# Patient Record
Sex: FEMALE | Race: WHITE | NOT HISPANIC OR LATINO
[De-identification: names, ages, dates, MRNs, and addresses within clinical notes are randomized per-mention and may not be internally consistent; named-entity substitution may affect disease eponyms.]

---

## 2017-06-28 ENCOUNTER — APPOINTMENT (OUTPATIENT)
Dept: ORTHOPEDIC SURGERY | Facility: CLINIC | Age: 82
End: 2017-06-28

## 2017-06-28 VITALS
HEART RATE: 65 BPM | SYSTOLIC BLOOD PRESSURE: 157 MMHG | BODY MASS INDEX: 29.12 KG/M2 | DIASTOLIC BLOOD PRESSURE: 72 MMHG | WEIGHT: 135 LBS | TEMPERATURE: 97.9 F | HEIGHT: 57 IN

## 2017-06-28 DIAGNOSIS — M25.572 PAIN IN LEFT ANKLE AND JOINTS OF LEFT FOOT: ICD-10-CM

## 2017-06-28 DIAGNOSIS — Z86.79 PERSONAL HISTORY OF OTHER DISEASES OF THE CIRCULATORY SYSTEM: ICD-10-CM

## 2017-06-28 DIAGNOSIS — Z86.39 PERSONAL HISTORY OF OTHER ENDOCRINE, NUTRITIONAL AND METABOLIC DISEASE: ICD-10-CM

## 2017-06-29 ENCOUNTER — INPATIENT (INPATIENT)
Facility: HOSPITAL | Age: 82
LOS: 2 days | Discharge: HOME | DRG: 194 | End: 2017-07-02
Attending: HOSPITALIST | Admitting: HOSPITALIST
Payer: MEDICARE

## 2017-06-29 VITALS
WEIGHT: 134.92 LBS | RESPIRATION RATE: 24 BRPM | SYSTOLIC BLOOD PRESSURE: 135 MMHG | HEIGHT: 60 IN | OXYGEN SATURATION: 96 % | TEMPERATURE: 99 F | HEART RATE: 90 BPM | DIASTOLIC BLOOD PRESSURE: 71 MMHG

## 2017-06-29 LAB
ALBUMIN SERPL ELPH-MCNC: 4.1 G/DL — SIGNIFICANT CHANGE UP (ref 3.3–5.2)
ALP SERPL-CCNC: 55 U/L — SIGNIFICANT CHANGE UP (ref 40–120)
ALT FLD-CCNC: 17 U/L — SIGNIFICANT CHANGE UP
ANION GAP SERPL CALC-SCNC: 12 MMOL/L — SIGNIFICANT CHANGE UP (ref 5–17)
APTT BLD: 31.1 SEC — SIGNIFICANT CHANGE UP (ref 27.5–37.4)
AST SERPL-CCNC: 25 U/L — SIGNIFICANT CHANGE UP
BASOPHILS # BLD AUTO: 0 K/UL — SIGNIFICANT CHANGE UP (ref 0–0.2)
BASOPHILS NFR BLD AUTO: 0.2 % — SIGNIFICANT CHANGE UP (ref 0–2)
BILIRUB SERPL-MCNC: 0.8 MG/DL — SIGNIFICANT CHANGE UP (ref 0.4–2)
BUN SERPL-MCNC: 22 MG/DL — HIGH (ref 8–20)
CALCIUM SERPL-MCNC: 9.6 MG/DL — SIGNIFICANT CHANGE UP (ref 8.6–10.2)
CHLORIDE SERPL-SCNC: 102 MMOL/L — SIGNIFICANT CHANGE UP (ref 98–107)
CO2 SERPL-SCNC: 30 MMOL/L — HIGH (ref 22–29)
CREAT SERPL-MCNC: 0.86 MG/DL — SIGNIFICANT CHANGE UP (ref 0.5–1.3)
D DIMER BLD IA.RAPID-MCNC: 553 NG/ML DDU — HIGH
EOSINOPHIL # BLD AUTO: 0.1 K/UL — SIGNIFICANT CHANGE UP (ref 0–0.5)
EOSINOPHIL NFR BLD AUTO: 0.3 % — SIGNIFICANT CHANGE UP (ref 0–6)
GLUCOSE SERPL-MCNC: 92 MG/DL — SIGNIFICANT CHANGE UP (ref 70–115)
HCT VFR BLD CALC: 36.1 % — LOW (ref 37–47)
HGB BLD-MCNC: 11.9 G/DL — LOW (ref 12–16)
INR BLD: 1.02 RATIO — SIGNIFICANT CHANGE UP (ref 0.88–1.16)
LACTATE BLDV-MCNC: 1.4 MMOL/L — SIGNIFICANT CHANGE UP (ref 0.5–2)
LYMPHOCYTES # BLD AUTO: 2.1 K/UL — SIGNIFICANT CHANGE UP (ref 1–4.8)
LYMPHOCYTES # BLD AUTO: 8.7 % — LOW (ref 20–55)
MCHC RBC-ENTMCNC: 28.7 PG — SIGNIFICANT CHANGE UP (ref 27–31)
MCHC RBC-ENTMCNC: 33 G/DL — SIGNIFICANT CHANGE UP (ref 32–36)
MCV RBC AUTO: 87.2 FL — SIGNIFICANT CHANGE UP (ref 81–99)
MONOCYTES # BLD AUTO: 2.6 K/UL — HIGH (ref 0–0.8)
MONOCYTES NFR BLD AUTO: 11.1 % — HIGH (ref 3–10)
NEUTROPHILS # BLD AUTO: 18.8 K/UL — HIGH (ref 1.8–8)
NEUTROPHILS NFR BLD AUTO: 79.4 % — HIGH (ref 37–73)
NT-PROBNP SERPL-SCNC: 846 PG/ML — HIGH (ref 0–300)
PLAT MORPH BLD: NORMAL — SIGNIFICANT CHANGE UP
PLATELET # BLD AUTO: 321 K/UL — SIGNIFICANT CHANGE UP (ref 150–400)
POTASSIUM SERPL-MCNC: 5 MMOL/L — SIGNIFICANT CHANGE UP (ref 3.5–5.3)
POTASSIUM SERPL-SCNC: 5 MMOL/L — SIGNIFICANT CHANGE UP (ref 3.5–5.3)
PROT SERPL-MCNC: 6.9 G/DL — SIGNIFICANT CHANGE UP (ref 6.6–8.7)
PROTHROM AB SERPL-ACNC: 11.2 SEC — SIGNIFICANT CHANGE UP (ref 9.8–12.7)
RBC # BLD: 4.14 M/UL — LOW (ref 4.4–5.2)
RBC # FLD: 15.2 % — SIGNIFICANT CHANGE UP (ref 11–15.6)
RBC BLD AUTO: NORMAL — SIGNIFICANT CHANGE UP
SODIUM SERPL-SCNC: 144 MMOL/L — SIGNIFICANT CHANGE UP (ref 135–145)
TROPONIN T SERPL-MCNC: 0.02 NG/ML — SIGNIFICANT CHANGE UP (ref 0–0.06)
WBC # BLD: 23.7 K/UL — HIGH (ref 4.8–10.8)
WBC # FLD AUTO: 23.7 K/UL — HIGH (ref 4.8–10.8)

## 2017-06-29 PROCEDURE — 93010 ELECTROCARDIOGRAM REPORT: CPT

## 2017-06-29 PROCEDURE — 99285 EMERGENCY DEPT VISIT HI MDM: CPT

## 2017-06-29 PROCEDURE — 93970 EXTREMITY STUDY: CPT | Mod: 26

## 2017-06-29 PROCEDURE — 71020: CPT | Mod: 26

## 2017-06-29 PROCEDURE — 99223 1ST HOSP IP/OBS HIGH 75: CPT

## 2017-06-29 PROCEDURE — 71275 CT ANGIOGRAPHY CHEST: CPT | Mod: 26

## 2017-06-29 RX ORDER — SODIUM CHLORIDE 9 MG/ML
3 INJECTION INTRAMUSCULAR; INTRAVENOUS; SUBCUTANEOUS ONCE
Qty: 0 | Refills: 0 | Status: COMPLETED | OUTPATIENT
Start: 2017-06-29 | End: 2017-06-29

## 2017-06-29 RX ADMIN — TRAMADOL HYDROCHLORIDE 50 MILLIGRAM(S): 50 TABLET ORAL at 23:23

## 2017-06-29 RX ADMIN — TRAMADOL HYDROCHLORIDE 50 MILLIGRAM(S): 50 TABLET ORAL at 22:41

## 2017-06-29 RX ADMIN — SODIUM CHLORIDE 3 MILLILITER(S): 9 INJECTION INTRAMUSCULAR; INTRAVENOUS; SUBCUTANEOUS at 19:36

## 2017-06-29 NOTE — ED PROVIDER NOTE - OBJECTIVE STATEMENT
84 y/o F presents for SOB has been having chronic issue with mucus production and felt that the sob was getting worse so came in for that reason denies fevers no chest or abd pain no prior hx of dvt or pe has been having some chronic pain in the left ankle and undergoing w/u with pcp dr gusman

## 2017-06-29 NOTE — H&P ADULT - HISTORY OF PRESENT ILLNESS
84 y/o female came to the ER because of worsening of SOB and wheezing. patient added that she has productive cough and SOB for about 1 month. low grade fever today.

## 2017-06-29 NOTE — ED ADULT TRIAGE NOTE - CHIEF COMPLAINT QUOTE
pt BIBA from home with SOB and dizziness all day. pt AOX3, on 3L of 02 on arrival, no hx of COPD or 02 use.

## 2017-06-29 NOTE — ED PROVIDER NOTE - PROGRESS NOTE DETAILS
Labs as noted.  No old labs for comparison.  Will obtain CT chest to R/O PE.  Case d/w hospitalist/Dr. Sheikh for PMD/Dr. Bell and will admit for further w/u and evaluation

## 2017-06-29 NOTE — ED PROVIDER NOTE - MEDICAL DECISION MAKING DETAILS
labs/ekg/doppler/dimer re-eval symptoms of chronic cough with some sob non smoker no hx of hypercoag or clot no prolonged periods of immobility

## 2017-06-29 NOTE — ED PROVIDER NOTE - NS ED MD DISPO DIVISION
Per provider's request, SW met with patient to discuss completing an Advance Directive.  Patient reported that he remembers completing on in the past.  SW looked in EPIC and found that patient does have an Advance Directive on file.  SW went through the completed POA and patient indicated that it is still current and that no changes need to be made.   Export

## 2017-06-30 DIAGNOSIS — Z98.1 ARTHRODESIS STATUS: Chronic | ICD-10-CM

## 2017-06-30 DIAGNOSIS — I10 ESSENTIAL (PRIMARY) HYPERTENSION: ICD-10-CM

## 2017-06-30 DIAGNOSIS — M54.5 LOW BACK PAIN: ICD-10-CM

## 2017-06-30 DIAGNOSIS — J18.1 LOBAR PNEUMONIA, UNSPECIFIED ORGANISM: ICD-10-CM

## 2017-06-30 DIAGNOSIS — R06.02 SHORTNESS OF BREATH: ICD-10-CM

## 2017-06-30 LAB
ANION GAP SERPL CALC-SCNC: 10 MMOL/L — SIGNIFICANT CHANGE UP (ref 5–17)
APPEARANCE UR: CLEAR — SIGNIFICANT CHANGE UP
BACTERIA # UR AUTO: ABNORMAL
BILIRUB UR-MCNC: NEGATIVE — SIGNIFICANT CHANGE UP
BUN SERPL-MCNC: 17 MG/DL — SIGNIFICANT CHANGE UP (ref 8–20)
CALCIUM SERPL-MCNC: 9.1 MG/DL — SIGNIFICANT CHANGE UP (ref 8.6–10.2)
CHLORIDE SERPL-SCNC: 104 MMOL/L — SIGNIFICANT CHANGE UP (ref 98–107)
CO2 SERPL-SCNC: 28 MMOL/L — SIGNIFICANT CHANGE UP (ref 22–29)
COLOR SPEC: YELLOW — SIGNIFICANT CHANGE UP
COMMENT - URINE: SIGNIFICANT CHANGE UP
CREAT SERPL-MCNC: 0.73 MG/DL — SIGNIFICANT CHANGE UP (ref 0.5–1.3)
DIFF PNL FLD: NEGATIVE — SIGNIFICANT CHANGE UP
EPI CELLS # UR: SIGNIFICANT CHANGE UP
GLUCOSE SERPL-MCNC: 154 MG/DL — HIGH (ref 70–115)
GLUCOSE UR QL: NEGATIVE MG/DL — SIGNIFICANT CHANGE UP
HCT VFR BLD CALC: 32.8 % — LOW (ref 37–47)
HGB BLD-MCNC: 10.8 G/DL — LOW (ref 12–16)
KETONES UR-MCNC: NEGATIVE — SIGNIFICANT CHANGE UP
LEUKOCYTE ESTERASE UR-ACNC: ABNORMAL
MAGNESIUM SERPL-MCNC: 2.2 MG/DL — SIGNIFICANT CHANGE UP (ref 1.6–2.6)
MCHC RBC-ENTMCNC: 28.6 PG — SIGNIFICANT CHANGE UP (ref 27–31)
MCHC RBC-ENTMCNC: 32.9 G/DL — SIGNIFICANT CHANGE UP (ref 32–36)
MCV RBC AUTO: 87 FL — SIGNIFICANT CHANGE UP (ref 81–99)
NITRITE UR-MCNC: NEGATIVE — SIGNIFICANT CHANGE UP
PH UR: 5 — SIGNIFICANT CHANGE UP (ref 5–8)
PLATELET # BLD AUTO: 302 K/UL — SIGNIFICANT CHANGE UP (ref 150–400)
POTASSIUM SERPL-MCNC: 4.2 MMOL/L — SIGNIFICANT CHANGE UP (ref 3.5–5.3)
POTASSIUM SERPL-SCNC: 4.2 MMOL/L — SIGNIFICANT CHANGE UP (ref 3.5–5.3)
PROCALCITONIN SERPL-MCNC: 0.7 NG/ML — HIGH (ref 0–0.04)
PROT UR-MCNC: NEGATIVE MG/DL — SIGNIFICANT CHANGE UP
RBC # BLD: 3.77 M/UL — LOW (ref 4.4–5.2)
RBC # FLD: 15.4 % — SIGNIFICANT CHANGE UP (ref 11–15.6)
RBC CASTS # UR COMP ASSIST: SIGNIFICANT CHANGE UP /HPF (ref 0–4)
SODIUM SERPL-SCNC: 142 MMOL/L — SIGNIFICANT CHANGE UP (ref 135–145)
SP GR SPEC: 1.01 — SIGNIFICANT CHANGE UP (ref 1.01–1.02)
UROBILINOGEN FLD QL: NEGATIVE MG/DL — SIGNIFICANT CHANGE UP
WBC # BLD: 16.1 K/UL — HIGH (ref 4.8–10.8)
WBC # FLD AUTO: 16.1 K/UL — HIGH (ref 4.8–10.8)
WBC UR QL: ABNORMAL

## 2017-06-30 PROCEDURE — 99233 SBSQ HOSP IP/OBS HIGH 50: CPT

## 2017-06-30 PROCEDURE — 93306 TTE W/DOPPLER COMPLETE: CPT | Mod: 26

## 2017-06-30 RX ORDER — DILTIAZEM HCL 120 MG
240 CAPSULE, EXT RELEASE 24 HR ORAL DAILY
Qty: 0 | Refills: 0 | Status: DISCONTINUED | OUTPATIENT
Start: 2017-06-30 | End: 2017-07-02

## 2017-06-30 RX ORDER — CEFTRIAXONE 500 MG/1
1 INJECTION, POWDER, FOR SOLUTION INTRAMUSCULAR; INTRAVENOUS EVERY 24 HOURS
Qty: 0 | Refills: 0 | Status: DISCONTINUED | OUTPATIENT
Start: 2017-07-01 | End: 2017-07-02

## 2017-06-30 RX ORDER — AZITHROMYCIN 500 MG/1
500 TABLET, FILM COATED ORAL EVERY 24 HOURS
Qty: 0 | Refills: 0 | Status: DISCONTINUED | OUTPATIENT
Start: 2017-07-01 | End: 2017-07-02

## 2017-06-30 RX ORDER — DICLOFENAC SODIUM 75 MG/1
0 TABLET, DELAYED RELEASE ORAL
Qty: 0 | Refills: 0 | COMMUNITY

## 2017-06-30 RX ORDER — TRAMADOL HYDROCHLORIDE 50 MG/1
50 TABLET ORAL ONCE
Qty: 0 | Refills: 0 | Status: DISCONTINUED | OUTPATIENT
Start: 2017-06-30 | End: 2017-06-29

## 2017-06-30 RX ORDER — ENOXAPARIN SODIUM 100 MG/ML
40 INJECTION SUBCUTANEOUS EVERY 24 HOURS
Qty: 0 | Refills: 0 | Status: DISCONTINUED | OUTPATIENT
Start: 2017-06-30 | End: 2017-07-02

## 2017-06-30 RX ORDER — TRAMADOL HYDROCHLORIDE 50 MG/1
25 TABLET ORAL
Qty: 0 | Refills: 0 | Status: DISCONTINUED | OUTPATIENT
Start: 2017-06-30 | End: 2017-06-30

## 2017-06-30 RX ORDER — ATORVASTATIN CALCIUM 80 MG/1
40 TABLET, FILM COATED ORAL AT BEDTIME
Qty: 0 | Refills: 0 | Status: DISCONTINUED | OUTPATIENT
Start: 2017-06-30 | End: 2017-07-02

## 2017-06-30 RX ORDER — PANTOPRAZOLE SODIUM 20 MG/1
40 TABLET, DELAYED RELEASE ORAL
Qty: 0 | Refills: 0 | Status: DISCONTINUED | OUTPATIENT
Start: 2017-06-30 | End: 2017-07-02

## 2017-06-30 RX ORDER — AZITHROMYCIN 500 MG/1
TABLET, FILM COATED ORAL
Qty: 0 | Refills: 0 | Status: DISCONTINUED | OUTPATIENT
Start: 2017-06-30 | End: 2017-07-02

## 2017-06-30 RX ORDER — AZITHROMYCIN 500 MG/1
500 TABLET, FILM COATED ORAL ONCE
Qty: 0 | Refills: 0 | Status: COMPLETED | OUTPATIENT
Start: 2017-06-30 | End: 2017-06-30

## 2017-06-30 RX ORDER — TRAMADOL HYDROCHLORIDE 50 MG/1
100 TABLET ORAL THREE TIMES A DAY
Qty: 0 | Refills: 0 | Status: DISCONTINUED | OUTPATIENT
Start: 2017-06-30 | End: 2017-07-02

## 2017-06-30 RX ORDER — ONDANSETRON 8 MG/1
4 TABLET, FILM COATED ORAL EVERY 6 HOURS
Qty: 0 | Refills: 0 | Status: DISCONTINUED | OUTPATIENT
Start: 2017-06-30 | End: 2017-07-02

## 2017-06-30 RX ORDER — CEFTRIAXONE 500 MG/1
1 INJECTION, POWDER, FOR SOLUTION INTRAMUSCULAR; INTRAVENOUS ONCE
Qty: 0 | Refills: 0 | Status: COMPLETED | OUTPATIENT
Start: 2017-06-30 | End: 2017-06-30

## 2017-06-30 RX ORDER — SENNA PLUS 8.6 MG/1
2 TABLET ORAL AT BEDTIME
Qty: 0 | Refills: 0 | Status: DISCONTINUED | OUTPATIENT
Start: 2017-06-30 | End: 2017-07-02

## 2017-06-30 RX ORDER — CEFTRIAXONE 500 MG/1
INJECTION, POWDER, FOR SOLUTION INTRAMUSCULAR; INTRAVENOUS
Qty: 0 | Refills: 0 | Status: DISCONTINUED | OUTPATIENT
Start: 2017-06-30 | End: 2017-07-02

## 2017-06-30 RX ORDER — GABAPENTIN 400 MG/1
300 CAPSULE ORAL
Qty: 0 | Refills: 0 | Status: DISCONTINUED | OUTPATIENT
Start: 2017-06-30 | End: 2017-06-30

## 2017-06-30 RX ORDER — ACETAMINOPHEN 500 MG
650 TABLET ORAL EVERY 6 HOURS
Qty: 0 | Refills: 0 | Status: DISCONTINUED | OUTPATIENT
Start: 2017-06-30 | End: 2017-07-02

## 2017-06-30 RX ORDER — VALSARTAN 80 MG/1
160 TABLET ORAL DAILY
Qty: 0 | Refills: 0 | Status: DISCONTINUED | OUTPATIENT
Start: 2017-06-30 | End: 2017-07-02

## 2017-06-30 RX ADMIN — Medication 240 MILLIGRAM(S): at 06:37

## 2017-06-30 RX ADMIN — VALSARTAN 160 MILLIGRAM(S): 80 TABLET ORAL at 06:37

## 2017-06-30 RX ADMIN — ATORVASTATIN CALCIUM 40 MILLIGRAM(S): 80 TABLET, FILM COATED ORAL at 21:30

## 2017-06-30 RX ADMIN — TRAMADOL HYDROCHLORIDE 100 MILLIGRAM(S): 50 TABLET ORAL at 21:30

## 2017-06-30 RX ADMIN — CEFTRIAXONE 100 GRAM(S): 500 INJECTION, POWDER, FOR SOLUTION INTRAMUSCULAR; INTRAVENOUS at 02:06

## 2017-06-30 RX ADMIN — TRAMADOL HYDROCHLORIDE 100 MILLIGRAM(S): 50 TABLET ORAL at 16:30

## 2017-06-30 RX ADMIN — TRAMADOL HYDROCHLORIDE 25 MILLIGRAM(S): 50 TABLET ORAL at 06:37

## 2017-06-30 RX ADMIN — TRAMADOL HYDROCHLORIDE 50 MILLIGRAM(S): 50 TABLET ORAL at 04:42

## 2017-06-30 RX ADMIN — AZITHROMYCIN 255 MILLIGRAM(S): 500 TABLET, FILM COATED ORAL at 03:50

## 2017-06-30 RX ADMIN — GABAPENTIN 300 MILLIGRAM(S): 400 CAPSULE ORAL at 06:39

## 2017-06-30 RX ADMIN — TRAMADOL HYDROCHLORIDE 100 MILLIGRAM(S): 50 TABLET ORAL at 22:30

## 2017-06-30 RX ADMIN — PANTOPRAZOLE SODIUM 40 MILLIGRAM(S): 20 TABLET, DELAYED RELEASE ORAL at 06:37

## 2017-06-30 RX ADMIN — TRAMADOL HYDROCHLORIDE 100 MILLIGRAM(S): 50 TABLET ORAL at 15:33

## 2017-06-30 RX ADMIN — TRAMADOL HYDROCHLORIDE 25 MILLIGRAM(S): 50 TABLET ORAL at 07:37

## 2017-06-30 NOTE — SWALLOW BEDSIDE ASSESSMENT ADULT - SLP PERTINENT HISTORY OF CURRENT PROBLEM
Pt reports food sticking in lower esophagus" when she gets stressed. Pt reports she was scheduled to have an endoscopy PTA

## 2017-06-30 NOTE — PHYSICAL THERAPY INITIAL EVALUATION ADULT - ADDITIONAL COMMENTS
Pt lives in a private home with 3 steps to enter (+) HR. Pt uses her rollator all the time. She also still drives.

## 2017-06-30 NOTE — PATIENT PROFILE ADULT. - ABILITY TO HEAR (WITH HEARING AID OR HEARING APPLIANCE IF NORMALLY USED):
Mildly to Moderately Impaired: difficulty hearing in some environments or speaker may need to increase volume or speak distinctly/2 hearing aids

## 2017-06-30 NOTE — PROGRESS NOTE ADULT - ASSESSMENT
1) CAP --> follow up procalcitonin  --> c.w iv abx  --> follow up cultures  --> likely transtion to oral abx tomorrow  --> speech consult appreciated    2) htn --> home meds    3) hld --< statin     4) dvt prop --> lovenox sub q    5) chronic pain --. discontinued gabapentin as per family request  --> follow up with private pain management    dispo --> follow up tte

## 2017-06-30 NOTE — PROGRESS NOTE ADULT - SUBJECTIVE AND OBJECTIVE BOX
MAMTA TALAVERA    [id number]    85y      Female    INTERVAL HPI/OVERNIGHT EVENTS:    patient being seen for cap, chronic pain and med management. patient seen at bedside with daughter in law and states feeling better.     last 24 hours patient is afebrile.       REVIEW OF SYSTEMS:    CONSTITUTIONAL: No fever, weight loss, or fatigue  RESPIRATORY: wheezing   CARDIOVASCULAR: No chest pain, palpitations  GASTROINTESTINAL: No abdominal or epigastric pain. No nausea, vomiting  NEUROLOGICAL: No headaches, memory loss, loss of strength.  MISCELLANEOUS:      Vital Signs Last 24 Hrs  T(C): 36.4 (2017 08:06), Max: 37.4 (2017 00:36)  T(F): 97.6 (2017 08:06), Max: 99.4 (2017 00:36)  HR: 65 (2017 08:06) (65 - 90)  BP: 142/68 (2017 08:06) (135/71 - 160/80)  BP(mean): --  RR: 18 (2017 08:06) (18 - 24)  SpO2: 100% (2017 08:06) (96% - 100%)    PHYSICAL EXAM:    GENERAL: NAD, pleasant   HEENT: PERRL, +EOMI  NECK: soft, Supple, No JVD,   CHEST/LUNG: min exp wheezing,   HEART: S1S2+, Regular rate and rhythm; No murmurs  ABDOMEN: Soft, Nontender,   EXTREMITIES:  2+ Peripheral Pulses, No edema  SKIN: No rashes or lesions  NEURO: AAOX3, no focal deficits  PSYCH: normal mood      LABS:                        10.8   16.1  )-----------( 302      ( 2017 11:47 )             32.8     06    142  |  104  |  17.0  ----------------------------<  154<H>  4.2   |  28.0  |  0.73    Ca    9.1      2017 11:47  Mg     2.2         TPro  6.9  /  Alb  4.1  /  TBili  0.8  /  DBili  x   /  AST  25  /  ALT  17  /  AlkPhos  55      PT/INR - ( 2017 19:22 )   PT: 11.2 sec;   INR: 1.02 ratio         PTT - ( 2017 19:22 )  PTT:31.1 sec  Urinalysis Basic - ( 2017 11:09 )    Color: Yellow / Appearance: Clear / S.010 / pH: x  Gluc: x / Ketone: Negative  / Bili: Negative / Urobili: Negative mg/dL   Blood: x / Protein: Negative mg/dL / Nitrite: Negative   Leuk Esterase: Trace / RBC: 0-2 /HPF / WBC 6-10   Sq Epi: x / Non Sq Epi: x / Bacteria: x          MEDICATIONS  (STANDING):  enoxaparin Injectable 40 milliGRAM(s) SubCutaneous every 24 hours  valsartan 160 milliGRAM(s) Oral daily  diltiazem    milliGRAM(s) Oral daily  atorvastatin 40 milliGRAM(s) Oral at bedtime  pantoprazole    Tablet 40 milliGRAM(s) Oral before breakfast  cefTRIAXone   IVPB   IV Intermittent   azithromycin  IVPB   IV Intermittent     MEDICATIONS  (PRN):  acetaminophen   Tablet 650 milliGRAM(s) Oral every 6 hours PRN For Temp greater than 38 C (100.4 F)  ondansetron Injectable 4 milliGRAM(s) IV Push every 6 hours PRN Nausea and/or Vomiting  senna 2 Tablet(s) Oral at bedtime PRN Constipation  traMADol 100 milliGRAM(s) Oral three times a day PRN Moderate Pain (4 - 6)      RADIOLOGY & ADDITIONAL TESTS:    tte done

## 2017-06-30 NOTE — PATIENT PROFILE ADULT. - VISION (WITH CORRECTIVE LENSES IF THE PATIENT USUALLY WEARS THEM):
1 pair/Partially impaired: cannot see medication labels or newsprint, but can see obstacles in path, and the surrounding layout; can count fingers at arm's length

## 2017-07-01 LAB
ANION GAP SERPL CALC-SCNC: 7 MMOL/L — SIGNIFICANT CHANGE UP (ref 5–17)
BUN SERPL-MCNC: 19 MG/DL — SIGNIFICANT CHANGE UP (ref 8–20)
CALCIUM SERPL-MCNC: 9.3 MG/DL — SIGNIFICANT CHANGE UP (ref 8.6–10.2)
CHLORIDE SERPL-SCNC: 102 MMOL/L — SIGNIFICANT CHANGE UP (ref 98–107)
CO2 SERPL-SCNC: 32 MMOL/L — HIGH (ref 22–29)
CREAT SERPL-MCNC: 0.75 MG/DL — SIGNIFICANT CHANGE UP (ref 0.5–1.3)
CULTURE RESULTS: NO GROWTH — SIGNIFICANT CHANGE UP
GLUCOSE SERPL-MCNC: 148 MG/DL — HIGH (ref 70–115)
HCT VFR BLD CALC: 33 % — LOW (ref 37–47)
HGB BLD-MCNC: 10.6 G/DL — LOW (ref 12–16)
MAGNESIUM SERPL-MCNC: 2 MG/DL — SIGNIFICANT CHANGE UP (ref 1.6–2.6)
MCHC RBC-ENTMCNC: 28.1 PG — SIGNIFICANT CHANGE UP (ref 27–31)
MCHC RBC-ENTMCNC: 32.1 G/DL — SIGNIFICANT CHANGE UP (ref 32–36)
MCV RBC AUTO: 87.5 FL — SIGNIFICANT CHANGE UP (ref 81–99)
PLATELET # BLD AUTO: 300 K/UL — SIGNIFICANT CHANGE UP (ref 150–400)
POTASSIUM SERPL-MCNC: 4.2 MMOL/L — SIGNIFICANT CHANGE UP (ref 3.5–5.3)
POTASSIUM SERPL-SCNC: 4.2 MMOL/L — SIGNIFICANT CHANGE UP (ref 3.5–5.3)
RBC # BLD: 3.77 M/UL — LOW (ref 4.4–5.2)
RBC # FLD: 15.2 % — SIGNIFICANT CHANGE UP (ref 11–15.6)
SODIUM SERPL-SCNC: 141 MMOL/L — SIGNIFICANT CHANGE UP (ref 135–145)
SPECIMEN SOURCE: SIGNIFICANT CHANGE UP
WBC # BLD: 10.8 K/UL — SIGNIFICANT CHANGE UP (ref 4.8–10.8)
WBC # FLD AUTO: 10.8 K/UL — SIGNIFICANT CHANGE UP (ref 4.8–10.8)

## 2017-07-01 PROCEDURE — 99233 SBSQ HOSP IP/OBS HIGH 50: CPT

## 2017-07-01 RX ADMIN — TRAMADOL HYDROCHLORIDE 100 MILLIGRAM(S): 50 TABLET ORAL at 23:00

## 2017-07-01 RX ADMIN — PANTOPRAZOLE SODIUM 40 MILLIGRAM(S): 20 TABLET, DELAYED RELEASE ORAL at 05:21

## 2017-07-01 RX ADMIN — CEFTRIAXONE 100 GRAM(S): 500 INJECTION, POWDER, FOR SOLUTION INTRAMUSCULAR; INTRAVENOUS at 04:07

## 2017-07-01 RX ADMIN — TRAMADOL HYDROCHLORIDE 100 MILLIGRAM(S): 50 TABLET ORAL at 18:02

## 2017-07-01 RX ADMIN — ATORVASTATIN CALCIUM 40 MILLIGRAM(S): 80 TABLET, FILM COATED ORAL at 23:02

## 2017-07-01 RX ADMIN — TRAMADOL HYDROCHLORIDE 100 MILLIGRAM(S): 50 TABLET ORAL at 08:52

## 2017-07-01 RX ADMIN — TRAMADOL HYDROCHLORIDE 100 MILLIGRAM(S): 50 TABLET ORAL at 23:01

## 2017-07-01 RX ADMIN — TRAMADOL HYDROCHLORIDE 100 MILLIGRAM(S): 50 TABLET ORAL at 09:52

## 2017-07-01 RX ADMIN — AZITHROMYCIN 255 MILLIGRAM(S): 500 TABLET, FILM COATED ORAL at 05:20

## 2017-07-01 RX ADMIN — VALSARTAN 160 MILLIGRAM(S): 80 TABLET ORAL at 05:21

## 2017-07-01 RX ADMIN — TRAMADOL HYDROCHLORIDE 100 MILLIGRAM(S): 50 TABLET ORAL at 17:02

## 2017-07-01 RX ADMIN — Medication 240 MILLIGRAM(S): at 05:21

## 2017-07-01 NOTE — PROGRESS NOTE ADULT - ASSESSMENT
1) CAP --> procalcitonin elevated  --> c.w iv abx  --> follow up cultures  --> transition to oral tomorrow     2) htn --> home meds    3) hld --< statin     4) chronic diastolic HF --> on arb  --> follow up with outpatient cardio    5) chronic pain --. discontinued gabapentin as per family request  --> follow up with private pain management    dispo --> dc tomorrow

## 2017-07-01 NOTE — PROGRESS NOTE ADULT - SUBJECTIVE AND OBJECTIVE BOX
MAMTA TALAVERA    [id number]    85y      Female    INTERVAL HPI/OVERNIGHT EVENTS:    patient being seen for cap, chronic pain and med management. patient seen at bedside and states feeling well.       REVIEW OF SYSTEMS:    CONSTITUTIONAL: No fever, weight loss, or fatigue  RESPIRATORY: No cough, wheezing, hemoptysis; No shortness of breath  CARDIOVASCULAR: No chest pain, palpitations  GASTROINTESTINAL: No abdominal or epigastric pain. No nausea, vomiting  NEUROLOGICAL: No headaches, memory loss, loss of strength.  MISCELLANEOUS:      Vital Signs Last 24 Hrs  T(C): 36.7 (2017 07:37), Max: 36.7 (2017 07:37)  T(F): 98 (2017 07:37), Max: 98 (2017 07:37)  HR: 61 (2017 07:37) (61 - 73)  BP: 143/72 (2017 07:37) (138/72 - 166/76)  BP(mean): --  RR: 18 (2017 07:37) (18 - 20)  SpO2: 98% (2017 07:37) (92% - 98%)    PHYSICAL EXAM:    GENERAL: NAD, pleasant   HEENT: PERRL, +EOMI  NECK: soft, Supple, No JVD,   CHEST/LUNG: min exp wheezing,   HEART: S1S2+, Regular rate and rhythm; No murmurs  ABDOMEN: Soft, Nontender,   EXTREMITIES:  2+ Peripheral Pulses, No edema  SKIN: No rashes or lesions  NEURO: AAOX3, no focal deficits  PSYCH: normal mood      LABS:                        10.6   10.8  )-----------( 300      ( 2017 06:35 )             33.0     07-    141  |  102  |  19.0  ----------------------------<  148<H>  4.2   |  32.0<H>  |  0.75    Ca    9.3      2017 06:35  Mg     2.0     07-    TPro  6.9  /  Alb  4.1  /  TBili  0.8  /  DBili  x   /  AST  25  /  ALT  17  /  AlkPhos  55      PT/INR - ( 2017 19:22 )   PT: 11.2 sec;   INR: 1.02 ratio         PTT - ( 2017 19:22 )  PTT:31.1 sec  Urinalysis Basic - ( 2017 11:09 )    Color: Yellow / Appearance: Clear / S.010 / pH: x  Gluc: x / Ketone: Negative  / Bili: Negative / Urobili: Negative mg/dL   Blood: x / Protein: Negative mg/dL / Nitrite: Negative   Leuk Esterase: Trace / RBC: 0-2 /HPF / WBC 6-10   Sq Epi: x / Non Sq Epi: x / Bacteria: x        MEDICATIONS  (STANDING):  enoxaparin Injectable 40 milliGRAM(s) SubCutaneous every 24 hours  valsartan 160 milliGRAM(s) Oral daily  diltiazem    milliGRAM(s) Oral daily  atorvastatin 40 milliGRAM(s) Oral at bedtime  pantoprazole    Tablet 40 milliGRAM(s) Oral before breakfast  cefTRIAXone   IVPB   IV Intermittent   azithromycin  IVPB   IV Intermittent   cefTRIAXone   IVPB 1 Gram(s) IV Intermittent every 24 hours  azithromycin  IVPB 500 milliGRAM(s) IV Intermittent every 24 hours    MEDICATIONS  (PRN):  acetaminophen   Tablet 650 milliGRAM(s) Oral every 6 hours PRN For Temp greater than 38 C (100.4 F)  ondansetron Injectable 4 milliGRAM(s) IV Push every 6 hours PRN Nausea and/or Vomiting  senna 2 Tablet(s) Oral at bedtime PRN Constipation  traMADol 100 milliGRAM(s) Oral three times a day PRN Moderate Pain (4 - 6)      RADIOLOGY & ADDITIONAL TESTS:    tte:     Summary:   1. Technically good study.   2. Hyperdynamic global left ventricular systolic function.   3. Left ventricular ejection fraction, by visual estimation, is >75%.   4. LV mid cavitary obliteration is seen.   5. Elevated left atrial and left ventricular end-diastolic pressures.   6. Moderately increased LV wall thickness.   7. Spectral Doppler shows pseudonormal pattern of left ventricular   myocardial filling (Grade II diastolic dysfunction).  8. moderate AS

## 2017-07-02 VITALS
OXYGEN SATURATION: 90 % | HEART RATE: 62 BPM | SYSTOLIC BLOOD PRESSURE: 139 MMHG | DIASTOLIC BLOOD PRESSURE: 67 MMHG | RESPIRATION RATE: 18 BRPM | TEMPERATURE: 98 F

## 2017-07-02 LAB
ANION GAP SERPL CALC-SCNC: 13 MMOL/L — SIGNIFICANT CHANGE UP (ref 5–17)
BUN SERPL-MCNC: 19 MG/DL — SIGNIFICANT CHANGE UP (ref 8–20)
CALCIUM SERPL-MCNC: 9.6 MG/DL — SIGNIFICANT CHANGE UP (ref 8.6–10.2)
CHLORIDE SERPL-SCNC: 102 MMOL/L — SIGNIFICANT CHANGE UP (ref 98–107)
CO2 SERPL-SCNC: 28 MMOL/L — SIGNIFICANT CHANGE UP (ref 22–29)
CREAT SERPL-MCNC: 0.73 MG/DL — SIGNIFICANT CHANGE UP (ref 0.5–1.3)
GLUCOSE SERPL-MCNC: 85 MG/DL — SIGNIFICANT CHANGE UP (ref 70–115)
HCT VFR BLD CALC: 34.1 % — LOW (ref 37–47)
HGB BLD-MCNC: 11 G/DL — LOW (ref 12–16)
MAGNESIUM SERPL-MCNC: 1.8 MG/DL — SIGNIFICANT CHANGE UP (ref 1.6–2.6)
MCHC RBC-ENTMCNC: 28.4 PG — SIGNIFICANT CHANGE UP (ref 27–31)
MCHC RBC-ENTMCNC: 32.3 G/DL — SIGNIFICANT CHANGE UP (ref 32–36)
MCV RBC AUTO: 88.1 FL — SIGNIFICANT CHANGE UP (ref 81–99)
PLATELET # BLD AUTO: 321 K/UL — SIGNIFICANT CHANGE UP (ref 150–400)
POTASSIUM SERPL-MCNC: 4.2 MMOL/L — SIGNIFICANT CHANGE UP (ref 3.5–5.3)
POTASSIUM SERPL-SCNC: 4.2 MMOL/L — SIGNIFICANT CHANGE UP (ref 3.5–5.3)
RBC # BLD: 3.87 M/UL — LOW (ref 4.4–5.2)
RBC # FLD: 14.8 % — SIGNIFICANT CHANGE UP (ref 11–15.6)
SODIUM SERPL-SCNC: 143 MMOL/L — SIGNIFICANT CHANGE UP (ref 135–145)
WBC # BLD: 7.7 K/UL — SIGNIFICANT CHANGE UP (ref 4.8–10.8)
WBC # FLD AUTO: 7.7 K/UL — SIGNIFICANT CHANGE UP (ref 4.8–10.8)

## 2017-07-02 PROCEDURE — 99239 HOSP IP/OBS DSCHRG MGMT >30: CPT

## 2017-07-02 RX ORDER — TRAMADOL HYDROCHLORIDE 50 MG/1
0 TABLET ORAL
Qty: 0 | Refills: 0 | COMMUNITY

## 2017-07-02 RX ORDER — IBUPROFEN 200 MG
1 TABLET ORAL
Qty: 0 | Refills: 0 | COMMUNITY

## 2017-07-02 RX ORDER — TRAMADOL HYDROCHLORIDE 50 MG/1
2 TABLET ORAL
Qty: 0 | Refills: 0 | COMMUNITY
Start: 2017-07-02

## 2017-07-02 RX ORDER — GABAPENTIN 400 MG/1
1 CAPSULE ORAL
Qty: 0 | Refills: 0 | COMMUNITY

## 2017-07-02 RX ORDER — TRAMADOL HYDROCHLORIDE 50 MG/1
1 TABLET ORAL
Qty: 0 | Refills: 0 | COMMUNITY
Start: 2017-07-02

## 2017-07-02 RX ADMIN — CEFTRIAXONE 100 GRAM(S): 500 INJECTION, POWDER, FOR SOLUTION INTRAMUSCULAR; INTRAVENOUS at 02:06

## 2017-07-02 RX ADMIN — PANTOPRAZOLE SODIUM 40 MILLIGRAM(S): 20 TABLET, DELAYED RELEASE ORAL at 06:03

## 2017-07-02 RX ADMIN — VALSARTAN 160 MILLIGRAM(S): 80 TABLET ORAL at 05:57

## 2017-07-02 RX ADMIN — Medication 240 MILLIGRAM(S): at 05:57

## 2017-07-02 RX ADMIN — AZITHROMYCIN 255 MILLIGRAM(S): 500 TABLET, FILM COATED ORAL at 00:19

## 2017-07-02 RX ADMIN — TRAMADOL HYDROCHLORIDE 100 MILLIGRAM(S): 50 TABLET ORAL at 06:02

## 2017-07-02 NOTE — DISCHARGE NOTE ADULT - CARE PROVIDER_API CALL
Alcon Bell), Internal Medicine  [address]  [place] [address]  Phone: [phone]  Fax: [phone]

## 2017-07-02 NOTE — DISCHARGE NOTE ADULT - MEDICATION SUMMARY - MEDICATIONS TO STOP TAKING
I will STOP taking the medications listed below when I get home from the hospital:    gabapentin 300 mg oral capsule  -- 1 cap(s) by mouth 3 times a day    Motrin 600 mg oral tablet  -- 1 tab(s) by mouth every 6 hours    traMADol 50 mg oral tablet  --  by mouth

## 2017-07-02 NOTE — DISCHARGE NOTE ADULT - MEDICATION SUMMARY - MEDICATIONS TO TAKE
I will START or STAY ON the medications listed below when I get home from the hospital:    traMADol 50 mg oral tablet  -- 2 tab(s) by mouth 3 times a day, As needed, Moderate Pain (4 - 6)  -- Indication: For Pain    valsartan 160 mg oral tablet  -- 1 tab(s) by mouth once a day  -- Indication: For htn    dilTIAZem 240 mg/24 hours oral capsule, extended release  -- 1 cap(s) by mouth once a day  -- Indication: For htn    atorvastatin 20 mg oral tablet  -- 1 tab(s) by mouth once a day  -- Indication: For hyperlipidemia    Protonix 40 mg oral delayed release tablet  -- 1 tab(s) by mouth once a day  -- Indication: For gerd    Levaquin 750 mg oral tablet  -- 1 tab(s) by mouth every 24 hours  -- Avoid prolonged or excessive exposure to direct and/or artificial sunlight while taking this medication.  Do not take dairy products, antacids, or iron preparations within one hour of this medication.  Finish all this medication unless otherwise directed by prescriber.  May cause drowsiness or dizziness.  Medication should be taken with plenty of water.    -- Indication: For Pneumonia of right upper lobe due to infectious organism

## 2017-07-02 NOTE — DISCHARGE NOTE ADULT - HOSPITAL COURSE
84 y/o female came to the ER because of worsening of SOB and wheezing. patient added that she has productive cough and SOB for about 1 month. low grade fever today. patient started on iv abx and had a ct angio chest:    IMPRESSION:    Negative for pulmonary emboli.    Right upper lobe pneumonia.      le duplex neg for dvt    patient improved on iv abx and transitoned to oral abx and is now stable for dc.     time spent on dc 32 minutes

## 2017-07-02 NOTE — DISCHARGE NOTE ADULT - PLAN OF CARE
finished iv abx and transition to oral abx follow up with pcp home meds dc gabapentin follow up with pain management secondary to cap

## 2017-07-02 NOTE — DISCHARGE NOTE ADULT - SECONDARY DIAGNOSIS.
Essential hypertension Chronic low back pain, unspecified back pain laterality, with sciatica presence unspecified Leukocytosis

## 2017-07-02 NOTE — DISCHARGE NOTE ADULT - CARE PLAN
Principal Discharge DX:	Pneumonia of right upper lobe due to infectious organism  Goal:	finished iv abx and transition to oral abx  Instructions for follow-up, activity and diet:	follow up with pcp  Secondary Diagnosis:	Essential hypertension  Goal:	home meds  Secondary Diagnosis:	Chronic low back pain, unspecified back pain laterality, with sciatica presence unspecified  Goal:	dc gabapentin  Instructions for follow-up, activity and diet:	follow up with pain management  Secondary Diagnosis:	Leukocytosis  Goal:	secondary to cap

## 2017-07-05 LAB
CULTURE RESULTS: SIGNIFICANT CHANGE UP
CULTURE RESULTS: SIGNIFICANT CHANGE UP
M TB TUBERC IFN-G BLD QL: 0 IU/ML — SIGNIFICANT CHANGE UP
M TB TUBERC IFN-G BLD QL: 0.02 IU/ML — SIGNIFICANT CHANGE UP
M TB TUBERC IFN-G BLD QL: NEGATIVE — SIGNIFICANT CHANGE UP
MITOGEN IGNF BCKGRD COR BLD-ACNC: 0.96 IU/ML — SIGNIFICANT CHANGE UP
SPECIMEN SOURCE: SIGNIFICANT CHANGE UP
SPECIMEN SOURCE: SIGNIFICANT CHANGE UP

## 2017-10-11 PROCEDURE — 81001 URINALYSIS AUTO W/SCOPE: CPT

## 2017-10-11 PROCEDURE — 86480 TB TEST CELL IMMUN MEASURE: CPT

## 2017-10-11 PROCEDURE — 97163 PT EVAL HIGH COMPLEX 45 MIN: CPT

## 2017-10-11 PROCEDURE — 93005 ELECTROCARDIOGRAM TRACING: CPT

## 2017-10-11 PROCEDURE — 92610 EVALUATE SWALLOWING FUNCTION: CPT

## 2017-10-11 PROCEDURE — 84145 PROCALCITONIN (PCT): CPT

## 2017-10-11 PROCEDURE — 71046 X-RAY EXAM CHEST 2 VIEWS: CPT

## 2017-10-11 PROCEDURE — 99285 EMERGENCY DEPT VISIT HI MDM: CPT | Mod: 25

## 2017-10-11 PROCEDURE — 71275 CT ANGIOGRAPHY CHEST: CPT

## 2017-10-11 PROCEDURE — 85027 COMPLETE CBC AUTOMATED: CPT

## 2017-10-11 PROCEDURE — 93970 EXTREMITY STUDY: CPT

## 2017-10-11 PROCEDURE — 85730 THROMBOPLASTIN TIME PARTIAL: CPT

## 2017-10-11 PROCEDURE — 83735 ASSAY OF MAGNESIUM: CPT

## 2017-10-11 PROCEDURE — 93306 TTE W/DOPPLER COMPLETE: CPT

## 2017-10-11 PROCEDURE — 80053 COMPREHEN METABOLIC PANEL: CPT

## 2017-10-11 PROCEDURE — 85610 PROTHROMBIN TIME: CPT

## 2017-10-11 PROCEDURE — 83605 ASSAY OF LACTIC ACID: CPT

## 2017-10-11 PROCEDURE — 84484 ASSAY OF TROPONIN QUANT: CPT

## 2017-10-11 PROCEDURE — 87040 BLOOD CULTURE FOR BACTERIA: CPT

## 2017-10-11 PROCEDURE — 85379 FIBRIN DEGRADATION QUANT: CPT

## 2017-10-11 PROCEDURE — 87086 URINE CULTURE/COLONY COUNT: CPT

## 2017-10-11 PROCEDURE — 80048 BASIC METABOLIC PNL TOTAL CA: CPT

## 2017-10-11 PROCEDURE — 36415 COLL VENOUS BLD VENIPUNCTURE: CPT

## 2017-10-11 PROCEDURE — 83880 ASSAY OF NATRIURETIC PEPTIDE: CPT

## 2017-10-30 ENCOUNTER — INPATIENT (INPATIENT)
Facility: HOSPITAL | Age: 82
LOS: 2 days | Discharge: NURSING, REHAB OR LONG-TERM CARE | DRG: 300 | End: 2017-11-02
Attending: HOSPITALIST | Admitting: HOSPITALIST
Payer: MEDICARE

## 2017-10-30 VITALS
HEART RATE: 90 BPM | TEMPERATURE: 98 F | OXYGEN SATURATION: 96 % | SYSTOLIC BLOOD PRESSURE: 144 MMHG | HEIGHT: 59 IN | RESPIRATION RATE: 20 BRPM | DIASTOLIC BLOOD PRESSURE: 72 MMHG | WEIGHT: 130.07 LBS

## 2017-10-30 DIAGNOSIS — Z98.1 ARTHRODESIS STATUS: Chronic | ICD-10-CM

## 2017-10-30 DIAGNOSIS — I82.403 ACUTE EMBOLISM AND THROMBOSIS OF UNSPECIFIED DEEP VEINS OF LOWER EXTREMITY, BILATERAL: ICD-10-CM

## 2017-10-30 LAB
ALBUMIN SERPL ELPH-MCNC: 4.1 G/DL — SIGNIFICANT CHANGE UP (ref 3.3–5.2)
ALP SERPL-CCNC: 130 U/L — HIGH (ref 40–120)
ALT FLD-CCNC: 25 U/L — SIGNIFICANT CHANGE UP
ANION GAP SERPL CALC-SCNC: 16 MMOL/L — SIGNIFICANT CHANGE UP (ref 5–17)
APPEARANCE UR: CLEAR — SIGNIFICANT CHANGE UP
APTT BLD: 26.3 SEC — LOW (ref 27.5–37.4)
AST SERPL-CCNC: 27 U/L — SIGNIFICANT CHANGE UP
BACTERIA # UR AUTO: ABNORMAL
BASOPHILS # BLD AUTO: 0 K/UL — SIGNIFICANT CHANGE UP (ref 0–0.2)
BASOPHILS NFR BLD AUTO: 0.2 % — SIGNIFICANT CHANGE UP (ref 0–2)
BILIRUB SERPL-MCNC: 0.5 MG/DL — SIGNIFICANT CHANGE UP (ref 0.4–2)
BILIRUB UR-MCNC: NEGATIVE — SIGNIFICANT CHANGE UP
BUN SERPL-MCNC: 28 MG/DL — HIGH (ref 8–20)
CALCIUM SERPL-MCNC: 9.8 MG/DL — SIGNIFICANT CHANGE UP (ref 8.6–10.2)
CHLORIDE SERPL-SCNC: 98 MMOL/L — SIGNIFICANT CHANGE UP (ref 98–107)
CO2 SERPL-SCNC: 26 MMOL/L — SIGNIFICANT CHANGE UP (ref 22–29)
COLOR SPEC: YELLOW — SIGNIFICANT CHANGE UP
CREAT SERPL-MCNC: 0.95 MG/DL — SIGNIFICANT CHANGE UP (ref 0.5–1.3)
DIFF PNL FLD: NEGATIVE — SIGNIFICANT CHANGE UP
EOSINOPHIL # BLD AUTO: 0.1 K/UL — SIGNIFICANT CHANGE UP (ref 0–0.5)
EOSINOPHIL NFR BLD AUTO: 0.8 % — SIGNIFICANT CHANGE UP (ref 0–6)
EPI CELLS # UR: SIGNIFICANT CHANGE UP
GLUCOSE SERPL-MCNC: 86 MG/DL — SIGNIFICANT CHANGE UP (ref 70–115)
GLUCOSE UR QL: NEGATIVE MG/DL — SIGNIFICANT CHANGE UP
HCT VFR BLD CALC: 40.5 % — SIGNIFICANT CHANGE UP (ref 37–47)
HGB BLD-MCNC: 13.7 G/DL — SIGNIFICANT CHANGE UP (ref 12–16)
INR BLD: 0.96 RATIO — SIGNIFICANT CHANGE UP (ref 0.88–1.16)
KETONES UR-MCNC: NEGATIVE — SIGNIFICANT CHANGE UP
LEUKOCYTE ESTERASE UR-ACNC: ABNORMAL
LYMPHOCYTES # BLD AUTO: 1.4 K/UL — SIGNIFICANT CHANGE UP (ref 1–4.8)
LYMPHOCYTES # BLD AUTO: 16 % — LOW (ref 20–55)
MCHC RBC-ENTMCNC: 29.5 PG — SIGNIFICANT CHANGE UP (ref 27–31)
MCHC RBC-ENTMCNC: 33.8 G/DL — SIGNIFICANT CHANGE UP (ref 32–36)
MCV RBC AUTO: 87.3 FL — SIGNIFICANT CHANGE UP (ref 81–99)
MONOCYTES # BLD AUTO: 1.1 K/UL — HIGH (ref 0–0.8)
MONOCYTES NFR BLD AUTO: 12.9 % — HIGH (ref 3–10)
NEUTROPHILS # BLD AUTO: 6 K/UL — SIGNIFICANT CHANGE UP (ref 1.8–8)
NEUTROPHILS NFR BLD AUTO: 69.5 % — SIGNIFICANT CHANGE UP (ref 37–73)
NITRITE UR-MCNC: NEGATIVE — SIGNIFICANT CHANGE UP
PH UR: 6 — SIGNIFICANT CHANGE UP (ref 5–8)
PLATELET # BLD AUTO: 334 K/UL — SIGNIFICANT CHANGE UP (ref 150–400)
POTASSIUM SERPL-MCNC: 4.6 MMOL/L — SIGNIFICANT CHANGE UP (ref 3.5–5.3)
POTASSIUM SERPL-SCNC: 4.6 MMOL/L — SIGNIFICANT CHANGE UP (ref 3.5–5.3)
PROT SERPL-MCNC: 7.2 G/DL — SIGNIFICANT CHANGE UP (ref 6.6–8.7)
PROT UR-MCNC: NEGATIVE MG/DL — SIGNIFICANT CHANGE UP
PROTHROM AB SERPL-ACNC: 10.6 SEC — SIGNIFICANT CHANGE UP (ref 9.8–12.7)
RBC # BLD: 4.64 M/UL — SIGNIFICANT CHANGE UP (ref 4.4–5.2)
RBC # FLD: 16.3 % — HIGH (ref 11–15.6)
RBC CASTS # UR COMP ASSIST: SIGNIFICANT CHANGE UP /HPF (ref 0–4)
SODIUM SERPL-SCNC: 140 MMOL/L — SIGNIFICANT CHANGE UP (ref 135–145)
SP GR SPEC: 1.01 — SIGNIFICANT CHANGE UP (ref 1.01–1.02)
TSH SERPL-MCNC: 2.99 UIU/ML — SIGNIFICANT CHANGE UP (ref 0.27–4.2)
UROBILINOGEN FLD QL: NEGATIVE MG/DL — SIGNIFICANT CHANGE UP
WBC # BLD: 8.6 K/UL — SIGNIFICANT CHANGE UP (ref 4.8–10.8)
WBC # FLD AUTO: 8.6 K/UL — SIGNIFICANT CHANGE UP (ref 4.8–10.8)
WBC UR QL: SIGNIFICANT CHANGE UP

## 2017-10-30 PROCEDURE — 93925 LOWER EXTREMITY STUDY: CPT | Mod: 26

## 2017-10-30 PROCEDURE — 99223 1ST HOSP IP/OBS HIGH 75: CPT

## 2017-10-30 PROCEDURE — 99285 EMERGENCY DEPT VISIT HI MDM: CPT

## 2017-10-30 PROCEDURE — 72158 MRI LUMBAR SPINE W/O & W/DYE: CPT | Mod: 26

## 2017-10-30 PROCEDURE — 93970 EXTREMITY STUDY: CPT | Mod: 26

## 2017-10-30 RX ORDER — ENOXAPARIN SODIUM 100 MG/ML
60 INJECTION SUBCUTANEOUS ONCE
Qty: 0 | Refills: 0 | Status: DISCONTINUED | OUTPATIENT
Start: 2017-10-30 | End: 2017-10-31

## 2017-10-30 RX ORDER — TRAMADOL HYDROCHLORIDE 50 MG/1
50 TABLET ORAL EVERY 8 HOURS
Qty: 0 | Refills: 0 | Status: DISCONTINUED | OUTPATIENT
Start: 2017-10-30 | End: 2017-10-30

## 2017-10-30 RX ORDER — IBUPROFEN 200 MG
400 TABLET ORAL EVERY 6 HOURS
Qty: 0 | Refills: 0 | Status: DISCONTINUED | OUTPATIENT
Start: 2017-10-30 | End: 2017-10-31

## 2017-10-30 RX ORDER — OXYCODONE AND ACETAMINOPHEN 5; 325 MG/1; MG/1
2 TABLET ORAL EVERY 6 HOURS
Qty: 0 | Refills: 0 | Status: DISCONTINUED | OUTPATIENT
Start: 2017-10-30 | End: 2017-10-30

## 2017-10-30 RX ADMIN — HYDROMORPHONE HYDROCHLORIDE 1 MILLIGRAM(S): 2 INJECTION INTRAMUSCULAR; INTRAVENOUS; SUBCUTANEOUS at 22:33

## 2017-10-30 RX ADMIN — Medication 400 MILLIGRAM(S): at 22:23

## 2017-10-30 RX ADMIN — Medication 400 MILLIGRAM(S): at 23:00

## 2017-10-30 RX ADMIN — TRAMADOL HYDROCHLORIDE 50 MILLIGRAM(S): 50 TABLET ORAL at 23:00

## 2017-10-30 RX ADMIN — TRAMADOL HYDROCHLORIDE 50 MILLIGRAM(S): 50 TABLET ORAL at 22:23

## 2017-10-30 NOTE — ED ADULT NURSE NOTE - OBJECTIVE STATEMENT
85 year old a&ox3 female comes to ED c/o of swelling to the lower extremities and rt hip pain. as per pt. and family she got an epidural on the 23rd and from there started having worsening LE pain and weakness. as per family it is very difficult for her to ambulate. +2 pitting edema noted to the rt le and +3 to lt le. +pulses.  pt. has a rt 3rd missing toe from when she was little. breathing even and unlabored. pt. in no apparent distress at this renato.

## 2017-10-30 NOTE — ED PROVIDER NOTE - OBJECTIVE STATEMENT
86 yo female hx of long standing chronic low back pain s/p lumbar fusion/ laminectomy, and years of pain management and epidural injections; lives alone; presents with 1 week of gradually worsening low back pain radiating into left groin region and radiating down left leg to tib/fib region; pain is burning, throbbing, severe; and worse with standing /weight bear, improved with laying down; patient has reached the point where now she cannot walk due to pain, was unable to get self to bathroom yesterday so presented to ED; of note, patient had epidural steroid injection 1 week ago, and reports her symptoms have gradually worsened since then.

## 2017-10-30 NOTE — ED PROVIDER NOTE - MEDICAL DECISION MAKING DETAILS
back pain with difficulty ambulating ; us positive for dvt; start anticoagulation; admit for anticoagulation and pt

## 2017-10-30 NOTE — H&P ADULT - HISTORY OF PRESENT ILLNESS
Pt is a 84 yo female with a pmh/o HTN, HLD, b/l acquired deafness, and chronic back pain s/p surgical intervention x 2 who is currently being followed by pain management who recently received a lumbar facet block last week who presents with intractable low back pain and left calf pain. Pt denies any recent trauma, new activity, and cannot recall anything that may have caused her acute pain. Pt states she has had this pain since the 23rd and presented to the ED as she can no longer care for self at home due to pain. Pt lives at home by herself.  Denies cp, sob, recent travel, h/o dvt/PE, gu/gi sx.

## 2017-10-30 NOTE — H&P ADULT - ATTENDING COMMENTS
advanced care planning discussed with patient for 30 min. plan of care including pain control and ortho consult discussed. code status discussed pt is FULL CODE. Pt states understanding of plan and has no further questions at this time.

## 2017-10-30 NOTE — ED PROVIDER NOTE - CARE PLAN
Principal Discharge DX:	Acute deep vein thrombosis (DVT) of both lower extremities, unspecified vein  Secondary Diagnosis:	Chronic right-sided low back pain without sciatica

## 2017-10-30 NOTE — H&P ADULT - PROBLEM SELECTOR PLAN 1
pain control  PT consult  ortho consult-d/w PA, to see, no intervention recommended at this time, will f/u with official consult note  pain management consult as pt being followed by team as outpt  sw consult-pt will likely need placement as lives alone pt reports previous sacral fracture in her 20's and does not recall any recent trauma  pain control  PT consult  ortho consult-d/w PA, to see, no intervention recommended at this time, will f/u with official consult note  pain management consult as pt being followed by team as outpt  sw consult-pt will likely need placement as lives alone  vit d levels  MRI reviewed  labs reviewed

## 2017-10-30 NOTE — ED PROVIDER NOTE - NEUROLOGICAL, MLM
Alert and oriented, normal CN's, normal speech, normal strength/sensation, ROM b/l upper ext; b/l low ext 4/5 strength with decreased DTRs; +normal sensation

## 2017-10-30 NOTE — ED ADULT TRIAGE NOTE - CHIEF COMPLAINT QUOTE
arrives with lt ankle swelling and lt groin pain, no injury, had epidural in the back last week. no chest pain, no dyspnea.

## 2017-10-30 NOTE — H&P ADULT - ASSESSMENT
Pt is a 86 yo female with a pmh/o HTN, HLD, b/l acquired deafness, and chronic back pain s/p surgical intervention x 2 who is admitted for acute/subacute sacral fracture and acute DVT.

## 2017-10-31 ENCOUNTER — TRANSCRIPTION ENCOUNTER (OUTPATIENT)
Age: 82
End: 2017-10-31

## 2017-10-31 DIAGNOSIS — S32.10XA UNSPECIFIED FRACTURE OF SACRUM, INITIAL ENCOUNTER FOR CLOSED FRACTURE: ICD-10-CM

## 2017-10-31 DIAGNOSIS — M84.48XA PATHOLOGICAL FRACTURE, OTHER SITE, INITIAL ENCOUNTER FOR FRACTURE: ICD-10-CM

## 2017-10-31 DIAGNOSIS — E78.5 HYPERLIPIDEMIA, UNSPECIFIED: ICD-10-CM

## 2017-10-31 DIAGNOSIS — M54.5 LOW BACK PAIN: ICD-10-CM

## 2017-10-31 DIAGNOSIS — S32.9XXA FRACTURE OF UNSPECIFIED PARTS OF LUMBOSACRAL SPINE AND PELVIS, INITIAL ENCOUNTER FOR CLOSED FRACTURE: ICD-10-CM

## 2017-10-31 DIAGNOSIS — I82.403 ACUTE EMBOLISM AND THROMBOSIS OF UNSPECIFIED DEEP VEINS OF LOWER EXTREMITY, BILATERAL: ICD-10-CM

## 2017-10-31 DIAGNOSIS — Z29.9 ENCOUNTER FOR PROPHYLACTIC MEASURES, UNSPECIFIED: ICD-10-CM

## 2017-10-31 DIAGNOSIS — S32.020A WEDGE COMPRESSION FRACTURE OF SECOND LUMBAR VERTEBRA, INITIAL ENCOUNTER FOR CLOSED FRACTURE: ICD-10-CM

## 2017-10-31 DIAGNOSIS — I10 ESSENTIAL (PRIMARY) HYPERTENSION: ICD-10-CM

## 2017-10-31 DIAGNOSIS — G89.4 CHRONIC PAIN SYNDROME: ICD-10-CM

## 2017-10-31 DIAGNOSIS — N17.9 ACUTE KIDNEY FAILURE, UNSPECIFIED: ICD-10-CM

## 2017-10-31 LAB
ALBUMIN SERPL ELPH-MCNC: 3.8 G/DL — SIGNIFICANT CHANGE UP (ref 3.3–5.2)
ALP SERPL-CCNC: 122 U/L — HIGH (ref 40–120)
ALT FLD-CCNC: 22 U/L — SIGNIFICANT CHANGE UP
ANION GAP SERPL CALC-SCNC: 9 MMOL/L — SIGNIFICANT CHANGE UP (ref 5–17)
APPEARANCE UR: CLEAR — SIGNIFICANT CHANGE UP
APTT BLD: 33 SEC — SIGNIFICANT CHANGE UP (ref 27.5–37.4)
AST SERPL-CCNC: 22 U/L — SIGNIFICANT CHANGE UP
BASOPHILS # BLD AUTO: 0 K/UL — SIGNIFICANT CHANGE UP (ref 0–0.2)
BASOPHILS NFR BLD AUTO: 0.3 % — SIGNIFICANT CHANGE UP (ref 0–2)
BILIRUB SERPL-MCNC: 0.6 MG/DL — SIGNIFICANT CHANGE UP (ref 0.4–2)
BILIRUB UR-MCNC: NEGATIVE — SIGNIFICANT CHANGE UP
BUN SERPL-MCNC: 24 MG/DL — HIGH (ref 8–20)
CALCIUM SERPL-MCNC: 9.3 MG/DL — SIGNIFICANT CHANGE UP (ref 8.6–10.2)
CHLORIDE SERPL-SCNC: 102 MMOL/L — SIGNIFICANT CHANGE UP (ref 98–107)
CO2 SERPL-SCNC: 31 MMOL/L — HIGH (ref 22–29)
COLOR SPEC: YELLOW — SIGNIFICANT CHANGE UP
CREAT SERPL-MCNC: 0.88 MG/DL — SIGNIFICANT CHANGE UP (ref 0.5–1.3)
CULTURE RESULTS: NO GROWTH — SIGNIFICANT CHANGE UP
DIFF PNL FLD: NEGATIVE — SIGNIFICANT CHANGE UP
EOSINOPHIL # BLD AUTO: 0.1 K/UL — SIGNIFICANT CHANGE UP (ref 0–0.5)
EOSINOPHIL NFR BLD AUTO: 1.3 % — SIGNIFICANT CHANGE UP (ref 0–6)
GLUCOSE SERPL-MCNC: 90 MG/DL — SIGNIFICANT CHANGE UP (ref 70–115)
GLUCOSE UR QL: NEGATIVE MG/DL — SIGNIFICANT CHANGE UP
HCT VFR BLD CALC: 38.6 % — SIGNIFICANT CHANGE UP (ref 37–47)
HGB BLD-MCNC: 12.8 G/DL — SIGNIFICANT CHANGE UP (ref 12–16)
INR BLD: 1.01 RATIO — SIGNIFICANT CHANGE UP (ref 0.88–1.16)
KETONES UR-MCNC: NEGATIVE — SIGNIFICANT CHANGE UP
LEUKOCYTE ESTERASE UR-ACNC: NEGATIVE — SIGNIFICANT CHANGE UP
LYMPHOCYTES # BLD AUTO: 1.7 K/UL — SIGNIFICANT CHANGE UP (ref 1–4.8)
LYMPHOCYTES # BLD AUTO: 24 % — SIGNIFICANT CHANGE UP (ref 20–55)
MCHC RBC-ENTMCNC: 29 PG — SIGNIFICANT CHANGE UP (ref 27–31)
MCHC RBC-ENTMCNC: 33.2 G/DL — SIGNIFICANT CHANGE UP (ref 32–36)
MCV RBC AUTO: 87.3 FL — SIGNIFICANT CHANGE UP (ref 81–99)
MONOCYTES # BLD AUTO: 0.9 K/UL — HIGH (ref 0–0.8)
MONOCYTES NFR BLD AUTO: 13.2 % — HIGH (ref 3–10)
NEUTROPHILS # BLD AUTO: 4.2 K/UL — SIGNIFICANT CHANGE UP (ref 1.8–8)
NEUTROPHILS NFR BLD AUTO: 60.8 % — SIGNIFICANT CHANGE UP (ref 37–73)
NITRITE UR-MCNC: NEGATIVE — SIGNIFICANT CHANGE UP
PH UR: 8 — SIGNIFICANT CHANGE UP (ref 5–8)
PLATELET # BLD AUTO: 312 K/UL — SIGNIFICANT CHANGE UP (ref 150–400)
POTASSIUM SERPL-MCNC: 4.3 MMOL/L — SIGNIFICANT CHANGE UP (ref 3.5–5.3)
POTASSIUM SERPL-SCNC: 4.3 MMOL/L — SIGNIFICANT CHANGE UP (ref 3.5–5.3)
PROT SERPL-MCNC: 6.6 G/DL — SIGNIFICANT CHANGE UP (ref 6.6–8.7)
PROT UR-MCNC: NEGATIVE MG/DL — SIGNIFICANT CHANGE UP
PROTHROM AB SERPL-ACNC: 11.1 SEC — SIGNIFICANT CHANGE UP (ref 9.8–12.7)
RBC # BLD: 4.42 M/UL — SIGNIFICANT CHANGE UP (ref 4.4–5.2)
RBC # FLD: 16.3 % — HIGH (ref 11–15.6)
SODIUM SERPL-SCNC: 142 MMOL/L — SIGNIFICANT CHANGE UP (ref 135–145)
SP GR SPEC: 1.01 — SIGNIFICANT CHANGE UP (ref 1.01–1.02)
SPECIMEN SOURCE: SIGNIFICANT CHANGE UP
UROBILINOGEN FLD QL: NEGATIVE MG/DL — SIGNIFICANT CHANGE UP
WBC # BLD: 6.9 K/UL — SIGNIFICANT CHANGE UP (ref 4.8–10.8)
WBC # FLD AUTO: 6.9 K/UL — SIGNIFICANT CHANGE UP (ref 4.8–10.8)

## 2017-10-31 PROCEDURE — 22310 CLOSED TX VERT FX W/O MANJ: CPT

## 2017-10-31 PROCEDURE — 72170 X-RAY EXAM OF PELVIS: CPT | Mod: 26

## 2017-10-31 PROCEDURE — 99233 SBSQ HOSP IP/OBS HIGH 50: CPT

## 2017-10-31 PROCEDURE — 99223 1ST HOSP IP/OBS HIGH 75: CPT | Mod: 57

## 2017-10-31 PROCEDURE — 72192 CT PELVIS W/O DYE: CPT | Mod: 26

## 2017-10-31 PROCEDURE — 27197 CLSD TX PELVIC RING FX: CPT

## 2017-10-31 PROCEDURE — 72100 X-RAY EXAM L-S SPINE 2/3 VWS: CPT | Mod: 26

## 2017-10-31 PROCEDURE — 76377 3D RENDER W/INTRP POSTPROCES: CPT | Mod: 26

## 2017-10-31 PROCEDURE — 72220 X-RAY EXAM SACRUM TAILBONE: CPT | Mod: 26

## 2017-10-31 RX ORDER — VALSARTAN 80 MG/1
160 TABLET ORAL DAILY
Qty: 0 | Refills: 0 | Status: DISCONTINUED | OUTPATIENT
Start: 2017-10-31 | End: 2017-11-02

## 2017-10-31 RX ORDER — ENOXAPARIN SODIUM 100 MG/ML
60 INJECTION SUBCUTANEOUS
Qty: 0 | Refills: 0 | Status: DISCONTINUED | OUTPATIENT
Start: 2017-10-31 | End: 2017-11-02

## 2017-10-31 RX ORDER — OXYCODONE AND ACETAMINOPHEN 5; 325 MG/1; MG/1
2 TABLET ORAL EVERY 4 HOURS
Qty: 0 | Refills: 0 | Status: DISCONTINUED | OUTPATIENT
Start: 2017-10-31 | End: 2017-10-31

## 2017-10-31 RX ORDER — LIDOCAINE 4 G/100G
1 CREAM TOPICAL DAILY
Qty: 0 | Refills: 0 | Status: DISCONTINUED | OUTPATIENT
Start: 2017-10-31 | End: 2017-11-02

## 2017-10-31 RX ORDER — ENOXAPARIN SODIUM 100 MG/ML
60 INJECTION SUBCUTANEOUS
Qty: 0 | Refills: 0 | Status: DISCONTINUED | OUTPATIENT
Start: 2017-10-30 | End: 2017-10-31

## 2017-10-31 RX ORDER — DOCUSATE SODIUM 100 MG
100 CAPSULE ORAL
Qty: 0 | Refills: 0 | Status: DISCONTINUED | OUTPATIENT
Start: 2017-10-31 | End: 2017-11-02

## 2017-10-31 RX ORDER — PANTOPRAZOLE SODIUM 20 MG/1
40 TABLET, DELAYED RELEASE ORAL
Qty: 0 | Refills: 0 | Status: DISCONTINUED | OUTPATIENT
Start: 2017-10-31 | End: 2017-11-02

## 2017-10-31 RX ORDER — DILTIAZEM HCL 120 MG
240 CAPSULE, EXT RELEASE 24 HR ORAL DAILY
Qty: 0 | Refills: 0 | Status: DISCONTINUED | OUTPATIENT
Start: 2017-10-31 | End: 2017-11-02

## 2017-10-31 RX ORDER — ATORVASTATIN CALCIUM 80 MG/1
20 TABLET, FILM COATED ORAL AT BEDTIME
Qty: 0 | Refills: 0 | Status: DISCONTINUED | OUTPATIENT
Start: 2017-10-31 | End: 2017-11-02

## 2017-10-31 RX ORDER — KETOROLAC TROMETHAMINE 30 MG/ML
15 SYRINGE (ML) INJECTION ONCE
Qty: 0 | Refills: 0 | Status: DISCONTINUED | OUTPATIENT
Start: 2017-10-31 | End: 2017-10-31

## 2017-10-31 RX ORDER — HYDROMORPHONE HYDROCHLORIDE 2 MG/ML
0.5 INJECTION INTRAMUSCULAR; INTRAVENOUS; SUBCUTANEOUS EVERY 4 HOURS
Qty: 0 | Refills: 0 | Status: DISCONTINUED | OUTPATIENT
Start: 2017-10-31 | End: 2017-11-01

## 2017-10-31 RX ORDER — POLYETHYLENE GLYCOL 3350 17 G/17G
17 POWDER, FOR SOLUTION ORAL DAILY
Qty: 0 | Refills: 0 | Status: DISCONTINUED | OUTPATIENT
Start: 2017-10-31 | End: 2017-11-02

## 2017-10-31 RX ORDER — IBUPROFEN 200 MG
400 TABLET ORAL EVERY 6 HOURS
Qty: 0 | Refills: 0 | Status: DISCONTINUED | OUTPATIENT
Start: 2017-10-31 | End: 2017-11-02

## 2017-10-31 RX ORDER — IBUPROFEN 200 MG
600 TABLET ORAL EVERY 6 HOURS
Qty: 0 | Refills: 0 | Status: DISCONTINUED | OUTPATIENT
Start: 2017-10-31 | End: 2017-10-31

## 2017-10-31 RX ORDER — OXYCODONE AND ACETAMINOPHEN 5; 325 MG/1; MG/1
2 TABLET ORAL EVERY 6 HOURS
Qty: 0 | Refills: 0 | Status: DISCONTINUED | OUTPATIENT
Start: 2017-10-31 | End: 2017-11-01

## 2017-10-31 RX ORDER — CYCLOBENZAPRINE HYDROCHLORIDE 10 MG/1
5 TABLET, FILM COATED ORAL THREE TIMES A DAY
Qty: 0 | Refills: 0 | Status: DISCONTINUED | OUTPATIENT
Start: 2017-10-31 | End: 2017-11-02

## 2017-10-31 RX ORDER — SENNA PLUS 8.6 MG/1
2 TABLET ORAL AT BEDTIME
Qty: 0 | Refills: 0 | Status: DISCONTINUED | OUTPATIENT
Start: 2017-10-31 | End: 2017-11-02

## 2017-10-31 RX ORDER — GABAPENTIN 400 MG/1
100 CAPSULE ORAL THREE TIMES A DAY
Qty: 0 | Refills: 0 | Status: DISCONTINUED | OUTPATIENT
Start: 2017-10-31 | End: 2017-11-02

## 2017-10-31 RX ORDER — HYDROMORPHONE HYDROCHLORIDE 2 MG/ML
1 INJECTION INTRAMUSCULAR; INTRAVENOUS; SUBCUTANEOUS EVERY 6 HOURS
Qty: 0 | Refills: 0 | Status: DISCONTINUED | OUTPATIENT
Start: 2017-10-31 | End: 2017-11-01

## 2017-10-31 RX ADMIN — HYDROMORPHONE HYDROCHLORIDE 1 MILLIGRAM(S): 2 INJECTION INTRAMUSCULAR; INTRAVENOUS; SUBCUTANEOUS at 11:23

## 2017-10-31 RX ADMIN — OXYCODONE AND ACETAMINOPHEN 2 TABLET(S): 5; 325 TABLET ORAL at 20:14

## 2017-10-31 RX ADMIN — ATORVASTATIN CALCIUM 20 MILLIGRAM(S): 80 TABLET, FILM COATED ORAL at 22:17

## 2017-10-31 RX ADMIN — LIDOCAINE 1 PATCH: 4 CREAM TOPICAL at 02:05

## 2017-10-31 RX ADMIN — PANTOPRAZOLE SODIUM 40 MILLIGRAM(S): 20 TABLET, DELAYED RELEASE ORAL at 09:27

## 2017-10-31 RX ADMIN — CYCLOBENZAPRINE HYDROCHLORIDE 5 MILLIGRAM(S): 10 TABLET, FILM COATED ORAL at 02:08

## 2017-10-31 RX ADMIN — Medication 100 MILLIGRAM(S): at 18:39

## 2017-10-31 RX ADMIN — HYDROMORPHONE HYDROCHLORIDE 1 MILLIGRAM(S): 2 INJECTION INTRAMUSCULAR; INTRAVENOUS; SUBCUTANEOUS at 11:38

## 2017-10-31 RX ADMIN — GABAPENTIN 100 MILLIGRAM(S): 400 CAPSULE ORAL at 22:18

## 2017-10-31 RX ADMIN — LIDOCAINE 1 PATCH: 4 CREAM TOPICAL at 11:38

## 2017-10-31 RX ADMIN — OXYCODONE AND ACETAMINOPHEN 2 TABLET(S): 5; 325 TABLET ORAL at 18:43

## 2017-10-31 RX ADMIN — ENOXAPARIN SODIUM 60 MILLIGRAM(S): 100 INJECTION SUBCUTANEOUS at 00:19

## 2017-10-31 RX ADMIN — HYDROMORPHONE HYDROCHLORIDE 1 MILLIGRAM(S): 2 INJECTION INTRAMUSCULAR; INTRAVENOUS; SUBCUTANEOUS at 22:18

## 2017-10-31 RX ADMIN — VALSARTAN 160 MILLIGRAM(S): 80 TABLET ORAL at 05:28

## 2017-10-31 RX ADMIN — Medication 240 MILLIGRAM(S): at 05:28

## 2017-10-31 RX ADMIN — ENOXAPARIN SODIUM 60 MILLIGRAM(S): 100 INJECTION SUBCUTANEOUS at 18:39

## 2017-10-31 RX ADMIN — LIDOCAINE 1 PATCH: 4 CREAM TOPICAL at 11:23

## 2017-10-31 NOTE — DISCHARGE NOTE ADULT - MEDICATION SUMMARY - MEDICATIONS TO TAKE
I will START or STAY ON the medications listed below when I get home from the hospital:    ibuprofen 600 mg oral tablet  -- 1 tab(s) by mouth every 6 hours  -- Indication: For dvt pain    oxyCODONE 10 mg oral tablet, extended release  -- 1 tab(s) by mouth every 12 hours MDD:2  -- Indication: For Pain    traMADol 50 mg oral tablet  -- 1 tab(s) by mouth every 6 hours, As Needed - 6)  -- Indication: For Pain    valsartan 160 mg oral tablet  -- 1 tab(s) by mouth once a day  -- Indication: For Htn    dilTIAZem 240 mg/24 hours oral capsule, extended release  -- 1 cap(s) by mouth once a day  -- Indication: For Htn    apixaban 5 mg oral tablet  -- 1 tab(s) by mouth 2 times a day MDD:2  tabs  -- Check with your doctor before becoming pregnant.  It is very important that you take or use this exactly as directed.  Do not skip doses or discontinue unless directed by your doctor.  Obtain medical advice before taking any non-prescription drugs as some may affect the action of this medication.    -- Indication: For dvt    gabapentin 100 mg oral capsule  -- 1 cap(s) by mouth 3 times a day  -- Indication: For Pain    atorvastatin 20 mg oral tablet  -- 1 tab(s) by mouth once a day  -- Indication: For Hyperlipidemia, unspecified hyperlipidemia type    lidocaine 5% topical film  -- Apply on skin to affected area once a day   -- Indication: For Pain    docusate sodium 100 mg oral capsule  -- 1 cap(s) by mouth 2 times a day  -- Indication: For Constipation    polyethylene glycol 3350 oral powder for reconstitution  -- 17 gram(s) by mouth once a day, As needed, Constipation  -- Indication: For Constipation    senna oral tablet  -- 2 tab(s) by mouth once a day (at bedtime)  -- Indication: For Constipation    cyclobenzaprine 5 mg oral tablet  -- 1 tab(s) by mouth 3 times a day, As needed, Muscle Spasm  -- Indication: For Constipation    Protonix 40 mg oral delayed release tablet  -- 1 tab(s) by mouth once a day  -- Indication: For gi prophylaxis

## 2017-10-31 NOTE — DISCHARGE NOTE ADULT - MEDICATION SUMMARY - MEDICATIONS TO STOP TAKING
I will STOP taking the medications listed below when I get home from the hospital:    Percocet 5/325 oral tablet    Dyrenium  -- 37.5 mg

## 2017-10-31 NOTE — DISCHARGE NOTE ADULT - CARE PLAN
Principal Discharge DX:	Chronic low back pain, unspecified back pain laterality, with sciatica presence unspecified  Goal:	Improved function and pain control  Instructions for follow-up, activity and diet:	office follow-up in 1-2 weeks  Pain control as needed  weight bearing and activity as tolerated Principal Discharge DX:	Closed nondisplaced fracture of pelvis, unspecified part of pelvis, initial encounter  Goal:	Improved function and pain control  Instructions for follow-up, activity and diet:	office follow-up in 1-2 weeks  Pain control as needed  RIGHT LE- weight bearing as tolerated  LEFT LE - TTWB Principal Discharge DX:	Closed nondisplaced fracture of pelvis, unspecified part of pelvis, initial encounter  Goal:	Improved function and pain control  Instructions for follow-up, activity and diet:	office follow-up in 1-2 weeks  Pain control as needed  RIGHT LE- weight bearing as tolerated  LEFT LE - TTWB  Secondary Diagnosis:	Acute deep vein thrombosis (DVT) of both lower extremities, unspecified vein

## 2017-10-31 NOTE — CONSULT NOTE ADULT - SUBJECTIVE AND OBJECTIVE BOX
Chief Complaint: chronic pain, low back, buttock pain       HPI:  MAMTA TALAVERA is a 85y Female that was seen and examined at bedside in the ED. Patient was noted to be asleep upon initial encounter. She describes 7/10 low back pain that is aching in nature. There is no radiation of pain but she admits to associated numbness in the lower extremities. She denies any bowel or bladder incontinence. She admits to being under the care of Pain Management physician, Dr. Otoole, and was maintained on Tramadol until recently being switch to Percocet for chronic low back pain. She states she recently had a procedure done last week, which only provided relief for a few days. She offers no further complaints.       REVIEW OF SYSTEMS: X denotes positive finding, otherwise all additional items were reviewed and negative  GEN: [] fever, [] chills, [] change in appetite, [] weight loss, [] fatigue, [] sedation  ENT: [] change in vision, [] dry mouth, [] ringing in ears, [] sore throat  RESP: [] shortness of breath, [] Obstructive sleep apnea  CV: [] chest pain, [] palpitations   GI: [] nausea, [] vomiting, [] flatus, [] recent BM, [] constipation, [] GERD  : [] bladder dysfunction, [] dysuria  MSK: [] weakness, [] joint pain, [] myalgias  Neuro: [x] pain, [x] numbness, [] tingling, [] tremor, [] seizures  Skin: [] rash, [] pruritis  Psych: [] anxiety, [] depression  Endo: [] polydipsia  Heme: [] coagulopathic disorder      PAST MEDICAL & SURGICAL HISTORY:  Hyperlipidemia, unspecified hyperlipidemia type  Chronic back pain  Essential hypertension  S/P spinal fusion: L spines      SOCIAL HISTORY:  Denies Smoking, Alcohol, or Drug Use  FAMILY HISTORY:  No pertinent family history in first degree relatives      Allergies  No Known Allergies      PAIN MEDICATIONS:  MEDICATIONS  (STANDING):  atorvastatin 20 milliGRAM(s) Oral at bedtime  diltiazem    milliGRAM(s) Oral daily  docusate sodium 100 milliGRAM(s) Oral two times a day  enoxaparin Injectable 60 milliGRAM(s) SubCutaneous two times a day  gabapentin 100 milliGRAM(s) Oral three times a day  lidocaine   Patch 1 Patch Transdermal daily  pantoprazole    Tablet 40 milliGRAM(s) Oral before breakfast  senna 2 Tablet(s) Oral at bedtime  valsartan 160 milliGRAM(s) Oral daily    MEDICATIONS  (PRN):  cyclobenzaprine 5 milliGRAM(s) Oral three times a day PRN Muscle Spasm  HYDROmorphone  Injectable 0.5 milliGRAM(s) IV Push every 4 hours PRN Breakthrough pain  HYDROmorphone  Injectable 1 milliGRAM(s) IV Push every 6 hours PRN Severe Pain (7 - 10)  ibuprofen  Tablet 400 milliGRAM(s) Oral every 6 hours PRN mild pain (1-3)  oxyCODONE    5 mG/acetaminophen 325 mG 2 Tablet(s) Oral every 6 hours PRN Moderate Pain (4 - 6)  polyethylene glycol 3350 17 Gram(s) Oral daily PRN Constipation      Vital Signs Last 24 Hrs  T(C): 36.7 (31 Oct 2017 13:01), Max: 36.8 (30 Oct 2017 16:05)  T(F): 98 (31 Oct 2017 13:01), Max: 98.3 (30 Oct 2017 16:05)  HR: 86 (31 Oct 2017 13:01) (78 - 90)  BP: 122/68 (31 Oct 2017 13:01) (122/68 - 157/79)  BP(mean): --  RR: 24 (31 Oct 2017 13:01) (20 - 24)  SpO2: 97% (31 Oct 2017 13:01) (94% - 97%)             PHYSICAL EXAM: X denotes positive finding   GENERAL: [x] cooperative, [] uncooperative, [x] NAD, [] in distress, [x] speech clear and coherent  HEENT: [x] NCAT, [x] EOMI, [] pupils non pinpoint, [] no external deformities, [] NGT   NECK: [x] normal overall appearance, [] no gross masses  RESPIRATORY: [x] unlabored respirations, [x] on room air, [] on supplemental O2, [] labored respirations  CV: [] regular rate, [] regular rhythm, [x] no LE edema, [] pitting LE edema  Abdomen: [x] soft, [] non-tender, [] tender to palpation, [] pos bowel sounds  : [] [place], [x] deferred  Skin: [x] normal texture, [] rash, [] lesion(s), [] drain(s)   MSK: [x] limited AROM, [] extremities antigravity x4, [] normal gait  Neuro: [] SILT, [x] sensation reduced to light touch b/l LE, [] abnormal DTRs  Psych: [x] oriented to person, place, time, [] displays insight, [] limited/impaired insight, [] poor coping skills       LABS:                        12.8   6.9   )-----------( 312      ( 31 Oct 2017 07:51 )             38.6     10-31    142  |  102  |  24.0<H>  ----------------------------<  90  4.3   |  31.0<H>  |  0.88    Ca    9.3      31 Oct 2017 07:51    TPro  6.6  /  Alb  3.8  /  TBili  0.6  /  DBili  x   /  AST  22  /  ALT  22  /  AlkPhos  122<H>  10-31    PT/INR - ( 31 Oct 2017 07:51 )   PT: 11.1 sec;   INR: 1.01 ratio         PTT - ( 31 Oct 2017 07:51 )  PTT:33.0 sec  Urinalysis Basic - ( 31 Oct 2017 10:01 )    Color: Yellow / Appearance: Clear / S.010 / pH: x  Gluc: x / Ketone: Negative  / Bili: Negative / Urobili: Negative mg/dL   Blood: x / Protein: Negative mg/dL / Nitrite: Negative   Leuk Esterase: Negative / RBC: x / WBC x   Sq Epi: x / Non Sq Epi: x / Bacteria: x      Drug Screen:      Radiology:      :  This report was requested by: Gayatri Gramajo | Reference #: [id number]     Rx Written	Rx Dispensed	Drug	Quantity	Days Supply	Prescriber Name			  10/26/2017	10/26/2017	oxycodone-acetaminophen 5-325 mg tab 	21	7	Rach Cobos NP			  2017	oxycodone-acetaminophen 5-325 mg tab 	21	7	Rach Cobos NP			  2017	tramadol hcl 50 mg tablet 	240	40	Rach Cobos NP			  2017	tramadol hcl 50 mg tablet 	240	40	Rach Cobos NP			  2017	tramadol hcl 50 mg tablet 	240	40	PapitoRach oscar NP			  2017	tramadol hcl 50 mg tablet 	240	40	OlaRach oscar NP			  2017	tramadol hcl 50 mg tablet 	240	40	OlaRach oscar NP			  03/15/2017	2017	tramadol hcl 50 mg tablet 	240	40	Rach Cobos NP			  2016	tramadol hcl 50 mg tablet 	240	30	PapitoRach oscar NP			  2016	tramadol hcl 50 mg tablet 	240	30	PapitoRach oscar NP			  2016	tramadol hcl 50 mg tablet 	60	8	Rahc Cobos NP			  * - Drugs marked with an asterisk are compound drugs. If the compound drug is made up of more than one controlled substance, then each controlled substance will be a separate row in the table.  Prescriptions Dispensed in [place]  There are no results for the search terms that you entered.   Prescriptions Dispensed in [place]  There are no results for the search terms that you entered.   Prescriptions Dispensed in [place]  There are no results for the search terms that you entered.   Prescriptions Dispensed in [place]  There are no results for the search terms that you entered.   Prescriptions Dispensed in [place]  There are no results for the search terms that you entered.   Prescriptions Dispensed in [place]  There are no results for the search terms that you entered.   Prescriptions Dispensed in [facility]  There are no results for the search terms that you entered.   Prescriptions Dispensed in [place]  There are no results for the search terms that you entered.   Prescriptions Dispensed in [place]  There are no results for the search terms that you entered.   Prescriptions Dispensed in [place]  There are no results for the search terms that you entered.   Prescriptions Dispensed in [place]  There are no results for the search terms that you entered.   Prescriptions Dispensed in [place]  There are no results for the search terms that you entered.   Prescriptions Dispensed in [place]  There are no results for the search terms that you entered.   Prescriptions Dispensed in [place]  There are no results for the search terms that you entered.   Prescriptions Dispensed in [place]  There are no results for the search terms that you entered.   Prescriptions Dispensed in [place]  There are no results for the search terms that you entered.   Prescriptions Dispensed in [place]  There are no results for the search terms that you entered.   Prescriptions Dispensed in [place]  There are no results for the search terms that you entered.   Prescriptions Dispensed in [place]  There are no results for the search terms that you entered.   Prescriptions Dispensed in [place]  There are no results for the search terms that you entered.   Prescriptions Dispensed in [place]  There are no results for the search terms that you entered.   * - Drugs marked with an asterisk are compound drugs. If the compound drug is made up of more than one controlled substance, then each controlled substance will be a separate row in the table.

## 2017-10-31 NOTE — CONSULT NOTE ADULT - SUBJECTIVE AND OBJECTIVE BOX
Pt Name: MAMTA TALAVERA    MRN: [id number]      Patient is a 85y Female presenting to the emergency department with a chief complaint of left lower back and left leg pain   Patient is a 85y old  Female who presents with a chief complaint of leg and back pain (30 Oct 2017 23:25).  Patient states received Epidural last Monday for back pain and following day developed left leg pain.  Patient with hx of L4/5 fusion in 2005. Patient states since pain to leg has been increasing and became difficult to bear weight causing her to come to ER.  Patient had MRI of back shows chronic L2 Compression fx and subacute vs acute sacral fx.  patient denies any recent falls or trauma.  patient states currently has left sided back pain with left leg pain.  Patient  had dopplers due to swelling in left leg found positive bilateral DVT.  Patient states has mild pain to left back currently.   .    HEALTH ISSUES - PROBLEM Dx:  Hyperlipidemia, unspecified hyperlipidemia type: Hyperlipidemia, unspecified hyperlipidemia type  Essential hypertension: Essential hypertension  SORAIDA (acute kidney injury): SORAIDA (acute kidney injury)  Acute deep vein thrombosis (DVT) of both lower extremities, unspecified vein: Acute deep vein thrombosis (DVT) of both lower extremities, unspecified vein  Closed fracture of sacrum, initial encounter: Closed fracture of sacrum, initial encounter      .      REVIEW OF SYSTEMS      General: no fevers or chills	    Skin/Breast:  	  Ophthalmologic:  	  ENMT:	    Respiratory and Thorax: no dyspnea   	  Cardiovascular:	no chest pain     Gastrointestinal:	no abdominal pain     Genitourinary:	    Musculoskeletal:	 left lower back pain/left leg pain and swelling     Neurological:	    Psychiatric:	    Hematology/Lymphatics:	    Endocrine:	    Allergic/Immunologic:	    ROS is otherwise negative.    PAST MEDICAL & SURGICAL HISTORY:  PAST MEDICAL & SURGICAL HISTORY:  Hyperlipidemia, unspecified hyperlipidemia type  Chronic back pain  Essential hypertension  S/P spinal fusion: L spines      Allergies: No Known Allergies      Medications: atorvastatin 20 milliGRAM(s) Oral at bedtime  cyclobenzaprine 5 milliGRAM(s) Oral three times a day PRN  diltiazem    milliGRAM(s) Oral daily  enoxaparin Injectable 60 milliGRAM(s) SubCutaneous two times a day  ibuprofen  Tablet 600 milliGRAM(s) Oral every 6 hours PRN  ketorolac   Injectable 15 milliGRAM(s) IV Push once PRN  lidocaine   Patch 1 Patch Transdermal daily  oxyCODONE    5 mG/acetaminophen 325 mG 2 Tablet(s) Oral every 4 hours PRN  pantoprazole    Tablet 40 milliGRAM(s) Oral before breakfast  valsartan 160 milliGRAM(s) Oral daily      FAMILY HISTORY:  No pertinent family history in first degree relatives  : non-contributory    Social History: denies drug/alcohol or tobacco use, lives alone at home    Ambulation: Walking independently                          13.7   8.6   )-----------( 334      ( 30 Oct 2017 18:55 )             40.5     10-30    140  |  98  |  28.0<H>  ----------------------------<  86  4.6   |  26.0  |  0.95    Ca    9.8      30 Oct 2017 18:55    TPro  7.2  /  Alb  4.1  /  TBili  0.5  /  DBili  x   /  AST  27  /  ALT  25  /  AlkPhos  130<H>  10-30      PHYSICAL EXAM:    Vital Signs Last 24 Hrs  T(C): 36.4 (31 Oct 2017 04:01), Max: 36.8 (30 Oct 2017 16:05)  T(F): 97.5 (31 Oct 2017 04:01), Max: 98.3 (30 Oct 2017 16:05)  HR: 78 (31 Oct 2017 04:01) (78 - 90)  BP: 157/79 (31 Oct 2017 04:01) (125/72 - 157/79)  BP(mean): --  RR: 20 (31 Oct 2017 04:01) (20 - 20)  SpO2: 96% (31 Oct 2017 04:01) (94% - 96%)  Daily Height in cm: 149.86 (30 Oct 2017 16:05)    Daily     Appearance: Alert, responsive, in no acute distress.    Neck FROM, no tenderness    Resp No distress    Skin: no rash on visible skin. Skin is clean, dry and intact. No bleeding. No abrasions. No ulcerations.    Vascular: 2+ distal pulses. Cap refill < 2 sec. No signs of venous insuffiency or stasis. No extremity ulcerations. No cyanosis.    Musculoskeletal:      Back positive mild paraspinal lumbar tenderness, no bony tenderness, ROM intact       Left Upper Extremity: Atraumatic with normal alignment NROM. No crepitus. No bony tenderness.        Right Upper Extremity: Atraumatic with normal alignment NROM. No crepitus. No bony tenderness.        Left Lower Extremity: Positive swelling to lower leg, positive tenderness to calf and lateral leg, pulse intact, Dorsi/planar flexion intact, EHL/FHL intact,  Strength 4/5 for lifting leg off bed       Right Lower Extremity: mild swelling, pulse intact, Dorsi/plantar flexion intact, EHL/FHL, strength 5/5, strength 5/5    Neurological: Sensation is grossly intact to light touch. No focal deficits or weaknesses found.    Pathologic reflexes: - Lu,  -  Clonus            Reflexes:  Patella, Achilles intact                Imaging Studies: < from: MRI Lumbar Spine w/wo Cont (10.30.17 @ 20:45) >    ******PRELIMINARY REPORT******    ******PRELIMINARY REPORT******           EXAM:  LUMBAR SPINE(MRI)W WO CON                          PROCEDURE DATE:  10/30/2017        ******PRELIMINARY REPORT******    ******PRELIMINARY REPORT******          INTERPRETATION:  [facility] RADI[employer]IST PRELIMINARY REPORT    EXAM:    MR Lumbar Spine Without and With Intravenous Contrast    CLINICAL HISTORY:    85 years old, female; Signs and symptoms; Weakness    TECHNIQUE:    Magnetic resonance images of the lumbar spine without and with   intravenous   contrast in multiple planes.    CONTRAST:    5 mL of gadavist administered intravenously.    COMPARISON:    No relevant prior studies available.    FINDINGS:    Vertebrae:  Abnormal T2 signal seen in the sacrum bilaterally extending   to   the sacroiliac joints.  Surgical changes from posterior fusion of L4 and   L5   level.  Mild old compression fracture of L2 level.  Severe levoscoliosis   in   lumbar spine centered at L2 level.  No evidence of acute fracture in   lumbar   spine.      Marrow:  Moderate to severe loss of vertebral body height at L4 and L5   level.   No edema seen within the bone marrow to suggest acute fracture.  Loss of   vertebral body height appears old.    Spinal cord:  Unremarkable.  Normal signal.  No abnormal enhancement.    Soft tissues:  Unremarkable.     DISCS/SPINAL CANAL/NEURAL FORAMINA:    Mild to moderate degenerative disc disease and facet arthropathy   throughout   lumbar spine. No disc protrusion or extrusion. Areas of mild degenerative   canal   stenosis seen. No areas of moderate or severe stenosis.    IMPRESSION:         Abnormal T2 signal seen in the sacrum bilaterally extending to the   sacroiliac   joints.  Findings are consistent with acute versus subacute sacral   fractures.    Severe levoscoliosis with degenerative and surgical changes throughout   lumbar   spine.          ******PRELIMINARY REPORT******    ******PRELIMINARY REPORT******              VANE FULLER M.D.;[facility] RADIOLOGIST                    < end of copied text >      A/P:  Pt is a  85y Female with Patient is a 85y old  Female who presents with a chief complaint of leg and back pain (30 Oct 2017 23:25)   found to have DVT bilateral lower extremities/chronic back pain       PLAN:  * Pain control  * Treatment plan to be finalized after review of imaging studies by Dr. Abarca Pt Name: MAMTA TALAVERA    MRN: [id number]      Patient is a 85y Female presenting to the emergency department with a chief complaint of left lower back and left leg pain   Patient is a 85y old  Female who presents with a chief complaint of leg and back pain (30 Oct 2017 23:25).  Patient states received Epidural last Monday for back pain and following day developed left leg pain.  Patient with hx of L4/5 fusion in 2005. Patient states since pain to leg has been increasing and became difficult to bear weight causing her to come to ER.  Patient had MRI of back shows chronic L2 Compression fx and subacute vs acute sacral fx.  patient denies any recent falls or trauma.  patient states currently has left sided back pain with left leg pain.  Patient  had dopplers due to swelling in left leg found positive bilateral DVT.  Patient states has mild pain to left back currently.   .    HEALTH ISSUES - PROBLEM Dx:  Hyperlipidemia, unspecified hyperlipidemia type: Hyperlipidemia, unspecified hyperlipidemia type  Essential hypertension: Essential hypertension  SORAIDA (acute kidney injury): SORAIDA (acute kidney injury)  Acute deep vein thrombosis (DVT) of both lower extremities, unspecified vein: Acute deep vein thrombosis (DVT) of both lower extremities, unspecified vein  Closed fracture of sacrum, initial encounter: Closed fracture of sacrum, initial encounter      .      REVIEW OF SYSTEMS      General: no fevers or chills	    Skin/Breast:  	  Ophthalmologic:  	  ENMT:	    Respiratory and Thorax: no dyspnea   	  Cardiovascular:	no chest pain     Gastrointestinal:	no abdominal pain     Genitourinary:	    Musculoskeletal:	 left lower back pain/left leg pain and swelling     Neurological:	    Psychiatric:	    Hematology/Lymphatics:	    Endocrine:	    Allergic/Immunologic:	    ROS is otherwise negative.    PAST MEDICAL & SURGICAL HISTORY:  PAST MEDICAL & SURGICAL HISTORY:  Hyperlipidemia, unspecified hyperlipidemia type  Chronic back pain  Essential hypertension  S/P spinal fusion: L spines      Allergies: No Known Allergies      Medications: atorvastatin 20 milliGRAM(s) Oral at bedtime  cyclobenzaprine 5 milliGRAM(s) Oral three times a day PRN  diltiazem    milliGRAM(s) Oral daily  enoxaparin Injectable 60 milliGRAM(s) SubCutaneous two times a day  ibuprofen  Tablet 600 milliGRAM(s) Oral every 6 hours PRN  ketorolac   Injectable 15 milliGRAM(s) IV Push once PRN  lidocaine   Patch 1 Patch Transdermal daily  oxyCODONE    5 mG/acetaminophen 325 mG 2 Tablet(s) Oral every 4 hours PRN  pantoprazole    Tablet 40 milliGRAM(s) Oral before breakfast  valsartan 160 milliGRAM(s) Oral daily      FAMILY HISTORY:  No pertinent family history in first degree relatives  : non-contributory    Social History: denies drug/alcohol or tobacco use, lives alone at home    Ambulation: Walking independently                          13.7   8.6   )-----------( 334      ( 30 Oct 2017 18:55 )             40.5     10-30    140  |  98  |  28.0<H>  ----------------------------<  86  4.6   |  26.0  |  0.95    Ca    9.8      30 Oct 2017 18:55    TPro  7.2  /  Alb  4.1  /  TBili  0.5  /  DBili  x   /  AST  27  /  ALT  25  /  AlkPhos  130<H>  10-30      PHYSICAL EXAM:    Vital Signs Last 24 Hrs  T(C): 36.4 (31 Oct 2017 04:01), Max: 36.8 (30 Oct 2017 16:05)  T(F): 97.5 (31 Oct 2017 04:01), Max: 98.3 (30 Oct 2017 16:05)  HR: 78 (31 Oct 2017 04:01) (78 - 90)  BP: 157/79 (31 Oct 2017 04:01) (125/72 - 157/79)  BP(mean): --  RR: 20 (31 Oct 2017 04:01) (20 - 20)  SpO2: 96% (31 Oct 2017 04:01) (94% - 96%)  Daily Height in cm: 149.86 (30 Oct 2017 16:05)    Daily     Appearance: Alert, responsive, in no acute distress.    Neck FROM, no tenderness    Resp No distress    Skin: no rash on visible skin. Skin is clean, dry and intact. No bleeding. No abrasions. No ulcerations.    Vascular: 2+ distal pulses. Cap refill < 2 sec. No signs of venous insuffiency or stasis. No extremity ulcerations. No cyanosis.    Musculoskeletal:      Back positive mild paraspinal lumbar tenderness, mild bony tenderness to lumbar region, no tenderness to sacrum, lidoderm patch in place, no open wounds or sores, ROM intact       Left Upper Extremity: Atraumatic with normal alignment NROM. No crepitus. No bony tenderness.        Right Upper Extremity: Atraumatic with normal alignment NROM. No crepitus. No bony tenderness.        Left Lower Extremity: Positive swelling to lower leg, positive tenderness to calf and lateral leg, pulse intact, Dorsi/planar flexion intact, EHL/FHL intact,  Strength 4/5 for lifting leg off bed       Right Lower Extremity: mild swelling, pulse intact, Dorsi/plantar flexion intact, EHL/FHL, strength 5/5, strength 5/5    Neurological: Sensation is grossly intact to light touch. No focal deficits or weaknesses found.    Pathologic reflexes: - Lu,  -  Clonus            Reflexes:  Patella, Achilles intact                Imaging Studies: < from: MRI Lumbar Spine w/wo Cont (10.30.17 @ 20:45) >    ******PRELIMINARY REPORT******    ******PRELIMINARY REPORT******           EXAM:  LUMBAR SPINE(MRI)W WO CON                          PROCEDURE DATE:  10/30/2017        ******PRELIMINARY REPORT******    ******PRELIMINARY REPORT******          INTERPRETATION:  [facility] RADI[employer]IST PRELIMINARY REPORT    EXAM:    MR Lumbar Spine Without and With Intravenous Contrast    CLINICAL HISTORY:    85 years old, female; Signs and symptoms; Weakness    TECHNIQUE:    Magnetic resonance images of the lumbar spine without and with   intravenous   contrast in multiple planes.    CONTRAST:    5 mL of gadavist administered intravenously.    COMPARISON:    No relevant prior studies available.    FINDINGS:    Vertebrae:  Abnormal T2 signal seen in the sacrum bilaterally extending   to   the sacroiliac joints.  Surgical changes from posterior fusion of L4 and   L5   level.  Mild old compression fracture of L2 level.  Severe levoscoliosis   in   lumbar spine centered at L2 level.  No evidence of acute fracture in   lumbar   spine.      Marrow:  Moderate to severe loss of vertebral body height at L4 and L5   level.   No edema seen within the bone marrow to suggest acute fracture.  Loss of   vertebral body height appears old.    Spinal cord:  Unremarkable.  Normal signal.  No abnormal enhancement.    Soft tissues:  Unremarkable.     DISCS/SPINAL CANAL/NEURAL FORAMINA:    Mild to moderate degenerative disc disease and facet arthropathy   throughout   lumbar spine. No disc protrusion or extrusion. Areas of mild degenerative   canal   stenosis seen. No areas of moderate or severe stenosis.    IMPRESSION:         Abnormal T2 signal seen in the sacrum bilaterally extending to the   sacroiliac   joints.  Findings are consistent with acute versus subacute sacral   fractures.    Severe levoscoliosis with degenerative and surgical changes throughout   lumbar   spine.          ******PRELIMINARY REPORT******    ******PRELIMINARY REPORT******              VANE FULLER M.D.;[facility] RADIOLOGIST                    < end of copied text >      A/P:  Pt is a  85y Female with Patient is a 85y old  Female who presents with a chief complaint of leg and back pain (30 Oct 2017 23:25)   found to have DVT bilateral lower extremities/chronic back pain       PLAN:  * Pain control  * Treatment plan to be finalized after review of imaging studies by Dr. Abarca Pt Name: MAMTA TALAVERA    MRN: [id number]      Patient is a 85y Female presenting to the emergency department with a chief complaint of left lower back and left leg pain   Patient is a 85y old  Female who presents with a chief complaint of leg and back pain (30 Oct 2017 23:25).  Patient states received Epidural last Monday for back pain and following day developed left leg pain.  Patient with hx of L4/5 fusion in 2005. Patient states since pain to leg has been increasing and became difficult to bear weight causing her to come to ER.  Patient had MRI of back shows chronic L2 Compression fx and subacute vs acute sacral fx.  patient denies any recent falls or trauma.  patient states currently has left sided back pain with left leg pain.  Patient  had dopplers due to swelling in left leg found positive bilateral DVT.  Patient states has mild pain to left back currently.   .    HEALTH ISSUES - PROBLEM Dx:  Hyperlipidemia, unspecified hyperlipidemia type: Hyperlipidemia, unspecified hyperlipidemia type  Essential hypertension: Essential hypertension  SORAIDA (acute kidney injury): SORAIDA (acute kidney injury)  Acute deep vein thrombosis (DVT) of both lower extremities, unspecified vein: Acute deep vein thrombosis (DVT) of both lower extremities, unspecified vein  Closed fracture of sacrum, initial encounter: Closed fracture of sacrum, initial encounter      .      REVIEW OF SYSTEMS      General: no fevers or chills	    Skin/Breast:  	  Ophthalmologic:  	  ENMT:	    Respiratory and Thorax: no dyspnea   	  Cardiovascular:	no chest pain     Gastrointestinal:	no abdominal pain     Genitourinary:	    Musculoskeletal:	 left lower back pain/left leg pain and swelling     Neurological:	    Psychiatric:	    Hematology/Lymphatics:	    Endocrine:	    Allergic/Immunologic:	    ROS is otherwise negative.    PAST MEDICAL & SURGICAL HISTORY:  PAST MEDICAL & SURGICAL HISTORY:  Hyperlipidemia, unspecified hyperlipidemia type  Chronic back pain  Essential hypertension  S/P spinal fusion: L spines      Allergies: No Known Allergies      Medications: atorvastatin 20 milliGRAM(s) Oral at bedtime  cyclobenzaprine 5 milliGRAM(s) Oral three times a day PRN  diltiazem    milliGRAM(s) Oral daily  enoxaparin Injectable 60 milliGRAM(s) SubCutaneous two times a day  ibuprofen  Tablet 600 milliGRAM(s) Oral every 6 hours PRN  ketorolac   Injectable 15 milliGRAM(s) IV Push once PRN  lidocaine   Patch 1 Patch Transdermal daily  oxyCODONE    5 mG/acetaminophen 325 mG 2 Tablet(s) Oral every 4 hours PRN  pantoprazole    Tablet 40 milliGRAM(s) Oral before breakfast  valsartan 160 milliGRAM(s) Oral daily      FAMILY HISTORY:  No pertinent family history in first degree relatives  : non-contributory    Social History: denies drug/alcohol or tobacco use, lives alone at home    Ambulation: Walking independently                          13.7   8.6   )-----------( 334      ( 30 Oct 2017 18:55 )             40.5     10-30    140  |  98  |  28.0<H>  ----------------------------<  86  4.6   |  26.0  |  0.95    Ca    9.8      30 Oct 2017 18:55    TPro  7.2  /  Alb  4.1  /  TBili  0.5  /  DBili  x   /  AST  27  /  ALT  25  /  AlkPhos  130<H>  10-30      PHYSICAL EXAM:    Vital Signs Last 24 Hrs  T(C): 36.4 (31 Oct 2017 04:01), Max: 36.8 (30 Oct 2017 16:05)  T(F): 97.5 (31 Oct 2017 04:01), Max: 98.3 (30 Oct 2017 16:05)  HR: 78 (31 Oct 2017 04:01) (78 - 90)  BP: 157/79 (31 Oct 2017 04:01) (125/72 - 157/79)  BP(mean): --  RR: 20 (31 Oct 2017 04:01) (20 - 20)  SpO2: 96% (31 Oct 2017 04:01) (94% - 96%)  Daily Height in cm: 149.86 (30 Oct 2017 16:05)    Daily     Appearance: Alert, responsive, in no acute distress.    Neck FROM, no tenderness    Resp No distress    Skin: no rash on visible skin. Skin is clean, dry and intact. No bleeding. No abrasions. No ulcerations.    Vascular: 2+ distal pulses. Cap refill < 2 sec. No signs of venous insuffiency or stasis. No extremity ulcerations. No cyanosis.    Musculoskeletal:      Back positive mild paraspinal lumbar tenderness, mild bony tenderness to lumbar region, no tenderness to sacrum, lidoderm patch in place, no open wounds or sores, ROM intact       Left Upper Extremity: Atraumatic with normal alignment NROM. No crepitus. No bony tenderness.        Right Upper Extremity: Atraumatic with normal alignment NROM. No crepitus. No bony tenderness.        Left Lower Extremity: Positive swelling to lower leg, positive tenderness to calf and lateral leg, pulse intact, Dorsi/planar flexion intact, EHL/FHL intact,  Strength 4/5 for lifting leg off bed       Right Lower Extremity: mild swelling, pulse intact, Dorsi/plantar flexion intact, EHL/FHL, strength 5/5, strength 5/5    Neurological: Sensation is grossly intact to light touch. No focal deficits or weaknesses found.    Pathologic reflexes: - Lu,  -  Clonus            Reflexes:  Patella, Achilles intact                Imaging Studies: < from: MRI Lumbar Spine w/wo Cont (10.30.17 @ 20:45) >    ******PRELIMINARY REPORT******    ******PRELIMINARY REPORT******           EXAM:  LUMBAR SPINE(MRI)W WO CON                          PROCEDURE DATE:  10/30/2017        ******PRELIMINARY REPORT******    ******PRELIMINARY REPORT******          INTERPRETATION:  [facility] RADIOLOGIST PRELIMINARY REPORT    EXAM:    MR Lumbar Spine Without and With Intravenous Contrast    CLINICAL HISTORY:    85 years old, female; Signs and symptoms; Weakness    TECHNIQUE:    Magnetic resonance images of the lumbar spine without and with   intravenous   contrast in multiple planes.    CONTRAST:    5 mL of gadavist administered intravenously.    COMPARISON:    No relevant prior studies available.    FINDINGS:    Vertebrae:  Abnormal T2 signal seen in the sacrum bilaterally extending   to   the sacroiliac joints.  Surgical changes from posterior fusion of L4 and   L5   level.  Mild old compression fracture of L2 level.  Severe levoscoliosis   in   lumbar spine centered at L2 level.  No evidence of acute fracture in   lumbar   spine.      Marrow:  Moderate to severe loss of vertebral body height at L4 and L5   level.   No edema seen within the bone marrow to suggest acute fracture.  Loss of   vertebral body height appears old.    Spinal cord:  Unremarkable.  Normal signal.  No abnormal enhancement.    Soft tissues:  Unremarkable.     DISCS/SPINAL CANAL/NEURAL FORAMINA:    Mild to moderate degenerative disc disease and facet arthropathy   throughout   lumbar spine. No disc protrusion or extrusion. Areas of mild degenerative   canal   stenosis seen. No areas of moderate or severe stenosis.    IMPRESSION:         Abnormal T2 signal seen in the sacrum bilaterally extending to the   sacroiliac   joints.  Findings are consistent with acute versus subacute sacral   fractures.    Severe levoscoliosis with degenerative and surgical changes throughout   lumbar   spine.          ******PRELIMINARY REPORT******    ******PRELIMINARY REPORT******              VANE FULLER M.D.;[employer]IOLOGIST                    < end of copied text >      A/P:  Pt is a  85y Female with Patient is a 85y old  Female who presents with a chief complaint of leg and back pain (30 Oct 2017 23:25)   found to have DVT bilateral lower extremities/chronic back pain       PLAN:  * Pain control  * I reviewed MRI images of the lumbar spine as well as pelvic x-rays that show a rami fracture as well as the corresponding MRI evidence of lumbar fractures I believe patient can be treated with right lower chin be weightbearing as tolerated left lower extremity toe touch weightbearing secondary to these sacral fractures as well as rami fractures PT OT with the above-mentioned weightbearing restrictions patient be followed up her hospital stay DVT prophylaxis secondary to her past medical history and recent Doppler findings patient can safely follow from a spinal perspective as an outpatient out also like to recommend a CAT scan of the pelvis to further delineate some anatomic detail these rami fractures

## 2017-10-31 NOTE — ED ADULT NURSE REASSESSMENT NOTE - NS ED NURSE REASSESS COMMENT FT1
pt. a&ox3. vss. as documented. pt. eating dinner. pt. in no apparent distress at this time. awaiting testing. will continue to monitor.
Pt is resting in bed comfortably at this time, no apparent distress noted at this time. Pt denies any complaints at this time. Plan of care explained, will continue to monitor.
pt care assumed at 1930. pt is not in room at this time. will reevaluate when pt returns to the Emergency Department.
pt returned from MRI and doppler at this time. no apparent distress noted at this time. pt denies any complaints at this time. plan of care explained, will continue to monitor.

## 2017-10-31 NOTE — DISCHARGE NOTE ADULT - CARE PROVIDER_API CALL
Ronald Abarca (DO), Orthopaedic Surgery  [address] [place] [address]  Phone: [phone]  Fax: [phone]

## 2017-10-31 NOTE — DISCHARGE NOTE ADULT - PATIENT PORTAL LINK FT
“You can access the [facility] Patient Portal, offered by [facility], by registering with the following website: [email or web address]”

## 2017-10-31 NOTE — PROGRESS NOTE ADULT - SUBJECTIVE AND OBJECTIVE BOX
MAMTA TALAVERA Patient is a 85y old  Female who presents with a chief complaint of leg and back pain (31 Oct 2017 08:46)     HPI:  Pt is a 84 yo female with a pmh/o HTN, HLD, b/l acquired deafness, and chronic back pain s/p surgical intervention x 2 who is currently being followed by pain management who recently received a lumbar facet block last week who presents with intractable low back pain and left calf pain. Pt denies any recent trauma, new activity, and cannot recall anything that may have caused her acute pain. Pt states she has had this pain since the  and presented to the ED as she can no longer care for self at home due to pain. Pt lives at home by herself.  Denies cp, sob, recent travel, h/o dvt/PE, gu/gi sx. (30 Oct 2017 23:25)      HPI: 10/31:    Patient was seen and examined at the bedside, she appear severely uncomfortable almost crying.     Height (cm): 149.86 (10-30 @ 16:05)  Weight (kg): 59 (10-30 @ 16:05)  BMI (kg/m2): 26.3 (10-30 @ 16:05)  BSA (m2): 1.54 (10-30 @ 16:05)I&O's Summary    Allergies    No Known Allergies    Intolerances      HEALTH ISSUES - PROBLEM Dx:  Closed compression fracture of second lumbar vertebra, initial encounter: Closed compression fracture of second lumbar vertebra, initial encounter  Chronic pain syndrome: Chronic pain syndrome  Sacral insufficiency fracture, initial encounter: Sacral insufficiency fracture, initial encounter  Chronic low back pain, unspecified back pain laterality, with sciatica presence unspecified: Chronic low back pain, unspecified back pain laterality, with sciatica presence unspecified  Hyperlipidemia, unspecified hyperlipidemia type: Hyperlipidemia, unspecified hyperlipidemia type  Essential hypertension: Essential hypertension  SORAIDA (acute kidney injury): SORAIDA (acute kidney injury)  Acute deep vein thrombosis (DVT) of both lower extremities, unspecified vein: Acute deep vein thrombosis (DVT) of both lower extremities, unspecified vein  Closed fracture of sacrum, initial encounter: Closed fracture of sacrum, initial encounter        PAST MEDICAL & SURGICAL HISTORY:  Hyperlipidemia, unspecified hyperlipidemia type  Chronic back pain  Essential hypertension  S/P spinal fusion: L spines          Vital Signs Last 24 Hrs  T(C): 36.7 (31 Oct 2017 13:01), Max: 36.8 (30 Oct 2017 16:05)  T(F): 98 (31 Oct 2017 13:01), Max: 98.3 (30 Oct 2017 16:05)  HR: 86 (31 Oct 2017 13:) (78 - 90)  BP: 122/68 (31 Oct 2017 13:01) (122/68 - 157/79)  BP(mean): --  RR: 24 (31 Oct 2017 13:) (20 - 24)  SpO2: 97% (31 Oct 2017 13:) (94% - 97%)T(C): 36.7 (10-31-17 @ 13:01), Max: 36.8 (10-30-17 @ 16:05)  HR: 86 (10-31-17 @ 13:01) (78 - 90)  BP: 122/68 (10-31-17 @ 13:01) (122/68 - 157/79)  RR: 24 (10-31-17 @ 13:01) (20 - 24)  SpO2: 97% (10-31-17 @ 13:01) (94% - 97%)  Wt(kg): --    PHYSICAL EXAM:    GENERAL: NAD, well-groomed, well-developed  HEAD:  Atraumatic, Normocephalic  EYES: EOMI, PERRLA, conjunctiva and sclera clear  ENMT:  Moist mucous membranes,  No lesions  NECK: Supple, No JVD, Normal thyroid  NERVOUS SYSTEM:  Alert & Oriented X3,  Moves upper and lower extremities; DTRs 2+ intact and symmetric  CHEST/LUNG: Clear to auscultation bilaterally; No rales, rhonchi, wheezing,   HEART: Regular rate and rhythm; No murmurs,   ABDOMEN: Soft, Nontender, Nondistended; Bowel sounds present  EXTREMITIES:  Peripheral Pulses, No  cyanosis, or edema  SKIN: No rashes or lesions    atorvastatin 20 milliGRAM(s) Oral at bedtime  cyclobenzaprine 5 milliGRAM(s) Oral three times a day PRN  diltiazem    milliGRAM(s) Oral daily  docusate sodium 100 milliGRAM(s) Oral two times a day  enoxaparin Injectable 60 milliGRAM(s) SubCutaneous two times a day  gabapentin 100 milliGRAM(s) Oral three times a day  HYDROmorphone  Injectable 0.5 milliGRAM(s) IV Push every 4 hours PRN  HYDROmorphone  Injectable 1 milliGRAM(s) IV Push every 6 hours PRN  ibuprofen  Tablet 400 milliGRAM(s) Oral every 6 hours PRN  lidocaine   Patch 1 Patch Transdermal daily  oxyCODONE    5 mG/acetaminophen 325 mG 2 Tablet(s) Oral every 6 hours PRN  pantoprazole    Tablet 40 milliGRAM(s) Oral before breakfast  polyethylene glycol 3350 17 Gram(s) Oral daily PRN  senna 2 Tablet(s) Oral at bedtime  valsartan 160 milliGRAM(s) Oral daily      LABS:                          12.8   6.9   )-----------( 312      ( 31 Oct 2017 07:51 )             38.6     10-31    142  |  102  |  24.0<H>  ----------------------------<  90  4.3   |  31.0<H>  |  0.88    Ca    9.3      31 Oct 2017 07:51    TPro  6.6  /  Alb  3.8  /  TBili  0.6  /  DBili  x   /  AST  22  /  ALT  22  /  AlkPhos  122<H>  10-31    LIVER FUNCTIONS - ( 31 Oct 2017 07:51 )  Alb: 3.8 g/dL / Pro: 6.6 g/dL / ALK PHOS: 122 U/L / ALT: 22 U/L / AST: 22 U/L / GGT: x           PT/INR - ( 31 Oct 2017 07:51 )   PT: 11.1 sec;   INR: 1.01 ratio         PTT - ( 31 Oct 2017 07:51 )  PTT:33.0 sec      Urinalysis Basic - ( 31 Oct 2017 10:01 )    Color: Yellow / Appearance: Clear / S.010 / pH: x  Gluc: x / Ketone: Negative  / Bili: Negative / Urobili: Negative mg/dL   Blood: x / Protein: Negative mg/dL / Nitrite: Negative   Leuk Esterase: Negative / RBC: x / WBC x   Sq Epi: x / Non Sq Epi: x / Bacteria: x      CAPILLARY BLOOD GLUCOSE          RADIOLOGY & ADDITIONAL TESTS:      Consultant notes reviewed    Case discussed with consultant/provider/ family /patient MAMTA TALAVERA Patient is a 85y old  Female who presents with a chief complaint of leg and back pain (31 Oct 2017 08:46)     HPI:  Pt is a 84 yo female with a pmh/o HTN, HLD, b/l acquired deafness, and chronic back pain s/p surgical intervention x 2 who is currently being followed by pain management who recently received a lumbar facet block last week who presents with intractable low back pain and left calf pain. Pt denies any recent trauma, new activity, and cannot recall anything that may have caused her acute pain. Pt states she has had this pain since the  and presented to the ED as she can no longer care for self at home due to pain. Pt lives at home by herself.  Denies cp, sob, recent travel, h/o dvt/PE, gu/gi sx. (30 Oct 2017 23:25)      HPI: 10/31:    Patient was seen and examined at the bedside, she appear severely uncomfortable almost crying. States she has back pain, groin pain, and a burning sensation from her low back to her legs. (7/10) Groin and back pain started prior to admission. She is being admitted for management of a sacral fracture also found to have bilateral LE DVTs. She denies CP, SOB, nausea, vomiting, abdominal pain, diarrhea, constipation, numbness in her extremities.     Later RN Bj mentioned he noticed an abnormality on her AP view of her lumbar spine x-ray. There seemed to be a fracture of her pelvis. I went to radiology and spoke to the radiologist. He confirmed that there was a fracture on the pelvis and he wrote that there was an acetabulum fracture. I called orthopedics back as they were originally consulted for the sacral fracture. Their interpretation of the fracture was most likely a pubic rami fracture and they ordered a dedicated pelvic fracture to make sure of this. The results are pending.       Height (cm): 149.86 (10-30 @ 16:05)  Weight (kg): 59 (10-30 @ 16:05)  BMI (kg/m2): 26.3 (10-30 @ 16:05)  BSA (m2): 1.54 (10-30 @ 16:05)I&O's Summary    Allergies    No Known Allergies    HEALTH ISSUES - PROBLEM Dx:  Closed compression fracture of second lumbar vertebra, initial encounter: Closed compression fracture of second lumbar vertebra, initial encounter  Chronic pain syndrome: Chronic pain syndrome  Sacral insufficiency fracture, initial encounter: Sacral insufficiency fracture, initial encounter  Chronic low back pain, unspecified back pain laterality, with sciatica presence unspecified: Chronic low back pain, unspecified back pain laterality, with sciatica presence unspecified  Hyperlipidemia, unspecified hyperlipidemia type: Hyperlipidemia, unspecified hyperlipidemia type  Essential hypertension: Essential hypertension  SORAIDA (acute kidney injury): SORAIDA (acute kidney injury)  Acute deep vein thrombosis (DVT) of both lower extremities, unspecified vein: Acute deep vein thrombosis (DVT) of both lower extremities, unspecified vein  Closed fracture of sacrum, initial encounter: Closed fracture of sacrum, initial encounter        PAST MEDICAL & SURGICAL HISTORY:  Hyperlipidemia, unspecified hyperlipidemia type  Chronic back pain  Essential hypertension  S/P spinal fusion: L spines      Vital Signs Last 24 Hrs  T(C): 36.7 (31 Oct 2017 13:01), Max: 36.8 (30 Oct 2017 16:05)  T(F): 98 (31 Oct 2017 13:01), Max: 98.3 (30 Oct 2017 16:05)  HR: 86 (31 Oct 2017 13:01) (78 - 90)  BP: 122/68 (31 Oct 2017 13:01) (122/68 - 157/79)  RR: 24 (31 Oct 2017 13:01) (20 - 24)  SpO2: 97% (31 Oct 2017 13:01) (94% - 97%)    PHYSICAL EXAM:    GENERAL: In severe distress, in pain and discomfort  HEAD:  Atraumatic, Normocephalic  EYES: EOMI, PERRLA, conjunctiva and sclera clear  ENMT:  dry  mucous membranes,  No lesions  NERVOUS SYSTEM:  Alert & Oriented X3,  Moves upper and lower extremities  MUSCULOSKELETAL: no pain to palpation of the hip bilaterally, FROM of left and right hip, no pain to passive movement of bilateral hips  CHEST/LUNG: Clear to auscultation bilaterally; No rales, rhonchi, wheezing,   HEART: Regular rate and rhythm; No murmurs,   ABDOMEN: Soft, Nontender, Nondistended; Bowel sounds present  EXTREMITIES:  Peripheral Pulses, No  cyanosis, or edema  SKIN: No rashes or lesions    atorvastatin 20 milliGRAM(s) Oral at bedtime  cyclobenzaprine 5 milliGRAM(s) Oral three times a day PRN  diltiazem    milliGRAM(s) Oral daily  docusate sodium 100 milliGRAM(s) Oral two times a day  enoxaparin Injectable 60 milliGRAM(s) SubCutaneous two times a day  gabapentin 100 milliGRAM(s) Oral three times a day  HYDROmorphone  Injectable 0.5 milliGRAM(s) IV Push every 4 hours PRN  HYDROmorphone  Injectable 1 milliGRAM(s) IV Push every 6 hours PRN  ibuprofen  Tablet 400 milliGRAM(s) Oral every 6 hours PRN  lidocaine   Patch 1 Patch Transdermal daily  oxyCODONE    5 mG/acetaminophen 325 mG 2 Tablet(s) Oral every 6 hours PRN  pantoprazole    Tablet 40 milliGRAM(s) Oral before breakfast  polyethylene glycol 3350 17 Gram(s) Oral daily PRN  senna 2 Tablet(s) Oral at bedtime  valsartan 160 milliGRAM(s) Oral daily      LABS:                          12.8   6.9   )-----------( 312      ( 31 Oct 2017 07:51 )             38.6     10    142  |  102  |  24.0<H>  ----------------------------<  90  4.3   |  31.0<H>  |  0.88    Ca    9.3      31 Oct 2017 07:51    TPro  6.6  /  Alb  3.8  /  TBili  0.6  /  DBili  x   /  AST  22  /  ALT  22  /  AlkPhos  122<H>  10    LIVER FUNCTIONS - ( 31 Oct 2017 07:51 )  Alb: 3.8 g/dL / Pro: 6.6 g/dL / ALK PHOS: 122 U/L / ALT: 22 U/L / AST: 22 U/L / GGT: x           PT/INR - ( 31 Oct 2017 07:51 )   PT: 11.1 sec;   INR: 1.01 ratio         PTT - ( 31 Oct 2017 07:51 )  PTT:33.0 sec      Urinalysis Basic - ( 31 Oct 2017 10:01 )    Color: Yellow / Appearance: Clear / S.010 / pH: x  Gluc: x / Ketone: Negative  / Bili: Negative / Urobili: Negative mg/dL   Blood: x / Protein: Negative mg/dL / Nitrite: Negative   Leuk Esterase: Negative / RBC: x / WBC x   Sq Epi: x / Non Sq Epi: x / Bacteria: x    RADIOLOGY & ADDITIONAL TESTS:  EXAM:  COCCYX SPINE                          PROCEDURE DATE:  10/31/2017          INTERPRETATION:  Radiographs of the sacrum and coccyx        CLINICAL INFORMATION:  Injury with  Pain.    TECHNIQUE:  Frontal and lateral views of the sacrum and coccyx were   obtained.         FINDINGS:  No prior examinations are available for review.    The sacrum appears intact.  Sacral neural foramina appear symmetric. The   sacroiliac joints are intact.  The coccyx appears unremarkable.         Status post aspinal fusion fixation of the L5 S1 vertebra.  IMPRESSION:   No acute radiographic osseous pathology..          If pain persist despite conservative therapy and soft tissue internal   derangement or occult fracture is clinically suspected follow-up MRI   recommended.    EXAM:  LUMBAR SPINE                          *** ADDENDUM 10/31/2017  ***    Correction:    There is a fracture of the left acetabulum only seen on AP lumbar spine   radiograph. Also note there is a typical indentation of the anterior   S1-S2 sacral segments which may represent a fracture. Please refer to MRI   scan dated 10/30/2017. Critical value  discussed with patient assistant   ADILIA, on 10/31/2017 at 2:11 PM with read back.  Hospital policies for critical values including read back   policy were followed.  The verbal communication of the critical value   supplements this written report.       *** END OF ADDENDUM 10/31/2017  ***      PROCEDURE DATE:  10/31/2017          INTERPRETATION:  Radiographs of the lumbar spine        CLINICAL INFORMATION:  Injury with  Pain.    TECHNIQUE:  Frontal, lateral  and lateral coned-down view of the   lumbosacral junction were obtained.    FINDINGS:  No previous examinations are available for review.    There is diffuse severe osteopenia of theceliac scoliotic lumbar spine   convex to the left with the spinal fusion fixation of the L5-S1 vertebra.   The sacroiliac joints appear intact. There is degenerative spondylosis   with hypertrophic spur formation on the cavity of curve. Two-level   degenerative disc space narrowing noted.  Calcified plaques of the bowel aorta noted without gross evidence of   aneurysm.   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    If pain persist despite conservative therapy and occult fracture or disc   herniation causing central canal or foraminal nerve root compression   clinically suspected further assessment with CT or MRI recommended.           Consultant notes reviewed    Case discussed with consultant/provider/ family /patient

## 2017-10-31 NOTE — CONSULT NOTE ADULT - ASSESSMENT
MAMTA TALAVERA is a 85y Female who presents with pain in the back, buttocks which is nociceptive in nature due to chronic low back pain, sacral insufficiency fracture.  Pain is inadequately controlled with current regimen.     Co-morbid Conditions:  Acute embolism and thrombosis of deep vein of both lower extremities  No h/o HF  No pertinent family history in first degree relatives  MEWS Score  Hyperlipidemia, unspecified hyperlipidemia type  Chronic back pain  Essential hypertension  Chronic low back pain, unspecified back pain laterality, with sciatica presence unspecified  Acute deep vein thrombosis (DVT) of both lower extremities, unspecified vein  Chronic low back pain, unspecified back pain laterality, with sciatica presence unspecified  Hyperlipidemia, unspecified hyperlipidemia type  Essential hypertension  SORAIDA (acute kidney injury)  Acute deep vein thrombosis (DVT) of both lower extremities, unspecified vein  Closed fracture of sacrum, initial encounter  S/P spinal fusion  No significant past surgical history  LEG /BACK/GROIN PAIN  LEG PAIN  90+  Chronic right-sided low back pain without sciatica

## 2017-10-31 NOTE — DISCHARGE NOTE ADULT - HOSPITAL COURSE
is an 84 yo female with a pmh/o HTN, HLD, b/l acquired deafness, and chronic back pain s/p surgical intervention x 2 who is currently being followed by pain management who recently received a lumbar facet block last week who presents with intractable low back pain and left calf pain. She was found to have a sacral fracture and left sided superior and inferior pubic rami fracture, and she received pain control and PT in the hospital. Our orthopedics team did see her and they recommende pain control and PT. She's being discharged to [facility] on apixaban and she is doing very well. She is still a little dehydrated and we recommended that she drink more water. She was on a diuretic at home (she has no hx of chf) and we recommend that she stop all diuretics.    She's pleased with her care here at [facility] and she can be discharged home.

## 2017-10-31 NOTE — DISCHARGE NOTE ADULT - PRINCIPAL DIAGNOSIS
Chronic low back pain, unspecified back pain laterality, with sciatica presence unspecified Closed nondisplaced fracture of pelvis, unspecified part of pelvis, initial encounter

## 2017-10-31 NOTE — DISCHARGE NOTE ADULT - PLAN OF CARE
Improved function and pain control office follow-up in 1-2 weeks  Pain control as needed  weight bearing and activity as tolerated office follow-up in 1-2 weeks  Pain control as needed  RIGHT LE- weight bearing as tolerated  LEFT LE - TTWB

## 2017-11-01 DIAGNOSIS — S32.509A UNSPECIFIED FRACTURE OF UNSPECIFIED PUBIS, INITIAL ENCOUNTER FOR CLOSED FRACTURE: ICD-10-CM

## 2017-11-01 DIAGNOSIS — S32.9XXA FRACTURE OF UNSPECIFIED PARTS OF LUMBOSACRAL SPINE AND PELVIS, INITIAL ENCOUNTER FOR CLOSED FRACTURE: ICD-10-CM

## 2017-11-01 LAB
24R-OH-CALCIDIOL SERPL-MCNC: 39.5 NG/ML — SIGNIFICANT CHANGE UP (ref 30–80)
ALBUMIN SERPL ELPH-MCNC: 3.7 G/DL — SIGNIFICANT CHANGE UP (ref 3.3–5.2)
ALP SERPL-CCNC: 114 U/L — SIGNIFICANT CHANGE UP (ref 40–120)
ALT FLD-CCNC: 22 U/L — SIGNIFICANT CHANGE UP
ANION GAP SERPL CALC-SCNC: 11 MMOL/L — SIGNIFICANT CHANGE UP (ref 5–17)
AST SERPL-CCNC: 20 U/L — SIGNIFICANT CHANGE UP
BILIRUB SERPL-MCNC: 0.5 MG/DL — SIGNIFICANT CHANGE UP (ref 0.4–2)
BUN SERPL-MCNC: 22 MG/DL — HIGH (ref 8–20)
CALCIUM SERPL-MCNC: 9.4 MG/DL — SIGNIFICANT CHANGE UP (ref 8.6–10.2)
CHLORIDE SERPL-SCNC: 102 MMOL/L — SIGNIFICANT CHANGE UP (ref 98–107)
CO2 SERPL-SCNC: 28 MMOL/L — SIGNIFICANT CHANGE UP (ref 22–29)
CREAT SERPL-MCNC: 0.91 MG/DL — SIGNIFICANT CHANGE UP (ref 0.5–1.3)
GLUCOSE SERPL-MCNC: 137 MG/DL — HIGH (ref 70–115)
HCT VFR BLD CALC: 42.1 % — SIGNIFICANT CHANGE UP (ref 37–47)
HGB BLD-MCNC: 14 G/DL — SIGNIFICANT CHANGE UP (ref 12–16)
MAGNESIUM SERPL-MCNC: 2.1 MG/DL — SIGNIFICANT CHANGE UP (ref 1.6–2.6)
MCHC RBC-ENTMCNC: 29.2 PG — SIGNIFICANT CHANGE UP (ref 27–31)
MCHC RBC-ENTMCNC: 33.3 G/DL — SIGNIFICANT CHANGE UP (ref 32–36)
MCV RBC AUTO: 87.9 FL — SIGNIFICANT CHANGE UP (ref 81–99)
PHOSPHATE SERPL-MCNC: 2.5 MG/DL — SIGNIFICANT CHANGE UP (ref 2.4–4.7)
PLATELET # BLD AUTO: 360 K/UL — SIGNIFICANT CHANGE UP (ref 150–400)
POTASSIUM SERPL-MCNC: 4 MMOL/L — SIGNIFICANT CHANGE UP (ref 3.5–5.3)
POTASSIUM SERPL-SCNC: 4 MMOL/L — SIGNIFICANT CHANGE UP (ref 3.5–5.3)
PROT SERPL-MCNC: 6.6 G/DL — SIGNIFICANT CHANGE UP (ref 6.6–8.7)
RBC # BLD: 4.79 M/UL — SIGNIFICANT CHANGE UP (ref 4.4–5.2)
RBC # FLD: 16.5 % — HIGH (ref 11–15.6)
SODIUM SERPL-SCNC: 141 MMOL/L — SIGNIFICANT CHANGE UP (ref 135–145)
WBC # BLD: 9.9 K/UL — SIGNIFICANT CHANGE UP (ref 4.8–10.8)
WBC # FLD AUTO: 9.9 K/UL — SIGNIFICANT CHANGE UP (ref 4.8–10.8)

## 2017-11-01 PROCEDURE — 99233 SBSQ HOSP IP/OBS HIGH 50: CPT

## 2017-11-01 RX ORDER — OXYCODONE HYDROCHLORIDE 5 MG/1
10 TABLET ORAL EVERY 12 HOURS
Qty: 0 | Refills: 0 | Status: DISCONTINUED | OUTPATIENT
Start: 2017-11-01 | End: 2017-11-02

## 2017-11-01 RX ORDER — GABAPENTIN 400 MG/1
1 CAPSULE ORAL
Qty: 0 | Refills: 0 | COMMUNITY
Start: 2017-11-01

## 2017-11-01 RX ORDER — OXYCODONE HYDROCHLORIDE 5 MG/1
1 TABLET ORAL
Qty: 60 | Refills: 0 | OUTPATIENT
Start: 2017-11-01 | End: 2017-12-01

## 2017-11-01 RX ORDER — WARFARIN SODIUM 2.5 MG/1
10 TABLET ORAL ONCE
Qty: 0 | Refills: 0 | Status: COMPLETED | OUTPATIENT
Start: 2017-11-01 | End: 2017-11-01

## 2017-11-01 RX ORDER — TRAMADOL HYDROCHLORIDE 50 MG/1
50 TABLET ORAL EVERY 6 HOURS
Qty: 0 | Refills: 0 | Status: DISCONTINUED | OUTPATIENT
Start: 2017-11-01 | End: 2017-11-02

## 2017-11-01 RX ADMIN — GABAPENTIN 100 MILLIGRAM(S): 400 CAPSULE ORAL at 06:33

## 2017-11-01 RX ADMIN — ATORVASTATIN CALCIUM 20 MILLIGRAM(S): 80 TABLET, FILM COATED ORAL at 21:44

## 2017-11-01 RX ADMIN — GABAPENTIN 100 MILLIGRAM(S): 400 CAPSULE ORAL at 21:44

## 2017-11-01 RX ADMIN — ENOXAPARIN SODIUM 60 MILLIGRAM(S): 100 INJECTION SUBCUTANEOUS at 06:32

## 2017-11-01 RX ADMIN — LIDOCAINE 1 PATCH: 4 CREAM TOPICAL at 01:58

## 2017-11-01 RX ADMIN — PANTOPRAZOLE SODIUM 40 MILLIGRAM(S): 20 TABLET, DELAYED RELEASE ORAL at 06:33

## 2017-11-01 RX ADMIN — Medication 100 MILLIGRAM(S): at 06:32

## 2017-11-01 RX ADMIN — SENNA PLUS 2 TABLET(S): 8.6 TABLET ORAL at 21:45

## 2017-11-01 RX ADMIN — OXYCODONE AND ACETAMINOPHEN 2 TABLET(S): 5; 325 TABLET ORAL at 10:02

## 2017-11-01 RX ADMIN — TRAMADOL HYDROCHLORIDE 50 MILLIGRAM(S): 50 TABLET ORAL at 14:51

## 2017-11-01 RX ADMIN — Medication 240 MILLIGRAM(S): at 06:33

## 2017-11-01 RX ADMIN — ENOXAPARIN SODIUM 60 MILLIGRAM(S): 100 INJECTION SUBCUTANEOUS at 18:07

## 2017-11-01 RX ADMIN — OXYCODONE HYDROCHLORIDE 10 MILLIGRAM(S): 5 TABLET ORAL at 19:00

## 2017-11-01 RX ADMIN — LIDOCAINE 1 PATCH: 4 CREAM TOPICAL at 12:10

## 2017-11-01 RX ADMIN — OXYCODONE HYDROCHLORIDE 10 MILLIGRAM(S): 5 TABLET ORAL at 18:05

## 2017-11-01 RX ADMIN — OXYCODONE AND ACETAMINOPHEN 2 TABLET(S): 5; 325 TABLET ORAL at 11:00

## 2017-11-01 RX ADMIN — GABAPENTIN 100 MILLIGRAM(S): 400 CAPSULE ORAL at 13:36

## 2017-11-01 RX ADMIN — Medication 100 MILLIGRAM(S): at 18:07

## 2017-11-01 RX ADMIN — WARFARIN SODIUM 10 MILLIGRAM(S): 2.5 TABLET ORAL at 21:44

## 2017-11-01 RX ADMIN — TRAMADOL HYDROCHLORIDE 50 MILLIGRAM(S): 50 TABLET ORAL at 15:50

## 2017-11-01 RX ADMIN — VALSARTAN 160 MILLIGRAM(S): 80 TABLET ORAL at 06:33

## 2017-11-01 NOTE — CONSULT NOTE ADULT - SUBJECTIVE AND OBJECTIVE BOX
HPI:  Ms. Garnica is a 85 year old female with a PMHx of HTN, HLD, b/L acquired deafness, and chronic back pain s/p spinal surgical intervention x 2, who is followed by pain management as an outpatient and recently received a lumbar facet block the week prior to admission. She presented to [facility] on 10/30 with intractable low back pain and left calf pain. She denies any recent trauma, new activity, and cannot recall anything that may have caused her acute pain. The pain initially started Oct. 23rd and she decided to present to the ED after she was no longer able to care for herself at home due to pain.     Lumbar MRI showed acute/subacute bilateral sacral insufficiency fractures, severe levoscoliosis. Lumbar spine x-ray showed left acetabulum fracture. Pelvis x-ray was positive for acute left superior margin of the superior pubic ramus fracture and possible left inferior pubic ramus fracture. CT scan was then done to further evaluate and showed acute minimally displaced left-sided superior and inferior pubic rami fractures, H-shaped sacral insufficiency fractures. Hospital course also notable for bilateral lower extremities venous dopplers were positive for deep venous thrombosis within the right soleal and gastrocnemius veins and within the left gastrocnemius veins.    Evaluated by orthopedics and managed non-operatively, to follow up as an outpatient. Pain management consulted and medications adjusted.    Today,    REVIEW OF SYSTEMS  Constitutional - No fever, No fatigue  HEENT - No visual disturbances, No difficulty hearing,  No neck pain  Respiratory - No cough, No wheezing, No shortness of breath  Cardiovascular - No chest pain, No palpitations  Gastrointestinal - No abdominal pain, No nausea, No vomiting, No diarrhea, No constipation  Genitourinary - No dysuria, No frequency, No hematuria, No incontinence  Neurological - No headaches, No loss of strength, No numbness  Skin - No itching, No rashes  Endocrine - No temperature intolerance  Musculoskeletal - No joint pain, No joint swelling, No muscle pain  Psychiatric - No depression, No anxiety  All other review of systems negative    PAST MEDICAL & SURGICAL HISTORY  B/L acquired deafness  Hyperlipidemia  Chronic back pain  Hypertension  S/P spinal fusion    SOCIAL HISTORY  Single  Homemaker  Smoking - Denied  EtOH - Denied   Drugs - Denied    FUNCTIONAL HISTORY  _______ hand dominant  Lives alone in a multi-level home with 4 JONAH + HR and ___ STI + HR  Independent in ambulation, ADL's, transfers prior to hospitalization    CURRENT FUNCTIONAL STATUS  Bed mobility - Min A  Transfers - Mod A using RW  Gait - 3 steps using RW requiring Mod A    FAMILY HISTORY   Reviewed and non-contributory    ALLERGIES  No Known Allergies    VITALS  T(C): 36.8 (11-01-17 @ 08:11)  T(F): 98.2 (11-01-17 @ 08:11), Max: 98.4 (10-31-17 @ 23:50)  HR: 56 (11-01-17 @ 08:11) (56 - 88)  BP: 128/79 (11-01-17 @ 08:11) (111/68 - 152/80)  RR:  (17 - 18)  SpO2:  (95% - 99%)  Wt(kg): --    PHYSICAL EXAM      RECENT LABS/IMAGING             14.0   9.9   )-----------( 360      ( 01 Nov 2017 09:11 )             42.1     141  |  102  |  22.0<H>  ----------------------------<  137<H>  4.0   |  28.0  |  0.91    Ca    9.4      01 Nov 2017 09:11  Phos  2.5     11-01  Mg     2.1     11-01    TPro  6.6  /  Alb  3.7  /  TBili  0.5  /  DBili  x   /  AST  20  /  ALT  22  /  AlkPhos  114  11-01    PT/INR - ( 31 Oct 2017 07:51 )   PT: 11.1 sec;   INR: 1.01 ratio    PTT - ( 31 Oct 2017 07:51 )  PTT:33.0 sec    U/A (10/31) - Negative nitrite and LE    MEDICATIONS   MEDICATIONS  (STANDING):  atorvastatin 20 milliGRAM(s) Oral at bedtime  diltiazem    milliGRAM(s) Oral daily  docusate sodium 100 milliGRAM(s) Oral two times a day  enoxaparin Injectable 60 milliGRAM(s) SubCutaneous two times a day  gabapentin 100 milliGRAM(s) Oral three times a day  lidocaine   Patch 1 Patch Transdermal daily  oxyCODONE  ER Tablet 10 milliGRAM(s) Oral every 12 hours  pantoprazole    Tablet 40 milliGRAM(s) Oral before breakfast  senna 2 Tablet(s) Oral at bedtime  valsartan 160 milliGRAM(s) Oral daily  warfarin 10 milliGRAM(s) Oral once    MEDICATIONS  (PRN):  cyclobenzaprine 5 milliGRAM(s) Oral three times a day PRN Muscle Spasm  ibuprofen  Tablet 400 milliGRAM(s) Oral every 6 hours PRN mild pain (1-3)  polyethylene glycol 3350 17 Gram(s) Oral daily PRN Constipation  traMADol 50 milliGRAM(s) Oral every 6 hours PRN Moderate Pain (4 - 6)    ASSESSMENT/PLAN  86 y/o female with intractable pain and was found to have acute minimally displaced left-sided superior and inferior pubic rami fractures, and H-shaped sacral insufficiency fractures. She is now with functional, gait, ADL impairments.    Disposition -  PT - ROM, Bed mobility, Transfers, Ambulation with assistive device  OT - ADLs, ROM  Precautions - Falls, Cardiac  Pain control - Tramadol PRN, Ibuprofen PRN, Cyclobenzaprine PRN, Oxycontin, Gabapentin 100mg TID, Lidoderm  DVT Prophylaxis - Lovenox bridge to Coumadin  Weight bearing status - TTWB LLE, WBAT RLE  Skin - Turn Q2hrs  Diet - Regular/thins HPI:  Ms. Garnica is a 85 year old female with a PMHx of HTN, HLD, b/L acquired deafness, and chronic back pain s/p spinal surgical intervention x 3, who is followed by pain management as an outpatient and recently received a lumbar facet block the week prior to admission. She reports that she has been dealing with low back pain radiating to the left > right lower extremities for years now requiring the use of a RW. She presented to [facility] on 10/30 as her low back pain worsened after the injection. She also complained of left calf pain. She denies any recent trauma, new activity, and cannot recall anything that may have caused her acute pain. The pain initially started Oct. 23rd and she decided to present to the ED after she was no longer able to care for herself at home due to pain.     Lumbar MRI showed acute/subacute bilateral sacral insufficiency fractures, severe levoscoliosis. Lumbar spine x-ray showed left acetabulum fracture. Pelvis x-ray was positive for acute left superior margin of the superior pubic ramus fracture and possible left inferior pubic ramus fracture. CT scan was then done to further evaluate and showed acute minimally displaced left-sided superior and inferior pubic rami fractures, H-shaped sacral insufficiency fractures. Hospital course also notable for bilateral lower extremities venous dopplers were positive for deep venous thrombosis within the right soleal and gastrocnemius veins and within the left gastrocnemius veins.    Evaluated by orthopedics and managed non-operatively, to follow up as an outpatient. Pain management consulted and medications adjusted.    Today, she reports that they adjusted her pain medications and the pain is very well controlled at this time. Reports that she was told she had osteoporosis by her primary care physician previously and was on Fosamax but stopped it as it upset her stomach. She worked with therapy today and reports that it was slightly more painful sitting but tolerable. Denies bowel or bladder incontinence.    REVIEW OF SYSTEMS  Constitutional - No fever, No fatigue  HEENT - No visual disturbances, No difficulty hearing,  No neck pain  Respiratory - No cough, No wheezing, No shortness of breath  Cardiovascular - No chest pain, No palpitations  Gastrointestinal - No abdominal pain, No nausea, No vomiting, No diarrhea, No constipation  Genitourinary - No dysuria, No frequency, No hematuria, No incontinence  Neurological - No headaches, +loss of strength, +numbness  Skin - No itching, No rashes  Endocrine - No temperature intolerance  Musculoskeletal - +back pain, No joint swelling, No muscle pain  Psychiatric - No depression, No anxiety  All other review of systems negative    PAST MEDICAL & SURGICAL HISTORY  Osteoporosis  B/L acquired deafness  Hyperlipidemia  Chronic back pain s/p 3 spinal surgeries  Hypertension    SOCIAL HISTORY  Single  Homemaker  Smoking - Denied  EtOH - Denied   Drugs - Denied    FUNCTIONAL HISTORY  Right hand dominant  Lives alone in a ranch style home with 3 JONAH + HR and no stairs inside to negotiate  Utilized RW for assistance with ambulation prior to hospitalization for years, independent in ADL's. Previously driving.    CURRENT FUNCTIONAL STATUS  Bed mobility - Min A  Transfers - Mod A using RW  Gait - 3 steps using RW requiring Mod A    FAMILY HISTORY   Reviewed and non-contributory    ALLERGIES  No Known Allergies    VITALS  T(C): 36.8 (11-01-17 @ 08:11)  T(F): 98.2 (11-01-17 @ 08:11), Max: 98.4 (10-31-17 @ 23:50)  HR: 56 (11-01-17 @ 08:11) (56 - 88)  BP: 128/79 (11-01-17 @ 08:11) (111/68 - 152/80)  RR:  (17 - 18)  SpO2:  (95% - 99%)  Wt(kg): --    PHYSICAL EXAM  Constitutional - NAD, Comfortable  HEENT - NCAT, EOMI  Neck - Supple  Chest - CTA bilaterally  Cardiovascular - RRR, S1S2  Abdomen - BS+, Soft, NTND  Extremities - Trace left lower extremity edema, Mild left calf tenderness  Neurologic Exam -                    Cognitive - Awake, Alert, Oriented to self, place, date, year, situation     Cranial Nerves - Hard of hearing     Motor -                     LEFT    UE - ShAB 5/5, EF 5/5, EE 5/5, WE 5/5,  5/5                    RIGHT UE - ShAB 5/5, EF 5/5, EE 5/5, WE 5/5,  5/5                    LEFT    LE - HF 2/5 (limited by pain), KE 4/5 (limited by pain), DF 5/5, PF 5/5                    RIGHT LE - HF 4/5, KE 5/5, DF 5/5, PF 5/5        Sensory - Impaired to light touch in bilateral lower extremities in no dermatomal pattern     Proprioception - Intact bilateral big toes     Reflexes - DTR diminished in bilateral patella and kristy's and symmetrical  Psychiatric - Affect WNL    RECENT LABS/IMAGING             14.0   9.9   )-----------( 360      ( 01 Nov 2017 09:11 )             42.1     141  |  102  |  22.0<H>  ----------------------------<  137<H>  4.0   |  28.0  |  0.91    Ca    9.4      01 Nov 2017 09:11  Phos  2.5     11-01  Mg     2.1     11-01    TPro  6.6  /  Alb  3.7  /  TBili  0.5  /  DBili  x   /  AST  20  /  ALT  22  /  AlkPhos  114  11-01    PT/INR - ( 31 Oct 2017 07:51 )   PT: 11.1 sec;   INR: 1.01 ratio    PTT - ( 31 Oct 2017 07:51 )  PTT:33.0 sec    U/A (10/31) - Negative nitrite and LE    MEDICATIONS   MEDICATIONS  (STANDING):  atorvastatin 20 milliGRAM(s) Oral at bedtime  diltiazem    milliGRAM(s) Oral daily  docusate sodium 100 milliGRAM(s) Oral two times a day  enoxaparin Injectable 60 milliGRAM(s) SubCutaneous two times a day  gabapentin 100 milliGRAM(s) Oral three times a day  lidocaine   Patch 1 Patch Transdermal daily  oxyCODONE  ER Tablet 10 milliGRAM(s) Oral every 12 hours  pantoprazole    Tablet 40 milliGRAM(s) Oral before breakfast  senna 2 Tablet(s) Oral at bedtime  valsartan 160 milliGRAM(s) Oral daily  warfarin 10 milliGRAM(s) Oral once    MEDICATIONS  (PRN):  cyclobenzaprine 5 milliGRAM(s) Oral three times a day PRN Muscle Spasm  ibuprofen  Tablet 400 milliGRAM(s) Oral every 6 hours PRN mild pain (1-3)  polyethylene glycol 3350 17 Gram(s) Oral daily PRN Constipation  traMADol 50 milliGRAM(s) Oral every 6 hours PRN Moderate Pain (4 - 6)    ASSESSMENT/PLAN  86 y/o female with intractable pain and was found to have acute minimally displaced left-sided superior and inferior pubic rami fractures, and H-shaped sacral insufficiency fractures. She is now with functional, gait, ADL impairments.    Recommend starting patient on medication for bone mineral growth and osteoporosis for prevention of further fractures.  Disposition - Continue bedside therapy, patient's pain is better controlled at this time. Given the patient's current limitations in function and that she lives along she would likely benefit from a slower pace of therapy at a subacute rehabilitation to improve functional, gait, ADL impairments. Will discuss case with attending for final rehabilitation recommendations.  PT - ROM, Bed mobility, Transfers, Ambulation with assistive device  OT - ADLs, ROM  Precautions - Falls, Cardiac  Pain control - Tramadol PRN, Ibuprofen PRN, Cyclobenzaprine PRN, Oxycontin, Gabapentin 100mg TID, Lidoderm  DVT Prophylaxis - Lovenox bridge to Coumadin  Weight bearing status - TTWB LLE, WBAT RLE  Skin - Turn Q2hrs  Diet - Regular/thins HPI:  Ms. Garnica is a 85 year old female with a PMHx of HTN, HLD, b/L acquired deafness, and chronic back pain s/p spinal surgical intervention x 3, who is followed by pain management as an outpatient and recently received a lumbar facet block the week prior to admission. She reports that she has been dealing with low back pain radiating to the left > right lower extremities for years now requiring the use of a RW. She presented to [facility] on 10/30 as her low back pain worsened after the injection. She also complained of left calf pain. She denies any recent trauma, new activity, and cannot recall anything that may have caused her acute pain. The pain initially started Oct. 23rd and she decided to present to the ED after she was no longer able to care for herself at home due to pain.     Lumbar MRI showed acute/subacute bilateral sacral insufficiency fractures, severe levoscoliosis. Lumbar spine x-ray showed left acetabulum fracture. Pelvis x-ray was positive for acute left superior margin of the superior pubic ramus fracture and possible left inferior pubic ramus fracture. CT scan was then done to further evaluate and showed acute minimally displaced left-sided superior and inferior pubic rami fractures, H-shaped sacral insufficiency fractures. Hospital course also notable for bilateral lower extremities venous dopplers were positive for deep venous thrombosis within the right soleal and gastrocnemius veins and within the left gastrocnemius veins.    Evaluated by orthopedics and managed non-operatively, to follow up as an outpatient. Pain management consulted and medications adjusted.    Today, she reports that they adjusted her pain medications and the pain is very well controlled at this time. Reports that she was told she had osteoporosis by her primary care physician previously and was on Fosamax but stopped it as it upset her stomach. She worked with therapy today and reports that it was slightly more painful sitting but tolerable. Denies bowel or bladder incontinence.    REVIEW OF SYSTEMS  Constitutional - No fever,   HEENT - + difficulty hearing,  No neck pain  Respiratory - No cough,  Cardiovascular - No chest pain,  Gastrointestinal - No abdominal pain,   Genitourinary - No dysuria,  Neurological : +loss of strength, +numbness  Skin - No itching, No rashes  Musculoskeletal - +back pain,   Psychiatric - No depression, No anxiety  All other review of systems negative    PAST MEDICAL & SURGICAL HISTORY  Osteoporosis  B/L acquired deafness  Hyperlipidemia  Chronic back pain s/p 3 spinal surgeries  Hypertension    SOCIAL HISTORY  Single  Homemaker  Smoking - Denied  EtOH - Denied   Drugs - Denied    FUNCTIONAL HISTORY  Right hand dominant  Lives alone in a ranch style home with 3 JONAH + HR and no stairs inside to negotiate  Utilized RW for assistance with ambulation prior to hospitalization for years, independent in ADL's. Previously driving.    CURRENT FUNCTIONAL STATUS  Bed mobility - Min A  Transfers - Mod A using RW  Gait - 3 steps using RW requiring Mod A    FAMILY HISTORY   Reviewed and non-contributory    ALLERGIES  No Known Allergies    VITALS  T(C): 36.8 (11-01-17 @ 08:11)  T(F): 98.2 (11-01-17 @ 08:11), Max: 98.4 (10-31-17 @ 23:50)  HR: 56 (11-01-17 @ 08:11) (56 - 88)  BP: 128/79 (11-01-17 @ 08:11) (111/68 - 152/80)  RR:  (17 - 18)  SpO2:  (95% - 99%)  Wt(kg): --    PHYSICAL EXAM  Constitutional - NAD, Comfortable  HEENT - NCAT, EOMI  Neck - Supple  Chest - CTA bilaterally  Cardiovascular - RRR, S1S2  Abdomen - BS+, Soft, NTND  Extremities - Trace left lower extremity edema, Mild left calf tenderness  Neurologic Exam -                    Cognitive - Awake, Alert, Oriented to self, place, date, year, situation     Cranial Nerves - Hard of hearing     Motor -                     LEFT    UE - ShAB 5/5, EF 5/5, EE 5/5, WE 5/5,  5/5                    RIGHT UE - ShAB 5/5, EF 5/5, EE 5/5, WE 5/5,  5/5                    LEFT    LE - HF 2/5 (limited by pain), KE 4/5 (limited by pain), DF 5/5, PF 5/5                    RIGHT LE - HF 4/5, KE 5/5, DF 5/5, PF 5/5        Sensory - Impaired to light touch in bilateral lower extremities in no dermatomal pattern     Proprioception - Intact bilateral big toes     Reflexes - DTR diminished in bilateral patella and kristy's and symmetrical  Psychiatric - Affect WNL    RECENT LABS/IMAGING             14.0   9.9   )-----------( 360      ( 01 Nov 2017 09:11 )             42.1     141  |  102  |  22.0<H>  ----------------------------<  137<H>  4.0   |  28.0  |  0.91    Ca    9.4      01 Nov 2017 09:11  Phos  2.5     11-01  Mg     2.1     11-01    TPro  6.6  /  Alb  3.7  /  TBili  0.5  /  DBili  x   /  AST  20  /  ALT  22  /  AlkPhos  114  11-01    PT/INR - ( 31 Oct 2017 07:51 )   PT: 11.1 sec;   INR: 1.01 ratio    PTT - ( 31 Oct 2017 07:51 )  PTT:33.0 sec    U/A (10/31) - Negative nitrite and LE    MEDICATIONS   MEDICATIONS  (STANDING):  atorvastatin 20 milliGRAM(s) Oral at bedtime  diltiazem    milliGRAM(s) Oral daily  docusate sodium 100 milliGRAM(s) Oral two times a day  enoxaparin Injectable 60 milliGRAM(s) SubCutaneous two times a day  gabapentin 100 milliGRAM(s) Oral three times a day  lidocaine   Patch 1 Patch Transdermal daily  oxyCODONE  ER Tablet 10 milliGRAM(s) Oral every 12 hours  pantoprazole    Tablet 40 milliGRAM(s) Oral before breakfast  senna 2 Tablet(s) Oral at bedtime  valsartan 160 milliGRAM(s) Oral daily  warfarin 10 milliGRAM(s) Oral once    MEDICATIONS  (PRN):  cyclobenzaprine 5 milliGRAM(s) Oral three times a day PRN Muscle Spasm  ibuprofen  Tablet 400 milliGRAM(s) Oral every 6 hours PRN mild pain (1-3)  polyethylene glycol 3350 17 Gram(s) Oral daily PRN Constipation  traMADol 50 milliGRAM(s) Oral every 6 hours PRN Moderate Pain (4 - 6)    ASSESSMENT/PLAN  84 y/o female with intractable pain and was found to have acute minimally displaced left-sided superior and inferior pubic rami fractures, and H-shaped sacral insufficiency fractures. She is now with functional, gait, ADL impairments.    Recommend starting patient on medication for bone mineral growth and osteoporosis for prevention of further fractures.  Disposition - Continue bedside therapy, patient's pain is better controlled at this time. Given the patient's current limitations in function and that she lives along she would likely benefit from a subacute rehabilitation to improve functional, gait, ADL impairments. Will discuss case with attending for final rehabilitation recommendations.  PT - ROM, Bed mobility, Transfers, Ambulation with assistive device  OT - ADLs, ROM  Precautions - Falls, Cardiac  Pain control - Tramadol PRN, Ibuprofen PRN, Cyclobenzaprine PRN, Oxycontin, Gabapentin 100mg TID, Lidoderm  DVT Prophylaxis - Lovenox bridge to Coumadin  Weight bearing status - TTWB LLE, WBAT RLE  Skin - Turn Q2hrs  Diet - Regular/thins

## 2017-11-01 NOTE — PHYSICAL THERAPY INITIAL EVALUATION ADULT - CRITERIA FOR SKILLED THERAPEUTIC INTERVENTIONS
functional limitations in following categories/rehab potential/therapy frequency/impairments found/risk reduction/prevention/anticipated discharge recommendation

## 2017-11-01 NOTE — PHYSICAL THERAPY INITIAL EVALUATION ADULT - PERTINENT HX OF CURRENT PROBLEM, REHAB EVAL
Pt presents with back pain x1 month that has increased, now with inability to ambulate. Reports no trauma or falls' x ray shows sacral fx, pubic rami fx

## 2017-11-01 NOTE — CONSULT NOTE ADULT - ATTENDING COMMENTS
85 year old female with h/o chronic LBP, multiple back surgeries, on pain management admitted with intractable pain. W/U revealed left superior and inferior pubic rami fracture and also with below knee DVT  On exam she does have limitation of left hip flexion.    Recommend course of subacute rehab to improve mobility/transfers/gait and ADLs.  Thank you
PLAN-  #Opioid:  - consider oxycodone 5/10mg PO q4h prn mod/sev pain  - consider Dilaudid 0.5mg IV q4h prn breakthrough pain   - Please monitor for oversedation and respiratory depression     #Adjuvant Analgesics:  - flexeril 5mg PO TID prn muscle spasms  - gabapentin 100mg PO TID, monitor CrCl   - Ibuprofen 400mg PO q6h, monitor renal fx   - Lidoderm 5% transdermal  - consider acetaminophen 975mg PO q8h, monitor LFTs     #Bowel Regimen:  - per primary team as clinically indicated     Risks, benefits, and alternatives to opioids discussed with patient, who verbalizes understanding. Patient counseled on treatment plan and pain expectations. All questions and concerns addressed at this time.    Please page if any concerns: [phone].

## 2017-11-01 NOTE — PROGRESS NOTE ADULT - SUBJECTIVE AND OBJECTIVE BOX
is an 84 yo female with a pmh/o HTN, HLD, b/l acquired deafness, and chronic back pain s/p surgical intervention x 2 who is currently being followed by pain management who recently received a lumbar facet block last week who presents with intractable low back pain and left calf pain. She was found to have a sacral fracture and pubic rami fracture, and she is currently benefitting from pain control. She can most likely be d/cd to [facility]., she worked well with PT today and was able to ambulate with a walker.    Summary:   REVIEW OF SYSTEMS    General:	"I'm comfortable."    Skin/Breast:  	  Ophthalmologic:  	  ENMT:	    Respiratory and Thorax:  	  Cardiovascular:	    Gastrointestinal:	    Genitourinary:	    Musculoskeletal:	    Neurological:	    Psychiatric:	    Hematology/Lymphatics:	    Endocrine:	    Allergic/Immunologic:	  Vital Signs Last 24 Hrs  T(C): 36.6 (01 Nov 2017 16:27), Max: 36.9 (31 Oct 2017 23:50)  T(F): 97.9 (01 Nov 2017 16:27), Max: 98.4 (31 Oct 2017 23:50)  HR: 89 (01 Nov 2017 16:27) (56 - 89)  BP: 119/74 (01 Nov 2017 16:27) (111/68 - 152/80)  BP(mean): --  RR: 18 (01 Nov 2017 16:27) (17 - 18)  SpO2: 97% (01 Nov 2017 16:27) (95% - 99%)  PHYSICAL EXAM:  GENERAL: In severe distress, in pain and discomfort  HEAD:  Atraumatic, Normocephalic  EYES: EOMI, PERRLA, conjunctiva and sclera clear  ENMT:  dry  mucous membranes,  No lesions  NERVOUS SYSTEM:  Alert & Oriented X3,  Moves upper and lower extremities  MUSCULOSKELETAL: no pain to palpation of the hip bilaterally, FROM of left and right hip, no pain to passive movement of bilateral hips  CHEST/LUNG: Clear to auscultation bilaterally; No rales, rhonchi, wheezing,   HEART: Regular rate and rhythm; No murmurs,   ABDOMEN: Soft, Nontender, Nondistended; Bowel sounds present  EXTREMITIES:  Peripheral Pulses, No  cyanosis, or edema  SKIN: No rashes or lesions                          14.0   9.9   )-----------( 360      ( 01 Nov 2017 09:11 )             42.1     11-01    141  |  102  |  22.0<H>  ----------------------------<  137<H>  4.0   |  28.0  |  0.91    Ca    9.4      01 Nov 2017 09:11  Phos  2.5     11-01  Mg     2.1     11-01    TPro  6.6  /  Alb  3.7  /  TBili  0.5  /  DBili  x   /  AST  20  /  ALT  22  /  AlkPhos  114  11-01    LIVER FUNCTIONS - ( 01 Nov 2017 09:11 )  Alb: 3.7 g/dL / Pro: 6.6 g/dL / ALK PHOS: 114 U/L / ALT: 22 U/L / AST: 20 U/L / GGT: x           PT/INR - ( 31 Oct 2017 07:51 )   PT: 11.1 sec;   INR: 1.01 ratio         PTT - ( 31 Oct 2017 07:51 )  PTT:33.0 sec    Radiology:     MEDICATIONS  (STANDING):  atorvastatin 20 milliGRAM(s) Oral at bedtime  diltiazem    milliGRAM(s) Oral daily  docusate sodium 100 milliGRAM(s) Oral two times a day  enoxaparin Injectable 60 milliGRAM(s) SubCutaneous two times a day  gabapentin 100 milliGRAM(s) Oral three times a day  lidocaine   Patch 1 Patch Transdermal daily  oxyCODONE  ER Tablet 10 milliGRAM(s) Oral every 12 hours  pantoprazole    Tablet 40 milliGRAM(s) Oral before breakfast  senna 2 Tablet(s) Oral at bedtime  valsartan 160 milliGRAM(s) Oral daily  warfarin 10 milliGRAM(s) Oral once    MEDICATIONS  (PRN):  cyclobenzaprine 5 milliGRAM(s) Oral three times a day PRN Muscle Spasm  ibuprofen  Tablet 400 milliGRAM(s) Oral every 6 hours PRN mild pain (1-3)  polyethylene glycol 3350 17 Gram(s) Oral daily PRN Constipation  traMADol 50 milliGRAM(s) Oral every 6 hours PRN Moderate Pain (4 - 6)

## 2017-11-01 NOTE — PHYSICAL THERAPY INITIAL EVALUATION ADULT - ADDITIONAL COMMENTS
Pt reports using RW x1 month prior to admission, however, pt reports it was becoming more difficult to ambulate. Pt unable to transfer into car, shop etc. secondary to back pain. +

## 2017-11-02 VITALS
RESPIRATION RATE: 16 BRPM | HEART RATE: 81 BPM | DIASTOLIC BLOOD PRESSURE: 81 MMHG | SYSTOLIC BLOOD PRESSURE: 139 MMHG | TEMPERATURE: 98 F | OXYGEN SATURATION: 97 %

## 2017-11-02 LAB
ANION GAP SERPL CALC-SCNC: 9 MMOL/L — SIGNIFICANT CHANGE UP (ref 5–17)
BUN SERPL-MCNC: 22 MG/DL — HIGH (ref 8–20)
CALCIUM SERPL-MCNC: 9.1 MG/DL — SIGNIFICANT CHANGE UP (ref 8.6–10.2)
CHLORIDE SERPL-SCNC: 106 MMOL/L — SIGNIFICANT CHANGE UP (ref 98–107)
CO2 SERPL-SCNC: 29 MMOL/L — SIGNIFICANT CHANGE UP (ref 22–29)
CREAT SERPL-MCNC: 0.79 MG/DL — SIGNIFICANT CHANGE UP (ref 0.5–1.3)
GLUCOSE SERPL-MCNC: 93 MG/DL — SIGNIFICANT CHANGE UP (ref 70–115)
HCT VFR BLD CALC: 40.4 % — SIGNIFICANT CHANGE UP (ref 37–47)
HGB BLD-MCNC: 13.1 G/DL — SIGNIFICANT CHANGE UP (ref 12–16)
INR BLD: 1.08 RATIO — SIGNIFICANT CHANGE UP (ref 0.88–1.16)
MAGNESIUM SERPL-MCNC: 2.1 MG/DL — SIGNIFICANT CHANGE UP (ref 1.6–2.6)
MCHC RBC-ENTMCNC: 28.6 PG — SIGNIFICANT CHANGE UP (ref 27–31)
MCHC RBC-ENTMCNC: 32.4 G/DL — SIGNIFICANT CHANGE UP (ref 32–36)
MCV RBC AUTO: 88.2 FL — SIGNIFICANT CHANGE UP (ref 81–99)
PHOSPHATE SERPL-MCNC: 2.8 MG/DL — SIGNIFICANT CHANGE UP (ref 2.4–4.7)
PLATELET # BLD AUTO: 363 K/UL — SIGNIFICANT CHANGE UP (ref 150–400)
POTASSIUM SERPL-MCNC: 4.1 MMOL/L — SIGNIFICANT CHANGE UP (ref 3.5–5.3)
POTASSIUM SERPL-SCNC: 4.1 MMOL/L — SIGNIFICANT CHANGE UP (ref 3.5–5.3)
PROTHROM AB SERPL-ACNC: 11.9 SEC — SIGNIFICANT CHANGE UP (ref 9.8–12.7)
RBC # BLD: 4.58 M/UL — SIGNIFICANT CHANGE UP (ref 4.4–5.2)
RBC # FLD: 16.8 % — HIGH (ref 11–15.6)
SODIUM SERPL-SCNC: 144 MMOL/L — SIGNIFICANT CHANGE UP (ref 135–145)
WBC # BLD: 8.5 K/UL — SIGNIFICANT CHANGE UP (ref 4.8–10.8)
WBC # FLD AUTO: 8.5 K/UL — SIGNIFICANT CHANGE UP (ref 4.8–10.8)

## 2017-11-02 PROCEDURE — 99222 1ST HOSP IP/OBS MODERATE 55: CPT | Mod: GC

## 2017-11-02 PROCEDURE — 99239 HOSP IP/OBS DSCHRG MGMT >30: CPT

## 2017-11-02 RX ORDER — DOCUSATE SODIUM 100 MG
1 CAPSULE ORAL
Qty: 0 | Refills: 0 | COMMUNITY
Start: 2017-11-02

## 2017-11-02 RX ORDER — APIXABAN 2.5 MG/1
10 TABLET, FILM COATED ORAL EVERY 12 HOURS
Qty: 0 | Refills: 0 | Status: DISCONTINUED | OUTPATIENT
Start: 2017-11-02 | End: 2017-11-02

## 2017-11-02 RX ORDER — SENNA PLUS 8.6 MG/1
2 TABLET ORAL
Qty: 0 | Refills: 0 | COMMUNITY
Start: 2017-11-02

## 2017-11-02 RX ORDER — APIXABAN 2.5 MG/1
2 TABLET, FILM COATED ORAL
Qty: 28 | Refills: 0 | OUTPATIENT
Start: 2017-11-02 | End: 2017-11-09

## 2017-11-02 RX ORDER — LIDOCAINE 4 G/100G
1 CREAM TOPICAL
Qty: 30 | Refills: 0 | OUTPATIENT
Start: 2017-11-02 | End: 2017-12-02

## 2017-11-02 RX ORDER — CYCLOBENZAPRINE HYDROCHLORIDE 10 MG/1
1 TABLET, FILM COATED ORAL
Qty: 90 | Refills: 0 | OUTPATIENT
Start: 2017-11-02 | End: 2017-12-02

## 2017-11-02 RX ORDER — GABAPENTIN 400 MG/1
1 CAPSULE ORAL
Qty: 90 | Refills: 0 | OUTPATIENT
Start: 2017-11-02 | End: 2017-12-02

## 2017-11-02 RX ORDER — POLYETHYLENE GLYCOL 3350 17 G/17G
17 POWDER, FOR SOLUTION ORAL
Qty: 0 | Refills: 0 | COMMUNITY
Start: 2017-11-02

## 2017-11-02 RX ORDER — TRIAMTERENE 100 MG/1
0 CAPSULE ORAL
Qty: 0 | Refills: 0 | COMMUNITY

## 2017-11-02 RX ADMIN — OXYCODONE HYDROCHLORIDE 10 MILLIGRAM(S): 5 TABLET ORAL at 05:19

## 2017-11-02 RX ADMIN — GABAPENTIN 100 MILLIGRAM(S): 400 CAPSULE ORAL at 13:36

## 2017-11-02 RX ADMIN — Medication 100 MILLIGRAM(S): at 05:18

## 2017-11-02 RX ADMIN — LIDOCAINE 1 PATCH: 4 CREAM TOPICAL at 13:36

## 2017-11-02 RX ADMIN — ENOXAPARIN SODIUM 60 MILLIGRAM(S): 100 INJECTION SUBCUTANEOUS at 05:19

## 2017-11-02 RX ADMIN — GABAPENTIN 100 MILLIGRAM(S): 400 CAPSULE ORAL at 05:19

## 2017-11-02 RX ADMIN — Medication 240 MILLIGRAM(S): at 05:19

## 2017-11-02 RX ADMIN — PANTOPRAZOLE SODIUM 40 MILLIGRAM(S): 20 TABLET, DELAYED RELEASE ORAL at 05:19

## 2017-11-02 RX ADMIN — VALSARTAN 160 MILLIGRAM(S): 80 TABLET ORAL at 05:18

## 2017-11-02 NOTE — PROGRESS NOTE ADULT - PROBLEM SELECTOR PLAN 2
-cont pain meds
-cont pain meds
-On AP view of Lumbar spine, there appears to be a pelvic fracture, discussed with radiology, they agree and actually read it as an acetabulum fracture. Ortho was called back, they believe it is a pubic rami fracture and ordered a dedicated pelvic x-ray   -Ortho states if it is a pelvic rami fracture management will not change  -F/u on x-ray results

## 2017-11-02 NOTE — PROGRESS NOTE ADULT - PROBLEM SELECTOR PLAN 3
-d/c to [facility]
-d/c to [facility] today
full dose lovenox with plan for bridge  -No history of valvular A-fib or valve replacements she could be a good candidate for DOACS possible eliquis  -Most likely will need at elast 3 months of anticoag

## 2017-11-02 NOTE — PROGRESS NOTE ADULT - ASSESSMENT
has:
84 yo female with a pmh/o HTN, HLD, b/l acquired deafness, and chronic back pain s/p surgical intervention x 2 who is currently being followed by pain management who recently received a lumbar facet block last week who presents with intractable low back pain and left calf pain.  In the ED she was found to have bilateral LE DVTs below the knee and a sacral fracture. She was started on pain medication and full dose lovenox. Orthopedics was consulted and recommended pain control and office follow up in 1-2 weeks. After i saw her today I adjusted her pain medication. Shortly after, LOLIS Lorenzo mentioned he noticed an abnormality on her AP view of her lumbar spine x-ray. There seemed to be a fracture of her pelvis. I went to radiology and spoke to the radiologist. He confirmed that there was a fracture on the pelvis and he wrote that there was an acetabulum fracture. I called orthopedics back as they were originally consulted for the sacral fracture. Their interpretation of the fracture was most likely a pubic rami fracture and they ordered a dedicated pelvic fracture to make sure of this. The results are pending.
 has:

## 2017-11-02 NOTE — PROGRESS NOTE ADULT - PROBLEM SELECTOR PLAN 1
-cont pain control, d/c to [facility]
pt reports previous sacral fracture in her 20's and does not recall any recent trauma, although she does work often in the garden, and when she sits down she usually hit the chair with some force  pain control, adjusted (Dilaudid severe pain, percocet moderate, Motrin for mild)  PT consult  Appreciate ortho consult --> pain control, office f/u in 1-2weeks with Dr. Abarca   Appreciate pain management consult   sw consult-pt will likely need placement as lives alone  Will check 25, hydroxy vitd in the AM  MRI reviewed  labs reviewed
-cont pain control, d/c to [facility]

## 2017-11-02 NOTE — PROGRESS NOTE ADULT - SUBJECTIVE AND OBJECTIVE BOX
is an 86 yo female with a pmh/o HTN, HLD, b/l acquired deafness, and chronic back pain s/p surgical intervention x 2 who is currently being followed by pain management who recently received a lumbar facet block last week who presents with intractable low back pain and left calf pain. She was found to have a sacral fracture and left sided superior and inferior pubic rami fracture, and she received pain control and PT in the hospital. Our orthopedics team did see her and they recommended pain control and PT. She's being discharged to [facility] on apixaban and she is doing very well. She is still a little dehydrated and we recommended that she drink more water. She was on a diuretic at home (she has no hx of chf) and we recommend that she stop all diuretics.    She's pleased with her care here at [facility] and she can be discharged home.      Summary:   REVIEW OF SYSTEMS    General:	"I'm comfortable."    Skin/Breast:  	  Ophthalmologic:  	  ENMT:	    Respiratory and Thorax:  	  Cardiovascular:	    Gastrointestinal:	    Genitourinary:	    Musculoskeletal:	    Neurological:	    Psychiatric:	    Hematology/Lymphatics:	    Endocrine:	    Allergic/Immunologic:	  Vital Signs Last 24 Hrs  112/64, HR=76  PHYSICAL EXAM:  GENERAL: In severe distress, in pain and discomfort  HEAD:  Atraumatic, Normocephalic  EYES: EOMI, PERRLA, conjunctiva and sclera clear  ENMT:  dry  mucous membranes,  No lesions  NERVOUS SYSTEM:  Alert & Oriented X3,  Moves upper and lower extremities  MUSCULOSKELETAL: no pain to palpation of the hip bilaterally, FROM of left and right hip, no pain to passive movement of bilateral hips  CHEST/LUNG: Clear to auscultation bilaterally; No rales, rhonchi, wheezing,   HEART: Regular rate and rhythm; No murmurs,   ABDOMEN: Soft, Nontender, Nondistended; Bowel sounds present  EXTREMITIES:  Peripheral Pulses, No  cyanosis, or edema  SKIN: No rashes or lesions                 14.0   9.9   )-----------( 360      ( 01 Nov 2017 09:11 )             42.1     11-01    141  |  102  |  22.0<H>  ----------------------------<  137<H>  4.0   |  28.0  |  0.91    Ca    9.4      01 Nov 2017 09:11  Phos  2.5     11-01  Mg     2.1     11-01    TPro  6.6  /  Alb  3.7  /  TBili  0.5  /  DBili  x   /  AST  20  /  ALT  22  /  AlkPhos  114  11-01    LIVER FUNCTIONS - ( 01 Nov 2017 09:11 )  Alb: 3.7 g/dL / Pro: 6.6 g/dL / ALK PHOS: 114 U/L / ALT: 22 U/L / AST: 20 U/L / GGT: x           PT/INR - ( 31 Oct 2017 07:51 )   PT: 11.1 sec;   INR: 1.01 ratio         PTT - ( 31 Oct 2017 07:51 )  PTT:33.0 sec    Radiology:     MEDICATIONS  (STANDING):  atorvastatin 20 milliGRAM(s) Oral at bedtime  diltiazem    milliGRAM(s) Oral daily  docusate sodium 100 milliGRAM(s) Oral two times a day  enoxaparin Injectable 60 milliGRAM(s) SubCutaneous two times a day  gabapentin 100 milliGRAM(s) Oral three times a day  lidocaine   Patch 1 Patch Transdermal daily  oxyCODONE  ER Tablet 10 milliGRAM(s) Oral every 12 hours  pantoprazole    Tablet 40 milliGRAM(s) Oral before breakfast  senna 2 Tablet(s) Oral at bedtime  valsartan 160 milliGRAM(s) Oral daily  warfarin 10 milliGRAM(s) Oral once    MEDICATIONS  (PRN):  cyclobenzaprine 5 milliGRAM(s) Oral three times a day PRN Muscle Spasm  ibuprofen  Tablet 400 milliGRAM(s) Oral every 6 hours PRN mild pain (1-3)  polyethylene glycol 3350 17 Gram(s) Oral daily PRN Constipation  traMADol 50 milliGRAM(s) Oral every 6 hours PRN Moderate Pain (4 - 6)

## 2017-11-02 NOTE — PROGRESS NOTE ADULT - PROBLEM SELECTOR PROBLEM 4
Acute deep vein thrombosis (DVT) of both lower extremities, unspecified vein
Acute deep vein thrombosis (DVT) of both lower extremities, unspecified vein
SORAIDA (acute kidney injury)

## 2017-11-02 NOTE — PROGRESS NOTE ADULT - PROBLEM SELECTOR PROBLEM 1
Closed nondisplaced fracture of pelvis, unspecified part of pelvis, initial encounter
Closed fracture of sacrum, initial encounter
Closed nondisplaced fracture of pelvis, unspecified part of pelvis, initial encounter

## 2017-11-03 PROBLEM — M54.9 DORSALGIA, UNSPECIFIED: Chronic | Status: ACTIVE | Noted: 2017-10-30

## 2017-11-08 ENCOUNTER — RX RENEWAL (OUTPATIENT)
Age: 82
End: 2017-11-08

## 2017-11-09 RX ORDER — APIXABAN 2.5 MG/1
1 TABLET, FILM COATED ORAL
Qty: 60 | Refills: 0 | OUTPATIENT
Start: 2017-11-09 | End: 2017-12-09

## 2017-11-14 ENCOUNTER — OTHER (OUTPATIENT)
Age: 82
End: 2017-11-14

## 2017-11-15 ENCOUNTER — APPOINTMENT (OUTPATIENT)
Dept: ORTHOPEDIC SURGERY | Facility: CLINIC | Age: 82
End: 2017-11-15
Payer: MEDICARE

## 2017-11-15 VITALS
HEART RATE: 71 BPM | DIASTOLIC BLOOD PRESSURE: 70 MMHG | HEIGHT: 57 IN | SYSTOLIC BLOOD PRESSURE: 123 MMHG | BODY MASS INDEX: 29.12 KG/M2 | WEIGHT: 135 LBS

## 2017-11-15 DIAGNOSIS — S32.810A MULTIPLE FRACTURES OF PELVIS WITH STABLE DISRUPTION OF PELVIC RING, INITIAL ENCOUNTER FOR CLOSED FRACTURE: ICD-10-CM

## 2017-11-15 PROCEDURE — 99024 POSTOP FOLLOW-UP VISIT: CPT

## 2017-12-19 PROCEDURE — 97530 THERAPEUTIC ACTIVITIES: CPT

## 2017-12-19 PROCEDURE — 93925 LOWER EXTREMITY STUDY: CPT

## 2017-12-19 PROCEDURE — 36415 COLL VENOUS BLD VENIPUNCTURE: CPT

## 2017-12-19 PROCEDURE — 81001 URINALYSIS AUTO W/SCOPE: CPT

## 2017-12-19 PROCEDURE — 84100 ASSAY OF PHOSPHORUS: CPT

## 2017-12-19 PROCEDURE — 72170 X-RAY EXAM OF PELVIS: CPT

## 2017-12-19 PROCEDURE — 93970 EXTREMITY STUDY: CPT

## 2017-12-19 PROCEDURE — 85610 PROTHROMBIN TIME: CPT

## 2017-12-19 PROCEDURE — 84443 ASSAY THYROID STIM HORMONE: CPT

## 2017-12-19 PROCEDURE — 99285 EMERGENCY DEPT VISIT HI MDM: CPT | Mod: 25

## 2017-12-19 PROCEDURE — 87086 URINE CULTURE/COLONY COUNT: CPT

## 2017-12-19 PROCEDURE — 85730 THROMBOPLASTIN TIME PARTIAL: CPT

## 2017-12-19 PROCEDURE — 80053 COMPREHEN METABOLIC PANEL: CPT

## 2017-12-19 PROCEDURE — 81003 URINALYSIS AUTO W/O SCOPE: CPT

## 2017-12-19 PROCEDURE — 97110 THERAPEUTIC EXERCISES: CPT

## 2017-12-19 PROCEDURE — 83735 ASSAY OF MAGNESIUM: CPT

## 2017-12-19 PROCEDURE — 82306 VITAMIN D 25 HYDROXY: CPT

## 2017-12-19 PROCEDURE — 72100 X-RAY EXAM L-S SPINE 2/3 VWS: CPT

## 2017-12-19 PROCEDURE — 72192 CT PELVIS W/O DYE: CPT

## 2017-12-19 PROCEDURE — 80048 BASIC METABOLIC PNL TOTAL CA: CPT

## 2017-12-19 PROCEDURE — 97163 PT EVAL HIGH COMPLEX 45 MIN: CPT

## 2017-12-19 PROCEDURE — 76377 3D RENDER W/INTRP POSTPROCES: CPT

## 2017-12-19 PROCEDURE — 85027 COMPLETE CBC AUTOMATED: CPT

## 2017-12-19 PROCEDURE — 72158 MRI LUMBAR SPINE W/O & W/DYE: CPT

## 2017-12-19 PROCEDURE — 72220 X-RAY EXAM SACRUM TAILBONE: CPT

## 2017-12-21 ENCOUNTER — APPOINTMENT (OUTPATIENT)
Dept: ORTHOPEDIC SURGERY | Facility: CLINIC | Age: 82
End: 2017-12-21

## 2017-12-22 ENCOUNTER — INPATIENT (INPATIENT)
Facility: HOSPITAL | Age: 82
LOS: 6 days | Discharge: HOME | DRG: 287 | End: 2017-12-29
Attending: HOSPITALIST | Admitting: HOSPITALIST
Payer: MEDICARE

## 2017-12-22 VITALS
TEMPERATURE: 99 F | HEART RATE: 78 BPM | RESPIRATION RATE: 16 BRPM | WEIGHT: 130.07 LBS | DIASTOLIC BLOOD PRESSURE: 106 MMHG | SYSTOLIC BLOOD PRESSURE: 161 MMHG | HEIGHT: 59 IN | OXYGEN SATURATION: 99 %

## 2017-12-22 DIAGNOSIS — Z98.1 ARTHRODESIS STATUS: Chronic | ICD-10-CM

## 2017-12-22 DIAGNOSIS — Z87.81 PERSONAL HISTORY OF (HEALED) TRAUMATIC FRACTURE: Chronic | ICD-10-CM

## 2017-12-22 LAB
ALBUMIN SERPL ELPH-MCNC: 4.4 G/DL — SIGNIFICANT CHANGE UP (ref 3.3–5.2)
ALP SERPL-CCNC: 70 U/L — SIGNIFICANT CHANGE UP (ref 40–120)
ALT FLD-CCNC: 14 U/L — SIGNIFICANT CHANGE UP
ANION GAP SERPL CALC-SCNC: 15 MMOL/L — SIGNIFICANT CHANGE UP (ref 5–17)
APTT BLD: 35.4 SEC — SIGNIFICANT CHANGE UP (ref 27.5–37.4)
AST SERPL-CCNC: 26 U/L — SIGNIFICANT CHANGE UP
BASOPHILS # BLD AUTO: 0 K/UL — SIGNIFICANT CHANGE UP (ref 0–0.2)
BASOPHILS NFR BLD AUTO: 0.4 % — SIGNIFICANT CHANGE UP (ref 0–2)
BILIRUB SERPL-MCNC: 0.3 MG/DL — LOW (ref 0.4–2)
BUN SERPL-MCNC: 14 MG/DL — SIGNIFICANT CHANGE UP (ref 8–20)
CALCIUM SERPL-MCNC: 9.8 MG/DL — SIGNIFICANT CHANGE UP (ref 8.6–10.2)
CHLORIDE SERPL-SCNC: 104 MMOL/L — SIGNIFICANT CHANGE UP (ref 98–107)
CO2 SERPL-SCNC: 26 MMOL/L — SIGNIFICANT CHANGE UP (ref 22–29)
CREAT SERPL-MCNC: 0.79 MG/DL — SIGNIFICANT CHANGE UP (ref 0.5–1.3)
EOSINOPHIL # BLD AUTO: 0.2 K/UL — SIGNIFICANT CHANGE UP (ref 0–0.5)
EOSINOPHIL NFR BLD AUTO: 2.9 % — SIGNIFICANT CHANGE UP (ref 0–6)
GLUCOSE SERPL-MCNC: 98 MG/DL — SIGNIFICANT CHANGE UP (ref 70–115)
HCT VFR BLD CALC: 36.6 % — LOW (ref 37–47)
HGB BLD-MCNC: 11.8 G/DL — LOW (ref 12–16)
INR BLD: 1.19 RATIO — HIGH (ref 0.88–1.16)
LIDOCAIN IGE QN: 37 U/L — SIGNIFICANT CHANGE UP (ref 22–51)
LYMPHOCYTES # BLD AUTO: 1.8 K/UL — SIGNIFICANT CHANGE UP (ref 1–4.8)
LYMPHOCYTES # BLD AUTO: 22.5 % — SIGNIFICANT CHANGE UP (ref 20–55)
MCHC RBC-ENTMCNC: 28.9 PG — SIGNIFICANT CHANGE UP (ref 27–31)
MCHC RBC-ENTMCNC: 32.2 G/DL — SIGNIFICANT CHANGE UP (ref 32–36)
MCV RBC AUTO: 89.5 FL — SIGNIFICANT CHANGE UP (ref 81–99)
MONOCYTES # BLD AUTO: 1 K/UL — HIGH (ref 0–0.8)
MONOCYTES NFR BLD AUTO: 12.8 % — HIGH (ref 3–10)
NEUTROPHILS # BLD AUTO: 5 K/UL — SIGNIFICANT CHANGE UP (ref 1.8–8)
NEUTROPHILS NFR BLD AUTO: 61.2 % — SIGNIFICANT CHANGE UP (ref 37–73)
NT-PROBNP SERPL-SCNC: 1188 PG/ML — HIGH (ref 0–300)
PLATELET # BLD AUTO: 310 K/UL — SIGNIFICANT CHANGE UP (ref 150–400)
POTASSIUM SERPL-MCNC: 4.5 MMOL/L — SIGNIFICANT CHANGE UP (ref 3.5–5.3)
POTASSIUM SERPL-SCNC: 4.5 MMOL/L — SIGNIFICANT CHANGE UP (ref 3.5–5.3)
PROT SERPL-MCNC: 7.2 G/DL — SIGNIFICANT CHANGE UP (ref 6.6–8.7)
PROTHROM AB SERPL-ACNC: 13.1 SEC — HIGH (ref 9.8–12.7)
RBC # BLD: 4.09 M/UL — LOW (ref 4.4–5.2)
RBC # FLD: 15.5 % — SIGNIFICANT CHANGE UP (ref 11–15.6)
SODIUM SERPL-SCNC: 145 MMOL/L — SIGNIFICANT CHANGE UP (ref 135–145)
TROPONIN T SERPL-MCNC: <0.01 NG/ML — SIGNIFICANT CHANGE UP (ref 0–0.06)
WBC # BLD: 8.1 K/UL — SIGNIFICANT CHANGE UP (ref 4.8–10.8)
WBC # FLD AUTO: 8.1 K/UL — SIGNIFICANT CHANGE UP (ref 4.8–10.8)

## 2017-12-22 PROCEDURE — 71020: CPT | Mod: 26

## 2017-12-22 PROCEDURE — 93010 ELECTROCARDIOGRAM REPORT: CPT

## 2017-12-22 PROCEDURE — 99285 EMERGENCY DEPT VISIT HI MDM: CPT

## 2017-12-22 RX ORDER — IPRATROPIUM/ALBUTEROL SULFATE 18-103MCG
3 AEROSOL WITH ADAPTER (GRAM) INHALATION ONCE
Qty: 0 | Refills: 0 | Status: COMPLETED | OUTPATIENT
Start: 2017-12-22 | End: 2017-12-22

## 2017-12-22 RX ORDER — PANTOPRAZOLE SODIUM 20 MG/1
40 TABLET, DELAYED RELEASE ORAL ONCE
Qty: 0 | Refills: 0 | Status: COMPLETED | OUTPATIENT
Start: 2017-12-22 | End: 2017-12-22

## 2017-12-22 RX ORDER — SODIUM CHLORIDE 9 MG/ML
3 INJECTION INTRAMUSCULAR; INTRAVENOUS; SUBCUTANEOUS ONCE
Qty: 0 | Refills: 0 | Status: COMPLETED | OUTPATIENT
Start: 2017-12-22 | End: 2017-12-22

## 2017-12-22 RX ORDER — METOCLOPRAMIDE HCL 10 MG
10 TABLET ORAL ONCE
Qty: 0 | Refills: 0 | Status: COMPLETED | OUTPATIENT
Start: 2017-12-22 | End: 2017-12-22

## 2017-12-22 RX ORDER — MORPHINE SULFATE 50 MG/1
2 CAPSULE, EXTENDED RELEASE ORAL ONCE
Qty: 0 | Refills: 0 | Status: DISCONTINUED | OUTPATIENT
Start: 2017-12-22 | End: 2017-12-22

## 2017-12-22 RX ADMIN — MORPHINE SULFATE 2 MILLIGRAM(S): 50 CAPSULE, EXTENDED RELEASE ORAL at 22:36

## 2017-12-22 RX ADMIN — PANTOPRAZOLE SODIUM 40 MILLIGRAM(S): 20 TABLET, DELAYED RELEASE ORAL at 22:36

## 2017-12-22 RX ADMIN — MORPHINE SULFATE 2 MILLIGRAM(S): 50 CAPSULE, EXTENDED RELEASE ORAL at 22:50

## 2017-12-22 RX ADMIN — SODIUM CHLORIDE 3 MILLILITER(S): 9 INJECTION INTRAMUSCULAR; INTRAVENOUS; SUBCUTANEOUS at 21:03

## 2017-12-22 RX ADMIN — Medication 104 MILLIGRAM(S): at 22:36

## 2017-12-22 RX ADMIN — Medication 3 MILLILITER(S): at 22:36

## 2017-12-22 NOTE — ED ADULT NURSE NOTE - PMH
Chronic back pain    Essential hypertension    Hyperlipidemia, unspecified hyperlipidemia type    PNA (pneumonia)  november 2017

## 2017-12-22 NOTE — ED PROVIDER NOTE - PHYSICAL EXAMINATION
Constitutional: Appears short of breath, in respiratory distress, talking in full sentences  Head: NC/AT, no swelling  Eyes: EOMI, no swelling  Mouth: mm moist  Neck: supple, trachea is midline  Chest: wheezing bilaterally (L>R) with mild retractions, in respiratory distress  Heart: S1 S2 distant  Abdomen: abd soft, non tender  Musc/Skel: extremities with no swelling, no deformity, no spine tenderness, distal pulses present, lower extremities with varicose veins  Neuro: A&Ox3, no focal deficits   Skin: dry, warm

## 2017-12-22 NOTE — ED ADULT NURSE NOTE - OBJECTIVE STATEMENT
pt care assumed at 2100, no apparent distress noted at this time. pt received Alert and Oriented to person, place, situation and time sitting in bed comfortably with son, and grand kids at bedside. Pt c/o cough and abnormal EKG. Pt states she felt like she was getting pna again when she went to urgent care. urgent care sent pt here due to "an abnormal EKG." pt was recently d/c from [facility] and was on abx for pna. HR is regular, lung sounds are diminished b/l, abd is soft and nontender with positive bowel sounds in all four quadrants, skin is warm, dry and appropriate for age and race. plan of care explained, will continue to monitor.

## 2017-12-22 NOTE — ED PROVIDER NOTE - MEDICAL DECISION MAKING DETAILS
84 y/o with recurrent PNA, wheezing, short of breath, plan to do RVP, sepsis protocol, CXR, and admit

## 2017-12-22 NOTE — ED PROVIDER NOTE - PROGRESS NOTE DETAILS
discussed results discussed CXR results, plan to do CTA abdomen/pelvis discussed CXR results, plan to do CTA abdomen/pelvis  patient is pre occupied with her health  most likely is anxious, plan to rule out acs

## 2017-12-22 NOTE — ED PROVIDER NOTE - NS ED ROS FT
no weight change, no fever or chills  no rash, no bruises  no visual changes no eye discharge  +cough +congestion  +SOB, +chest pain +wheezing  no orthopnea, no pnd  no abd pain, no vomiting/diarrhea, +nausea  no hematuria, no change in urinary habits  no joint pain, no deformity  no headache, no paresthesia

## 2017-12-22 NOTE — ED PROVIDER NOTE - OBJECTIVE STATEMENT
85 year old female with hx of HTN, A-fib, hysterectomy presents to the ED c/o shortness of breath, wheezing, chest pain that began today. Pt reports recurrent PNA episodes since August 2017. Pt states her first episode onset after discharge from [facility]; pt was undergoing rehab treatment s/p pelvis fracture from fall. Pt states she woke up normally this morning, presenting sx onset around 1600. She describes her chest pain as if she was "having a heart attack" and states she felt congested with phlegm. Pt states she has been spitting up increasing amounts of water as well. Denies fever, chills, dysuria, hematuria, constipation. Pt endorses associated nausea, no vomiting or diarrhea. Pt has consulted her PMD about her recurrent sx, referred to pulmonologist who she states did not examine her. Currently takes Gabapentin, Eliquis, Protonix, Tramadol. Taking pain medication for chronic L leg pain s/p procedure to extremity. Tramadol, Tylenol, Motrin taken PTA. Non-smoker, +passive smoke exposure. No further complaints at this time. 85 year old female with hx of HTN, A-fib, hysterectomy presents to the ED c/o shortness of breath, wheezing, chest pain that began today. Pt reports recurrent PNA episodes since August 2017. Pt states her first episode onset after discharge from [facility]; pt was undergoing rehab treatment s/p pelvis fracture from fall. Pt states she woke up normally this morning, presenting sx onset around 1600. She describes her chest pain as if she was "having a heart attack" and states she felt congested with phlegm. Pt states she has been spitting up increasing amounts of water as well. Denies fever, chills, dysuria, hematuria, constipation. Pt endorses associated nausea, no vomiting or diarrhea. Pt has consulted her PMD about her recurrent sx, referred to pulmonologist who she states did not examine her. Currently takes Gabapentin, Eliquis, Protonix, Tramadol. Taking pain medication for chronic L leg pain s/p procedure to extremity. Tramadol, Tylenol, Motrin taken PTA. Non-smoker, +passive smoke exposure. No further complaints at this time.  PMD is Dr. Jim [phone]

## 2017-12-23 ENCOUNTER — TRANSCRIPTION ENCOUNTER (OUTPATIENT)
Age: 82
End: 2017-12-23

## 2017-12-23 DIAGNOSIS — R91.1 SOLITARY PULMONARY NODULE: ICD-10-CM

## 2017-12-23 DIAGNOSIS — R07.89 OTHER CHEST PAIN: ICD-10-CM

## 2017-12-23 DIAGNOSIS — I82.433 ACUTE EMBOLISM AND THROMBOSIS OF POPLITEAL VEIN, BILATERAL: ICD-10-CM

## 2017-12-23 DIAGNOSIS — S22.41XA MULTIPLE FRACTURES OF RIBS, RIGHT SIDE, INITIAL ENCOUNTER FOR CLOSED FRACTURE: ICD-10-CM

## 2017-12-23 DIAGNOSIS — I50.33 ACUTE ON CHRONIC DIASTOLIC (CONGESTIVE) HEART FAILURE: ICD-10-CM

## 2017-12-23 DIAGNOSIS — I50.9 HEART FAILURE, UNSPECIFIED: ICD-10-CM

## 2017-12-23 DIAGNOSIS — I48.91 UNSPECIFIED ATRIAL FIBRILLATION: ICD-10-CM

## 2017-12-23 DIAGNOSIS — I24.9 ACUTE ISCHEMIC HEART DISEASE, UNSPECIFIED: ICD-10-CM

## 2017-12-23 DIAGNOSIS — E04.1 NONTOXIC SINGLE THYROID NODULE: ICD-10-CM

## 2017-12-23 DIAGNOSIS — I48.0 PAROXYSMAL ATRIAL FIBRILLATION: ICD-10-CM

## 2017-12-23 LAB
ALBUMIN SERPL ELPH-MCNC: 4.5 G/DL — SIGNIFICANT CHANGE UP (ref 3.3–5.2)
ALP SERPL-CCNC: 77 U/L — SIGNIFICANT CHANGE UP (ref 40–120)
ALT FLD-CCNC: 15 U/L — SIGNIFICANT CHANGE UP
ANION GAP SERPL CALC-SCNC: 18 MMOL/L — HIGH (ref 5–17)
APTT BLD: 36.8 SEC — SIGNIFICANT CHANGE UP (ref 27.5–37.4)
AST SERPL-CCNC: 25 U/L — SIGNIFICANT CHANGE UP
BASOPHILS # BLD AUTO: 0 K/UL — SIGNIFICANT CHANGE UP (ref 0–0.2)
BASOPHILS NFR BLD AUTO: 0.2 % — SIGNIFICANT CHANGE UP (ref 0–2)
BILIRUB SERPL-MCNC: 0.5 MG/DL — SIGNIFICANT CHANGE UP (ref 0.4–2)
BUN SERPL-MCNC: 14 MG/DL — SIGNIFICANT CHANGE UP (ref 8–20)
CALCIUM SERPL-MCNC: 10.1 MG/DL — SIGNIFICANT CHANGE UP (ref 8.6–10.2)
CHLORIDE SERPL-SCNC: 102 MMOL/L — SIGNIFICANT CHANGE UP (ref 98–107)
CHOLEST SERPL-MCNC: 203 MG/DL — HIGH (ref 110–199)
CO2 SERPL-SCNC: 24 MMOL/L — SIGNIFICANT CHANGE UP (ref 22–29)
CREAT SERPL-MCNC: 0.7 MG/DL — SIGNIFICANT CHANGE UP (ref 0.5–1.3)
EOSINOPHIL # BLD AUTO: 0 K/UL — SIGNIFICANT CHANGE UP (ref 0–0.5)
EOSINOPHIL NFR BLD AUTO: 0 % — SIGNIFICANT CHANGE UP (ref 0–6)
GLUCOSE SERPL-MCNC: 138 MG/DL — HIGH (ref 70–115)
HBA1C BLD-MCNC: 5.4 % — SIGNIFICANT CHANGE UP (ref 4–5.6)
HCT VFR BLD CALC: 37.7 % — SIGNIFICANT CHANGE UP (ref 37–47)
HDLC SERPL-MCNC: 117 MG/DL — SIGNIFICANT CHANGE UP
HGB BLD-MCNC: 12.3 G/DL — SIGNIFICANT CHANGE UP (ref 12–16)
INR BLD: 1.4 RATIO — HIGH (ref 0.88–1.16)
LIPID PNL WITH DIRECT LDL SERPL: 76 MG/DL — SIGNIFICANT CHANGE UP
LYMPHOCYTES # BLD AUTO: 0.6 K/UL — LOW (ref 1–4.8)
LYMPHOCYTES # BLD AUTO: 9.3 % — LOW (ref 20–55)
MCHC RBC-ENTMCNC: 28.9 PG — SIGNIFICANT CHANGE UP (ref 27–31)
MCHC RBC-ENTMCNC: 32.6 G/DL — SIGNIFICANT CHANGE UP (ref 32–36)
MCV RBC AUTO: 88.7 FL — SIGNIFICANT CHANGE UP (ref 81–99)
MONOCYTES # BLD AUTO: 0.1 K/UL — SIGNIFICANT CHANGE UP (ref 0–0.8)
MONOCYTES NFR BLD AUTO: 1.5 % — LOW (ref 3–10)
NEUTROPHILS # BLD AUTO: 5.8 K/UL — SIGNIFICANT CHANGE UP (ref 1.8–8)
NEUTROPHILS NFR BLD AUTO: 88.8 % — HIGH (ref 37–73)
PLATELET # BLD AUTO: 310 K/UL — SIGNIFICANT CHANGE UP (ref 150–400)
POTASSIUM SERPL-MCNC: 4.3 MMOL/L — SIGNIFICANT CHANGE UP (ref 3.5–5.3)
POTASSIUM SERPL-SCNC: 4.3 MMOL/L — SIGNIFICANT CHANGE UP (ref 3.5–5.3)
PROT SERPL-MCNC: 7.6 G/DL — SIGNIFICANT CHANGE UP (ref 6.6–8.7)
PROTHROM AB SERPL-ACNC: 15.5 SEC — HIGH (ref 9.8–12.7)
RAPID RVP RESULT: DETECTED
RBC # BLD: 4.25 M/UL — LOW (ref 4.4–5.2)
RBC # FLD: 15.5 % — SIGNIFICANT CHANGE UP (ref 11–15.6)
RV+EV RNA SPEC QL NAA+PROBE: DETECTED
SODIUM SERPL-SCNC: 144 MMOL/L — SIGNIFICANT CHANGE UP (ref 135–145)
TOTAL CHOLESTEROL/HDL RATIO MEASUREMENT: 2 RATIO — LOW (ref 3.3–7.1)
TRIGL SERPL-MCNC: 48 MG/DL — SIGNIFICANT CHANGE UP (ref 10–200)
TROPONIN T SERPL-MCNC: <0.01 NG/ML — SIGNIFICANT CHANGE UP (ref 0–0.06)
WBC # BLD: 6.6 K/UL — SIGNIFICANT CHANGE UP (ref 4.8–10.8)
WBC # FLD AUTO: 6.6 K/UL — SIGNIFICANT CHANGE UP (ref 4.8–10.8)

## 2017-12-23 PROCEDURE — 99223 1ST HOSP IP/OBS HIGH 75: CPT

## 2017-12-23 PROCEDURE — 74174 CTA ABD&PLVS W/CONTRAST: CPT | Mod: 26

## 2017-12-23 PROCEDURE — 71275 CT ANGIOGRAPHY CHEST: CPT | Mod: 26

## 2017-12-23 PROCEDURE — 12345: CPT | Mod: NC

## 2017-12-23 RX ORDER — TRAMADOL HYDROCHLORIDE 50 MG/1
50 TABLET ORAL EVERY 12 HOURS
Qty: 0 | Refills: 0 | Status: DISCONTINUED | OUTPATIENT
Start: 2017-12-23 | End: 2017-12-23

## 2017-12-23 RX ORDER — ATORVASTATIN CALCIUM 80 MG/1
20 TABLET, FILM COATED ORAL AT BEDTIME
Qty: 0 | Refills: 0 | Status: DISCONTINUED | OUTPATIENT
Start: 2017-12-23 | End: 2017-12-29

## 2017-12-23 RX ORDER — IPRATROPIUM/ALBUTEROL SULFATE 18-103MCG
3 AEROSOL WITH ADAPTER (GRAM) INHALATION ONCE
Qty: 0 | Refills: 0 | Status: COMPLETED | OUTPATIENT
Start: 2017-12-23 | End: 2017-12-23

## 2017-12-23 RX ORDER — OXYCODONE AND ACETAMINOPHEN 5; 325 MG/1; MG/1
1 TABLET ORAL EVERY 6 HOURS
Qty: 0 | Refills: 0 | Status: DISCONTINUED | OUTPATIENT
Start: 2017-12-23 | End: 2017-12-29

## 2017-12-23 RX ORDER — APIXABAN 2.5 MG/1
5 TABLET, FILM COATED ORAL EVERY 12 HOURS
Qty: 0 | Refills: 0 | Status: DISCONTINUED | OUTPATIENT
Start: 2017-12-23 | End: 2017-12-26

## 2017-12-23 RX ORDER — FUROSEMIDE 40 MG
40 TABLET ORAL
Qty: 0 | Refills: 0 | Status: DISCONTINUED | OUTPATIENT
Start: 2017-12-23 | End: 2017-12-24

## 2017-12-23 RX ORDER — POLYETHYLENE GLYCOL 3350 17 G/17G
17 POWDER, FOR SOLUTION ORAL DAILY
Qty: 0 | Refills: 0 | Status: DISCONTINUED | OUTPATIENT
Start: 2017-12-23 | End: 2017-12-29

## 2017-12-23 RX ORDER — GABAPENTIN 400 MG/1
100 CAPSULE ORAL THREE TIMES A DAY
Qty: 0 | Refills: 0 | Status: DISCONTINUED | OUTPATIENT
Start: 2017-12-23 | End: 2017-12-29

## 2017-12-23 RX ORDER — VALSARTAN 80 MG/1
160 TABLET ORAL DAILY
Qty: 0 | Refills: 0 | Status: DISCONTINUED | OUTPATIENT
Start: 2017-12-23 | End: 2017-12-28

## 2017-12-23 RX ORDER — DILTIAZEM HCL 120 MG
240 CAPSULE, EXT RELEASE 24 HR ORAL DAILY
Qty: 0 | Refills: 0 | Status: DISCONTINUED | OUTPATIENT
Start: 2017-12-23 | End: 2017-12-29

## 2017-12-23 RX ORDER — TRAMADOL HYDROCHLORIDE 50 MG/1
25 TABLET ORAL EVERY 6 HOURS
Qty: 0 | Refills: 0 | Status: DISCONTINUED | OUTPATIENT
Start: 2017-12-23 | End: 2017-12-28

## 2017-12-23 RX ORDER — SENNA PLUS 8.6 MG/1
2 TABLET ORAL AT BEDTIME
Qty: 0 | Refills: 0 | Status: DISCONTINUED | OUTPATIENT
Start: 2017-12-23 | End: 2017-12-29

## 2017-12-23 RX ORDER — DOCUSATE SODIUM 100 MG
100 CAPSULE ORAL
Qty: 0 | Refills: 0 | Status: DISCONTINUED | OUTPATIENT
Start: 2017-12-23 | End: 2017-12-29

## 2017-12-23 RX ORDER — PANTOPRAZOLE SODIUM 20 MG/1
40 TABLET, DELAYED RELEASE ORAL
Qty: 0 | Refills: 0 | Status: DISCONTINUED | OUTPATIENT
Start: 2017-12-23 | End: 2017-12-29

## 2017-12-23 RX ADMIN — TRAMADOL HYDROCHLORIDE 50 MILLIGRAM(S): 50 TABLET ORAL at 05:25

## 2017-12-23 RX ADMIN — OXYCODONE AND ACETAMINOPHEN 1 TABLET(S): 5; 325 TABLET ORAL at 13:04

## 2017-12-23 RX ADMIN — APIXABAN 5 MILLIGRAM(S): 2.5 TABLET, FILM COATED ORAL at 19:48

## 2017-12-23 RX ADMIN — Medication 100 MILLIGRAM(S): at 19:48

## 2017-12-23 RX ADMIN — Medication 100 MILLIGRAM(S): at 05:25

## 2017-12-23 RX ADMIN — OXYCODONE AND ACETAMINOPHEN 1 TABLET(S): 5; 325 TABLET ORAL at 17:44

## 2017-12-23 RX ADMIN — Medication 40 MILLIGRAM(S): at 19:49

## 2017-12-23 RX ADMIN — VALSARTAN 160 MILLIGRAM(S): 80 TABLET ORAL at 05:25

## 2017-12-23 RX ADMIN — PANTOPRAZOLE SODIUM 40 MILLIGRAM(S): 20 TABLET, DELAYED RELEASE ORAL at 09:58

## 2017-12-23 RX ADMIN — Medication 40 MILLIGRAM(S): at 10:02

## 2017-12-23 RX ADMIN — TRAMADOL HYDROCHLORIDE 50 MILLIGRAM(S): 50 TABLET ORAL at 05:40

## 2017-12-23 RX ADMIN — GABAPENTIN 100 MILLIGRAM(S): 400 CAPSULE ORAL at 05:25

## 2017-12-23 RX ADMIN — Medication 40 MILLIGRAM(S): at 03:48

## 2017-12-23 RX ADMIN — APIXABAN 5 MILLIGRAM(S): 2.5 TABLET, FILM COATED ORAL at 05:25

## 2017-12-23 RX ADMIN — OXYCODONE AND ACETAMINOPHEN 1 TABLET(S): 5; 325 TABLET ORAL at 23:00

## 2017-12-23 RX ADMIN — Medication 240 MILLIGRAM(S): at 05:25

## 2017-12-23 RX ADMIN — SENNA PLUS 2 TABLET(S): 8.6 TABLET ORAL at 22:04

## 2017-12-23 RX ADMIN — GABAPENTIN 100 MILLIGRAM(S): 400 CAPSULE ORAL at 22:04

## 2017-12-23 RX ADMIN — ATORVASTATIN CALCIUM 20 MILLIGRAM(S): 80 TABLET, FILM COATED ORAL at 22:04

## 2017-12-23 RX ADMIN — GABAPENTIN 100 MILLIGRAM(S): 400 CAPSULE ORAL at 13:08

## 2017-12-23 RX ADMIN — OXYCODONE AND ACETAMINOPHEN 1 TABLET(S): 5; 325 TABLET ORAL at 22:04

## 2017-12-23 NOTE — H&P ADULT - RS GEN PE MLT RESP DETAILS PC
diminished breath sounds, R/respirations labored/respirations non-labored/diminished breath sounds, L/rales/no chest wall tenderness/no intercostal retractions

## 2017-12-23 NOTE — PROGRESS NOTE ADULT - ASSESSMENT
86 yo female with a pmh/o HTN, HLD, thyroid and lung nodule and chronic back pain due to fractures, recently was discharged and sent to [facility] s/p pelvic fracture and b/l LE DVT presents with Shortness of breath, found to have Diastolic HF, URI and Pafib.

## 2017-12-23 NOTE — H&P ADULT - PROBLEM SELECTOR PROBLEM 4
Acute deep vein thrombosis (DVT) of popliteal vein of both lower extremities Closed fracture of multiple ribs of right side, initial encounter

## 2017-12-23 NOTE — H&P ADULT - PROBLEM SELECTOR PLAN 3
pain control with tramadol  pt following with pain management as outpt-will continue regimen  pt denies falling at home-family states likely occurred with patient throwing herself on specific hard rolling chair to sit habitually. as above  may be related to subacute right sided rib fracture

## 2017-12-23 NOTE — PROGRESS NOTE ADULT - SUBJECTIVE AND OBJECTIVE BOX
seen for diastolic hf, URI, chronic back pain    complaining of rib and back pain.  sob improved.    MEDICATIONS  (STANDING):  apixaban 5 milliGRAM(s) Oral every 12 hours  atorvastatin 20 milliGRAM(s) Oral at bedtime  diltiazem    milliGRAM(s) Oral daily  docusate sodium 100 milliGRAM(s) Oral two times a day  furosemide   Injectable 40 milliGRAM(s) IV Push two times a day  gabapentin 100 milliGRAM(s) Oral three times a day  pantoprazole    Tablet 40 milliGRAM(s) Oral before breakfast  senna 2 Tablet(s) Oral at bedtime  valsartan 160 milliGRAM(s) Oral daily    MEDICATIONS  (PRN):  oxyCODONE    5 mG/acetaminophen 325 mG 1 Tablet(s) Oral every 6 hours PRN Severe Pain (7 - 10)  polyethylene glycol 3350 17 Gram(s) Oral daily PRN Constipation  traMADol 25 milliGRAM(s) Oral every 6 hours PRN Moderate Pain (4 - 6)      Allergies    No Known Allergies        Vital Signs Last 24 Hrs  T(C): 36.6 (23 Dec 2017 11:24), Max: 37 (22 Dec 2017 19:51)  T(F): 97.9 (23 Dec 2017 11:24), Max: 98.6 (22 Dec 2017 19:51)  HR: 92 (23 Dec 2017 11:24) (72 - 92)  BP: 135/64 (23 Dec 2017 11:24) (125/69 - 161/106)  BP(mean): --  RR: 18 (23 Dec 2017 11:24) (16 - 18)  SpO2: 92% (23 Dec 2017 11:24) (92% - 99%)    PHYSICAL EXAM:    GENERAL: NAD  CHEST/LUNG: crackles left base  poor effort   HEART: Regular rate and rhythm; S1 S2  ABDOMEN: Soft, Nontender, Nondistended; Bowel sounds present  EXTREMITIES:  no edema.   NERVOUS SYSTEM:  Alert & Oriented X3,  nonfocal  PSYCH: normal mood, appropriate response.    LABS:                        12.3   6.6   )-----------( 310      ( 23 Dec 2017 09:16 )             37.7     12-23    144  |  102  |  14.0  ----------------------------<  138<H>  4.3   |  24.0  |  0.70    Ca    10.1      23 Dec 2017 09:16    TPro  7.6  /  Alb  4.5  /  TBili  0.5  /  DBili  x   /  AST  25  /  ALT  15  /  AlkPhos  77  12-23    PT/INR - ( 23 Dec 2017 09:16 )   PT: 15.5 sec;   INR: 1.40 ratio         PTT - ( 23 Dec 2017 09:16 )  PTT:36.8 sec      CAPILLARY BLOOD GLUCOSE            RADIOLOGY & ADDITIONAL TESTS:

## 2017-12-23 NOTE — H&P ADULT - PROBLEM SELECTOR PLAN 2
as above  may be related to subacute right sided rib fracture rate controlled, pt denies h/o afib, previous EKG first degree block in sinus rhythm  cont AC-on for b/l DVT lower extremities  EKG's from urgent care and ED reviewed  cont cardizem at home dose  f/u cardiology consult

## 2017-12-23 NOTE — H&P ADULT - ASSESSMENT
84 yo female with a pmh/o HTN, HLD, thyroid and lung nodule, b/l acquired deafness, and chronic back pain s/p surgical intervention x 2 who is currently being followed by pain management who recently was discharged and sent to [facility] s/p pelvic fracture and b/l LE DVT, who is admitted for acute on chronic CHF exacerbation unknown type and atypical chest pain concerning for ACS. 86 yo female with a pmh/o HTN, HLD, thyroid and lung nodule, b/l acquired deafness, and chronic back pain s/p surgical intervention x 2 who is currently being followed by pain management who recently was discharged and sent to [facility] s/p pelvic fracture and b/l LE DVT, who is admitted for acute on chronic CHF exacerbation unknown type, new onset atrial fibrillation, and atypical chest pain.

## 2017-12-23 NOTE — H&P ADULT - PROBLEM SELECTOR PLAN 1
admit to monitored bed + pulse ox  pt noted to be hypoxic on ambulation and at rest is 92% on RA  [facility] cardiology consulted for evaluation  lasix 40mg IV bid started  strict i/o  daily weights  DASH/TLC diet with fluid restriction  nutrition consult for dietary counseling  Social work consult for eliquis coverage outpt and also for evaluation of placement as pt lives alone, is fall risk, and states she is unable to care for self  cont statin  serial troponin  ASA daily  TTE  PE r/o via CTA chest-interlobular septal thickening and cardiomegaly  lipid panel  HbA1c  f/u RVP panel-additional viral component likely given sick contacts  Pt eval

## 2017-12-23 NOTE — H&P ADULT - PROBLEM SELECTOR PLAN 4
cont apixiban 5mg bid pain control with tramadol  pt following with pain management as outpt-will continue regimen  pt denies falling at home-family states likely occurred with patient throwing herself on specific hard rolling chair to sit habitually.

## 2017-12-23 NOTE — H&P ADULT - HISTORY OF PRESENT ILLNESS
Pt is a 86 yo female with a pmh/o HTN, HLD, pneumonia, thyroid and lung nodule, b/l acquired deafness, and chronic back pain s/p surgical intervention x 2 who is currently being followed by pain management who recently was discharged and sent to [facility] s/p pelvic fracture and b/l LE DVT who presents to ED with daughter and grand daughter complaining of sob when walking to bathroom or trying to ambulate, productive cough with yellow sputum, b/l leg swelling and leg pain, sob upon awakening and when lying flat associated with chest pain and weight gain >6lbs since discharge from [facility]. +sick contacts in family. Pt states she arrived home either TH or [facility] and since then has indulged in ham daily, chinese food, [employer] j[employer], and 'lots and lots' of water because she thought she was dehydrated. Pt denies h/o CHF however does state she was on lasix for years but stopped as her PMD retired. Pt reports having cardiac cath approx 4-5 yrs ago but does not know of results or who performed it. Pt states the cardiologist was from [facility].  Denies v/d, HA, back pain, gu sx, fever, chills.

## 2017-12-23 NOTE — CONSULT NOTE ADULT - SUBJECTIVE AND OBJECTIVE BOX
HPI:  Pt is a 84 yo female with a pmh/o HTN, HLD, hearing disorder, chronic back pain s/p surgical intervention x 2, recent pelvic fracture and bilateral DVT on Eliquis.   She was recently in rehab and discharged home. Over the last several days has had increased dyspnea on exertion, progressive yesterday when she developed more severe dyspnea. Also noted pain in left lower chest radiating around back, also somewhat worse with exertion. She has noted dietary indiscretions recently.   On arrival noted to be in atrial fibrillation with reasonably controlled ventricular rates (80s-110 bpm). She had previously been on lasix but stopped due to frequent urination. Has received diuresis and feeling better, but still SOB with exertion and sitting up in bed. CT noted some vascular congestion- no reported PE.   Of note, last TTEs have revealed preserved LV function with moderate AS (though was severe by DAVID, not be gradient). Stress in 7/2015 with no ischemia.        PAST MEDICAL & SURGICAL HISTORY:  Closed fracture of multiple ribs of right side, initial encounter  Pelvic fracture  Acute deep vein thrombosis (DVT) of popliteal vein of both lower extremities  Thyroid nodule  Lung nodule  PNA (pneumonia): november 2017  Hyperlipidemia, unspecified hyperlipidemia type  Chronic back pain  Essential hypertension  History of pelvic fracture  S/P spinal fusion: L spines      REVIEW OF SYSTEMS  +SOB, chest  pain, Leg pain, back pain. denies fever, palpitation, syncope. No SOB at rest. otherwise negative other than HPI      MEDICATIONS  (STANDING):  apixaban 5 milliGRAM(s) Oral every 12 hours  atorvastatin 20 milliGRAM(s) Oral at bedtime  diltiazem    milliGRAM(s) Oral daily  docusate sodium 100 milliGRAM(s) Oral two times a day  furosemide   Injectable 40 milliGRAM(s) IV Push two times a day  gabapentin 100 milliGRAM(s) Oral three times a day  pantoprazole    Tablet 40 milliGRAM(s) Oral before breakfast  senna 2 Tablet(s) Oral at bedtime  valsartan 160 milliGRAM(s) Oral daily    MEDICATIONS  (PRN):  polyethylene glycol 3350 17 Gram(s) Oral daily PRN Constipation  traMADol 50 milliGRAM(s) Oral every 12 hours PRN Moderate Pain (4 - 6)      Allergies    No Known Allergies    Intolerances    SOCIAL HISTORY: , lives alone. denies tob, etoh, drugs	    FAMILY HISTORY:  No pertinent family history in first degree relatives      Vital Signs Last 24 Hrs  T(C): 36.7 (23 Dec 2017 03:29), Max: 37 (22 Dec 2017 19:51)  T(F): 98.1 (23 Dec 2017 03:29), Max: 98.6 (22 Dec 2017 19:51)  HR: 92 (22 Dec 2017 21:58) (78 - 92)  BP: 125/75 (23 Dec 2017 03:29) (125/75 - 161/106)  BP(mean): --  RR: 18 (23 Dec 2017 03:29) (16 - 18)  SpO2: 92% (23 Dec 2017 03:29) (92% - 99%)    PHYSICAL EXAM:  NAD, sitting up in bed  NC/AT  no JVD  rales mostly in left lung, decreased BS on right. good air movement  irregularly irregular, 2/6 LUCIO, S2 not audible.   mild reproducible CP on palpitation of right chest  s/nt/nd  trace ankle edema bilaterally  AAOx3, nonfocal  hearing impaired  no apparent rash      LABS:                        11.8   8.1   )-----------( 310      ( 22 Dec 2017 21:01 )             36.6   12-22    145  |  104  |  14.0  ----------------------------<  98  4.5   |  26.0  |  0.79    Ca    9.8      22 Dec 2017 21:01    TPro  7.2  /  Alb  4.4  /  TBili  0.3<L>  /  DBili  x   /  AST  26  /  ALT  14  /  AlkPhos  70  12-22  LIVER FUNCTIONS - ( 22 Dec 2017 21:01 )  Alb: 4.4 g/dL / Pro: 7.2 g/dL / ALK PHOS: 70 U/L / ALT: 14 U/L / AST: 26 U/L / GGT: x           PT/INR - ( 22 Dec 2017 21:01 )   PT: 13.1 sec;   INR: 1.19 ratio         PTT - ( 22 Dec 2017 21:01 )  PTT:35.4 secCARDIAC MARKERS ( 22 Dec 2017 21:01 )  x     / <0.01 ng/mL / x     / x     / x          BNP 1188    +RPV    ECG: atrial fibrillation 93 bpm, LAFB, poor Rw progression, no acute ST changes.     RADIOLOGY & ADDITIONAL STUDIES:  < from: TTE Echo Complete w/Doppler (06.30.17 @ 11:46) >   1. Technically good study.   2. Hyperdynamic global left ventricular systolic function.   3. Left ventricular ejection fraction, by visual estimation, is >75%.   4. LV mid cavitary obliteration is seen.   5. Elevated left atrial and left ventricular end-diastolic pressures.   6. Moderately increased LV wall thickness.   7. Spectral Doppler shows pseudonormal pattern of left ventricular   myocardial filling (Grade II diastolic dysfunction).   8. Severely enlarged left atrium.   9.Thickening of the anterior and posterior mitral valve leaflets.  10. Trace mitral valve regurgitation.  11. Peak transaortic gradient equals 56.2 mmHg, mean transaortic gradient   equals 32.0 mmHg, the calculated aortic valve area equals 0.88 cm² by the   continuity equation consistent with severe aortic stenosis. By gradients,   moderate aortic stenosis is present. DI of 0.34 also speaks against   severe aortic valve stenosis. Based on the above findings, moerate aortic   valve stenosis is present.  12. There is moderate aortic root calcification.  13. There is no evidence of pericardial effusion.      < end of copied text >  < from: Nuclear Stress Test-Pharmacologic (07.29.15 @ 08:02) >  IMPRESSIONS:Normal Study  Inability to exercise due to unsteady gait.  No Symptom. No diagnostic changes.  The left ventricle was normal LV size.  No ischemia/infarction.  Gated wall motion analysis shows normal wall motion with  LVEF of 65%.    < end of copied text >  < from: CT Angio Chest w/ IV Cont (12.23.17 @ 00:53) >  No evidence of dissection    Subtle new interlobular septal thickening may be related to vascular   congestion    1 cm left upper lobe pulmonary nodule is stable since June 2017.   Continued follow-up recommended for 2 year stability per Fleischner   criteria below    Mild hyperdense thickening of the right lateral abdominal wall   musculature. This may represent an intramuscular hematoma versus   thickening due to positioning. Correlate with physical exam.    Subacute right lower posterolateral rib fractures are new since June 2017.    Pelvic fractures again noted asseen on prior exam of October 2017.      < end of copied text >

## 2017-12-23 NOTE — H&P ADULT - PMH
Acute deep vein thrombosis (DVT) of popliteal vein of both lower extremities    Chronic back pain    Closed fracture of multiple ribs of right side, initial encounter    Essential hypertension    Hyperlipidemia, unspecified hyperlipidemia type    Lung nodule    Pelvic fracture    PNA (pneumonia)  november 2017  Thyroid nodule

## 2017-12-23 NOTE — CONSULT NOTE ADULT - ASSESSMENT
84 yo female with history of aortic stenosis with preserved LV function, HTN, HLD, hearing disorder, recent pelvic fracture and bilateral DVT on Eliquis, now with SOB in setting of respiratory viral illness (RPV +) and evidence of CHF with exertional dyspnea, elevated BNP and pulmonary congestion. Also atrial fibrillation with is sufficiently rate controlled, likely not new given severe LA enlargement.  Suspect CHF related to combination of AS, AF, and dietary indiscretions.     1. acute CHF exacerbation in setting of chronic diastolic CHF (preserved LV function)  -agree with lasix IV for now. monitor I/Os, daily weights  -rate control of atrial fibrillation. Can continue Diltiazem  -TTE. Will need to reassess severity of AS. Suspect will need evaluation for TAVR. May need cath in this setting as well.   -telemetry     2. Chest pain- atypical. Suspect not coronary due to normal stress in last few years, atypical nature, recent rib fractures, and nml troponin/absence of ischemic changes on ECG.   -serial troponins and ECG  -possible further ischemic evaluation pending workup for AS/TAVR as above    3. Atrial fibrillation  -continue rate control with Dilt for now. Goal rates <110s bpm.   -CHADSVASC=5. continue anticoagulation. Eliquis ok for now (treating AF and recent DVT).   -may need to transition to lovenox pending AS workup    4. Respiratory infection, viral  -supportive care, respiratory isolation, treatment per medical team  -no infiltrate on CT.     Doubt PE, but will need to ensure this was ruled out on recent CT chest

## 2017-12-24 DIAGNOSIS — E78.5 HYPERLIPIDEMIA, UNSPECIFIED: ICD-10-CM

## 2017-12-24 DIAGNOSIS — I35.0 NONRHEUMATIC AORTIC (VALVE) STENOSIS: ICD-10-CM

## 2017-12-24 DIAGNOSIS — J21.0 ACUTE BRONCHIOLITIS DUE TO RESPIRATORY SYNCYTIAL VIRUS: ICD-10-CM

## 2017-12-24 LAB
ANION GAP SERPL CALC-SCNC: 14 MMOL/L — SIGNIFICANT CHANGE UP (ref 5–17)
BUN SERPL-MCNC: 21 MG/DL — HIGH (ref 8–20)
CALCIUM SERPL-MCNC: 10.4 MG/DL — HIGH (ref 8.6–10.2)
CHLORIDE SERPL-SCNC: 102 MMOL/L — SIGNIFICANT CHANGE UP (ref 98–107)
CO2 SERPL-SCNC: 32 MMOL/L — HIGH (ref 22–29)
CREAT SERPL-MCNC: 0.93 MG/DL — SIGNIFICANT CHANGE UP (ref 0.5–1.3)
GLUCOSE SERPL-MCNC: 101 MG/DL — SIGNIFICANT CHANGE UP (ref 70–115)
HCT VFR BLD CALC: 38.5 % — SIGNIFICANT CHANGE UP (ref 37–47)
HGB BLD-MCNC: 12.8 G/DL — SIGNIFICANT CHANGE UP (ref 12–16)
MAGNESIUM SERPL-MCNC: 2 MG/DL — SIGNIFICANT CHANGE UP (ref 1.6–2.6)
MCHC RBC-ENTMCNC: 29.4 PG — SIGNIFICANT CHANGE UP (ref 27–31)
MCHC RBC-ENTMCNC: 33.2 G/DL — SIGNIFICANT CHANGE UP (ref 32–36)
MCV RBC AUTO: 88.3 FL — SIGNIFICANT CHANGE UP (ref 81–99)
PLATELET # BLD AUTO: 332 K/UL — SIGNIFICANT CHANGE UP (ref 150–400)
POTASSIUM SERPL-MCNC: 4 MMOL/L — SIGNIFICANT CHANGE UP (ref 3.5–5.3)
POTASSIUM SERPL-SCNC: 4 MMOL/L — SIGNIFICANT CHANGE UP (ref 3.5–5.3)
RBC # BLD: 4.36 M/UL — LOW (ref 4.4–5.2)
RBC # FLD: 15.5 % — SIGNIFICANT CHANGE UP (ref 11–15.6)
SODIUM SERPL-SCNC: 148 MMOL/L — HIGH (ref 135–145)
WBC # BLD: 9.1 K/UL — SIGNIFICANT CHANGE UP (ref 4.8–10.8)
WBC # FLD AUTO: 9.1 K/UL — SIGNIFICANT CHANGE UP (ref 4.8–10.8)

## 2017-12-24 PROCEDURE — 99233 SBSQ HOSP IP/OBS HIGH 50: CPT

## 2017-12-24 PROCEDURE — 93880 EXTRACRANIAL BILAT STUDY: CPT | Mod: 26

## 2017-12-24 PROCEDURE — 93306 TTE W/DOPPLER COMPLETE: CPT | Mod: 26

## 2017-12-24 RX ORDER — INFLUENZA VIRUS VACCINE 15; 15; 15; 15 UG/.5ML; UG/.5ML; UG/.5ML; UG/.5ML
0.5 SUSPENSION INTRAMUSCULAR ONCE
Qty: 0 | Refills: 0 | Status: COMPLETED | OUTPATIENT
Start: 2017-12-24 | End: 2017-12-24

## 2017-12-24 RX ORDER — FUROSEMIDE 40 MG
40 TABLET ORAL DAILY
Qty: 0 | Refills: 0 | Status: DISCONTINUED | OUTPATIENT
Start: 2017-12-24 | End: 2017-12-24

## 2017-12-24 RX ADMIN — Medication 40 MILLIGRAM(S): at 06:28

## 2017-12-24 RX ADMIN — GABAPENTIN 100 MILLIGRAM(S): 400 CAPSULE ORAL at 16:56

## 2017-12-24 RX ADMIN — TRAMADOL HYDROCHLORIDE 25 MILLIGRAM(S): 50 TABLET ORAL at 09:46

## 2017-12-24 RX ADMIN — PANTOPRAZOLE SODIUM 40 MILLIGRAM(S): 20 TABLET, DELAYED RELEASE ORAL at 11:46

## 2017-12-24 RX ADMIN — VALSARTAN 160 MILLIGRAM(S): 80 TABLET ORAL at 06:28

## 2017-12-24 RX ADMIN — APIXABAN 5 MILLIGRAM(S): 2.5 TABLET, FILM COATED ORAL at 17:01

## 2017-12-24 RX ADMIN — TRAMADOL HYDROCHLORIDE 25 MILLIGRAM(S): 50 TABLET ORAL at 09:52

## 2017-12-24 RX ADMIN — TRAMADOL HYDROCHLORIDE 25 MILLIGRAM(S): 50 TABLET ORAL at 16:57

## 2017-12-24 RX ADMIN — Medication 240 MILLIGRAM(S): at 06:28

## 2017-12-24 RX ADMIN — Medication 100 MILLIGRAM(S): at 17:01

## 2017-12-24 RX ADMIN — Medication 100 MILLIGRAM(S): at 06:28

## 2017-12-24 RX ADMIN — GABAPENTIN 100 MILLIGRAM(S): 400 CAPSULE ORAL at 21:23

## 2017-12-24 RX ADMIN — GABAPENTIN 100 MILLIGRAM(S): 400 CAPSULE ORAL at 06:28

## 2017-12-24 RX ADMIN — SENNA PLUS 2 TABLET(S): 8.6 TABLET ORAL at 21:23

## 2017-12-24 RX ADMIN — APIXABAN 5 MILLIGRAM(S): 2.5 TABLET, FILM COATED ORAL at 06:28

## 2017-12-24 RX ADMIN — ATORVASTATIN CALCIUM 20 MILLIGRAM(S): 80 TABLET, FILM COATED ORAL at 21:23

## 2017-12-24 NOTE — PROGRESS NOTE ADULT - ASSESSMENT
86 yo female with history of aortic stenosis with preserved LV function, HTN, HLD, hearing disorder, recent pelvic fracture and bilateral DVT on Eliquis, now with SOB in setting of respiratory viral illness (RPV +) and evidence of CHF with exertional dyspnea, elevated BNP and pulmonary congestion. Also atrial fibrillation with is sufficiently rate controlled, likely not new given severe LA enlargement.  Suspect CHF related to combination of respiratory illness, AS, AF, and dietary indiscretions.     1. acute CHF exacerbation in setting of chronic diastolic CHF (preserved LV function) and valvular heart disease.   -volume status improving. Will hold lasix for now given increase BUN/Cr.  -monitor I/Os, daily weights  -rate control of atrial fibrillation. Can continue Diltiazem  -awaiting official TTE read. Suspect severe AS- if so will need evaluation for TAVR. May need cath in this setting as well.   -telemetry     2. Chest pain- atypical. Suspect not coronary due to normal stress in last few years, atypical nature, recent rib fractures, and nml troponin/absence of ischemic changes on ECG.   -possible further ischemic evaluation pending workup for AS/TAVR as above    3. Atrial fibrillation  -continue rate control with Dilt for now. Goal rates <110s bpm.   -CHADSVASC=5. continue anticoagulation. Eliquis ok for now (treating AF and recent DVT).   -may need to hold anticoagulation pending AS workup    4. Respiratory infection, viral  -supportive care, respiratory isolation, treatment per medical team  -no infiltrate on CT.

## 2017-12-24 NOTE — PROGRESS NOTE ADULT - SUBJECTIVE AND OBJECTIVE BOX
seen for diastolic heart failure, URI    + cough.  CURRY  ROS otherwise.    MEDICATIONS  (STANDING):  apixaban 5 milliGRAM(s) Oral every 12 hours  atorvastatin 20 milliGRAM(s) Oral at bedtime  diltiazem    milliGRAM(s) Oral daily  docusate sodium 100 milliGRAM(s) Oral two times a day  gabapentin 100 milliGRAM(s) Oral three times a day  pantoprazole    Tablet 40 milliGRAM(s) Oral before breakfast  senna 2 Tablet(s) Oral at bedtime  valsartan 160 milliGRAM(s) Oral daily    MEDICATIONS  (PRN):  guaiFENesin    Syrup 200 milliGRAM(s) Oral every 6 hours PRN Cough  oxyCODONE    5 mG/acetaminophen 325 mG 1 Tablet(s) Oral every 6 hours PRN Severe Pain (7 - 10)  polyethylene glycol 3350 17 Gram(s) Oral daily PRN Constipation  traMADol 25 milliGRAM(s) Oral every 6 hours PRN Moderate Pain (4 - 6)      Allergies    No Known Allergies    Vital Signs Last 24 Hrs  T(C): 36.5 (24 Dec 2017 06:25), Max: 36.7 (24 Dec 2017 02:48)  T(F): 97.7 (24 Dec 2017 06:25), Max: 98.1 (24 Dec 2017 02:48)  HR: 83 (24 Dec 2017 06:25) (83 - 97)  BP: 149/79 (24 Dec 2017 06:25) (136/68 - 149/79)  BP(mean): --  RR: 18 (24 Dec 2017 06:25) (18 - 18)  SpO2: 100% (24 Dec 2017 06:25) (95% - 100%)    PHYSICAL EXAM:    GENERAL: NAD  CHEST/LUNG: Ctab  HEART: Regular rate and rhythm; S1 S2; LUCIO RUSB  ABDOMEN: Soft, Nontender, Nondistended; Bowel sounds present  EXTREMITIES:  no edema.  NERVOUS SYSTEM:  Alert & Oriented X3, nonfocal  PSYCH: normal mood, appropriate response.    LABS:                        12.8   9.1   )-----------( 332      ( 24 Dec 2017 08:43 )             38.5     12-24    148<H>  |  102  |  21.0<H>  ----------------------------<  101  4.0   |  32.0<H>  |  0.93    Ca    10.4<H>      24 Dec 2017 08:43  Mg     2.0     12-24    TPro  7.6  /  Alb  4.5  /  TBili  0.5  /  DBili  x   /  AST  25  /  ALT  15  /  AlkPhos  77  12-23    PT/INR - ( 23 Dec 2017 09:16 )   PT: 15.5 sec;   INR: 1.40 ratio         PTT - ( 23 Dec 2017 09:16 )  PTT:36.8 sec      CAPILLARY BLOOD GLUCOSE            RADIOLOGY & ADDITIONAL TESTS:

## 2017-12-24 NOTE — PROGRESS NOTE ADULT - SUBJECTIVE AND OBJECTIVE BOX
Consult requesting by: Alonzo   cardiologist: Richard       HPI:  Pt is a 84 yo female with a pmh/o HTN, HLD, pneumonia, thyroid and lung nodule, b/l acquired deafness, and chronic back pain s/p surgical intervention x 2 who is currently being followed by pain management who recently was discharged and sent to [facility] s/p pelvic fracture and b/l LE DVT who presents to ED with daughter and grand daughter complaining of sob when walking to bathroom or trying to ambulate, productive cough with yellow sputum, b/l leg swelling and leg pain, sob upon awakening and when lying flat associated with chest pain and weight gain >6lbs since discharge from [facility]. +sick contacts in family. Pt states she arrived home either [facility] or [facility] and since then has indulged in ham daily, chinese food, burRecoVend jae, and 'lots and lots' of water because she thought she was dehydrated. Pt denies h/o CHF however does state she was on lasix for years but stopped as her PMD retired. Pt reports having cardiac cath approx 4-5 yrs ago but does not know of results or who performed it. Pt states the cardiologist was from [facility].  Denies v/d, HA, back pain, gu sx, fever, chills. (23 Dec 2017 03:47)    Currently consulted for severe aortic stenosis; patient currently denies chest pain, palpitations, N/V, fevers, chills, headache, syncope, black out.  she admits leg edema, weight gain, shortness of breath, dyspnea on exertion.        PAST MEDICAL & SURGICAL HISTORY:  Closed fracture of multiple ribs of right side, initial encounter  Pelvic fracture  Acute deep vein thrombosis (DVT) of popliteal vein of both lower extremities  Thyroid nodule  Lung nodule  PNA (pneumonia): november 2017  Hyperlipidemia, unspecified hyperlipidemia type  Chronic back pain  Essential hypertension  History of pelvic fracture  S/P spinal fusion: L spines      MEDICATIONS  (STANDING):  apixaban 5 milliGRAM(s) Oral every 12 hours  atorvastatin 20 milliGRAM(s) Oral at bedtime  diltiazem    milliGRAM(s) Oral daily  docusate sodium 100 milliGRAM(s) Oral two times a day  gabapentin 100 milliGRAM(s) Oral three times a day  pantoprazole    Tablet 40 milliGRAM(s) Oral before breakfast  senna 2 Tablet(s) Oral at bedtime  valsartan 160 milliGRAM(s) Oral daily    MEDICATIONS  (PRN):  guaiFENesin    Syrup 200 milliGRAM(s) Oral every 6 hours PRN Cough  oxyCODONE    5 mG/acetaminophen 325 mG 1 Tablet(s) Oral every 6 hours PRN Severe Pain (7 - 10)  polyethylene glycol 3350 17 Gram(s) Oral daily PRN Constipation  traMADol 25 milliGRAM(s) Oral every 6 hours PRN Moderate Pain (4 - 6)    Antiplatelet therapy:                           Last dose/amt:    Allergies  No Known Allergies  Intolerances    SOCIAL HISTORY:  Smoker: denies  ETOH use: denies  Occupation: retired   Live with: self; currently living in two family home with son   Assisted device use: walker/ cane at home   Has stairs x 3 at home     FAMILY HISTORY:  No pertinent family history in first degree relatives      PHYSICAL EXAM  Vital Signs Last 24 Hrs  T(C): 36.6 (24 Dec 2017 17:34), Max: 36.7 (24 Dec 2017 02:48)  T(F): 97.8 (24 Dec 2017 17:34), Max: 98.1 (24 Dec 2017 02:48)  HR: 70 (24 Dec 2017 17:34) (70 - 97)  BP: 138/74 (24 Dec 2017 17:34) (136/68 - 149/79)  BP(mean): --  RR: 18 (24 Dec 2017 17:34) (18 - 18)  SpO2: 97% (24 Dec 2017 17:34) (95% - 100%)                                                            LABS:                        12.8   9.1   )-----------( 332      ( 24 Dec 2017 08:43 )             38.5     12-24    148<H>  |  102  |  21.0<H>  ----------------------------<  101  4.0   |  32.0<H>  |  0.93    Ca    10.4<H>      24 Dec 2017 08:43  Mg     2.0     12-24    TPro  7.6  /  Alb  4.5  /  TBili  0.5  /  DBili  x   /  AST  25  /  ALT  15  /  AlkPhos  77  12-23    PT/INR - ( 23 Dec 2017 09:16 )   PT: 15.5 sec;   INR: 1.40 ratio         PTT - ( 23 Dec 2017 09:16 )  PTT:36.8 sec    CARDIAC MARKERS ( 23 Dec 2017 09:16 )  x     / <0.01 ng/mL / x     / x     / x      CARDIAC MARKERS ( 22 Dec 2017 21:01 )  x     / <0.01 ng/mL / x     / x     / x            Serum Pro-Brain Natriuretic Peptide: 1188 pg/mL (12-22 @ 21:01)        Cardiac Cath: N/A    TTE / BIRGIT:  < from: TTE Echo Complete w/Doppler (12.24.17 @ 14:07) >     PHYSICIAN INTERPRETATION:  Aortic Valve: Moderate to severe aortic stenosis is present. Peak   transaortic gradient equals 65.3 mmHg, mean transaortic gradient equals   33.8 mmHg, the calculated aortic valve area equals 0.92 cm² by the   continuity equation consistent with severe aortic stenosis. The peak   aortic velocity was obtained from the apical view.        Summary:   1. Technically excellent study.   2. Moderate to severe aortic valve stenosis.   3. Re-evaluation of doppler study confirms at least moderate to severe   aortic stenosis.   4. Peak transaortic gradient equals 65.3 mmHg, mean transaortic gradient   equals 33.8 mmHg, the calculated aortic valve area equals 0.92 cm² by the   continuity equation consistent with severe aortic stenosis.      < end of copied text >

## 2017-12-24 NOTE — PROGRESS NOTE ADULT - SUBJECTIVE AND OBJECTIVE BOX
no significant change in symptoms. No SOB at rest. With productive cough, dark phlegm.   Acknowledges progressive exertional dyspnea and LE edema   Remains in AF, with controlled ventricular rates    MEDICATIONS  (STANDING):  apixaban 5 milliGRAM(s) Oral every 12 hours  atorvastatin 20 milliGRAM(s) Oral at bedtime  diltiazem    milliGRAM(s) Oral daily  docusate sodium 100 milliGRAM(s) Oral two times a day  furosemide    Tablet 40 milliGRAM(s) Oral daily  gabapentin 100 milliGRAM(s) Oral three times a day  pantoprazole    Tablet 40 milliGRAM(s) Oral before breakfast  senna 2 Tablet(s) Oral at bedtime  valsartan 160 milliGRAM(s) Oral daily    MEDICATIONS  (PRN):  guaiFENesin    Syrup 200 milliGRAM(s) Oral every 6 hours PRN Cough  oxyCODONE    5 mG/acetaminophen 325 mG 1 Tablet(s) Oral every 6 hours PRN Severe Pain (7 - 10)  polyethylene glycol 3350 17 Gram(s) Oral daily PRN Constipation  traMADol 25 milliGRAM(s) Oral every 6 hours PRN Moderate Pain (4 - 6)      Allergies    No Known Allergies    Intolerances      PAST MEDICAL & SURGICAL HISTORY:  Closed fracture of multiple ribs of right side, initial encounter  Pelvic fracture  Acute deep vein thrombosis (DVT) of popliteal vein of both lower extremities  Thyroid nodule  Lung nodule  PNA (pneumonia): november 2017  Hyperlipidemia, unspecified hyperlipidemia type  Chronic back pain  Essential hypertension  History of pelvic fracture  S/P spinal fusion: L spines      Vital Signs Last 24 Hrs  T(C): 36.5 (24 Dec 2017 06:25), Max: 36.7 (24 Dec 2017 02:48)  T(F): 97.7 (24 Dec 2017 06:25), Max: 98.1 (24 Dec 2017 02:48)  HR: 83 (24 Dec 2017 06:25) (83 - 97)  BP: 149/79 (24 Dec 2017 06:25) (136/68 - 149/79)  BP(mean): --  RR: 18 (24 Dec 2017 06:25) (18 - 18)  SpO2: 100% (24 Dec 2017 06:25) (95% - 100%)    Physical Exam:  NAD, sitting up in bed  NC/AT  no JVD  decreased BS in lower lungs good air movement  irregularly irregular, 2/6 LUCIO, S2 not audible.   mild reproducible CP on palpitation of left and right lower chest wall  s/nt/nd  trace-1+ ankle edema bilaterally  AAOx3, nonfocal  hearing impaired  no apparent rash      LABS:                        12.8   9.1   )-----------( 332      ( 24 Dec 2017 08:43 )             38.5     12-24    148<H>  |  102  |  21.0<H>  ----------------------------<  101  4.0   |  32.0<H>  |  0.93    Ca    10.4<H>      24 Dec 2017 08:43  Mg     2.0     12-24    TPro  7.6  /  Alb  4.5  /  TBili  0.5  /  DBili  x   /  AST  25  /  ALT  15  /  AlkPhos  77  12-23    PT/INR - ( 23 Dec 2017 09:16 )   PT: 15.5 sec;   INR: 1.40 ratio         PTT - ( 23 Dec 2017 09:16 )  PTT:36.8 sec      RADIOLOGY & ADDITIONAL TESTS:

## 2017-12-24 NOTE — ED ADULT NURSE REASSESSMENT NOTE - NS ED NURSE REASSESS COMMENT FT1
Patient A&Ox4, complaining of pain to back. Stated is chronic in nature. Respirations even & unlabored, denies any numbness or tingling. O2 in place, negative obvious s/s distress. Denies any chest pain, shortness of breath, nausea or dizziness. Cardiac monitor in place, atrial flutter. IV site C/D/I, patent, negative s/s phlebitis or infiltration. All AM medications administered as ordered, well tolerated. Will monitor.
Patient A&Ox4, denies any pain or discomfort. Denies any chest pain, shortness of breath, nausea or dizziness. Respirations even & unlabored, denies any numbness or tingling. O2 in place. Atrial flutter remains on cardiac monitor. All PM medications administered as ordered, as well as pain medication as ordered, well tolerated. Will continue to monitor.
Report received from off going RN, charting as noted. Patient A&Ox4, denies any pain or discomfort. Respirations even & unlabored, denies any numbness or tingling. Cardiac monitor in place, atrial flutter. Respirations even & unlabored, denies any numbness or tingling. O2 in place, well tolerated. Negative obvious distress. IV site C/D/I, patent, negative s/s phlebitis or infiltration. Commode at bedside. Fluid restriction maintained. Plan of care discussed, all questions answered. Will continue to monitor.
Pt is resting in bed comfortably at this time, no apparent distress noted at this time. Pt denies any complaints at this time. pt remains Normal Sinus Rhythm on the cardiac monitor. Plan of care explained, will continue to monitor.
Received notice from ED monitor tech that patient was in atrial flutter. Primary ED RN off unit at ultrasound with another admitted patient at this time. Another RN called to check on patient. Other RN stated patient in negative obvious distress,  in atrial flutter. Dr. Sheikh made aware.

## 2017-12-24 NOTE — PROGRESS NOTE ADULT - ASSESSMENT
84 yo female with history of aortic stenosis with preserved LV function, HTN, HLD, hearing disorder, recent pelvic fracture and bilateral DVT on Eliquis, now with SOB in setting of respiratory viral illness (RPV +) and evidence of CHF with exertional dyspnea, elevated BNP and pulmonary congestion complicated by atrial fibrillation currently rate cntroled     1. acute CHF exacerbation in setting of chronic diastolic CHF (preserved LV function)  -agree with lasix IV for now. monitor I/Os, daily weights  -rate control of atrial fibrillation. Can continue Diltiazem  -TTE. Will need to reassess severity of AS. Suspect will need evaluation for TAVR. May need cath in this setting as well.   -telemetry     2. Chest pain- atypical. Suspect not coronary due to normal stress in last few years, atypical nature, recent rib fractures, and nml troponin/absence of ischemic changes on ECG.   -serial troponins and ECG  -possible further ischemic evaluation pending workup for AS/TAVR as above    3. Atrial fibrillation  -continue rate control with Dilt for now. Goal rates <110s bpm.   -CHADSVASC=5. continue anticoagulation. Eliquis ok for now (treating AF and recent DVT).   -may need to transition to lovenox pending AS workup    4. Respiratory infection, viral  -supportive care, respiratory isolation, treatment per medical team  -no infiltrate on CT.

## 2017-12-24 NOTE — PROGRESS NOTE ADULT - ASSESSMENT
84 yo female with a pmh/o HTN, HLD, thyroid and lung nodule and chronic back pain due to fractures, recently was discharged and sent to [facility] s/p pelvic fracture and b/l LE DVT presents with Shortness of breath, found to have Diastolic HF, URI and Pafib.  Questionable severe AS with repeat echo pending per cardiology request.

## 2017-12-25 DIAGNOSIS — I35.0 NONRHEUMATIC AORTIC (VALVE) STENOSIS: ICD-10-CM

## 2017-12-25 LAB
ANION GAP SERPL CALC-SCNC: 17 MMOL/L — SIGNIFICANT CHANGE UP (ref 5–17)
BUN SERPL-MCNC: 23 MG/DL — HIGH (ref 8–20)
CALCIUM SERPL-MCNC: 9.6 MG/DL — SIGNIFICANT CHANGE UP (ref 8.6–10.2)
CHLORIDE SERPL-SCNC: 100 MMOL/L — SIGNIFICANT CHANGE UP (ref 98–107)
CO2 SERPL-SCNC: 29 MMOL/L — SIGNIFICANT CHANGE UP (ref 22–29)
CREAT SERPL-MCNC: 0.83 MG/DL — SIGNIFICANT CHANGE UP (ref 0.5–1.3)
GLUCOSE SERPL-MCNC: 97 MG/DL — SIGNIFICANT CHANGE UP (ref 70–115)
HCT VFR BLD CALC: 37.6 % — SIGNIFICANT CHANGE UP (ref 37–47)
HGB BLD-MCNC: 12.4 G/DL — SIGNIFICANT CHANGE UP (ref 12–16)
MCHC RBC-ENTMCNC: 28.8 PG — SIGNIFICANT CHANGE UP (ref 27–31)
MCHC RBC-ENTMCNC: 33 G/DL — SIGNIFICANT CHANGE UP (ref 32–36)
MCV RBC AUTO: 87.2 FL — SIGNIFICANT CHANGE UP (ref 81–99)
PLATELET # BLD AUTO: 328 K/UL — SIGNIFICANT CHANGE UP (ref 150–400)
POTASSIUM SERPL-MCNC: 3.6 MMOL/L — SIGNIFICANT CHANGE UP (ref 3.5–5.3)
POTASSIUM SERPL-SCNC: 3.6 MMOL/L — SIGNIFICANT CHANGE UP (ref 3.5–5.3)
RBC # BLD: 4.31 M/UL — LOW (ref 4.4–5.2)
RBC # FLD: 15.3 % — SIGNIFICANT CHANGE UP (ref 11–15.6)
SODIUM SERPL-SCNC: 146 MMOL/L — HIGH (ref 135–145)
WBC # BLD: 8.7 K/UL — SIGNIFICANT CHANGE UP (ref 4.8–10.8)
WBC # FLD AUTO: 8.7 K/UL — SIGNIFICANT CHANGE UP (ref 4.8–10.8)

## 2017-12-25 PROCEDURE — 99233 SBSQ HOSP IP/OBS HIGH 50: CPT

## 2017-12-25 RX ORDER — POTASSIUM CHLORIDE 20 MEQ
40 PACKET (EA) ORAL ONCE
Qty: 0 | Refills: 0 | Status: COMPLETED | OUTPATIENT
Start: 2017-12-25 | End: 2017-12-25

## 2017-12-25 RX ADMIN — OXYCODONE AND ACETAMINOPHEN 1 TABLET(S): 5; 325 TABLET ORAL at 13:15

## 2017-12-25 RX ADMIN — Medication 100 MILLIGRAM(S): at 17:45

## 2017-12-25 RX ADMIN — Medication 100 MILLIGRAM(S): at 05:09

## 2017-12-25 RX ADMIN — TRAMADOL HYDROCHLORIDE 25 MILLIGRAM(S): 50 TABLET ORAL at 06:03

## 2017-12-25 RX ADMIN — SENNA PLUS 2 TABLET(S): 8.6 TABLET ORAL at 21:16

## 2017-12-25 RX ADMIN — APIXABAN 5 MILLIGRAM(S): 2.5 TABLET, FILM COATED ORAL at 05:08

## 2017-12-25 RX ADMIN — APIXABAN 5 MILLIGRAM(S): 2.5 TABLET, FILM COATED ORAL at 17:45

## 2017-12-25 RX ADMIN — TRAMADOL HYDROCHLORIDE 25 MILLIGRAM(S): 50 TABLET ORAL at 01:30

## 2017-12-25 RX ADMIN — GABAPENTIN 100 MILLIGRAM(S): 400 CAPSULE ORAL at 13:52

## 2017-12-25 RX ADMIN — Medication 240 MILLIGRAM(S): at 05:09

## 2017-12-25 RX ADMIN — GABAPENTIN 100 MILLIGRAM(S): 400 CAPSULE ORAL at 21:16

## 2017-12-25 RX ADMIN — PANTOPRAZOLE SODIUM 40 MILLIGRAM(S): 20 TABLET, DELAYED RELEASE ORAL at 05:09

## 2017-12-25 RX ADMIN — ATORVASTATIN CALCIUM 20 MILLIGRAM(S): 80 TABLET, FILM COATED ORAL at 21:16

## 2017-12-25 RX ADMIN — OXYCODONE AND ACETAMINOPHEN 1 TABLET(S): 5; 325 TABLET ORAL at 12:57

## 2017-12-25 RX ADMIN — GABAPENTIN 100 MILLIGRAM(S): 400 CAPSULE ORAL at 05:09

## 2017-12-25 RX ADMIN — VALSARTAN 160 MILLIGRAM(S): 80 TABLET ORAL at 05:09

## 2017-12-25 RX ADMIN — Medication 40 MILLIEQUIVALENT(S): at 12:57

## 2017-12-25 NOTE — PROGRESS NOTE ADULT - SUBJECTIVE AND OBJECTIVE BOX
MAMTA TALAVERA    [id number]    85y      Female    Patient is a 85y old  Female who presents with a chief complaint of shortness of breath, leg swelling (23 Dec 2017 03:47)      INTERVAL HPI/OVERNIGHT EVENTS:    Patient has no chest pain or SOB, denies fever, chills, chest pain, nausea, vomiting, dizziness.     REVIEW OF SYSTEMS:    CONSTITUTIONAL: No fever, no fatigue  RESPIRATORY: No cough, No shortness of breath  CARDIOVASCULAR: No chest pain, palpitations  GASTROINTESTINAL: No abdominal, No nausea, vomiting  NEUROLOGICAL: No headaches,  loss of strength.  MISCELLANEOUS: No joint swelling or pain       Vital Signs Last 24 Hrs  T(C): 36.8 (25 Dec 2017 07:52), Max: 36.9 (24 Dec 2017 23:42)  T(F): 98.2 (25 Dec 2017 07:52), Max: 98.4 (24 Dec 2017 23:42)  HR: 83 (25 Dec 2017 07:52) (70 - 91)  BP: 120/77 (25 Dec 2017 07:52) (120/77 - 142/80)  RR: 18 (25 Dec 2017 07:52) (18 - 18)  SpO2: 92% (25 Dec 2017 07:52) (92% - 97%)    PHYSICAL EXAM:    GENERAL: Elderly female looking comfortable  HEENT: PERRL, +EOMI  NECK: soft, Supple, No JVD,   CHEST/LUNG: Decrease air entry bilaterally; No wheezing  HEART: S1S2+, Regular rate and rhythm; No murmurs  ABDOMEN: Soft, Nontender, Nondistended; Bowel sounds present  EXTREMITIES:  2+ Peripheral Pulses, No edema  SKIN: No rashes or lesions  NEURO: AAOX3, no focal deficits, no motor r sensory loss  PSYCH: normal mood      LABS:                        12.4   8.7   )-----------( 328      ( 25 Dec 2017 06:07 )             37.6     12-25    146<H>  |  100  |  23.0<H>  ----------------------------<  97  3.6   |  29.0  |  0.83    Ca    9.6      25 Dec 2017 06:07  Mg     2.0     12-24              I&O's Summary    24 Dec 2017 07:01  -  25 Dec 2017 07:00  --------------------------------------------------------  IN: 0 mL / OUT: 1350 mL / NET: -1350 mL        MEDICATIONS  (STANDING):  apixaban 5 milliGRAM(s) Oral every 12 hours  atorvastatin 20 milliGRAM(s) Oral at bedtime  diltiazem    milliGRAM(s) Oral daily  docusate sodium 100 milliGRAM(s) Oral two times a day  gabapentin 100 milliGRAM(s) Oral three times a day  influenza   Vaccine 0.5 milliLiter(s) IntraMuscular once  pantoprazole    Tablet 40 milliGRAM(s) Oral before breakfast  potassium chloride    Tablet ER 40 milliEquivalent(s) Oral once  senna 2 Tablet(s) Oral at bedtime  valsartan 160 milliGRAM(s) Oral daily    MEDICATIONS  (PRN):  guaiFENesin    Syrup 200 milliGRAM(s) Oral every 6 hours PRN Cough  oxyCODONE    5 mG/acetaminophen 325 mG 1 Tablet(s) Oral every 6 hours PRN Severe Pain (7 - 10)  polyethylene glycol 3350 17 Gram(s) Oral daily PRN Constipation  traMADol 25 milliGRAM(s) Oral every 6 hours PRN Moderate Pain (4 - 6)

## 2017-12-25 NOTE — PROGRESS NOTE ADULT - SUBJECTIVE AND OBJECTIVE BOX
No new symptoms today. Denies SOB at rest, but still SOB with ambulation or with prolonged conversations.     MEDICATIONS  (STANDING):  apixaban 5 milliGRAM(s) Oral every 12 hours  atorvastatin 20 milliGRAM(s) Oral at bedtime  diltiazem    milliGRAM(s) Oral daily  docusate sodium 100 milliGRAM(s) Oral two times a day  gabapentin 100 milliGRAM(s) Oral three times a day  influenza   Vaccine 0.5 milliLiter(s) IntraMuscular once  pantoprazole    Tablet 40 milliGRAM(s) Oral before breakfast  senna 2 Tablet(s) Oral at bedtime  valsartan 160 milliGRAM(s) Oral daily    MEDICATIONS  (PRN):  guaiFENesin    Syrup 200 milliGRAM(s) Oral every 6 hours PRN Cough  oxyCODONE    5 mG/acetaminophen 325 mG 1 Tablet(s) Oral every 6 hours PRN Severe Pain (7 - 10)  polyethylene glycol 3350 17 Gram(s) Oral daily PRN Constipation  traMADol 25 milliGRAM(s) Oral every 6 hours PRN Moderate Pain (4 - 6)      Allergies    No Known Allergies    Intolerances      PAST MEDICAL & SURGICAL HISTORY:  Closed fracture of multiple ribs of right side, initial encounter  Pelvic fracture  Acute deep vein thrombosis (DVT) of popliteal vein of both lower extremities  Thyroid nodule  Lung nodule  PNA (pneumonia): november 2017  Hyperlipidemia, unspecified hyperlipidemia type  Chronic back pain  Essential hypertension  History of pelvic fracture  S/P spinal fusion: L spines      Vital Signs Last 24 Hrs  T(C): 37 (25 Dec 2017 15:25), Max: 37 (25 Dec 2017 15:25)  T(F): 98.6 (25 Dec 2017 15:25), Max: 98.6 (25 Dec 2017 15:25)  HR: 85 (25 Dec 2017 15:25) (70 - 91)  BP: 109/67 (25 Dec 2017 15:25) (109/67 - 142/80)  BP(mean): --  RR: 18 (25 Dec 2017 15:25) (18 - 18)  SpO2: 93% (25 Dec 2017 15:25) (92% - 97%)    Physical Exam:  NAD, sitting up in bed  NC/AT  no JVD  decreased BS in lower lungs good air movement  irregularly irregular, 2/6 LUCIO, S2 not audible.   mild reproducible CP on palpitation of left and right lower chest wall  s/nt/nd  no LE at this time.  AAOx3, nonfocal  hearing impaired  no apparent rash    LABS:                        12.4   8.7   )-----------( 328      ( 25 Dec 2017 06:07 )             37.6     12-25    146<H>  |  100  |  23.0<H>  ----------------------------<  97  3.6   |  29.0  |  0.83    Ca    9.6      25 Dec 2017 06:07  Mg     2.0     12-24      RADIOLOGY & ADDITIONAL TESTS:  < from: TTE Echo Complete w/Doppler (12.24.17 @ 14:07) >   1. Technically excellent study.   2. Moderate to severe aortic valve stenosis.   3. Re-evaluation of doppler study confirms at least moderate to severe   aortic stenosis.   4. Peak transaortic gradient equals 65.3 mmHg, mean transaortic gradient   equals 33.8 mmHg, the calculated aortic valve area equals 0.92 cm² by the   continuity equation consistent with severe aortic stenosis.    < end of copied text >

## 2017-12-25 NOTE — DIETITIAN INITIAL EVALUATION ADULT. - PROBLEM SELECTOR PLAN 4
pain control with tramadol  pt following with pain management as outpt-will continue regimen  pt denies falling at home-family states likely occurred with patient throwing herself on specific hard rolling chair to sit habitually.

## 2017-12-25 NOTE — DIETITIAN INITIAL EVALUATION ADULT. - PROBLEM SELECTOR PLAN 2
rate controlled, pt denies h/o afib, previous EKG first degree block in sinus rhythm  cont AC-on for b/l DVT lower extremities  EKG's from urgent care and ED reviewed  cont cardizem at home dose  f/u cardiology consult

## 2017-12-25 NOTE — PROGRESS NOTE ADULT - ASSESSMENT
86 yo female with a pmh/o HTN, HLD, thyroid and lung nodule and chronic back pain due to fractures, recently was discharged and sent to [facility] s/p pelvic fracture and b/l LE DVT presents with Shortness of breath, found to have Diastolic HF, URI and Pafib.  Questionable severe AS with repeat echo pending per cardiology request.

## 2017-12-25 NOTE — DIETITIAN INITIAL EVALUATION ADULT. - OTHER INFO
Pt admitted for CHF. Pt tolerating DASH diet + 1000ml FR. Diet discussed in length with Pt- good comprehension. Motivated to adhere to diet recommendations.

## 2017-12-25 NOTE — DIETITIAN INITIAL EVALUATION ADULT. - PROBLEM SELECTOR PLAN 1
admit to monitored bed + pulse ox  pt noted to be hypoxic on ambulation and at rest is 92% on RA  [facility] cardiology consulted for evaluation  lasix 40mg IV bid started  strict i/o  daily weights  DASH/TLC diet with fluid restriction  nutrition consult for dietary counseling  Social work consult for eliquis coverage outpt and also for evaluation of placement as pt lives alone, is fall risk, and states she is unable to care for self  cont statin  serial troponin  ASA daily  TTE  PE r/o via CTA chest-interlobular septal thickening and cardiomegaly  lipid panel  HbA1c  f/u RVP panel-additional viral component likely given sick contacts  Pt eval

## 2017-12-25 NOTE — PROGRESS NOTE ADULT - ASSESSMENT
86 yo female with history of aortic stenosis with preserved LV function, HTN, HLD, hearing disorder, recent pelvic fracture and bilateral DVT on Eliquis, now with SOB in setting of respiratory viral illness (RPV +) and evidence of CHF.. Also atrial fibrillation with is sufficiently rate controlled, likely chronic given severe LA enlargement.  Now euvolemic. Moderate to severe AS, likely responsible for recurrent CHF exacerbation and chronic CHF symptoms.     -Appears euvolemic. Hold lasix for now. restart as needed.   -continue AF rate control.   -CHADSVASC=5 and recent DVT. continue Eliquis. Hold as needed pending surgical evaluation.   -appreciate CTS evaluation for consideration of TAVR. Workup started.   -possible further ischemic evaluation pending workup for AS/TAVR as above  -Dr Rubio to evaluate tomorrow if available

## 2017-12-26 LAB
ANION GAP SERPL CALC-SCNC: 12 MMOL/L — SIGNIFICANT CHANGE UP (ref 5–17)
BUN SERPL-MCNC: 20 MG/DL — SIGNIFICANT CHANGE UP (ref 8–20)
CALCIUM SERPL-MCNC: 9.6 MG/DL — SIGNIFICANT CHANGE UP (ref 8.6–10.2)
CHLORIDE SERPL-SCNC: 105 MMOL/L — SIGNIFICANT CHANGE UP (ref 98–107)
CO2 SERPL-SCNC: 27 MMOL/L — SIGNIFICANT CHANGE UP (ref 22–29)
CREAT SERPL-MCNC: 0.71 MG/DL — SIGNIFICANT CHANGE UP (ref 0.5–1.3)
GLUCOSE SERPL-MCNC: 93 MG/DL — SIGNIFICANT CHANGE UP (ref 70–115)
HCT VFR BLD CALC: 37.7 % — SIGNIFICANT CHANGE UP (ref 37–47)
HGB BLD-MCNC: 12.2 G/DL — SIGNIFICANT CHANGE UP (ref 12–16)
INR BLD: 1.19 RATIO — HIGH (ref 0.88–1.16)
MCHC RBC-ENTMCNC: 28.6 PG — SIGNIFICANT CHANGE UP (ref 27–31)
MCHC RBC-ENTMCNC: 32.4 G/DL — SIGNIFICANT CHANGE UP (ref 32–36)
MCV RBC AUTO: 88.3 FL — SIGNIFICANT CHANGE UP (ref 81–99)
PLATELET # BLD AUTO: 329 K/UL — SIGNIFICANT CHANGE UP (ref 150–400)
POTASSIUM SERPL-MCNC: 4.1 MMOL/L — SIGNIFICANT CHANGE UP (ref 3.5–5.3)
POTASSIUM SERPL-SCNC: 4.1 MMOL/L — SIGNIFICANT CHANGE UP (ref 3.5–5.3)
PROTHROM AB SERPL-ACNC: 13.1 SEC — HIGH (ref 9.8–12.7)
RBC # BLD: 4.27 M/UL — LOW (ref 4.4–5.2)
RBC # FLD: 15.2 % — SIGNIFICANT CHANGE UP (ref 11–15.6)
SODIUM SERPL-SCNC: 144 MMOL/L — SIGNIFICANT CHANGE UP (ref 135–145)
WBC # BLD: 7.7 K/UL — SIGNIFICANT CHANGE UP (ref 4.8–10.8)
WBC # FLD AUTO: 7.7 K/UL — SIGNIFICANT CHANGE UP (ref 4.8–10.8)

## 2017-12-26 PROCEDURE — 99232 SBSQ HOSP IP/OBS MODERATE 35: CPT

## 2017-12-26 PROCEDURE — 99231 SBSQ HOSP IP/OBS SF/LOW 25: CPT

## 2017-12-26 PROCEDURE — 74174 CTA ABD&PLVS W/CONTRAST: CPT | Mod: 26

## 2017-12-26 PROCEDURE — 75574 CT ANGIO HRT W/3D IMAGE: CPT | Mod: 26

## 2017-12-26 RX ADMIN — TRAMADOL HYDROCHLORIDE 25 MILLIGRAM(S): 50 TABLET ORAL at 21:10

## 2017-12-26 RX ADMIN — VALSARTAN 160 MILLIGRAM(S): 80 TABLET ORAL at 05:36

## 2017-12-26 RX ADMIN — Medication 100 MILLIGRAM(S): at 17:20

## 2017-12-26 RX ADMIN — OXYCODONE AND ACETAMINOPHEN 1 TABLET(S): 5; 325 TABLET ORAL at 14:56

## 2017-12-26 RX ADMIN — TRAMADOL HYDROCHLORIDE 25 MILLIGRAM(S): 50 TABLET ORAL at 21:09

## 2017-12-26 RX ADMIN — OXYCODONE AND ACETAMINOPHEN 1 TABLET(S): 5; 325 TABLET ORAL at 05:36

## 2017-12-26 RX ADMIN — SENNA PLUS 2 TABLET(S): 8.6 TABLET ORAL at 21:05

## 2017-12-26 RX ADMIN — Medication 240 MILLIGRAM(S): at 05:36

## 2017-12-26 RX ADMIN — ATORVASTATIN CALCIUM 20 MILLIGRAM(S): 80 TABLET, FILM COATED ORAL at 21:05

## 2017-12-26 RX ADMIN — Medication 200 MILLIGRAM(S): at 21:10

## 2017-12-26 RX ADMIN — GABAPENTIN 100 MILLIGRAM(S): 400 CAPSULE ORAL at 05:35

## 2017-12-26 RX ADMIN — GABAPENTIN 100 MILLIGRAM(S): 400 CAPSULE ORAL at 14:56

## 2017-12-26 RX ADMIN — PANTOPRAZOLE SODIUM 40 MILLIGRAM(S): 20 TABLET, DELAYED RELEASE ORAL at 05:36

## 2017-12-26 RX ADMIN — GABAPENTIN 100 MILLIGRAM(S): 400 CAPSULE ORAL at 21:05

## 2017-12-26 RX ADMIN — Medication 100 MILLIGRAM(S): at 05:36

## 2017-12-26 RX ADMIN — APIXABAN 5 MILLIGRAM(S): 2.5 TABLET, FILM COATED ORAL at 05:35

## 2017-12-26 NOTE — PROGRESS NOTE ADULT - SUBJECTIVE AND OBJECTIVE BOX
MAMTA TALAVERA    [id number]    85y      Female    Patient is a 85y old  Female who presents with a chief complaint of shortness of breath, leg swelling (23 Dec 2017 03:47)      INTERVAL HPI/OVERNIGHT EVENTS:    Patient has no chest pain or SOB, denies fever, chills, chest pain, nausea, vomiting, dizziness, participating well with physical therapy, as per patient she get SOB while exertion.      REVIEW OF SYSTEMS:    CONSTITUTIONAL: No fever, no fatigue  RESPIRATORY: No cough, has exertional shortness of breath  CARDIOVASCULAR: No chest pain, palpitations  GASTROINTESTINAL: No abdominal, No nausea, vomiting  NEUROLOGICAL: No headaches,  loss of strength.  MISCELLANEOUS: No joint swelling or pain        Vital Signs Last 24 Hrs  T(C): 36.5 (26 Dec 2017 07:37), Max: 37 (25 Dec 2017 15:25)  T(F): 97.7 (26 Dec 2017 07:37), Max: 98.6 (25 Dec 2017 15:25)  HR: 72 (26 Dec 2017 07:37) (72 - 86)  BP: 132/79 (26 Dec 2017 07:37) (109/67 - 140/88)  RR: 18 (26 Dec 2017 07:37) (18 - 18)  SpO2: 98% (26 Dec 2017 07:37) (93% - 98%)    PHYSICAL EXAM:    GENERAL: Elderly female looking comfortable  HEENT: PERRL, +EOMI  NECK: soft, Supple, No JVD,   CHEST/LUNG: Decrease air entry bilaterally; No wheezing  HEART: S1S2+, Regular rate and rhythm; systolic murmurs loud at aortic area.   ABDOMEN: Soft, Nontender, Nondistended; Bowel sounds present  EXTREMITIES:  1+ Peripheral Pulses, No edema  SKIN: No rashes or lesions  NEURO: AAOX3, no focal deficits, no motor r sensory loss  PSYCH: normal mood    LABS:                        12.2   7.7   )-----------( 329      ( 26 Dec 2017 06:01 )             37.7     12-26    144  |  105  |  20.0  ----------------------------<  93  4.1   |  27.0  |  0.71    Ca    9.6      26 Dec 2017 06:01      PT/INR - ( 26 Dec 2017 06:01 )   PT: 13.1 sec;   INR: 1.19 ratio                 I&O's Summary    25 Dec 2017 07:01  -  26 Dec 2017 07:00  --------------------------------------------------------  IN: 480 mL / OUT: 1 mL / NET: 479 mL        MEDICATIONS  (STANDING):  apixaban 5 milliGRAM(s) Oral every 12 hours  atorvastatin 20 milliGRAM(s) Oral at bedtime  diltiazem    milliGRAM(s) Oral daily  docusate sodium 100 milliGRAM(s) Oral two times a day  gabapentin 100 milliGRAM(s) Oral three times a day  influenza   Vaccine 0.5 milliLiter(s) IntraMuscular once  pantoprazole    Tablet 40 milliGRAM(s) Oral before breakfast  senna 2 Tablet(s) Oral at bedtime  valsartan 160 milliGRAM(s) Oral daily    MEDICATIONS  (PRN):  guaiFENesin    Syrup 200 milliGRAM(s) Oral every 6 hours PRN Cough  oxyCODONE    5 mG/acetaminophen 325 mG 1 Tablet(s) Oral every 6 hours PRN Severe Pain (7 - 10)  polyethylene glycol 3350 17 Gram(s) Oral daily PRN Constipation  traMADol 25 milliGRAM(s) Oral every 6 hours PRN Moderate Pain (4 - 6)

## 2017-12-26 NOTE — CHART NOTE - NSCHARTNOTEFT_GEN_A_CORE
86 yo female with a pmh/o HTN, HLD, pneumonia, thyroid and lung nodule, b/l acquired deafness, and chronic back pain s/p surgical intervention x 2 who is currently being followed by pain management who recently   was discharged and sent to [facility] s/p pelvic fracture and b/l LE DVT who presents to ED  complaining of sob worsening leg edema> dietary non compliant ^^ fast food intake  Consulted for traetment AS> TAVR eval for Dr Aragon  Discussed w/ Dr Soto this am will have cath today, plan for CTA 12/27 and subsequent discharge [facility] with O/P TAVR in near future > will discuss timing Dr Aragon

## 2017-12-26 NOTE — PHYSICAL THERAPY INITIAL EVALUATION ADULT - ADDITIONAL COMMENTS
Pt. states she lives alone in a single story home.  Recent discharge from [facility] and is modified independent with rolling walker and has walk in shower with shower chair.  Son lives next door to assist as needed.

## 2017-12-26 NOTE — PROGRESS NOTE ADULT - ASSESSMENT
84 yo female with a pmh/o HTN, HLD, thyroid and lung nodule and chronic back pain due to fractures, recently was discharged and sent to [facility] s/p pelvic fracture and b/l LE DVT presents with Shortness of breath, found to have Diastolic HF, URI and Pafib.  Questionable severe AS with repeat echo pending per cardiology request.

## 2017-12-26 NOTE — PROGRESS NOTE ADULT - SUBJECTIVE AND OBJECTIVE BOX
Patient with severe aortic stenosis presenting with symptoms of shortness of breath.   Will plan for BIRGIT/Cath tomorrow.   CTA needed for TAVR workup.   Will follow.

## 2017-12-26 NOTE — PROGRESS NOTE ADULT - SUBJECTIVE AND OBJECTIVE BOX
Patient seen today in bed. Complaint of productive cough in the AM. Otherwise no acute complaints.     TELE: Coarse atrial fibrillation with rates consistently below 100bpm.     MEDICATIONS  (STANDING):  atorvastatin 20 milliGRAM(s) Oral at bedtime  diltiazem    milliGRAM(s) Oral daily  docusate sodium 100 milliGRAM(s) Oral two times a day  gabapentin 100 milliGRAM(s) Oral three times a day  influenza   Vaccine 0.5 milliLiter(s) IntraMuscular once  pantoprazole    Tablet 40 milliGRAM(s) Oral before breakfast  senna 2 Tablet(s) Oral at bedtime  valsartan 160 milliGRAM(s) Oral daily    MEDICATIONS  (PRN):  guaiFENesin    Syrup 200 milliGRAM(s) Oral every 6 hours PRN Cough  oxyCODONE    5 mG/acetaminophen 325 mG 1 Tablet(s) Oral every 6 hours PRN Severe Pain (7 - 10)  polyethylene glycol 3350 17 Gram(s) Oral daily PRN Constipation  traMADol 25 milliGRAM(s) Oral every 6 hours PRN Moderate Pain (4 - 6)    Allergies    No Known Allergies    PAST MEDICAL & SURGICAL HISTORY:  Closed fracture of multiple ribs of right side, initial encounter  Pelvic fracture  Acute deep vein thrombosis (DVT) of popliteal vein of both lower extremities  Thyroid nodule  Lung nodule  PNA (pneumonia): november 2017  Hyperlipidemia, unspecified hyperlipidemia type  Chronic back pain  Essential hypertension  History of pelvic fracture  S/P spinal fusion: L spines    Vital Signs Last 24 Hrs  T(C): 36.5 (26 Dec 2017 07:37), Max: 37 (25 Dec 2017 15:25)  T(F): 97.7 (26 Dec 2017 07:37), Max: 98.6 (25 Dec 2017 15:25)  HR: 72 (26 Dec 2017 07:37) (72 - 86)  BP: 132/79 (26 Dec 2017 07:37) (109/67 - 140/88)  RR: 18 (26 Dec 2017 07:37) (18 - 18)  SpO2: 98% (26 Dec 2017 07:37) (93% - 98%)    Physical Exam:  Constitutional: NAD, AAOx3  Cardiovascular: +S1S2 IRIR. 3/6 holosystolic LUCIO at RUSB  Pulmonary: Coarse breath sounds b/l, unlabored  GI: soft NTND +BS  Neuro: non focal, MANDEL x4    LABS:                        12.2   7.7   )-----------( 329      ( 26 Dec 2017 06:01 )             37.7     12-26    144  |  105  |  20.0  ----------------------------<  93  4.1   |  27.0  |  0.71    Ca    9.6      26 Dec 2017 06:01  PT/INR - ( 26 Dec 2017 06:01 )   PT: 13.1 sec;   INR: 1.19 ratio      A/P  84 yo female with history of aortic stenosis with preserved LV function, HTN, HLD, hearing disorder, recent pelvic fracture and bilateral DVT on Eliquis, now with SOB in setting of respiratory viral illness (RPV +) and evidence of CHF. Also atrial fibrillation with is sufficiently rate controlled, likely chronic given severe LA enlargement. CHADSVASC=5. Moderate to severe AS, likely responsible for recurrent CHF exacerbation and chronic CHF symptoms.     - Patient very well rate controlled at this point. Would continue Cardizem for now.   - No atrial flutter on monitor. Coarse AFib likely masquerading/misinterpreted as atrial flutter.   - No acute EP intervention at this time.  - Aortic Stenosis: Continue workup for TAVR in near future.

## 2017-12-27 ENCOUNTER — TRANSCRIPTION ENCOUNTER (OUTPATIENT)
Age: 82
End: 2017-12-27

## 2017-12-27 PROCEDURE — 93320 DOPPLER ECHO COMPLETE: CPT | Mod: 26

## 2017-12-27 PROCEDURE — 99152 MOD SED SAME PHYS/QHP 5/>YRS: CPT

## 2017-12-27 PROCEDURE — 76376 3D RENDER W/INTRP POSTPROCES: CPT | Mod: 26

## 2017-12-27 PROCEDURE — 93460 R&L HRT ART/VENTRICLE ANGIO: CPT | Mod: 26

## 2017-12-27 PROCEDURE — 99233 SBSQ HOSP IP/OBS HIGH 50: CPT

## 2017-12-27 PROCEDURE — 93312 ECHO TRANSESOPHAGEAL: CPT | Mod: 26

## 2017-12-27 PROCEDURE — 93567 NJX CAR CTH SPRVLV AORTGRPHY: CPT

## 2017-12-27 PROCEDURE — 93325 DOPPLER ECHO COLOR FLOW MAPG: CPT | Mod: 26

## 2017-12-27 PROCEDURE — 99232 SBSQ HOSP IP/OBS MODERATE 35: CPT

## 2017-12-27 RX ORDER — SODIUM CHLORIDE 9 MG/ML
1000 INJECTION INTRAMUSCULAR; INTRAVENOUS; SUBCUTANEOUS
Qty: 0 | Refills: 0 | Status: DISCONTINUED | OUTPATIENT
Start: 2017-12-27 | End: 2017-12-27

## 2017-12-27 RX ORDER — SODIUM CHLORIDE 9 MG/ML
300 INJECTION INTRAMUSCULAR; INTRAVENOUS; SUBCUTANEOUS
Qty: 0 | Refills: 0 | Status: DISCONTINUED | OUTPATIENT
Start: 2017-12-27 | End: 2017-12-28

## 2017-12-27 RX ORDER — FENTANYL CITRATE 50 UG/ML
25 INJECTION INTRAVENOUS ONCE
Qty: 0 | Refills: 0 | Status: DISCONTINUED | OUTPATIENT
Start: 2017-12-27 | End: 2017-12-27

## 2017-12-27 RX ORDER — ASPIRIN/CALCIUM CARB/MAGNESIUM 324 MG
81 TABLET ORAL ONCE
Qty: 0 | Refills: 0 | Status: COMPLETED | OUTPATIENT
Start: 2017-12-27 | End: 2017-12-27

## 2017-12-27 RX ORDER — APIXABAN 2.5 MG/1
5 TABLET, FILM COATED ORAL EVERY 12 HOURS
Qty: 0 | Refills: 0 | Status: DISCONTINUED | OUTPATIENT
Start: 2017-12-27 | End: 2017-12-28

## 2017-12-27 RX ADMIN — ATORVASTATIN CALCIUM 20 MILLIGRAM(S): 80 TABLET, FILM COATED ORAL at 21:34

## 2017-12-27 RX ADMIN — Medication 240 MILLIGRAM(S): at 06:11

## 2017-12-27 RX ADMIN — GABAPENTIN 100 MILLIGRAM(S): 400 CAPSULE ORAL at 21:34

## 2017-12-27 RX ADMIN — SENNA PLUS 2 TABLET(S): 8.6 TABLET ORAL at 21:34

## 2017-12-27 RX ADMIN — OXYCODONE AND ACETAMINOPHEN 1 TABLET(S): 5; 325 TABLET ORAL at 09:43

## 2017-12-27 RX ADMIN — PANTOPRAZOLE SODIUM 40 MILLIGRAM(S): 20 TABLET, DELAYED RELEASE ORAL at 06:12

## 2017-12-27 RX ADMIN — GABAPENTIN 100 MILLIGRAM(S): 400 CAPSULE ORAL at 06:11

## 2017-12-27 RX ADMIN — FENTANYL CITRATE 25 MICROGRAM(S): 50 INJECTION INTRAVENOUS at 17:35

## 2017-12-27 RX ADMIN — VALSARTAN 160 MILLIGRAM(S): 80 TABLET ORAL at 06:12

## 2017-12-27 RX ADMIN — APIXABAN 5 MILLIGRAM(S): 2.5 TABLET, FILM COATED ORAL at 21:35

## 2017-12-27 RX ADMIN — Medication 100 MILLIGRAM(S): at 18:29

## 2017-12-27 RX ADMIN — Medication 81 MILLIGRAM(S): at 16:46

## 2017-12-27 RX ADMIN — OXYCODONE AND ACETAMINOPHEN 1 TABLET(S): 5; 325 TABLET ORAL at 17:30

## 2017-12-27 RX ADMIN — OXYCODONE AND ACETAMINOPHEN 1 TABLET(S): 5; 325 TABLET ORAL at 18:15

## 2017-12-27 RX ADMIN — GABAPENTIN 100 MILLIGRAM(S): 400 CAPSULE ORAL at 18:30

## 2017-12-27 RX ADMIN — FENTANYL CITRATE 25 MICROGRAM(S): 50 INJECTION INTRAVENOUS at 17:20

## 2017-12-27 RX ADMIN — OXYCODONE AND ACETAMINOPHEN 1 TABLET(S): 5; 325 TABLET ORAL at 08:24

## 2017-12-27 RX ADMIN — Medication 100 MILLIGRAM(S): at 06:12

## 2017-12-27 NOTE — PROGRESS NOTE ADULT - ASSESSMENT
86 yo female with history of aortic stenosis with preserved LV function, HTN, HLD, hearing disorder, recent pelvic fracture and bilateral DVT on Eliquis, now with SOB in setting of respiratory viral illness (RPV +) and evidence of CHF with exertional dyspnea, elevated BNP and pulmonary congestion complicated by atrial fibrillation currently rate cntroled     1. acute CHF exacerbation in setting of chronic diastolic CHF (preserved LV function)  -agree with lasix IV for now. monitor I/Os, daily weights  -rate control of atrial fibrillation. Can continue Diltiazem  -TTE. Will need to reassess severity of AS. Suspect will need evaluation for TAVR. May need cath in this setting as well.   -telemetry     2. Chest pain- atypical. Suspect not coronary due to normal stress in last few years, atypical nature, recent rib fractures, and nml troponin/absence of ischemic changes on ECG.   -serial troponins and ECG  -possible further ischemic evaluation pending workup for AS/TAVR as above    3. Atrial fibrillation  -continue rate control with Dilt for now. Goal rates <110s bpm.   -CHADSVASC=5. continue anticoagulation. Eliquis ok for now (treating AF and recent DVT).   -may need to transition to lovenox pending AS workup    4. Respiratory infection, viral  -supportive care, respiratory isolation, treatment per medical team  -no infiltrate on CT.

## 2017-12-27 NOTE — PROGRESS NOTE ADULT - SUBJECTIVE AND OBJECTIVE BOX
s/p right and left heart catheterization via #[id number]/#[id number] after failed right brachial attempt  Tolerated procedure well w/o complications  Seen post procedure by Dr. Marie w/son present  Await centricity report          REVIEW OF SYSTEMS:  Denies SOB, CP, NV, HA, dizziness, palpitations, site pain    PHYSICAL EXAM: A&Ox3 NAD Skin warm and dry  NEURO: Speech intact +gag +swallow Tongue midline MANDEL  NECK: No JVD, trachea midline. Eupneic  HEART: gr3/6sm Tele AF VR95 bpm rare PVC  PULMONARY:  CTA david  ABDOMEN: Soft nontender X4 +BS Vdg  EXTREMITIES:  RFV/RFA site: No bleed, hematoma, pain, ecchymosis or swelling Rt DP/PT+  Rt brachial site soft, flat and ecchymotic. DSD applied  RT radial/brachial pulse +

## 2017-12-27 NOTE — PROGRESS NOTE ADULT - SUBJECTIVE AND OBJECTIVE BOX
Subjective: PT NAD s/p CATH today     VITAL SIGNS  T(C): 36.5 (17 @ 11:00), Max: 36.9 (17 @ 06:10)  HR: 92 (17 @ 17:15) (78 - 92)  BP: 129/61 (17 @ 17:15) (1/- - 149/72)  RR: 18 (17 @ 17:15) (17 - 18)  SpO2: 100% (17 @ 17:15) (96% - 100%) RA        Daily     Daily Weight in k.4 (27 Dec 2017 15:13)  Admit Wt: Drug Dosing Weight  Height (cm): 149.86 (22 Dec 2017 19:51)  Weight (kg): 59 (22 Dec 2017 19:51)  BMI (kg/m2): 26.3 (22 Dec 2017 19:51)  BSA (m2): 1.54 (22 Dec 2017 19:51)    LVEF:  60-65%    MEDICATIONS  apixaban 5 milliGRAM(s) Oral every 12 hours  atorvastatin 20 milliGRAM(s) Oral at bedtime  diltiazem    milliGRAM(s) Oral daily  docusate sodium 100 milliGRAM(s) Oral two times a day  gabapentin 100 milliGRAM(s) Oral three times a day  guaiFENesin    Syrup 200 milliGRAM(s) Oral every 6 hours PRN  influenza   Vaccine 0.5 milliLiter(s) IntraMuscular once  oxyCODONE    5 mG/acetaminophen 325 mG 1 Tablet(s) Oral every 6 hours PRN  pantoprazole    Tablet 40 milliGRAM(s) Oral before breakfast  polyethylene glycol 3350 17 Gram(s) Oral daily PRN  senna 2 Tablet(s) Oral at bedtime  sodium chloride 0.9%. 300 milliLiter(s) IV Continuous <Continuous>  traMADol 25 milliGRAM(s) Oral every 6 hours PRN  valsartan 160 milliGRAM(s) Oral daily    MEDICATIONS  (PRN):  guaiFENesin    Syrup 200 milliGRAM(s) Oral every 6 hours PRN Cough  oxyCODONE    5 mG/acetaminophen 325 mG 1 Tablet(s) Oral every 6 hours PRN Severe Pain (7 - 10)  polyethylene glycol 3350 17 Gram(s) Oral daily PRN Constipation  traMADol 25 milliGRAM(s) Oral every 6 hours PRN Moderate Pain (4 - 6)        PHYSICAL EXAM  General: A&O  resp:  Decreased at bases  cardiac: S1 S2  + LUCIO   Abd:  soft NT ND + BS        I&O's Detail      LABS      144  |  105  |  20.0  ----------------------------<  93  4.1   |  27.0  |  0.71    Ca    9.6      26 Dec 2017 06:01                                   12.2   7.7   )-----------( 329      ( 26 Dec 2017 06:01 )             37.7          PT/INR - ( 26 Dec 2017 06:01 )   PT: 13.1 sec;   INR: 1.19 ratio                    CAPILLARY BLOOD GLUCOSE               Today's CXR:  NA    PAST MEDICAL & SURGICAL HISTORY:  Closed fracture of multiple ribs of right side, initial encounter  Pelvic fracture  Acute deep vein thrombosis (DVT) of popliteal vein of both lower extremities  Thyroid nodule  Lung nodule  PNA (pneumonia): 2017  Hyperlipidemia, unspecified hyperlipidemia type  Chronic back pain  Essential hypertension  History of pelvic fracture  S/P spinal fusion: L spines

## 2017-12-27 NOTE — DISCHARGE NOTE ADULT - MEDICATION SUMMARY - MEDICATIONS TO STOP TAKING
I will STOP taking the medications listed below when I get home from the hospital:  None I will STOP taking the medications listed below when I get home from the hospital:    ibuprofen 600 mg oral tablet  -- 1 tab(s) by mouth every 6 hours

## 2017-12-27 NOTE — DISCHARGE NOTE ADULT - MEDICATION SUMMARY - MEDICATIONS TO CHANGE
I will SWITCH the dose or number of times a day I take the medications listed below when I get home from the hospital:    apixaban 5 mg oral tablet  -- 1 tab(s) by mouth 2 times a day MDD:2  tabs  -- Check with your doctor before becoming pregnant.  It is very important that you take or use this exactly as directed.  Do not skip doses or discontinue unless directed by your doctor.  Obtain medical advice before taking any non-prescription drugs as some may affect the action of this medication. I will SWITCH the dose or number of times a day I take the medications listed below when I get home from the hospital:    valsartan 160 mg oral tablet  -- 1 tab(s) by mouth once a day    apixaban 5 mg oral tablet  -- 1 tab(s) by mouth 2 times a day MDD:2  tabs  -- Check with your doctor before becoming pregnant.  It is very important that you take or use this exactly as directed.  Do not skip doses or discontinue unless directed by your doctor.  Obtain medical advice before taking any non-prescription drugs as some may affect the action of this medication.

## 2017-12-27 NOTE — PROGRESS NOTE ADULT - SUBJECTIVE AND OBJECTIVE BOX
MAMTA TALAVERA    [id number]    85y      Female    Patient is a 85y old  Female who presents with a chief complaint of shortness of breath, leg swelling (23 Dec 2017 03:47)      INTERVAL HPI/OVERNIGHT EVENTS:    Patient is feeling better, denies fever, chills, chest pain, nausea, vomiting, dizziness, she is going to have cardiac cath.     REVIEW OF SYSTEMS:    CONSTITUTIONAL: No fever, no fatigue  RESPIRATORY: No cough, has exertional shortness of breath  CARDIOVASCULAR: No chest pain, palpitations  GASTROINTESTINAL: No abdominal, No nausea, vomiting  NEUROLOGICAL: No headaches,  loss of strength.  MISCELLANEOUS: No joint swelling or pain        Vital Signs Last 24 Hrs  T(C): 36.5 (27 Dec 2017 08:18), Max: 36.9 (27 Dec 2017 06:10)  T(F): 97.7 (27 Dec 2017 08:18), Max: 98.5 (27 Dec 2017 06:10)  HR: 79 (27 Dec 2017 08:18) (79 - 89)  BP: 142/82 (27 Dec 2017 08:18) (1/- - 153/89)  RR: 18 (27 Dec 2017 08:18) (18 - 18)  SpO2: 99% (27 Dec 2017 08:18) (94% - 99%)    PHYSICAL EXAM:    GENERAL: Elderly female looking comfortable  HEENT: PERRL, +EOMI  NECK: soft, Supple, No JVD,   CHEST/LUNG: Decrease air entry bilaterally; No wheezing  HEART: S1S2+, Regular rate and rhythm; systolic murmurs loud at aortic area.   ABDOMEN: Soft, Nontender, Nondistended; Bowel sounds present  EXTREMITIES:  1+ Peripheral Pulses, No edema  SKIN: No rashes or lesions  NEURO: AAOX3, no focal deficits, no motor r sensory loss  PSYCH: normal mood      LABS:                        12.2   7.7   )-----------( 329      ( 26 Dec 2017 06:01 )             37.7     12-26    144  |  105  |  20.0  ----------------------------<  93  4.1   |  27.0  |  0.71    Ca    9.6      26 Dec 2017 06:01      PT/INR - ( 26 Dec 2017 06:01 )   PT: 13.1 sec;   INR: 1.19 ratio                 I&O's Summary      MEDICATIONS  (STANDING):  atorvastatin 20 milliGRAM(s) Oral at bedtime  diltiazem    milliGRAM(s) Oral daily  docusate sodium 100 milliGRAM(s) Oral two times a day  gabapentin 100 milliGRAM(s) Oral three times a day  influenza   Vaccine 0.5 milliLiter(s) IntraMuscular once  pantoprazole    Tablet 40 milliGRAM(s) Oral before breakfast  senna 2 Tablet(s) Oral at bedtime  valsartan 160 milliGRAM(s) Oral daily    MEDICATIONS  (PRN):  guaiFENesin    Syrup 200 milliGRAM(s) Oral every 6 hours PRN Cough  oxyCODONE    5 mG/acetaminophen 325 mG 1 Tablet(s) Oral every 6 hours PRN Severe Pain (7 - 10)  polyethylene glycol 3350 17 Gram(s) Oral daily PRN Constipation  traMADol 25 milliGRAM(s) Oral every 6 hours PRN Moderate Pain (4 - 6)

## 2017-12-27 NOTE — DISCHARGE NOTE ADULT - PATIENT PORTAL LINK FT
“You can access the [facility] Patient Portal, offered by [facility], by registering with the following website: [email or web address]”

## 2017-12-27 NOTE — PROGRESS NOTE ADULT - SUBJECTIVE AND OBJECTIVE BOX
NPO>8 hours  ASA 2 Mallampati 2  [place] has hearing aids and communicates effectively  ASA 81/IVF ordered for [facility]  BIRGIT prior to [facility]  TAVR evaluation w/u in progress  Kendal Carrillo and Brittany present for consents NPO>8 hours  ASA 2 Mallampati 2  [place] has hearing aids and communicates effectively  ASA 81/IVF ordered for [facility]  BIRGIT prior to [facility]  TAVR evaluation w/u in progress  Kendal Carrillo and Brittany present for consents    1300 s/p BIRGIT  Tolerated procedure well w/o complications  Seen post procedure by Dr. Rubio  +gag +swallow Patent airway  NPO for [facility]

## 2017-12-27 NOTE — DISCHARGE NOTE ADULT - ADDITIONAL INSTRUCTIONS
Follow up with your cardiologist in 2 week, PMD in 1 week and CT surgery team will contact you for further appointment.

## 2017-12-27 NOTE — DISCHARGE NOTE ADULT - SECONDARY DIAGNOSIS.
Essential hypertension Acute deep vein thrombosis (DVT) of popliteal vein of both lower extremities CAD (coronary artery disease) Severe aortic stenosis

## 2017-12-27 NOTE — DISCHARGE NOTE ADULT - CARE PLAN
Principal Discharge DX:	Acute on chronic diastolic heart failure due to valvular disease  Goal:	Continue with current meds, take low salt diet.  Instructions for follow-up, activity and diet:	Follow up with cardiologist  Secondary Diagnosis:	Essential hypertension  Goal:	HTN  Secondary Diagnosis:	Acute deep vein thrombosis (DVT) of popliteal vein of both lower extremities  Goal:	your dose of Eliquis has been changed to 2.5mg BID  Secondary Diagnosis:	CAD (coronary artery disease)  Goal:	Continue with current meds  Instructions for follow-up, activity and diet:	Follow up with cardiology  Secondary Diagnosis:	Severe aortic stenosis  Goal:	Follow up with your CT surgeon

## 2017-12-27 NOTE — PROGRESS NOTE ADULT - ASSESSMENT
A- s/p right and left heart catheterization via #6RFv/#5RFA after failed right brachial attempt  Await centricty  Possible CTS for AVR/TAVR A- s/p right and left heart catheterization via #6RFv/#5RFA after failed right brachial attempt  Await centricty-Severe AS, RCA  and disease OM1, mild pulmonary HTN, calcified aortic root  Possible CTS for AVR/TAVR

## 2017-12-27 NOTE — DISCHARGE NOTE ADULT - PLAN OF CARE
Continue with current meds, take low salt diet. Follow up with cardiologist HTN your dose of Eliquis has been changed to 2.5mg BID Continue with current meds Follow up with cardiology Follow up with your CT surgeon

## 2017-12-27 NOTE — DISCHARGE NOTE ADULT - HOSPITAL COURSE
S/P BIRGIT  S/P R&L cardiac catheterization via #5 RFA and #6 RFV after failed right brachial approach 84 yo female with a pmh/o HTN, HLD, thyroid and lung nodule and chronic back pain due to fractures, recently was discharged and sent to [facility] s/p pelvic fracture and b/l LE DVT presents with Shortness of breath, found to have Diastolic HF, URI and Pafib.  Questionable severe AS with repeat echo pending per cardiology request.  Patient was admitted under medicine, for her acute on chronic diastolic heart failure due to valvular disease, was started on IV lasix with close monitoring of I/Os, she was given supplemental oxygen as well, cardiology was consulted, she became euvolemic, Lasix was held, she is being give 20mg of lasix daily, echo repeat done showed Moderate to severe aortic valve stenosis, Re-evaluation of doppler study confirms at least moderate to severe aortic stenosis, Peak transaortic gradient equals 65.3 mmHg, mean transaortic gradient equals 33.8 mmHg, the calculated aortic valve area equals 0.92 cm² by the continuity equation consistent with severe aortic stenosis, patient has been continued with Diovan.   For her Aortic valve stenosis she is being seen by CT surgery team for TAVR, TTE repeated showed Moderate to severe aortic valve stenosis, Re-evaluation of doppler study confirms at least moderate to severe aortic stenosis, Peak transaortic gradient equals 65.3 mmHg, mean transaortic gradient equals 33.8 mmHg, the calculated aortic valve area equals 0.92 cm² by the continuity equation consistent with severe aortic stenosis, they did workup for the TAVR, her coppler of carotid done showed Right carotid system:  No hemodynamically significant stenosis in the common carotid or internal carotid artery.   Moderate stenosis in the external carotid artery,  Left carotid system:  No hemodynamically significant stenosis is found, patient had cardiac cath showed sever aortic stenosis, at mid circumflex there was a discrete 60 % stenosis in the middle third of the vessel segment, just before OM1, RCA: The vessel was small size, Proximal RCA: There was a diffuse 100 % stenosis in the proximal third of the vessel segment, just before RV marginal 1, patient was continued with aspirin along with statins, cardiology recommanded continue with same medical management and follow up in the clinic, CT surgery recommended follow up as out patient for further evaluation of   TAVR,  She has hx of Paroxysmal atrial fibrillation, she was continued with eliquis and dose adjusted and diltiazem, rate has been controlled, her CHADS VASC is 5, she was diagnosed with acute deep vein thrombosis (DVT) of popliteal vein of both lower extremities, 2 months ago, she has been continued with Eliquis, has no more leg swelling, most likely it was provoked in setting of pelvic fracture, she need out patient workup for hypercoagulable state.    She was continued with pain mes for her recent closed fracture of multiple ribs of right side, her pain remained well controlled.   She was evaluated by PT and she did well, her symptoms has better, she is being discharged home in a stable condition.     Vital Signs Last 24 Hrs  T(C): 37.1 (29 Dec 2017 10:08), Max: 37.1 (29 Dec 2017 10:08)  T(F): 98.8 (29 Dec 2017 10:08), Max: 98.8 (29 Dec 2017 10:08)  HR: 87 (29 Dec 2017 10:08) (66 - 87)  BP: 101/51 (29 Dec 2017 10:08) (101/51 - 127/59)  RR: 18 (29 Dec 2017 10:08) (18 - 18)  SpO2: 95% (29 Dec 2017 05:26) (95% - 96%)     PHYSICAL EXAM:    GENERAL: Elderly female looking comfortable  HEENT: PERRL, +EOMI  NECK: soft, Supple, No JVD,   CHEST/LUNG: Decrease air entry bilaterally; No wheezing  HEART: S1S2+, Regular rate and rhythm; systolic murmurs loud at aortic area.   ABDOMEN: Soft, Nontender, Nondistended; Bowel sounds present  EXTREMITIES:  1+ Peripheral Pulses, No edema  SKIN: No rashes or lesions  NEURO: AAOX3, no focal deficits, no motor r sensory loss  PSYCH: normal mood  Right femoral area with some ecchymosis, no swelling or palpable mass.     Total time spent 50minutes.

## 2017-12-27 NOTE — DISCHARGE NOTE ADULT - MEDICATION SUMMARY - MEDICATIONS TO TAKE
I will START or STAY ON the medications listed below when I get home from the hospital:    oxyCODONE 10 mg oral tablet, extended release  -- 1 tab(s) by mouth every 12 hours MDD:2  -- Indication: For Pain     traMADol 50 mg oral tablet  -- 1 tab(s) by mouth every 6 hours, As needed, Moderate Pain (4 - 6)  -- Indication: For Pain     aspirin 81 mg oral delayed release tablet  -- 1 tab(s) by mouth once a day  -- Indication: For CAD (coronary artery disease)    valsartan 40 mg oral tablet  -- 1 tab(s) by mouth once a day  -- Indication: For HTN     dilTIAZem 240 mg/24 hours oral capsule, extended release  -- 1 cap(s) by mouth once a day  -- Indication: For Afib     apixaban 2.5 mg oral tablet  -- 1 tab(s) by mouth every 12 hours  -- Indication: For Afib     gabapentin 100 mg oral capsule  -- 1 cap(s) by mouth 3 times a day  -- Indication: For Pain    atorvastatin 20 mg oral tablet  -- 1 tab(s) by mouth once a day (at bedtime)  -- Indication: For HLD    lidocaine 5% topical film  -- Apply on skin to affected area once a day   -- Indication: For Pain     Lasix 20 mg oral tablet  -- 1 tab(s) by mouth once a day   -- Avoid prolonged or excessive exposure to direct and/or artificial sunlight while taking this medication.  It is very important that you take or use this exactly as directed.  Do not skip doses or discontinue unless directed by your doctor.  It may be advisable to drink a full glass orange juice or eat a banana daily while taking this medication.    -- Indication: For CHF    docusate sodium 100 mg oral capsule  -- 1 cap(s) by mouth 2 times a day  -- Indication: For Constipation     polyethylene glycol 3350 oral powder for reconstitution  -- 17 gram(s) by mouth once a day, As needed, Constipation  -- Indication: For Constipation     senna oral tablet  -- 2 tab(s) by mouth once a day (at bedtime)  -- Indication: For Constipation     cyclobenzaprine 5 mg oral tablet  -- 1 tab(s) by mouth 3 times a day, As needed, Muscle Spasm  -- Indication: For Pain     Protonix 40 mg oral delayed release tablet  -- 1 tab(s) by mouth once a day  -- Indication: For GERD I will START or STAY ON the medications listed below when I get home from the hospital:    oxyCODONE 10 mg oral tablet, extended release  -- 1 tab(s) by mouth every 12 hours MDD:2  -- Indication: For Pain     traMADol 50 mg oral tablet  -- 1 tab(s) by mouth every 6 hours, As needed, Moderate Pain (4 - 6)  -- Indication: For Pain     aspirin 81 mg oral delayed release tablet  -- 1 tab(s) by mouth once a day  -- Indication: For CAD (coronary artery disease)    valsartan 40 mg oral tablet  -- 1 tab(s) by mouth once a day  -- Indication: For HTN    dilTIAZem 240 mg/24 hours oral capsule, extended release  -- 1 cap(s) by mouth once a day  -- Indication: For AFIB    apixaban 2.5 mg oral tablet  -- 1 tab(s) by mouth every 12 hours  -- Indication: For Afib    gabapentin 100 mg oral capsule  -- 1 cap(s) by mouth 3 times a day  -- Indication: For Pain    atorvastatin 20 mg oral tablet  -- 1 tab(s) by mouth once a day (at bedtime)  -- Indication: For HLD    lidocaine 5% topical film  -- Apply on skin to affected area once a day   -- Indication: For Pain    docusate sodium 100 mg oral capsule  -- 1 cap(s) by mouth 2 times a day  -- Indication: For Constipation    polyethylene glycol 3350 oral powder for reconstitution  -- 17 gram(s) by mouth once a day, As needed, Constipation  -- Indication: For Constipation    senna oral tablet  -- 2 tab(s) by mouth once a day (at bedtime)  -- Indication: For Constipation    cyclobenzaprine 5 mg oral tablet  -- 1 tab(s) by mouth 3 times a day, As needed, Muscle Spasm  -- Indication: For Pain     Protonix 40 mg oral delayed release tablet  -- 1 tab(s) by mouth once a day  -- Indication: For GERD

## 2017-12-27 NOTE — DISCHARGE NOTE ADULT - CARE PROVIDER_API CALL
Jer Marie), Cardiovascular Disease; Internal Medicine; Interventional Cardiology  Phone: [phone]  Fax: [phone]    Yesika Rubio), Cardiology; Internal Medicine  [address] [place] [id number]  Phone: [phone]  Fax: [phone]

## 2017-12-27 NOTE — PROGRESS NOTE ADULT - ASSESSMENT
86 yo female with a pmh/o HTN, HLD, thyroid and lung nodule and chronic back pain due to fractures, recently was discharged and sent to [facility] s/p pelvic fracture and b/l LE DVT presents with Shortness of breath, found to have Diastolic HF, URI and Pafib.  Questionable severe AS with repeat echo pending per cardiology request. 84 yo female with a pmh/o HTN, HLD, thyroid and lung nodule and chronic back pain due to fractures, recently was discharged and sent to [facility] s/p pelvic fracture and b/l LE DVT presents with Shortness of breath, found to have Diastolic HF, URI and Pafib.  Questionable severe AS with repeat echo pending per cardiology request.  Patient was admitted under medicine, for her acute on chronic diastolic heart failure due to valvular disease, was started on IV lasix with close monitoring of I/Os, she was given supplemental oxygen as well, cardiology was consulted, she became euvolemic, Lasix was held, echo repeat done showed Moderate to severe aortic valve stenosis, Re-evaluation of doppler study confirms at least moderate to severe aortic stenosis, Peak transaortic gradient equals 65.3 mmHg, mean transaortic gradient equals 33.8 mmHg, the calculated aortic valve area equals 0.92 cm² by the continuity equation consistent with severe aortic stenosis, patient has been continued with Diovan.   For her Aortic valve stenosis she is being seen by CT surgery team for TAVR, TTE repeated showed Moderate to severe aortic valve stenosis, Re-evaluation of doppler study confirms at least moderate to severe aortic stenosis, Peak transaortic gradient equals 65.3 mmHg, mean transaortic gradient equals 33.8 mmHg, the calculated aortic valve area equals 0.92 cm² by the continuity equation consistent with severe aortic stenosis.   Doppler of carotid done showed Right carotid system:  No hemodynamically significant stenosis in the common carotid or internal carotid artery.   Moderate stenosis in the external carotid artery,  Left carotid system:  No hemodynamically significant stenosis is found, patient going to have cardiac cath, She has hx of Paroxysmal atrial fibrillation, continued on eliquis and dilt, rate has been controlled, her CHADS VASC is 5.   She also has acute deep vein thrombosis (DVT) of popliteal vein of both lower extremities, she has been continued with Eliquis, leg swelling is better, she need out patient workup for hypercoagulable state.

## 2017-12-28 DIAGNOSIS — I25.10 ATHEROSCLEROTIC HEART DISEASE OF NATIVE CORONARY ARTERY WITHOUT ANGINA PECTORIS: ICD-10-CM

## 2017-12-28 DIAGNOSIS — I10 ESSENTIAL (PRIMARY) HYPERTENSION: ICD-10-CM

## 2017-12-28 DIAGNOSIS — I25.118 ATHEROSCLEROTIC HEART DISEASE OF NATIVE CORONARY ARTERY WITH OTHER FORMS OF ANGINA PECTORIS: ICD-10-CM

## 2017-12-28 DIAGNOSIS — I35.0 NONRHEUMATIC AORTIC (VALVE) STENOSIS: ICD-10-CM

## 2017-12-28 DIAGNOSIS — Z29.9 ENCOUNTER FOR PROPHYLACTIC MEASURES, UNSPECIFIED: ICD-10-CM

## 2017-12-28 PROCEDURE — 99232 SBSQ HOSP IP/OBS MODERATE 35: CPT

## 2017-12-28 PROCEDURE — 99233 SBSQ HOSP IP/OBS HIGH 50: CPT

## 2017-12-28 RX ORDER — ASPIRIN/CALCIUM CARB/MAGNESIUM 324 MG
81 TABLET ORAL DAILY
Qty: 0 | Refills: 0 | Status: DISCONTINUED | OUTPATIENT
Start: 2017-12-28 | End: 2017-12-29

## 2017-12-28 RX ORDER — VALSARTAN 80 MG/1
40 TABLET ORAL DAILY
Qty: 0 | Refills: 0 | Status: DISCONTINUED | OUTPATIENT
Start: 2017-12-29 | End: 2017-12-29

## 2017-12-28 RX ORDER — SODIUM CHLORIDE 9 MG/ML
250 INJECTION INTRAMUSCULAR; INTRAVENOUS; SUBCUTANEOUS
Qty: 0 | Refills: 0 | Status: DISCONTINUED | OUTPATIENT
Start: 2017-12-28 | End: 2017-12-29

## 2017-12-28 RX ORDER — TRAMADOL HYDROCHLORIDE 50 MG/1
50 TABLET ORAL EVERY 6 HOURS
Qty: 0 | Refills: 0 | Status: DISCONTINUED | OUTPATIENT
Start: 2017-12-28 | End: 2017-12-29

## 2017-12-28 RX ORDER — APIXABAN 2.5 MG/1
2.5 TABLET, FILM COATED ORAL EVERY 12 HOURS
Qty: 0 | Refills: 0 | Status: DISCONTINUED | OUTPATIENT
Start: 2017-12-28 | End: 2017-12-29

## 2017-12-28 RX ADMIN — OXYCODONE AND ACETAMINOPHEN 1 TABLET(S): 5; 325 TABLET ORAL at 23:00

## 2017-12-28 RX ADMIN — APIXABAN 2.5 MILLIGRAM(S): 2.5 TABLET, FILM COATED ORAL at 18:27

## 2017-12-28 RX ADMIN — TRAMADOL HYDROCHLORIDE 50 MILLIGRAM(S): 50 TABLET ORAL at 18:30

## 2017-12-28 RX ADMIN — GABAPENTIN 100 MILLIGRAM(S): 400 CAPSULE ORAL at 22:29

## 2017-12-28 RX ADMIN — ATORVASTATIN CALCIUM 20 MILLIGRAM(S): 80 TABLET, FILM COATED ORAL at 22:29

## 2017-12-28 RX ADMIN — SENNA PLUS 2 TABLET(S): 8.6 TABLET ORAL at 22:29

## 2017-12-28 RX ADMIN — GABAPENTIN 100 MILLIGRAM(S): 400 CAPSULE ORAL at 18:27

## 2017-12-28 RX ADMIN — SODIUM CHLORIDE 80 MILLILITER(S): 9 INJECTION INTRAMUSCULAR; INTRAVENOUS; SUBCUTANEOUS at 13:09

## 2017-12-28 RX ADMIN — VALSARTAN 160 MILLIGRAM(S): 80 TABLET ORAL at 06:11

## 2017-12-28 RX ADMIN — OXYCODONE AND ACETAMINOPHEN 1 TABLET(S): 5; 325 TABLET ORAL at 22:31

## 2017-12-28 RX ADMIN — TRAMADOL HYDROCHLORIDE 50 MILLIGRAM(S): 50 TABLET ORAL at 19:24

## 2017-12-28 RX ADMIN — GABAPENTIN 100 MILLIGRAM(S): 400 CAPSULE ORAL at 06:11

## 2017-12-28 RX ADMIN — Medication 100 MILLIGRAM(S): at 18:28

## 2017-12-28 RX ADMIN — TRAMADOL HYDROCHLORIDE 50 MILLIGRAM(S): 50 TABLET ORAL at 13:10

## 2017-12-28 RX ADMIN — Medication 240 MILLIGRAM(S): at 06:11

## 2017-12-28 RX ADMIN — APIXABAN 5 MILLIGRAM(S): 2.5 TABLET, FILM COATED ORAL at 10:59

## 2017-12-28 RX ADMIN — Medication 100 MILLIGRAM(S): at 06:10

## 2017-12-28 RX ADMIN — TRAMADOL HYDROCHLORIDE 50 MILLIGRAM(S): 50 TABLET ORAL at 13:09

## 2017-12-28 RX ADMIN — PANTOPRAZOLE SODIUM 40 MILLIGRAM(S): 20 TABLET, DELAYED RELEASE ORAL at 06:11

## 2017-12-28 NOTE — PROGRESS NOTE ADULT - SUBJECTIVE AND OBJECTIVE BOX
SUBJECTIVE:  84 yo female with PMH of Hypertesnion, Hyperlipidemia presented to hospital with shortness of breath/chf.  Echo  Moderate to severe aortic valve stenosis, Re-evaluation of doppler study confirms at least moderate to severe aortic stenosis, Peak transaortic gradient equals 65.3 mmHg, mean transaortic gradient equals 33.8 mmHg, the calculated aortic valve area equals 0.92 cm² by the continuity equation consistent with severe aortic stenosis,   aC  Cath c/w double vessel dx and severe as  Currently being evaluated by CV surgery for AVR vs jorge    Denies any cp sob or palpitaions  Oob in room without any dizziness    On exam          with a pmh/o HTN, HLD, thyroid and lung nodule and chronic back pain due to fractures, recently was discharged and sent to [facility] s/p pelvic fracture and b/l LE DVT presents with Shortness of breath, found to have Diastolic HF, URI and Pafib.  Questionable severe AS with repeat echo pending per cardiology request.  Patient was admitted under medicine, for her acute on chronic diastolic heart failure due to valvular disease, was started on IV lasix with close monitoring of I/Os, she was given supplemental oxygen as well, cardiology was consulted, she became euvolemic, Lasix was held, echo repeat done showed Moderate to severe aortic valve stenosis, Re-evaluation of doppler study confirms at least moderate to severe aortic stenosis, Peak transaortic gradient equals 65.3 mmHg, mean transaortic gradient equals 33.8 mmHg, the calculated aortic valve area equals 0.92 cm² by the continuity equation consistent with severe aortic stenosis, patient has been continued with Diovan.   For her Aortic valve stenosis she is being seen by CT surgery team for TAVR, TTE repeated showed Moderate to severe aortic valve stenosis, Re-evaluation of doppler study confirms at least moderate to severe aortic stenosis, Peak transaortic gradient equals 65.3 mmHg, mean transaortic gradient equals 33.8 mmHg, the calculated aortic valve area equals 0.92 cm² by the continuity equation consistent with severe aortic stenosis.   Doppler of carotid done showed Right carotid system:  No hemodynamically significant stenosis in the common carotid or internal carotid artery.   Moderate stenosis in the external carotid artery,  Left carotid system:  No hemodynamically significant stenosis is found, patient going to have cardiac cath, She has hx of Paroxysmal atrial fibrillation, continued on eliquis and dilt, rate has been controlled, her CHADS VASC is 5.   She also has acute deep vein thrombosis (DVT) of popliteal vein of both lower extremities, she has been continued with Eliquis, leg swelling is better, she need out patient workup for hypercoagulable state.             OBJECTIVE:  	  MEDICATIONS:  diltiazem    milliGRAM(s) Oral daily  valsartan 160 milliGRAM(s) Oral daily      guaiFENesin    Syrup 200 milliGRAM(s) Oral every 6 hours PRN    gabapentin 100 milliGRAM(s) Oral three times a day  oxyCODONE    5 mG/acetaminophen 325 mG 1 Tablet(s) Oral every 6 hours PRN  traMADol 50 milliGRAM(s) Oral every 6 hours PRN    docusate sodium 100 milliGRAM(s) Oral two times a day  pantoprazole    Tablet 40 milliGRAM(s) Oral before breakfast  polyethylene glycol 3350 17 Gram(s) Oral daily PRN  senna 2 Tablet(s) Oral at bedtime    atorvastatin 20 milliGRAM(s) Oral at bedtime    apixaban 5 milliGRAM(s) Oral every 12 hours  influenza   Vaccine 0.5 milliLiter(s) IntraMuscular once  sodium chloride 0.9%. 250 milliLiter(s) IV Continuous <Continuous>        PHYSICAL EXAM:    T(C): 37 (17 @ 10:24), Max: 37.2 (17 @ 06:10)  HR: 85 (17 @ 10:24) (80 - 103)  BP: 100/56 (17 @ 10:24) (100/56 - 147/68)  RR: 18 (17 @ 10:24) (17 - 18)  SpO2: 99% (17 @ 06:10) (99% - 100%)  Wt(kg): --    I&O's Summary    28 Dec 2017 07:01  -  28 Dec 2017 11:24  --------------------------------------------------------  IN: 0 mL / OUT: 300 mL / NET: -300 mL        Daily     Daily Weight in k.8 (28 Dec 2017 06:46)    Appearance: Normal	  HEENT:   Normal oral mucosa, PERRL, EOMI	  Lymphatic: No lymphadenopathy  Cardiovascular: Normal S1 S2, No JVD, No murmurs, No edema  Respiratory: Lungs clear to auscultation	  Psychiatry: A & O x 3, Mood & affect appropriate  Gastrointestinal:  Soft, Non-tender, + BS	  Skin: No rashes, No ecchymoses, No cyanosis  Neurologic: Non-focal  Extremities: Normal range of motion, No clubbing, cyanosis or edema  Vascular: Peripheral pulses palpable 2+ bilaterally    TELEMETRY: 	    ECG:  	  RADIOLOGY:   DIAGNOSTIC TESTING:  [ ] Echocardiogram:  [ ]  Catheterization:  [ ] Stress Test:    OTHER: 	    LABS:	 	    CARDIAC MARKERS:                    proBNP:   Lipid Profile:   HgA1c:   TSH:     ASSESSMENT/PLAN: SUBJECTIVE:  84 yo female with PMH of Hypertesnion, Hyperlipidemia presented to hospital with shortness of breath/chf.  Echo  Moderate to severe aortic valve stenosis, Re-evaluation of doppler study confirms at least moderate to severe aortic stenosis, Peak transaortic gradient equals 65.3 mmHg, mean transaortic gradient equals 33.8 mmHg, the calculated aortic valve area equals 0.92 cm² by the continuity equation consistent with severe aortic stenosis,   aC  Cath c/w double vessel dx and severe as  Currently being evaluated by CV surgery for AVR vs jorge    Denies any cp sob or palpitaions  Oob in room without any dizziness      OBJECTIVE:  	  MEDICATIONS:  diltiazem    milliGRAM(s) Oral daily  valsartan 160 milliGRAM(s) Oral daily  apixaban 5 milliGRAM(s) Oral every 12 hours  atorvastatin 20 milliGRAM(s) Oral at bedtime  pantoprazole    Tablet 40 milliGRAM(s) Oral before breakfast        PHYSICAL EXAM:    T(C): 37 (17 @ 10:24), Max: 37.2 (17 @ 06:10)  HR: 85 (17 @ 10:24) (80 - 103)  BP: 100/56 (17 @ 10:24) (100/56 - 147/68)  RR: 18 (17 @ 10:24) (17 - 18)  SpO2: 99% (17 @ 06:10) (99% - 100%)  Wt(kg): --    I&O's Summary    28 Dec 2017 07:01  -  28 Dec 2017 11:24  --------------------------------------------------------  IN: 0 mL / OUT: 300 mL / NET: -300 mL        Daily     Daily Weight in k.8 (28 Dec 2017 06:46)      TELEMETRY: 	  Atrial fib with controlled ellen response  No ventricular ectopy  ECG:  	  RADIOLOGY:   DIAGNOSTIC TESTING:  [ ] Echocardiogram:  [ ]  Catheterization:  [ ] Stress Test:    OTHER: 	        ASSESSMENT/PLAN: SUBJECTIVE:  86 yo female with PMH of Hypertesnion, Hyperlipidemia presented to hospital with shortness of breath/chf.  Echo  Moderate to severe aortic valve stenosis, Re-evaluation of doppler study confirms at least moderate to severe aortic stenosis, Peak transaortic gradient equals 65.3 mmHg, mean transaortic gradient equals 33.8 mmHg, the calculated aortic valve area equals 0.92 cm² by the continuity equation consistent with severe aortic stenosis, s/p Cath with double vessel disease and severe as  aC  Cath c/w double vessel dx and severe as  Currently being evaluated by CV surgery for AVR vs jorge    Denies any cp sob or palpitaions  Oob in room without any dizziness      OBJECTIVE:  	  MEDICATIONS:  diltiazem    milliGRAM(s) Oral daily  valsartan 160 milliGRAM(s) Oral daily  apixaban 5 milliGRAM(s) Oral every 12 hours  atorvastatin 20 milliGRAM(s) Oral at bedtime  pantoprazole    Tablet 40 milliGRAM(s) Oral before breakfast        PHYSICAL EXAM:    T(C): 37 (17 @ 10:24), Max: 37.2 (17 @ 06:10)  HR: 85 (17 @ 10:24) (80 - 103)  BP: 100/56 (17 @ 10:24) (100/56 - 147/68)  RR: 18 (17 @ 10:24) (17 - 18)  SpO2: 99% (17 @ 06:10) (99% - 100%)  Wt(kg): --    I&O's Summary    28 Dec 2017 07:01  -  28 Dec 2017 11:24  --------------------------------------------------------  IN: 0 mL / OUT: 300 mL / NET: -300 mL        Daily     Daily Weight in k.8 (28 Dec 2017 06:46)    Objective  Sitting in chair nad  neck supple  Lungs clear  Heart s1&s2 irregular with 3/6 jane  Abd soft non tender  Right radial pulse 2 plus no ecchymosis  right groin ecchymosis area soft with 2+ fem and dp/pt pulses  Neuro alert and oriented      TELEMETRY: 	  Atrial fib with controlled ellen response  No ventricular ectopy  ECG:  	  RADIOLOGY:   DIAGNOSTIC TESTING:  [ ] Echocardiogram:  [ ]  Catheterization:  HEMODYNAMICS: Hemodynamic assessment demonstrates normal pulmonary  capillary wedge pressure. No shunting was identified. There is mild  pulmonary hypertension.  VALVES: AORTIC VALVE: There was severe aortic stenosis with a centrally  oriented stenotic jet.  CORONARY VESSELS: The coronary circulation is right dominant.  LM:   --  LM: Normal.  LAD:   --  LAD: Normal. The vessel was normal sized, moderately calcified,  and moderately tortuous. Angiography showed mild atherosclerosis with no  flow limiting lesions.  CX:   --  Circumflex: Normal. The vessel was normal sized, moderately  calcified, and moderately tortuous. Angiography showed moderate  atherosclerosis.  --  Mid circumflex: There was a discrete 60 % stenosis in the middle third  of the vessel segment, just before OM1.  RCA:   --  RCA: The vessel was small sized.  --  Proximal RCA: There was a diffuse 100 % stenosis in the proximal third  of the vessel segment, just before RV marginal 1.  AORTA: The root exhibited normal size and marked fibrocalcific change.  DIAGNOSTIC IMPRESSIONS: Two vessel CAD  RCA Prox 100% (Fills via collaterals from the LAD)  LCx Mid 60% stenosis  Severe Aortic Stenosis (AVG=25 mmHg; DAVID=0.92 cm2)  Normal Aortic Root  Severe Tortuosity of the abdominal and thoracic Aorta  Normal CO (4.6 L/min) and CI (2.99 L/min/m2)  Mild Pul HTN (37/18 -27 mmHg)  Normal RA pressure (4 mmHg)  Normal PCWP (14 mmHg)  DIAGNOSTIC RECOMMENDATIONS: Remove Rt Groin access sheaths  CT Surgery consult for AVR (Surgical vs. TAVR)  Continue all meds  INTERVENTIONAL IMPRESSIONS: Two vessel CAD  RCA Prox 100% (Fills via collaterals from the LAD)  LCx Mid 60% stenosis  Severe Aortic Stenosis (AVG=25 mmHg; DAVID=0.92 cm2)  Normal Aortic Root  Severe Tortuosity of the abdominal and thoracic Aorta  Normal CO (4.6 L/min) and CI (2.99 L/min/m2)  Mild Pul HTN (37/18 -27 mmHg)  Normal RA pressure (4 mmHg)  Normal PCWP (14 mmHg)  INTERVENTIONAL RECOMMENDATIONS: Remove Rt Groin access sheaths  CT Surgery consult for AVR (Surgical vs. TAVR)    A/P    86 yo female with PMH of Hypertesnion, Hyperlipidemia presented to hospital with shortness of breath/chf.  Echo  Moderate to severe aortic valve stenosis, Re-evaluation of doppler study confirms at least moderate to severe aortic stenosis, Peak transaortic gradient equals 65.3 mmHg, mean transaortic gradient equals 33.8 mmHg, the calculated aortic valve area equals 0.92 cm² by the continuity equation consistent with severe aortic stenosis, s/p Cath with double vessel disease and severe as  aC  Severe Aortic stenosis  being evaluated by CT surgery  Patient will likely go to rehab and get stronger then return for surgery    ASHD  Double vessel disease  continue eliquos, cardizem, lipitor  add beta blocker if b/p and hr allow  Prevent hypotensive episodes in lieu of severe as    Atrial fib  rate controlled with cardizem continue eliquos    Dvt continue eliquos

## 2017-12-28 NOTE — PROGRESS NOTE ADULT - ATTENDING COMMENTS
PT evaluation is being done
PT evaluation.
PT evaluation is being done
Ecchymosis of the right inguinal area post cath, no worsening, no palpable swelling, will monitor for now.
No further in-patient cardiac work-up/management is needed.  Follow-up in cardiology office in 2 weeks.  Thank you for allowing me to participate in care of your patient.   Please call as needed.
agree with above.  Will continue work up for a TAVR procedure.  PRB discussed with patient

## 2017-12-28 NOTE — PROGRESS NOTE ADULT - SUBJECTIVE AND OBJECTIVE BOX
[facility]                                                        Office: [address] [address]                                                       Telephone: [phone]. Fax:[phone]                                                                             PROGRESS NOTE   Reason for follow up: aortic stenosis and diastolic heart failure.                             Overnight: No new events.   Update: cath showed severe coronary artery disease but good collaterals. EF normal. DAVID by cath 0,.9 cmsq.   BIRGIT confirms low flow , low gradient severe aortic stenosis. Agatson score aortic valve: 1400.     Subjective: "i dont want to go home"   Complains of:  patient states that she wants to get the procedure done sooner then later.  She does not want to go home. She wants to go back to rehab.   Review of symptoms: Cardiac:  No chest pain. +  dyspnea. No palpitations.  Respiratory: no cough. No dyspnea  Gastrointestinal: No diarrhea. No abdominal pain. No bleeding.     Past medical history: No updates.   Chronic conditions:  Hypertension: controlled. CHF: Stable.   	  Vitals:  T(C): 37 (12-28-17 @ 10:24), Max: 37.2 (12-28-17 @ 06:10)  HR: 85 (12-28-17 @ 10:24) (80 - 103)  BP: 100/56 (12-28-17 @ 10:24) (100/56 - 147/68)  RR: 18 (12-28-17 @ 10:24) (17 - 18)  SpO2: 99% (12-28-17 @ 06:10) (99% - 100%)  Wt(kg): --  I&O's Summary    28 Dec 2017 07:01  -  28 Dec 2017 13:57  --------------------------------------------------------  IN: 0 mL / OUT: 300 mL / NET: -300 mL          PHYSICAL EXAM:  Appearance: Comfortable. No acute distress  HEENT:  Head and neck: Atraumatic. Normocephalic.  Normal oral mucosa, PERRL, Neck is supple. No JVD, No carotid bruit.   Neurologic: A & O x 3, no focal deficits. EOMI , Cranial nerves are intact. hard of hearing. Uses a hearing aid./   Lymphatic: No cervical lymphadenopathy  Cardiovascular: irregular S1 S2, 5/6 systolic ejection murmur. no rubs/gallops. No JVD, No edema  Respiratory: Lungs clear to auscultation  Gastrointestinal:  Soft, Non-tender, + BS  Lower Extremities: No edema  Psychiatry: Patient is calm. No agitation. Mood & affect appropriate  Skin: No rashes/ ecchymoses/cyanosis/ulcers visualized on the face, hands or feet.    CURRENT MEDICATIONS:  diltiazem    milliGRAM(s) Oral daily  valsartan 160 milliGRAM(s) Oral daily    gabapentin  docusate sodium  pantoprazole    Tablet  senna  atorvastatin  apixaban  influenza   Vaccine  sodium chloride 0.9%.      LABS:	 	                proBNP: 1190  Lipid Profile: Date: 12-23 @ 09:16  Total cholesterol 203; Direct LDL: 76; HDL: 117; Triglycerides:48    HgA1c: Hemoglobin A1C, Whole Blood: 5.4 %  TSH:     TELEMETRY: Reviewed : Atrial fibrillation. heart rate     ECG:  Reviewed by me. 	Atrial fibrillation:  LAFB heart rate 93     DIAGNOSTIC TESTING:  [ ] Echocardiogram: BIRGIT: Low-flow ,. low-gradient severe aortic stenosis. EF normal. Other valves are unremarkable . no effusion.   [ ]  Catheterization: < from: Cardiac Cath Lab - Adult (12.27.17 @ 14:21) >  CORONARY VESSELS: The coronary circulation is right dominant.  LM:   --  LM: Normal.  LAD:   --  LAD: Normal. The vessel was normal sized, moderately calcified,and moderately tortuous. Angiographyshowed mild atherosclerosis with no  flow limiting lesions.  CX:   --  Circumflex: Normal. The vessel was normal sized, moderatelycalcified, and moderately tortuous. Angiography showed moderate  atherosclerosis.  --  Mid circumflex: There was a discrete 60 % stenosis in the middle third of the vessel segment, just before OM1.  RCA:   --  RCA: The vessel was small sized.  --  Proximal RCA: There was a diffuse 100 % stenosis in the proximal third of the vessel segment, just before RV marginal 1.  AORTA: The root exhibited normal size and marked fibrocalcific change.  DIAGNOSTIC IMPRESSIONS: Two vessel CAD  RCA Prox 100% (Fills via collaterals from the LAD)  LCx Mid 60% stenosis  Severe Aortic Stenosis (AVG=25 mmHg; DAVID=0.92 cm2)  Normal Aortic Root  Severe Tortuosity of the abdominal and thoracic Aorta  Normal CO (4.6 L/min) and CI (2.99 L/min/m2)  Mild Pul HTN (37/18 -27 mmHg)  Normal RA pressure (4 mmHg)  Normal PCWP (14 mmHg)    DIAGNOSTIC RECOMMENDATIONS: Remove Rt Groin access sheaths  CT Surgery consult for AVR (Surgical vs. TAVR)  Continue all meds  INTERVENTIONAL IMPRESSIONS: Two vessel CAD  RCA Prox 100% (Fills via collaterals from the LAD)  LCx Mid 60% stenosis  Severe Aortic Stenosis (AVG=25 mmHg; DAVID=0.92 cm2)  Normal Aortic Root  Severe Tortuosity of the abdominal and thoracic Aorta  Normal CO (4.6 L/min) and CI (2.99 L/min/m2)  Mild Pul HTN (37/18 -27 mmHg)  Normal RA pressure (4 mmHg)  Normal PCWP (14 mmHg)    [ ] Stress Test:    OTHER:

## 2017-12-28 NOTE — PROGRESS NOTE ADULT - ASSESSMENT
This is a 85 year old woman with history of coronary artery disease , severe aortic stenosis with diastolic heart failure improved with diuresis.

## 2017-12-28 NOTE — PROGRESS NOTE ADULT - SUBJECTIVE AND OBJECTIVE BOX
MAMTA TALAVERA    [id number]    85y      Female    Patient is a 85y old  Female who presents with a chief complaint of shortness of breath, leg swelling (27 Dec 2017 15:13)      INTERVAL HPI/OVERNIGHT EVENTS:    Patient is feeling better, she could not sleep well last night, she is s/p cardiac cath, denies fever, chills, chest pain, nausea, vomiting, dizziness    REVIEW OF SYSTEMS:    CONSTITUTIONAL: No fever, no fatigue  RESPIRATORY: No cough, has exertional shortness of breath  CARDIOVASCULAR: No chest pain, palpitations  GASTROINTESTINAL: No abdominal, No nausea, vomiting  NEUROLOGICAL: No headaches,  loss of strength.  MISCELLANEOUS: No joint swelling or pain        Vital Signs Last 24 Hrs  T(C): 37.2 (28 Dec 2017 06:10), Max: 37.2 (28 Dec 2017 06:10)  T(F): 98.9 (28 Dec 2017 06:10), Max: 98.9 (28 Dec 2017 06:10)  HR: 88 (28 Dec 2017 06:10) (78 - 103)  BP: 116/64 (28 Dec 2017 06:10) (116/64 - 147/68)  RR: 18 (28 Dec 2017 06:10) (17 - 18)  SpO2: 99% (28 Dec 2017 06:10) (99% - 100%)    PHYSICAL EXAM:    GENERAL: Elderly female looking comfortable  HEENT: PERRL, +EOMI  NECK: soft, Supple, No JVD,   CHEST/LUNG: Decrease air entry bilaterally; No wheezing  HEART: S1S2+, Regular rate and rhythm; systolic murmurs loud at aortic area.   ABDOMEN: Soft, Nontender, Nondistended; Bowel sounds present  EXTREMITIES:  1+ Peripheral Pulses, No edema  SKIN: No rashes or lesions  NEURO: AAOX3, no focal deficits, no motor r sensory loss  PSYCH: normal mood  Right femoral area with some ecchymosis, no swelling or palpable mass.       MEDICATIONS  (STANDING):  apixaban 5 milliGRAM(s) Oral every 12 hours  atorvastatin 20 milliGRAM(s) Oral at bedtime  diltiazem    milliGRAM(s) Oral daily  docusate sodium 100 milliGRAM(s) Oral two times a day  gabapentin 100 milliGRAM(s) Oral three times a day  influenza   Vaccine 0.5 milliLiter(s) IntraMuscular once  pantoprazole    Tablet 40 milliGRAM(s) Oral before breakfast  senna 2 Tablet(s) Oral at bedtime  sodium chloride 0.9%. 300 milliLiter(s) (50 mL/Hr) IV Continuous <Continuous>  valsartan 160 milliGRAM(s) Oral daily    MEDICATIONS  (PRN):  guaiFENesin    Syrup 200 milliGRAM(s) Oral every 6 hours PRN Cough  oxyCODONE    5 mG/acetaminophen 325 mG 1 Tablet(s) Oral every 6 hours PRN Severe Pain (7 - 10)  polyethylene glycol 3350 17 Gram(s) Oral daily PRN Constipation  traMADol 25 milliGRAM(s) Oral every 6 hours PRN Moderate Pain (4 - 6)

## 2017-12-28 NOTE — PROGRESS NOTE ADULT - PROBLEM SELECTOR PLAN 6
low BP. patient received contrast. IV fluids for contrast.   decrease Bp meds. valsartan to 40 mg (from 160). ct cardizem for afib.   holding parameters. BP goal 130/85
Patient had cardiac cath done showed Mid circumflex: There was a discrete 60 % stenosis in the middle third of the vessel segment, just before OM1.  RCA:   --  RCA: The vessel was small sized, Proximal RCA: There was a diffuse 100 % stenosis in the proximal third of the vessel segment, just before RV marginal 1.  Will follow cardiology and CT surgery team, will continue with current meds

## 2017-12-28 NOTE — PROGRESS NOTE ADULT - ASSESSMENT
84 yo female with a pmh/o HTN, HLD, thyroid and lung nodule and chronic back pain due to fractures, recently was discharged and sent to [facility] s/p pelvic fracture and b/l LE DVT presents with Shortness of breath, found to have Diastolic HF, URI and Pafib.  Questionable severe AS with repeat echo pending per cardiology request.  Patient was admitted under medicine, for her acute on chronic diastolic heart failure due to valvular disease, was started on IV lasix with close monitoring of I/Os, she was given supplemental oxygen as well, cardiology was consulted, she became euvolemic, Lasix was held, echo repeat done showed Moderate to severe aortic valve stenosis, Re-evaluation of doppler study confirms at least moderate to severe aortic stenosis, Peak transaortic gradient equals 65.3 mmHg, mean transaortic gradient equals 33.8 mmHg, the calculated aortic valve area equals 0.92 cm² by the continuity equation consistent with severe aortic stenosis, patient has been continued with Diovan.   For her Aortic valve stenosis she is being seen by CT surgery team for TAVR, TTE repeated showed Moderate to severe aortic valve stenosis, Re-evaluation of doppler study confirms at least moderate to severe aortic stenosis, Peak transaortic gradient equals 65.3 mmHg, mean transaortic gradient equals 33.8 mmHg, the calculated aortic valve area equals 0.92 cm² by the continuity equation consistent with severe aortic stenosis.   Doppler of carotid done showed Right carotid system:  No hemodynamically significant stenosis in the common carotid or internal carotid artery.   Moderate stenosis in the external carotid artery,  Left carotid system:  No hemodynamically significant stenosis is found, patient going to have cardiac cath, She has hx of Paroxysmal atrial fibrillation, continued on eliquis and dilt, rate has been controlled, her CHADS VASC is 5.   She also has acute deep vein thrombosis (DVT) of popliteal vein of both lower extremities, she has been continued with Eliquis, leg swelling is better, she need out patient workup for hypercoagulable state.

## 2017-12-29 VITALS
RESPIRATION RATE: 18 BRPM | OXYGEN SATURATION: 98 % | HEART RATE: 82 BPM | DIASTOLIC BLOOD PRESSURE: 62 MMHG | SYSTOLIC BLOOD PRESSURE: 116 MMHG

## 2017-12-29 LAB
ANION GAP SERPL CALC-SCNC: 11 MMOL/L — SIGNIFICANT CHANGE UP (ref 5–17)
BUN SERPL-MCNC: 22 MG/DL — HIGH (ref 8–20)
CALCIUM SERPL-MCNC: 9.1 MG/DL — SIGNIFICANT CHANGE UP (ref 8.6–10.2)
CHLORIDE SERPL-SCNC: 100 MMOL/L — SIGNIFICANT CHANGE UP (ref 98–107)
CO2 SERPL-SCNC: 28 MMOL/L — SIGNIFICANT CHANGE UP (ref 22–29)
CREAT SERPL-MCNC: 0.7 MG/DL — SIGNIFICANT CHANGE UP (ref 0.5–1.3)
GLUCOSE SERPL-MCNC: 103 MG/DL — SIGNIFICANT CHANGE UP (ref 70–115)
HCT VFR BLD CALC: 32.6 % — LOW (ref 37–47)
HGB BLD-MCNC: 10.7 G/DL — LOW (ref 12–16)
MCHC RBC-ENTMCNC: 28.7 PG — SIGNIFICANT CHANGE UP (ref 27–31)
MCHC RBC-ENTMCNC: 32.8 G/DL — SIGNIFICANT CHANGE UP (ref 32–36)
MCV RBC AUTO: 87.4 FL — SIGNIFICANT CHANGE UP (ref 81–99)
PLATELET # BLD AUTO: 256 K/UL — SIGNIFICANT CHANGE UP (ref 150–400)
POTASSIUM SERPL-MCNC: 4.1 MMOL/L — SIGNIFICANT CHANGE UP (ref 3.5–5.3)
POTASSIUM SERPL-SCNC: 4.1 MMOL/L — SIGNIFICANT CHANGE UP (ref 3.5–5.3)
RBC # BLD: 3.73 M/UL — LOW (ref 4.4–5.2)
RBC # FLD: 14.6 % — SIGNIFICANT CHANGE UP (ref 11–15.6)
SODIUM SERPL-SCNC: 139 MMOL/L — SIGNIFICANT CHANGE UP (ref 135–145)
WBC # BLD: 8.4 K/UL — SIGNIFICANT CHANGE UP (ref 4.8–10.8)
WBC # FLD AUTO: 8.4 K/UL — SIGNIFICANT CHANGE UP (ref 4.8–10.8)

## 2017-12-29 PROCEDURE — 87633 RESP VIRUS 12-25 TARGETS: CPT

## 2017-12-29 PROCEDURE — 97163 PT EVAL HIGH COMPLEX 45 MIN: CPT

## 2017-12-29 PROCEDURE — 99285 EMERGENCY DEPT VISIT HI MDM: CPT | Mod: 25

## 2017-12-29 PROCEDURE — 96374 THER/PROPH/DIAG INJ IV PUSH: CPT | Mod: XU

## 2017-12-29 PROCEDURE — 83690 ASSAY OF LIPASE: CPT

## 2017-12-29 PROCEDURE — 75574 CT ANGIO HRT W/3D IMAGE: CPT

## 2017-12-29 PROCEDURE — 93460 R&L HRT ART/VENTRICLE ANGIO: CPT

## 2017-12-29 PROCEDURE — 94010 BREATHING CAPACITY TEST: CPT

## 2017-12-29 PROCEDURE — 83735 ASSAY OF MAGNESIUM: CPT

## 2017-12-29 PROCEDURE — C1889: CPT

## 2017-12-29 PROCEDURE — 93320 DOPPLER ECHO COMPLETE: CPT

## 2017-12-29 PROCEDURE — 87486 CHLMYD PNEUM DNA AMP PROBE: CPT

## 2017-12-29 PROCEDURE — 87798 DETECT AGENT NOS DNA AMP: CPT

## 2017-12-29 PROCEDURE — 87581 M.PNEUMON DNA AMP PROBE: CPT

## 2017-12-29 PROCEDURE — 80061 LIPID PANEL: CPT

## 2017-12-29 PROCEDURE — 93005 ELECTROCARDIOGRAM TRACING: CPT

## 2017-12-29 PROCEDURE — C1894: CPT

## 2017-12-29 PROCEDURE — 83880 ASSAY OF NATRIURETIC PEPTIDE: CPT

## 2017-12-29 PROCEDURE — 93325 DOPPLER ECHO COLOR FLOW MAPG: CPT

## 2017-12-29 PROCEDURE — 83036 HEMOGLOBIN GLYCOSYLATED A1C: CPT

## 2017-12-29 PROCEDURE — 85610 PROTHROMBIN TIME: CPT

## 2017-12-29 PROCEDURE — 80053 COMPREHEN METABOLIC PANEL: CPT

## 2017-12-29 PROCEDURE — 36415 COLL VENOUS BLD VENIPUNCTURE: CPT

## 2017-12-29 PROCEDURE — 99153 MOD SED SAME PHYS/QHP EA: CPT

## 2017-12-29 PROCEDURE — 74174 CTA ABD&PLVS W/CONTRAST: CPT

## 2017-12-29 PROCEDURE — 96375 TX/PRO/DX INJ NEW DRUG ADDON: CPT | Mod: XU

## 2017-12-29 PROCEDURE — 85730 THROMBOPLASTIN TIME PARTIAL: CPT

## 2017-12-29 PROCEDURE — 71046 X-RAY EXAM CHEST 2 VIEWS: CPT

## 2017-12-29 PROCEDURE — 84484 ASSAY OF TROPONIN QUANT: CPT

## 2017-12-29 PROCEDURE — 85027 COMPLETE CBC AUTOMATED: CPT

## 2017-12-29 PROCEDURE — 99152 MOD SED SAME PHYS/QHP 5/>YRS: CPT

## 2017-12-29 PROCEDURE — 93306 TTE W/DOPPLER COMPLETE: CPT

## 2017-12-29 PROCEDURE — 93567 NJX CAR CTH SPRVLV AORTGRPHY: CPT

## 2017-12-29 PROCEDURE — 80048 BASIC METABOLIC PNL TOTAL CA: CPT

## 2017-12-29 PROCEDURE — 94760 N-INVAS EAR/PLS OXIMETRY 1: CPT

## 2017-12-29 PROCEDURE — 93312 ECHO TRANSESOPHAGEAL: CPT

## 2017-12-29 PROCEDURE — C1887: CPT

## 2017-12-29 PROCEDURE — 71275 CT ANGIOGRAPHY CHEST: CPT

## 2017-12-29 PROCEDURE — 93880 EXTRACRANIAL BILAT STUDY: CPT

## 2017-12-29 PROCEDURE — 94640 AIRWAY INHALATION TREATMENT: CPT

## 2017-12-29 PROCEDURE — 99233 SBSQ HOSP IP/OBS HIGH 50: CPT

## 2017-12-29 PROCEDURE — C1769: CPT

## 2017-12-29 PROCEDURE — 99239 HOSP IP/OBS DSCHRG MGMT >30: CPT

## 2017-12-29 RX ORDER — VALSARTAN 80 MG/1
1 TABLET ORAL
Qty: 30 | Refills: 0 | OUTPATIENT
Start: 2017-12-29 | End: 2018-01-27

## 2017-12-29 RX ORDER — VALSARTAN 80 MG/1
1 TABLET ORAL
Qty: 0 | Refills: 0 | COMMUNITY
Start: 2017-12-29

## 2017-12-29 RX ORDER — ASPIRIN/CALCIUM CARB/MAGNESIUM 324 MG
1 TABLET ORAL
Qty: 0 | Refills: 0 | COMMUNITY
Start: 2017-12-29

## 2017-12-29 RX ORDER — FUROSEMIDE 40 MG
1 TABLET ORAL
Qty: 30 | Refills: 0 | OUTPATIENT
Start: 2017-12-29 | End: 2018-01-27

## 2017-12-29 RX ORDER — TRAMADOL HYDROCHLORIDE 50 MG/1
1 TABLET ORAL
Qty: 0 | Refills: 0 | DISCHARGE
Start: 2017-12-29

## 2017-12-29 RX ORDER — APIXABAN 2.5 MG/1
1 TABLET, FILM COATED ORAL
Qty: 60 | Refills: 0 | OUTPATIENT
Start: 2017-12-29 | End: 2018-01-27

## 2017-12-29 RX ORDER — ATORVASTATIN CALCIUM 80 MG/1
1 TABLET, FILM COATED ORAL
Qty: 0 | Refills: 0 | DISCHARGE
Start: 2017-12-29

## 2017-12-29 RX ORDER — VALSARTAN 80 MG/1
1 TABLET ORAL
Qty: 0 | Refills: 0 | COMMUNITY

## 2017-12-29 RX ORDER — ATORVASTATIN CALCIUM 80 MG/1
1 TABLET, FILM COATED ORAL
Qty: 0 | Refills: 0 | COMMUNITY

## 2017-12-29 RX ORDER — IBUPROFEN 200 MG
1 TABLET ORAL
Qty: 0 | Refills: 0 | COMMUNITY

## 2017-12-29 RX ADMIN — APIXABAN 2.5 MILLIGRAM(S): 2.5 TABLET, FILM COATED ORAL at 05:28

## 2017-12-29 RX ADMIN — GABAPENTIN 100 MILLIGRAM(S): 400 CAPSULE ORAL at 05:28

## 2017-12-29 RX ADMIN — GABAPENTIN 100 MILLIGRAM(S): 400 CAPSULE ORAL at 14:11

## 2017-12-29 RX ADMIN — PANTOPRAZOLE SODIUM 40 MILLIGRAM(S): 20 TABLET, DELAYED RELEASE ORAL at 05:28

## 2017-12-29 RX ADMIN — Medication 240 MILLIGRAM(S): at 05:28

## 2017-12-29 RX ADMIN — Medication 100 MILLIGRAM(S): at 05:28

## 2017-12-29 RX ADMIN — Medication 81 MILLIGRAM(S): at 12:25

## 2017-12-29 NOTE — PROGRESS NOTE ADULT - PROBLEM SELECTOR PROBLEM 3
Closed fracture of multiple ribs of right side, initial encounter
Closed fracture of multiple ribs of right side, initial encounter
Acute congestive heart failure, unspecified congestive heart failure type
Closed fracture of multiple ribs of right side, initial encounter
New onset atrial fibrillation
New onset atrial fibrillation
Closed fracture of multiple ribs of right side, initial encounter
New onset atrial fibrillation

## 2017-12-29 NOTE — PROGRESS NOTE ADULT - PROVIDER SPECIALTY LIST ADULT
CT Surgery
Cardiology
Electrophysiology
Hospitalist
Intervent Cardiology
Hospitalist

## 2017-12-29 NOTE — PROGRESS NOTE ADULT - PROBLEM SELECTOR PROBLEM 1
Acute on chronic diastolic heart failure due to valvular disease
Acute on chronic diastolic heart failure due to valvular disease
Acute deep vein thrombosis (DVT) of popliteal vein of both lower extremities
Acute on chronic diastolic heart failure due to valvular disease
Nonrheumatic aortic valve stenosis
Nonrheumatic aortic valve stenosis
Severe aortic stenosis
Acute on chronic diastolic heart failure due to valvular disease
Nonrheumatic aortic valve stenosis

## 2017-12-29 NOTE — PROGRESS NOTE ADULT - PROBLEM SELECTOR PLAN 2
continue eliquis and dilt, rate is controlled, her CHADS VASC is 5, cardiology is on board.
continue eliquis and dilt, rate is controlled, her CHADS VASC is 5, cardiology is on board.
60% LCX. and Total RCA occlusion.   Will manage medically. asa 81 mg . Statins 20 mg lipitor .    Diltiazem for Angina and BP and heart rate control.
Diuretics PRN
Diuretics PRN
continue eliquis and dilt
continue eliquis and dilt
continue eliquis and dilt, rate is controlled, her CHADS VASC is 5, cardiology is on board.
continue eliquis and dilt, rate is controlled, her CHADS VASC is 5, cardiology is on board.
AS PER PRIMARY TEAM CURRENTLY HOLDING DIURETICS  CONTINUE TO MONITOR FLUID STATUS

## 2017-12-29 NOTE — PROGRESS NOTE ADULT - PROBLEM SELECTOR PLAN 5
Patient is being seen by CT surgery team for TAVR, TTE repeated showed Moderate to severe aortic valve stenosis, Re-evaluation of doppler study confirms at least moderate to severe aortic stenosis, Peak transaortic gradient equals 65.3 mmHg, mean transaortic gradient equals 33.8 mmHg, the calculated aortic valve area equals 0.92 cm² by the continuity equation consistent with severe aortic stenosis.   Doppler of carotid done showed Right carotid system:  No hemodynamically significant stenosis in the common carotid or internal carotid artery.   Moderate stenosis in the external carotid artery,  Left carotid system:  No hemodynamically significant stenosis is found, probably going to have cardiac cath, will follow CT surgery as well as Cardiology team.
Patient is being seen by CT surgery team for TAVR,  Doppler of carotid done showed Right carotid system:  No hemodynamically significant stenosis in the common carotid or internal carotid artery.   Moderate stenosis in the external carotid artery,  Left carotid system:  No hemodynamically significant stenosis is found, probably going to have cardiac cath, will follow CT surgery as well as Cardiology team.
Patient is being seen by CT surgery team for TAVR, TTE repeated showed Moderate to severe aortic valve stenosis, Re-evaluation of doppler study confirms at least moderate to severe aortic stenosis, Peak transaortic gradient equals 65.3 mmHg, mean transaortic gradient equals 33.8 mmHg, the calculated aortic valve area equals 0.92 cm² by the continuity equation consistent with severe aortic stenosis.   Doppler of carotid done showed Right carotid system:  No hemodynamically significant stenosis in the common carotid or internal carotid artery.   Moderate stenosis in the external carotid artery,  Left carotid system:  No hemodynamically significant stenosis is found, patient had cardiac cath showed sever aortic stenosis, at mid circumflex there was a discrete 60 % stenosis in the middle third  of the vessel segment, just before OM1, RCA: The vessel was small size, Proximal RCA: There was a diffuse 100 % stenosis in the proximal third of the vessel segment, just before RV marginal 1. will follow CT surgery as well as Cardiology team.
Patient is being seen by CT surgery team for TAVR, TTE repeated showed Moderate to severe aortic valve stenosis, Re-evaluation of doppler study confirms at least moderate to severe aortic stenosis, Peak transaortic gradient equals 65.3 mmHg, mean transaortic gradient equals 33.8 mmHg, the calculated aortic valve area equals 0.92 cm² by the continuity equation consistent with severe aortic stenosis.   Doppler of carotid done showed Right carotid system:  No hemodynamically significant stenosis in the common carotid or internal carotid artery.   Moderate stenosis in the external carotid artery,  Left carotid system:  No hemodynamically significant stenosis is found, patient is going to have cardiac cath, will follow CT surgery as well as Cardiology team.
SUPPORTIVE THERAPY

## 2017-12-29 NOTE — PROGRESS NOTE ADULT - SUBJECTIVE AND OBJECTIVE BOX
Subjective: " They said I don't qualify for rehab"  Pt in bed NAD     VITAL SIGNS  T(C): 37.1 (17 @ 10:08), Max: 37.1 (17 @ 10:08)  HR: 87 (17 @ 10:08) (66 - 87)  BP: 101/51 (17 @ 10:08) (101/51 - 127/59)  RR: 18 (17 @ 10:08) (18 - 18)  SpO2: 95% (17 @ 05:26) (95% - 96%) RA        Daily     Daily Weight in k.9 (29 Dec 2017 06:06)  Admit Wt: Drug Dosing Weight  Height (cm): 149.86 (22 Dec 2017 19:51)  Weight (kg): 59 (22 Dec 2017 19:51)      MEDICATIONS  apixaban 2.5 milliGRAM(s) Oral every 12 hours  aspirin enteric coated 81 milliGRAM(s) Oral daily  atorvastatin 20 milliGRAM(s) Oral at bedtime  diltiazem    milliGRAM(s) Oral daily  docusate sodium 100 milliGRAM(s) Oral two times a day  gabapentin 100 milliGRAM(s) Oral three times a day  guaiFENesin    Syrup 200 milliGRAM(s) Oral every 6 hours PRN  oxyCODONE    5 mG/acetaminophen 325 mG 1 Tablet(s) Oral every 6 hours PRN  pantoprazole    Tablet 40 milliGRAM(s) Oral before breakfast  polyethylene glycol 3350 17 Gram(s) Oral daily PRN  senna 2 Tablet(s) Oral at bedtime  sodium chloride 0.9%. 250 milliLiter(s) IV Continuous <Continuous>  traMADol 50 milliGRAM(s) Oral every 6 hours PRN  valsartan 40 milliGRAM(s) Oral daily    MEDICATIONS  (PRN):  guaiFENesin    Syrup 200 milliGRAM(s) Oral every 6 hours PRN Cough  oxyCODONE    5 mG/acetaminophen 325 mG 1 Tablet(s) Oral every 6 hours PRN Severe Pain (7 - 10)  polyethylene glycol 3350 17 Gram(s) Oral daily PRN Constipation  traMADol 50 milliGRAM(s) Oral every 6 hours PRN Moderate Pain (4 - 6)        PHYSICAL EXAM  General: A&O  resp:  Decreased at bases  cardiac: S1 S2  + LUCIO   Abd:  soft NT ND + BS      I&O's Detail    28 Dec 2017 07:01  -  29 Dec 2017 07:00  --------------------------------------------------------  IN:    Oral Fluid: 720 mL    sodium chloride 0.9%.: 240 mL  Total IN: 960 mL    OUT:    Voided: 500 mL  Total OUT: 500 mL    Total NET: 460 mL          LABS      139  |  100  |  22.0<H>  ----------------------------<  103  4.1   |  28.0  |  0.70    Ca    9.1      29 Dec 2017 01:00                                   10.7   8.4   )-----------( 256      ( 29 Dec 2017 01:00 )             32.6                     CAPILLARY BLOOD GLUCOSE               Today's CXR:  NA    PAST MEDICAL & SURGICAL HISTORY:  Closed fracture of multiple ribs of right side, initial encounter  Pelvic fracture  Acute deep vein thrombosis (DVT) of popliteal vein of both lower extremities  Thyroid nodule  Lung nodule  PNA (pneumonia): 2017  Hyperlipidemia, unspecified hyperlipidemia type  Chronic back pain  Essential hypertension  History of pelvic fracture  S/P spinal fusion: L spines

## 2017-12-29 NOTE — PROGRESS NOTE ADULT - PROBLEM SELECTOR PLAN 1
Lasix on hold for now, as she looks euvolemic, echo repeat done showed Moderate to severe aortic valve stenosis, Re-evaluation of doppler study confirms at least moderate to severe aortic stenosis, Peak transaortic gradient equals 65.3 mmHg, mean transaortic gradient equals 33.8 mmHg, the calculated aortic valve area equals 0.92 cm² by the continuity equation consistent with severe aortic stenosis, will continue with current meds, will resume lasix as needed, if she develops edema or worsening SOB.
lasix on hold for now, as she looks euvolemic, (bun increasing and hypernatremia), echo repeat pending per cardiology request as current echo may be under estimating degree of stenosis
KINZA to be done as outpatient. KINZA work up completed.    patient returning to a rehab and will be scheduled for Kinza as outpatient.   ct low dose diuretics 20 mg daily.   decrease Bp meds. valsartan to 40 mg (from 160). ct cardizem for afib.   Patient got contrast during cath and during CTA. will give her some IV fluids. 250 ml Normal saline 80 cc /hr.
Lasix on hold for now, as she looks euvolemic, echo repeat done showed Moderate to severe aortic valve stenosis, Re-evaluation of doppler study confirms at least moderate to severe aortic stenosis, Peak transaortic gradient equals 65.3 mmHg, mean transaortic gradient equals 33.8 mmHg, the calculated aortic valve area equals 0.92 cm² by the continuity equation consistent with severe aortic stenosis, will continue with current meds, will resume lasix as needed, if she develops edema or worsening SOB.
Resume Eliquis for DVT/dose 5mg po q12  D/W Dr. Robledo  Right groin and right brachial site care  FU Dr. Marie
TAVR WORK UP IN PROGRESS   CTA complete  CATH complete   carotids negative   plan to d/c home and then come back as an OP   will follow
TAVR WORK UP IN PROGRESS   CTA complete  CATH complete   carotids negative   will follow
c/w IV lasix  cardiology following  echo pending  likely all due to AS given murmur
stop lasix for now as dry (bun increasing and hypernatremia)  echo repeat pending per cardiology request as current echo may be under estimating degree of stenosis
lasix on hold for now, as she looks euvolemic, echo repeat done showed Moderate to severe aortic valve stenosis, Re-evaluation of doppler study confirms at least moderate to severe aortic stenosis, Peak transaortic gradient equals 65.3 mmHg, mean transaortic gradient equals 33.8 mmHg, the calculated aortic valve area equals 0.92 cm² by the continuity equation consistent with severe aortic stenosis.  will continue with current meds, will resume lasix as needed, if she develops edema or worsening SOB.
TAVR WORK UP IN PROGRESS  WILL NEED CTA/ AND ANGIOGRAM   PENDING CAROTIDS AND TAMMI   WILL DISCUSS WITH ATTENDING

## 2017-12-29 NOTE — PROGRESS NOTE ADULT - PROBLEM SELECTOR PLAN 4
Eliquis, leg swelling is better, will continue with anticoagulation.
eliquis
Ct cardizem CD.  ct eliquis 2.5 mg Q12. (patient is fall risk and also low body weight and > 80years. )
DASH DIET   PATIENT EDUCATION   CONTINUE LIPITOR
DASH DIET   PATIENT EDUCATION   CONTINUE LIPITOR
Eliquis, leg swelling is better, will continue with anticoagulation.
eliquis
eliquis
eliquis, leg swelling is better, will continue with anticoagulation.
DASH DIET   PATIENT EDUCATION   CONTINUE LIPITOR

## 2017-12-29 NOTE — PROGRESS NOTE ADULT - PROBLEM SELECTOR PROBLEM 5
Aortic valve stenosis, etiology of cardiac valve disease unspecified
Hyperlipidemia, unspecified hyperlipidemia type
RSV (acute bronchiolitis due to respiratory syncytial virus)

## 2017-12-29 NOTE — PROGRESS NOTE ADULT - PROBLEM SELECTOR PROBLEM 2
Paroxysmal atrial fibrillation
Paroxysmal atrial fibrillation
Acute on chronic diastolic heart failure due to valvular disease
Acute on chronic diastolic heart failure due to valvular disease
Coronary artery disease of native artery of native heart with stable angina pectoris
Paroxysmal atrial fibrillation
Acute on chronic diastolic heart failure due to valvular disease

## 2017-12-29 NOTE — PROGRESS NOTE ADULT - PROBLEM SELECTOR PLAN 3
c/w pain medications
c/w pain medications
Diastolic due to aortic stenosis.   Lasix 20 mg daily on discharge. Rate control for afib.
ON ELIQUIS   RATE CONTROL
ON ELIQUIS   RATE CONTROL
c/w pain medications
ON ELIQUIS   RATE CONTROL

## 2017-12-29 NOTE — PROGRESS NOTE ADULT - PROBLEM SELECTOR PROBLEM 4
Acute deep vein thrombosis (DVT) of popliteal vein of both lower extremities
Hyperlipidemia, unspecified hyperlipidemia type
Hyperlipidemia, unspecified hyperlipidemia type
Paroxysmal atrial fibrillation
Acute deep vein thrombosis (DVT) of popliteal vein of both lower extremities
Hyperlipidemia, unspecified hyperlipidemia type

## 2018-01-03 ENCOUNTER — APPOINTMENT (OUTPATIENT)
Dept: CARDIOLOGY | Facility: CLINIC | Age: 83
End: 2018-01-03
Payer: MEDICARE

## 2018-01-03 ENCOUNTER — NON-APPOINTMENT (OUTPATIENT)
Age: 83
End: 2018-01-03

## 2018-01-03 VITALS
SYSTOLIC BLOOD PRESSURE: 148 MMHG | OXYGEN SATURATION: 96 % | HEART RATE: 88 BPM | DIASTOLIC BLOOD PRESSURE: 95 MMHG | HEIGHT: 57 IN

## 2018-01-03 PROBLEM — J18.9 PNEUMONIA, UNSPECIFIED ORGANISM: Chronic | Status: ACTIVE | Noted: 2017-12-22

## 2018-01-03 PROCEDURE — 99215 OFFICE O/P EST HI 40 MIN: CPT

## 2018-01-03 PROCEDURE — 93000 ELECTROCARDIOGRAM COMPLETE: CPT

## 2018-01-08 ENCOUNTER — OUTPATIENT (OUTPATIENT)
Dept: OUTPATIENT SERVICES | Facility: HOSPITAL | Age: 83
LOS: 1 days | End: 2018-01-08
Payer: MEDICARE

## 2018-01-08 ENCOUNTER — APPOINTMENT (OUTPATIENT)
Dept: CARDIOTHORACIC SURGERY | Facility: CLINIC | Age: 83
End: 2018-01-08

## 2018-01-08 VITALS
WEIGHT: 136.69 LBS | DIASTOLIC BLOOD PRESSURE: 60 MMHG | SYSTOLIC BLOOD PRESSURE: 118 MMHG | HEIGHT: 58 IN | TEMPERATURE: 98 F | HEART RATE: 84 BPM | RESPIRATION RATE: 16 BRPM

## 2018-01-08 DIAGNOSIS — I35.0 NONRHEUMATIC AORTIC (VALVE) STENOSIS: ICD-10-CM

## 2018-01-08 DIAGNOSIS — Z01.810 ENCOUNTER FOR PREPROCEDURAL CARDIOVASCULAR EXAMINATION: ICD-10-CM

## 2018-01-08 DIAGNOSIS — Z98.1 ARTHRODESIS STATUS: Chronic | ICD-10-CM

## 2018-01-08 DIAGNOSIS — Z87.81 PERSONAL HISTORY OF (HEALED) TRAUMATIC FRACTURE: Chronic | ICD-10-CM

## 2018-01-08 LAB
ALBUMIN SERPL ELPH-MCNC: 4 G/DL — SIGNIFICANT CHANGE UP (ref 3.3–5.2)
ALP SERPL-CCNC: 69 U/L — SIGNIFICANT CHANGE UP (ref 40–120)
ALT FLD-CCNC: 14 U/L — SIGNIFICANT CHANGE UP
ANION GAP SERPL CALC-SCNC: 12 MMOL/L — SIGNIFICANT CHANGE UP (ref 5–17)
APPEARANCE UR: CLEAR — SIGNIFICANT CHANGE UP
APTT BLD: 33.1 SEC — SIGNIFICANT CHANGE UP (ref 27.5–37.4)
AST SERPL-CCNC: 22 U/L — SIGNIFICANT CHANGE UP
BACTERIA # UR AUTO: ABNORMAL
BASOPHILS # BLD AUTO: 0 K/UL — SIGNIFICANT CHANGE UP (ref 0–0.2)
BASOPHILS NFR BLD AUTO: 0.6 % — SIGNIFICANT CHANGE UP (ref 0–2)
BILIRUB SERPL-MCNC: 0.3 MG/DL — LOW (ref 0.4–2)
BILIRUB UR-MCNC: NEGATIVE — SIGNIFICANT CHANGE UP
BLD GP AB SCN SERPL QL: SIGNIFICANT CHANGE UP
BUN SERPL-MCNC: 16 MG/DL — SIGNIFICANT CHANGE UP (ref 8–20)
CALCIUM SERPL-MCNC: 9.7 MG/DL — SIGNIFICANT CHANGE UP (ref 8.6–10.2)
CHLORIDE SERPL-SCNC: 99 MMOL/L — SIGNIFICANT CHANGE UP (ref 98–107)
CO2 SERPL-SCNC: 32 MMOL/L — HIGH (ref 22–29)
COLOR SPEC: YELLOW — SIGNIFICANT CHANGE UP
CREAT SERPL-MCNC: 0.72 MG/DL — SIGNIFICANT CHANGE UP (ref 0.5–1.3)
DIFF PNL FLD: NEGATIVE — SIGNIFICANT CHANGE UP
EOSINOPHIL # BLD AUTO: 0.2 K/UL — SIGNIFICANT CHANGE UP (ref 0–0.5)
EOSINOPHIL NFR BLD AUTO: 2.8 % — SIGNIFICANT CHANGE UP (ref 0–6)
EPI CELLS # UR: SIGNIFICANT CHANGE UP
GLUCOSE SERPL-MCNC: 89 MG/DL — SIGNIFICANT CHANGE UP (ref 70–115)
GLUCOSE UR QL: NEGATIVE MG/DL — SIGNIFICANT CHANGE UP
HCT VFR BLD CALC: 35.6 % — LOW (ref 37–47)
HGB BLD-MCNC: 11.3 G/DL — LOW (ref 12–16)
INR BLD: 1.11 RATIO — SIGNIFICANT CHANGE UP (ref 0.88–1.16)
KETONES UR-MCNC: NEGATIVE — SIGNIFICANT CHANGE UP
LEUKOCYTE ESTERASE UR-ACNC: ABNORMAL
LYMPHOCYTES # BLD AUTO: 2.2 K/UL — SIGNIFICANT CHANGE UP (ref 1–4.8)
LYMPHOCYTES # BLD AUTO: 33.7 % — SIGNIFICANT CHANGE UP (ref 20–55)
MCHC RBC-ENTMCNC: 28.6 PG — SIGNIFICANT CHANGE UP (ref 27–31)
MCHC RBC-ENTMCNC: 31.7 G/DL — LOW (ref 32–36)
MCV RBC AUTO: 90.1 FL — SIGNIFICANT CHANGE UP (ref 81–99)
MONOCYTES # BLD AUTO: 0.8 K/UL — SIGNIFICANT CHANGE UP (ref 0–0.8)
MONOCYTES NFR BLD AUTO: 11.8 % — HIGH (ref 3–10)
MRSA PCR RESULT.: SIGNIFICANT CHANGE UP
NEUTROPHILS # BLD AUTO: 3.3 K/UL — SIGNIFICANT CHANGE UP (ref 1.8–8)
NEUTROPHILS NFR BLD AUTO: 50.8 % — SIGNIFICANT CHANGE UP (ref 37–73)
NITRITE UR-MCNC: NEGATIVE — SIGNIFICANT CHANGE UP
NT-PROBNP SERPL-SCNC: 710 PG/ML — HIGH (ref 0–300)
PH UR: 5 — SIGNIFICANT CHANGE UP (ref 5–8)
PLATELET # BLD AUTO: 428 K/UL — HIGH (ref 150–400)
POTASSIUM SERPL-MCNC: 3.8 MMOL/L — SIGNIFICANT CHANGE UP (ref 3.5–5.3)
POTASSIUM SERPL-SCNC: 3.8 MMOL/L — SIGNIFICANT CHANGE UP (ref 3.5–5.3)
PREALB SERPL-MCNC: 17 MG/DL — LOW (ref 18–38)
PROT SERPL-MCNC: 7 G/DL — SIGNIFICANT CHANGE UP (ref 6.6–8.7)
PROT UR-MCNC: 30 MG/DL
PROTHROM AB SERPL-ACNC: 12.2 SEC — SIGNIFICANT CHANGE UP (ref 9.8–12.7)
RBC # BLD: 3.95 M/UL — LOW (ref 4.4–5.2)
RBC # FLD: 14.5 % — SIGNIFICANT CHANGE UP (ref 11–15.6)
RBC CASTS # UR COMP ASSIST: SIGNIFICANT CHANGE UP /HPF (ref 0–4)
S AUREUS DNA NOSE QL NAA+PROBE: SIGNIFICANT CHANGE UP
SODIUM SERPL-SCNC: 143 MMOL/L — SIGNIFICANT CHANGE UP (ref 135–145)
SP GR SPEC: 1.02 — SIGNIFICANT CHANGE UP (ref 1.01–1.02)
T3 SERPL-MCNC: 117 NG/DL — SIGNIFICANT CHANGE UP (ref 80–200)
T4 AB SER-ACNC: 6 UG/DL — SIGNIFICANT CHANGE UP (ref 4.5–12)
TSH SERPL-MCNC: 7.73 UIU/ML — HIGH (ref 0.27–4.2)
TYPE + AB SCN PNL BLD: SIGNIFICANT CHANGE UP
UROBILINOGEN FLD QL: NEGATIVE MG/DL — SIGNIFICANT CHANGE UP
WBC # BLD: 6.4 K/UL — SIGNIFICANT CHANGE UP (ref 4.8–10.8)
WBC # FLD AUTO: 6.4 K/UL — SIGNIFICANT CHANGE UP (ref 4.8–10.8)
WBC UR QL: SIGNIFICANT CHANGE UP

## 2018-01-08 PROCEDURE — 71046 X-RAY EXAM CHEST 2 VIEWS: CPT | Mod: 26

## 2018-01-08 PROCEDURE — 93010 ELECTROCARDIOGRAM REPORT: CPT

## 2018-01-08 RX ORDER — CEFUROXIME AXETIL 250 MG
1500 TABLET ORAL ONCE
Qty: 0 | Refills: 0 | Status: COMPLETED | OUTPATIENT
Start: 2018-01-12 | End: 2018-01-12

## 2018-01-08 NOTE — H&P PST ADULT - NSANTHOSAYNRD_GEN_A_CORE
No. MAGDALENA screening performed.  STOP BANG Legend: 0-2 = LOW Risk; 3-4 = INTERMEDIATE Risk; 5-8 = HIGH Risk

## 2018-01-08 NOTE — H&P PST ADULT - PMH
Acute deep vein thrombosis (DVT) of popliteal vein of both lower extremities    Aortic stenosis    Chronic back pain    Closed fracture of multiple ribs of right side, initial encounter    Essential hypertension    Hyperlipidemia, unspecified hyperlipidemia type    Lung nodule    Pelvic fracture    PNA (pneumonia)  november 2017  Thyroid nodule

## 2018-01-10 ENCOUNTER — APPOINTMENT (OUTPATIENT)
Dept: ORTHOPEDIC SURGERY | Facility: CLINIC | Age: 83
End: 2018-01-10
Payer: MEDICARE

## 2018-01-10 VITALS
HEART RATE: 88 BPM | WEIGHT: 135 LBS | HEIGHT: 57 IN | SYSTOLIC BLOOD PRESSURE: 153 MMHG | BODY MASS INDEX: 29.12 KG/M2 | DIASTOLIC BLOOD PRESSURE: 85 MMHG

## 2018-01-10 DIAGNOSIS — S32.810A MULTIPLE FRACTURES OF PELVIS WITH STABLE DISRUPTION OF PELVIC RING, INITIAL ENCOUNTER FOR CLOSED FRACTURE: ICD-10-CM

## 2018-01-10 LAB
-  AMIKACIN: SIGNIFICANT CHANGE UP
-  AMPICILLIN/SULBACTAM: SIGNIFICANT CHANGE UP
-  AMPICILLIN: SIGNIFICANT CHANGE UP
-  AZTREONAM: SIGNIFICANT CHANGE UP
-  CEFAZOLIN: SIGNIFICANT CHANGE UP
-  CEFEPIME: SIGNIFICANT CHANGE UP
-  CEFOXITIN: SIGNIFICANT CHANGE UP
-  CEFTAZIDIME: SIGNIFICANT CHANGE UP
-  CEFTRIAXONE: SIGNIFICANT CHANGE UP
-  CIPROFLOXACIN: SIGNIFICANT CHANGE UP
-  ERTAPENEM: SIGNIFICANT CHANGE UP
-  GENTAMICIN: SIGNIFICANT CHANGE UP
-  LEVOFLOXACIN: SIGNIFICANT CHANGE UP
-  MEROPENEM: SIGNIFICANT CHANGE UP
-  NITROFURANTOIN: SIGNIFICANT CHANGE UP
-  PIPERACILLIN/TAZOBACTAM: SIGNIFICANT CHANGE UP
-  TOBRAMYCIN: SIGNIFICANT CHANGE UP
-  TRIMETHOPRIM/SULFAMETHOXAZOLE: SIGNIFICANT CHANGE UP
CULTURE RESULTS: SIGNIFICANT CHANGE UP
METHOD TYPE: SIGNIFICANT CHANGE UP
ORGANISM # SPEC MICROSCOPIC CNT: SIGNIFICANT CHANGE UP
ORGANISM # SPEC MICROSCOPIC CNT: SIGNIFICANT CHANGE UP
SPECIMEN SOURCE: SIGNIFICANT CHANGE UP

## 2018-01-10 PROCEDURE — 99024 POSTOP FOLLOW-UP VISIT: CPT

## 2018-01-11 PROCEDURE — 84480 ASSAY TRIIODOTHYRONINE (T3): CPT

## 2018-01-11 PROCEDURE — 87640 STAPH A DNA AMP PROBE: CPT

## 2018-01-11 PROCEDURE — 81001 URINALYSIS AUTO W/SCOPE: CPT

## 2018-01-11 PROCEDURE — 71046 X-RAY EXAM CHEST 2 VIEWS: CPT

## 2018-01-11 PROCEDURE — 93005 ELECTROCARDIOGRAM TRACING: CPT

## 2018-01-11 PROCEDURE — 36415 COLL VENOUS BLD VENIPUNCTURE: CPT

## 2018-01-11 PROCEDURE — 86920 COMPATIBILITY TEST SPIN: CPT

## 2018-01-11 PROCEDURE — 87086 URINE CULTURE/COLONY COUNT: CPT

## 2018-01-11 PROCEDURE — 86901 BLOOD TYPING SEROLOGIC RH(D): CPT

## 2018-01-11 PROCEDURE — 84436 ASSAY OF TOTAL THYROXINE: CPT

## 2018-01-11 PROCEDURE — 87641 MR-STAPH DNA AMP PROBE: CPT

## 2018-01-11 PROCEDURE — 85027 COMPLETE CBC AUTOMATED: CPT

## 2018-01-11 PROCEDURE — 80053 COMPREHEN METABOLIC PANEL: CPT

## 2018-01-11 PROCEDURE — G0463: CPT

## 2018-01-11 PROCEDURE — 86850 RBC ANTIBODY SCREEN: CPT

## 2018-01-11 PROCEDURE — 85730 THROMBOPLASTIN TIME PARTIAL: CPT

## 2018-01-11 PROCEDURE — 86900 BLOOD TYPING SEROLOGIC ABO: CPT

## 2018-01-11 PROCEDURE — 83880 ASSAY OF NATRIURETIC PEPTIDE: CPT

## 2018-01-11 PROCEDURE — 87186 SC STD MICRODIL/AGAR DIL: CPT

## 2018-01-11 PROCEDURE — 84134 ASSAY OF PREALBUMIN: CPT

## 2018-01-11 PROCEDURE — 84443 ASSAY THYROID STIM HORMONE: CPT

## 2018-01-11 PROCEDURE — 85610 PROTHROMBIN TIME: CPT

## 2018-01-12 ENCOUNTER — INPATIENT (INPATIENT)
Facility: HOSPITAL | Age: 83
LOS: 1 days | Discharge: HOME | DRG: 267 | End: 2018-01-14
Attending: THORACIC SURGERY (CARDIOTHORACIC VASCULAR SURGERY) | Admitting: THORACIC SURGERY (CARDIOTHORACIC VASCULAR SURGERY)
Payer: MEDICARE

## 2018-01-12 ENCOUNTER — APPOINTMENT (OUTPATIENT)
Dept: CARDIOTHORACIC SURGERY | Facility: HOSPITAL | Age: 83
End: 2018-01-12

## 2018-01-12 ENCOUNTER — TRANSCRIPTION ENCOUNTER (OUTPATIENT)
Age: 83
End: 2018-01-12

## 2018-01-12 VITALS
HEART RATE: 96 BPM | TEMPERATURE: 98 F | OXYGEN SATURATION: 99 % | RESPIRATION RATE: 16 BRPM | DIASTOLIC BLOOD PRESSURE: 80 MMHG | SYSTOLIC BLOOD PRESSURE: 145 MMHG | HEIGHT: 58 IN | WEIGHT: 136.69 LBS

## 2018-01-12 DIAGNOSIS — I35.0 NONRHEUMATIC AORTIC (VALVE) STENOSIS: ICD-10-CM

## 2018-01-12 DIAGNOSIS — Z98.1 ARTHRODESIS STATUS: Chronic | ICD-10-CM

## 2018-01-12 LAB
ABO RH CONFIRMATION: SIGNIFICANT CHANGE UP
ALBUMIN SERPL ELPH-MCNC: 3.1 G/DL — LOW (ref 3.3–5.2)
ALP SERPL-CCNC: 50 U/L — SIGNIFICANT CHANGE UP (ref 40–120)
ALT FLD-CCNC: 10 U/L — SIGNIFICANT CHANGE UP
ANION GAP SERPL CALC-SCNC: 12 MMOL/L — SIGNIFICANT CHANGE UP (ref 5–17)
APTT BLD: 41.5 SEC — HIGH (ref 27.5–37.4)
AST SERPL-CCNC: 20 U/L — SIGNIFICANT CHANGE UP
BILIRUB SERPL-MCNC: 0.3 MG/DL — LOW (ref 0.4–2)
BUN SERPL-MCNC: 13 MG/DL — SIGNIFICANT CHANGE UP (ref 8–20)
CALCIUM SERPL-MCNC: 7.9 MG/DL — LOW (ref 8.6–10.2)
CHLORIDE SERPL-SCNC: 110 MMOL/L — HIGH (ref 98–107)
CK SERPL-CCNC: 78 U/L — SIGNIFICANT CHANGE UP (ref 25–170)
CO2 SERPL-SCNC: 24 MMOL/L — SIGNIFICANT CHANGE UP (ref 22–29)
CREAT SERPL-MCNC: 0.52 MG/DL — SIGNIFICANT CHANGE UP (ref 0.5–1.3)
GAS PNL BLDA: SIGNIFICANT CHANGE UP
GLUCOSE SERPL-MCNC: 94 MG/DL — SIGNIFICANT CHANGE UP (ref 70–115)
HCT VFR BLD CALC: 31 % — LOW (ref 37–47)
HGB BLD-MCNC: 10.2 G/DL — LOW (ref 12–16)
INR BLD: 1.16 RATIO — SIGNIFICANT CHANGE UP (ref 0.88–1.16)
MAGNESIUM SERPL-MCNC: 1.6 MG/DL — SIGNIFICANT CHANGE UP (ref 1.6–2.6)
MCHC RBC-ENTMCNC: 29 PG — SIGNIFICANT CHANGE UP (ref 27–31)
MCHC RBC-ENTMCNC: 32.9 G/DL — SIGNIFICANT CHANGE UP (ref 32–36)
MCV RBC AUTO: 88.1 FL — SIGNIFICANT CHANGE UP (ref 81–99)
PLATELET # BLD AUTO: 308 K/UL — SIGNIFICANT CHANGE UP (ref 150–400)
POTASSIUM SERPL-MCNC: 3.4 MMOL/L — LOW (ref 3.5–5.3)
POTASSIUM SERPL-SCNC: 3.4 MMOL/L — LOW (ref 3.5–5.3)
PROT SERPL-MCNC: 5.2 G/DL — LOW (ref 6.6–8.7)
PROTHROM AB SERPL-ACNC: 12.8 SEC — HIGH (ref 9.8–12.7)
RBC # BLD: 3.52 M/UL — LOW (ref 4.4–5.2)
RBC # FLD: 14.2 % — SIGNIFICANT CHANGE UP (ref 11–15.6)
SODIUM SERPL-SCNC: 146 MMOL/L — HIGH (ref 135–145)
TROPONIN T SERPL-MCNC: 0.06 NG/ML — SIGNIFICANT CHANGE UP (ref 0–0.06)
WBC # BLD: 4.9 K/UL — SIGNIFICANT CHANGE UP (ref 4.8–10.8)
WBC # FLD AUTO: 4.9 K/UL — SIGNIFICANT CHANGE UP (ref 4.8–10.8)

## 2018-01-12 PROCEDURE — 93010 ELECTROCARDIOGRAM REPORT: CPT

## 2018-01-12 PROCEDURE — 33361 REPLACE AORTIC VALVE PERQ: CPT | Mod: 62,Q0

## 2018-01-12 PROCEDURE — 71045 X-RAY EXAM CHEST 1 VIEW: CPT | Mod: 26

## 2018-01-12 PROCEDURE — 93355 ECHO TRANSESOPHAGEAL (TEE): CPT

## 2018-01-12 RX ORDER — SODIUM CHLORIDE 9 MG/ML
1000 INJECTION INTRAMUSCULAR; INTRAVENOUS; SUBCUTANEOUS
Qty: 0 | Refills: 0 | Status: DISCONTINUED | OUTPATIENT
Start: 2018-01-12 | End: 2018-01-13

## 2018-01-12 RX ORDER — APIXABAN 2.5 MG/1
2.5 TABLET, FILM COATED ORAL EVERY 12 HOURS
Qty: 0 | Refills: 0 | Status: DISCONTINUED | OUTPATIENT
Start: 2018-01-13 | End: 2018-01-14

## 2018-01-12 RX ORDER — PANTOPRAZOLE SODIUM 20 MG/1
40 TABLET, DELAYED RELEASE ORAL
Qty: 0 | Refills: 0 | Status: DISCONTINUED | OUTPATIENT
Start: 2018-01-12 | End: 2018-01-14

## 2018-01-12 RX ORDER — DOCUSATE SODIUM 100 MG
100 CAPSULE ORAL THREE TIMES A DAY
Qty: 0 | Refills: 0 | Status: DISCONTINUED | OUTPATIENT
Start: 2018-01-12 | End: 2018-01-14

## 2018-01-12 RX ORDER — GABAPENTIN 400 MG/1
300 CAPSULE ORAL THREE TIMES A DAY
Qty: 0 | Refills: 0 | Status: DISCONTINUED | OUTPATIENT
Start: 2018-01-12 | End: 2018-01-14

## 2018-01-12 RX ORDER — POTASSIUM CHLORIDE 20 MEQ
20 PACKET (EA) ORAL ONCE
Qty: 0 | Refills: 0 | Status: COMPLETED | OUTPATIENT
Start: 2018-01-12 | End: 2018-01-12

## 2018-01-12 RX ORDER — SODIUM CHLORIDE 9 MG/ML
3 INJECTION INTRAMUSCULAR; INTRAVENOUS; SUBCUTANEOUS EVERY 8 HOURS
Qty: 0 | Refills: 0 | Status: DISCONTINUED | OUTPATIENT
Start: 2018-01-12 | End: 2018-01-12

## 2018-01-12 RX ORDER — ASPIRIN/CALCIUM CARB/MAGNESIUM 324 MG
81 TABLET ORAL DAILY
Qty: 0 | Refills: 0 | Status: DISCONTINUED | OUTPATIENT
Start: 2018-01-12 | End: 2018-01-14

## 2018-01-12 RX ORDER — HYDROMORPHONE HYDROCHLORIDE 2 MG/ML
0.5 INJECTION INTRAMUSCULAR; INTRAVENOUS; SUBCUTANEOUS ONCE
Qty: 0 | Refills: 0 | Status: DISCONTINUED | OUTPATIENT
Start: 2018-01-12 | End: 2018-01-12

## 2018-01-12 RX ORDER — POTASSIUM CHLORIDE 20 MEQ
40 PACKET (EA) ORAL ONCE
Qty: 0 | Refills: 0 | Status: COMPLETED | OUTPATIENT
Start: 2018-01-12 | End: 2018-01-12

## 2018-01-12 RX ORDER — CEFUROXIME AXETIL 250 MG
1500 TABLET ORAL EVERY 8 HOURS
Qty: 0 | Refills: 0 | Status: DISCONTINUED | OUTPATIENT
Start: 2018-01-12 | End: 2018-01-13

## 2018-01-12 RX ORDER — ATORVASTATIN CALCIUM 80 MG/1
20 TABLET, FILM COATED ORAL AT BEDTIME
Qty: 0 | Refills: 0 | Status: DISCONTINUED | OUTPATIENT
Start: 2018-01-12 | End: 2018-01-14

## 2018-01-12 RX ORDER — ACETAMINOPHEN 500 MG
1000 TABLET ORAL ONCE
Qty: 0 | Refills: 0 | Status: DISCONTINUED | OUTPATIENT
Start: 2018-01-12 | End: 2018-01-12

## 2018-01-12 RX ORDER — MAGNESIUM SULFATE 500 MG/ML
2 VIAL (ML) INJECTION ONCE
Qty: 0 | Refills: 0 | Status: COMPLETED | OUTPATIENT
Start: 2018-01-12 | End: 2018-01-12

## 2018-01-12 RX ORDER — MEPERIDINE HYDROCHLORIDE 50 MG/ML
25 INJECTION INTRAMUSCULAR; INTRAVENOUS; SUBCUTANEOUS ONCE
Qty: 0 | Refills: 0 | Status: DISCONTINUED | OUTPATIENT
Start: 2018-01-12 | End: 2018-01-12

## 2018-01-12 RX ADMIN — Medication 40 MILLIEQUIVALENT(S): at 20:42

## 2018-01-12 RX ADMIN — Medication 20 MILLIEQUIVALENT(S): at 19:03

## 2018-01-12 RX ADMIN — HYDROMORPHONE HYDROCHLORIDE 0.5 MILLIGRAM(S): 2 INJECTION INTRAMUSCULAR; INTRAVENOUS; SUBCUTANEOUS at 23:31

## 2018-01-12 RX ADMIN — SODIUM CHLORIDE 3 MILLILITER(S): 9 INJECTION INTRAMUSCULAR; INTRAVENOUS; SUBCUTANEOUS at 17:24

## 2018-01-12 RX ADMIN — Medication 100 MILLIGRAM(S): at 22:30

## 2018-01-12 RX ADMIN — ATORVASTATIN CALCIUM 20 MILLIGRAM(S): 80 TABLET, FILM COATED ORAL at 21:11

## 2018-01-12 RX ADMIN — Medication 100 MILLIGRAM(S): at 21:11

## 2018-01-12 RX ADMIN — GABAPENTIN 300 MILLIGRAM(S): 400 CAPSULE ORAL at 21:11

## 2018-01-12 RX ADMIN — Medication 50 GRAM(S): at 19:02

## 2018-01-12 NOTE — BRIEF OPERATIVE NOTE - POST-OP DX
Chronic diastolic CHF (congestive heart failure), NYHA class 3  01/12/2018    Active  Pierre Moncada  Severe aortic stenosis  01/12/2018    Active  Pierre Moncada

## 2018-01-12 NOTE — BRIEF OPERATIVE NOTE - PROCEDURE
<<-----Click on this checkbox to enter Procedure TAVR, transfemoral approach  01/12/2018  Percutaneous Transfemoral TAVR via Right Common Femoral Artery (23mm Gila S3) (NCT# [id number]) (STS/ACC TVT Registry Patient ID#[id number])  Katy CARR

## 2018-01-12 NOTE — PROGRESS NOTE ADULT - SUBJECTIVE AND OBJECTIVE BOX
Commercial Percutaneous 23mm Isaac Gila S3 Transfemoral TAVR via Right Common Femoral Artery.  NCT# [id number], STS/ACC TVT Registry Patient ID# [id number].

## 2018-01-13 DIAGNOSIS — I35.0 NONRHEUMATIC AORTIC (VALVE) STENOSIS: ICD-10-CM

## 2018-01-13 DIAGNOSIS — I82.433 ACUTE EMBOLISM AND THROMBOSIS OF POPLITEAL VEIN, BILATERAL: ICD-10-CM

## 2018-01-13 DIAGNOSIS — I50.32 CHRONIC DIASTOLIC (CONGESTIVE) HEART FAILURE: ICD-10-CM

## 2018-01-13 DIAGNOSIS — I48.2 CHRONIC ATRIAL FIBRILLATION: ICD-10-CM

## 2018-01-13 DIAGNOSIS — I44.7 LEFT BUNDLE-BRANCH BLOCK, UNSPECIFIED: ICD-10-CM

## 2018-01-13 DIAGNOSIS — I10 ESSENTIAL (PRIMARY) HYPERTENSION: ICD-10-CM

## 2018-01-13 LAB
ALBUMIN SERPL ELPH-MCNC: 3.1 G/DL — LOW (ref 3.3–5.2)
ALP SERPL-CCNC: 56 U/L — SIGNIFICANT CHANGE UP (ref 40–120)
ALT FLD-CCNC: 10 U/L — SIGNIFICANT CHANGE UP
ANION GAP SERPL CALC-SCNC: 9 MMOL/L — SIGNIFICANT CHANGE UP (ref 5–17)
APTT BLD: 32.4 SEC — SIGNIFICANT CHANGE UP (ref 27.5–37.4)
AST SERPL-CCNC: 21 U/L — SIGNIFICANT CHANGE UP
BILIRUB SERPL-MCNC: 0.4 MG/DL — SIGNIFICANT CHANGE UP (ref 0.4–2)
BUN SERPL-MCNC: 13 MG/DL — SIGNIFICANT CHANGE UP (ref 8–20)
CALCIUM SERPL-MCNC: 8.9 MG/DL — SIGNIFICANT CHANGE UP (ref 8.6–10.2)
CHLORIDE SERPL-SCNC: 105 MMOL/L — SIGNIFICANT CHANGE UP (ref 98–107)
CK SERPL-CCNC: 62 U/L — SIGNIFICANT CHANGE UP (ref 25–170)
CO2 SERPL-SCNC: 28 MMOL/L — SIGNIFICANT CHANGE UP (ref 22–29)
CREAT SERPL-MCNC: 0.67 MG/DL — SIGNIFICANT CHANGE UP (ref 0.5–1.3)
GLUCOSE SERPL-MCNC: 92 MG/DL — SIGNIFICANT CHANGE UP (ref 70–115)
HCT VFR BLD CALC: 30.5 % — LOW (ref 37–47)
HGB BLD-MCNC: 9.8 G/DL — LOW (ref 12–16)
INR BLD: 1.08 RATIO — SIGNIFICANT CHANGE UP (ref 0.88–1.16)
MAGNESIUM SERPL-MCNC: 2.3 MG/DL — SIGNIFICANT CHANGE UP (ref 1.6–2.6)
MCHC RBC-ENTMCNC: 28.3 PG — SIGNIFICANT CHANGE UP (ref 27–31)
MCHC RBC-ENTMCNC: 32.1 G/DL — SIGNIFICANT CHANGE UP (ref 32–36)
MCV RBC AUTO: 88.2 FL — SIGNIFICANT CHANGE UP (ref 81–99)
PLATELET # BLD AUTO: 306 K/UL — SIGNIFICANT CHANGE UP (ref 150–400)
POTASSIUM SERPL-MCNC: 4.8 MMOL/L — SIGNIFICANT CHANGE UP (ref 3.5–5.3)
POTASSIUM SERPL-SCNC: 4.8 MMOL/L — SIGNIFICANT CHANGE UP (ref 3.5–5.3)
PROT SERPL-MCNC: 5.5 G/DL — LOW (ref 6.6–8.7)
PROTHROM AB SERPL-ACNC: 11.9 SEC — SIGNIFICANT CHANGE UP (ref 9.8–12.7)
RBC # BLD: 3.46 M/UL — LOW (ref 4.4–5.2)
RBC # FLD: 14.3 % — SIGNIFICANT CHANGE UP (ref 11–15.6)
SODIUM SERPL-SCNC: 142 MMOL/L — SIGNIFICANT CHANGE UP (ref 135–145)
TROPONIN T SERPL-MCNC: 0.08 NG/ML — HIGH (ref 0–0.06)
WBC # BLD: 8.4 K/UL — SIGNIFICANT CHANGE UP (ref 4.8–10.8)
WBC # FLD AUTO: 8.4 K/UL — SIGNIFICANT CHANGE UP (ref 4.8–10.8)

## 2018-01-13 PROCEDURE — 93010 ELECTROCARDIOGRAM REPORT: CPT

## 2018-01-13 PROCEDURE — 93306 TTE W/DOPPLER COMPLETE: CPT | Mod: 26

## 2018-01-13 PROCEDURE — 99231 SBSQ HOSP IP/OBS SF/LOW 25: CPT

## 2018-01-13 PROCEDURE — 71045 X-RAY EXAM CHEST 1 VIEW: CPT | Mod: 26

## 2018-01-13 RX ORDER — VALSARTAN 80 MG/1
40 TABLET ORAL DAILY
Qty: 0 | Refills: 0 | Status: DISCONTINUED | OUTPATIENT
Start: 2018-01-13 | End: 2018-01-14

## 2018-01-13 RX ORDER — SENNA PLUS 8.6 MG/1
2 TABLET ORAL AT BEDTIME
Qty: 0 | Refills: 0 | Status: DISCONTINUED | OUTPATIENT
Start: 2018-01-13 | End: 2018-01-14

## 2018-01-13 RX ADMIN — Medication 100 MILLIGRAM(S): at 13:04

## 2018-01-13 RX ADMIN — APIXABAN 2.5 MILLIGRAM(S): 2.5 TABLET, FILM COATED ORAL at 09:33

## 2018-01-13 RX ADMIN — Medication 100 MILLIGRAM(S): at 07:00

## 2018-01-13 RX ADMIN — PANTOPRAZOLE SODIUM 40 MILLIGRAM(S): 20 TABLET, DELAYED RELEASE ORAL at 09:34

## 2018-01-13 RX ADMIN — Medication 100 MILLIGRAM(S): at 21:10

## 2018-01-13 RX ADMIN — VALSARTAN 40 MILLIGRAM(S): 80 TABLET ORAL at 18:49

## 2018-01-13 RX ADMIN — ATORVASTATIN CALCIUM 20 MILLIGRAM(S): 80 TABLET, FILM COATED ORAL at 21:11

## 2018-01-13 RX ADMIN — Medication 100 MILLIGRAM(S): at 14:08

## 2018-01-13 RX ADMIN — GABAPENTIN 300 MILLIGRAM(S): 400 CAPSULE ORAL at 21:10

## 2018-01-13 RX ADMIN — Medication 81 MILLIGRAM(S): at 13:04

## 2018-01-13 RX ADMIN — Medication 100 MILLIGRAM(S): at 09:33

## 2018-01-13 RX ADMIN — GABAPENTIN 300 MILLIGRAM(S): 400 CAPSULE ORAL at 13:04

## 2018-01-13 RX ADMIN — GABAPENTIN 300 MILLIGRAM(S): 400 CAPSULE ORAL at 09:34

## 2018-01-13 RX ADMIN — APIXABAN 2.5 MILLIGRAM(S): 2.5 TABLET, FILM COATED ORAL at 17:38

## 2018-01-13 NOTE — PROGRESS NOTE ADULT - SUBJECTIVE AND OBJECTIVE BOX
Subjective:  85yFemale POD#1 Perc TF-KINZA via R groin (S3)  postop new LBBB which resolved    "cant wait to go home"  denies chest pain, sob, feve/chills    Past Medical History:  Nonrheumatic aortic valve stenosis  H/o or current diagnosis of HF- Contraindication to ACEI/ARBs  H/o or current diagnosis of HF- ACEI/ARB contraindication unknown  Family history of breast cancer (Mother, Sibling)  No pertinent family history in first degree relatives  Handoff  Aortic stenosis  Closed fracture of multiple ribs of right side, initial encounter  Pelvic fracture  Acute deep vein thrombosis (DVT) of popliteal vein of both lower extremities  Thyroid nodule  Lung nodule  PNA (pneumonia)  Hyperlipidemia, unspecified hyperlipidemia type  Chronic back pain  Essential hypertension  Severe aortic stenosis  Chronic diastolic CHF (congestive heart failure), NYHA class 3  Severe aortic stenosis  Chronic diastolic CHF (congestive heart failure), NYHA class 3  Aortic stenosis  TAVR, transfemoral approach  History of pelvic fracture  S/P spinal fusion  No significant past surgical history  NONRHEUMATIC AORTIC (VALVE) ST        apixaban 2.5 milliGRAM(s) Oral every 12 hours  aspirin enteric coated 81 milliGRAM(s) Oral daily  atorvastatin 20 milliGRAM(s) Oral at bedtime  cefuroxime  IVPB 1500 milliGRAM(s) IV Intermittent every 8 hours  cefuroxime  IVPB 1500 milliGRAM(s) IV Intermittent once  docusate sodium 100 milliGRAM(s) Oral three times a day  gabapentin 300 milliGRAM(s) Oral three times a day  pantoprazole    Tablet 40 milliGRAM(s) Oral before breakfast  sodium chloride 0.9%. 1000 milliLiter(s) IV Continuous <Continuous>  sodium chloride 0.9%. 1000 milliLiter(s) IV Continuous <Continuous>  MEDICATIONS  (PRN):    Height (cm): 147.32 (01-12 @ 09:17)  Weight (kg): 62 (01-12 @ 09:17)  BMI (kg/m2): 28.6 (01-12 @ 09:17)  BSA (m2): 1.55 (01-12 @ 09:17)  Daily Height in cm: 147.32 (12 Jan 2018 09:17)    Daily       ABG - ( 12 Jan 2018 17:04 )  pH: 7.35  /  pCO2: 51    /  pO2: 124   / HCO3: 26    / Base Excess: 2.0   /  SaO2: 99                                      10.2   4.9   )-----------( 308      ( 12 Jan 2018 17:17 )             31.0   01-12    146<H>  |  110<H>  |  13.0  ----------------------------<  94  3.4<L>   |  24.0  |  0.52    Ca    7.9<L>      12 Jan 2018 17:17  Mg     1.6     01-12    TPro  5.2<L>  /  Alb  3.1<L>  /  TBili  0.3<L>  /  DBili  x   /  AST  20  /  ALT  10  /  AlkPhos  50  01-12    CARDIAC MARKERS ( 12 Jan 2018 17:17 )  x     / 0.06 ng/mL / 78 U/L / x     / x        PT/INR - ( 12 Jan 2018 17:17 )   PT: 12.8 sec;   INR: 1.16 ratio         PTT - ( 12 Jan 2018 17:17 )  PTT:41.5 sec      Objective:  T(C): 36.8 (01-12-18 @ 20:00), Max: 36.8 (01-12-18 @ 20:00)  HR: 84 (01-13-18 @ 01:00) (71 - 96)  BP: 117/59 (01-13-18 @ 01:00) (101/50 - 148/91)  RR: 16 (01-13-18 @ 01:00) (14 - 28)  SpO2: 95% (01-13-18 @ 01:00) (80% - 100%)  Wt(kg): --CAPILLARY BLOOD GLUCOSE      I&O's Summary    12 Jan 2018 07:01  -  13 Jan 2018 02:05  --------------------------------------------------------  IN: 160 mL / OUT: 125 mL / NET: 35 mL        Physical Exam:  Neuro:a0x3  Pulm: cta b/l  CV: s1/s2 ireg  Abd: soft nt/nd  Ext: warm, well perfused no edema  Inc: esau c/d/i

## 2018-01-13 NOTE — PHYSICAL THERAPY INITIAL EVALUATION ADULT - ADDITIONAL COMMENTS
owns and uses a RW, 2 steps 1 rail to enter home, sister states will be living with her at D/C for a week or two

## 2018-01-13 NOTE — PHYSICAL THERAPY INITIAL EVALUATION ADULT - GENERAL OBSERVATIONS, REHAB EVAL
pt received sitting in chair at bedside, NAD, agreeable to PT, cardiac PA discussion: agreeable at ambulate pt

## 2018-01-13 NOTE — PHYSICAL THERAPY INITIAL EVALUATION ADULT - PERTINENT HX OF CURRENT PROBLEM, REHAB EVAL
pt presents to [facility] due to s/p 1/12 percutaneous transfemoral-TAVR via right groin, post op new LBBB

## 2018-01-13 NOTE — PHYSICAL THERAPY INITIAL EVALUATION ADULT - GAIT PATTERN USED, PT EVAL
decreased gait velocity and activity tolerance, supervision for safety due to fatigue, O2 sat 95% p ambulation

## 2018-01-13 NOTE — PHYSICAL THERAPY INITIAL EVALUATION ADULT - CRITERIA FOR SKILLED THERAPEUTIC INTERVENTIONS
functional limitations in following categories/therapy frequency/impairments found/anticipated discharge recommendation/predicted duration of therapy intervention/rehab potential

## 2018-01-14 VITALS — RESPIRATION RATE: 29 BRPM | HEART RATE: 98 BPM

## 2018-01-14 DIAGNOSIS — J96.01 ACUTE RESPIRATORY FAILURE WITH HYPOXIA: ICD-10-CM

## 2018-01-14 LAB
ANION GAP SERPL CALC-SCNC: 12 MMOL/L — SIGNIFICANT CHANGE UP (ref 5–17)
BUN SERPL-MCNC: 12 MG/DL — SIGNIFICANT CHANGE UP (ref 8–20)
CALCIUM SERPL-MCNC: 9.4 MG/DL — SIGNIFICANT CHANGE UP (ref 8.6–10.2)
CHLORIDE SERPL-SCNC: 106 MMOL/L — SIGNIFICANT CHANGE UP (ref 98–107)
CO2 SERPL-SCNC: 27 MMOL/L — SIGNIFICANT CHANGE UP (ref 22–29)
CREAT SERPL-MCNC: 0.65 MG/DL — SIGNIFICANT CHANGE UP (ref 0.5–1.3)
GLUCOSE SERPL-MCNC: 98 MG/DL — SIGNIFICANT CHANGE UP (ref 70–115)
HCT VFR BLD CALC: 32.5 % — LOW (ref 37–47)
HGB BLD-MCNC: 10.2 G/DL — LOW (ref 12–16)
MAGNESIUM SERPL-MCNC: 2 MG/DL — SIGNIFICANT CHANGE UP (ref 1.6–2.6)
MCHC RBC-ENTMCNC: 27.9 PG — SIGNIFICANT CHANGE UP (ref 27–31)
MCHC RBC-ENTMCNC: 31.4 G/DL — LOW (ref 32–36)
MCV RBC AUTO: 88.8 FL — SIGNIFICANT CHANGE UP (ref 81–99)
PHOSPHATE SERPL-MCNC: 3.1 MG/DL — SIGNIFICANT CHANGE UP (ref 2.4–4.7)
PLATELET # BLD AUTO: 273 K/UL — SIGNIFICANT CHANGE UP (ref 150–400)
POTASSIUM SERPL-MCNC: 3.9 MMOL/L — SIGNIFICANT CHANGE UP (ref 3.5–5.3)
POTASSIUM SERPL-SCNC: 3.9 MMOL/L — SIGNIFICANT CHANGE UP (ref 3.5–5.3)
RBC # BLD: 3.66 M/UL — LOW (ref 4.4–5.2)
RBC # FLD: 14.3 % — SIGNIFICANT CHANGE UP (ref 11–15.6)
SODIUM SERPL-SCNC: 145 MMOL/L — SIGNIFICANT CHANGE UP (ref 135–145)
WBC # BLD: 7.4 K/UL — SIGNIFICANT CHANGE UP (ref 4.8–10.8)
WBC # FLD AUTO: 7.4 K/UL — SIGNIFICANT CHANGE UP (ref 4.8–10.8)

## 2018-01-14 PROCEDURE — 82947 ASSAY GLUCOSE BLOOD QUANT: CPT

## 2018-01-14 PROCEDURE — 93010 ELECTROCARDIOGRAM REPORT: CPT

## 2018-01-14 PROCEDURE — 99238 HOSP IP/OBS DSCHRG MGMT 30/<: CPT

## 2018-01-14 PROCEDURE — 83605 ASSAY OF LACTIC ACID: CPT

## 2018-01-14 PROCEDURE — 93320 DOPPLER ECHO COMPLETE: CPT

## 2018-01-14 PROCEDURE — 97163 PT EVAL HIGH COMPLEX 45 MIN: CPT

## 2018-01-14 PROCEDURE — 82330 ASSAY OF CALCIUM: CPT

## 2018-01-14 PROCEDURE — 82803 BLOOD GASES ANY COMBINATION: CPT

## 2018-01-14 PROCEDURE — 82550 ASSAY OF CK (CPK): CPT

## 2018-01-14 PROCEDURE — 84100 ASSAY OF PHOSPHORUS: CPT

## 2018-01-14 PROCEDURE — 76000 FLUOROSCOPY <1 HR PHYS/QHP: CPT

## 2018-01-14 PROCEDURE — 84484 ASSAY OF TROPONIN QUANT: CPT

## 2018-01-14 PROCEDURE — C1889: CPT

## 2018-01-14 PROCEDURE — 36415 COLL VENOUS BLD VENIPUNCTURE: CPT

## 2018-01-14 PROCEDURE — 93005 ELECTROCARDIOGRAM TRACING: CPT

## 2018-01-14 PROCEDURE — 80053 COMPREHEN METABOLIC PANEL: CPT

## 2018-01-14 PROCEDURE — 75625 CONTRAST EXAM ABDOMINL AORTA: CPT

## 2018-01-14 PROCEDURE — 80048 BASIC METABOLIC PNL TOTAL CA: CPT

## 2018-01-14 PROCEDURE — 86920 COMPATIBILITY TEST SPIN: CPT

## 2018-01-14 PROCEDURE — C1769: CPT

## 2018-01-14 PROCEDURE — C1887: CPT

## 2018-01-14 PROCEDURE — 85610 PROTHROMBIN TIME: CPT

## 2018-01-14 PROCEDURE — 71045 X-RAY EXAM CHEST 1 VIEW: CPT

## 2018-01-14 PROCEDURE — 83735 ASSAY OF MAGNESIUM: CPT

## 2018-01-14 PROCEDURE — C1760: CPT

## 2018-01-14 PROCEDURE — C1894: CPT

## 2018-01-14 PROCEDURE — 84295 ASSAY OF SERUM SODIUM: CPT

## 2018-01-14 PROCEDURE — 85730 THROMBOPLASTIN TIME PARTIAL: CPT

## 2018-01-14 PROCEDURE — 85027 COMPLETE CBC AUTOMATED: CPT

## 2018-01-14 PROCEDURE — 93312 ECHO TRANSESOPHAGEAL: CPT

## 2018-01-14 PROCEDURE — 93325 DOPPLER ECHO COLOR FLOW MAPG: CPT

## 2018-01-14 PROCEDURE — 71045 X-RAY EXAM CHEST 1 VIEW: CPT | Mod: 26

## 2018-01-14 PROCEDURE — 93306 TTE W/DOPPLER COMPLETE: CPT

## 2018-01-14 PROCEDURE — 84132 ASSAY OF SERUM POTASSIUM: CPT

## 2018-01-14 PROCEDURE — 82435 ASSAY OF BLOOD CHLORIDE: CPT

## 2018-01-14 PROCEDURE — 85014 HEMATOCRIT: CPT

## 2018-01-14 RX ORDER — POTASSIUM CHLORIDE 20 MEQ
40 PACKET (EA) ORAL ONCE
Qty: 0 | Refills: 0 | Status: COMPLETED | OUTPATIENT
Start: 2018-01-14 | End: 2018-01-14

## 2018-01-14 RX ORDER — DILTIAZEM HCL 120 MG
1 CAPSULE, EXT RELEASE 24 HR ORAL
Qty: 0 | Refills: 0 | COMMUNITY

## 2018-01-14 RX ORDER — MAGNESIUM OXIDE 400 MG ORAL TABLET 241.3 MG
400 TABLET ORAL ONCE
Qty: 0 | Refills: 0 | Status: COMPLETED | OUTPATIENT
Start: 2018-01-14 | End: 2018-01-14

## 2018-01-14 RX ORDER — PANTOPRAZOLE SODIUM 20 MG/1
1 TABLET, DELAYED RELEASE ORAL
Qty: 0 | Refills: 0 | COMMUNITY

## 2018-01-14 RX ADMIN — Medication 40 MILLIEQUIVALENT(S): at 06:46

## 2018-01-14 RX ADMIN — Medication 100 MILLIGRAM(S): at 06:47

## 2018-01-14 RX ADMIN — VALSARTAN 40 MILLIGRAM(S): 80 TABLET ORAL at 06:47

## 2018-01-14 RX ADMIN — GABAPENTIN 300 MILLIGRAM(S): 400 CAPSULE ORAL at 12:18

## 2018-01-14 RX ADMIN — PANTOPRAZOLE SODIUM 40 MILLIGRAM(S): 20 TABLET, DELAYED RELEASE ORAL at 06:47

## 2018-01-14 RX ADMIN — SENNA PLUS 2 TABLET(S): 8.6 TABLET ORAL at 06:36

## 2018-01-14 RX ADMIN — Medication 100 MILLIGRAM(S): at 12:18

## 2018-01-14 RX ADMIN — GABAPENTIN 300 MILLIGRAM(S): 400 CAPSULE ORAL at 06:46

## 2018-01-14 RX ADMIN — Medication 81 MILLIGRAM(S): at 12:17

## 2018-01-14 RX ADMIN — APIXABAN 2.5 MILLIGRAM(S): 2.5 TABLET, FILM COATED ORAL at 06:47

## 2018-01-14 RX ADMIN — MAGNESIUM OXIDE 400 MG ORAL TABLET 400 MILLIGRAM(S): 241.3 TABLET ORAL at 06:46

## 2018-01-14 NOTE — PROGRESS NOTE ADULT - RS GEN PE MLT RESP DETAILS PC
breath sounds equal/respirations non-labored/airway patent/good air movement/clear to auscultation bilaterally

## 2018-01-14 NOTE — PROGRESS NOTE ADULT - PROBLEM SELECTOR PLAN 7
desaturations overnight   02 reapplied  continue pulse oximetry  discuss with team in am   possible home 02

## 2018-01-14 NOTE — PROGRESS NOTE ADULT - ASSESSMENT
85F  Severe AS, chronic diastolic CHF, HTN, HLD, AFib on A/C, DVT, spinal fusion now POD#1 Perc TF-KINZA via R groin (S3)  postop new LBBB.
85F  Severe AS, chronic diastolic CHF with [place] class 3 symptoms, HTN, HLD, AFib on A/C, DVT, spinal fusion now POD#1 Perc TF-KINZA via R groin (S3)  postop new LBBB, resolved and right before discharge patient noted to have LBB; discharge held at that time for increased observation

## 2018-01-14 NOTE — DISCHARGE NOTE ADULT - SECONDARY DIAGNOSIS.
Chronic diastolic heart failure Atrial fibrillation, chronic Acute deep vein thrombosis (DVT) of popliteal vein of both lower extremities LBBB (left bundle branch block) Essential hypertension

## 2018-01-14 NOTE — DISCHARGE NOTE ADULT - ADDITIONAL INSTRUCTIONS
Please follow up with your Cardiologist and Primary care Doctor 4 weeks from discharge  Follow up appointment with Dr Aragon at the Cardiac surgery office second floor at [facility]   Please call RENATO at [phone] to make an appointment for follow up

## 2018-01-14 NOTE — DISCHARGE NOTE ADULT - INSTRUCTIONS
Wash incisions with soap and water in shower daily, apply friction to release scab formation.  Vinod and Vinod Baby Soap is a nice mild soap to consider.  Do not use any ointment or lotions on your insicions.  Please call the office at [phone] if you have any drainage or concern about your wounds.  Please go to the ED for fevers, chills. Choose lean meats and poultry without skin and prepare them without added saturated and trans fat.  Eat fish at least twice a week. Recent research shows that eating oily fish containing omega-3 fatty acids (for example, salmon, trout and herring) may help lower your risk of death from coronary artery disease.  Select fat-free, 1 percent fat and low-fat dairy products.  Cut back on foods containing partially hydrogenated vegetable oils to reduce trans fat in your diet.   To lower cholesterol, reduce saturated fat to no more than 5 to 6 percent of total calories. For someone eating 2,000 calories a day, that’s about 13 grams of saturated fat.  Cut back on beverages and foods with added sugars.  Choose and prepare foods with little or no salt. To lower blood pressure, aim to eat no more than 2,400 milligrams of sodium per day. Reducing daily intake to 1,500 mg is desirable because it can lower blood pressure even further.  If you drink alcohol, drink in moderation. That means one drink per day if you’re a woman and two drinks  per day if you’re a man.  Follow the American Heart Association recommendations when you eat out, and keep an eye on your portion sizes.

## 2018-01-14 NOTE — DISCHARGE NOTE ADULT - CARE PLAN
Principal Discharge DX:	Nonrheumatic aortic valve stenosis  Goal:	remain event free  Instructions for follow-up, activity and diet:	Please take all of your medications as prescribed.  If you have any questions about your medications please feel to call our office at [phone].   Wash incisions with soap and water in shower daily, apply friction to release scab formation.  Vinod and Vinod Baby Soap is a nice mild soap to consider.  Do not use any ointment or lotions on your insicions.  Please call the office at [phone] if you have any drainage or concern about your wounds.  Please go to the ED for fevers, chills.   Please come to ED or Call Cardio thoracic office at [phone] if Chest pain, Shortness of Breath, persistant Nausea & vomiting, oozing from wounds, 2 lb increase in weight in 24 hours.   Please follow up with your Cardiologist and Primary care Doctor 4 weeks from discharge   Follow up appointment with Dr Aragon at the Cardiac surgery office second floor at [facility]. Please call [phone] and speak to RENATO to make an appointment.  Secondary Diagnosis:	Chronic diastolic heart failure  Instructions for follow-up, activity and diet:	You are being discharged with a diagnosis of heart failure. To prevent exacerbation of congestive heart failure (CHF) it is important to follow a heart-healthy, LOW SODIUM diet. You should read all food labels and keep your sodium intake less than 1500 mg per day. Examples of high sodium foods include fast food or processed food (ie frozen TV dinners), chinese food, soup and regular cold cuts. You should also restrict your fluid intake to less than 1-1.5 liters per day. Please weigh yourself every day at the same time in the morning and document your weight in a weight log. Please call your doctor right away if you gain over 2-3 pounds in one day, or you notice an increase in leg swelling, abdominal swelling or shortness of breath. Making sure you take all of your medications as prescribed and maintaining a normal blood pressure are also important for preventing a CHF exacerbation.  Secondary Diagnosis:	Atrial fibrillation, chronic  Secondary Diagnosis:	Acute deep vein thrombosis (DVT) of popliteal vein of both lower extremities  Secondary Diagnosis:	LBBB (left bundle branch block)  Secondary Diagnosis:	Essential hypertension

## 2018-01-14 NOTE — DISCHARGE NOTE ADULT - PLAN OF CARE
remain event free Please take all of your medications as prescribed.  If you have any questions about your medications please feel to call our office at [phone].   Wash incisions with soap and water in shower daily, apply friction to release scab formation.  Vinod and Vinod Baby Soap is a nice mild soap to consider.  Do not use any ointment or lotions on your insicions.  Please call the office at [phone] if you have any drainage or concern about your wounds.  Please go to the ED for fevers, chills.   Please come to ED or Call Cardio thoracic office at [phone] if Chest pain, Shortness of Breath, persistant Nausea & vomiting, oozing from wounds, 2 lb increase in weight in 24 hours.   Please follow up with your Cardiologist and Primary care Doctor 4 weeks from discharge   Follow up appointment with Dr Aragon at the Cardiac surgery office second floor at [facility]. Please call [phone] and speak to RENATO to make an appointment. You are being discharged with a diagnosis of heart failure. To prevent exacerbation of congestive heart failure (CHF) it is important to follow a heart-healthy, LOW SODIUM diet. You should read all food labels and keep your sodium intake less than 1500 mg per day. Examples of high sodium foods include fast food or processed food (ie frozen TV dinners), chinese food, soup and regular cold cuts. You should also restrict your fluid intake to less than 1-1.5 liters per day. Please weigh yourself every day at the same time in the morning and document your weight in a weight log. Please call your doctor right away if you gain over 2-3 pounds in one day, or you notice an increase in leg swelling, abdominal swelling or shortness of breath. Making sure you take all of your medications as prescribed and maintaining a normal blood pressure are also important for preventing a CHF exacerbation.

## 2018-01-14 NOTE — DISCHARGE NOTE ADULT - CARE PROVIDERS DIRECT ADDRESSES
[email or web address]

## 2018-01-14 NOTE — PROGRESS NOTE ADULT - PROBLEM SELECTOR PROBLEM 5
Acute deep vein thrombosis (DVT) of popliteal vein of both lower extremities
Acute deep vein thrombosis (DVT) of popliteal vein of both lower extremities

## 2018-01-14 NOTE — DISCHARGE NOTE ADULT - NS AS ACTIVITY OBS
Stairs allowed/Showering allowed/Walking-Indoors allowed/No Heavy lifting/straining/Wash incisions with soap and water in shower daily, apply friction to release scab formation.  Vinod and Vinod Baby Soap is a nice mild soap to consider.  Do not use any ointment or lotions on your insicions.  Please call the office at [phone] if you have any drainage or concern about your wounds.  Please go to the ED for fevers, chills./Do not make important decisions/Do not drive or operate machinery

## 2018-01-14 NOTE — PROGRESS NOTE ADULT - SUBJECTIVE AND OBJECTIVE BOX
Subjective:  "I wanted to die when they took out that line in my groin, you would think you should have better technology than this"  patient without any acute complaints at this time   patient denies any groin pain, chest pain, palpitations, SOb or N/V.        T(C): 37.1 (01-13-18 @ 21:00), Max: 37.2 (01-13-18 @ 12:00)  HR: 98 (01-14-18 @ 04:00) (92 - 113)  BP: 159/80 (01-14-18 @ 04:00) (105/60 - 159/80)  ABP: 133/60 (01-13-18 @ 12:00) (103/39 - 133/60)  ABP(mean): 87 (01-13-18 @ 12:00) (61 - 89)  RR: 19 (01-14-18 @ 04:00) (7 - 27)  SpO2: 92% (01-14-18 @ 04:00) (90% - 97%)      01-14    145  |  106  |  12.0  ----------------------------<  98  3.9   |  27.0  |  0.65    Ca    9.4      14 Jan 2018 03:55  Phos  3.1     01-14  Mg     2.0     01-14    TPro  5.5<L>  /  Alb  3.1<L>  /  TBili  0.4  /  DBili  x   /  AST  21  /  ALT  10  /  AlkPhos  56  01-13                        9.8    8.4   )-----------( 306      ( 13 Jan 2018 03:26 )             30.5     PT/INR - ( 13 Jan 2018 03:26 )   PT: 11.9 sec;   INR: 1.08 ratio         PTT - ( 13 Jan 2018 03:26 )  PTT:32.4 sec        CAPILLARY BLOOD GLUCOSE                ABG - ( 12 Jan 2018 17:04 )  pH: 7.35  /  pCO2: 51    /  pO2: 124   / HCO3: 26    / Base Excess: 2.0   /  SaO2: 99        CXR:  < from: Xray Chest 1 View AP- PORTABLE-Urgent (01.13.18 @ 08:37) >    Comparison: 1/13/2018    AP radiograph of the chest demonstrates subsegmental atelectasis at the   lung bases. The cardiac silhouette is normal in size. Osseous structures   show dextroscoliosis..    Impression:Subsegmental atelectasis is seen at the lung bases.    < end of copied text >      I&O's Detail    12 Jan 2018 07:01  -  13 Jan 2018 07:00  --------------------------------------------------------  IN:    sodium chloride 0.9%: 60 mL    Solution: 50 mL    Solution: 100 mL  Total IN: 210 mL    OUT:    Voided: 125 mL  Total OUT: 125 mL    Total NET: 85 mL      13 Jan 2018 07:01  -  14 Jan 2018 04:38  --------------------------------------------------------  IN:    Oral Fluid: 420 mL  Total IN: 420 mL    OUT:    Voided: 500 mL  Total OUT: 500 mL    Total NET: -80 mL        Drug Dosing Weight  Height (cm): 147.32 (12 Jan 2018 09:17)  Weight (kg): 62 (12 Jan 2018 09:17)  BMI (kg/m2): 28.6 (12 Jan 2018 09:17)  BSA (m2): 1.55 (12 Jan 2018 09:17)    MEDICATIONS  (STANDING):  apixaban 2.5 milliGRAM(s) Oral every 12 hours  aspirin enteric coated 81 milliGRAM(s) Oral daily  atorvastatin 20 milliGRAM(s) Oral at bedtime  docusate sodium 100 milliGRAM(s) Oral three times a day  gabapentin 300 milliGRAM(s) Oral three times a day  magnesium oxide 400 milliGRAM(s) Oral once  pantoprazole    Tablet 40 milliGRAM(s) Oral before breakfast  potassium chloride   Powder 40 milliEquivalent(s) Oral once  senna 2 Tablet(s) Oral at bedtime  valsartan 40 milliGRAM(s) Oral daily    MEDICATIONS  (PRN):      PAST MEDICAL & SURGICAL HISTORY:  Aortic stenosis  Closed fracture of multiple ribs of right side, initial encounter  Pelvic fracture  Acute deep vein thrombosis (DVT) of popliteal vein of both lower extremities  Thyroid nodule  Lung nodule  PNA (pneumonia): november 2017  Hyperlipidemia, unspecified hyperlipidemia type  Chronic back pain  Essential hypertension  S/P spinal fusion: L spines

## 2018-01-14 NOTE — PROGRESS NOTE ADULT - PROBLEM SELECTOR PLAN 1
restart home meds  TTE in AM
home medications as tolerated  continue cardiac monitoring  hold AV navarro agents for LBB

## 2018-01-14 NOTE — DISCHARGE NOTE ADULT - PATIENT PORTAL LINK FT
“You can access the [employer] Patient Portal, offered by [facility], by registering with the following website: [email or web address]”

## 2018-01-14 NOTE — DISCHARGE NOTE ADULT - CARE PROVIDER_API CALL
Amador Aragon), Surgery; Thoracic and Cardiac Surgery  [address] [place] [address]  Phone: [phone]  Fax: [phone]    Yesika Rubio), Cardiology; Internal Medicine  [address] [id number]  Phone: [phone]  Fax: [phone]

## 2018-01-14 NOTE — DISCHARGE NOTE ADULT - MEDICATION SUMMARY - MEDICATIONS TO TAKE
I will START or STAY ON the medications listed below when I get home from the hospital:    traMADol 50 mg oral tablet  -- 1 tab(s) by mouth every 6 hours, As needed, Moderate Pain (4 - 6)  -- Indication: For for pain    aspirin 81 mg oral delayed release tablet  -- 1 tab(s) by mouth once a day  -- Indication: For for your TAVR prophylaxis    oxyCODONE-acetaminophen 5 mg-325 mg oral tablet  -- 1 tab(s) by mouth 3 times a day, As Needed  -- Indication: For for pain    valsartan 40 mg oral tablet  -- 1 tab(s) by mouth once a day  -- Indication: For blood pressure    apixaban 2.5 mg oral tablet  -- 1 tab(s) by mouth every 12 hours  -- Indication: For for your TAVR prophylaxis    gabapentin 100 mg oral capsule  -- 1 cap(s) by mouth 3 times a day  -- Indication: For for your pain    atorvastatin 20 mg oral tablet  -- 1 tab(s) by mouth once a day (at bedtime)  -- Indication: For fpr high cholesterol    Miacalcin Nasal 200 intl units/inh nasal spray  -- 1 spray(s) into nose once a day  -- Indication: For for decongestant     furosemide 40 mg oral tablet  -- 1 tab(s) by mouth once a day  -- Indication: For for your water retnetion     furosemide 20 mg oral tablet  -- 1 tab(s) by mouth once a day (in the evening)  -- Indication: For fpr your water retention     docusate sodium 100 mg oral capsule  -- 1 cap(s) by mouth 2 times a day  -- Indication: For for your constipation     senna oral tablet  -- 2 tab(s) by mouth once a day (at bedtime)  -- Indication: For for your constipation    cyclobenzaprine 5 mg oral tablet  -- 1 tab(s) by mouth 3 times a day, As needed, Muscle Spasm  -- Indication: For for your muscle spasm

## 2018-01-14 NOTE — DISCHARGE NOTE ADULT - HOSPITAL COURSE
85F  Severe AS, chronic diastolic CHF with [place] class 3 symptoms, HTN, HLD, AFib on A/C, DVT, spinal fusion now POD#1 Perc TF-KINZA via R groin (S3)  postop new LBBB, resolved and right before discharge patient noted to have LBB; discharge held at that time for increased observation

## 2018-01-17 ENCOUNTER — APPOINTMENT (OUTPATIENT)
Dept: CARDIOLOGY | Facility: CLINIC | Age: 83
End: 2018-01-17
Payer: MEDICARE

## 2018-01-17 ENCOUNTER — NON-APPOINTMENT (OUTPATIENT)
Age: 83
End: 2018-01-17

## 2018-01-17 VITALS
OXYGEN SATURATION: 97 % | SYSTOLIC BLOOD PRESSURE: 118 MMHG | BODY MASS INDEX: 27.92 KG/M2 | DIASTOLIC BLOOD PRESSURE: 86 MMHG | HEART RATE: 108 BPM | WEIGHT: 129 LBS

## 2018-01-17 DIAGNOSIS — I20.9 ANGINA PECTORIS, UNSPECIFIED: ICD-10-CM

## 2018-01-17 DIAGNOSIS — Z51.89 ENCOUNTER FOR OTHER SPECIFIED AFTERCARE: ICD-10-CM

## 2018-01-17 PROCEDURE — 93000 ELECTROCARDIOGRAM COMPLETE: CPT

## 2018-01-17 PROCEDURE — 99215 OFFICE O/P EST HI 40 MIN: CPT

## 2018-01-19 ENCOUNTER — CLINICAL ADVICE (OUTPATIENT)
Age: 83
End: 2018-01-19

## 2018-01-30 RX ORDER — APIXABAN 2.5 MG/1
2.5 TABLET, FILM COATED ORAL
Qty: 120 | Refills: 4 | Status: DISCONTINUED | COMMUNITY
End: 2018-01-30

## 2018-02-06 ENCOUNTER — APPOINTMENT (OUTPATIENT)
Dept: CARDIOTHORACIC SURGERY | Facility: CLINIC | Age: 83
End: 2018-02-06
Payer: MEDICARE

## 2018-02-06 VITALS
DIASTOLIC BLOOD PRESSURE: 80 MMHG | BODY MASS INDEX: 27.29 KG/M2 | HEIGHT: 58 IN | OXYGEN SATURATION: 95 % | SYSTOLIC BLOOD PRESSURE: 140 MMHG | RESPIRATION RATE: 16 BRPM | HEART RATE: 88 BPM | WEIGHT: 130 LBS

## 2018-02-06 PROCEDURE — 99024 POSTOP FOLLOW-UP VISIT: CPT

## 2018-02-11 NOTE — CONSULT LETTER
[Dear  ___] : Dear  [unfilled], [Courtesy Letter:] : I had the pleasure of seeing your patient, [unfilled], in my office today. [Please see my note below.] : Please see my note below. [Consult Closing:] : Thank you very much for allowing me to participate in the care of this patient.  If you have any questions, please do not hesitate to contact me. [Sincerely,] : Sincerely, [Amador Aragon MD] : Amador Aragon MD [Chief] : Chief [[facility]] : [facility] [FreeTextEntry2] : Dr. Yesika Rubio\par [address] [place] [address]\[place] [address]

## 2018-02-11 NOTE — ASSESSMENT
[FreeTextEntry1] : Very pleasant patient who is  status post  a transcatheter aortic valve  replacement (TAVR) on 1/12/18.  The patient is currently a home doing well.  Patient  denies shortness of breath, or chest pain. The patient is becoming more active. On exam, all the vital signs are stable. The lungs are clear. There is a very soft systolic murmur. All the incisions are healing nicely, and there is no evidence of infection. I have advised the patient to continue with physical activity. I have also asked the patient to follow-up with me in one year with a repeat echocardiogram. Thank you for the opportunity to participate in the care of your patient.  Please do not hesitate to call if I can be of further help.

## 2018-02-14 ENCOUNTER — APPOINTMENT (OUTPATIENT)
Dept: CARDIOLOGY | Facility: CLINIC | Age: 83
End: 2018-02-14
Payer: MEDICARE

## 2018-02-14 VITALS
SYSTOLIC BLOOD PRESSURE: 150 MMHG | HEIGHT: 58 IN | HEART RATE: 87 BPM | DIASTOLIC BLOOD PRESSURE: 80 MMHG | OXYGEN SATURATION: 95 %

## 2018-02-14 PROCEDURE — 99215 OFFICE O/P EST HI 40 MIN: CPT

## 2018-02-14 RX ORDER — ASPIRIN/ACETAMINOPHEN/CAFFEINE 500-325-65
325 POWDER IN PACKET (EA) ORAL
Refills: 0 | Status: DISCONTINUED | COMMUNITY
End: 2018-02-14

## 2018-02-16 ENCOUNTER — CLINICAL ADVICE (OUTPATIENT)
Age: 83
End: 2018-02-16

## 2018-02-26 ENCOUNTER — APPOINTMENT (OUTPATIENT)
Dept: CARDIOLOGY | Facility: CLINIC | Age: 83
End: 2018-02-26

## 2018-03-09 ENCOUNTER — MEDICATION RENEWAL (OUTPATIENT)
Age: 83
End: 2018-03-09

## 2018-03-14 ENCOUNTER — TRANSCRIPTION ENCOUNTER (OUTPATIENT)
Age: 83
End: 2018-03-14

## 2018-03-28 ENCOUNTER — NON-APPOINTMENT (OUTPATIENT)
Age: 83
End: 2018-03-28

## 2018-03-28 ENCOUNTER — APPOINTMENT (OUTPATIENT)
Dept: CARDIOLOGY | Facility: CLINIC | Age: 83
End: 2018-03-28
Payer: MEDICARE

## 2018-03-28 VITALS — SYSTOLIC BLOOD PRESSURE: 138 MMHG | BODY MASS INDEX: 28.63 KG/M2 | WEIGHT: 137 LBS | DIASTOLIC BLOOD PRESSURE: 68 MMHG

## 2018-03-28 PROCEDURE — 93000 ELECTROCARDIOGRAM COMPLETE: CPT

## 2018-03-28 PROCEDURE — 99214 OFFICE O/P EST MOD 30 MIN: CPT | Mod: 25

## 2018-03-28 RX ORDER — RIVAROXABAN 15 MG/1
15 TABLET, FILM COATED ORAL
Qty: 90 | Refills: 1 | Status: DISCONTINUED | COMMUNITY
End: 2018-03-28

## 2018-03-28 RX ORDER — PANTOPRAZOLE 40 MG/1
40 TABLET, DELAYED RELEASE ORAL
Qty: 90 | Refills: 3 | Status: DISCONTINUED | COMMUNITY
Start: 2017-11-08 | End: 2018-03-28

## 2018-03-31 ENCOUNTER — TRANSCRIPTION ENCOUNTER (OUTPATIENT)
Age: 83
End: 2018-03-31

## 2018-04-14 NOTE — PHYSICAL THERAPY INITIAL EVALUATION ADULT - SITTING BALANCE: STATIC
Subjective:  patient of dr Arteaga, Dr Ramos asked me to follow up, he had a cardiac cath by dr murillo, multi vessel pci was recommended, he was kept under observation to follow up with post cath status and manage his bp which was elevated. no new event in the last 24 h, bp has been well stable, no new cardiac event    MEDICATIONS  (STANDING):  apixaban 2.5 milliGRAM(s) Oral every 12 hours  aspirin  chewable 81 milliGRAM(s) Oral daily  atorvastatin 40 milliGRAM(s) Oral at bedtime  clopidogrel Tablet 75 milliGRAM(s) Oral daily  dextrose 5%. 1000 milliLiter(s) (50 mL/Hr) IV Continuous <Continuous>  dextrose 50% Injectable 12.5 Gram(s) IV Push once  dextrose 50% Injectable 25 Gram(s) IV Push once  dextrose 50% Injectable 25 Gram(s) IV Push once  dofetilide 500 MICROGram(s) Oral every 12 hours  famotidine    Tablet 20 milliGRAM(s) Oral two times a day  furosemide    Tablet 40 milliGRAM(s) Oral daily  glipiZIDE XL. 10 milliGRAM(s) Oral with breakfast  guaiFENesin  milliGRAM(s) Oral every 12 hours  insulin lispro (HumaLOG) corrective regimen sliding scale   SubCutaneous three times a day before meals  lisinopril 20 milliGRAM(s) Oral daily  metoprolol tartrate 75 milliGRAM(s) Oral two times a day  NIFEdipine XL 60 milliGRAM(s) Oral every 24 hours  nitroglycerin  Infusion 10 MICROgram(s)/Min (3 mL/Hr) IV Continuous <Continuous>  potassium chloride    Tablet ER 20 milliEquivalent(s) Oral daily    MEDICATIONS  (PRN):  dextrose Gel 1 Dose(s) Oral once PRN Blood Glucose LESS THAN 70 milliGRAM(s)/deciliter  glucagon  Injectable 1 milliGRAM(s) IntraMuscular once PRN Glucose LESS THAN 70 milligrams/deciliter            Vital Signs Last 24 Hrs  T(C): 36.7 (14 Apr 2018 08:00), Max: 36.9 (13 Apr 2018 19:45)  T(F): 98 (14 Apr 2018 08:00), Max: 98.4 (13 Apr 2018 19:45)  HR: 60 (14 Apr 2018 10:00) (58 - 84)  BP: 123/57 (14 Apr 2018 11:15) (107/48 - 186/70)  BP(mean): 83 (14 Apr 2018 11:15) (65 - 127)  RR: 19 (14 Apr 2018 10:00) (10 - 27)  SpO2: 97% (14 Apr 2018 04:00) (95% - 98%)             REVIEW OF SYSTEMS:  no chest pain, sob, palpitation  no bleeding  no nausea or vomiting   no abdominal pain  no leg edema  limited memory             PHYSICAL EXAM:  · CONSTITUTIONAL: Looks stable  . NECK: Supple, no JVD, no bruit   · RESPIRATORY: Normal air entry to lung base, no wheeze, no crackle, no wet rales  · CARDIOVASCULAR: Normal S1, A2, P2, no S3 or gallop  · EXTREMITIES: No cyanosis, no clubbing, no edema  · VASCULAR: Pulses are regular, equal, bilateral in upper and lower extremities  	  TELEMETRY:    ECG: < from: 12 Lead ECG (04.13.18 @ 09:21) >  Diagnosis Line Atrial-sensed ventricular-paced rhythm  Biventricular pacemaker detected    < end of copied text >      TTE:< from: Transthoracic Echocardiogram (04.12.18 @ 07:07) >   1. Normal global left ventricular systolic function.   2. LV Ejection Fraction by Raya's Method with a biplane EF of 53 %.   3. Spectral Doppler shows impaired relaxation pattern of left   ventricular myocardial filling (Grade I diastolic dysfunction).    < end of copied text >   ef calculated 35% in official report but dictated normal?! EF is low by my visual assessment    LABS:                        13.2   8.31  )-----------( 216      ( 14 Apr 2018 12:04 )             39.6     04-14    140  |  103  |  15  ----------------------------<  219<H>  4.1   |  20  |  1.2    Ca    8.3<L>      14 Apr 2018 12:04  Mg     1.9     04-14          PT/INR - ( 13 Apr 2018 04:59 )   PT: 11.20 sec;   INR: 1.04 ratio         PTT - ( 13 Apr 2018 04:59 )  PTT:43.2 sec    I&O's Summary    13 Apr 2018 07:01  -  14 Apr 2018 07:00  --------------------------------------------------------  IN: 751.5 mL / OUT: 1550 mL / NET: -798.5 mL    14 Apr 2018 07:01  -  14 Apr 2018 13:32  --------------------------------------------------------  IN: 276 mL / OUT: 550 mL / NET: -274 mL      BNP  RADIOLOGY & ADDITIONAL STUDIES:     IMPRESSION AND PLAN:  < from: Xray Chest 1 View- PORTABLE-Routine (04.12.18 @ 11:44) >    Left-sided ICDwith additional ventricular pacing wire, stable position      < end of copied text > normal balance

## 2018-05-16 ENCOUNTER — APPOINTMENT (OUTPATIENT)
Dept: CARDIOLOGY | Facility: CLINIC | Age: 83
End: 2018-05-16
Payer: MEDICARE

## 2018-05-16 ENCOUNTER — NON-APPOINTMENT (OUTPATIENT)
Age: 83
End: 2018-05-16

## 2018-05-16 VITALS
SYSTOLIC BLOOD PRESSURE: 140 MMHG | WEIGHT: 132 LBS | DIASTOLIC BLOOD PRESSURE: 72 MMHG | HEART RATE: 85 BPM | BODY MASS INDEX: 27.59 KG/M2 | OXYGEN SATURATION: 95 %

## 2018-05-16 PROCEDURE — 99214 OFFICE O/P EST MOD 30 MIN: CPT | Mod: 25

## 2018-05-16 PROCEDURE — 93000 ELECTROCARDIOGRAM COMPLETE: CPT

## 2018-05-21 LAB — NT-PROBNP SERPL-MCNC: 608 PG/ML

## 2018-05-23 ENCOUNTER — APPOINTMENT (OUTPATIENT)
Dept: PULMONOLOGY | Facility: CLINIC | Age: 83
End: 2018-05-23
Payer: MEDICARE

## 2018-05-23 VITALS — BODY MASS INDEX: 27.59 KG/M2 | WEIGHT: 132 LBS | SYSTOLIC BLOOD PRESSURE: 140 MMHG | DIASTOLIC BLOOD PRESSURE: 80 MMHG

## 2018-05-23 VITALS — OXYGEN SATURATION: 96 % | HEART RATE: 68 BPM

## 2018-05-23 DIAGNOSIS — M41.9 SCOLIOSIS, UNSPECIFIED: ICD-10-CM

## 2018-05-23 PROCEDURE — 99204 OFFICE O/P NEW MOD 45 MIN: CPT | Mod: 25

## 2018-05-23 PROCEDURE — 94060 EVALUATION OF WHEEZING: CPT

## 2018-05-23 PROCEDURE — 94664 DEMO&/EVAL PT USE INHALER: CPT | Mod: 59

## 2018-06-27 ENCOUNTER — APPOINTMENT (OUTPATIENT)
Dept: ORTHOPEDIC SURGERY | Facility: CLINIC | Age: 83
End: 2018-06-27
Payer: MEDICARE

## 2018-06-27 VITALS
WEIGHT: 132 LBS | DIASTOLIC BLOOD PRESSURE: 72 MMHG | BODY MASS INDEX: 27.71 KG/M2 | SYSTOLIC BLOOD PRESSURE: 166 MMHG | HEART RATE: 89 BPM | HEIGHT: 58 IN

## 2018-06-27 DIAGNOSIS — Z87.39 PERSONAL HISTORY OF OTHER DISEASES OF THE MUSCULOSKELETAL SYSTEM AND CONNECTIVE TISSUE: ICD-10-CM

## 2018-06-27 DIAGNOSIS — M16.10 UNILATERAL PRIMARY OSTEOARTHRITIS, UNSPECIFIED HIP: ICD-10-CM

## 2018-06-27 DIAGNOSIS — M41.9 SCOLIOSIS, UNSPECIFIED: ICD-10-CM

## 2018-06-27 PROCEDURE — 99214 OFFICE O/P EST MOD 30 MIN: CPT

## 2018-06-27 PROCEDURE — 72100 X-RAY EXAM L-S SPINE 2/3 VWS: CPT

## 2018-07-06 ENCOUNTER — APPOINTMENT (OUTPATIENT)
Dept: CARDIOLOGY | Facility: CLINIC | Age: 83
End: 2018-07-06
Payer: MEDICARE

## 2018-07-25 ENCOUNTER — INPATIENT (INPATIENT)
Facility: HOSPITAL | Age: 83
LOS: 1 days | Discharge: HOME | DRG: 871 | End: 2018-07-27
Attending: HOSPITALIST | Admitting: FAMILY MEDICINE
Payer: MEDICARE

## 2018-07-25 VITALS
DIASTOLIC BLOOD PRESSURE: 63 MMHG | HEART RATE: 110 BPM | SYSTOLIC BLOOD PRESSURE: 105 MMHG | OXYGEN SATURATION: 86 % | RESPIRATION RATE: 18 BRPM | TEMPERATURE: 100 F

## 2018-07-25 DIAGNOSIS — A41.9 SEPSIS, UNSPECIFIED ORGANISM: ICD-10-CM

## 2018-07-25 DIAGNOSIS — Z98.1 ARTHRODESIS STATUS: Chronic | ICD-10-CM

## 2018-07-25 PROBLEM — E04.1 NONTOXIC SINGLE THYROID NODULE: Chronic | Status: ACTIVE | Noted: 2017-12-23

## 2018-07-25 PROBLEM — S22.41XA MULTIPLE FRACTURES OF RIBS, RIGHT SIDE, INITIAL ENCOUNTER FOR CLOSED FRACTURE: Chronic | Status: ACTIVE | Noted: 2017-12-23

## 2018-07-25 PROBLEM — S32.9XXA FRACTURE OF UNSPECIFIED PARTS OF LUMBOSACRAL SPINE AND PELVIS, INITIAL ENCOUNTER FOR CLOSED FRACTURE: Chronic | Status: ACTIVE | Noted: 2017-12-23

## 2018-07-25 PROBLEM — R91.1 SOLITARY PULMONARY NODULE: Chronic | Status: ACTIVE | Noted: 2017-12-23

## 2018-07-25 PROBLEM — I82.433: Chronic | Status: ACTIVE | Noted: 2017-12-23

## 2018-07-25 PROBLEM — I10 ESSENTIAL (PRIMARY) HYPERTENSION: Chronic | Status: ACTIVE | Noted: 2017-06-30

## 2018-07-25 PROBLEM — E78.5 HYPERLIPIDEMIA, UNSPECIFIED: Chronic | Status: ACTIVE | Noted: 2017-10-31

## 2018-07-25 LAB
ANION GAP SERPL CALC-SCNC: 15 MMOL/L — SIGNIFICANT CHANGE UP (ref 5–17)
APPEARANCE UR: CLEAR — SIGNIFICANT CHANGE UP
APTT BLD: 31.4 SEC — SIGNIFICANT CHANGE UP (ref 27.5–37.4)
BILIRUB UR-MCNC: NEGATIVE — SIGNIFICANT CHANGE UP
BUN SERPL-MCNC: 33 MG/DL — HIGH (ref 8–20)
CALCIUM SERPL-MCNC: 10.1 MG/DL — SIGNIFICANT CHANGE UP (ref 8.6–10.2)
CHLORIDE SERPL-SCNC: 98 MMOL/L — SIGNIFICANT CHANGE UP (ref 98–107)
CO2 SERPL-SCNC: 29 MMOL/L — SIGNIFICANT CHANGE UP (ref 22–29)
COLOR SPEC: YELLOW — SIGNIFICANT CHANGE UP
CREAT SERPL-MCNC: 1.26 MG/DL — SIGNIFICANT CHANGE UP (ref 0.5–1.3)
DIFF PNL FLD: ABNORMAL
EPI CELLS # UR: SIGNIFICANT CHANGE UP
GLUCOSE SERPL-MCNC: 143 MG/DL — HIGH (ref 70–115)
GLUCOSE UR QL: NEGATIVE MG/DL — SIGNIFICANT CHANGE UP
HCT VFR BLD CALC: 40.8 % — SIGNIFICANT CHANGE UP (ref 37–47)
HGB BLD-MCNC: 13.3 G/DL — SIGNIFICANT CHANGE UP (ref 12–16)
INR BLD: 1.28 RATIO — HIGH (ref 0.88–1.16)
KETONES UR-MCNC: NEGATIVE — SIGNIFICANT CHANGE UP
LACTATE BLDV-MCNC: 2.1 MMOL/L — HIGH (ref 0.5–2)
LEUKOCYTE ESTERASE UR-ACNC: ABNORMAL
MCHC RBC-ENTMCNC: 28.1 PG — SIGNIFICANT CHANGE UP (ref 27–31)
MCHC RBC-ENTMCNC: 32.6 G/DL — SIGNIFICANT CHANGE UP (ref 32–36)
MCV RBC AUTO: 86.3 FL — SIGNIFICANT CHANGE UP (ref 81–99)
NITRITE UR-MCNC: NEGATIVE — SIGNIFICANT CHANGE UP
OB PNL STL: NEGATIVE — SIGNIFICANT CHANGE UP
PH UR: 6 — SIGNIFICANT CHANGE UP (ref 5–8)
PLATELET # BLD AUTO: 218 K/UL — SIGNIFICANT CHANGE UP (ref 150–400)
POTASSIUM SERPL-MCNC: 4.1 MMOL/L — SIGNIFICANT CHANGE UP (ref 3.5–5.3)
POTASSIUM SERPL-SCNC: 4.1 MMOL/L — SIGNIFICANT CHANGE UP (ref 3.5–5.3)
PROT UR-MCNC: 15 MG/DL
PROTHROM AB SERPL-ACNC: 14.1 SEC — HIGH (ref 9.8–12.7)
RBC # BLD: 4.73 M/UL — SIGNIFICANT CHANGE UP (ref 4.4–5.2)
RBC # FLD: 16.6 % — HIGH (ref 11–15.6)
RBC CASTS # UR COMP ASSIST: SIGNIFICANT CHANGE UP /HPF (ref 0–4)
SODIUM SERPL-SCNC: 142 MMOL/L — SIGNIFICANT CHANGE UP (ref 135–145)
SP GR SPEC: 1.01 — SIGNIFICANT CHANGE UP (ref 1.01–1.02)
UROBILINOGEN FLD QL: NEGATIVE MG/DL — SIGNIFICANT CHANGE UP
WBC # BLD: 10.4 K/UL — SIGNIFICANT CHANGE UP (ref 4.8–10.8)
WBC # FLD AUTO: 10.4 K/UL — SIGNIFICANT CHANGE UP (ref 4.8–10.8)
WBC UR QL: SIGNIFICANT CHANGE UP

## 2018-07-25 PROCEDURE — 74177 CT ABD & PELVIS W/CONTRAST: CPT | Mod: 26

## 2018-07-25 PROCEDURE — 99223 1ST HOSP IP/OBS HIGH 75: CPT

## 2018-07-25 PROCEDURE — 71045 X-RAY EXAM CHEST 1 VIEW: CPT | Mod: 26

## 2018-07-25 PROCEDURE — 99291 CRITICAL CARE FIRST HOUR: CPT

## 2018-07-25 RX ORDER — DOCUSATE SODIUM 100 MG
100 CAPSULE ORAL
Qty: 0 | Refills: 0 | Status: DISCONTINUED | OUTPATIENT
Start: 2018-07-25 | End: 2018-07-27

## 2018-07-25 RX ORDER — TRAMADOL HYDROCHLORIDE 50 MG/1
50 TABLET ORAL EVERY 6 HOURS
Qty: 0 | Refills: 0 | Status: DISCONTINUED | OUTPATIENT
Start: 2018-07-25 | End: 2018-07-26

## 2018-07-25 RX ORDER — SODIUM CHLORIDE 9 MG/ML
1000 INJECTION INTRAMUSCULAR; INTRAVENOUS; SUBCUTANEOUS
Qty: 0 | Refills: 0 | Status: DISCONTINUED | OUTPATIENT
Start: 2018-07-25 | End: 2018-07-26

## 2018-07-25 RX ORDER — OXYCODONE AND ACETAMINOPHEN 5; 325 MG/1; MG/1
1 TABLET ORAL THREE TIMES A DAY
Qty: 0 | Refills: 0 | Status: DISCONTINUED | OUTPATIENT
Start: 2018-07-25 | End: 2018-07-25

## 2018-07-25 RX ORDER — CYCLOBENZAPRINE HYDROCHLORIDE 10 MG/1
5 TABLET, FILM COATED ORAL THREE TIMES A DAY
Qty: 0 | Refills: 0 | Status: DISCONTINUED | OUTPATIENT
Start: 2018-07-25 | End: 2018-07-27

## 2018-07-25 RX ORDER — ASPIRIN/CALCIUM CARB/MAGNESIUM 324 MG
81 TABLET ORAL DAILY
Qty: 0 | Refills: 0 | Status: DISCONTINUED | OUTPATIENT
Start: 2018-07-25 | End: 2018-07-27

## 2018-07-25 RX ORDER — ATORVASTATIN CALCIUM 80 MG/1
20 TABLET, FILM COATED ORAL AT BEDTIME
Qty: 0 | Refills: 0 | Status: DISCONTINUED | OUTPATIENT
Start: 2018-07-25 | End: 2018-07-27

## 2018-07-25 RX ORDER — CALCITONIN SALMON 200 [IU]/ML
240 INJECTION, SOLUTION INTRAMUSCULAR EVERY 12 HOURS
Qty: 0 | Refills: 0 | Status: COMPLETED | OUTPATIENT
Start: 2018-07-25 | End: 2018-07-26

## 2018-07-25 RX ORDER — SODIUM CHLORIDE 9 MG/ML
1800 INJECTION INTRAMUSCULAR; INTRAVENOUS; SUBCUTANEOUS ONCE
Qty: 0 | Refills: 0 | Status: COMPLETED | OUTPATIENT
Start: 2018-07-25 | End: 2018-07-25

## 2018-07-25 RX ORDER — SENNA PLUS 8.6 MG/1
2 TABLET ORAL AT BEDTIME
Qty: 0 | Refills: 0 | Status: DISCONTINUED | OUTPATIENT
Start: 2018-07-25 | End: 2018-07-27

## 2018-07-25 RX ORDER — GABAPENTIN 400 MG/1
100 CAPSULE ORAL THREE TIMES A DAY
Qty: 0 | Refills: 0 | Status: DISCONTINUED | OUTPATIENT
Start: 2018-07-25 | End: 2018-07-27

## 2018-07-25 RX ORDER — ACETAMINOPHEN 500 MG
650 TABLET ORAL ONCE
Qty: 0 | Refills: 0 | Status: COMPLETED | OUTPATIENT
Start: 2018-07-25 | End: 2018-07-25

## 2018-07-25 RX ADMIN — SODIUM CHLORIDE 1800 MILLILITER(S): 9 INJECTION INTRAMUSCULAR; INTRAVENOUS; SUBCUTANEOUS at 14:30

## 2018-07-25 RX ADMIN — SODIUM CHLORIDE 1800 MILLILITER(S): 9 INJECTION INTRAMUSCULAR; INTRAVENOUS; SUBCUTANEOUS at 12:30

## 2018-07-25 RX ADMIN — Medication 650 MILLIGRAM(S): at 13:15

## 2018-07-25 RX ADMIN — GABAPENTIN 100 MILLIGRAM(S): 400 CAPSULE ORAL at 22:38

## 2018-07-25 RX ADMIN — SODIUM CHLORIDE 100 MILLILITER(S): 9 INJECTION INTRAMUSCULAR; INTRAVENOUS; SUBCUTANEOUS at 18:00

## 2018-07-25 RX ADMIN — ATORVASTATIN CALCIUM 20 MILLIGRAM(S): 80 TABLET, FILM COATED ORAL at 22:37

## 2018-07-25 NOTE — H&P ADULT - PROBLEM SELECTOR PLAN 7
chronic.  CT abdomen /pelvis reviewed, positive for diverticulosis, no evidence of diverticulitis.  has colonoscopy several years ago (normal per son).  may refer to GI on discharge

## 2018-07-25 NOTE — H&P ADULT - PROBLEM SELECTOR PLAN 2
?etiology.  ?PNA.  CXR , limited study, no acute infiltrates.  hold eliquis for now.  sputum culture.  CT chest wo contrast.  continue Levaquin  may consider to consult pulmonary

## 2018-07-25 NOTE — ED ADULT TRIAGE NOTE - CHIEF COMPLAINT QUOTE
Patient arrived via EMS, awake, alert, and oriented times 3, breathing unlabored.  patient complaining of " heaviness" to bilateral legs.  patient unable to elevate legs when asked.  No other complaints at this time.  Equal sensation to all 4 extremities.  Weakness noted when asked to squeeze bilateral hands.  Dr. Taveras at bedside on arrival

## 2018-07-25 NOTE — ED PROVIDER NOTE - OBJECTIVE STATEMENT
Pt is an 87 y/o F, with PMHx of DVT, aortic stenosis, HTN, HLD, presenting to the ED with c/o LE weakness. Pt is a poor historian but states that she has been having this weakness in her legs for the past 2 weeks. Reports that her legs feel heavy and she is unable to lift them. Family also reports the pt appears altered this AM. Pt denies recent dysuria, CP, HA, dizziness, or worse cough. Found to be febrile in ED.

## 2018-07-25 NOTE — ED PROVIDER NOTE - CHPI ED SYMPTOMS NEG
no dizziness/no pain/no chills/no vomiting/no decreased eating/drinking/no headache/no fever/no loss of consciousness/no nausea/no back pain

## 2018-07-25 NOTE — ED PROVIDER NOTE - CRITICAL CARE INDICATION, MLM
patient was critically ill... Patient was critically ill with a high probability of imminent or life threatening deterioration. Patient was critically ill with a high probability of imminent or life threatening deterioration. CODE SEPSIS STAT IV STAT LABS STAT XRAY STAT MEDS CT URINE IV AB STAT AND ADMIT

## 2018-07-25 NOTE — ED PROVIDER NOTE - CARE PLAN
Principal Discharge DX:	Sepsis, due to unspecified organism  Secondary Diagnosis:	Pneumonia due to infectious organism, unspecified laterality, unspecified part of lung

## 2018-07-25 NOTE — H&P ADULT - PROBLEM SELECTOR PLAN 1
?etiology.  ?delirium.  resolved, patient AAA3  no focal neurological deficit.  monitor clinically.  monitory clinically.

## 2018-07-25 NOTE — ED PROVIDER NOTE - CRITICAL CARE PROVIDED
direct patient care (not related to procedure)/documentation/additional history taking/consult w/ pt's family directly relating to pts condition/interpretation of diagnostic studies/consultation with other physicians additional history taking/consult w/ pt's family directly relating to pts condition/direct patient care (not related to procedure)/interpretation of diagnostic studies/consultation with other physicians/documentation/40 MINUTES

## 2018-07-25 NOTE — H&P ADULT - RS GEN PE MLT RESP DETAILS PC
diminished breath sounds, R/no rales/breath sounds equal/good air movement/no rhonchi/diminished breath sounds, L/respirations non-labored

## 2018-07-25 NOTE — H&P ADULT - HISTORY OF PRESENT ILLNESS
86 years old female with PMH of HTN, dyslipidemia, DVT and chronic back pain brought in to the ER for the evaluation of change in mental status since this morning. Patient states when she woke up this morning she had chills and felt fatigued. Later on during the day her son visited her and found her on the chair, looking fatigued an  having hallucinations. He brought her to the ER. While in the ER patient had cough with blood tinged sputum. As per son, patient has been having RLQ pain for which she was seen by her PCP and referred to orthopedic assuming it was due to arthritis. No h/o headache, weakness, numbness, chest pain, SOB, GI complaints.

## 2018-07-25 NOTE — H&P ADULT - PROBLEM SELECTOR PLAN 4
controlled.  on Valsartan 40 mg daily--discontinued for now  hold lasix for now  BP in low normal range.  start losartan 12.5 mg daily with holding parameters.  monitor BP.

## 2018-07-26 DIAGNOSIS — E78.5 HYPERLIPIDEMIA, UNSPECIFIED: ICD-10-CM

## 2018-07-26 DIAGNOSIS — E86.0 DEHYDRATION: ICD-10-CM

## 2018-07-26 DIAGNOSIS — I10 ESSENTIAL (PRIMARY) HYPERTENSION: ICD-10-CM

## 2018-07-26 DIAGNOSIS — R10.9 UNSPECIFIED ABDOMINAL PAIN: ICD-10-CM

## 2018-07-26 DIAGNOSIS — Z86.718 PERSONAL HISTORY OF OTHER VENOUS THROMBOSIS AND EMBOLISM: ICD-10-CM

## 2018-07-26 DIAGNOSIS — R04.2 HEMOPTYSIS: ICD-10-CM

## 2018-07-26 DIAGNOSIS — R41.82 ALTERED MENTAL STATUS, UNSPECIFIED: ICD-10-CM

## 2018-07-26 LAB
CULTURE RESULTS: NO GROWTH — SIGNIFICANT CHANGE UP
SPECIMEN SOURCE: SIGNIFICANT CHANGE UP

## 2018-07-26 PROCEDURE — 99222 1ST HOSP IP/OBS MODERATE 55: CPT

## 2018-07-26 PROCEDURE — 71250 CT THORAX DX C-: CPT | Mod: 26

## 2018-07-26 PROCEDURE — 99223 1ST HOSP IP/OBS HIGH 75: CPT

## 2018-07-26 PROCEDURE — 99233 SBSQ HOSP IP/OBS HIGH 50: CPT

## 2018-07-26 RX ORDER — AZITHROMYCIN 500 MG/1
TABLET, FILM COATED ORAL
Qty: 0 | Refills: 0 | Status: DISCONTINUED | OUTPATIENT
Start: 2018-07-26 | End: 2018-07-27

## 2018-07-26 RX ORDER — POLYETHYLENE GLYCOL 3350 17 G/17G
17 POWDER, FOR SOLUTION ORAL DAILY
Qty: 0 | Refills: 0 | Status: DISCONTINUED | OUTPATIENT
Start: 2018-07-26 | End: 2018-07-27

## 2018-07-26 RX ORDER — SODIUM CHLORIDE 9 MG/ML
1000 INJECTION INTRAMUSCULAR; INTRAVENOUS; SUBCUTANEOUS
Qty: 0 | Refills: 0 | Status: DISCONTINUED | OUTPATIENT
Start: 2018-07-26 | End: 2018-07-27

## 2018-07-26 RX ORDER — APIXABAN 2.5 MG/1
2.5 TABLET, FILM COATED ORAL EVERY 12 HOURS
Qty: 0 | Refills: 0 | Status: DISCONTINUED | OUTPATIENT
Start: 2018-07-26 | End: 2018-07-27

## 2018-07-26 RX ORDER — AZITHROMYCIN 500 MG/1
500 TABLET, FILM COATED ORAL ONCE
Qty: 0 | Refills: 0 | Status: COMPLETED | OUTPATIENT
Start: 2018-07-26 | End: 2018-07-26

## 2018-07-26 RX ORDER — DILTIAZEM HCL 120 MG
120 CAPSULE, EXT RELEASE 24 HR ORAL DAILY
Qty: 0 | Refills: 0 | Status: DISCONTINUED | OUTPATIENT
Start: 2018-07-26 | End: 2018-07-27

## 2018-07-26 RX ORDER — AZITHROMYCIN 500 MG/1
500 TABLET, FILM COATED ORAL EVERY 24 HOURS
Qty: 0 | Refills: 0 | Status: DISCONTINUED | OUTPATIENT
Start: 2018-07-27 | End: 2018-07-27

## 2018-07-26 RX ORDER — IPRATROPIUM/ALBUTEROL SULFATE 18-103MCG
3 AEROSOL WITH ADAPTER (GRAM) INHALATION EVERY 6 HOURS
Qty: 0 | Refills: 0 | Status: DISCONTINUED | OUTPATIENT
Start: 2018-07-26 | End: 2018-07-27

## 2018-07-26 RX ORDER — CEFTRIAXONE 500 MG/1
1 INJECTION, POWDER, FOR SOLUTION INTRAMUSCULAR; INTRAVENOUS EVERY 24 HOURS
Qty: 0 | Refills: 0 | Status: DISCONTINUED | OUTPATIENT
Start: 2018-07-26 | End: 2018-07-27

## 2018-07-26 RX ORDER — LOSARTAN POTASSIUM 100 MG/1
12.5 TABLET, FILM COATED ORAL DAILY
Qty: 0 | Refills: 0 | Status: DISCONTINUED | OUTPATIENT
Start: 2018-07-26 | End: 2018-07-27

## 2018-07-26 RX ADMIN — APIXABAN 2.5 MILLIGRAM(S): 2.5 TABLET, FILM COATED ORAL at 17:48

## 2018-07-26 RX ADMIN — Medication 120 MILLIGRAM(S): at 12:39

## 2018-07-26 RX ADMIN — SODIUM CHLORIDE 70 MILLILITER(S): 9 INJECTION INTRAMUSCULAR; INTRAVENOUS; SUBCUTANEOUS at 14:16

## 2018-07-26 RX ADMIN — TRAMADOL HYDROCHLORIDE 50 MILLIGRAM(S): 50 TABLET ORAL at 20:47

## 2018-07-26 RX ADMIN — TRAMADOL HYDROCHLORIDE 50 MILLIGRAM(S): 50 TABLET ORAL at 02:58

## 2018-07-26 RX ADMIN — CALCITONIN SALMON 240 INTERNATIONAL UNIT(S): 200 INJECTION, SOLUTION INTRAMUSCULAR at 08:42

## 2018-07-26 RX ADMIN — GABAPENTIN 100 MILLIGRAM(S): 400 CAPSULE ORAL at 11:05

## 2018-07-26 RX ADMIN — Medication 100 MILLIGRAM(S): at 05:32

## 2018-07-26 RX ADMIN — GABAPENTIN 100 MILLIGRAM(S): 400 CAPSULE ORAL at 20:47

## 2018-07-26 RX ADMIN — GABAPENTIN 100 MILLIGRAM(S): 400 CAPSULE ORAL at 05:32

## 2018-07-26 RX ADMIN — TRAMADOL HYDROCHLORIDE 50 MILLIGRAM(S): 50 TABLET ORAL at 21:50

## 2018-07-26 RX ADMIN — CEFTRIAXONE 100 GRAM(S): 500 INJECTION, POWDER, FOR SOLUTION INTRAMUSCULAR; INTRAVENOUS at 17:47

## 2018-07-26 RX ADMIN — ATORVASTATIN CALCIUM 20 MILLIGRAM(S): 80 TABLET, FILM COATED ORAL at 20:48

## 2018-07-26 RX ADMIN — LOSARTAN POTASSIUM 12.5 MILLIGRAM(S): 100 TABLET, FILM COATED ORAL at 05:32

## 2018-07-26 RX ADMIN — CALCITONIN SALMON 240 INTERNATIONAL UNIT(S): 200 INJECTION, SOLUTION INTRAMUSCULAR at 17:50

## 2018-07-26 RX ADMIN — AZITHROMYCIN 255 MILLIGRAM(S): 500 TABLET, FILM COATED ORAL at 17:47

## 2018-07-26 RX ADMIN — Medication 81 MILLIGRAM(S): at 11:05

## 2018-07-26 NOTE — ED ADULT NURSE REASSESSMENT NOTE - NS ED NURSE REASSESS COMMENT FT1
Patient medicated. Patient denies discomfort or pain at this time. Will continue to monitor
Patient resting comfortably with call bell within reach. Patient denies pain or discomfort at this time. Patient awaiting bed. Updated on plan of care. Will continue to monitor.
pt had large formed BM in bedside commode pt reports that its not her normally color, I Must be having internal bleeding somewhere and because I also coughed up blood in my phlegm before" Dr. Taveras notified, spec set to lab. no urine obtained at this time secondary to mixed with bm.
started on po contrast for ct scan.
Pt is resting in bed comfortably at this time, no apparent distress noted at this time. pt safety maintained. Pt denies any complaints at this time. pt remains Normal Sinus Rhythm on cardiac monitor. pt educated on plan of care, plan of care taught back to RN. plan of care education deemed proficient through successful teach back. will continue to reeducate pt regarding plan of care.
pt c/o lower back pain. pt medicated as per orders. Pt is resting in bed comfortably at this time, no apparent distress noted at this time. pt safety maintained. pt remains Normal Sinus Rhythm on cardiac monitor.  pt educated on plan of care, plan of care taught back to RN. plan of care education deemed proficient through successful teach back. will continue to reeducate pt regarding plan of care.
Pt returned from ct scan, sleepy no acute distress noted, vss, family at bedside, safety precautions in place, will continue to monitor.

## 2018-07-26 NOTE — PROGRESS NOTE ADULT - SUBJECTIVE AND OBJECTIVE BOX
MAMTA TALAVERA Female 86y [id number]    Patient is a 86y old  Female who presents with a chief complaint of Altered mental status. (2018 21:24)      Subjective/objective:  Pt seen and examined at bedside, admitted overnight for delirium and fever. This morning Pt is AOX3, delirium resolved. had fever 103 last night. This morning Pt reports coughing up blood lately and feeling more weak.     Review of system:  No chills, nausea, vomiting, headache, chest pain, or palpitation.      PHYSICAL EXAM:    Vital Signs Last 24 Hrs  T(C): 36.8 (2018 07:36), Max: 39.9 (2018 12:08)  T(F): 98.2 (2018 07:36), Max: 103.8 (2018 12:08)  HR: 91 (2018 07:36) (85 - 110)  BP: 150/67 (2018 07:36) (104/68 - 150/67)  BP(mean): 90 (2018 21:24) (90 - 90)  RR: 18 (2018 07:36) (16 - 20)  SpO2: 98% (2018 07:36) (86% - 98%)    GENERAL: Pt lying comfortably, NAD.  CHEST/LUNG: Clear to auscultation bilaterally; No wheezing.  HEART: S1S2+, irregularly irregular.  ABDOMEN: Soft, Nontender, Nondistended; Bowel sounds present.  MUSCULOSKELETAL: Normal range of motion.  SKIN: No rashes or lesions.  NEURO: AAOX3, no focal deficits, no motor r sensory loss.  PSYCH: normal mood.      MEDICATIONS  (STANDING):  aspirin enteric coated 81 milliGRAM(s) Oral daily  atorvastatin 20 milliGRAM(s) Oral at bedtime  calcitonin Injectable 240 International Unit(s) IntraMuscular every 12 hours  docusate sodium 100 milliGRAM(s) Oral two times a day  gabapentin 100 milliGRAM(s) Oral three times a day  levoFLOXacin IVPB 500 milliGRAM(s) IV Intermittent once  levoFLOXacin IVPB      losartan 12.5 milliGRAM(s) Oral daily  senna 2 Tablet(s) Oral at bedtime  sodium chloride 0.9%. 1000 milliLiter(s) (100 mL/Hr) IV Continuous <Continuous>    MEDICATIONS  (PRN):  cyclobenzaprine 5 milliGRAM(s) Oral three times a day PRN Muscle Spasm  oxyCODONE    5 mG/acetaminophen 325 mG 1 Tablet(s) Oral three times a day PRN Severe Pain (7 - 10)  traMADol 50 milliGRAM(s) Oral every 6 hours PRN Moderate Pain (4 - 6)        Labs:  LABS:                        13.3   10.4  )-----------( 218      ( 2018 12:33 )             40.8         142  |  98  |  33.0<H>  ----------------------------<  143<H>  4.1   |  29.0  |  1.26    Ca    10.1      2018 12:33      PT/INR - ( 2018 12:33 )   PT: 14.1 sec;   INR: 1.28 ratio         PTT - ( 2018 12:33 )  PTT:31.4 sec      Urinalysis Basic - ( 2018 19:48 )    Color: Yellow / Appearance: Clear / S.015 / pH: x  Gluc: x / Ketone: Negative  / Bili: Negative / Urobili: Negative mg/dL   Blood: x / Protein: 15 mg/dL / Nitrite: Negative   Leuk Esterase: Trace / RBC: 0-2 /HPF / WBC 0-2   Sq Epi: x / Non Sq Epi: Occasional / Bacteria: x

## 2018-07-26 NOTE — CONSULT NOTE ADULT - SUBJECTIVE AND OBJECTIVE BOX
PULMONARY CONSULT NOTE      GRAHAM TALAVERA-[id number]    Patient is a 86y old  Female who presents with a chief complaint of Altered mental status. (2018 21:24)      HISTORY OF PRESENT ILLNESS:    86 years old female with PMH of HTN, dyslipidemia, DVT and chronic back pain brought in to the ER for the evaluation of change in mental status since this morning. Patient states when she woke up this morning she had chills and felt fatigued. Later on during the day her son visited her and found her on the chair, looking fatigued an  having hallucinations. He brought her to the ER. While in the ER patient had cough with blood tinged sputum. As per son, patient has been having RLQ pain for which she was seen by her PCP and referred to orthopedic assuming it was due to arthritis. No h/o headache, weakness, numbness, chest pain, SOB, GI complaints.   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  Afib on AC  Kyphoscoliosis  GERD  Dysphagia  Vamsi in my office in May was WNL    MEDICATIONS  (STANDING):  apixaban 2.5 milliGRAM(s) Oral every 12 hours  aspirin enteric coated 81 milliGRAM(s) Oral daily  atorvastatin 20 milliGRAM(s) Oral at bedtime  calcitonin Injectable 240 International Unit(s) IntraMuscular every 12 hours  diltiazem    milliGRAM(s) Oral daily  docusate sodium 100 milliGRAM(s) Oral two times a day  gabapentin 100 milliGRAM(s) Oral three times a day  levoFLOXacin IVPB      losartan 12.5 milliGRAM(s) Oral daily  polyethylene glycol 3350 17 Gram(s) Oral daily  senna 2 Tablet(s) Oral at bedtime  sodium chloride 0.9%. 1000 milliLiter(s) (70 mL/Hr) IV Continuous <Continuous>      MEDICATIONS  (PRN):  ALBUTerol/ipratropium for Nebulization 3 milliLiter(s) Nebulizer every 6 hours PRN Shortness of Breath  cyclobenzaprine 5 milliGRAM(s) Oral three times a day PRN Muscle Spasm  oxyCODONE    5 mG/acetaminophen 325 mG 1 Tablet(s) Oral three times a day PRN Severe Pain (7 - 10)  traMADol 50 milliGRAM(s) Oral every 6 hours PRN Moderate Pain (4 - 6)      Allergies    No Known Allergies    Intolerances        PAST MEDICAL & SURGICAL HISTORY:  Aortic stenosis  Closed fracture of multiple ribs of right side, initial encounter  Pelvic fracture  Acute deep vein thrombosis (DVT) of popliteal vein of both lower extremities  Thyroid nodule  Lung nodule  PNA (pneumonia): 2017  Hyperlipidemia, unspecified hyperlipidemia type  Chronic back pain  Essential hypertension  S/P spinal fusion: L spines      FAMILY HISTORY:  Family history of breast cancer (Sibling)      SOCIAL HISTORY  Smoking History:     REVIEW OF SYSTEMS:    CONSTITUTIONAL:  No  sweats    HEENT:  Eyes:  No diplopia or blurred vision. ENT:  No earache, sore throat or runny nose.    CARDIOVASCULAR:  No pressure, squeezing, tightness, or heaviness about the chest; no palpitations.    RESPIRATORY:  No  PND or orthopnea. Mild SOBOE    GASTROINTESTINAL:  No abdominal pain, nausea, vomiting or diarrhea.    GENITOURINARY:  No dysuria, frequency or urgency.    NEUROLOGIC:  No paresthesias, fasciculations, seizures or weakness.    PSYCHIATRIC:  No disorder of thought or mood.    Vital Signs Last 24 Hrs  T(C): 36.6 (2018 11:11), Max: 39.9 (2018 21:24)  T(F): 97.9 (2018 11:11), Max: 103.8 (2018 21:24)  HR: 109 (2018 11:11) (85 - 110)  BP: 135/68 (2018 11:11) (104/68 - 150/67)  BP(mean): 90 (2018 21:24) (90 - 90)  RR: 18 (2018 11:11) (16 - 20)  SpO2: 98% (2018 11:11) (95% - 98%)    PHYSICAL EXAMINATION:    GENERAL: The patient is a well-developed, well-nourished kyphoscoliotic elderly female in no apparent distress.     HEENT: Head is normocephalic and atraumatic. Extraocular muscles are intact. Mucous membranes are moist.     NECK: Supple.     LUNGS: Clear to auscultation without wheezing, rales, or rhonchi. Respirations unlabored    HEART: Regular rate and rhythm without murmur.    ABDOMEN: Soft, nontender, and nondistended.  No hepatosplenomegaly is noted.    EXTREMITIES: Without any cyanosis, clubbing, rash, lesions or edema.    NEUROLOGIC: Grossly intact.      LABS:                        13.3   10.4  )-----------( 218      ( 2018 12:33 )             40.8     07-    142  |  98  |  33.0<H>  ----------------------------<  143<H>  4.1   |  29.0  |  1.26    Ca    10.1      2018 12:33      PT/INR - ( 2018 12:33 )   PT: 14.1 sec;   INR: 1.28 ratio         PTT - ( 2018 12:33 )  PTT:31.4 sec  Urinalysis Basic - ( 2018 19:48 )    Color: Yellow / Appearance: Clear / S.015 / pH: x  Gluc: x / Ketone: Negative  / Bili: Negative / Urobili: Negative mg/dL   Blood: x / Protein: 15 mg/dL / Nitrite: Negative   Leuk Esterase: Trace / RBC: 0-2 /HPF / WBC 0-2   Sq Epi: x / Non Sq Epi: Occasional / Bacteria: x        MICROBIOLOGY: NA    RADIOLOGY & ADDITIONAL STUDIES:    CT Chest on 18  IMPRESSION:    No significant change with evidence of mild bronchiectasis of LEFT lower   lobe with a medial basilar  chronic atelectasis. Linear discoid   atelectasis RIGHT lung base.  Severe thoracic dextroscoliosis unchanged .  Mild cardiomegaly with prosthetic aortic valve and coronary artery   calcifications noted.  No tracheal or gross endobronchial obstructing lesion..    TAMARA RENTERIA M.D., ATTENDING RADIOLOGIST  This document has been electronically signed. 2018  8:54AM

## 2018-07-26 NOTE — CONSULT NOTE ADULT - ASSESSMENT
Assess    Bronchiectasis probably related to aspiration in the past  Minimal possible Hemoptysis (brown sputum) related to bronchiectasis and complicated by AC for atrial fibrilation    Rec    Speech Swallow  Levaquin for now - may want to try a penicillin instead  Cardiology FU - in house group knows this patient post TAVR by Dr Aragon here this year  Truncal elevation at night

## 2018-07-26 NOTE — CONSULT NOTE ADULT - SUBJECTIVE AND OBJECTIVE BOX
[facility]  INFECTIOUS DISEASES AND INTERNAL MEDICINE at [place]  =======================================================  Sami Orta MD  Diplomates American Board of Internal Medicine and Infectious Diseases  =======================================================    [id number]  MAMTA TALAVERA     CC: Altered mental status     HPI:  87 y/o woman with PMH of HTN, dyslipidemia, DVT and chronic back pain was admitted in change in mental status from her baseline. She also complained of chills and fatinue. In the ED had high fever with hemoptysis 2-3 times, every time blood mixed with sputum.   She is complaining of cough and sputum, and mild RLQ pain. No diarrhea of constipation , no vomiting or dysuria.  She had Chest and abdominal CT, showing LLL pneumonia.     PAST MEDICAL & SURGICAL HISTORY:  Aortic stenosis  Closed fracture of multiple ribs of right side, initial encounter  Pelvic fracture  Acute deep vein thrombosis (DVT) of popliteal vein of both lower extremities  Thyroid nodule  Lung nodule  PNA (pneumonia): 2017  Hyperlipidemia, unspecified hyperlipidemia type  Chronic back pain  Essential hypertension  S/P spinal fusion: L spines    Social Hx: never smoked, no alcohol or drugs. Lives at home alone, her son and his family live next door and takes care of her.     FAMILY HISTORY:  Family history of breast cancer (Sibling)    Allergies  No Known Allergies    Antibiotics:  levoFLOXacin IVPB         REVIEW OF SYSTEMS:  CONSTITUTIONAL:  + Fever or chills  HEENT:  No diplopia or blurred vision.  No sore throat or runny nose.  CARDIOVASCULAR:  No chest pain or SOB.  RESPIRATORY:  +cough, no shortness of breath,+ sputum, hemoptysis   GASTROINTESTINAL:  No nausea, vomiting or diarrhea.  GENITOURINARY:  No dysuria, frequency or urgency. No Blood in urine  MUSCULOSKELETAL:  no joint aches, no muscle pain  SKIN:  No change in skin, hair or nails.  NEUROLOGIC:  No paresthesias, fasciculations, seizures or weakness.  PSYCHIATRIC:  No disorder of thought or mood.  ENDOCRINE:  No heat or cold intolerance, polyuria or polydipsia.  HEMATOLOGICAL:  No easy bruising or bleeding.     Physical Exam:  Vital Signs Last 24 Hrs  T(C): 36.6 (2018 11:11), Max: 39.9 (2018 21:24)  T(F): 97.9 (2018 11:11), Max: 103.8 (2018 21:24)  HR: 81 (2018 12:38) (81 - 109)  BP: 157/79 (2018 12:38) (108/74 - 157/79)  BP(mean): 90 (2018 21:24) (90 - 90)  RR: 18 (2018 12:38) (16 - 20)  SpO2: 98% (2018 12:38) (95% - 98%)  GEN: NAD  HEENT: normocephalic and atraumatic. EOMI. PERRL.    NECK: Supple.  No lymphadenopathy   LUNGS: Bilateral lower lobes with rales more prominent in LLL  HEART: Regular rate and rhythm without murmur.  ABDOMEN: Soft, nontender, and nondistended.  Positive bowel sounds.    : No CVA tenderness  EXTREMITIES: Without any cyanosis, clubbing, rash, lesions or edema.  NEUROLOGIC: grossly intact.  PSYCHIATRIC: Appropriate affect .  SKIN: No ulceration or induration present.    Labs:      142  |  98  |  33.0<H>  ----------------------------<  143<H>  4.1   |  29.0  |  1.26    Ca    10.1      2018 12:33                          13.3   10.4  )-----------( 218      ( 2018 12:33 )             40.8     PT/INR - ( 2018 12:33 )   PT: 14.1 sec;   INR: 1.28 ratio    PTT - ( 2018 12:33 )  PTT:31.4 sec  Urinalysis Basic - ( 2018 19:48 )    Color: Yellow / Appearance: Clear / S.015 / pH: x  Gluc: x / Ketone: Negative  / Bili: Negative / Urobili: Negative mg/dL   Blood: x / Protein: 15 mg/dL / Nitrite: Negative   Leuk Esterase: Trace / RBC: 0-2 /HPF / WBC 0-2   Sq Epi: x / Non Sq Epi: Occasional / Bacteria: x    RECENT CULTURES:   @ 19:48 .Urine Clean Catch (Midstream)     No growth      All imaging and other data have been reviewed.    Abdominal CT:   IMPRESSION:   Moderate stool load throughout the colon. Diffuse diverticulosis of the   sigmoid colon without diverticulitis, mass or obstruction is CT   evaluation. Remaining bowel aside from stomach which is nondistended   unremarkable. Appendix not visualized.   LEFT lower lobe medial basilar atelectasis/infiltrate.  IMPRESSION:    Chest CT:   No significant change with evidence of mild bronchiectasis of LEFT lower   lobe with a medial basilar  chronic atelectasis. Linear discoid   atelectasis RIGHT lung base.  Severe thoracic dextroscoliosis unchanged .  Mild cardiomegaly with prosthetic aortic valve and coronary artery   calcifications noted.  No tracheal or gross endobronchial obstructing lesion..

## 2018-07-26 NOTE — SWALLOW BEDSIDE ASSESSMENT ADULT - ORAL PREPARATORY PHASE
Within functional limits Within functional limits/encouraged pt to initiate and maintain a rotary chewing pattern with good success

## 2018-07-26 NOTE — SWALLOW BEDSIDE ASSESSMENT ADULT - SWALLOW EVAL: DIAGNOSIS
Oral and pharyngeal stage judged to be WFL. Noted at times pt masticates with  front teeth, however with cues and practice pt able to demonstrate a rotary chewing pattern with good success

## 2018-07-26 NOTE — CONSULT NOTE ADULT - ASSESSMENT
85 y/o woman with PMH of HTN and TAVR and chronic back pain was admitted in change in mental status from her baseline. had a very high fever and cough with hemoptysis.   She had Chest and abdominal CT, showing LLL pneumonia.   Fever is going down and her mental status went back to normal. Most likely infection and high fever caused AMS. could be pneumonia with bronchitis causing hemoptysis.     1-Pneumonia:   -LLL opacification and effusion due to pneumonia   -2sets of Blood cultures are pending.   -Send sputum for culture  -Send Legionella urine ag   -stop Levaquin  -start ceftriaxone 1gm daily  -start azithromycin 500mg daily

## 2018-07-26 NOTE — PROGRESS NOTE ADULT - ASSESSMENT
86 years old female with PMH of HTN, HLD, PA /DVT on AC at home, CHF, AS s/p TAVR, CAD, Arthritis chronic back pain brought in to the ER by family for the evaluation of brief episode of change in mental status. In Er Pt noted to have coughing up blood and fever 103. Pt admitted for suspected PNA and started in IV abx. AMS resolved and pt mental status back to baseline.    AMS:  - unclear etiology, ? metabolic component.  - Resolved now, Pt mental status back to normal.  - Monitor clinically.     Bronchiectasis:  - Pt coughing up blood, had fever 103. No more hemoptysis.  - CT chest shows mild bronchiectasis  - Pt started in IV abx overnight, will empirically treat with ABx.  - Pulmonology consult requested.     Fever:  - Unclear etiology, ? from bronchiectasis. Afebrile now, no leukocytosis.  - UA / CT chest with no infection.   - C/w empiric abx.  - F/u blood /urine cx.     >H/o HLD- C/w home Lipitor  >H/o HTN- Pt on valsartan 40 at home, here started on losartan 12.5- c/w it. Previous records shows Pt was on Cardizem at home for HTN and Rate control for Afib- will restart at lower dose and titrate.   >H/o PAF- On Eliquis at home, here was not resume due to hemoptysis. hgb stable with no more hemoptysis- will restart home dose Eliquis 2.5 mg bid.  Previous records shows Pt was on Cardizem at home for Rate control- resume.   >H/o DVT- On eliquis as above.   >H/o CAD- C/w ASA /Statins  >H/o Diastolic HF- not in volume overload, little dehydrated on admission, IVFs for 24 hours. Home Lasix on hold  >H/o Chronic back pain- C/w home flexeril, percocet, gabapentin, ultram  >Constipation- CT shows stool load in colon, Pt had large BM in ER. C/w senna, add Miralax  >DVT ppx- On eliquis 86 years old female with PMH of HTN, HLD, PAF /DVT on AC at home, CHF, AS s/p TAVR, CAD, Arthritis chronic back pain brought in to the ER by family for the evaluation of brief episode of change in mental status. In Er Pt noted to have coughing up blood and fever 103. Pt admitted for suspected PNA and started in IV abx. AMS resolved and pt mental status back to baseline.    AMS:  - unclear etiology, ? metabolic component.  - Resolved now, Pt mental status back to normal.  - Monitor clinically.     Bronchiectasis:  - Pt coughing up blood, had fever 103. No more hemoptysis.  - CT chest shows mild bronchiectasis  - Pt started in IV abx overnight, will empirically treat with ABx.  - Pulmonology consult requested.     Fever:  - Unclear etiology, ? from bronchiectasis. Afebrile now, no leukocytosis.  - UA / CT chest with no infection.   - C/w empiric abx.  - F/u blood /urine cx.     >H/o HLD- C/w home Lipitor  >H/o HTN- Pt on valsartan 40 at home, here started on losartan 12.5- c/w it. Previous records shows Pt was on Cardizem at home for HTN and Rate control for Afib- will restart at lower dose and titrate.   >H/o PAF- On Eliquis at home, here was not resume due to hemoptysis. hgb stable with no more hemoptysis- will restart home dose Eliquis 2.5 mg bid.  Previous records shows Pt was on Cardizem at home for Rate control- resume.   >H/o DVT- On eliquis as above.   >H/o CAD- C/w ASA /Statins  >H/o Diastolic HF- not in volume overload, little dehydrated on admission, IVFs for 24 hours. Home Lasix on hold  >H/o Chronic back pain- C/w home flexeril, percocet, gabapentin, ultram  >Constipation- CT shows stool load in colon, Pt had large BM in ER. C/w senna, add Miralax  >DVT ppx- On eliquis

## 2018-07-27 ENCOUNTER — TRANSCRIPTION ENCOUNTER (OUTPATIENT)
Age: 83
End: 2018-07-27

## 2018-07-27 VITALS — TEMPERATURE: 98 F | HEART RATE: 78 BPM

## 2018-07-27 DIAGNOSIS — R50.9 FEVER, UNSPECIFIED: ICD-10-CM

## 2018-07-27 DIAGNOSIS — D72.823 LEUKEMOID REACTION: ICD-10-CM

## 2018-07-27 DIAGNOSIS — R41.0 DISORIENTATION, UNSPECIFIED: ICD-10-CM

## 2018-07-27 LAB
ANION GAP SERPL CALC-SCNC: 12 MMOL/L — SIGNIFICANT CHANGE UP (ref 5–17)
BUN SERPL-MCNC: 10 MG/DL — SIGNIFICANT CHANGE UP (ref 8–20)
CALCIUM SERPL-MCNC: 9.2 MG/DL — SIGNIFICANT CHANGE UP (ref 8.6–10.2)
CHLORIDE SERPL-SCNC: 108 MMOL/L — HIGH (ref 98–107)
CO2 SERPL-SCNC: 22 MMOL/L — SIGNIFICANT CHANGE UP (ref 22–29)
CREAT SERPL-MCNC: 0.67 MG/DL — SIGNIFICANT CHANGE UP (ref 0.5–1.3)
GLUCOSE SERPL-MCNC: 92 MG/DL — SIGNIFICANT CHANGE UP (ref 70–115)
GRAM STN FLD: SIGNIFICANT CHANGE UP
HCT VFR BLD CALC: 39.5 % — SIGNIFICANT CHANGE UP (ref 37–47)
HGB BLD-MCNC: 12.7 G/DL — SIGNIFICANT CHANGE UP (ref 12–16)
MAGNESIUM SERPL-MCNC: 2 MG/DL — SIGNIFICANT CHANGE UP (ref 1.8–2.6)
MCHC RBC-ENTMCNC: 28.1 PG — SIGNIFICANT CHANGE UP (ref 27–31)
MCHC RBC-ENTMCNC: 32.2 G/DL — SIGNIFICANT CHANGE UP (ref 32–36)
MCV RBC AUTO: 87.4 FL — SIGNIFICANT CHANGE UP (ref 81–99)
PLATELET # BLD AUTO: 198 K/UL — SIGNIFICANT CHANGE UP (ref 150–400)
POTASSIUM SERPL-MCNC: 4.5 MMOL/L — SIGNIFICANT CHANGE UP (ref 3.5–5.3)
POTASSIUM SERPL-SCNC: 4.5 MMOL/L — SIGNIFICANT CHANGE UP (ref 3.5–5.3)
RBC # BLD: 4.52 M/UL — SIGNIFICANT CHANGE UP (ref 4.4–5.2)
RBC # FLD: 17.3 % — HIGH (ref 11–15.6)
SODIUM SERPL-SCNC: 142 MMOL/L — SIGNIFICANT CHANGE UP (ref 135–145)
SPECIMEN SOURCE: SIGNIFICANT CHANGE UP
WBC # BLD: 9 K/UL — SIGNIFICANT CHANGE UP (ref 4.8–10.8)
WBC # FLD AUTO: 9 K/UL — SIGNIFICANT CHANGE UP (ref 4.8–10.8)

## 2018-07-27 PROCEDURE — 74177 CT ABD & PELVIS W/CONTRAST: CPT

## 2018-07-27 PROCEDURE — 83605 ASSAY OF LACTIC ACID: CPT

## 2018-07-27 PROCEDURE — 99239 HOSP IP/OBS DSCHRG MGMT >30: CPT

## 2018-07-27 PROCEDURE — 82272 OCCULT BLD FECES 1-3 TESTS: CPT

## 2018-07-27 PROCEDURE — 71250 CT THORAX DX C-: CPT

## 2018-07-27 PROCEDURE — 85610 PROTHROMBIN TIME: CPT

## 2018-07-27 PROCEDURE — 71045 X-RAY EXAM CHEST 1 VIEW: CPT

## 2018-07-27 PROCEDURE — 96374 THER/PROPH/DIAG INJ IV PUSH: CPT | Mod: XU

## 2018-07-27 PROCEDURE — 93005 ELECTROCARDIOGRAM TRACING: CPT

## 2018-07-27 PROCEDURE — 80048 BASIC METABOLIC PNL TOTAL CA: CPT

## 2018-07-27 PROCEDURE — 87449 NOS EACH ORGANISM AG IA: CPT

## 2018-07-27 PROCEDURE — 87086 URINE CULTURE/COLONY COUNT: CPT

## 2018-07-27 PROCEDURE — 83735 ASSAY OF MAGNESIUM: CPT

## 2018-07-27 PROCEDURE — 85730 THROMBOPLASTIN TIME PARTIAL: CPT

## 2018-07-27 PROCEDURE — 87040 BLOOD CULTURE FOR BACTERIA: CPT

## 2018-07-27 PROCEDURE — 87070 CULTURE OTHR SPECIMN AEROBIC: CPT

## 2018-07-27 PROCEDURE — 99285 EMERGENCY DEPT VISIT HI MDM: CPT | Mod: 25

## 2018-07-27 PROCEDURE — 81001 URINALYSIS AUTO W/SCOPE: CPT

## 2018-07-27 PROCEDURE — 99232 SBSQ HOSP IP/OBS MODERATE 35: CPT

## 2018-07-27 PROCEDURE — 96361 HYDRATE IV INFUSION ADD-ON: CPT

## 2018-07-27 PROCEDURE — 92610 EVALUATE SWALLOWING FUNCTION: CPT

## 2018-07-27 PROCEDURE — 85027 COMPLETE CBC AUTOMATED: CPT

## 2018-07-27 PROCEDURE — 36415 COLL VENOUS BLD VENIPUNCTURE: CPT

## 2018-07-27 RX ORDER — IBUPROFEN 200 MG
600 TABLET ORAL ONCE
Qty: 0 | Refills: 0 | Status: DISCONTINUED | OUTPATIENT
Start: 2018-07-27 | End: 2018-07-27

## 2018-07-27 RX ORDER — CEFPODOXIME PROXETIL 100 MG
200 TABLET ORAL EVERY 12 HOURS
Qty: 0 | Refills: 0 | Status: DISCONTINUED | OUTPATIENT
Start: 2018-07-27 | End: 2018-07-27

## 2018-07-27 RX ORDER — CEFPODOXIME PROXETIL 100 MG
1 TABLET ORAL
Qty: 10 | Refills: 0 | OUTPATIENT
Start: 2018-07-27 | End: 2018-07-31

## 2018-07-27 RX ORDER — DILTIAZEM HCL 120 MG
1 CAPSULE, EXT RELEASE 24 HR ORAL
Qty: 0 | Refills: 0 | COMMUNITY
Start: 2018-07-27

## 2018-07-27 RX ORDER — TRAMADOL HYDROCHLORIDE 50 MG/1
50 TABLET ORAL ONCE
Qty: 0 | Refills: 0 | Status: DISCONTINUED | OUTPATIENT
Start: 2018-07-27 | End: 2018-07-27

## 2018-07-27 RX ADMIN — GABAPENTIN 100 MILLIGRAM(S): 400 CAPSULE ORAL at 05:04

## 2018-07-27 RX ADMIN — Medication 81 MILLIGRAM(S): at 12:19

## 2018-07-27 RX ADMIN — APIXABAN 2.5 MILLIGRAM(S): 2.5 TABLET, FILM COATED ORAL at 05:01

## 2018-07-27 RX ADMIN — TRAMADOL HYDROCHLORIDE 50 MILLIGRAM(S): 50 TABLET ORAL at 05:00

## 2018-07-27 RX ADMIN — LOSARTAN POTASSIUM 12.5 MILLIGRAM(S): 100 TABLET, FILM COATED ORAL at 05:01

## 2018-07-27 RX ADMIN — Medication 120 MILLIGRAM(S): at 05:01

## 2018-07-27 RX ADMIN — GABAPENTIN 100 MILLIGRAM(S): 400 CAPSULE ORAL at 14:18

## 2018-07-27 NOTE — CDI QUERY NOTE - NSCDIOTHERTXTBX_GEN_ALL_CORE_HH
Pt. admitted with weakness, confusion, "altered as per family."  As per H&P- " Problem: Altered mental status.  Plan: ?etiology.  ?delirium. resolved, patient A&Ox3.      7/26 PN-"admitted overnight for delirium and fever. This morning Pt is AOX3, delirium resolved.  AMS:- unclear etiology, ? metabolic component.    ID-"Fever is going down and her mental status went back to normal. Most likely infection and high fever caused AMS could be pneumonia with bronchitis causing hemoptysis.     Please further specify the etiology of AMS.  1) AMS due to delirium 2/2 metabolic encephalopathy.  2) AMS 2/2 metabolic encephalopathy.  3) AMS due to delirium and metabolic encephalopathy.  4) Other ( please specify)

## 2018-07-27 NOTE — DISCHARGE NOTE ADULT - CARE PROVIDER_API CALL
Pierre Vasquez), Infectious Disease; Internal Medicine  [address] [facility] [address]  [place] [address]  Phone: [phone]  Fax: [phone]    Armen Gilbert), Critical Care Medicine; Internal Medicine; Pulmonary Disease  [address] [facility] [address]  [place] [address]  Phone: [phone]  Fax: [phone] Pierre Vasqeuz), Infectious Disease; Internal Medicine  [address] [facility]  [address]  [place] [address]  Phone: [phone]  Fax: [phone]    Armen Gilbert), Critical Care Medicine; Internal Medicine; Pulmonary Disease  [address] [facility] [address]  [place] [address]  Phone: [phone]  Fax: [phone]    Amador Aragon), Surgery; Thoracic and Cardiac Surgery  [address] [place] [address]  Phone: [phone]  Fax: [phone] Pierre Vasquez), Infectious Disease; Internal Medicine  [address] [facility]  [address]  [place] [address]  Phone: [phone]  Fax: [phone]    Armen Gilbert), Critical Care Medicine; Internal Medicine; Pulmonary Disease  [address] [facility] [address]  [place] [address]  Phone: [phone]  Fax: [phone]    Amador Aragon), Surgery; Thoracic and Cardiac Surgery  [address] [place] [address]  Phone: [phone]  Fax: [phone]    Yesika Rubio), Cardiology; Internal Medicine  [address] [facility] [address]  [place], [place] [id number]  Phone: [phone]  Fax: [phone]

## 2018-07-27 NOTE — DISCHARGE NOTE ADULT - CARE PLAN
Principal Discharge DX:	Sepsis, due to unspecified organism  Goal:	resolved  Secondary Diagnosis:	Urinary tract infection associated with catheterization of urinary tract, unspecified indwelling urinary catheter type, subsequent encounter  Secondary Diagnosis:	Chronic back pain Principal Discharge DX:	Sepsis, due to unspecified organism  Goal:	resolved  Assessment and plan of treatment:	Possible cause UTI . Follow up with ID within one week. Follow up with PMD within one week.  Secondary Diagnosis:	Urinary tract infection associated with catheterization of urinary tract, unspecified indwelling urinary catheter type, subsequent encounter  Goal:	To resolve  Assessment and plan of treatment:	Take antibiotics as prescribed and to completion five days. Follow up w ID and PMD within one week.  Secondary Diagnosis:	Altered mental status  Goal:	Resolved  Assessment and plan of treatment:	Brief episode altered mental status liklely due to metabolic encephalopathy due to UTI. Plan as above. Return to ED immediately with worsening or worrisome symptoms. Principal Discharge DX:	Sepsis, due to unspecified organism  Goal:	resolved  Assessment and plan of treatment:	Possible cause UTI . Follow up with ID within one week. Follow up with PMD within one week.  Secondary Diagnosis:	Urinary tract infection associated with catheterization of urinary tract, unspecified indwelling urinary catheter type, subsequent encounter  Goal:	To resolve  Assessment and plan of treatment:	Take antibiotics as prescribed and to completion five days. Follow up w ID and PMD within one week.  Secondary Diagnosis:	Altered mental status  Goal:	Resolved  Assessment and plan of treatment:	Brief episode altered mental status likely due to metabolic encephalopathy due to UTI. Plan as above. Return to ED immediately with worsening or worrisome symptoms. Principal Discharge DX:	Sepsis, due to unspecified organism  Goal:	resolved  Assessment and plan of treatment:	Possible cause UTI . take medicine as reconciled. Follow up with ID within one week. Follow up with PMD within one week.  Secondary Diagnosis:	Urinary tract infection associated with catheterization of urinary tract, unspecified indwelling urinary catheter type, subsequent encounter  Goal:	To resolve  Assessment and plan of treatment:	Take antibiotics as prescribed and to completion five days. Follow up w ID and PMD within one week.  Secondary Diagnosis:	Altered mental status  Goal:	Resolved  Assessment and plan of treatment:	Brief episode altered mental status likely due to metabolic encephalopathy due to UTI. Plan as above. Return to ED immediately with worsening or worrisome symptoms.  Secondary Diagnosis:	Aortic stenosis  Assessment and plan of treatment:	f/u with cardiology and CT surgery  Secondary Diagnosis:	Essential hypertension  Assessment and plan of treatment:	Resume outpatient meds and f/u with PCP  Secondary Diagnosis:	Hyperlipidemia, unspecified hyperlipidemia type  Assessment and plan of treatment:	Resume outpatient meds and f/u with PCP  Secondary Diagnosis:	CAD (coronary artery disease)  Assessment and plan of treatment:	Resume outpatient meds and f/u with PCP

## 2018-07-27 NOTE — DISCHARGE NOTE ADULT - CARE PROVIDERS DIRECT ADDRESSES
,DirectAddress_Unknown,[email or web address] ,DirectAddress_Unknown,[email or web address] ,[email or web address],[email or web address]

## 2018-07-27 NOTE — PROGRESS NOTE ADULT - SUBJECTIVE AND OBJECTIVE BOX
[facility]  INFECTIOUS DISEASES AND INTERNAL MEDICINE at [place]  =======================================================  Sami Nguyen MD [facility]   Diogenes Orta MD  Diplomates American Board of Internal Medicine and Infectious Diseases  =======================================================    MAMTA TALAVERA [id number]  chief complaint: follow up on fevers, Altered mentation  patient seen and examined in follow up.  Chart and labs reviewed.     cultures negative  CT chest without infiltrates  pt reports dark urine for some time.  then had foul smelling urine at home.   =======================================================  Allergies:  No Known Allergies      =======================================================  Medications:  ALBUTerol/ipratropium for Nebulization 3 milliLiter(s) Nebulizer every 6 hours PRN  apixaban 2.5 milliGRAM(s) Oral every 12 hours  aspirin enteric coated 81 milliGRAM(s) Oral daily  atorvastatin 20 milliGRAM(s) Oral at bedtime  cefpodoxime 200 milliGRAM(s) Oral every 12 hours  cyclobenzaprine 5 milliGRAM(s) Oral three times a day PRN  diltiazem    milliGRAM(s) Oral daily  docusate sodium 100 milliGRAM(s) Oral two times a day  gabapentin 100 milliGRAM(s) Oral three times a day  ibuprofen  Tablet 600 milliGRAM(s) Oral once  losartan 12.5 milliGRAM(s) Oral daily  polyethylene glycol 3350 17 Gram(s) Oral daily  senna 2 Tablet(s) Oral at bedtime  sodium chloride 0.9%. 1000 milliLiter(s) IV Continuous <Continuous>    =======================================================    REVIEW OF SYSTEMS:  as above  all other ROS are negative    =======================================================  Physical Exam:  Vital Signs Last 24 Hrs  T(C): 36.5 (2018 11:58), Max: 37 (2018 05:08)  T(F): 97.7 (2018 11:58), Max: 98.6 (2018 05:08)  HR: 58 (2018 11:58) (58 - 85)  BP: 127/69 (2018 11:58) (127/69 - 161/80)  RR: 18 (2018 11:58) (18 - 20)  SpO2: 99% (2018 21:17) (98% - 99%)    GEN: NAD, pleasant  HEENT: normocephalic and atraumatic. EOMI. CLARK.    NECK: Supple. No carotid bruits.  No lymphadenopathy or thyromegaly.  LUNGS: Clear to auscultation. on posterior exam  HEART: Regular rate and rhythm    ABDOMEN: Soft, nontender, and nondistended.  Positive bowel sounds.    : No CVA tenderness  EXTREMITIES: Without any cyanosis, clubbing, rash, lesions or edema.  MSK: no joint swelling  NEUROLOGIC: Cranial nerves II through XII are grossly intact.  PSYCHIATRIC: Appropriate affect .  SKIN: No ulceration or induration present.    =======================================================    Labs:      142  |  108<H>  |  10.0  ----------------------------<  92  4.5   |  22.0  |  0.67    Ca    9.2      2018 05:16  Mg     2.0                                 12.7   9.0   )-----------( 198      ( 2018 05:16 )             39.5       PT/INR - ( 2018 12:33 )   PT: 14.1 sec;   INR: 1.28 ratio         PTT - ( 2018 12:33 )  PTT:31.4 sec  Urinalysis Basic - ( 2018 19:48 )    Color: Yellow / Appearance: Clear / S.015 / pH: x  Gluc: x / Ketone: Negative  / Bili: Negative / Urobili: Negative mg/dL   Blood: x / Protein: 15 mg/dL / Nitrite: Negative   Leuk Esterase: Trace / RBC: 0-2 /HPF / WBC 0-2   Sq Epi: x / Non Sq Epi: Occasional / Bacteria: x         RECENT CULTURES:   @ 22:08 .Sputum Sputum       Moderate White blood cells  Few Gram positive cocci in pairs  Few Gram Positive Rods       @ 19:48 .Urine Clean Catch (Midstream)     No growth    ========================     EXAM:  CT CHEST                          PROCEDURE DATE:  2018          INTERPRETATION:  Chest CT without contrast .  COMPARISON: 2017 chest CT scan.  CLINICAL INFORMATION: Hemoptysis..  TECHNIQUE: Contiguous axial 2.5 mm slice thickness images of the chest   were obtained without intravenous contrast administration.  FINDINGS:  Similar severe dextroscoliosis of thoracic spine noted with degenerative   endplate spurring without acute compression fracture.  Lung parenchyma shows similar bronchiectasis in the LEFT lower lobe with   a medial basilar chronic atelectasis. No gross endobronchial lesion or   tracheal lesion seen. RIGHT lung parenchyma displays some linear   atelectasis within the LEFT lung base. LEFT and RIGHT upper lobes remain   clear.  There is mild cardiomegaly with calcified coronary arteries and   prosthetic aortic valve in place. .    There is no pleural effusion or pneumothorax.    There are no mediastinal lymphadenopathy or masses.      Visualized upper abdominal viscera unremarkable.    The bones are normal.     IMPRESSION:    No significant change with evidence of mild bronchiectasis of LEFT lower lobe with a medial basilar  chronic atelectasis. Linear discoid atelectasis RIGHT lung base.  Severe thoracic dextroscoliosis unchanged .  Mild cardiomegaly with prosthetic aortic valve and coronary artery calcifications noted.  No tracheal or gross endobronchial obstructing lesion..    TAMARA RENTERIA M.D., ATTENDING RADIOLOGIST  This document has been electronically signed. 2018  8:54AM

## 2018-07-27 NOTE — DISCHARGE NOTE ADULT - MEDICATION SUMMARY - MEDICATIONS TO TAKE
I will START or STAY ON the medications listed below when I get home from the hospital:    traMADol 50 mg oral tablet  -- 1 tab(s) by mouth every 6 hours, As needed, Moderate Pain (4 - 6)  -- Indication: For Pain    aspirin 81 mg oral delayed release tablet  -- 1 tab(s) by mouth once a day  -- Indication: For CAD    oxyCODONE-acetaminophen 5 mg-325 mg oral tablet  -- 1 tab(s) by mouth 3 times a day, As Needed  -- Indication: For Pain    valsartan 40 mg oral tablet  -- 1 tab(s) by mouth once a day  -- Indication: For Essential hypertension    dilTIAZem 240 mg/24 hours oral capsule, extended release  -- 1 cap(s) by mouth once a day  -- Indication: For PAF    Xarelto 15 mg oral tablet  -- 1 tab(s) by mouth once a day (in the evening)  -- Indication: For PAF    gabapentin 100 mg oral capsule  -- 1 cap(s) by mouth 3 times a day  -- Indication: For Pain    atorvastatin 20 mg oral tablet  -- 1 tab(s) by mouth once a day (at bedtime)  -- Indication: For Hyperlipidemia, unspecified hyperlipidemia type    Miacalcin Nasal 200 intl units/inh nasal spray  -- 1 spray(s) into nose once a day  -- Indication: For Hypercalcemia    cefpodoxime 200 mg oral tablet  -- 1 tab(s) by mouth every 12 hours  -- Indication: For UTI    furosemide 40 mg oral tablet  -- 1 tab(s) by mouth once a day  -- Indication: For CHF    furosemide 20 mg oral tablet  -- 1 tab(s) by mouth once a day (in the evening)  -- Indication: For CHF    docusate sodium 100 mg oral capsule  -- 1 cap(s) by mouth 2 times a day  -- Indication: For constipation    senna oral tablet  -- 2 tab(s) by mouth once a day (at bedtime)  -- Indication: For constipation    cyclobenzaprine 5 mg oral tablet  -- 1 tab(s) by mouth 3 times a day, As needed, Muscle Spasm  -- Indication: For Pain

## 2018-07-27 NOTE — DISCHARGE NOTE ADULT - HOSPITAL COURSE
86 years old female with PMH of HTN, HLD, PAF /DVT on AC at home, CHF, AS s/p TAVR 12/17, CAD, Arthritis chronic back pain brought in to the ER by family for the evaluation of brief episode of change in mental status. In ED Pt noted to have coughing up blood and fever 103. Pt admitted for suspected PNA and started in IV abx. AMS resolved very quickly and pt mental status back to baseline. Patient later stated that she has been having symptoms past few days of hesitancy abd foul smelling urine. UA negative and UC no growth to date.     AMS:  - unclear etiology, ? metabolic component.  - Resolved now, Pt mental status back to normal.  - Monitor clinically.     Bronchiectasis:  - Pt coughing up blood, had fever 103. No more hemoptysis.  - CT chest shows mild bronchiectasis  - Pt started in IV abx overnight, will empirically treat with ABx.  - Pulmonology consult requested.     Fever:  - Unclear etiology, ? from bronchiectasis. Afebrile now, no leukocytosis.  - UA / CT chest with no infection.   - C/w empiric abx.  - F/u blood /urine cx.     >H/o HLD- C/w home Lipitor  >H/o HTN- Pt on valsartan 40 at home, here started on losartan 12.5- c/w it. Previous records shows Pt was on Cardizem at home for HTN and Rate control for Afib- will restart at lower dose and titrate.   >H/o PAF- On Eliquis at home, here was not resume due to hemoptysis. hgb stable with no more hemoptysis- will restart home dose Eliquis 2.5 mg bid.  Previous records shows Pt was on Cardizem at home for Rate control- resume.   >H/o DVT- On eliquis as above.   >H/o CAD- C/w ASA /Statins  >H/o Diastolic HF- not in volume overload, little dehydrated on admission, IVFs for 24 hours. Home Lasix on hold  >H/o Chronic back pain- C/w home flexeril, percocet, gabapentin, ultram  >Constipation- CT shows stool load in colon, Pt had large BM in ER. C/w senna, add Miralax  >DVT ppx- On eliquis 86 years old female with PMH of HTN, HLD, PAF /DVT on AC at home, CHF, AS s/p TAVR 12/17, CAD, Arthritis chronic back pain brought in to the ER by family for the evaluation of brief episode of change in mental status. In ED Pt noted to have coughing up blood and fever 103. Pt admitted for suspected PNA and started in IV abx. AMS resolved very quickly and pt mental status back to baseline. Patient later stated that she has been having symptoms past few days of hesitancy abd foul smelling urine. UA negative and UC no growth to date. No PNA on chest CT. Chest CT shows mild bronchiectasis. Pt was seen by pulmonology. Pulmonologist felt bronchiectasis probably related to aspiration in the past. Pt with minimal possible Hemoptysis (brown sputum) related to bronchiectasis and complicated by AC for atrial fibrillation Speech and swallow eval was performed and patient deemed able to tolerate regular diet with thin liquids. Pt has been followed by ID who states likely Bronchitis vs UTI, ABX changed to po Vantin for next five days. Pt has remained afebrile, labs are wnl, no further episode of AMS or hemoptysis. Pt requesting discharge to home. Pt is at baseline status which is alert and oriented. Pt lives at home alone with her son and grandchild living in the house next door. Pt will be discharged to home with home care. Pt is feeling well and is stable for discharge. Rx sent for Vantin. Pt is advised to take as directed and to completion. Advised to take w probiotic. Advised to follow up w PMD and ID within one week. Pt advised to follow up w pulmonary within one week due to brief episode hemoptysis. Pt is advised to continue with all other at home medications including Xarelto. Had been switched to Eliquis while here but expresses wants to take her Xarelto instead due to insurance/cost factor. Pt can continue with current pain medications for chronic back pain.  Pt and son express understanding and agreement with this plan of discharge and follwo up. Have had the opportunity to ask questions and all questions have been answered.   Vital Signs Last 24 Hrs  T(C): 36.5 (27 Jul 2018 11:58), Max: 37 (27 Jul 2018 05:08)  T(F): 97.7 (27 Jul 2018 11:58), Max: 98.6 (27 Jul 2018 05:08)  HR: 58 (27 Jul 2018 11:58) (58 - 85)  BP: 127/69 (27 Jul 2018 11:58) (127/69 - 161/80)  BP(mean): --  RR: 18 (27 Jul 2018 11:58) (16 - 20)  SpO2: 97% (27 Jul 2018 11:00) (97% - 99%)s     PE:   General: NAD, sitting up in bed, well groomed in nightgown  Eyes: PERRLA, EOM intact  Neck: Supple and symmetrical, no JVD  CV: RRR, no m/r/g  Lungs: CTA b/l, no use of accessory muscles, no wheezes, rales, rhonchi  Skin: warm, dry, no rashes  Psych: normal mood/affect  Abdomen: Normal BS, soft, NT/ND  Extremities: no edema, cyanosis  Musculoskeletal: good strength b/l UE/LE, normal ROM, no joint swelling  Neuro: A & O X 3, CN 2-12 grossly intact, no sensory or motor deficit.       NAD noted at discharge and vital signs stable. Advised return to ED immediately with AMS, fevers/chills, chest pain, sob, palpitations, weakness.   42 minutes spent on discharge plan and summary. 86 years old female with PMH of HTN, HLD, PAF /DVT on AC at home, CHF, AS s/p TAVR 12/17, CAD, Arthritis chronic back pain brought in to the ER by family for the evaluation of brief episode of change in mental status. In ED Pt noted to have coughing up blood and fever 103. Pt admitted for suspected PNA and started in IV abx. AMS resolved very quickly and pt mental status back to baseline. Patient later stated that she has been having symptoms past few days of hesitancy abd foul smelling urine. UA negative and UC no growth to date. No PNA on chest CT. Chest CT shows mild bronchiectasis. Pt was seen by pulmonology. Pulmonologist felt bronchiectasis probably related to aspiration in the past. Pt with minimal possible Hemoptysis (brown sputum) related to bronchiectasis and complicated by AC for atrial fibrillation Speech and swallow eval was performed and patient deemed able to tolerate regular diet with thin liquids. Pt has been followed by ID who states likely Bronchitis vs UTI, ABX changed to po Vantin for next five days. Pt has remained afebrile, labs are wnl, no further episode of AMS or hemoptysis. Pt requesting discharge to home. Pt is at baseline status which is alert and oriented. Pt lives at home alone with her son and grandchild living in the house next door. Pt will be discharged to home with home care. Pt is feeling well and is stable for discharge. Rx sent for Vantin. Pt is advised to take as directed and to completion. Advised to take w probiotic. Advised to follow up w PMD and ID within one week. Pt advised to follow up w pulmonary within one week due to brief episode hemoptysis. Pt is advised to continue with all other at home medications including Xarelto. Had been switched to Eliquis while here but expresses wants to take her Xarelto instead due to insurance/cost factor. Pt can continue with current pain medications for chronic back pain.  Pt and son express understanding and agreement with this plan of discharge and follwo up. Have had the opportunity to ask questions and all questions have been answered.   Vital Signs Last 24 Hrs  T(C): 36.5 (27 Jul 2018 11:58), Max: 37 (27 Jul 2018 05:08)  T(F): 97.7 (27 Jul 2018 11:58), Max: 98.6 (27 Jul 2018 05:08)  HR: 58 (27 Jul 2018 11:58) (58 - 85)  BP: 127/69 (27 Jul 2018 11:58) (127/69 - 161/80)  BP(mean): --  RR: 18 (27 Jul 2018 11:58) (16 - 20)  SpO2: 97% (27 Jul 2018 11:00) (97% - 99%)s     PE:   General: NAD, sitting up in bed, well groomed in nightgown  Eyes: PERRLA, EOM intact  Neck: Supple and symmetrical, no JVD  CV: RRR, no m/r/g  Lungs: CTA b/l, no use of accessory muscles, no wheezes, rales, rhonchi  Skin: warm, dry, no rashes  Psych: normal mood/affect  Abdomen: Normal BS, soft, NT/ND  Extremities: no edema, cyanosis  Musculoskeletal: good strength b/l UE/LE, normal ROM, no joint swelling  Neuro: A & O X 3, CN 2-12 grossly intact, no sensory or motor deficit.       NAD noted at discharge and vital signs stable. Advised return to ED immediately with AMS, fevers/chills, chest pain, sob, palpitations, weakness.     42 minutes spent on discharge plan and summary. 86 years old female with PMH of HTN, HLD, PAF /DVT on AC at home, CHF, AS s/p TAVR 12/17, CAD, Arthritis chronic back pain brought in to the ER by family for the evaluation of brief episode of change in mental status. In ED Pt noted to have coughing up blood and fever 103. Pt admitted for suspected PNA and started in IV abx. AMS resolved very quickly and pt mental status back to baseline. Patient later stated that she has been having symptoms past few days of hesitancy abd foul smelling urine. UA negative and UC no growth to date. No PNA on chest CT. Chest CT shows mild bronchiectasis. Pt was seen by pulmonology. Pulmonologist felt bronchiectasis probably related to aspiration in the past. Pt with minimal possible Hemoptysis (brown sputum) related to bronchiectasis and complicated by AC for atrial fibrillation Speech and swallow eval was performed and patient deemed able to tolerate regular diet with thin liquids. Pt has been followed by ID who states likely Bronchitis vs UTI, ABX changed to po Vantin for next five days. Pt has remained afebrile, labs are wnl, no further episode of AMS or hemoptysis. AMS likely metabolic encephalopathy. Pt requesting discharge to home. Pt is at baseline status which is alert and oriented. Pt lives at home alone with her son and grandchild living in the house next door. Pt will be discharged to home with home care. Pt is feeling well and is stable for discharge. Rx sent for Vantin. Pt is advised to take as directed and to completion. Advised to take w probiotic. Advised to follow up w PMD and ID within one week. Pt advised to follow up w pulmonary within one week due to brief episode hemoptysis. Pt is advised to continue with all other at home medications including Xarelto. Had been switched to Eliquis while here but expresses wants to take her Xarelto instead due to insurance/cost factor. Pt can continue with current pain medications for chronic back pain.  Pt and son express understanding and agreement with this plan of discharge and follwo up. Have had the opportunity to ask questions and all questions have been answered.   Vital Signs Last 24 Hrs  T(C): 36.5 (27 Jul 2018 11:58), Max: 37 (27 Jul 2018 05:08)  T(F): 97.7 (27 Jul 2018 11:58), Max: 98.6 (27 Jul 2018 05:08)  HR: 58 (27 Jul 2018 11:58) (58 - 85)  BP: 127/69 (27 Jul 2018 11:58) (127/69 - 161/80)  BP(mean): --  RR: 18 (27 Jul 2018 11:58) (16 - 20)  SpO2: 97% (27 Jul 2018 11:00) (97% - 99%)s     PE:   General: NAD, sitting up in bed, well groomed in nightgown  Eyes: PERRLA, EOM intact  Neck: Supple and symmetrical, no JVD  CV: RRR, no m/r/g  Lungs: CTA b/l, no use of accessory muscles, no wheezes, rales, rhonchi  Skin: warm, dry, no rashes  Psych: normal mood/affect  Abdomen: Normal BS, soft, NT/ND  Extremities: no edema, cyanosis  Musculoskeletal: good strength b/l UE/LE, normal ROM, no joint swelling  Neuro: A & O X 3, CN 2-12 grossly intact, no sensory or motor deficit.       NAD noted at discharge and vital signs stable. Advised return to ED immediately with AMS, fevers/chills, chest pain, sob, palpitations, weakness.     42 minutes spent on discharge plan and summary.

## 2018-07-27 NOTE — DISCHARGE NOTE ADULT - PLAN OF CARE
resolved Possible cause UTI . Follow up with ID within one week. Follow up with PMD within one week. To resolve Take antibiotics as prescribed and to completion five days. Follow up w ID and PMD within one week. Resolved Brief episode altered mental status liklely due to metabolic encephalopathy due to UTI. Plan as above. Return to ED immediately with worsening or worrisome symptoms. Brief episode altered mental status likely due to metabolic encephalopathy due to UTI. Plan as above. Return to ED immediately with worsening or worrisome symptoms. Resume outpatient meds and f/u with PCP Possible cause UTI . take medicine as reconciled. Follow up with ID within one week. Follow up with PMD within one week. f/u with cardiology and CT surgery

## 2018-07-27 NOTE — DISCHARGE NOTE ADULT - PROVIDER TOKENS
TOKEN:'[id number]:'[id number] TOKEN:'[id number]:'[id number] TOKEN:'[id number]:'[id number]'

## 2018-07-27 NOTE — DISCHARGE NOTE ADULT - MEDICATION SUMMARY - MEDICATIONS TO STOP TAKING
I will STOP taking the medications listed below when I get home from the hospital:    apixaban 2.5 mg oral tablet  -- 1 tab(s) by mouth every 12 hours

## 2018-07-27 NOTE — DISCHARGE NOTE ADULT - SECONDARY DIAGNOSIS.
Urinary tract infection associated with catheterization of urinary tract, unspecified indwelling urinary catheter type, subsequent encounter Chronic back pain Altered mental status CAD (coronary artery disease) Aortic stenosis Essential hypertension Hyperlipidemia, unspecified hyperlipidemia type

## 2018-07-27 NOTE — DISCHARGE NOTE ADULT - PATIENT PORTAL LINK FT
You can access the [facility] Patient Portal, offered by [facility], by registering with the following website: [email or web address]

## 2018-07-27 NOTE — PROGRESS NOTE ADULT - ASSESSMENT
87 y/o woman with PMH of HTN and TAVR and chronic back pain was admitted in change in mental status, fever of 103, and foul smelling urine. also report of cough with blood tinged sputum.   Chest CT without significant change in atelectasis.  FEVER RESOLVED> AMS RESOLVED>     prior cultures in micro showed proteus in past, susceptible to most antibiotics.     Unclear if this is UTI or bronchitis.   - clinically improving.   - de-escalate therapy    - d/c Azithro   d/c Ceftriaxone      - start VANTIN 200mg PO BID x 5 days.

## 2018-07-28 LAB — LEGIONELLA AG UR QL: NEGATIVE — SIGNIFICANT CHANGE UP

## 2018-07-29 LAB
CULTURE RESULTS: SIGNIFICANT CHANGE UP
SPECIMEN SOURCE: SIGNIFICANT CHANGE UP

## 2018-07-30 LAB
CULTURE RESULTS: SIGNIFICANT CHANGE UP
CULTURE RESULTS: SIGNIFICANT CHANGE UP
SPECIMEN SOURCE: SIGNIFICANT CHANGE UP
SPECIMEN SOURCE: SIGNIFICANT CHANGE UP

## 2018-08-01 ENCOUNTER — NON-APPOINTMENT (OUTPATIENT)
Age: 83
End: 2018-08-01

## 2018-08-01 ENCOUNTER — APPOINTMENT (OUTPATIENT)
Dept: CARDIOLOGY | Facility: CLINIC | Age: 83
End: 2018-08-01
Payer: MEDICARE

## 2018-08-01 VITALS
WEIGHT: 130 LBS | HEIGHT: 58 IN | DIASTOLIC BLOOD PRESSURE: 68 MMHG | OXYGEN SATURATION: 93 % | SYSTOLIC BLOOD PRESSURE: 153 MMHG | HEART RATE: 76 BPM | BODY MASS INDEX: 27.29 KG/M2

## 2018-08-01 PROCEDURE — 99215 OFFICE O/P EST HI 40 MIN: CPT

## 2018-08-01 PROCEDURE — 93000 ELECTROCARDIOGRAM COMPLETE: CPT

## 2018-08-02 RX ORDER — VALSARTAN 40 MG/1
TABLET ORAL
Refills: 0 | Status: DISCONTINUED | COMMUNITY
End: 2018-08-02

## 2018-08-02 RX ORDER — VALSARTAN 80 MG/1
80 TABLET, COATED ORAL DAILY
Qty: 60 | Refills: 3 | Status: DISCONTINUED | COMMUNITY
Start: 2018-05-16 | End: 2018-08-02

## 2018-08-03 ENCOUNTER — MEDICATION RENEWAL (OUTPATIENT)
Age: 83
End: 2018-08-03

## 2018-08-03 ENCOUNTER — APPOINTMENT (OUTPATIENT)
Dept: PULMONOLOGY | Facility: CLINIC | Age: 83
End: 2018-08-03
Payer: MEDICARE

## 2018-08-03 VITALS
HEART RATE: 77 BPM | WEIGHT: 130 LBS | BODY MASS INDEX: 27.29 KG/M2 | SYSTOLIC BLOOD PRESSURE: 124 MMHG | OXYGEN SATURATION: 95 % | DIASTOLIC BLOOD PRESSURE: 80 MMHG | HEIGHT: 58 IN

## 2018-08-03 DIAGNOSIS — J30.9 ALLERGIC RHINITIS, UNSPECIFIED: ICD-10-CM

## 2018-08-03 DIAGNOSIS — J47.9 BRONCHIECTASIS, UNCOMPLICATED: ICD-10-CM

## 2018-08-03 DIAGNOSIS — J45.909 UNSPECIFIED ASTHMA, UNCOMPLICATED: ICD-10-CM

## 2018-08-03 PROCEDURE — 99214 OFFICE O/P EST MOD 30 MIN: CPT

## 2018-08-03 RX ORDER — AMOXICILLIN 500 MG/1
500 CAPSULE ORAL
Qty: 10 | Refills: 0 | Status: DISCONTINUED | COMMUNITY
Start: 2018-06-29

## 2018-08-03 RX ORDER — CEFPODOXIME PROXETIL 200 MG/1
200 TABLET, FILM COATED ORAL
Qty: 10 | Refills: 0 | Status: DISCONTINUED | COMMUNITY
Start: 2018-07-27

## 2018-08-03 RX ORDER — OFLOXACIN 3 MG/ML
0.3 SOLUTION/ DROPS OPHTHALMIC
Qty: 5 | Refills: 0 | Status: DISCONTINUED | COMMUNITY
Start: 2018-06-29

## 2018-08-03 RX ORDER — NITROFURANTOIN (MONOHYDRATE/MACROCRYSTALS) 25; 75 MG/1; MG/1
100 CAPSULE ORAL
Qty: 14 | Refills: 0 | Status: DISCONTINUED | COMMUNITY
Start: 2018-03-14

## 2018-08-06 ENCOUNTER — APPOINTMENT (OUTPATIENT)
Dept: CARDIOLOGY | Facility: CLINIC | Age: 83
End: 2018-08-06
Payer: MEDICARE

## 2018-08-06 PROCEDURE — 93970 EXTREMITY STUDY: CPT

## 2018-08-20 ENCOUNTER — EMERGENCY (EMERGENCY)
Facility: HOSPITAL | Age: 83
LOS: 1 days | Discharge: HOME | End: 2018-08-20
Attending: EMERGENCY MEDICINE
Payer: MEDICARE

## 2018-08-20 VITALS
SYSTOLIC BLOOD PRESSURE: 192 MMHG | OXYGEN SATURATION: 96 % | HEIGHT: 58 IN | RESPIRATION RATE: 24 BRPM | WEIGHT: 130.07 LBS | HEART RATE: 93 BPM | DIASTOLIC BLOOD PRESSURE: 74 MMHG

## 2018-08-20 DIAGNOSIS — Z98.1 ARTHRODESIS STATUS: Chronic | ICD-10-CM

## 2018-08-20 PROCEDURE — 99282 EMERGENCY DEPT VISIT SF MDM: CPT | Mod: 25

## 2018-08-20 PROCEDURE — 30903 CONTROL OF NOSEBLEED: CPT | Mod: LT

## 2018-08-20 PROCEDURE — 30903 CONTROL OF NOSEBLEED: CPT

## 2018-08-20 NOTE — ED PROVIDER NOTE - RESPIRATORY NEGATIVE STATEMENT, MLM
Doing okay does have some swelling in the left hand he is going to continue to do therapy just started I do think that most likely for him to return to his full duty at work we'll be between the 4 and 6 month point.    We will see him back in approximately 6 weeks.  
no cough, and no shortness of breath.

## 2018-08-20 NOTE — ED PROVIDER NOTE - OBJECTIVE STATEMENT
85 y/o F pt with hx of HTN presents to ED c/o sudden onset epistaxis starting 30 minutes PTA. Pt is on Xarelto. denies fever. denies HA or neck pain. no chest pain or sob. no abd pain. no n/v/d. no urinary f/u/d. no back pain. no motor or sensory deficits. denies drug use. no recent travel. no rash. no other acute issues symptoms or concerns

## 2018-08-21 ENCOUNTER — CHART COPY (OUTPATIENT)
Age: 83
End: 2018-08-21

## 2018-09-05 ENCOUNTER — EMERGENCY (EMERGENCY)
Facility: HOSPITAL | Age: 83
LOS: 1 days | Discharge: HOME | End: 2018-09-05
Attending: EMERGENCY MEDICINE
Payer: MEDICARE

## 2018-09-05 VITALS
DIASTOLIC BLOOD PRESSURE: 67 MMHG | WEIGHT: 130.07 LBS | TEMPERATURE: 98 F | SYSTOLIC BLOOD PRESSURE: 136 MMHG | HEART RATE: 80 BPM | RESPIRATION RATE: 18 BRPM | HEIGHT: 58 IN | OXYGEN SATURATION: 95 %

## 2018-09-05 DIAGNOSIS — Z98.1 ARTHRODESIS STATUS: Chronic | ICD-10-CM

## 2018-09-05 PROCEDURE — 30901 CONTROL OF NOSEBLEED: CPT

## 2018-09-05 PROCEDURE — 30901 CONTROL OF NOSEBLEED: CPT | Mod: LT

## 2018-09-05 PROCEDURE — 99282 EMERGENCY DEPT VISIT SF MDM: CPT | Mod: 25

## 2018-09-05 PROCEDURE — 99283 EMERGENCY DEPT VISIT LOW MDM: CPT | Mod: 25

## 2018-09-05 NOTE — ED PROVIDER NOTE - OBJECTIVE STATEMENT
86 year old female with PMH HTN, hLD, AS, afib on xarelto presents with nosebleed. Pt states she was one the toilet straining to have a BM when her nose started to bleed. She was bleeding for 30 minutes, and it stopped while en route to the hospital. She currently has no complaints including no chest pain, SOB, congestion, headache.

## 2018-09-05 NOTE — ED ADULT TRIAGE NOTE - CHIEF COMPLAINT QUOTE
Pt BIBA A&Ox3 c/o spontaneous epistaxis while straining to use the toilet. Pt is on xaralto, bleeding controlled at this time. Denies any dizziness, HA, CP or SOB, respirations even and unlabored.

## 2018-10-01 ENCOUNTER — RX RENEWAL (OUTPATIENT)
Age: 83
End: 2018-10-01

## 2018-10-31 ENCOUNTER — APPOINTMENT (OUTPATIENT)
Dept: CARDIOLOGY | Facility: CLINIC | Age: 83
End: 2018-10-31
Payer: MEDICARE

## 2018-10-31 ENCOUNTER — NON-APPOINTMENT (OUTPATIENT)
Age: 83
End: 2018-10-31

## 2018-10-31 VITALS — OXYGEN SATURATION: 95 % | DIASTOLIC BLOOD PRESSURE: 80 MMHG | HEART RATE: 83 BPM | SYSTOLIC BLOOD PRESSURE: 163 MMHG

## 2018-10-31 VITALS — SYSTOLIC BLOOD PRESSURE: 180 MMHG | DIASTOLIC BLOOD PRESSURE: 73 MMHG

## 2018-10-31 PROCEDURE — 93000 ELECTROCARDIOGRAM COMPLETE: CPT

## 2018-10-31 PROCEDURE — 99215 OFFICE O/P EST HI 40 MIN: CPT

## 2018-11-01 LAB
ANION GAP SERPL CALC-SCNC: 12 MMOL/L
BUN SERPL-MCNC: 19 MG/DL
CALCIUM SERPL-MCNC: 10.1 MG/DL
CHLORIDE SERPL-SCNC: 104 MMOL/L
CO2 SERPL-SCNC: 29 MMOL/L
CREAT SERPL-MCNC: 1.16 MG/DL
GLUCOSE SERPL-MCNC: 98 MG/DL
POTASSIUM SERPL-SCNC: 4.7 MMOL/L
SODIUM SERPL-SCNC: 145 MMOL/L

## 2018-11-05 LAB — NT-PROBNP SERPL-MCNC: 963 PG/ML

## 2018-11-07 ENCOUNTER — OUTPATIENT (OUTPATIENT)
Dept: OUTPATIENT SERVICES | Facility: HOSPITAL | Age: 83
LOS: 1 days | End: 2018-11-07
Payer: MEDICARE

## 2018-11-07 VITALS
DIASTOLIC BLOOD PRESSURE: 75 MMHG | SYSTOLIC BLOOD PRESSURE: 151 MMHG | HEART RATE: 84 BPM | TEMPERATURE: 98 F | RESPIRATION RATE: 18 BRPM | WEIGHT: 128.97 LBS | HEIGHT: 58 IN | OXYGEN SATURATION: 96 %

## 2018-11-07 VITALS — WEIGHT: 127.87 LBS

## 2018-11-07 DIAGNOSIS — Z98.1 ARTHRODESIS STATUS: Chronic | ICD-10-CM

## 2018-11-07 DIAGNOSIS — Z01.810 ENCOUNTER FOR PREPROCEDURAL CARDIOVASCULAR EXAMINATION: ICD-10-CM

## 2018-11-07 DIAGNOSIS — Z95.3 PRESENCE OF XENOGENIC HEART VALVE: Chronic | ICD-10-CM

## 2018-11-07 LAB
ANION GAP SERPL CALC-SCNC: 10 MMOL/L — SIGNIFICANT CHANGE UP (ref 5–17)
APTT BLD: 36.4 SEC — HIGH (ref 27.5–36.3)
BASOPHILS # BLD AUTO: 0 K/UL — SIGNIFICANT CHANGE UP (ref 0–0.2)
BASOPHILS NFR BLD AUTO: 0.7 % — SIGNIFICANT CHANGE UP (ref 0–2)
BUN SERPL-MCNC: 21 MG/DL — HIGH (ref 8–20)
CALCIUM SERPL-MCNC: 9.8 MG/DL — SIGNIFICANT CHANGE UP (ref 8.6–10.2)
CHLORIDE SERPL-SCNC: 101 MMOL/L — SIGNIFICANT CHANGE UP (ref 98–107)
CO2 SERPL-SCNC: 31 MMOL/L — HIGH (ref 22–29)
CREAT SERPL-MCNC: 0.89 MG/DL — SIGNIFICANT CHANGE UP (ref 0.5–1.3)
EOSINOPHIL # BLD AUTO: 0.1 K/UL — SIGNIFICANT CHANGE UP (ref 0–0.5)
EOSINOPHIL NFR BLD AUTO: 1.6 % — SIGNIFICANT CHANGE UP (ref 0–6)
GLUCOSE SERPL-MCNC: 74 MG/DL — SIGNIFICANT CHANGE UP (ref 70–115)
HCT VFR BLD CALC: 37.1 % — SIGNIFICANT CHANGE UP (ref 37–47)
HGB BLD-MCNC: 12.2 G/DL — SIGNIFICANT CHANGE UP (ref 12–16)
INR BLD: 1.24 RATIO — HIGH (ref 0.88–1.16)
LYMPHOCYTES # BLD AUTO: 1.8 K/UL — SIGNIFICANT CHANGE UP (ref 1–4.8)
LYMPHOCYTES # BLD AUTO: 29.8 % — SIGNIFICANT CHANGE UP (ref 20–55)
MCHC RBC-ENTMCNC: 28 PG — SIGNIFICANT CHANGE UP (ref 27–31)
MCHC RBC-ENTMCNC: 32.9 G/DL — SIGNIFICANT CHANGE UP (ref 32–36)
MCV RBC AUTO: 85.1 FL — SIGNIFICANT CHANGE UP (ref 81–99)
MONOCYTES # BLD AUTO: 1 K/UL — HIGH (ref 0–0.8)
MONOCYTES NFR BLD AUTO: 16 % — HIGH (ref 3–10)
NEUTROPHILS # BLD AUTO: 3.2 K/UL — SIGNIFICANT CHANGE UP (ref 1.8–8)
NEUTROPHILS NFR BLD AUTO: 51.9 % — SIGNIFICANT CHANGE UP (ref 37–73)
PLATELET # BLD AUTO: 262 K/UL — SIGNIFICANT CHANGE UP (ref 150–400)
POTASSIUM SERPL-MCNC: 4.4 MMOL/L — SIGNIFICANT CHANGE UP (ref 3.5–5.3)
POTASSIUM SERPL-SCNC: 4.4 MMOL/L — SIGNIFICANT CHANGE UP (ref 3.5–5.3)
PROTHROM AB SERPL-ACNC: 14.3 SEC — HIGH (ref 10–12.9)
RBC # BLD: 4.36 M/UL — LOW (ref 4.4–5.2)
RBC # FLD: 14.1 % — SIGNIFICANT CHANGE UP (ref 11–15.6)
SODIUM SERPL-SCNC: 142 MMOL/L — SIGNIFICANT CHANGE UP (ref 135–145)
WBC # BLD: 6.1 K/UL — SIGNIFICANT CHANGE UP (ref 4.8–10.8)
WBC # FLD AUTO: 6.1 K/UL — SIGNIFICANT CHANGE UP (ref 4.8–10.8)

## 2018-11-07 PROCEDURE — 36415 COLL VENOUS BLD VENIPUNCTURE: CPT

## 2018-11-07 PROCEDURE — G0463: CPT

## 2018-11-07 PROCEDURE — 85730 THROMBOPLASTIN TIME PARTIAL: CPT

## 2018-11-07 PROCEDURE — 85027 COMPLETE CBC AUTOMATED: CPT

## 2018-11-07 PROCEDURE — 93010 ELECTROCARDIOGRAM REPORT: CPT

## 2018-11-07 PROCEDURE — 93005 ELECTROCARDIOGRAM TRACING: CPT

## 2018-11-07 PROCEDURE — 80048 BASIC METABOLIC PNL TOTAL CA: CPT

## 2018-11-07 PROCEDURE — 85610 PROTHROMBIN TIME: CPT

## 2018-11-07 RX ORDER — FUROSEMIDE 40 MG
1 TABLET ORAL
Qty: 0 | Refills: 0 | COMMUNITY

## 2018-11-07 NOTE — H&P PST ADULT - HISTORY OF PRESENT ILLNESS
87 y/o thin white female to have LHC for anginal equivalent class III of CURRY with evaluation of diastolic HF vs CAD with known RCA stenosis  and moderate LCX disease    Patient on BB, CCB and antianginal medications and states compliance     CHADVASC>3 on DOAC CANNOT TAKE ASPIRIN DUE TO SEVERE NASAL BLEEDING    PMH:  KINZA 2018 for severe AS, HTN, AFib CHADSVASC>3,

## 2018-11-07 NOTE — H&P PST ADULT - REASON FOR ADMISSION
[facility] for evaluation of RCA stenosis with FFR for investigation of CURRY

## 2018-11-07 NOTE — H&P PST ADULT - NEGATIVE NEUROLOGICAL SYMPTOMS
no confusion/no paresthesias/no headache/no facial palsy/no vertigo/no syncope/no tremors/no focal seizures/no weakness/no transient paralysis/no generalized seizures/no difficulty walking/no loss of sensation/no loss of consciousness/no hemiparesis

## 2018-11-07 NOTE — H&P PST ADULT - PMH
Acute deep vein thrombosis (DVT) of popliteal vein of both lower extremities    AF (atrial fibrillation)  Persistent/chronic  Aortic stenosis  s/p KINZA Jan 2018 [facility] Dr. Aragon  Asthma    Atherosclerosis of native coronary artery of native heart with angina pectoris    AV block    Bronchiectasis    Chronic back pain    Closed fracture of multiple ribs of right side, initial encounter    CURRY (dyspnea on exertion)    DVT (deep venous thrombosis)  left lower extremity  Essential hypertension    Hypercholesteremia    Hyperlipidemia, unspecified hyperlipidemia type    Kyphoscoliosis and scoliosis    Lung nodule    Nosebleed  prior left nostrils cauterize x2  Pelvic fracture    PNA (pneumonia)  november 2017  Spinal stenosis    Stenocardia    Thyroid nodule Acute deep vein thrombosis (DVT) of popliteal vein of both lower extremities    AF (atrial fibrillation)  Persistent/chronic  Angina pectoris  class III  Anxiety    Aortic stenosis  s/p TAVR bioprosthetic Jan 2018 [facility] Dr. Aragon  Aspiration pneumonia    Asthma    Atherosclerosis of native coronary artery of native heart with angina pectoris    AV block    Bronchiectasis    Chronic back pain    Closed fracture of multiple ribs of right side, initial encounter    Closed pelvic ring fracture    Diastolic CHF  NYHA class 3  CURRY (dyspnea on exertion)    DVT (deep venous thrombosis)  left lower extremity  Essential hypertension    GERD (gastroesophageal reflux disease)    Hip arthritis    HTN (hypertension)    Hypercholesteremia    Hyperlipidemia, unspecified hyperlipidemia type    Kyphoscoliosis and scoliosis    Lung nodule    Murmur, cardiac  gr3/6  Neuropathy    Nosebleed  prior left nostrils cauterize x2  Nosebleed  severe on aspirin  Pelvic fracture    PNA (pneumonia)  november 2017  Spinal stenosis    Stenocardia    Thyroid nodule

## 2018-11-07 NOTE — H&P PST ADULT - RS GEN PE MLT RESP DETAILS PC
clear to auscultation bilaterally/respirations non-labored/good air movement/no rales/breath sounds equal/airway patent/no chest wall tenderness/no rhonchi/normal

## 2018-11-07 NOTE — H&P PST ADULT - ASSESSMENT
A- [facility] for anginal equivalent class III of CURRY with evaluation of diastolic HF vs CAD with known RCA stenosis  and moderate LCX disease    Patient on BB, CCB and antianginal medications and states compliance     CHADVASC>3 on DOAC CANNOT TAKE ASPIRIN DUE TO SEVERE NASAL BLEEDING    Xarelto - Last dose Friday Nov 9

## 2018-11-07 NOTE — H&P PST ADULT - NEUROLOGICAL DETAILS
sensation intact/cranial nerves intact/responds to verbal commands/alert and oriented x 3/responds to pain/deep reflexes intact

## 2018-11-07 NOTE — ASU PATIENT PROFILE, ADULT - PSH
S/P spinal fusion  L spines S/P spinal fusion  L spines  S/p TAVR (transcatheter aortic valve replacement), bioprosthetic

## 2018-11-07 NOTE — ASU PATIENT PROFILE, ADULT - PMH
Acute deep vein thrombosis (DVT) of popliteal vein of both lower extremities    AF (atrial fibrillation)    Aortic stenosis    Asthma    Atherosclerosis of native coronary artery of native heart with angina pectoris    AV block    Bronchiectasis    Chronic back pain    Closed fracture of multiple ribs of right side, initial encounter    CURRY (dyspnea on exertion)    DVT (deep venous thrombosis)  left lower extremity  Essential hypertension    Hypercholesteremia    Hyperlipidemia, unspecified hyperlipidemia type    Kyphoscoliosis and scoliosis    Lung nodule    Pelvic fracture    PNA (pneumonia)  november 2017  Spinal stenosis    Stenocardia    Thyroid nodule Acute deep vein thrombosis (DVT) of popliteal vein of both lower extremities    AF (atrial fibrillation)    Aortic stenosis    Asthma    Atherosclerosis of native coronary artery of native heart with angina pectoris    AV block    Bronchiectasis    Chronic back pain    Closed fracture of multiple ribs of right side, initial encounter    CURRY (dyspnea on exertion)    DVT (deep venous thrombosis)  left lower extremity  Essential hypertension    Hypercholesteremia    Hyperlipidemia, unspecified hyperlipidemia type    Kyphoscoliosis and scoliosis    Lung nodule    Nosebleed  prior left nostrils cauterize x2  Pelvic fracture    PNA (pneumonia)  november 2017  Spinal stenosis    Stenocardia    Thyroid nodule

## 2018-11-08 PROBLEM — I48.91 UNSPECIFIED ATRIAL FIBRILLATION: Chronic | Status: ACTIVE | Noted: 2018-11-07

## 2018-11-08 PROBLEM — I50.30 UNSPECIFIED DIASTOLIC (CONGESTIVE) HEART FAILURE: Chronic | Status: ACTIVE | Noted: 2018-11-07

## 2018-11-08 PROBLEM — I35.0 NONRHEUMATIC AORTIC (VALVE) STENOSIS: Chronic | Status: ACTIVE | Noted: 2018-01-08

## 2018-11-09 ENCOUNTER — APPOINTMENT (OUTPATIENT)
Dept: PULMONOLOGY | Facility: CLINIC | Age: 83
End: 2018-11-09

## 2018-11-12 ENCOUNTER — TRANSCRIPTION ENCOUNTER (OUTPATIENT)
Age: 83
End: 2018-11-12

## 2018-11-12 ENCOUNTER — APPOINTMENT (OUTPATIENT)
Dept: CARDIOLOGY | Facility: CLINIC | Age: 83
End: 2018-11-12

## 2018-11-12 ENCOUNTER — INPATIENT (INPATIENT)
Facility: HOSPITAL | Age: 83
LOS: 0 days | Discharge: HOME | DRG: 247 | End: 2018-11-13
Attending: INTERNAL MEDICINE | Admitting: INTERNAL MEDICINE
Payer: COMMERCIAL

## 2018-11-12 VITALS
OXYGEN SATURATION: 95 % | RESPIRATION RATE: 16 BRPM | HEART RATE: 71 BPM | SYSTOLIC BLOOD PRESSURE: 141 MMHG | TEMPERATURE: 98 F | DIASTOLIC BLOOD PRESSURE: 65 MMHG

## 2018-11-12 DIAGNOSIS — I10 ESSENTIAL (PRIMARY) HYPERTENSION: ICD-10-CM

## 2018-11-12 DIAGNOSIS — I20.8 OTHER FORMS OF ANGINA PECTORIS: ICD-10-CM

## 2018-11-12 DIAGNOSIS — Z95.3 PRESENCE OF XENOGENIC HEART VALVE: Chronic | ICD-10-CM

## 2018-11-12 DIAGNOSIS — Z98.1 ARTHRODESIS STATUS: Chronic | ICD-10-CM

## 2018-11-12 DIAGNOSIS — I25.10 ATHEROSCLEROTIC HEART DISEASE OF NATIVE CORONARY ARTERY WITHOUT ANGINA PECTORIS: ICD-10-CM

## 2018-11-12 LAB
BLD GP AB SCN SERPL QL: SIGNIFICANT CHANGE UP
TYPE + AB SCN PNL BLD: SIGNIFICANT CHANGE UP

## 2018-11-12 PROCEDURE — 92928 PRQ TCAT PLMT NTRAC ST 1 LES: CPT | Mod: LC

## 2018-11-12 PROCEDURE — 76937 US GUIDE VASCULAR ACCESS: CPT | Mod: 26

## 2018-11-12 PROCEDURE — 93460 R&L HRT ART/VENTRICLE ANGIO: CPT | Mod: 26,XU

## 2018-11-12 PROCEDURE — 99152 MOD SED SAME PHYS/QHP 5/>YRS: CPT

## 2018-11-12 RX ORDER — ALBUTEROL 90 UG/1
2 AEROSOL, METERED ORAL EVERY 6 HOURS
Qty: 0 | Refills: 0 | Status: DISCONTINUED | OUTPATIENT
Start: 2018-11-12 | End: 2018-11-13

## 2018-11-12 RX ORDER — LOSARTAN POTASSIUM 100 MG/1
50 TABLET, FILM COATED ORAL DAILY
Qty: 0 | Refills: 0 | Status: DISCONTINUED | OUTPATIENT
Start: 2018-11-12 | End: 2018-11-12

## 2018-11-12 RX ORDER — DILTIAZEM HCL 120 MG
120 CAPSULE, EXT RELEASE 24 HR ORAL DAILY
Qty: 0 | Refills: 0 | Status: DISCONTINUED | OUTPATIENT
Start: 2018-11-12 | End: 2018-11-13

## 2018-11-12 RX ORDER — FUROSEMIDE 40 MG
40 TABLET ORAL DAILY
Qty: 0 | Refills: 0 | Status: DISCONTINUED | OUTPATIENT
Start: 2018-11-12 | End: 2018-11-13

## 2018-11-12 RX ORDER — HYDRALAZINE HCL 50 MG
5 TABLET ORAL ONCE
Qty: 0 | Refills: 0 | Status: COMPLETED | OUTPATIENT
Start: 2018-11-12 | End: 2018-11-12

## 2018-11-12 RX ORDER — SODIUM CHLORIDE 9 MG/ML
600 INJECTION INTRAMUSCULAR; INTRAVENOUS; SUBCUTANEOUS
Qty: 0 | Refills: 0 | Status: DISCONTINUED | OUTPATIENT
Start: 2018-11-12 | End: 2018-11-13

## 2018-11-12 RX ORDER — ASPIRIN/CALCIUM CARB/MAGNESIUM 324 MG
81 TABLET ORAL ONCE
Qty: 0 | Refills: 0 | Status: COMPLETED | OUTPATIENT
Start: 2018-11-12 | End: 2018-11-12

## 2018-11-12 RX ORDER — ONDANSETRON 8 MG/1
4 TABLET, FILM COATED ORAL EVERY 8 HOURS
Qty: 0 | Refills: 0 | Status: DISCONTINUED | OUTPATIENT
Start: 2018-11-12 | End: 2018-11-13

## 2018-11-12 RX ORDER — RIVAROXABAN 15 MG-20MG
15 KIT ORAL EVERY 24 HOURS
Qty: 0 | Refills: 0 | Status: DISCONTINUED | OUTPATIENT
Start: 2018-11-12 | End: 2018-11-13

## 2018-11-12 RX ORDER — CLOPIDOGREL BISULFATE 75 MG/1
75 TABLET, FILM COATED ORAL DAILY
Qty: 0 | Refills: 0 | Status: DISCONTINUED | OUTPATIENT
Start: 2018-11-12 | End: 2018-11-13

## 2018-11-12 RX ORDER — NYSTATIN/TRIAMCINOLONE ACET
1 OINTMENT (GRAM) TOPICAL EVERY 12 HOURS
Qty: 0 | Refills: 0 | Status: DISCONTINUED | OUTPATIENT
Start: 2018-11-12 | End: 2018-11-13

## 2018-11-12 RX ORDER — ACETAMINOPHEN 500 MG
650 TABLET ORAL EVERY 6 HOURS
Qty: 0 | Refills: 0 | Status: DISCONTINUED | OUTPATIENT
Start: 2018-11-12 | End: 2018-11-13

## 2018-11-12 RX ORDER — ALPRAZOLAM 0.25 MG
0.25 TABLET ORAL EVERY 6 HOURS
Qty: 0 | Refills: 0 | Status: DISCONTINUED | OUTPATIENT
Start: 2018-11-12 | End: 2018-11-13

## 2018-11-12 RX ORDER — TRAMADOL HYDROCHLORIDE 50 MG/1
50 TABLET ORAL EVERY 6 HOURS
Qty: 0 | Refills: 0 | Status: DISCONTINUED | OUTPATIENT
Start: 2018-11-12 | End: 2018-11-13

## 2018-11-12 RX ORDER — SODIUM CHLORIDE 9 MG/ML
150 INJECTION INTRAMUSCULAR; INTRAVENOUS; SUBCUTANEOUS
Qty: 0 | Refills: 0 | Status: DISCONTINUED | OUTPATIENT
Start: 2018-11-12 | End: 2018-11-12

## 2018-11-12 RX ORDER — RANOLAZINE 500 MG/1
500 TABLET, FILM COATED, EXTENDED RELEASE ORAL
Qty: 0 | Refills: 0 | Status: DISCONTINUED | OUTPATIENT
Start: 2018-11-12 | End: 2018-11-13

## 2018-11-12 RX ORDER — CALCITONIN SALMON 200 [IU]/ML
1 INJECTION, SOLUTION INTRAMUSCULAR
Qty: 0 | Refills: 0 | COMMUNITY

## 2018-11-12 RX ORDER — LOSARTAN POTASSIUM 100 MG/1
50 TABLET, FILM COATED ORAL DAILY
Qty: 0 | Refills: 0 | Status: DISCONTINUED | OUTPATIENT
Start: 2018-11-12 | End: 2018-11-13

## 2018-11-12 RX ADMIN — TRAMADOL HYDROCHLORIDE 50 MILLIGRAM(S): 50 TABLET ORAL at 17:08

## 2018-11-12 RX ADMIN — TRAMADOL HYDROCHLORIDE 50 MILLIGRAM(S): 50 TABLET ORAL at 22:41

## 2018-11-12 RX ADMIN — Medication 81 MILLIGRAM(S): at 10:01

## 2018-11-12 RX ADMIN — TRAMADOL HYDROCHLORIDE 50 MILLIGRAM(S): 50 TABLET ORAL at 15:50

## 2018-11-12 RX ADMIN — TRAMADOL HYDROCHLORIDE 50 MILLIGRAM(S): 50 TABLET ORAL at 23:11

## 2018-11-12 RX ADMIN — Medication 1 APPLICATION(S): at 18:38

## 2018-11-12 RX ADMIN — SODIUM CHLORIDE 150 MILLILITER(S): 9 INJECTION INTRAMUSCULAR; INTRAVENOUS; SUBCUTANEOUS at 09:56

## 2018-11-12 RX ADMIN — Medication 0.25 MILLIGRAM(S): at 22:41

## 2018-11-12 RX ADMIN — ONDANSETRON 4 MILLIGRAM(S): 8 TABLET, FILM COATED ORAL at 12:37

## 2018-11-12 RX ADMIN — SODIUM CHLORIDE 150 MILLILITER(S): 9 INJECTION INTRAMUSCULAR; INTRAVENOUS; SUBCUTANEOUS at 12:44

## 2018-11-12 RX ADMIN — RIVAROXABAN 15 MILLIGRAM(S): KIT at 22:43

## 2018-11-12 RX ADMIN — RANOLAZINE 500 MILLIGRAM(S): 500 TABLET, FILM COATED, EXTENDED RELEASE ORAL at 22:41

## 2018-11-12 RX ADMIN — Medication 30 MILLILITER(S): at 12:37

## 2018-11-12 RX ADMIN — Medication 5 MILLIGRAM(S): at 14:43

## 2018-11-12 NOTE — PROGRESS NOTE ADULT - SUBJECTIVE AND OBJECTIVE BOX
s/p R&L cardiac catheterization #7/#6 LFV/LFA  JARETT to mcirc 4.0x16mm  Centricity pending  Seen post procedure by Dr. Soto  GFR protocol in progress          REVIEW OF SYSTEMS:  Denies SOB, CP, NV, HA, dizziness, palpitations, site pain    PHYSICAL EXAM: A&Ox3 NAD Skin warm and dry  NEURO: Speech intact +gag +swallow Tongue midline MANDEL  NECK: No JVD, trachea midline. Eupneic  HEART: AFib  post procedure no ectopy on tele/ECG  PULMONARY:  CTA david  ABDOMEN: Soft nontender X4 +BS Vdg/eating  EXTREMITIES: LFA site: No bleed, hematoma, pain, ecchymosis or swelling Lt DP/PT+ s/p R&L cardiac catheterization #7/#6 LFV/LFA  JARETT to mcirc 4.0x16mm  Centricity pending  Seen post procedure by Dr. Soto  GFR protocol in progress    REVIEW OF SYSTEMS:  Denies SOB, CP, NV, HA, dizziness, palpitations, site pain    PHYSICAL EXAM: A&Ox3 NAD Skin warm and dry  NEURO: Speech intact +gag +swallow Tongue midline MANDEL  NECK: No JVD, trachea midline. Eupneic  HEART: AFib  post procedure no ectopy on tele/ECG  PULMONARY:  CTA david supine positon  ABDOMEN: Soft nontender X4 +BS   EXTREMITIES: LFA site: No bleed, hematoma, pain, ecchymosis or swelling Lt DP/PT+ s/p R&L cardiac catheterization #7/#6 LFV/LFA w/angioseal  JARETT to mcirc 4.0x16mm  Centricity pending  Seen post procedure by Dr. Soto  GFR protocol in progress    REVIEW OF SYSTEMS:  Denies SOB, CP, NV, HA, dizziness, palpitations, site pain    PHYSICAL EXAM: A&Ox3 NAD Skin warm and dry  NEURO: Speech intact +gag +swallow Tongue midline MANDEL  NECK: No JVD, trachea midline. Eupneic  HEART: AFib  post procedure no ectopy on tele/ECG  PULMONARY:  CTA david supine positon  ABDOMEN: Soft nontender X4 +BS   EXTREMITIES: LFA site: No bleed, hematoma, pain, ecchymosis or swelling Lt DP/PT+ s/p R&L cardiac catheterization #7/#6 LFV/LFA w/angioseal  JARETT to mcirc 4.0x16mm  Centricity pending  Seen post procedure by Dr. Soto  GFR protocol in progress    REVIEW OF SYSTEMS:  Denies SOB, CP, NV, HA, dizziness, palpitations, site pain    PHYSICAL EXAM: A&Ox3 NAD Skin warm and dry  NEURO: Speech intact +gag +swallow Tongue midline MANDEL  NECK: No JVD, trachea midline. Eupneic  HEART: AFib  post procedure no ectopy on tele/ECG  PULMONARY:  CTA david supine positon  ABDOMEN: Soft nontender X4 +BS   EXTREMITIES: LFA site: No bleed, hematoma, pain, ecchymosis or swelling Lt DP/PT+#7 LFV sheath aseptically removed intact with adequate hemostasis after 20 minute hold/DSTAT. Groin instructions reviewed with patient. Small skin tear noted above angioseal site. s/p R&L cardiac catheterization #7/#6 LFV/LFA w/angioseal  JARETT to mcirc 4.0x16mm SYNERGY  Centricity pending  Seen post procedure by Dr. Soto  GFR protocol in progress    REVIEW OF SYSTEMS:  Denies SOB, CP, NV, HA, dizziness, palpitations, site pain    PHYSICAL EXAM: A&Ox3 NAD Skin warm and dry  NEURO: Speech intact +gag +swallow Tongue midline MANDEL  NECK: No JVD, trachea midline. Eupneic  HEART: AFib  post procedure no ectopy on tele/ECG  PULMONARY:  CTA david supine positon  ABDOMEN: Soft nontender X4 +BS   EXTREMITIES: LFA site: No bleed, hematoma, pain, ecchymosis or swelling Lt DP/PT+#7 LFV sheath aseptically removed intact with adequate hemostasis after 20 minute hold/DSTAT. Groin instructions reviewed with patient. Small skin tear noted above angioseal site.

## 2018-11-12 NOTE — DISCHARGE NOTE ADULT - NS AS ACTIVITY OBS
Showering allowed/No Heavy lifting/straining/Do not make important decisions/Do not drive or operate machinery Do not drive or operate machinery/Do not make important decisions/Walking-Indoors allowed/No Heavy lifting/straining/Showering allowed

## 2018-11-12 NOTE — PROGRESS NOTE ADULT - SUBJECTIVE AND OBJECTIVE BOX
Last Xarelto taken Friday Nov 9   NPO > 8 hours  IVF for GFR 59 150cc x1 hour  R&L cardiac catheterization with Dr. Soto to consent  Labs reviewed.  Left groin approach agreed

## 2018-11-12 NOTE — DISCHARGE NOTE ADULT - CARE PLAN
Principal Discharge DX:	Coronary artery disease with other form of angina pectoris  Goal:	return to pre hosp functional status.  Assessment and plan of treatment:	No heavy lifting, driving, sex, tub baths, swimming, or any activity that submerges the lower half of the body in water for 48 hours.  Limited walking and stairs for 48 hours.    Change the bandaid after 24 hours and every 24 hours after that.  Keep the puncture site dry and covered with a bandaid until a scab forms.    Observe the site frequently.  If bleeding or a large lump (the size of a golf ball or bigger) occurs lie flat, apply continuous direct pressure just above the puncture site for at least 10 minutes, and notify your physician immediately.  If the bleeding cannot be controlled, call 911 immediately for assistance.  Notify your physician of pain, swelling or any drainage.    Notify your physician immediately if coldness, numbness, discoloration or pain in your foot occurs.  Assessment and plan of treatment:	Do not stop  or Plavix without speaking with cardiologist first  Assessment and plan of treatment:	follow up with your primary MD within one week.  Return to the Emergency dept. for any chest pain or shortness of breath  See cardiologist in 7-10 days

## 2018-11-12 NOTE — DISCHARGE NOTE ADULT - CARE PROVIDER_API CALL
Yesika Rubio), Cardiology; Internal Medicine  [address] [id number]  Phone: [phone]  Fax: [phone]

## 2018-11-12 NOTE — PROGRESS NOTE ADULT - ASSESSMENT
A-s/p JARETT 4.0x 16mm mcirc via LFA #6 sheath A-s/p JARETT 4.0x 16mm mcirc via LFA w/angioseal A-s/p JARETT 4.0x 16mmSYNERGY  mcirc via LFA w/angioseal

## 2018-11-12 NOTE — DISCHARGE NOTE ADULT - MEDICATION SUMMARY - MEDICATIONS TO TAKE
I will START or STAY ON the medications listed below when I get home from the hospital:    col-rite  250  mg  -- 1 cap(s) by mouth once a day  -- Indication: For supplelment    traMADol 50 mg oral tablet  -- 1 tab(s) by mouth every 6 hours, As needed, Moderate Pain (4 - 6)  -- Indication: For pain    losartan 50 mg oral tablet  -- 1 tab(s) by mouth once a day  -- Indication: For HTN    Ranexa 500 mg oral tablet, extended release  -- 1 tab(s) by mouth 2 times a day  -- Indication: For antianginal    dilTIAZem 120 mg/24 hours oral capsule, extended release  -- 1 cap(s) by mouth once a day  -- Indication: For antianginal    Xarelto 15 mg oral tablet  -- 1 tab(s) by mouth once a day (in the evening)  -- Indication: For AF    gabapentin 300 mg oral capsule  -- 2 cap(s) by mouth 3 times a day  -- Indication: For pain    atorvastatin 20 mg oral tablet  -- 1 tab(s) by mouth once a day (at bedtime)  -- Indication: For Chol    clopidogrel 75 mg oral tablet  -- 1 tab(s) by mouth once a day  -- Indication: For CAD (coronary artery disease) with stents    Ventolin HFA 90 mcg/inh inhalation aerosol  -- 2 puff(s) inhaled 4 times a day, As Needed  -- Indication: For COPD    furosemide 40 mg oral tablet  -- 1 tab(s) by mouth once a day  -- Indication: For diuretic    Cranberry oral capsule  -- 1 cap(s) by mouth once a day  -- Indication: For supplement    Calcium 600+D oral tablet  -- 2 tab(s) by mouth once a day  -- Indication: For supplemetn    vitamin E alpha 1000 intl units oral capsule  -- 1 cap(s) by mouth once a day  -- Indication: For supplement    Vitamin D3 400 intl units oral tablet  -- 2 tab(s) by mouth once a day  -- Indication: For supplemetn

## 2018-11-12 NOTE — DISCHARGE NOTE ADULT - HOSPITAL COURSE
S/P right and left cardiac via LFV/LFA S/P right and left cardiac via LFV #7/LFA w/angioseal closure  JARETT to mcirc S/P right and left cardiac via LFV #7/LFA w/angioseal closure  JARETT to mcirc    History of Present Illness		  85 y/o thin white female to have [facility] for anginal equivalent class III of CURRY with evaluation of diastolic HF vs CAD with known RCA stenosis  and moderate LCX disease  Patient on BB, CCB and antianginal medications and states compliance   CHADVASC>3 on DOAC CANNOT TAKE ASPIRIN DUE TO SEVERE NASAL BLEEDING    PMH:  KINZA 2018 for severe AS, HTN, AFib CHADSVASC>3,   Hospital course:   85 y/o thin white female to have [facility] for anginal equivalent class III of CURRY with evaluation of diastolic HF vs CAD with known RCA stenosis  and moderate LCX disease  PMH:  KINZA 2018 for severe AS, HTN, AFib CHADSVASC>3,   SP [facility] 11/12/18 : DESx1 to [facility]  hemodynamically stable overnight/ tolerating diet labs and Ekg reviewed    Plan:  Continue current meds  Placix/ Xeralto/cardizem / ARB/ statinNo ASA due to severe nose bleeds. /   to ambulate this am   groin care med compliance and out patient follow up discussed  stable for d/c home

## 2018-11-12 NOTE — DISCHARGE NOTE ADULT - PLAN OF CARE
return to pre hosp functional status. No heavy lifting, driving, sex, tub baths, swimming, or any activity that submerges the lower half of the body in water for 48 hours.  Limited walking and stairs for 48 hours.    Change the bandaid after 24 hours and every 24 hours after that.  Keep the puncture site dry and covered with a bandaid until a scab forms.    Observe the site frequently.  If bleeding or a large lump (the size of a golf ball or bigger) occurs lie flat, apply continuous direct pressure just above the puncture site for at least 10 minutes, and notify your physician immediately.  If the bleeding cannot be controlled, call 911 immediately for assistance.  Notify your physician of pain, swelling or any drainage.    Notify your physician immediately if coldness, numbness, discoloration or pain in your foot occurs. Do not stop  or Plavix without speaking with cardiologist first follow up with your primary MD within one week.  Return to the Emergency dept. for any chest pain or shortness of breath  See cardiologist in 7-10 days

## 2018-11-12 NOTE — PROGRESS NOTE ADULT - PROBLEM SELECTOR PLAN 1
Left groin site care w/instructions to pt  LFA removal when ACT<180  NO ASPIRIN secondary to severe nasal bleeding requiring packing/cauterization  Daily plavix and xarelto (renal dose)  AM Labs/ECG  [facility] Dr. Rubio outpt  GFR monitoring outpt in one week Left groin site care w/instructions to pt  LFV sheath removal when ACT<180  NO ASPIRIN secondary to severe nasal bleeding requiring packing/cauterization  Daily plavix and xarelto (renal dose)  AM Labs/ECG  [facility] Dr. Rubio outpt  GFR monitoring outpt in one week Left groin site care w/instructions to pt  LFV sheath removal when ACT<180  Nystatin for skin tear left groin  NO ASPIRIN secondary to severe nasal bleeding requiring packing/cauterization  Daily plavix and xarelto (renal dose)  AM Labs/ECG  [facility] Dr. Rubio outpt  GFR monitoring outpt in one week

## 2018-11-13 VITALS
TEMPERATURE: 98 F | OXYGEN SATURATION: 98 % | HEART RATE: 65 BPM | DIASTOLIC BLOOD PRESSURE: 78 MMHG | SYSTOLIC BLOOD PRESSURE: 120 MMHG | RESPIRATION RATE: 18 BRPM

## 2018-11-13 LAB
ANION GAP SERPL CALC-SCNC: 12 MMOL/L — SIGNIFICANT CHANGE UP (ref 5–17)
BUN SERPL-MCNC: 12 MG/DL — SIGNIFICANT CHANGE UP (ref 8–20)
CALCIUM SERPL-MCNC: 9.4 MG/DL — SIGNIFICANT CHANGE UP (ref 8.6–10.2)
CHLORIDE SERPL-SCNC: 107 MMOL/L — SIGNIFICANT CHANGE UP (ref 98–107)
CO2 SERPL-SCNC: 27 MMOL/L — SIGNIFICANT CHANGE UP (ref 22–29)
CREAT SERPL-MCNC: 0.75 MG/DL — SIGNIFICANT CHANGE UP (ref 0.5–1.3)
GLUCOSE SERPL-MCNC: 80 MG/DL — SIGNIFICANT CHANGE UP (ref 70–115)
HCT VFR BLD CALC: 34.8 % — LOW (ref 37–47)
HGB BLD-MCNC: 11.1 G/DL — LOW (ref 12–16)
INR BLD: 1.86 RATIO — HIGH (ref 0.88–1.16)
MAGNESIUM SERPL-MCNC: 2.1 MG/DL — SIGNIFICANT CHANGE UP (ref 1.6–2.6)
MCHC RBC-ENTMCNC: 26.4 PG — LOW (ref 27–31)
MCHC RBC-ENTMCNC: 31.9 G/DL — LOW (ref 32–36)
MCV RBC AUTO: 82.7 FL — SIGNIFICANT CHANGE UP (ref 81–99)
PLATELET # BLD AUTO: 233 K/UL — SIGNIFICANT CHANGE UP (ref 150–400)
POTASSIUM SERPL-MCNC: 4.4 MMOL/L — SIGNIFICANT CHANGE UP (ref 3.5–5.3)
POTASSIUM SERPL-SCNC: 4.4 MMOL/L — SIGNIFICANT CHANGE UP (ref 3.5–5.3)
PROTHROM AB SERPL-ACNC: 21.8 SEC — HIGH (ref 10–12.9)
RBC # BLD: 4.21 M/UL — LOW (ref 4.4–5.2)
RBC # FLD: 14.7 % — SIGNIFICANT CHANGE UP (ref 11–15.6)
SODIUM SERPL-SCNC: 146 MMOL/L — HIGH (ref 135–145)
WBC # BLD: 7.9 K/UL — SIGNIFICANT CHANGE UP (ref 4.8–10.8)
WBC # FLD AUTO: 7.9 K/UL — SIGNIFICANT CHANGE UP (ref 4.8–10.8)

## 2018-11-13 PROCEDURE — 93010 ELECTROCARDIOGRAM REPORT: CPT

## 2018-11-13 RX ORDER — ATORVASTATIN CALCIUM 80 MG/1
20 TABLET, FILM COATED ORAL AT BEDTIME
Qty: 0 | Refills: 0 | Status: DISCONTINUED | OUTPATIENT
Start: 2018-11-13 | End: 2018-11-13

## 2018-11-13 RX ORDER — CLOPIDOGREL BISULFATE 75 MG/1
1 TABLET, FILM COATED ORAL
Qty: 30 | Refills: 5 | OUTPATIENT
Start: 2018-11-13 | End: 2019-05-11

## 2018-11-13 RX ADMIN — RANOLAZINE 500 MILLIGRAM(S): 500 TABLET, FILM COATED, EXTENDED RELEASE ORAL at 05:38

## 2018-11-13 RX ADMIN — CLOPIDOGREL BISULFATE 75 MILLIGRAM(S): 75 TABLET, FILM COATED ORAL at 11:20

## 2018-11-13 RX ADMIN — LOSARTAN POTASSIUM 50 MILLIGRAM(S): 100 TABLET, FILM COATED ORAL at 05:38

## 2018-11-13 RX ADMIN — TRAMADOL HYDROCHLORIDE 50 MILLIGRAM(S): 50 TABLET ORAL at 05:46

## 2018-11-13 RX ADMIN — Medication 1 APPLICATION(S): at 05:39

## 2018-11-13 RX ADMIN — TRAMADOL HYDROCHLORIDE 50 MILLIGRAM(S): 50 TABLET ORAL at 06:16

## 2018-11-13 RX ADMIN — Medication 120 MILLIGRAM(S): at 05:38

## 2018-11-13 NOTE — PROGRESS NOTE ADULT - SUBJECTIVE AND OBJECTIVE BOX
SUBJECTIVE:  Cardiology NP F/U note:  SP: [facility] which revealed: JARETT X1 to mCirc   denies complaints of chest pain/sob/dizziness/palps overnight   tolerated diet OOB to BR       	  MEDICATIONS:  diltiazem    milliGRAM(s) Oral daily  furosemide    Tablet 40 milliGRAM(s) Oral daily  losartan 50 milliGRAM(s) Oral daily  ranolazine 500 milliGRAM(s) Oral two times a day  ALBUTerol    90 MICROgram(s) HFA Inhaler 2 Puff(s) Inhalation every 6 hours PRN  acetaminophen   Tablet .. 650 milliGRAM(s) Oral every 6 hours PRN  ALPRAZolam 0.25 milliGRAM(s) Oral every 6 hours PRN  ondansetron Injectable 4 milliGRAM(s) IV Push every 8 hours PRN  traMADol 50 milliGRAM(s) Oral every 6 hours PRN  aluminum hydroxide/magnesium hydroxide/simethicone Suspension 30 milliLiter(s) Oral every 4 hours PRN  clopidogrel Tablet 75 milliGRAM(s) Oral daily  nystatin/triamcinolone Cream 1 Application(s) Topical every 12 hours  rivaroxaban 15 milliGRAM(s) Oral every 24 hours  sodium chloride 0.9%. 600 milliLiter(s) IV Continuous <Continuous>        PHYSICAL EXAM:    T(C): 36.9 (18 @ 05:35), Max: 36.9 (18 @ 05:35)  HR: 80 (18 @ 05:35) (71 - 89)  BP: 138/56 (18 @ 05:35) (127/59 - 190/79)  RR: 16 (18 @ 05:35) (16 - 18)  SpO2: 96% (18 @ 05:35) (94% - 98%)  Wt(kg): --    I&O's Summary    2018 07:01  -  2018 07:00  --------------------------------------------------------  IN: 200 mL / OUT: 0 mL / NET: 200 mL        Daily     Daily Weight in k (2018 04:56)    Appearance: Normal	  HEENT:   Normal oral mucosa, 	  Lymphatic: No lymphadenopathy  Cardiovascular: Normal S1 S2, IRREG 80's  No JVD, 3/6 LUCIO LSB to neck  No edema  Respiratory: Lungs clear to auscultation	  Psychiatry: A & O x 3, Mood & affect appropriate  Gastrointestinal:  Soft, Non-tender, + BS	  Skin: No rashes, No ecchymoses, No cyanosis  Neurologic: Non-focal  Extremities: Normal range of motion, No clubbing, cyanosis or edema  Vascular: Peripheral pulses palpable 2+ bilaterally    TELEMETRY: 	 AFl 80's no events on tele   ECG:  	AF VR 84 No acute STTW changes.   RADIOLOGY:   DIAGNOSTIC TESTING:  [X ] Echocardiogram:  < from: TTE Echo Complete w/Doppler (18 @ 10:50) >   Left ventricular ejection fraction, by visual estimation, is >75%.   2. Hyperdynamic global left ventricular systolic function.   3. The mitral in-flow pattern reveals no discernable A-wave, therefore   no comment on diastolic function can be made.   4. There is moderate septal left ventricular hypertrophy.   5. Moderately dilated right atrium.   6. Mild mitral annular calcification.   7. Thickening of the anterior and posterior mitral valve leaflets.   8. Bioprosthesis in the aortic position.   9. Estimated pulmonary artery systolic pressure is 42.6 mmHg assuming a   right atrial pressure of 8 mmHg, which is consistent with mild pulmonary   hypertension.  10.Severely enlarged left atrium.    < end of copied text >    [X  ]  Catheterization: pending official / DESx1 to MCx   [ ] Stress Test:    OTHER: 	    LABS:	 	    CARDIAC MARKERS:                                  11.1   7.9   )-----------( 233      ( 2018 05:49 )             34.8     11-13    146<H>  |  107  |  12.0  ----------------------------<  80  4.4   |  27.0  |  0.75    Ca    9.4      2018 05:49  Mg     2.1           ASSESSMENT:  87 y/o thin white female to have [facility] for anginal equivalent class III of CURRY with evaluation of diastolic HF vs CAD with known RCA stenosis  and moderate LCX disease  PMH:  KINZA 2018 for severe AS, HTN, AFib CHADSVASC>3,   SP LHC 11/12/18 : DESx1 to Mcx  hemodynamically stable overnight/ tolerating diet labs and Ekg reviewed    Plan:  Continue current meds  Placix/ Xeralto/cardizem / ARB/ statinNo ASA due to severe nose bleeds. /   to ambulate this am   groin care med compliance and out patient follow up discussed  stable for d/c home SUBJECTIVE:  Cardiology NP F/U note:  SP: [facility] which revealed: JARETT X1 to mCirc   denies complaints of chest pain/sob/dizziness/palps overnight   tolerated diet OOB to BR       	  MEDICATIONS:  diltiazem    milliGRAM(s) Oral daily  furosemide    Tablet 40 milliGRAM(s) Oral daily  losartan 50 milliGRAM(s) Oral daily  ranolazine 500 milliGRAM(s) Oral two times a day  ALBUTerol    90 MICROgram(s) HFA Inhaler 2 Puff(s) Inhalation every 6 hours PRN  acetaminophen   Tablet .. 650 milliGRAM(s) Oral every 6 hours PRN  ALPRAZolam 0.25 milliGRAM(s) Oral every 6 hours PRN  ondansetron Injectable 4 milliGRAM(s) IV Push every 8 hours PRN  traMADol 50 milliGRAM(s) Oral every 6 hours PRN  aluminum hydroxide/magnesium hydroxide/simethicone Suspension 30 milliLiter(s) Oral every 4 hours PRN  clopidogrel Tablet 75 milliGRAM(s) Oral daily  nystatin/triamcinolone Cream 1 Application(s) Topical every 12 hours  rivaroxaban 15 milliGRAM(s) Oral every 24 hours  sodium chloride 0.9%. 600 milliLiter(s) IV Continuous <Continuous>        PHYSICAL EXAM:    T(C): 36.9 (18 @ 05:35), Max: 36.9 (18 @ 05:35)  HR: 80 (18 @ 05:35) (71 - 89)  BP: 138/56 (18 @ 05:35) (127/59 - 190/79)  RR: 16 (18 @ 05:35) (16 - 18)  SpO2: 96% (18 @ 05:35) (94% - 98%)  Wt(kg): --    I&O's Summary    2018 07:01  -  2018 07:00  --------------------------------------------------------  IN: 200 mL / OUT: 0 mL / NET: 200 mL        Daily     Daily Weight in k (2018 04:56)    Appearance: Normal	  HEENT:   Normal oral mucosa, 	  Lymphatic: No lymphadenopathy  Cardiovascular: Normal S1 S2, IRREG 80's  No JVD, 3/6 LUCIO LSB to neck  No edema  Respiratory: Lungs clear to auscultation	  Psychiatry: A & O x 3, Mood & affect appropriate  Gastrointestinal:  Soft, Non-tender, + BS	  Skin: No rashes, No ecchymoses, No cyanosis  Neurologic: Non-focal  Extremities: Normal range of motion, No clubbing, cyanosis or edema  Vascular: Peripheral pulses palpable 2+ bilaterally    TELEMETRY: 	 AFl 80's no events on tele   ECG:  	AF VR 84 No acute STTW changes.   RADIOLOGY:   DIAGNOSTIC TESTING:  [X ] Echocardiogram:  < from: TTE Echo Complete w/Doppler (18 @ 10:50) >   Left ventricular ejection fraction, by visual estimation, is >75%.   2. Hyperdynamic global left ventricular systolic function.   3. The mitral in-flow pattern reveals no discernable A-wave, therefore   no comment on diastolic function can be made.   4. There is moderate septal left ventricular hypertrophy.   5. Moderately dilated right atrium.   6. Mild mitral annular calcification.   7. Thickening of the anterior and posterior mitral valve leaflets.   8. Bioprosthesis in the aortic position.   9. Estimated pulmonary artery systolic pressure is 42.6 mmHg assuming a   right atrial pressure of 8 mmHg, which is consistent with mild pulmonary   hypertension.  10.Severely enlarged left atrium.    < end of copied text >    [X  ]  Catheterization: pending official / DESx1 to MCx   [ ] Stress Test:    OTHER: 	    LABS:	 	    CARDIAC MARKERS:                                  11.1   7.9   )-----------( 233      ( 2018 05:49 )             34.8     11-13    146<H>  |  107  |  12.0  ----------------------------<  80  4.4   |  27.0  |  0.75    Ca    9.4      2018 05:49  Mg     2.1           ASSESSMENT:  87 y/o thin white female to have [facility] for anginal equivalent class III of CURRY with evaluation of diastolic HF vs CAD with known RCA stenosis  and moderate LCX disease  PMH:  KINZA 2018 for severe AS, HTN, AFib CHADSVASC>3,   SP LHC 11/12/18 : DESx1 to Mcx  hemodynamically stable overnight/ tolerating diet labs and Ekg reviewed    Plan:  Continue current meds  Placix/ Xeralto/cardizem / ARB/ statin No ASA due to severe nose bleeds. /   No betablocker secondary to COPD and hx Bradycardia   to ambulate this am   groin care med compliance and out patient follow up discussed  stable for d/c home SUBJECTIVE:  Cardiology NP F/U note:  SP: [facility] which revealed: JARETT X1 to mCirc   denies complaints of chest pain/sob/dizziness/palps overnight   tolerated diet OOB to BR       	  MEDICATIONS:  diltiazem    milliGRAM(s) Oral daily  furosemide    Tablet 40 milliGRAM(s) Oral daily  losartan 50 milliGRAM(s) Oral daily  ranolazine 500 milliGRAM(s) Oral two times a day  ALBUTerol    90 MICROgram(s) HFA Inhaler 2 Puff(s) Inhalation every 6 hours PRN  acetaminophen   Tablet .. 650 milliGRAM(s) Oral every 6 hours PRN  ALPRAZolam 0.25 milliGRAM(s) Oral every 6 hours PRN  ondansetron Injectable 4 milliGRAM(s) IV Push every 8 hours PRN  traMADol 50 milliGRAM(s) Oral every 6 hours PRN  aluminum hydroxide/magnesium hydroxide/simethicone Suspension 30 milliLiter(s) Oral every 4 hours PRN  clopidogrel Tablet 75 milliGRAM(s) Oral daily  nystatin/triamcinolone Cream 1 Application(s) Topical every 12 hours  rivaroxaban 15 milliGRAM(s) Oral every 24 hours  sodium chloride 0.9%. 600 milliLiter(s) IV Continuous <Continuous>        PHYSICAL EXAM:    T(C): 36.9 (18 @ 05:35), Max: 36.9 (18 @ 05:35)  HR: 80 (18 @ 05:35) (71 - 89)  BP: 138/56 (18 @ 05:35) (127/59 - 190/79)  RR: 16 (18 @ 05:35) (16 - 18)  SpO2: 96% (18 @ 05:35) (94% - 98%)  Wt(kg): --    I&O's Summary    2018 07:01  -  2018 07:00  --------------------------------------------------------  IN: 200 mL / OUT: 0 mL / NET: 200 mL        Daily     Daily Weight in k (2018 04:56)    Appearance: Normal	  HEENT:   Normal oral mucosa, 	  Lymphatic: No lymphadenopathy  Cardiovascular: Normal S1 S2, IRREG 80's  No JVD, 3/6 LUCIO LSB to neck  No edema  Respiratory: Lungs clear to auscultation	  Psychiatry: A & O x 3, Mood & affect appropriate  Gastrointestinal:  Soft, Non-tender, + BS	  Skin: No rashes, No ecchymoses, No cyanosis  Neurologic: Non-focal  Extremities: Normal range of motion, No clubbing, cyanosis or edema Left groin site no hematoma/ dressing removed.   Vascular: Peripheral pulses palpable 2+ bilaterally    TELEMETRY: 	 AFl 80's no events on tele   ECG:  	AF VR 84 No acute STTW changes.   RADIOLOGY:   DIAGNOSTIC TESTING:  [X ] Echocardiogram:  < from: TTE Echo Complete w/Doppler (18 @ 10:50) >   Left ventricular ejection fraction, by visual estimation, is >75%.   2. Hyperdynamic global left ventricular systolic function.   3. The mitral in-flow pattern reveals no discernable A-wave, therefore   no comment on diastolic function can be made.   4. There is moderate septal left ventricular hypertrophy.   5. Moderately dilated right atrium.   6. Mild mitral annular calcification.   7. Thickening of the anterior and posterior mitral valve leaflets.   8. Bioprosthesis in the aortic position.   9. Estimated pulmonary artery systolic pressure is 42.6 mmHg assuming a   right atrial pressure of 8 mmHg, which is consistent with mild pulmonary   hypertension.  10.Severely enlarged left atrium.    < end of copied text >    [X  ]  Catheterization: pending official / DESx1 to MCx   [ ] Stress Test:    OTHER: 	    LABS:	 	    CARDIAC MARKERS:                                  11.1   7.9   )-----------( 233      ( 2018 05:49 )             34.8     11-    146<H>  |  107  |  12.0  ----------------------------<  80  4.4   |  27.0  |  0.75    Ca    9.4      2018 05:49  Mg     2.1     11-      ASSESSMENT:  85 y/o thin white female to have [facility] for anginal equivalent class III of CURRY with evaluation of diastolic HF vs CAD with known RCA stenosis  and moderate LCX disease  PMH:  KINZA 2018 for severe AS, HTN, AFib CHADSVASC>3, / rate controlled on AC   SP [facility] 18 : DESx1 to Mcx  hemodynamically stable overnight/ tolerating diet labs and Ekg reviewed    Plan:  Continue current meds  Placix/ Xeralto/cardizem / ARB/ statin No ASA due to severe nose bleeds. /   No betablocker secondary to COPD and hx Bradycardia   to ambulate this am   groin care med compliance and out patient follow up discussed  stable for d/c home

## 2018-11-27 PROCEDURE — 93460 R&L HRT ART/VENTRICLE ANGIO: CPT | Mod: XU

## 2018-11-27 PROCEDURE — C1874: CPT

## 2018-11-27 PROCEDURE — 93005 ELECTROCARDIOGRAM TRACING: CPT

## 2018-11-27 PROCEDURE — 99153 MOD SED SAME PHYS/QHP EA: CPT

## 2018-11-27 PROCEDURE — 76937 US GUIDE VASCULAR ACCESS: CPT

## 2018-11-27 PROCEDURE — C1894: CPT

## 2018-11-27 PROCEDURE — C1769: CPT

## 2018-11-27 PROCEDURE — 86901 BLOOD TYPING SEROLOGIC RH(D): CPT

## 2018-11-27 PROCEDURE — 80048 BASIC METABOLIC PNL TOTAL CA: CPT

## 2018-11-27 PROCEDURE — C1887: CPT

## 2018-11-27 PROCEDURE — C9600: CPT

## 2018-11-27 PROCEDURE — 86850 RBC ANTIBODY SCREEN: CPT

## 2018-11-27 PROCEDURE — 86900 BLOOD TYPING SEROLOGIC ABO: CPT

## 2018-11-27 PROCEDURE — 99152 MOD SED SAME PHYS/QHP 5/>YRS: CPT

## 2018-11-27 PROCEDURE — C1760: CPT

## 2018-11-27 PROCEDURE — C1889: CPT

## 2018-11-27 PROCEDURE — 83735 ASSAY OF MAGNESIUM: CPT

## 2018-11-27 PROCEDURE — 85610 PROTHROMBIN TIME: CPT

## 2018-11-27 PROCEDURE — C1725: CPT

## 2018-11-27 PROCEDURE — 36415 COLL VENOUS BLD VENIPUNCTURE: CPT

## 2018-11-27 PROCEDURE — 85027 COMPLETE CBC AUTOMATED: CPT

## 2018-12-05 ENCOUNTER — APPOINTMENT (OUTPATIENT)
Dept: CARDIOLOGY | Facility: CLINIC | Age: 83
End: 2018-12-05
Payer: MEDICARE

## 2018-12-05 ENCOUNTER — NON-APPOINTMENT (OUTPATIENT)
Age: 83
End: 2018-12-05

## 2018-12-05 DIAGNOSIS — G62.9 POLYNEUROPATHY, UNSPECIFIED: ICD-10-CM

## 2018-12-05 PROCEDURE — 93000 ELECTROCARDIOGRAM COMPLETE: CPT

## 2018-12-05 PROCEDURE — 99215 OFFICE O/P EST HI 40 MIN: CPT

## 2018-12-05 RX ORDER — ALBUTEROL SULFATE 90 UG/1
108 (90 BASE) AEROSOL, METERED RESPIRATORY (INHALATION)
Qty: 1 | Refills: 5 | Status: DISCONTINUED | COMMUNITY
Start: 2018-05-23 | End: 2018-12-05

## 2018-12-05 NOTE — DISCUSSION/SUMMARY
[Patient] : the patient [Risks] : risks [Benefits] : benefits [Alternatives] : alternatives [___ Week(s)] : [unfilled] week(s) [With Me] : with me [FreeTextEntry1] : This is a 83 F with HTN, dyslipidemia, here with abnormal EKG and chest pain.\par 1) Angina equivalent: Dyspnea diastolic heart failure vs. coronary artery disease \par s/p PCI to Cx . plavix and on xarelto for atrial fibrillation \par 2)dyspnea  Diastolic heart failure.: \par 3) severe aortic stenosis. :s/p KINZA. cannot take aspirin due to nasal bleeding.  on anticoagulation for afib. \par 4) hypertension : daily ct cardizem 120 mg daily. ct amlodipine 5 mg daily.  imncrease losartan 100 mg daily. \par 5) atrial fibrillation : rate controlled.  meds.  heart rate 100.  ct anticoagulation CHADVASC > 3.  On xarelto 15 mg daily. ct cardizem. no cardioversion for now. If symptoms done improve then consider cardivoersion./ \par 7) coronary artery disease : : rca with collaterals. moderate disease in LCx. initaite antianginal. cardiac catherization.

## 2018-12-05 NOTE — PHYSICAL EXAM
[General Appearance - Well Developed] : well developed [Normal Appearance] : normal appearance [Well Groomed] : well groomed [General Appearance - Well Nourished] : well nourished [No Deformities] : no deformities [General Appearance - In No Acute Distress] : no acute distress [Normal Conjunctiva] : the conjunctiva exhibited no abnormalities [Eyelids - No Xanthelasma] : the eyelids demonstrated no xanthelasmas [Normal Oral Mucosa] : normal oral mucosa [No Oral Pallor] : no oral pallor [No Oral Cyanosis] : no oral cyanosis [Normal Jugular Venous A Waves Present] : normal jugular venous A waves present [Normal Jugular Venous V Waves Present] : normal jugular venous V waves present [No Jugular Venous Alexander A Waves] : no jugular venous alexander A waves [Respiration, Rhythm And Depth] : normal respiratory rhythm and effort [Exaggerated Use Of Accessory Muscles For Inspiration] : no accessory muscle use [Auscultation Breath Sounds / Voice Sounds] : lungs were clear to auscultation bilaterally [Heart Sounds] : normal S1 and S2 [Abdomen Soft] : soft [Abdomen Tenderness] : non-tender [Abdomen Mass (___ Cm)] : no abdominal mass palpated [Nail Clubbing] : no clubbing of the fingernails [Cyanosis, Localized] : no localized cyanosis [Petechial Hemorrhages (___cm)] : no petechial hemorrhages [Skin Color & Pigmentation] : normal skin color and pigmentation [] : no rash [No Venous Stasis] : no venous stasis [Skin Lesions] : no skin lesions [No Skin Ulcers] : no skin ulcer [No Xanthoma] : no  xanthoma was observed [Oriented To Time, Place, And Person] : oriented to person, place, and time [Affect] : the affect was normal [Mood] : the mood was normal [No Anxiety] : not feeling anxious [FreeTextEntry1] : uses a walker

## 2018-12-05 NOTE — HISTORY OF PRESENT ILLNESS
[FreeTextEntry1] : Referred for hypertension and dyslipidemia; biop aortic valve replacement TAVR \par patient is very short of breathe. \par she took off baby aspirin due to nasal bleeding. She is taking xarelto. \par Multiple nsal bleeding and cauterisation. \par She cannot walk muich. is passing uring in the am. No swelling of feet. \par She feels very short of breathe. \par \par \par old note: No chest pain. no dyspnea. no dizziness.s\par patient was recently admitted to hospital for pneumonia. probably aspiration pneumonia. \par \par \par old note: Feels better. "wonderful" . but  pain in the left leg. \par \par back pain radiating to the left. pain. sharp pain in the calf. releived with tramadol./ no headaches. \par \par \par old note: patient s/p JORGE.  tolerated procedure well. no stroke. no hematoma.  feels 75% better.\par no chest pain. no dizziness. + wheezing. \par old note: patient was seen in the hospital.  has diastolic heart failure. moderate to severe aortic stenosis. \par scheduled for jorge. Was discharged on lasix 20 mg daily. Patient has been complianing of LE edema and dyspnea nad wheezing.\par congestive heart failure.  .progressively worsneing dyspnea and LE edeam and orthopnea for last few days. \par \par old note: This is a 83 year old woman with history of hypertension, dyslipidemia, was referred for abnormal EKG.  Patient complains of chest pain on exertion.  No radiation. Not associated with dyspnea. Denies dizziness. No syncopal episodes. Denies any headache.

## 2018-12-05 NOTE — CARDIOLOGY SUMMARY
[LVEF ___%] : LVEF [unfilled]% [Mild] : mild pulmonary hypertension [Normal] : normal LA size [None] : no mitral regurgitation [___] : [unfilled]

## 2018-12-05 NOTE — REVIEW OF SYSTEMS
[see HPI] : see HPI [Shortness Of Breath] : shortness of breath [Chest  Pressure] : chest pressure [Chest Pain] : chest pain [Negative] : Heme/Lymph [Dyspnea on exertion] : not dyspnea during exertion [Lower Ext Edema] : no extremity edema [Leg Claudication] : no intermittent leg claudication [Palpitations] : no palpitations

## 2018-12-05 NOTE — REASON FOR VISIT
[Initial Evaluation] : an initial evaluation of [FreeTextEntry2] : regular check up; HTN, dyslipidemia [FreeTextEntry1] : regular check up; HTN, dyslipidemia

## 2018-12-06 PROBLEM — J47.9 BRONCHIECTASIS, UNCOMPLICATED: Chronic | Status: ACTIVE | Noted: 2018-11-07

## 2018-12-06 PROBLEM — K21.9 GASTRO-ESOPHAGEAL REFLUX DISEASE WITHOUT ESOPHAGITIS: Chronic | Status: ACTIVE | Noted: 2018-11-07

## 2018-12-06 PROBLEM — M41.9 SCOLIOSIS, UNSPECIFIED: Chronic | Status: ACTIVE | Noted: 2018-11-07

## 2018-12-06 PROBLEM — S32.810A: Chronic | Status: ACTIVE | Noted: 2018-11-07

## 2018-12-06 PROBLEM — I20.9 ANGINA PECTORIS, UNSPECIFIED: Chronic | Status: ACTIVE | Noted: 2018-11-07

## 2018-12-06 PROBLEM — J69.0 PNEUMONITIS DUE TO INHALATION OF FOOD AND VOMIT: Chronic | Status: ACTIVE | Noted: 2018-11-07

## 2018-12-06 PROBLEM — R06.09 OTHER FORMS OF DYSPNEA: Chronic | Status: ACTIVE | Noted: 2018-11-07

## 2018-12-06 PROBLEM — I10 ESSENTIAL (PRIMARY) HYPERTENSION: Chronic | Status: ACTIVE | Noted: 2018-11-07

## 2018-12-06 PROBLEM — R04.0 EPISTAXIS: Chronic | Status: ACTIVE | Noted: 2018-11-07

## 2018-12-06 PROBLEM — R01.1 CARDIAC MURMUR, UNSPECIFIED: Chronic | Status: ACTIVE | Noted: 2018-11-07

## 2018-12-06 PROBLEM — I44.30 UNSPECIFIED ATRIOVENTRICULAR BLOCK: Chronic | Status: ACTIVE | Noted: 2018-11-07

## 2018-12-06 PROBLEM — F41.9 ANXIETY DISORDER, UNSPECIFIED: Chronic | Status: ACTIVE | Noted: 2018-11-07

## 2018-12-06 PROBLEM — M48.00 SPINAL STENOSIS, SITE UNSPECIFIED: Chronic | Status: ACTIVE | Noted: 2018-11-07

## 2018-12-06 PROBLEM — E78.00 PURE HYPERCHOLESTEROLEMIA, UNSPECIFIED: Chronic | Status: ACTIVE | Noted: 2018-11-07

## 2018-12-06 PROBLEM — I82.409 ACUTE EMBOLISM AND THROMBOSIS OF UNSPECIFIED DEEP VEINS OF UNSPECIFIED LOWER EXTREMITY: Chronic | Status: ACTIVE | Noted: 2018-11-07

## 2018-12-06 PROBLEM — G62.9 POLYNEUROPATHY, UNSPECIFIED: Chronic | Status: ACTIVE | Noted: 2018-11-07

## 2018-12-06 PROBLEM — J45.909 UNSPECIFIED ASTHMA, UNCOMPLICATED: Chronic | Status: ACTIVE | Noted: 2018-11-07

## 2018-12-06 PROBLEM — M16.10 UNILATERAL PRIMARY OSTEOARTHRITIS, UNSPECIFIED HIP: Chronic | Status: ACTIVE | Noted: 2018-11-07

## 2018-12-06 PROBLEM — I25.119 ATHEROSCLEROTIC HEART DISEASE OF NATIVE CORONARY ARTERY WITH UNSPECIFIED ANGINA PECTORIS: Chronic | Status: ACTIVE | Noted: 2018-11-07

## 2018-12-10 ENCOUNTER — RX RENEWAL (OUTPATIENT)
Age: 83
End: 2018-12-10

## 2018-12-18 LAB
ANION GAP SERPL CALC-SCNC: 9 MMOL/L
BUN SERPL-MCNC: 16 MG/DL
CALCIUM SERPL-MCNC: 9.6 MG/DL
CHLORIDE SERPL-SCNC: 104 MMOL/L
CO2 SERPL-SCNC: 30 MMOL/L
CREAT SERPL-MCNC: 0.95 MG/DL
GLUCOSE SERPL-MCNC: 80 MG/DL
POTASSIUM SERPL-SCNC: 5 MMOL/L
SODIUM SERPL-SCNC: 143 MMOL/L

## 2019-01-09 ENCOUNTER — NON-APPOINTMENT (OUTPATIENT)
Age: 84
End: 2019-01-09

## 2019-01-09 ENCOUNTER — APPOINTMENT (OUTPATIENT)
Dept: CARDIOLOGY | Facility: CLINIC | Age: 84
End: 2019-01-09
Payer: MEDICARE

## 2019-01-09 VITALS
BODY MASS INDEX: 27.29 KG/M2 | HEIGHT: 58 IN | WEIGHT: 130 LBS | DIASTOLIC BLOOD PRESSURE: 76 MMHG | OXYGEN SATURATION: 97 % | HEART RATE: 81 BPM | SYSTOLIC BLOOD PRESSURE: 154 MMHG

## 2019-01-09 DIAGNOSIS — I73.9 PERIPHERAL VASCULAR DISEASE, UNSPECIFIED: ICD-10-CM

## 2019-01-09 PROCEDURE — 99215 OFFICE O/P EST HI 40 MIN: CPT

## 2019-01-09 PROCEDURE — 93000 ELECTROCARDIOGRAM COMPLETE: CPT

## 2019-01-09 NOTE — DISCUSSION/SUMMARY
[Patient] : the patient [Risks] : risks [Benefits] : benefits [Alternatives] : alternatives [___ Week(s)] : [unfilled] week(s) [With ___] : with [unfilled] [FreeTextEntry1] : This is a 83 F with HTN, dyslipidemia, here with abnormal EKG and chest pain.\par 1) Angina equivalent: Dyspnea diastolic heart failure vs. coronary artery disease \par s/p PCI to Cx . plavix and on xarelto for atrial fibrillation will cardiovert the atrial flutter. \par severe circumflex disease s/p PCI in nov 2018  d/c ranexa. \par 2)dyspnea  Diastolic heart failure.: fatigue and tired. will cardioversion in sinus rhyhtm for symptom relief. \par 3) severe aortic stenosis. :s/p KINZA. cannot take aspirin due to nasal bleeding.  on anticoagulation for afib. \par 4) hypertension : increase  cardizem 180 mg daily. d/c  amlodipine  ct losartan 100 mg daily. \par 5) atrial fibrillation/atrial flutter: : rate controlled.  meds.  ct anticoagulation CHADVASC > 3.  On xarelto 15 mg daily. increase cardizem- 180 mg daily. transesophageal echocardiogram and cardioversion ordered. no cardioversion for now. If symptoms done improve then consider cardivoersion./ \par 7) coronary artery disease : : rca with collaterals. s/p PCI to  LCx. d/c ranexa. d/c norvasc.

## 2019-01-09 NOTE — HISTORY OF PRESENT ILLNESS
[FreeTextEntry1] : Referred for hypertension and dyslipidemia; biop aortic valve replacement TAVR \par she has pain all over. pain in the legs both legs.  and groi pain. \par every once in a while sharp ache onver chest. difficulty swallowing. \par she is still feels fatigue and weakness and has ongoing atrial flutter or fibrillation. \par \par \par old note:  patient is very short of breathe. \par she took off baby aspirin due to nasal bleeding. She is taking xarelto. \par Multiple nasal bleeding and cauterisation. \par She cannot walk muich. is passing uring in the am. No swelling of feet. \par She feels very short of breathe. \par \par \par old note: No chest pain. no dyspnea. no dizziness.s\par patient was recently admitted to hospital for pneumonia. probably aspiration pneumonia. \par \par \par old note: Feels better. "wonderful" . but  pain in the left leg. \par \par back pain radiating to the left. pain. sharp pain in the calf. releived with tramadol./ no headaches. \par \par \par old note: patient s/p JORGE.  tolerated procedure well. no stroke. no hematoma.  feels 75% better.\par no chest pain. no dizziness. + wheezing. \par old note: patient was seen in the hospital.  has diastolic heart failure. moderate to severe aortic stenosis. \par scheduled for jorge. Was discharged on lasix 20 mg daily. Patient has been complianing of LE edema and dyspnea nad wheezing.\par congestive heart failure.  .progressively worsneing dyspnea and LE edeam and orthopnea for last few days. \par \par old note: This is a 83 year old woman with history of hypertension, dyslipidemia, was referred for abnormal EKG.  Patient complains of chest pain on exertion.  No radiation. Not associated with dyspnea. Denies dizziness. No syncopal episodes. Denies any headache.

## 2019-01-09 NOTE — CARDIOLOGY SUMMARY
[___] : [unfilled] [LVEF ___%] : LVEF [unfilled]% [Mild] : mild pulmonary hypertension [Normal] : normal LA size [None] : no mitral regurgitation

## 2019-01-16 ENCOUNTER — MEDICATION RENEWAL (OUTPATIENT)
Age: 84
End: 2019-01-16

## 2019-01-16 ENCOUNTER — RX RENEWAL (OUTPATIENT)
Age: 84
End: 2019-01-16

## 2019-01-27 ENCOUNTER — FORM ENCOUNTER (OUTPATIENT)
Age: 84
End: 2019-01-27

## 2019-01-28 ENCOUNTER — TRANSCRIPTION ENCOUNTER (OUTPATIENT)
Age: 84
End: 2019-01-28

## 2019-01-28 ENCOUNTER — OUTPATIENT (OUTPATIENT)
Dept: OUTPATIENT SERVICES | Facility: HOSPITAL | Age: 84
LOS: 1 days | End: 2019-01-28
Payer: MEDICARE

## 2019-01-28 VITALS — WEIGHT: 125 LBS | HEIGHT: 58 IN

## 2019-01-28 VITALS — WEIGHT: 125 LBS

## 2019-01-28 DIAGNOSIS — Z95.3 PRESENCE OF XENOGENIC HEART VALVE: Chronic | ICD-10-CM

## 2019-01-28 DIAGNOSIS — I48.91 UNSPECIFIED ATRIAL FIBRILLATION: ICD-10-CM

## 2019-01-28 DIAGNOSIS — Z98.1 ARTHRODESIS STATUS: Chronic | ICD-10-CM

## 2019-01-28 DIAGNOSIS — I48.1 PERSISTENT ATRIAL FIBRILLATION: ICD-10-CM

## 2019-01-28 LAB
ANION GAP SERPL CALC-SCNC: 11 MMOL/L — SIGNIFICANT CHANGE UP (ref 5–17)
APTT BLD: 37.3 SEC — HIGH (ref 27.5–36.3)
BUN SERPL-MCNC: 16 MG/DL — SIGNIFICANT CHANGE UP (ref 8–20)
CALCIUM SERPL-MCNC: 9.8 MG/DL — SIGNIFICANT CHANGE UP (ref 8.6–10.2)
CHLORIDE SERPL-SCNC: 104 MMOL/L — SIGNIFICANT CHANGE UP (ref 98–107)
CO2 SERPL-SCNC: 27 MMOL/L — SIGNIFICANT CHANGE UP (ref 22–29)
CREAT SERPL-MCNC: 0.76 MG/DL — SIGNIFICANT CHANGE UP (ref 0.5–1.3)
GLUCOSE SERPL-MCNC: 94 MG/DL — SIGNIFICANT CHANGE UP (ref 70–115)
HCT VFR BLD CALC: 38.7 % — SIGNIFICANT CHANGE UP (ref 37–47)
HGB BLD-MCNC: 12.4 G/DL — SIGNIFICANT CHANGE UP (ref 12–16)
INR BLD: 1.31 RATIO — HIGH (ref 0.88–1.16)
MAGNESIUM SERPL-MCNC: 1.8 MG/DL — SIGNIFICANT CHANGE UP (ref 1.6–2.6)
MCHC RBC-ENTMCNC: 27.6 PG — SIGNIFICANT CHANGE UP (ref 27–31)
MCHC RBC-ENTMCNC: 32 G/DL — SIGNIFICANT CHANGE UP (ref 32–36)
MCV RBC AUTO: 86 FL — SIGNIFICANT CHANGE UP (ref 81–99)
PLATELET # BLD AUTO: 277 K/UL — SIGNIFICANT CHANGE UP (ref 150–400)
POTASSIUM SERPL-MCNC: 4.3 MMOL/L — SIGNIFICANT CHANGE UP (ref 3.5–5.3)
POTASSIUM SERPL-SCNC: 4.3 MMOL/L — SIGNIFICANT CHANGE UP (ref 3.5–5.3)
PROTHROM AB SERPL-ACNC: 15.2 SEC — HIGH (ref 10–12.9)
RBC # BLD: 4.5 M/UL — SIGNIFICANT CHANGE UP (ref 4.4–5.2)
RBC # FLD: 17.9 % — HIGH (ref 11–15.6)
SODIUM SERPL-SCNC: 142 MMOL/L — SIGNIFICANT CHANGE UP (ref 135–145)
WBC # BLD: 7.3 K/UL — SIGNIFICANT CHANGE UP (ref 4.8–10.8)
WBC # FLD AUTO: 7.3 K/UL — SIGNIFICANT CHANGE UP (ref 4.8–10.8)

## 2019-01-28 PROCEDURE — 93312 ECHO TRANSESOPHAGEAL: CPT | Mod: 26

## 2019-01-28 PROCEDURE — 76376 3D RENDER W/INTRP POSTPROCES: CPT | Mod: 26

## 2019-01-28 PROCEDURE — 93320 DOPPLER ECHO COMPLETE: CPT

## 2019-01-28 PROCEDURE — 93312 ECHO TRANSESOPHAGEAL: CPT

## 2019-01-28 PROCEDURE — 93325 DOPPLER ECHO COLOR FLOW MAPG: CPT | Mod: 26

## 2019-01-28 PROCEDURE — 36415 COLL VENOUS BLD VENIPUNCTURE: CPT

## 2019-01-28 PROCEDURE — 92960 CARDIOVERSION ELECTRIC EXT: CPT

## 2019-01-28 PROCEDURE — 93325 DOPPLER ECHO COLOR FLOW MAPG: CPT

## 2019-01-28 PROCEDURE — 85027 COMPLETE CBC AUTOMATED: CPT

## 2019-01-28 PROCEDURE — 93005 ELECTROCARDIOGRAM TRACING: CPT

## 2019-01-28 PROCEDURE — 83735 ASSAY OF MAGNESIUM: CPT

## 2019-01-28 PROCEDURE — 93320 DOPPLER ECHO COMPLETE: CPT | Mod: 26

## 2019-01-28 PROCEDURE — 93010 ELECTROCARDIOGRAM REPORT: CPT

## 2019-01-28 PROCEDURE — 85610 PROTHROMBIN TIME: CPT

## 2019-01-28 PROCEDURE — 85730 THROMBOPLASTIN TIME PARTIAL: CPT

## 2019-01-28 PROCEDURE — 93010 ELECTROCARDIOGRAM REPORT: CPT | Mod: 77

## 2019-01-28 PROCEDURE — 80048 BASIC METABOLIC PNL TOTAL CA: CPT

## 2019-01-28 RX ORDER — AMIODARONE HYDROCHLORIDE 400 MG/1
200 TABLET ORAL ONCE
Qty: 0 | Refills: 0 | Status: COMPLETED | OUTPATIENT
Start: 2019-01-28 | End: 2019-01-28

## 2019-01-28 RX ORDER — MAGNESIUM SULFATE 500 MG/ML
2 VIAL (ML) INJECTION ONCE
Qty: 0 | Refills: 0 | Status: COMPLETED | OUTPATIENT
Start: 2019-01-28 | End: 2019-01-28

## 2019-01-28 RX ORDER — RIVAROXABAN 15 MG-20MG
1 KIT ORAL
Qty: 0 | Refills: 0 | DISCHARGE
Start: 2019-01-28

## 2019-01-28 RX ORDER — RANOLAZINE 500 MG/1
1 TABLET, FILM COATED, EXTENDED RELEASE ORAL
Qty: 0 | Refills: 0 | COMMUNITY

## 2019-01-28 RX ORDER — FONDAPARINUX SODIUM 2.5 MG/.5ML
1 INJECTION, SOLUTION SUBCUTANEOUS
Qty: 0 | Refills: 0 | COMMUNITY

## 2019-01-28 RX ORDER — AMIODARONE HYDROCHLORIDE 400 MG/1
1 TABLET ORAL
Qty: 0 | Refills: 0 | DISCHARGE
Start: 2019-01-28

## 2019-01-28 RX ORDER — FUROSEMIDE 40 MG
1 TABLET ORAL
Qty: 0 | Refills: 0 | COMMUNITY

## 2019-01-28 RX ORDER — PANTOPRAZOLE SODIUM 20 MG/1
1 TABLET, DELAYED RELEASE ORAL
Qty: 30 | Refills: 0
Start: 2019-01-28 | End: 2019-02-26

## 2019-01-28 RX ORDER — AMIODARONE HYDROCHLORIDE 400 MG/1
1 TABLET ORAL
Qty: 90 | Refills: 0
Start: 2019-01-28 | End: 2019-04-27

## 2019-01-28 RX ORDER — ASPIRIN/CALCIUM CARB/MAGNESIUM 324 MG
1 TABLET ORAL
Qty: 90 | Refills: 0
Start: 2019-01-28 | End: 2019-04-27

## 2019-01-28 RX ORDER — RIVAROXABAN 15 MG-20MG
1 KIT ORAL
Qty: 30 | Refills: 0
Start: 2019-01-28 | End: 2019-02-26

## 2019-01-28 RX ORDER — AMIODARONE HYDROCHLORIDE 400 MG/1
1 TABLET ORAL
Qty: 14 | Refills: 0
Start: 2019-01-28 | End: 2019-02-03

## 2019-01-28 RX ORDER — RIVAROXABAN 15 MG-20MG
20 KIT ORAL ONCE
Qty: 0 | Refills: 0 | Status: DISCONTINUED | OUTPATIENT
Start: 2019-01-28 | End: 2019-02-12

## 2019-01-28 RX ADMIN — Medication 50 GRAM(S): at 14:07

## 2019-01-28 RX ADMIN — AMIODARONE HYDROCHLORIDE 200 MILLIGRAM(S): 400 TABLET ORAL at 14:07

## 2019-01-28 NOTE — DISCHARGE NOTE ADULT - CARE PROVIDER_API CALL
Yesika Rubio), Cardiology; Internal Medicine  [address] [id number]  Phone: [phone]  Fax: [phone]

## 2019-01-28 NOTE — DISCHARGE NOTE ADULT - MEDICATION SUMMARY - MEDICATIONS TO TAKE
I will START or STAY ON the medications listed below when I get home from the hospital:    col-rite  250  mg  -- 1 cap(s) by mouth once a day  -- Indication: For intestinal    spironolactone 25 mg oral tablet  -- orally once a day  -- Indication: For Heart failure    Adult Aspirin 81 mg oral tablet, chewable  -- 1 tab(s) chewed once a day   -- Chew tablets before swallowing  Take with food or milk.    -- Indication: For AF (atrial fibrillation)    traMADol 50 mg oral tablet  -- 1 tab(s) by mouth every 6 hours, As needed, Moderate Pain (4 - 6)  -- Indication: For Pain    losartan 50 mg oral tablet  -- 1 tab(s) by mouth once a day  -- Indication: For Htn    amiodarone 200 mg oral tablet  -- 1 tab(s) by mouth every 12 hours   -- Avoid grapefruit and grapefruit juice while taking this medication.  Avoid prolonged or excessive exposure to direct and/or artificial sunlight while taking this medication.  Do not take this drug if you are pregnant.  It is very important that you take or use this exactly as directed.  Do not skip doses or discontinue unless directed by your doctor.  May cause drowsiness or dizziness.    -- Indication: For AF (atrial fibrillation)    amiodarone 200 mg oral tablet  -- 1 tab(s) by mouth once a day   start on Feb 5   -- Avoid grapefruit and grapefruit juice while taking this medication.  Avoid prolonged or excessive exposure to direct and/or artificial sunlight while taking this medication.  Do not take this drug if you are pregnant.  It is very important that you take or use this exactly as directed.  Do not skip doses or discontinue unless directed by your doctor.  May cause drowsiness or dizziness.    -- Indication: For AF (atrial fibrillation)    Cartia  mg/24 hours oral capsule, extended release  -- 1 cap(s) by mouth once a day  -- Indication: For AF (atrial fibrillation)    amiodarone 200 mg oral tablet  -- 1 tab(s) by mouth once  -- Indication: For AF (atrial fibrillation)    Xarelto 15 mg oral tablet  -- 1 tab(s) by mouth once a day (in the evening)    TAKE ON ODD NUMBER DAYS  -- Indication: For AF (atrial fibrillation)    Xarelto 20 mg oral tablet  -- 1 tab(s) by mouth once a day (in the evening)   TAKE ON EVEN NUMBER DAYS  -- Check with your doctor before becoming pregnant.  It is very important that you take or use this exactly as directed.  Do not skip doses or discontinue unless directed by your doctor.  Obtain medical advice before taking any non-prescription drugs as some may affect the action of this medication.  Take with food.    -- Indication: For AF (atrial fibrillation)    gabapentin 300 mg oral capsule  -- 2 cap(s) by mouth 3 times a day  -- Indication: For leg    atorvastatin 20 mg oral tablet  -- 1 tab(s) by mouth once a day (at bedtime)  -- Indication: For Hld    Ventolin HFA 90 mcg/inh inhalation aerosol  -- 2 puff(s) inhaled 4 times a day, As Needed  -- Indication: For copd    amLODIPine 5 mg oral tablet  -- 1 tab(s) by mouth once a day  -- Indication: For Htn    Cranberry oral capsule  -- 1 cap(s) by mouth once a day  -- Indication: For supplement    Protonix 40 mg oral delayed release tablet  -- 1 tab(s) by mouth once a day   -- It is very important that you take or use this exactly as directed.  Do not skip doses or discontinue unless directed by your doctor.  Obtain medical advice before taking any non-prescription drugs as some may affect the action of this medication.  Swallow whole.  Do not crush.    -- Indication: For gerd    Calcium 600+D oral tablet  -- 2 tab(s) by mouth once a day  -- Indication: For mineral    vitamin E alpha 1000 intl units oral capsule  -- 1 cap(s) by mouth once a day  -- Indication: For vitamin    Vitamin D3 400 intl units oral tablet  -- 2 tab(s) by mouth once a day  -- Indication: For vitamin

## 2019-01-28 NOTE — DISCHARGE NOTE ADULT - HOSPITAL COURSE
s/p BIRGIT/CV with 200J to NSR confirmed by 12 ECG  Tolerated procedure well w/o complications   Skin intact  Seen post procedure by Dr. Rubio Report pending    REVIEW OF SYSTEMS:  Denies SOB, CP, NV, HA, dizziness, palpitations, site pain    PHYSICAL EXAM: A&Ox3 NAD Skin warm and dry  NEURO: Speech intact +gag +swallow Tongue midline MANDEL  NECK: No JVD, trachea midline. Eupneic  HEART: RRR S1S2 no g/m SR on tele/ECG  PULMONARY:  CTA david  ABDOMEN: Soft nontender X4 +BS Vdg/eating  A- successful CV Large atrium per verbal report  P- alternate xarelto 15mg with 20 mg every other day with daily aspirin 81mg until seen by Dr. Rubio (h/o nasal bleed requiring packing-pt receptive to this regime until seen by Dr. Rubio on Feb 12)  Protonix added  Amiodarone 200 mg q12 x7days then 200 mg daily First dose given prior to discharge  All RXs to pharmacy and pt education done with her son present (caregiver)

## 2019-01-28 NOTE — DISCHARGE NOTE ADULT - MEDICATION SUMMARY - MEDICATIONS TO CHANGE
I will SWITCH the dose or number of times a day I take the medications listed below when I get home from the hospital:    Xarelto 15 mg oral tablet  -- 1 tab(s) by mouth once a day (in the evening)

## 2019-01-28 NOTE — ASU PATIENT PROFILE, ADULT - PMH
Acute deep vein thrombosis (DVT) of popliteal vein of both lower extremities    AF (atrial fibrillation)  Persistent/chronic  Angina pectoris  class IV  Anxiety    Aortic stenosis  s/p TAVR bioprosthetic Jan 2018 [facility] Dr. Aragon  Aspiration pneumonia    Asthma    Atherosclerosis of native coronary artery of native heart with angina pectoris    AV block    Bronchiectasis    Chronic back pain    Closed fracture of multiple ribs of right side, initial encounter    Closed pelvic ring fracture    Diastolic CHF  NYHA class 3  CURRY (dyspnea on exertion)    DVT (deep venous thrombosis)  left lower extremity  Essential hypertension    GERD (gastroesophageal reflux disease)    Hip arthritis    [place] (hard of hearing)  wears bilateral hearing aides  HTN (hypertension)    Hypercholesteremia    Hyperlipidemia, unspecified hyperlipidemia type    Kyphoscoliosis and scoliosis    Lung nodule    Murmur, cardiac  gr3/6  Neuropathy    Nosebleed  prior left nostrils cauterize x2  Nosebleed  severe on aspirin; had nose cautery done in 2018  Pelvic fracture    PNA (pneumonia)  november 2017  Spinal stenosis    Stenocardia    Thyroid nodule

## 2019-01-28 NOTE — H&P PST ADULT - PROBLEM SELECTOR PLAN 1
-BIRGIT/DCCV with Dr. Rubio  -consent  -procedure d/w patient and son at bedside; risks and benefits; questions answered  -labs and EKG reviewed  -medications reviewed -BIRGIT/DCCV with Dr. Rubio  -consent  -procedure d/w patient and son at bedside; risks and benefits; questions answered  -labs and EKG reviewed  -medications reviewed  -mag 2g IVPB

## 2019-01-28 NOTE — H&P PST ADULT - NEGATIVE NEUROLOGICAL SYMPTOMS
no transient paralysis/no paresthesias/no tremors/no syncope/no headache/no weakness/no generalized seizures/no focal seizures/no loss of sensation/no loss of consciousness

## 2019-01-28 NOTE — H&P PST ADULT - NEUROLOGICAL DETAILS
alert and oriented x 3/responds to verbal commands/no spontaneous movement/responds to pain/sensation intact/cranial nerves intact/deep reflexes intact

## 2019-01-28 NOTE — H&P PST ADULT - FAMILY HISTORY
Sibling  Still living? Unknown  Family history of breast cancer, Age at diagnosis: Age Unknown     Mother  Still living? Unknown  Family history of cerebrovascular accident (CVA), Age at diagnosis: Age Unknown     Father  Still living? Unknown  Family history of esophageal cancer, Age at diagnosis: Age Unknown

## 2019-01-28 NOTE — H&P PST ADULT - ASSESSMENT
This is an 87 y/o white female with a PMH of CAD with PCI (CX synergy [id number]; 11/2018), TAVR  (1/12/2018), diastolic HF, CURRY, HTN, HLD, class IV angina, DVT, AS, AFib (on xarelto) presents today for PST procedure BIRGIT/ DCCV with Dr. Rubio.

## 2019-01-28 NOTE — H&P PST ADULT - PMH
Acute deep vein thrombosis (DVT) of popliteal vein of both lower extremities    AF (atrial fibrillation)  Persistent/chronic  Angina pectoris  class IV  Anxiety    Aortic stenosis  s/p TAVR bioprosthetic Jan [facility] Dr. Aragon  Aspiration pneumonia    Asthma    Atherosclerosis of native coronary artery of native heart with angina pectoris    AV block    Bronchiectasis    Chronic back pain    Closed fracture of multiple ribs of right side, initial encounter    Closed pelvic ring fracture    Diastolic CHF  NYHA class 3  CURRY (dyspnea on exertion)    DVT (deep venous thrombosis)  left lower extremity  Essential hypertension    GERD (gastroesophageal reflux disease)    Hip arthritis    [place] (hard of hearing)  wears bilateral hearing aides  HTN (hypertension)    Hypercholesteremia    Hyperlipidemia, unspecified hyperlipidemia type    Kyphoscoliosis and scoliosis    Lung nodule    Murmur, cardiac  gr3/6  Neuropathy    Nosebleed  prior left nostrils cauterize x2  Nosebleed  severe on aspirin; had nose cautery done in 2018  Pelvic fracture    PNA (pneumonia)  november 2017  Spinal stenosis    Stenocardia    Thyroid nodule

## 2019-01-28 NOTE — DISCHARGE NOTE ADULT - PATIENT PORTAL LINK FT
You can access the [facility] Patient Portal, offered by [facility], by registering with the following website: [email or web address]

## 2019-01-28 NOTE — H&P PST ADULT - HISTORY OF PRESENT ILLNESS
This is an 85 y/o white female with a PMH of CAD with PCI (CX synergy [id number]; 11/2018), TAVR  (1/12/2018), diastolic HF, CURRY, HTN, HLD, class IV angina, DVT, AS, AFib (on xarelto) presents today for PST procedure BIRGIT/ DCCV with Dr. Rubio.    Medications taken this am: losartan and cartia  Last dose of xarelto: Sunday, 1/27/2019    Cardiac Cath from 11/12/2018 revealed: mLAD 40% stenosis, mCX 80% stenosis, mRCA 100% stenosis-->JARETT placed with Dr. Soto  Echo from 1/13/2018 revealed: LVEF > 75%, hyperdynamic global LV dysfunction, moderate septal LV hypertrophy, mild annular calcification, moderately dilated right atrium, thickening of anterior and posterior mitral valve leaflets, bioprosthesis in the aortic position, estimated PAS 42.6mmHg, RAP 8mmHg, severely enlarged LA. This is an 87 y/o white female with a PMH of CAD with PCI (CX synergy [id number]; 11/2018), TAVR  (1/12/2018), diastolic HF, CURRY, HTN, HLD, class IV angina, DVT, AS, AFib (on xarelto) presents today for PST procedure BIRGIT/ DCCV with Dr. Rubio. Pt reports recent increase in symptoms of SOB, CURRY, denies chest pain or palpitations at this time. Son at bedside with pt.     Medications taken this am: losartan and cartia  Last dose of xarelto: Sunday, 1/27/2019    Cardiac Cath from 11/12/2018 revealed: mLAD 40% stenosis, mCX 80% stenosis, mRCA 100% stenosis-->JARETT placed with Dr. Soto  Echo from 1/13/2018 revealed: LVEF > 75%, hyperdynamic global LV dysfunction, moderate septal LV hypertrophy, mild annular calcification, moderately dilated right atrium, thickening of anterior and posterior mitral valve leaflets, bioprosthesis in the aortic position, estimated PAS 42.6mmHg, RAP 8mmHg, severely enlarged LA.

## 2019-01-28 NOTE — DISCHARGE NOTE ADULT - CARE PLAN
Principal Discharge DX:	AF (atrial fibrillation)  Goal:	optimal cardiac function  Assessment and plan of treatment:	MARINA Rubio (you have appt on Feb 12th)

## 2019-01-28 NOTE — H&P PST ADULT - RS GEN PE MLT RESP DETAILS PC
normal/respirations non-labored/good air movement/no chest wall tenderness/breath sounds equal/clear to auscultation bilaterally/airway patent

## 2019-01-28 NOTE — PROCEDURE NOTE - ADDITIONAL PROCEDURE DETAILS
After risks and benefits of procedures were explained informed consent was obtained and placed in chart.  Anesthesia was present and administered sedation.  Patient then received synchronized cardioversion x1 with 200 J.  Patient converted to sinus rhythm, confirmed by 12-lead EKG.  Patient tolerated the procedure well without complication.

## 2019-01-29 PROBLEM — R04.0 EPISTAXIS: Chronic | Status: ACTIVE | Noted: 2018-11-07

## 2019-01-29 PROBLEM — I20.9 ANGINA PECTORIS, UNSPECIFIED: Chronic | Status: ACTIVE | Noted: 2018-11-07

## 2019-02-11 ENCOUNTER — APPOINTMENT (OUTPATIENT)
Dept: CARDIOLOGY | Facility: CLINIC | Age: 84
End: 2019-02-11
Payer: MEDICARE

## 2019-02-11 PROCEDURE — 93922 UPR/L XTREMITY ART 2 LEVELS: CPT

## 2019-02-11 PROCEDURE — 93925 LOWER EXTREMITY STUDY: CPT

## 2019-02-20 ENCOUNTER — APPOINTMENT (OUTPATIENT)
Dept: CARDIOLOGY | Facility: CLINIC | Age: 84
End: 2019-02-20
Payer: MEDICARE

## 2019-02-20 VITALS
OXYGEN SATURATION: 96 % | BODY MASS INDEX: 26.24 KG/M2 | DIASTOLIC BLOOD PRESSURE: 50 MMHG | WEIGHT: 125 LBS | HEIGHT: 58 IN | HEART RATE: 60 BPM | SYSTOLIC BLOOD PRESSURE: 138 MMHG

## 2019-02-20 DIAGNOSIS — K21.9 GASTRO-ESOPHAGEAL REFLUX DISEASE W/OUT ESOPHAGITIS: ICD-10-CM

## 2019-02-20 PROCEDURE — 93000 ELECTROCARDIOGRAM COMPLETE: CPT

## 2019-02-20 PROCEDURE — 99214 OFFICE O/P EST MOD 30 MIN: CPT

## 2019-02-20 RX ORDER — RANOLAZINE 500 MG/1
500 TABLET, FILM COATED, EXTENDED RELEASE ORAL
Qty: 60 | Refills: 3 | Status: DISCONTINUED | COMMUNITY
Start: 2018-10-31 | End: 2019-02-20

## 2019-02-20 RX ORDER — GABAPENTIN 300 MG/1
300 CAPSULE ORAL
Refills: 0 | Status: ACTIVE | COMMUNITY

## 2019-02-20 RX ORDER — AMLODIPINE BESYLATE 5 MG/1
5 TABLET ORAL DAILY
Qty: 90 | Refills: 3 | Status: DISCONTINUED | COMMUNITY
Start: 2018-10-31 | End: 2019-02-20

## 2019-02-20 RX ORDER — FUROSEMIDE 40 MG/1
40 TABLET ORAL
Qty: 90 | Refills: 3 | Status: DISCONTINUED | COMMUNITY
Start: 2018-01-03 | End: 2019-02-20

## 2019-02-20 RX ORDER — CLOPIDOGREL BISULFATE 75 MG/1
75 TABLET, FILM COATED ORAL
Qty: 90 | Refills: 1 | Status: DISCONTINUED | COMMUNITY
Start: 2018-12-05 | End: 2019-02-20

## 2019-02-22 ENCOUNTER — RX RENEWAL (OUTPATIENT)
Age: 84
End: 2019-02-22

## 2019-02-27 ENCOUNTER — NON-APPOINTMENT (OUTPATIENT)
Age: 84
End: 2019-02-27

## 2019-02-28 ENCOUNTER — TRANSCRIPTION ENCOUNTER (OUTPATIENT)
Age: 84
End: 2019-02-28

## 2019-03-05 ENCOUNTER — RX RENEWAL (OUTPATIENT)
Age: 84
End: 2019-03-05

## 2019-03-07 ENCOUNTER — RX RENEWAL (OUTPATIENT)
Age: 84
End: 2019-03-07

## 2019-03-18 ENCOUNTER — TRANSCRIPTION ENCOUNTER (OUTPATIENT)
Age: 84
End: 2019-03-18

## 2019-03-26 ENCOUNTER — CLINICAL ADVICE (OUTPATIENT)
Age: 84
End: 2019-03-26

## 2019-04-25 ENCOUNTER — RX RENEWAL (OUTPATIENT)
Age: 84
End: 2019-04-25

## 2019-05-29 ENCOUNTER — RX RENEWAL (OUTPATIENT)
Age: 84
End: 2019-05-29

## 2019-06-17 ENCOUNTER — MEDICATION RENEWAL (OUTPATIENT)
Age: 84
End: 2019-06-17

## 2019-06-25 ENCOUNTER — RX RENEWAL (OUTPATIENT)
Age: 84
End: 2019-06-25

## 2019-07-10 ENCOUNTER — NON-APPOINTMENT (OUTPATIENT)
Age: 84
End: 2019-07-10

## 2019-07-10 ENCOUNTER — APPOINTMENT (OUTPATIENT)
Dept: CARDIOLOGY | Facility: CLINIC | Age: 84
End: 2019-07-10
Payer: MEDICARE

## 2019-07-10 VITALS
DIASTOLIC BLOOD PRESSURE: 64 MMHG | HEART RATE: 66 BPM | OXYGEN SATURATION: 98 % | BODY MASS INDEX: 26.03 KG/M2 | WEIGHT: 124 LBS | SYSTOLIC BLOOD PRESSURE: 187 MMHG | HEIGHT: 58 IN

## 2019-07-10 PROCEDURE — 99215 OFFICE O/P EST HI 40 MIN: CPT

## 2019-07-10 PROCEDURE — 93000 ELECTROCARDIOGRAM COMPLETE: CPT

## 2019-07-10 RX ORDER — TITANIUM DIOXIDE, OCTINOXATE, ZINC OXIDE 4.61; 1.6; .78 G/40ML; G/40ML; G/40ML
400 CREAM TOPICAL
Refills: 0 | Status: DISCONTINUED | COMMUNITY
Start: 2018-12-05 | End: 2019-07-10

## 2019-07-10 RX ORDER — MONTELUKAST 10 MG/1
10 TABLET, FILM COATED ORAL
Qty: 90 | Refills: 3 | Status: DISCONTINUED | COMMUNITY
Start: 2018-08-03 | End: 2019-07-10

## 2019-07-10 RX ORDER — PANTOPRAZOLE 40 MG/1
40 TABLET, DELAYED RELEASE ORAL
Qty: 90 | Refills: 0 | Status: DISCONTINUED | COMMUNITY
Start: 2019-02-20 | End: 2019-07-10

## 2019-07-10 NOTE — REVIEW OF SYSTEMS
[see HPI] : see HPI [Shortness Of Breath] : shortness of breath [Dyspnea on exertion] : not dyspnea during exertion [Chest  Pressure] : chest pressure [Chest Pain] : chest pain [Lower Ext Edema] : no extremity edema [Leg Claudication] : no intermittent leg claudication [Palpitations] : no palpitations [Negative] : Endocrine

## 2019-07-10 NOTE — PHYSICAL EXAM
[General Appearance - Well Developed] : well developed [Normal Appearance] : normal appearance [Well Groomed] : well groomed [General Appearance - Well Nourished] : well nourished [No Deformities] : no deformities [Normal Conjunctiva] : the conjunctiva exhibited no abnormalities [General Appearance - In No Acute Distress] : no acute distress [Eyelids - No Xanthelasma] : the eyelids demonstrated no xanthelasmas [No Oral Cyanosis] : no oral cyanosis [Normal Oral Mucosa] : normal oral mucosa [No Oral Pallor] : no oral pallor [Normal Jugular Venous V Waves Present] : normal jugular venous V waves present [Normal Jugular Venous A Waves Present] : normal jugular venous A waves present [No Jugular Venous Alexander A Waves] : no jugular venous alexander A waves [Respiration, Rhythm And Depth] : normal respiratory rhythm and effort [Auscultation Breath Sounds / Voice Sounds] : lungs were clear to auscultation bilaterally [Exaggerated Use Of Accessory Muscles For Inspiration] : no accessory muscle use [Heart Sounds] : normal S1 and S2 [Abdomen Soft] : soft [Abdomen Mass (___ Cm)] : no abdominal mass palpated [Abdomen Tenderness] : non-tender [Nail Clubbing] : no clubbing of the fingernails [Cyanosis, Localized] : no localized cyanosis [FreeTextEntry1] : uses a walker  [Petechial Hemorrhages (___cm)] : no petechial hemorrhages [Skin Color & Pigmentation] : normal skin color and pigmentation [] : no rash [No Venous Stasis] : no venous stasis [No Xanthoma] : no  xanthoma was observed [Skin Lesions] : no skin lesions [No Skin Ulcers] : no skin ulcer [Affect] : the affect was normal [Oriented To Time, Place, And Person] : oriented to person, place, and time [Mood] : the mood was normal [No Anxiety] : not feeling anxious

## 2019-07-10 NOTE — DISCUSSION/SUMMARY
[Patient] : the patient [Risks] : risks [Benefits] : benefits [Alternatives] : alternatives [___ Month(s)] : [unfilled] month(s) [FreeTextEntry1] : This is a 83 F with HTN, dyslipidemia, here with abnormal EKG and chest pain.\par 1) Angina equivalent: Dyspnea diastolic heart failure vs. coronary artery disease \par s/p PCI to Cx .aspirin and on xarelto for atrial fibrillation will cardiovert the atrial flutter. \par severe circumflex disease s/p PCI in nov 2018  \par 2)dyspnea  Diastolic heart failure . controlled. . \par 3) severe aortic stenosis. :s/p KINZA. cannot take aspirin due to nasal bleeding.  on anticoagulation for afib. \par 4) hypertension : ct cardizem 120 mg daily.  increase  losartan 100 mg daily.   aldactone 25 every other. \par If BP still elevated then change cardizem to norvasc (heart rate low cannot increase cardizem.)\par 5) atrial fibrillation/atrial flutter: : rate controlled.  meds.  ct anticoagulation CHADVASC > 3.  On xarelto 15 mg daily. \par 7) coronary artery disease : : rca with collaterals. s/p PCI to  LCx.  [With ___] : with [unfilled]

## 2019-07-10 NOTE — HISTORY OF PRESENT ILLNESS
[FreeTextEntry1] : Referred for hypertension and dyslipidemia; biop aortic valve replacement TAVR \par no chest pain. no dyspnea,. bno headaches. no dizziness. no palpitaitons./ \par \par old note: she has pain all over. pain in the legs both legs.  and groi pain. \par every once in a while sharp ache onver chest. difficulty swallowing. \par she is still feels fatigue and weakness and has ongoing atrial flutter or fibrillation. \par \par \par old note:  patient is very short of breathe. \par she took off baby aspirin due to nasal bleeding. She is taking xarelto. \par Multiple nasal bleeding and cauterisation. \par She cannot walk muich. is passing uring in the am. No swelling of feet. \par She feels very short of breathe. \par \par \par old note: No chest pain. no dyspnea. no dizziness.s\par patient was recently admitted to hospital for pneumonia. probably aspiration pneumonia. \par \par \par old note: Feels better. "wonderful" . but  pain in the left leg. \par \par back pain radiating to the left. pain. sharp pain in the calf. releived with tramadol./ no headaches. \par \par \par old note: patient s/p JORGE.  tolerated procedure well. no stroke. no hematoma.  feels 75% better.\par no chest pain. no dizziness. + wheezing. \par old note: patient was seen in the hospital.  has diastolic heart failure. moderate to severe aortic stenosis. \par scheduled for jorge. Was discharged on lasix 20 mg daily. Patient has been complianing of LE edema and dyspnea nad wheezing.\par congestive heart failure.  .progressively worsneing dyspnea and LE edeam and orthopnea for last few days. \par \par old note: This is a 83 year old woman with history of hypertension, dyslipidemia, was referred for abnormal EKG.  Patient complains of chest pain on exertion.  No radiation. Not associated with dyspnea. Denies dizziness. No syncopal episodes. Denies any headache.

## 2019-07-10 NOTE — REASON FOR VISIT
[Initial Evaluation] : an initial evaluation of [FreeTextEntry1] : regular check up; HTN, dyslipidemia [FreeTextEntry2] : regular check up; HTN, dyslipidemia

## 2019-07-11 PROBLEM — H91.90 UNSPECIFIED HEARING LOSS, UNSPECIFIED EAR: Chronic | Status: ACTIVE | Noted: 2019-01-28

## 2019-07-22 ENCOUNTER — APPOINTMENT (OUTPATIENT)
Dept: CARDIOLOGY | Facility: CLINIC | Age: 84
End: 2019-07-22
Payer: MEDICARE

## 2019-07-22 PROCEDURE — 93880 EXTRACRANIAL BILAT STUDY: CPT

## 2019-07-23 ENCOUNTER — RX RENEWAL (OUTPATIENT)
Age: 84
End: 2019-07-23

## 2019-07-23 ENCOUNTER — OTHER (OUTPATIENT)
Age: 84
End: 2019-07-23

## 2019-10-08 ENCOUNTER — MEDICATION RENEWAL (OUTPATIENT)
Age: 84
End: 2019-10-08

## 2019-10-23 ENCOUNTER — APPOINTMENT (OUTPATIENT)
Dept: CARDIOLOGY | Facility: CLINIC | Age: 84
End: 2019-10-23
Payer: MEDICARE

## 2019-10-23 ENCOUNTER — NON-APPOINTMENT (OUTPATIENT)
Age: 84
End: 2019-10-23

## 2019-10-23 VITALS
HEIGHT: 58 IN | WEIGHT: 120 LBS | DIASTOLIC BLOOD PRESSURE: 61 MMHG | HEART RATE: 57 BPM | OXYGEN SATURATION: 98 % | SYSTOLIC BLOOD PRESSURE: 168 MMHG | BODY MASS INDEX: 25.19 KG/M2

## 2019-10-23 PROCEDURE — 93000 ELECTROCARDIOGRAM COMPLETE: CPT

## 2019-10-23 PROCEDURE — 99215 OFFICE O/P EST HI 40 MIN: CPT

## 2019-10-23 RX ORDER — DILTIAZEM HYDROCHLORIDE 120 MG/1
120 CAPSULE, EXTENDED RELEASE ORAL
Qty: 90 | Refills: 1 | Status: DISCONTINUED | COMMUNITY
Start: 2018-12-10 | End: 2019-10-23

## 2019-10-23 RX ORDER — AMIODARONE HYDROCHLORIDE 200 MG/1
200 TABLET ORAL DAILY
Qty: 90 | Refills: 1 | Status: DISCONTINUED | COMMUNITY
Start: 2019-02-20 | End: 2019-10-23

## 2019-10-23 NOTE — DISCUSSION/SUMMARY
[Patient] : the patient [Risks] : risks [Benefits] : benefits [Alternatives] : alternatives [___ Week(s)] : [unfilled] week(s) [With ___] : with [unfilled] [FreeTextEntry1] : This is a 83 F with HTN, dyslipidemia, here with abnormal EKG and chest pain.\par 1) Angina equivalent: Dyspnea diastolic heart failure vs. coronary artery disease \par s/p PCI to Cx .aspirin and on xarelto for atrial fibrillation will cardiovert the atrial flutter. \par severe circumflex disease s/p PCI in nov 2018  \par 2)dyspnea  Diastolic heart failure . controlled. . \par 3) severe aortic stenosis. :s/p KINZA. cannot take aspirin due to nasal bleeding.  on anticoagulation for afib. \par 4) hypertension : stop cardizem 120 mg daily.  ct losartan 100 mg daily.   Add amlodipine 10 mg dial.y \par 5) atrial fibrillation/atrial flutter: : rate controlled.  meds.  ct anticoagulation CHADVASC > 3.  On xarelto 15 mg daily.  stop AMiodarone.  TSh elevated. Stop cardiac has heart rate low. need Amldopine for htn \par 7) coronary artery disease : : rca with collaterals. s/p PCI to  LCx.

## 2019-10-23 NOTE — HISTORY OF PRESENT ILLNESS
[FreeTextEntry1] : Referred for hypertension and dyslipidemia; biop aortic valve replacement TAVR \par r\par no chest pain. no dyspnea,. bno headaches. no dizziness. no palpitaitons./ ] \par compalisn of lef thip pain.\par TSh on amiodarone. \par \par \par \par old note: she has pain all over. pain in the legs both legs.  and groi pain. \par every once in a while sharp ache onver chest. difficulty swallowing. \par she is still feels fatigue and weakness and has ongoing atrial flutter or fibrillation. \par \par \par old note:  patient is very short of breathe. \par she took off baby aspirin due to nasal bleeding. She is taking xarelto. \par Multiple nasal bleeding and cauterisation. \par She cannot walk muich. is passing uring in the am. No swelling of feet. \par She feels very short of breathe. \par \par \par old note: No chest pain. no dyspnea. no dizziness.s\par patient was recently admitted to hospital for pneumonia. probably aspiration pneumonia. \par \par \par old note: Feels better. "wonderful" . but  pain in the left leg. \par \par back pain radiating to the left. pain. sharp pain in the calf. releived with tramadol./ no headaches. \par \par \par old note: patient s/p JORGE.  tolerated procedure well. no stroke. no hematoma.  feels 75% better.\par no chest pain. no dizziness. + wheezing. \par old note: patient was seen in the hospital.  has diastolic heart failure. moderate to severe aortic stenosis. \par scheduled for jorge. Was discharged on lasix 20 mg daily. Patient has been complianing of LE edema and dyspnea nad wheezing.\par congestive heart failure.  .progressively worsneing dyspnea and LE edeam and orthopnea for last few days. \par \par old note: This is a 83 year old woman with history of hypertension, dyslipidemia, was referred for abnormal EKG.  Patient complains of chest pain on exertion.  No radiation. Not associated with dyspnea. Denies dizziness. No syncopal episodes. Denies any headache.

## 2019-10-23 NOTE — PHYSICAL EXAM
[General Appearance - Well Developed] : well developed [Normal Appearance] : normal appearance [Well Groomed] : well groomed [General Appearance - Well Nourished] : well nourished [No Deformities] : no deformities [Normal Conjunctiva] : the conjunctiva exhibited no abnormalities [General Appearance - In No Acute Distress] : no acute distress [Eyelids - No Xanthelasma] : the eyelids demonstrated no xanthelasmas [Normal Oral Mucosa] : normal oral mucosa [No Oral Pallor] : no oral pallor [Normal Jugular Venous A Waves Present] : normal jugular venous A waves present [No Oral Cyanosis] : no oral cyanosis [Normal Jugular Venous V Waves Present] : normal jugular venous V waves present [No Jugular Venous Alexander A Waves] : no jugular venous alexander A waves [Respiration, Rhythm And Depth] : normal respiratory rhythm and effort [Exaggerated Use Of Accessory Muscles For Inspiration] : no accessory muscle use [Auscultation Breath Sounds / Voice Sounds] : lungs were clear to auscultation bilaterally [Heart Sounds] : normal S1 and S2 [Abdomen Soft] : soft [Abdomen Tenderness] : non-tender [Abdomen Mass (___ Cm)] : no abdominal mass palpated [Cyanosis, Localized] : no localized cyanosis [Nail Clubbing] : no clubbing of the fingernails [Petechial Hemorrhages (___cm)] : no petechial hemorrhages [Skin Color & Pigmentation] : normal skin color and pigmentation [] : no rash [Skin Lesions] : no skin lesions [No Venous Stasis] : no venous stasis [No Skin Ulcers] : no skin ulcer [No Xanthoma] : no  xanthoma was observed [Oriented To Time, Place, And Person] : oriented to person, place, and time [Affect] : the affect was normal [Mood] : the mood was normal [No Anxiety] : not feeling anxious [FreeTextEntry1] : uses a walker

## 2019-11-13 ENCOUNTER — APPOINTMENT (OUTPATIENT)
Dept: CARDIOLOGY | Facility: CLINIC | Age: 84
End: 2019-11-13
Payer: MEDICARE

## 2019-11-13 PROCEDURE — 93306 TTE W/DOPPLER COMPLETE: CPT

## 2019-11-14 NOTE — DISCHARGE NOTE ADULT - ABILITY TO HEAR (WITH HEARING AID OR HEARING APPLIANCE IF NORMALLY USED):
Patient : Juana Malik Age: 70 Mildly to Moderately Impaired: difficulty hearing in some environments or speaker may need to increase volume or speak distinctly/2 hearing aids   SHELTON CHAN    11/14/19 1734 11/14/19 1735  SHELTON CHAN               [facility]  follow up on work up and plan of care.    MDM  70 year old woman presenting with AMS, hypothermia. ETOH 315. CT head and C spine negative for injury. No significant lab abnormalities. VSS. Warmed with bear hugger with improving mental status, though still somnolent. Signed out to Dr Causey for reassessment when clinically sober.    Critical Care time spent on this patient outside of billable procedures:  None        I have reviewed the information recorded by the scribe for accuracy and agree with its contents.    ____________________________________________________________________    Selena Fairchild acting as a scribe for Dr. Epi Ro  Dictation # [id number]  Scribe:Selena Ro MD  11/14/19 7139

## 2019-11-20 ENCOUNTER — APPOINTMENT (OUTPATIENT)
Dept: CARDIOLOGY | Facility: CLINIC | Age: 84
End: 2019-11-20
Payer: MEDICARE

## 2019-11-20 VITALS
SYSTOLIC BLOOD PRESSURE: 172 MMHG | HEART RATE: 78 BPM | OXYGEN SATURATION: 92 % | WEIGHT: 124 LBS | HEIGHT: 58 IN | BODY MASS INDEX: 26.03 KG/M2 | DIASTOLIC BLOOD PRESSURE: 78 MMHG

## 2019-11-20 VITALS — SYSTOLIC BLOOD PRESSURE: 130 MMHG | DIASTOLIC BLOOD PRESSURE: 70 MMHG

## 2019-11-20 VITALS — SYSTOLIC BLOOD PRESSURE: 158 MMHG | DIASTOLIC BLOOD PRESSURE: 65 MMHG

## 2019-11-20 PROCEDURE — 99214 OFFICE O/P EST MOD 30 MIN: CPT

## 2020-01-06 ENCOUNTER — MEDICATION RENEWAL (OUTPATIENT)
Age: 85
End: 2020-01-06

## 2020-01-14 ENCOUNTER — APPOINTMENT (OUTPATIENT)
Dept: CARDIOLOGY | Facility: CLINIC | Age: 85
End: 2020-01-14
Payer: MEDICARE

## 2020-01-14 VITALS
BODY MASS INDEX: 24.98 KG/M2 | SYSTOLIC BLOOD PRESSURE: 147 MMHG | DIASTOLIC BLOOD PRESSURE: 60 MMHG | WEIGHT: 119 LBS | OXYGEN SATURATION: 94 % | HEART RATE: 58 BPM | HEIGHT: 58 IN

## 2020-01-14 VITALS — DIASTOLIC BLOOD PRESSURE: 64 MMHG | SYSTOLIC BLOOD PRESSURE: 130 MMHG

## 2020-01-14 PROCEDURE — 99214 OFFICE O/P EST MOD 30 MIN: CPT

## 2020-01-14 PROCEDURE — 93000 ELECTROCARDIOGRAM COMPLETE: CPT

## 2020-01-17 LAB
ANION GAP SERPL CALC-SCNC: 13 MMOL/L
BUN SERPL-MCNC: 25 MG/DL
CALCIUM SERPL-MCNC: 9.9 MG/DL
CHLORIDE SERPL-SCNC: 99 MMOL/L
CO2 SERPL-SCNC: 28 MMOL/L
CREAT SERPL-MCNC: 1.15 MG/DL
GLUCOSE SERPL-MCNC: 69 MG/DL
POTASSIUM SERPL-SCNC: 5.1 MMOL/L
SODIUM SERPL-SCNC: 140 MMOL/L

## 2020-01-20 ENCOUNTER — NON-APPOINTMENT (OUTPATIENT)
Age: 85
End: 2020-01-20

## 2020-02-27 ENCOUNTER — RESULT REVIEW (OUTPATIENT)
Age: 85
End: 2020-02-27

## 2020-03-25 ENCOUNTER — APPOINTMENT (OUTPATIENT)
Dept: CARDIOLOGY | Facility: CLINIC | Age: 85
End: 2020-03-25

## 2020-04-16 NOTE — ED ADULT TRIAGE NOTE - MEANS OF ARRIVAL
Quality 47: Advance Care Plan: Advance Care Planning discussed and documented; advance care plan or surrogate decision maker documented in the medical record. wheelchair

## 2020-04-17 ENCOUNTER — NON-APPOINTMENT (OUTPATIENT)
Age: 85
End: 2020-04-17

## 2020-05-02 LAB
ANION GAP SERPL CALC-SCNC: 17 MMOL/L
BUN SERPL-MCNC: 20 MG/DL
CALCIUM SERPL-MCNC: 10 MG/DL
CHLORIDE SERPL-SCNC: 103 MMOL/L
CO2 SERPL-SCNC: 24 MMOL/L
CREAT SERPL-MCNC: 0.89 MG/DL
GLUCOSE SERPL-MCNC: 98 MG/DL
MAGNESIUM SERPL-MCNC: 2 MG/DL
NT-PROBNP SERPL-MCNC: 372 PG/ML
POTASSIUM SERPL-SCNC: 4.8 MMOL/L
SODIUM SERPL-SCNC: 144 MMOL/L

## 2020-05-05 ENCOUNTER — APPOINTMENT (OUTPATIENT)
Dept: CARDIOLOGY | Facility: CLINIC | Age: 85
End: 2020-05-05
Payer: MEDICARE

## 2020-05-05 ENCOUNTER — NON-APPOINTMENT (OUTPATIENT)
Age: 85
End: 2020-05-05

## 2020-05-05 VITALS
HEART RATE: 72 BPM | SYSTOLIC BLOOD PRESSURE: 184 MMHG | WEIGHT: 115 LBS | HEIGHT: 58 IN | OXYGEN SATURATION: 96 % | BODY MASS INDEX: 24.14 KG/M2 | DIASTOLIC BLOOD PRESSURE: 66 MMHG

## 2020-05-05 VITALS — DIASTOLIC BLOOD PRESSURE: 75 MMHG | SYSTOLIC BLOOD PRESSURE: 151 MMHG

## 2020-05-05 DIAGNOSIS — R09.89 OTHER SPECIFIED SYMPTOMS AND SIGNS INVOLVING THE CIRCULATORY AND RESPIRATORY SYSTEMS: ICD-10-CM

## 2020-05-05 PROCEDURE — 99215 OFFICE O/P EST HI 40 MIN: CPT

## 2020-05-05 PROCEDURE — 93000 ELECTROCARDIOGRAM COMPLETE: CPT

## 2020-05-05 RX ORDER — FUROSEMIDE 20 MG/1
20 TABLET ORAL
Qty: 90 | Refills: 1 | Status: DISCONTINUED | COMMUNITY
Start: 2020-01-28 | End: 2020-05-05

## 2020-05-05 RX ORDER — LOSARTAN POTASSIUM 100 MG/1
100 TABLET, FILM COATED ORAL DAILY
Qty: 90 | Refills: 3 | Status: DISCONTINUED | COMMUNITY
Start: 1900-01-01 | End: 2020-05-05

## 2020-05-05 NOTE — CARDIOLOGY SUMMARY
[___] : [unfilled] [LVEF ___%] : LVEF [unfilled]% [Mild] : mild pulmonary hypertension [None] : no mitral regurgitation [Normal] : normal LA size

## 2020-05-05 NOTE — PHYSICAL EXAM
[General Appearance - Well Developed] : well developed [Normal Appearance] : normal appearance [No Deformities] : no deformities [Well Groomed] : well groomed [General Appearance - Well Nourished] : well nourished [General Appearance - In No Acute Distress] : no acute distress [Eyelids - No Xanthelasma] : the eyelids demonstrated no xanthelasmas [Normal Conjunctiva] : the conjunctiva exhibited no abnormalities [Normal Oral Mucosa] : normal oral mucosa [No Oral Pallor] : no oral pallor [No Oral Cyanosis] : no oral cyanosis [Normal Jugular Venous V Waves Present] : normal jugular venous V waves present [Normal Jugular Venous A Waves Present] : normal jugular venous A waves present [Respiration, Rhythm And Depth] : normal respiratory rhythm and effort [No Jugular Venous Alexander A Waves] : no jugular venous alexander A waves [Exaggerated Use Of Accessory Muscles For Inspiration] : no accessory muscle use [Auscultation Breath Sounds / Voice Sounds] : lungs were clear to auscultation bilaterally [Abdomen Soft] : soft [Heart Sounds] : normal S1 and S2 [Abdomen Tenderness] : non-tender [Abdomen Mass (___ Cm)] : no abdominal mass palpated [Nail Clubbing] : no clubbing of the fingernails [Cyanosis, Localized] : no localized cyanosis [Petechial Hemorrhages (___cm)] : no petechial hemorrhages [Skin Color & Pigmentation] : normal skin color and pigmentation [] : no rash [No Venous Stasis] : no venous stasis [Skin Lesions] : no skin lesions [Oriented To Time, Place, And Person] : oriented to person, place, and time [No Skin Ulcers] : no skin ulcer [No Xanthoma] : no  xanthoma was observed [No Anxiety] : not feeling anxious [Affect] : the affect was normal [Mood] : the mood was normal [FreeTextEntry1] : uses a walker

## 2020-05-05 NOTE — HISTORY OF PRESENT ILLNESS
[FreeTextEntry1] : Hypertension and dyslipidemia; biop aortic valve replacement TAVR \par \par HPI for today: :  eats a lot of salty food.  compliatn with meds.  no dizzines.s no headaches. \par \par \par old note:  no chest pain. no dyspnea,. bno headaches. no dizziness. no palpitaitons./ ] \par compalisn of lef thip pain.\par  \par \par old note: she has pain all over. pain in the legs both legs.  and groi pain. \par every once in a while sharp ache onver chest. difficulty swallowing. \par she is still feels fatigue and weakness and has ongoing atrial flutter or fibrillation. \par \par \par old note:  patient is very short of breathe. \par she took off baby aspirin due to nasal bleeding. She is taking xarelto. \par Multiple nasal bleeding and cauterisation. \par She cannot walk muich. is passing uring in the am. No swelling of feet. \par She feels very short of breathe. \par \par \par old note: No chest pain. no dyspnea. no dizziness.s\par patient was recently admitted to hospital for pneumonia. probably aspiration pneumonia. \par \par \par old note: Feels better. "wonderful" . but  pain in the left leg. \par \par back pain radiating to the left. pain. sharp pain in the calf. releived with tramadol./ no headaches. \par \par \par old note: patient s/p JORGE.  tolerated procedure well. no stroke. no hematoma.  feels 75% better.\par no chest pain. no dizziness. + wheezing. \par old note: patient was seen in the hospital.  has diastolic heart failure. moderate to severe aortic stenosis. \par scheduled for jorge. Was discharged on lasix 20 mg daily. Patient has been complianing of LE edema and dyspnea nad wheezing.\par congestive heart failure.  .progressively worsneing dyspnea and LE edeam and orthopnea for last few days. \par \par old note: This is a 83 year old woman with history of hypertension, dyslipidemia, was referred for abnormal EKG.  Patient complains of chest pain on exertion.  No radiation. Not associated with dyspnea. Denies dizziness. No syncopal episodes. Denies any headache.

## 2020-05-05 NOTE — DISCUSSION/SUMMARY
[Patient] : the patient [Risks] : risks [Alternatives] : alternatives [Benefits] : benefits [___ Month(s)] : [unfilled] month(s) [With Me] : with me [FreeTextEntry1] : This is a 83 F with HTN, dyslipidemia, here with abnormal EKG and chest pain.\par 1) Angina equivalent: Dyspnea diastolic heart failure vs. coronary artery disease \par s/p PCI to Cx .aspirin and on xarelto for atrial fibrillation will cardiovert the atrial flutter. \par severe circumflex disease s/p PCI in nov 2018  \par 2)dyspnea  Diastolic heart failure . controlled. . aldactone 25 mg daily. off lasix. \par 3) severe aortic stenosis. :s/p KINZA. cannot take aspirin due to nasal bleeding.  on anticoagulation for afib. \par 4) hypertension   ct losartan 100 mg daily . add HCTz 25 mg daily. .  ct amlodipine 10 mg dial.y . aldactone 25 mg dailty. \par 5) atrial fibrillation/atrial flutter: : rate controlled.  meds.  ct anticoagulation CHADVASC > 3.  On xarelto 15 mg daily.   \par 7) coronary artery disease : : rca with collaterals. s/p PCI to  LCx.

## 2020-05-05 NOTE — REVIEW OF SYSTEMS
[Shortness Of Breath] : shortness of breath [see HPI] : see HPI [Chest  Pressure] : chest pressure [Chest Pain] : chest pain [Negative] : Heme/Lymph [Dyspnea on exertion] : not dyspnea during exertion [Lower Ext Edema] : no extremity edema [Leg Claudication] : no intermittent leg claudication [Palpitations] : no palpitations

## 2020-06-01 ENCOUNTER — RX RENEWAL (OUTPATIENT)
Age: 85
End: 2020-06-01

## 2020-06-24 NOTE — H&P PST ADULT - BACK
Erythromycin Counseling:  I discussed with the patient the risks of erythromycin including but not limited to GI upset, allergic reaction, drug rash, diarrhea, increase in liver enzymes, and yeast infections. Dapsone Counseling: I discussed with the patient the risks of dapsone including but not limited to hemolytic anemia, agranulocytosis, rashes, methemoglobinemia, kidney failure, peripheral neuropathy, headaches, GI upset, and liver toxicity.  Patients who start dapsone require monitoring including baseline LFTs and weekly CBCs for the first month, then every month thereafter.  The patient verbalized understanding of the proper use and possible adverse effects of dapsone.  All of the patient's questions and concerns were addressed. Topical Sulfur Applications Pregnancy And Lactation Text: This medication is Pregnancy Category C and has an unknown safety profile during pregnancy. It is unknown if this topical medication is excreted in breast milk. Bactrim Pregnancy And Lactation Text: This medication is Pregnancy Category D and is known to cause fetal risk.  It is also excreted in breast milk. High Dose Vitamin A Counseling: Side effects reviewed, pt to contact office should one occur. Topical Clindamycin Counseling: Patient counseled that this medication may cause skin irritation or allergic reactions.  In the event of skin irritation, the patient was advised to reduce the amount of the drug applied or use it less frequently.   The patient verbalized understanding of the proper use and possible adverse effects of clindamycin.  All of the patient's questions and concerns were addressed. Topical Retinoid counseling:  Patient advised to apply a pea-sized amount only at bedtime and wait 30 minutes after washing their face before applying.  If too drying, patient may add a non-comedogenic moisturizer. The patient verbalized understanding of the proper use and possible adverse effects of retinoids.  All of the patient's questions and concerns were addressed. Tetracycline Counseling: Patient counseled regarding possible photosensitivity and increased risk for sunburn.  Patient instructed to avoid sunlight, if possible.  When exposed to sunlight, patients should wear protective clothing, sunglasses, and sunscreen.  The patient was instructed to call the office immediately if the following severe adverse effects occur:  hearing changes, easy bruising/bleeding, severe headache, or vision changes.  The patient verbalized understanding of the proper use and possible adverse effects of tetracycline.  All of the patient's questions and concerns were addressed. Patient understands to avoid pregnancy while on therapy due to potential birth defects. Sarecycline Counseling: Patient advised regarding possible photosensitivity and discoloration of the teeth, skin, lips, tongue and gums.  Patient instructed to avoid sunlight, if possible.  When exposed to sunlight, patients should wear protective clothing, sunglasses, and sunscreen.  The patient was instructed to call the office immediately if the following severe adverse effects occur:  hearing changes, easy bruising/bleeding, severe headache, or vision changes.  The patient verbalized understanding of the proper use and possible adverse effects of sarecycline.  All of the patient's questions and concerns were addressed. Dapsone Pregnancy And Lactation Text: This medication is Pregnancy Category C and is not considered safe during pregnancy or breast feeding. Detail Level: Zone Topical Retinoid Pregnancy And Lactation Text: This medication is Pregnancy Category C. It is unknown if this medication is excreted in breast milk. Tetracycline Pregnancy And Lactation Text: This medication is Pregnancy Category D and not consider safe during pregnancy. It is also excreted in breast milk. Azithromycin Counseling:  I discussed with the patient the risks of azithromycin including but not limited to GI upset, allergic reaction, drug rash, diarrhea, and yeast infections. High Dose Vitamin A Pregnancy And Lactation Text: High dose vitamin A therapy is contraindicated during pregnancy and breast feeding. Erythromycin Pregnancy And Lactation Text: This medication is Pregnancy Category B and is considered safe during pregnancy. It is also excreted in breast milk. Minocycline Counseling: Patient advised regarding possible photosensitivity and discoloration of the teeth, skin, lips, tongue and gums.  Patient instructed to avoid sunlight, if possible.  When exposed to sunlight, patients should wear protective clothing, sunglasses, and sunscreen.  The patient was instructed to call the office immediately if the following severe adverse effects occur:  hearing changes, easy bruising/bleeding, severe headache, or vision changes.  The patient verbalized understanding of the proper use and possible adverse effects of minocycline.  All of the patient's questions and concerns were addressed. Isotretinoin Counseling: Patient should get monthly blood tests, not donate blood, not drive at night if vision affected, not share medication, and not undergo elective surgery for 6 months after tx completed. Side effects reviewed, pt to contact office should one occur. Doxycycline Counseling:  Patient counseled regarding possible photosensitivity and increased risk for sunburn.  Patient instructed to avoid sunlight, if possible.  When exposed to sunlight, patients should wear protective clothing, sunglasses, and sunscreen.  The patient was instructed to call the office immediately if the following severe adverse effects occur:  hearing changes, easy bruising/bleeding, severe headache, or vision changes.  The patient verbalized understanding of the proper use and possible adverse effects of doxycycline.  All of the patient's questions and concerns were addressed. Birth Control Pills Counseling: Birth Control Pill Counseling: I discussed with the patient the potential side effects of OCPs including but not limited to increased risk of stroke, heart attack, thrombophlebitis, deep venous thrombosis, hepatic adenomas, breast changes, GI upset, headaches, and depression.  The patient verbalized understanding of the proper use and possible adverse effects of OCPs. All of the patient's questions and concerns were addressed. Use Enhanced Medication Counseling?: No Topical Clindamycin Pregnancy And Lactation Text: This medication is Pregnancy Category B and is considered safe during pregnancy. It is unknown if it is excreted in breast milk. Azithromycin Pregnancy And Lactation Text: This medication is considered safe during pregnancy and is also secreted in breast milk. Topical Sulfur Applications Counseling: Topical Sulfur Counseling: Patient counseled that this medication may cause skin irritation or allergic reactions.  In the event of skin irritation, the patient was advised to reduce the amount of the drug applied or use it less frequently.   The patient verbalized understanding of the proper use and possible adverse effects of topical sulfur application.  All of the patient's questions and concerns were addressed. Tazorac Counseling:  Patient advised that medication is irritating and drying.  Patient may need to apply sparingly and wash off after an hour before eventually leaving it on overnight.  The patient verbalized understanding of the proper use and possible adverse effects of tazorac.  All of the patient's questions and concerns were addressed. Benzoyl Peroxide Counseling: Patient counseled that medicine may cause skin irritation and bleach clothing.  In the event of skin irritation, the patient was advised to reduce the amount of the drug applied or use it less frequently.   The patient verbalized understanding of the proper use and possible adverse effects of benzoyl peroxide.  All of the patient's questions and concerns were addressed. Spironolactone Counseling: Patient advised regarding risks of diarrhea, abdominal pain, hyperkalemia, birth defects (for female patients), liver toxicity and renal toxicity. The patient may need blood work to monitor liver and kidney function and potassium levels while on therapy. The patient verbalized understanding of the proper use and possible adverse effects of spironolactone.  All of the patient's questions and concerns were addressed. Doxycycline Pregnancy And Lactation Text: This medication is Pregnancy Category D and not consider safe during pregnancy. It is also excreted in breast milk but is considered safe for shorter treatment courses. Birth Control Pills Pregnancy And Lactation Text: This medication should be avoided if pregnant and for the first 30 days post-partum. Tazorac Pregnancy And Lactation Text: This medication is not safe during pregnancy. It is unknown if this medication is excreted in breast milk. Benzoyl Peroxide Pregnancy And Lactation Text: This medication is Pregnancy Category C. It is unknown if benzoyl peroxide is excreted in breast milk. Bactrim Counseling:  I discussed with the patient the risks of sulfa antibiotics including but not limited to GI upset, allergic reaction, drug rash, diarrhea, dizziness, photosensitivity, and yeast infections.  Rarely, more serious reactions can occur including but not limited to aplastic anemia, agranulocytosis, methemoglobinemia, blood dyscrasias, liver or kidney failure, lung infiltrates or desquamative/blistering drug rashes. Spironolactone Pregnancy And Lactation Text: This medication can cause feminization of the male fetus and should be avoided during pregnancy. The active metabolite is also found in breast milk. Isotretinoin Pregnancy And Lactation Text: This medication is Pregnancy Category X and is considered extremely dangerous during pregnancy. It is unknown if it is excreted in breast milk. not examined

## 2020-09-16 ENCOUNTER — APPOINTMENT (OUTPATIENT)
Dept: CARDIOLOGY | Facility: CLINIC | Age: 85
End: 2020-09-16
Payer: MEDICARE

## 2020-09-16 ENCOUNTER — NON-APPOINTMENT (OUTPATIENT)
Age: 85
End: 2020-09-16

## 2020-09-16 VITALS
OXYGEN SATURATION: 96 % | BODY MASS INDEX: 24.14 KG/M2 | HEART RATE: 62 BPM | HEIGHT: 58 IN | WEIGHT: 115 LBS | SYSTOLIC BLOOD PRESSURE: 131 MMHG | DIASTOLIC BLOOD PRESSURE: 58 MMHG | TEMPERATURE: 98.3 F

## 2020-09-16 DIAGNOSIS — Z00.00 ENCOUNTER FOR GENERAL ADULT MEDICAL EXAMINATION W/OUT ABNORMAL FINDINGS: ICD-10-CM

## 2020-09-16 PROCEDURE — 99215 OFFICE O/P EST HI 40 MIN: CPT

## 2020-09-16 PROCEDURE — 93000 ELECTROCARDIOGRAM COMPLETE: CPT

## 2020-09-16 RX ORDER — ASPIRIN 81 MG/1
81 TABLET, DELAYED RELEASE ORAL
Refills: 0 | Status: DISCONTINUED | COMMUNITY
Start: 2019-02-20 | End: 2020-09-16

## 2020-09-16 RX ORDER — ACETAMINOPHEN 500 MG
1200-1000 TABLET ORAL
Refills: 0 | Status: DISCONTINUED | COMMUNITY
Start: 2018-05-16 | End: 2020-09-16

## 2020-09-16 NOTE — REVIEW OF SYSTEMS
[see HPI] : see HPI [Shortness Of Breath] : shortness of breath [Chest  Pressure] : chest pressure [Chest Pain] : chest pain [Negative] : Psychiatric [Dyspnea on exertion] : not dyspnea during exertion [Lower Ext Edema] : no extremity edema [Leg Claudication] : no intermittent leg claudication [Palpitations] : no palpitations

## 2020-09-16 NOTE — PHYSICAL EXAM
[General Appearance - Well Developed] : well developed [Normal Appearance] : normal appearance [General Appearance - Well Nourished] : well nourished [Well Groomed] : well groomed [No Deformities] : no deformities [Normal Conjunctiva] : the conjunctiva exhibited no abnormalities [General Appearance - In No Acute Distress] : no acute distress [No Oral Pallor] : no oral pallor [Eyelids - No Xanthelasma] : the eyelids demonstrated no xanthelasmas [Normal Oral Mucosa] : normal oral mucosa [Normal Jugular Venous V Waves Present] : normal jugular venous V waves present [No Oral Cyanosis] : no oral cyanosis [Normal Jugular Venous A Waves Present] : normal jugular venous A waves present [No Jugular Venous Alexander A Waves] : no jugular venous alexander A waves [Auscultation Breath Sounds / Voice Sounds] : lungs were clear to auscultation bilaterally [Respiration, Rhythm And Depth] : normal respiratory rhythm and effort [Exaggerated Use Of Accessory Muscles For Inspiration] : no accessory muscle use [Abdomen Soft] : soft [Heart Sounds] : normal S1 and S2 [Abdomen Tenderness] : non-tender [Abdomen Mass (___ Cm)] : no abdominal mass palpated [Nail Clubbing] : no clubbing of the fingernails [Petechial Hemorrhages (___cm)] : no petechial hemorrhages [Cyanosis, Localized] : no localized cyanosis [Skin Color & Pigmentation] : normal skin color and pigmentation [] : no rash [No Venous Stasis] : no venous stasis [Skin Lesions] : no skin lesions [No Skin Ulcers] : no skin ulcer [No Xanthoma] : no  xanthoma was observed [Oriented To Time, Place, And Person] : oriented to person, place, and time [Mood] : the mood was normal [Affect] : the affect was normal [No Anxiety] : not feeling anxious [FreeTextEntry1] : uses a walker

## 2020-09-16 NOTE — DISCUSSION/SUMMARY
[Risks] : risks [Patient] : the patient [Benefits] : benefits [Alternatives] : alternatives [With Me] : with me [___ Month(s)] : [unfilled] month(s) [FreeTextEntry1] : This is a 83 F with HTN, dyslipidemia, here with abnormal EKG and chest pain.\par 1) Angina equivalent: resolved.  Dyspnea diastolic heart failure vs. coronary artery disease \par s/p PCI to Cx .   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 controlled. . aldactone 25 mg daily.  BMP normal will get bloodwork from her PMD. \par 3) severe aortic stenosis. :s/p KINZA. cannot take aspirin due to nasal bleeding.  on anticoagulation for afib. \par 4) hypertension   ct losartan 100 mg daily .  ct HCTz 25 mg daily. .  ct amlodipine 10 mg dial.y . aldactone 25 mg dailty. \par 5) atrial fibrillation/atrial flutter: : rate controlled.  meds.  ct anticoagulation CHADVASC > 3.  On xarelto 15 mg and 20 mg every alternate days. \par 7) coronary artery disease : : rca with collaterals. s/p PCI to  LCx.

## 2020-11-16 ENCOUNTER — APPOINTMENT (OUTPATIENT)
Dept: CARDIOLOGY | Facility: CLINIC | Age: 85
End: 2020-11-16
Payer: MEDICARE

## 2020-11-16 PROCEDURE — 93306 TTE W/DOPPLER COMPLETE: CPT

## 2020-11-18 ENCOUNTER — NON-APPOINTMENT (OUTPATIENT)
Age: 85
End: 2020-11-18

## 2020-12-21 ENCOUNTER — RX RENEWAL (OUTPATIENT)
Age: 85
End: 2020-12-21

## 2020-12-26 ENCOUNTER — RX RENEWAL (OUTPATIENT)
Age: 85
End: 2020-12-26

## 2021-01-19 ENCOUNTER — NON-APPOINTMENT (OUTPATIENT)
Age: 86
End: 2021-01-19

## 2021-01-20 ENCOUNTER — APPOINTMENT (OUTPATIENT)
Dept: CARDIOLOGY | Facility: CLINIC | Age: 86
End: 2021-01-20
Payer: MEDICARE

## 2021-01-20 ENCOUNTER — NON-APPOINTMENT (OUTPATIENT)
Age: 86
End: 2021-01-20

## 2021-01-20 VITALS
TEMPERATURE: 98.5 F | DIASTOLIC BLOOD PRESSURE: 54 MMHG | HEART RATE: 75 BPM | OXYGEN SATURATION: 96 % | WEIGHT: 110 LBS | SYSTOLIC BLOOD PRESSURE: 100 MMHG | HEIGHT: 58 IN | BODY MASS INDEX: 23.09 KG/M2

## 2021-01-20 DIAGNOSIS — K21.9 GASTRO-ESOPHAGEAL REFLUX DISEASE W/OUT ESOPHAGITIS: ICD-10-CM

## 2021-01-20 PROCEDURE — 93000 ELECTROCARDIOGRAM COMPLETE: CPT

## 2021-01-20 PROCEDURE — 99214 OFFICE O/P EST MOD 30 MIN: CPT

## 2021-01-20 RX ORDER — ADHESIVE TAPE 1.5"X360"
TAPE, NON-MEDICATED TOPICAL
Refills: 0 | Status: DISCONTINUED | COMMUNITY
Start: 2018-12-05 | End: 2021-01-20

## 2021-01-20 RX ORDER — SPIRONOLACTONE 25 MG/1
25 TABLET ORAL DAILY
Qty: 90 | Refills: 0 | Status: DISCONTINUED | COMMUNITY
Start: 2018-12-05 | End: 2021-01-20

## 2021-01-20 RX ORDER — DOCUSATE SODIUM 100 MG/1
100 CAPSULE ORAL
Refills: 0 | Status: DISCONTINUED | COMMUNITY
Start: 2018-12-05 | End: 2021-01-20

## 2021-01-20 NOTE — PHYSICAL EXAM
[General Appearance - Well Developed] : well developed [General Appearance - Well Nourished] : well nourished [] : no respiratory distress [Respiration, Rhythm And Depth] : normal respiratory rhythm and effort [Auscultation Breath Sounds / Voice Sounds] : lungs were clear to auscultation bilaterally [Heart Rate And Rhythm] : heart rate and rhythm were normal [Heart Sounds] : normal S1 and S2 [Murmurs] : no murmurs present [Arterial Pulses Normal] : the arterial pulses were normal [Edema] : no peripheral edema present [Abnormal Walk] : normal gait [Oriented To Time, Place, And Person] : oriented to person, place, and time

## 2021-01-21 NOTE — REVIEW OF SYSTEMS
[Chest Pain] : chest pain [Headache] : no headache [Feeling Fatigued] : not feeling fatigued [Shortness Of Breath] : no shortness of breath [Dyspnea on exertion] : not dyspnea during exertion [Lower Ext Edema] : no extremity edema [Chest  Pressure] : no chest pressure [Palpitations] : no palpitations [Dizziness] : no dizziness [Easy Bleeding] : no tendency for easy bleeding [Easy Bruising] : no tendency for easy bruising

## 2021-01-21 NOTE — HISTORY OF PRESENT ILLNESS
[FreeTextEntry1] : Ms. Garnica is an 88 y/f with history of HTN, dyslipidemia, TAVR, CAD s/p PCI circ 2018 with Mid % occlusion with collaterals, and mid LAD 40% stenosis, AI , diastolic HF , PAF on AC, who presents today c/o chest pain.\par \par She reports that over the weekend she ate breakfast then skipped lunch and had a large diiner- hamburger, sauteed vegetables, garlic bread. Then 30 minutes after meal she began having chest pain, aching pain radiating into left breast and axillary area. Was fearful and called her son to come over, she then took baking soda and water and pain resolved within 30 minutes. Does recall that she felt pain similar to this prior to PCI in 2018. She also notes that on occasion with ambulating she feels sharp left sided pinching pain which typically lasts a few seconds and resolves with rest. Denies shortness of breath, lightheadedness, dizziness.

## 2021-01-21 NOTE — DISCUSSION/SUMMARY
[FreeTextEntry1] : Ms. Garnica is an 88 y/f with history of HTN, dyslipidemia, TAVR, CAD s/p PCI circ 2018 with Mid % occlusion with collaterals, and mid LAD 40% stenosis, AI , diastolic HF , PAF on AC, who presents today c/o chest pain.\par \par She reports that over the weekend she ate breakfast then skipped lunch and had a large diiner- hamburger, sauteed vegetables, garlic bread. Then 30 minutes after meal she began having chest pain, aching pain radiating into left breast and axillary area. Was fearful and called her son to come over, she then took baking soda and water and pain resolved within 30 minutes. Does recall that she felt pain similar to this prior to PCI in 2018. She also notes that on occasion with ambulating she feels sharp left sided pinching pain which typically lasts a few seconds and resolves with rest. Denies shortness of breath, lightheadedness, dizziness. \par \par Recommendation: \par \par 1. Chest pain with history of CAD s/p PCI Cx 2018- to obtain nuclear stress test. Begin Ranexa 500mg BID \par 2. Possible GERD- Start protonix 20mg daily \par 3. PAF- Continue xarelto for stroke prophylaxis, tolerating without complaints of easy bruising, bleeding or falls\par 4. Severe AS, s/p TAVR \par 5. HTN- continue losartan, hctz, amlodipine, aldactone \par 6. Diastolic HF- Denies CURRY, euvolemic on exam \par \par Counseled on red flag symptoms to report or if persistent then to proceed to ER. \par Follow up with Dr. Rubio 2-3 weeks after testing complete.\par \par Janel Castro ANP-C \par

## 2021-02-09 ENCOUNTER — APPOINTMENT (OUTPATIENT)
Dept: CARDIOLOGY | Facility: CLINIC | Age: 86
End: 2021-02-09

## 2021-03-09 ENCOUNTER — APPOINTMENT (OUTPATIENT)
Dept: CARDIOLOGY | Facility: CLINIC | Age: 86
End: 2021-03-09
Payer: MEDICARE

## 2021-03-09 VITALS
WEIGHT: 112 LBS | HEART RATE: 73 BPM | RESPIRATION RATE: 16 BRPM | SYSTOLIC BLOOD PRESSURE: 140 MMHG | BODY MASS INDEX: 23.51 KG/M2 | DIASTOLIC BLOOD PRESSURE: 62 MMHG | OXYGEN SATURATION: 95 % | HEIGHT: 58 IN | TEMPERATURE: 98 F

## 2021-03-09 VITALS — DIASTOLIC BLOOD PRESSURE: 60 MMHG | SYSTOLIC BLOOD PRESSURE: 120 MMHG

## 2021-03-09 DIAGNOSIS — I82.492 ACUTE EMBOLISM AND THROMBOSIS OF OTHER SPECIFIED DEEP VEIN OF LEFT LOWER EXTREMITY: ICD-10-CM

## 2021-03-09 PROCEDURE — 93000 ELECTROCARDIOGRAM COMPLETE: CPT

## 2021-03-09 PROCEDURE — 99215 OFFICE O/P EST HI 40 MIN: CPT

## 2021-03-09 NOTE — END OF VISIT
[Time Spent: ___ minutes] : I have spent [unfilled] minutes of time on the encounter. [>50% of Time Spent on Counseling and Coordination of Care for  ___] : Greater than 50% of the encounter time was spent on counseling and coordination of care for [unfilled]

## 2021-03-23 NOTE — H&P PST ADULT - VENOUS THROMBOEMBOLISM BMI
----- Message from Malu Mclaughlin RN sent at 3/23/2021 12:43 PM CDT -----    ----- Message -----  From: Shi Oscar MD  Sent: 3/23/2021  12:41 PM CDT  To: SABAS Oscar  Nurse Msg Pool    Dr Oscar has reviewed labs and Will d/w pt at upcoming appt       (0) indicator not present

## 2021-03-31 ENCOUNTER — RX RENEWAL (OUTPATIENT)
Age: 86
End: 2021-03-31

## 2021-04-15 NOTE — PATIENT PROFILE ADULT. - COMFORT LEVEL, ACCEPTABLE
Device: Port  Central Line Site: Right   Central Line Site Appearance: clear  Implanted port accessed using a 20 gauge non-coring needle primed with 0.9% sodium chloride. Good blood return obtained.  Blood obtained and sent to lab.  Site Care: Aseptic site care per protocol  Comments: patient tolerated procedure well  Central line flushed with: 20 ml 0.9 normal saline  Central line removed: no  Earl Needle: Earl needle left in place    
2

## 2021-04-21 NOTE — HISTORY OF PRESENT ILLNESS
[FreeTextEntry1] : Hypertension and dyslipidemia; biop aortic valve replacement TAVR \par \par HPI for today: : she had episode of chest pain and dyspnea on exertion \par she wwas started on rtanexa and given nuclear stress test and PPi.  \par she feels better now. she does not wwant to do the stress test.\par she is awaiting the vaccine.\par \par \par \par old noteL: : feels good. no chest pain. no ehadahces. no LE edema. no headahces. no dizziness\par \par \par \par old noteL:   eats a lot of salty food.  compliatn with meds.  no dizzines.s no headaches. \par \par \par old note:  no chest pain. no dyspnea,. bno headaches. no dizziness. no palpitaitons./ ] \par compalisn of lef thip pain.\par  \par \par old note: she has pain all over. pain in the legs both legs.  and groi pain. \par every once in a while sharp ache onver chest. difficulty swallowing. \par she is still feels fatigue and weakness and has ongoing atrial flutter or fibrillation. \par \par \par old note:  patient is very short of breathe. \par she took off baby aspirin due to nasal bleeding. She is taking xarelto. \par Multiple nasal bleeding and cauterisation. \par She cannot walk muich. is passing uring in the am. No swelling of feet. \par She feels very short of breathe. \par \par \par old note: No chest pain. no dyspnea. no dizziness.s\par patient was recently admitted to hospital for pneumonia. probably aspiration pneumonia. \par \par \par old note: Feels better. "wonderful" . but  pain in the left leg. \par \par back pain radiating to the left. pain. sharp pain in the calf. releived with tramadol./ no headaches. \par \par \par old note: patient s/p JORGE.  tolerated procedure well. no stroke. no hematoma.  feels 75% better.\par no chest pain. no dizziness. + wheezing. \par old note: patient was seen in the hospital.  has diastolic heart failure. moderate to severe aortic stenosis. \par scheduled for jorge. Was discharged on lasix 20 mg daily. Patient has been complianing of LE edema and dyspnea nad wheezing.\par congestive heart failure.  .progressively worsneing dyspnea and LE edeam and orthopnea for last few days. \par \par old note: This is a 83 year old woman with history of hypertension, dyslipidemia, was referred for abnormal EKG.  Patient complains of chest pain on exertion.  No radiation. Not associated with dyspnea. Denies dizziness. No syncopal episodes. Denies any headache.

## 2021-04-21 NOTE — ADDENDUM
[FreeTextEntry1] : patient can come of blood thinners 5-7 days prior to the epidural injection\par resume anticoagulation after injection\par

## 2021-04-21 NOTE — DISCUSSION/SUMMARY
[Patient] : the patient [Risks] : risks [Benefits] : benefits [Alternatives] : alternatives [___ Month(s)] : [unfilled] month(s) [With Me] : with me [FreeTextEntry1] : This is a 83 F with HTN, dyslipidemia, here with abnormal EKG and chest pain.\par 1) Angina equivalent:  Dyspnea diastolic heart failure vs. coronary artery disease \par s/p PCI to Cx .   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 \par ranexa . \par 2) GERD: PPI. \par 3)dyspnea  Diastolic heart failure . controlled. . aldactone 25 mg daily.  BMP normal will get bloodwork from her PMD. \par 4) severe aortic stenosis. :s/p IKNZA. cannot take aspirin due to nasal bleeding.  on anticoagulation for afib. \par 5) hypertension   ct losartan 100 mg daily .  ct HCTz 25 mg daily. .  ct amlodipine 10 mg dial.y . aldactone 25 mg dailty. \par 5) atrial fibrillation/atrial flutter: : rate controlled.  meds.  ct anticoagulation CHADVASC > 3.  On xarelto 15 mg and 20 mg every alternate days. \par 7) coronary artery disease : : rca with collaterals. s/p PCI to  LCx.

## 2021-04-23 ENCOUNTER — RX RENEWAL (OUTPATIENT)
Age: 86
End: 2021-04-23

## 2021-05-05 ENCOUNTER — NON-APPOINTMENT (OUTPATIENT)
Age: 86
End: 2021-05-05

## 2021-05-07 ENCOUNTER — NON-APPOINTMENT (OUTPATIENT)
Age: 86
End: 2021-05-07

## 2021-05-24 NOTE — H&P PST ADULT - NSANTHGENDERRD_ENT_A_CORE
From: Isha Durant  To: JASSI Brooks  Sent: 5/24/2021 8:06 AM CDT  Subject: Lab Test or Test Related Question    Good morning, I tried calling but they must be busy. Im supposed to have my covid test tomorrow for my procedure on Friday. Its supposed to be in [place] but I was wondering if I can make a nurse visit with you guys tomorrow and just get it here instead of driving all that way just for a covid test?     Thank you.   No

## 2021-07-07 ENCOUNTER — NON-APPOINTMENT (OUTPATIENT)
Age: 86
End: 2021-07-07

## 2021-07-09 ENCOUNTER — RX RENEWAL (OUTPATIENT)
Age: 86
End: 2021-07-09

## 2021-07-21 ENCOUNTER — NON-APPOINTMENT (OUTPATIENT)
Age: 86
End: 2021-07-21

## 2021-07-21 ENCOUNTER — APPOINTMENT (OUTPATIENT)
Dept: CARDIOLOGY | Facility: CLINIC | Age: 86
End: 2021-07-21
Payer: MEDICARE

## 2021-07-21 VITALS
HEART RATE: 58 BPM | WEIGHT: 112 LBS | OXYGEN SATURATION: 96 % | HEIGHT: 58 IN | DIASTOLIC BLOOD PRESSURE: 57 MMHG | TEMPERATURE: 97.8 F | SYSTOLIC BLOOD PRESSURE: 130 MMHG | BODY MASS INDEX: 23.51 KG/M2

## 2021-07-21 PROCEDURE — 99215 OFFICE O/P EST HI 40 MIN: CPT

## 2021-07-21 PROCEDURE — 93000 ELECTROCARDIOGRAM COMPLETE: CPT

## 2021-07-22 NOTE — DISCUSSION/SUMMARY
[Patient] : the patient [Risks] : risks [Benefits] : benefits [Alternatives] : alternatives [With Me] : with me [___ Month(s)] : in [unfilled] month(s) [FreeTextEntry1] : This is a 83 F with HTN, dyslipidemia, here with abnormal EKG and chest pain.\par 1) Angina equivalent:  Dyspnea diastolic heart failure vs. coronary artery disease \par s/p PCI to Cx .   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 \par ranexa . \par 2) GERD: PPI. \par 3)dyspnea  Diastolic heart failure . controlled. . aldactone 25 mg daily.  BMP normal will get bloodwork from her PMD. \par 4) severe aortic stenosis. :s/p KINZA. cannot take aspirin due to nasal bleeding.  on anticoagulation for afib. \par 5) hypertension   ct losartan 100 mg daily .  ct HCTz 25 mg daily. .  ct amlodipine 10 mg dial.y . aldactone 25 mg dailty. \par 5) atrial fibrillation/atrial flutter: : rate controlled.  meds.  ct anticoagulation CHADVASC > 3.  On xarelto 15 mg and 20 mg every alternate days. \par 7) coronary artery disease : : rca with collaterals. s/p PCI to  LCx. \par az

## 2021-07-22 NOTE — CARDIOLOGY SUMMARY
[___] : [unfilled] [LVEF ___%] : LVEF [unfilled]% [Mild] : mild pulmonary hypertension [Normal] : normal LA size [None] : no mitral regurgitation [de-identified] : 7 21 2021 Sinus  Rhythm  -First degree A-V block \par Javier = 260\par -Left axis -anterior fascicular block. \par  -Anteroseptal infarct -age undetermined. \par \par ABNORMAL

## 2021-07-22 NOTE — HISTORY OF PRESENT ILLNESS
[FreeTextEntry1] : Hypertension and dyslipidemia; biop aortic valve replacement TAVR \par \par HPI for today: : she feels good. she does get out of breath sometimes. she lost 20 ln 2 years.  \par she can walk in the house,. \par she eats well. she does shoping.  she still drives. \par \par \par old note: : : she had episode of chest pain and dyspnea on exertion \par she wwas started on rtanexa and given nuclear stress test and PPi.  \par she feels better now. she does not wwant to do the stress test.\par she is awaiting the vaccine.\par \par \par \par old noteL: : feels good. no chest pain. no ehadahces. no LE edema. no headahces. no dizziness\par \par \par \par old noteL:   eats a lot of salty food.  compliatn with meds.  no dizzines.s no headaches. \par \par \par old note:  no chest pain. no dyspnea,. bno headaches. no dizziness. no palpitaitons./ ] \par compalisn of lef thip pain.\par  \par \par old note: she has pain all over. pain in the legs both legs.  and groi pain. \par every once in a while sharp ache onver chest. difficulty swallowing. \par she is still feels fatigue and weakness and has ongoing atrial flutter or fibrillation. \par \par \par old note:  patient is very short of breathe. \par she took off baby aspirin due to nasal bleeding. She is taking xarelto. \par Multiple nasal bleeding and cauterisation. \par She cannot walk muich. is passing uring in the am. No swelling of feet. \par She feels very short of breathe. \par \par \par old note: No chest pain. no dyspnea. no dizziness.s\par patient was recently admitted to hospital for pneumonia. probably aspiration pneumonia. \par \par \par old note: Feels better. "wonderful" . but  pain in the left leg. \par \par back pain radiating to the left. pain. sharp pain in the calf. releived with tramadol./ no headaches. \par \par \par old note: patient s/p JORGE.  tolerated procedure well. no stroke. no hematoma.  feels 75% better.\par no chest pain. no dizziness. + wheezing. \par old note: patient was seen in the hospital.  has diastolic heart failure. moderate to severe aortic stenosis. \par scheduled for jorge. Was discharged on lasix 20 mg daily. Patient has been complianing of LE edema and dyspnea nad wheezing.\par congestive heart failure.  .progressively worsneing dyspnea and LE edeam and orthopnea for last few days. \par \par old note: This is a 83 year old woman with history of hypertension, dyslipidemia, was referred for abnormal EKG.  Patient complains of chest pain on exertion.  No radiation. Not associated with dyspnea. Denies dizziness. No syncopal episodes. Denies any headache.

## 2021-11-15 NOTE — PHYSICAL THERAPY INITIAL EVALUATION ADULT - PREDICTED DURATION OF THERAPY (DAYS/WKS), PT EVAL
TAXOL/CARBO MEDICATION ORDERS FOR CHEMOTHERAPY      1. Zofran 8 mg (short acting nausea medication) every hours as needed for nausea or vomiting. 2.  Dexamethasone (steriod) take 8 mg the night prior to the First chemotherapy only one time, then take 2 mg for 2 days after each chemotherapy.     OTC:  Loperamide (Imodium)  Docusate (Colace)  Loratadine (Claritin)
5 days

## 2021-12-15 ENCOUNTER — APPOINTMENT (OUTPATIENT)
Dept: CARDIOLOGY | Facility: CLINIC | Age: 86
End: 2021-12-15
Payer: MEDICARE

## 2021-12-15 ENCOUNTER — NON-APPOINTMENT (OUTPATIENT)
Age: 86
End: 2021-12-15

## 2021-12-15 VITALS
DIASTOLIC BLOOD PRESSURE: 50 MMHG | HEIGHT: 58 IN | WEIGHT: 116 LBS | SYSTOLIC BLOOD PRESSURE: 134 MMHG | TEMPERATURE: 97.2 F | HEART RATE: 58 BPM | BODY MASS INDEX: 24.35 KG/M2 | OXYGEN SATURATION: 98 %

## 2021-12-15 DIAGNOSIS — R60.0 LOCALIZED EDEMA: ICD-10-CM

## 2021-12-15 DIAGNOSIS — I20.9 ANGINA PECTORIS, UNSPECIFIED: ICD-10-CM

## 2021-12-15 PROCEDURE — 99215 OFFICE O/P EST HI 40 MIN: CPT

## 2021-12-15 PROCEDURE — 93000 ELECTROCARDIOGRAM COMPLETE: CPT

## 2021-12-15 NOTE — REASON FOR VISIT
[FreeTextEntry1] : regular check up; HTN, dyslipidemia [Initial Evaluation] : an initial evaluation of [FreeTextEntry2] : regular check up; HTN, dyslipidemia

## 2021-12-15 NOTE — CARDIOLOGY SUMMARY
[de-identified] : 12 15 2021  Sinus  Bradycardia  -First degree A-V block \par Javier = 256\par -Left axis -anterior fascicular block. \par  -Anteroseptal infarct -age undetermined. \par \par ABNORMAL \par 7 21 2021 Sinus  Rhythm  -First degree A-V block \par Javier = 260\par -Left axis -anterior fascicular block. \par  -Anteroseptal infarct -age undetermined. \par \par ABNORMAL  [___] : [unfilled] [LVEF ___%] : LVEF [unfilled]% [Mild] : mild pulmonary hypertension [Normal] : normal LA size [None] : no mitral regurgitation [___] : [unfilled]

## 2021-12-15 NOTE — DISCUSSION/SUMMARY
[Patient] : the patient [Risks] : risks [Benefits] : benefits [Alternatives] : alternatives [With Me] : with me [___ Month(s)] : in [unfilled] month(s) [FreeTextEntry1] : This is a 83 F with HTN, dyslipidemia, here with abnormal EKG and chest pain.\par \par 1) Angina equivalent:  Dyspnea diastolic heart failure vs. coronary artery disease \par s/p PCI to Cx .   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 \par 2) GERD: PPI. \par 3)dyspnea  Diastolic heart failure . controlled. . aldactone 25 mg daily.  \par 4) severe aortic stenosis. :s/p KINZA. cannot take aspirin due to nasal bleeding.  on anticoagulation for afib. \par 5) hypertension   ct losartan 100 mg daily .  ct HCTz 25 mg daily. .  ct amlodipine 10 mg dial.y . aldactone 25 mg dailty. \par 5) atrial fibrillation/atrial flutter: : rate controlled.  meds.  ct anticoagulation CHADVASC > 3.  On xarelto 15 mg and 20 mg every alternate days. \par 7) coronary artery disease : : rca with collaterals. s/p PCI to  LCx. \par Will order and review ECG for the above mentioned diagnosis/condition/symptoms

## 2021-12-15 NOTE — PHYSICAL EXAM
[General Appearance - Well Developed] : well developed [Normal Appearance] : normal appearance [Well Groomed] : well groomed [General Appearance - Well Nourished] : well nourished [No Deformities] : no deformities [General Appearance - In No Acute Distress] : no acute distress [Normal Conjunctiva] : the conjunctiva exhibited no abnormalities [Eyelids - No Xanthelasma] : the eyelids demonstrated no xanthelasmas [Normal Oral Mucosa] : normal oral mucosa [No Oral Pallor] : no oral pallor [No Oral Cyanosis] : no oral cyanosis [Normal Jugular Venous A Waves Present] : normal jugular venous A waves present [Normal Jugular Venous V Waves Present] : normal jugular venous V waves present [No Jugular Venous Alexander A Waves] : no jugular venous alexander A waves [Respiration, Rhythm And Depth] : normal respiratory rhythm and effort [Exaggerated Use Of Accessory Muscles For Inspiration] : no accessory muscle use [Auscultation Breath Sounds / Voice Sounds] : lungs were clear to auscultation bilaterally [Heart Sounds] : normal S1 and S2 [Abdomen Soft] : soft [Abdomen Tenderness] : non-tender [Abdomen Mass (___ Cm)] : no abdominal mass palpated [FreeTextEntry1] : uses a walker  [Nail Clubbing] : no clubbing of the fingernails [Cyanosis, Localized] : no localized cyanosis [Petechial Hemorrhages (___cm)] : no petechial hemorrhages [Skin Color & Pigmentation] : normal skin color and pigmentation [] : no rash [No Venous Stasis] : no venous stasis [Skin Lesions] : no skin lesions [No Skin Ulcers] : no skin ulcer [No Xanthoma] : no  xanthoma was observed [Oriented To Time, Place, And Person] : oriented to person, place, and time [Affect] : the affect was normal [Mood] : the mood was normal [No Anxiety] : not feeling anxious

## 2021-12-15 NOTE — HISTORY OF PRESENT ILLNESS
[FreeTextEntry1] : Hypertension and dyslipidemia; biop aortic valve replacement TAVR \par \par HPI for today: :  she gets tired when she uses walker.  if she cannot use the walker.  she gets tired.\par no syncope.   no paliptations. no dizziness. \par \par \par \par old note: she feels good. she does get out of breath sometimes. she lost 20 ln 2 years.  \par she can walk in the house,. \par she eats well. she does shoping.  she still drives. \par \par \par old note: : : she had episode of chest pain and dyspnea on exertion \par she wwas started on rtanexa and given nuclear stress test and PPi.  \par she feels better now. she does not wwant to do the stress test.\par she is awaiting the vaccine.\par \par \par \par old noteL: : feels good. no chest pain. no ehadahces. no LE edema. no headahces. no dizziness\par \par \par \par old noteL:   eats a lot of salty food.  compliatn with meds.  no dizzines.s no headaches. \par \par \par old note:  no chest pain. no dyspnea,. bno headaches. no dizziness. no palpitaitons./ ] \par compalisn of lef thip pain.\par  \par \par old note: she has pain all over. pain in the legs both legs.  and groi pain. \par every once in a while sharp ache onver chest. difficulty swallowing. \par she is still feels fatigue and weakness and has ongoing atrial flutter or fibrillation. \par \par \par old note:  patient is very short of breathe. \par she took off baby aspirin due to nasal bleeding. She is taking xarelto. \par Multiple nasal bleeding and cauterisation. \par She cannot walk muich. is passing uring in the am. No swelling of feet. \par She feels very short of breathe. \par \par \par old note: No chest pain. no dyspnea. no dizziness.s\par patient was recently admitted to hospital for pneumonia. probably aspiration pneumonia. \par \par \par old note: Feels better. "wonderful" . but  pain in the left leg. \par \par back pain radiating to the left. pain. sharp pain in the calf. releived with tramadol./ no headaches. \par \par \par old note: patient s/p JORGE.  tolerated procedure well. no stroke. no hematoma.  feels 75% better.\par no chest pain. no dizziness. + wheezing. \par old note: patient was seen in the hospital.  has diastolic heart failure. moderate to severe aortic stenosis. \par scheduled for jorge. Was discharged on lasix 20 mg daily. Patient has been complianing of LE edema and dyspnea nad wheezing.\par congestive heart failure.  .progressively worsneing dyspnea and LE edeam and orthopnea for last few days. \par \par old note: This is a 83 year old woman with history of hypertension, dyslipidemia, was referred for abnormal EKG.  Patient complains of chest pain on exertion.  No radiation. Not associated with dyspnea. Denies dizziness. No syncopal episodes. Denies any headache.

## 2022-01-03 ENCOUNTER — RX RENEWAL (OUTPATIENT)
Age: 87
End: 2022-01-03

## 2022-01-05 ENCOUNTER — INPATIENT (INPATIENT)
Facility: HOSPITAL | Age: 87
LOS: 8 days | Discharge: HOME | DRG: 177 | End: 2022-01-14
Attending: INTERNAL MEDICINE | Admitting: HOSPITALIST
Payer: MEDICARE

## 2022-01-05 VITALS — RESPIRATION RATE: 24 BRPM | HEART RATE: 92 BPM | OXYGEN SATURATION: 82 %

## 2022-01-05 DIAGNOSIS — Z95.3 PRESENCE OF XENOGENIC HEART VALVE: Chronic | ICD-10-CM

## 2022-01-05 DIAGNOSIS — Z98.1 ARTHRODESIS STATUS: Chronic | ICD-10-CM

## 2022-01-05 LAB
ALBUMIN SERPL ELPH-MCNC: 3.7 G/DL — SIGNIFICANT CHANGE UP (ref 3.3–5.2)
ALP SERPL-CCNC: 246 U/L — HIGH (ref 40–120)
ALT FLD-CCNC: 17 U/L — SIGNIFICANT CHANGE UP
ANION GAP SERPL CALC-SCNC: 19 MMOL/L — HIGH (ref 5–17)
APTT BLD: 36.2 SEC — HIGH (ref 27.5–35.5)
AST SERPL-CCNC: 35 U/L — HIGH
BASE EXCESS BLDV CALC-SCNC: -2.4 MMOL/L — LOW (ref -2–3)
BASOPHILS # BLD AUTO: 0.01 K/UL — SIGNIFICANT CHANGE UP (ref 0–0.2)
BASOPHILS NFR BLD AUTO: 0.1 % — SIGNIFICANT CHANGE UP (ref 0–2)
BILIRUB SERPL-MCNC: 0.9 MG/DL — SIGNIFICANT CHANGE UP (ref 0.4–2)
BLD GP AB SCN SERPL QL: SIGNIFICANT CHANGE UP
BUN SERPL-MCNC: 30.4 MG/DL — HIGH (ref 8–20)
CA-I SERPL-SCNC: 1.03 MMOL/L — LOW (ref 1.15–1.33)
CALCIUM SERPL-MCNC: 9 MG/DL — SIGNIFICANT CHANGE UP (ref 8.6–10.2)
CHLORIDE BLDV-SCNC: 94 MMOL/L — LOW (ref 98–107)
CHLORIDE SERPL-SCNC: 91 MMOL/L — LOW (ref 98–107)
CO2 SERPL-SCNC: 20 MMOL/L — LOW (ref 22–29)
CREAT SERPL-MCNC: 2.09 MG/DL — HIGH (ref 0.5–1.3)
EOSINOPHIL # BLD AUTO: 0 K/UL — SIGNIFICANT CHANGE UP (ref 0–0.5)
EOSINOPHIL NFR BLD AUTO: 0 % — SIGNIFICANT CHANGE UP (ref 0–6)
FLUAV AG NPH QL: SIGNIFICANT CHANGE UP
FLUBV AG NPH QL: SIGNIFICANT CHANGE UP
GAS PNL BLDV: 127 MMOL/L — LOW (ref 136–145)
GAS PNL BLDV: SIGNIFICANT CHANGE UP
GAS PNL BLDV: SIGNIFICANT CHANGE UP
GLUCOSE BLDV-MCNC: 140 MG/DL — HIGH (ref 70–99)
GLUCOSE SERPL-MCNC: 139 MG/DL — HIGH (ref 70–99)
HCO3 BLDV-SCNC: 23 MMOL/L — SIGNIFICANT CHANGE UP (ref 22–29)
HCT VFR BLD CALC: 38.6 % — SIGNIFICANT CHANGE UP (ref 34.5–45)
HCT VFR BLDA CALC: 39 % — SIGNIFICANT CHANGE UP (ref 34–46)
HGB BLD CALC-MCNC: 13.1 G/DL — SIGNIFICANT CHANGE UP (ref 11.7–16.1)
HGB BLD-MCNC: 12.8 G/DL — SIGNIFICANT CHANGE UP (ref 11.5–15.5)
IMM GRANULOCYTES NFR BLD AUTO: 0.4 % — SIGNIFICANT CHANGE UP (ref 0–1.5)
INR BLD: 2.12 RATIO — HIGH (ref 0.88–1.16)
LACTATE BLDV-MCNC: 1.6 MMOL/L — SIGNIFICANT CHANGE UP (ref 0.5–2)
LYMPHOCYTES # BLD AUTO: 0.82 K/UL — LOW (ref 1–3.3)
LYMPHOCYTES # BLD AUTO: 9.7 % — LOW (ref 13–44)
MCHC RBC-ENTMCNC: 29.4 PG — SIGNIFICANT CHANGE UP (ref 27–34)
MCHC RBC-ENTMCNC: 33.2 GM/DL — SIGNIFICANT CHANGE UP (ref 32–36)
MCV RBC AUTO: 88.7 FL — SIGNIFICANT CHANGE UP (ref 80–100)
MONOCYTES # BLD AUTO: 1.43 K/UL — HIGH (ref 0–0.9)
MONOCYTES NFR BLD AUTO: 16.9 % — HIGH (ref 2–14)
NEUTROPHILS # BLD AUTO: 6.19 K/UL — SIGNIFICANT CHANGE UP (ref 1.8–7.4)
NEUTROPHILS NFR BLD AUTO: 72.9 % — SIGNIFICANT CHANGE UP (ref 43–77)
PCO2 BLDV: 42 MMHG — SIGNIFICANT CHANGE UP (ref 39–42)
PH BLDV: 7.35 — SIGNIFICANT CHANGE UP (ref 7.32–7.43)
PLATELET # BLD AUTO: 206 K/UL — SIGNIFICANT CHANGE UP (ref 150–400)
PO2 BLDV: 68 MMHG — HIGH (ref 25–45)
POTASSIUM BLDV-SCNC: 4.4 MMOL/L — SIGNIFICANT CHANGE UP (ref 3.5–5.1)
POTASSIUM SERPL-MCNC: 4.6 MMOL/L — SIGNIFICANT CHANGE UP (ref 3.5–5.3)
POTASSIUM SERPL-SCNC: 4.6 MMOL/L — SIGNIFICANT CHANGE UP (ref 3.5–5.3)
PROT SERPL-MCNC: 6.4 G/DL — LOW (ref 6.6–8.7)
PROTHROM AB SERPL-ACNC: 23.7 SEC — HIGH (ref 10.6–13.6)
RBC # BLD: 4.35 M/UL — SIGNIFICANT CHANGE UP (ref 3.8–5.2)
RBC # FLD: 14.4 % — SIGNIFICANT CHANGE UP (ref 10.3–14.5)
RSV RNA NPH QL NAA+NON-PROBE: SIGNIFICANT CHANGE UP
SAO2 % BLDV: 94.6 % — SIGNIFICANT CHANGE UP
SARS-COV-2 RNA SPEC QL NAA+PROBE: DETECTED
SODIUM SERPL-SCNC: 130 MMOL/L — LOW (ref 135–145)
WBC # BLD: 8.48 K/UL — SIGNIFICANT CHANGE UP (ref 3.8–10.5)
WBC # FLD AUTO: 8.48 K/UL — SIGNIFICANT CHANGE UP (ref 3.8–10.5)

## 2022-01-05 PROCEDURE — 70450 CT HEAD/BRAIN W/O DYE: CPT | Mod: 26,ME

## 2022-01-05 PROCEDURE — 71045 X-RAY EXAM CHEST 1 VIEW: CPT | Mod: 26

## 2022-01-05 PROCEDURE — G1004: CPT

## 2022-01-05 RX ORDER — NALOXONE HYDROCHLORIDE 4 MG/.1ML
4 SPRAY NASAL ONCE
Refills: 0 | Status: COMPLETED | OUTPATIENT
Start: 2022-01-05 | End: 2022-01-05

## 2022-01-05 RX ORDER — SODIUM CHLORIDE 9 MG/ML
1000 INJECTION INTRAMUSCULAR; INTRAVENOUS; SUBCUTANEOUS ONCE
Refills: 0 | Status: COMPLETED | OUTPATIENT
Start: 2022-01-05 | End: 2022-01-05

## 2022-01-05 RX ORDER — DEXAMETHASONE 0.5 MG/5ML
10 ELIXIR ORAL ONCE
Refills: 0 | Status: COMPLETED | OUTPATIENT
Start: 2022-01-05 | End: 2022-01-05

## 2022-01-05 RX ADMIN — SODIUM CHLORIDE 1000 MILLILITER(S): 9 INJECTION INTRAMUSCULAR; INTRAVENOUS; SUBCUTANEOUS at 20:45

## 2022-01-05 RX ADMIN — NALOXONE HYDROCHLORIDE 4 MILLIGRAM(S): 4 SPRAY NASAL at 20:40

## 2022-01-05 NOTE — ED PROVIDER NOTE - ATTENDING CONTRIBUTION TO CARE
Victor Hugo: I performed a face to face bedside interview with patient regarding history of present illness, review of symptoms and past medical history. I completed an independent physical exam and ordered tests/medications as needed.  I have discussed patient's plan of care with the resident. The resident assisted in  executing the discussed plan. I was available for any questions or issues that may have arose during the execution of the plan of care.     Upon my evaluation, this patient had a high probability of imminent or life-threatening deterioration due to acute hypoxemia and AMS which required my direct attention, intervention, and personal management.  The patient has a  medical condition that impairs one or more vital organ systems.  Frequent personal assessment and adjustment of medical interventions was performed.      I have personally provided 45 minutes of critical care time exclusive of time spent on separately billable procedures. Time includes review of laboratory data, radiology results, discussion with consultants, patient and family; monitoring for potential decompensation, as well as time spent retrieving data and reviewing the chart and documenting the visit. Interventions were performed as documented above.

## 2022-01-05 NOTE — ED PROVIDER NOTE - PROGRESS NOTE DETAILS
Aguilar Gonzalez for ED attending, Dr. Gay: son now at beside confirmed DNR/DNI status wants everything done short of intubation/compressions Aguilar Gonzalez for ED attending, Dr. Gay: son now at beside confirmed DNR/DNI status wants everything done short of intubation/compressions. Pedro: Spoke with pt's son/HCP Bony Garnica ([phone]), who lives next door to pt and checks on her daily.  States that pt was otherwise in her usual state of health up until today.  Noted that she was more sleepy this morning and did not get up until noon, when her grandson went to check on her.  Per grandson, pt was a little more lethargic compared to usual but was able to get up and eat.  Later went back to sleep at around 1430.  Family then had trouble waking her up later this evening and therefore called EMS.  They note that pt takes CBD and tramadol/gabapentin for chronic back pain. Aguilar Gonzalez for ED attending, Dr. Gay: son now at beside confirmed DNR/DNI status wants everything done short of intubation/compressions.  Will keep on O2 via NRB+NC for now. Vasquez: Remains minimally responsive to painful stimuli.  +COVID swab.  Will give decadron, trial of HFNC.

## 2022-01-05 NOTE — ED PROVIDER NOTE - PHYSICAL EXAMINATION
Gen: ill apearing in severe distress  Head: NC/AT  Neck: trachea midline  Card: irregular   Resp:  sonorus respirations, saturating at 86% on 10L/   Abd: soft, nondistended  Ext: no deformities  Neuro:  pt only minimally responsive to physical stimuli  Skin:  Warm and dry as visualized Gen: ill appearing in severe distress  Head: NC/AT, pupils 3 mm and reactive  Neck: trachea midline  Card: irregular   Resp:  sonorus respirations, saturating at 86% on 10L/   Abd: soft, nondistended  Ext: no deformities  Neuro:  pt only minimally responsive to painful stimuli  Skin:  Warm and dry as visualized

## 2022-01-05 NOTE — ED PROVIDER NOTE - NSICDXPASTMEDICALHX_GEN_ALL_CORE_FT
PAST MEDICAL HISTORY:  Afib     Chronic back pain     History of valvular heart disease     HTN (hypertension)

## 2022-01-05 NOTE — ED ADULT NURSE NOTE - OBJECTIVE STATEMENT
Assumed pt in critical ambulance area, pt unresponsive requiring ambu oxygenation to maintain sat above 90% when placed on nasal cannula pt desats to low 80s. IV placed, labs drawn, pt placed on non-way breather mask to maintain o2 sat above 95%. pt place on monitor and moved to critical room. family educated on plan of care, family  able to successfully teach back plan of care to RN, RN will continue to reeducate family and pt when alert during hospital stay.

## 2022-01-05 NOTE — ED PROVIDER NOTE - CLINICAL SUMMARY MEDICAL DECISION MAKING FREE TEXT BOX
elderly female presenting unresponsive put to bed at 2:0 tramadol/THC earlier in the day, has been unresponsive since 2:30 but breathing on own, arrived saturating in 70's on 10L nc, minimally responsive to stimuli. Plan for labs, IV fluid, Narcan, O2 PRN, discuss with family reassess elderly female presenting unresponsive put to bed at 1430 tramadol/THC earlier in the day, has been unresponsive since 14:30 but breathing on own, arrived saturating in 70's on 10L nc, minimally responsive to stimuli. Plan for labs, IV fluid, Narcan, O2 PRN, discuss with family, reassess

## 2022-01-05 NOTE — ED PROVIDER NOTE - OBJECTIVE STATEMENT
88 y/o female with hx spinal stenosis, on tramadol for pain management, DNR/DNI bibems unresponsive will constricted pupils. Per EMS pt received 4 of Narcan. 88 y/o female with Afib on Xarelto, HTN, unspecified heart valve replacement, hx spinal stenosis, on tramadol for pain management, DNR/DNI bibems unresponsive will constricted pupils. Per EMS pt received 4 of Narcan with slight improvement.  Pt remains obtunded and minimally responsive upon arrival.

## 2022-01-05 NOTE — ED PROVIDER NOTE - CARE PLAN
1 Principal Discharge DX:	Toxic metabolic encephalopathy  Secondary Diagnosis:	COVID-19   Principal Discharge DX:	Acute respiratory failure due to COVID-19  Secondary Diagnosis:	Toxic metabolic encephalopathy

## 2022-01-06 DIAGNOSIS — U07.1 COVID-19: ICD-10-CM

## 2022-01-06 LAB
ABO RH CONFIRMATION: SIGNIFICANT CHANGE UP
ALBUMIN SERPL ELPH-MCNC: 3.4 G/DL — SIGNIFICANT CHANGE UP (ref 3.3–5.2)
ALP SERPL-CCNC: 197 U/L — HIGH (ref 40–120)
ALT FLD-CCNC: 15 U/L — SIGNIFICANT CHANGE UP
AMPHET UR-MCNC: NEGATIVE — SIGNIFICANT CHANGE UP
ANION GAP SERPL CALC-SCNC: 21 MMOL/L — HIGH (ref 5–17)
ANION GAP SERPL CALC-SCNC: 21 MMOL/L — HIGH (ref 5–17)
APPEARANCE UR: ABNORMAL
APTT BLD: 35.9 SEC — HIGH (ref 27.5–35.5)
APTT BLD: >200 SEC — CRITICAL HIGH (ref 27.5–35.5)
AST SERPL-CCNC: 36 U/L — HIGH
BACTERIA # UR AUTO: ABNORMAL
BARBITURATES UR SCN-MCNC: NEGATIVE — SIGNIFICANT CHANGE UP
BASOPHILS # BLD AUTO: 0.02 K/UL — SIGNIFICANT CHANGE UP (ref 0–0.2)
BASOPHILS NFR BLD AUTO: 0.1 % — SIGNIFICANT CHANGE UP (ref 0–2)
BENZODIAZ UR-MCNC: NEGATIVE — SIGNIFICANT CHANGE UP
BILIRUB SERPL-MCNC: 0.8 MG/DL — SIGNIFICANT CHANGE UP (ref 0.4–2)
BILIRUB UR-MCNC: NEGATIVE — SIGNIFICANT CHANGE UP
BUN SERPL-MCNC: 33.1 MG/DL — HIGH (ref 8–20)
BUN SERPL-MCNC: 35.5 MG/DL — HIGH (ref 8–20)
CALCIUM SERPL-MCNC: 8.4 MG/DL — LOW (ref 8.6–10.2)
CALCIUM SERPL-MCNC: 8.5 MG/DL — LOW (ref 8.6–10.2)
CHLORIDE SERPL-SCNC: 93 MMOL/L — LOW (ref 98–107)
CHLORIDE SERPL-SCNC: 96 MMOL/L — LOW (ref 98–107)
CK MB CFR SERPL CALC: 9.1 NG/ML — HIGH (ref 0–6.7)
CK SERPL-CCNC: 387 U/L — HIGH (ref 25–170)
CO2 SERPL-SCNC: 17 MMOL/L — LOW (ref 22–29)
CO2 SERPL-SCNC: 19 MMOL/L — LOW (ref 22–29)
COCAINE METAB.OTHER UR-MCNC: NEGATIVE — SIGNIFICANT CHANGE UP
COLOR SPEC: YELLOW — SIGNIFICANT CHANGE UP
COMMENT - URINE: SIGNIFICANT CHANGE UP
CREAT SERPL-MCNC: 1.56 MG/DL — HIGH (ref 0.5–1.3)
CREAT SERPL-MCNC: 1.87 MG/DL — HIGH (ref 0.5–1.3)
DIFF PNL FLD: ABNORMAL
EOSINOPHIL # BLD AUTO: 0 K/UL — SIGNIFICANT CHANGE UP (ref 0–0.5)
EOSINOPHIL NFR BLD AUTO: 0 % — SIGNIFICANT CHANGE UP (ref 0–6)
EPI CELLS # UR: SIGNIFICANT CHANGE UP
GLUCOSE SERPL-MCNC: 143 MG/DL — HIGH (ref 70–99)
GLUCOSE SERPL-MCNC: 156 MG/DL — HIGH (ref 70–99)
GLUCOSE UR QL: NEGATIVE MG/DL — SIGNIFICANT CHANGE UP
HCT VFR BLD CALC: 20.8 % — CRITICAL LOW (ref 34.5–45)
HCT VFR BLD CALC: 38 % — SIGNIFICANT CHANGE UP (ref 34.5–45)
HCT VFR BLD CALC: 40.7 % — SIGNIFICANT CHANGE UP (ref 34.5–45)
HGB BLD-MCNC: 13 G/DL — SIGNIFICANT CHANGE UP (ref 11.5–15.5)
HGB BLD-MCNC: 14.1 G/DL — SIGNIFICANT CHANGE UP (ref 11.5–15.5)
HGB BLD-MCNC: 6.7 G/DL — CRITICAL LOW (ref 11.5–15.5)
HYALINE CASTS # UR AUTO: ABNORMAL /LPF
IMM GRANULOCYTES NFR BLD AUTO: 0.4 % — SIGNIFICANT CHANGE UP (ref 0–1.5)
INR BLD: 1.57 RATIO — HIGH (ref 0.88–1.16)
INR BLD: 1.7 RATIO — HIGH (ref 0.88–1.16)
KETONES UR-MCNC: ABNORMAL
LACTATE SERPL-SCNC: 2.5 MMOL/L — HIGH (ref 0.5–2)
LEUKOCYTE ESTERASE UR-ACNC: ABNORMAL
LYMPHOCYTES # BLD AUTO: 0.96 K/UL — LOW (ref 1–3.3)
LYMPHOCYTES # BLD AUTO: 6.9 % — LOW (ref 13–44)
MAGNESIUM SERPL-MCNC: 2 MG/DL — SIGNIFICANT CHANGE UP (ref 1.6–2.6)
MCHC RBC-ENTMCNC: 29.1 PG — SIGNIFICANT CHANGE UP (ref 27–34)
MCHC RBC-ENTMCNC: 29.4 PG — SIGNIFICANT CHANGE UP (ref 27–34)
MCHC RBC-ENTMCNC: 29.7 PG — SIGNIFICANT CHANGE UP (ref 27–34)
MCHC RBC-ENTMCNC: 32.2 GM/DL — SIGNIFICANT CHANGE UP (ref 32–36)
MCHC RBC-ENTMCNC: 34.2 GM/DL — SIGNIFICANT CHANGE UP (ref 32–36)
MCHC RBC-ENTMCNC: 34.6 GM/DL — SIGNIFICANT CHANGE UP (ref 32–36)
MCV RBC AUTO: 85.7 FL — SIGNIFICANT CHANGE UP (ref 80–100)
MCV RBC AUTO: 86 FL — SIGNIFICANT CHANGE UP (ref 80–100)
MCV RBC AUTO: 90.4 FL — SIGNIFICANT CHANGE UP (ref 80–100)
METHADONE UR-MCNC: NEGATIVE — SIGNIFICANT CHANGE UP
MONOCYTES # BLD AUTO: 1.4 K/UL — HIGH (ref 0–0.9)
MONOCYTES NFR BLD AUTO: 10.1 % — SIGNIFICANT CHANGE UP (ref 2–14)
NEUTROPHILS # BLD AUTO: 11.5 K/UL — HIGH (ref 1.8–7.4)
NEUTROPHILS NFR BLD AUTO: 82.5 % — HIGH (ref 43–77)
NITRITE UR-MCNC: NEGATIVE — SIGNIFICANT CHANGE UP
OPIATES UR-MCNC: NEGATIVE — SIGNIFICANT CHANGE UP
PCP SPEC-MCNC: SIGNIFICANT CHANGE UP
PCP UR-MCNC: NEGATIVE — SIGNIFICANT CHANGE UP
PH UR: 6 — SIGNIFICANT CHANGE UP (ref 5–8)
PHOSPHATE SERPL-MCNC: 5.5 MG/DL — HIGH (ref 2.4–4.7)
PLATELET # BLD AUTO: 121 K/UL — LOW (ref 150–400)
PLATELET # BLD AUTO: 225 K/UL — SIGNIFICANT CHANGE UP (ref 150–400)
PLATELET # BLD AUTO: 242 K/UL — SIGNIFICANT CHANGE UP (ref 150–400)
POTASSIUM SERPL-MCNC: 3.6 MMOL/L — SIGNIFICANT CHANGE UP (ref 3.5–5.3)
POTASSIUM SERPL-MCNC: 4.3 MMOL/L — SIGNIFICANT CHANGE UP (ref 3.5–5.3)
POTASSIUM SERPL-SCNC: 3.6 MMOL/L — SIGNIFICANT CHANGE UP (ref 3.5–5.3)
POTASSIUM SERPL-SCNC: 4.3 MMOL/L — SIGNIFICANT CHANGE UP (ref 3.5–5.3)
PROT SERPL-MCNC: 6.2 G/DL — LOW (ref 6.6–8.7)
PROT UR-MCNC: 30 MG/DL
PROTHROM AB SERPL-ACNC: 17.8 SEC — HIGH (ref 10.6–13.6)
PROTHROM AB SERPL-ACNC: 19.2 SEC — HIGH (ref 10.6–13.6)
RBC # BLD: 2.3 M/UL — LOW (ref 3.8–5.2)
RBC # BLD: 4.42 M/UL — SIGNIFICANT CHANGE UP (ref 3.8–5.2)
RBC # BLD: 4.75 M/UL — SIGNIFICANT CHANGE UP (ref 3.8–5.2)
RBC # FLD: 14.3 % — SIGNIFICANT CHANGE UP (ref 10.3–14.5)
RBC # FLD: 14.4 % — SIGNIFICANT CHANGE UP (ref 10.3–14.5)
RBC # FLD: 14.6 % — HIGH (ref 10.3–14.5)
RBC CASTS # UR COMP ASSIST: SIGNIFICANT CHANGE UP /HPF (ref 0–4)
SODIUM SERPL-SCNC: 133 MMOL/L — LOW (ref 135–145)
SODIUM SERPL-SCNC: 133 MMOL/L — LOW (ref 135–145)
SP GR SPEC: 1.01 — SIGNIFICANT CHANGE UP (ref 1.01–1.02)
THC UR QL: POSITIVE
TROPONIN T SERPL-MCNC: 0.03 NG/ML — SIGNIFICANT CHANGE UP (ref 0–0.06)
UROBILINOGEN FLD QL: 1 MG/DL
WBC # BLD: 13.93 K/UL — HIGH (ref 3.8–10.5)
WBC # BLD: 19.49 K/UL — HIGH (ref 3.8–10.5)
WBC # BLD: 9.33 K/UL — SIGNIFICANT CHANGE UP (ref 3.8–10.5)
WBC # FLD AUTO: 13.93 K/UL — HIGH (ref 3.8–10.5)
WBC # FLD AUTO: 19.49 K/UL — HIGH (ref 3.8–10.5)
WBC # FLD AUTO: 9.33 K/UL — SIGNIFICANT CHANGE UP (ref 3.8–10.5)
WBC UR QL: SIGNIFICANT CHANGE UP

## 2022-01-06 PROCEDURE — 12345: CPT | Mod: NC

## 2022-01-06 PROCEDURE — 99223 1ST HOSP IP/OBS HIGH 75: CPT

## 2022-01-06 PROCEDURE — 99222 1ST HOSP IP/OBS MODERATE 55: CPT

## 2022-01-06 PROCEDURE — 93010 ELECTROCARDIOGRAM REPORT: CPT

## 2022-01-06 RX ORDER — LANOLIN ALCOHOL/MO/W.PET/CERES
3 CREAM (GRAM) TOPICAL AT BEDTIME
Refills: 0 | Status: DISCONTINUED | OUTPATIENT
Start: 2022-01-06 | End: 2022-01-14

## 2022-01-06 RX ORDER — HEPARIN SODIUM 5000 [USP'U]/ML
2000 INJECTION INTRAVENOUS; SUBCUTANEOUS EVERY 6 HOURS
Refills: 0 | Status: DISCONTINUED | OUTPATIENT
Start: 2022-01-06 | End: 2022-01-09

## 2022-01-06 RX ORDER — HEPARIN SODIUM 5000 [USP'U]/ML
4500 INJECTION INTRAVENOUS; SUBCUTANEOUS EVERY 6 HOURS
Refills: 0 | Status: DISCONTINUED | OUTPATIENT
Start: 2022-01-06 | End: 2022-01-09

## 2022-01-06 RX ORDER — HEPARIN SODIUM 5000 [USP'U]/ML
INJECTION INTRAVENOUS; SUBCUTANEOUS
Qty: 25000 | Refills: 0 | Status: DISCONTINUED | OUTPATIENT
Start: 2022-01-06 | End: 2022-01-09

## 2022-01-06 RX ORDER — REMDESIVIR 5 MG/ML
INJECTION INTRAVENOUS
Refills: 0 | Status: COMPLETED | OUTPATIENT
Start: 2022-01-06 | End: 2022-01-10

## 2022-01-06 RX ORDER — ACETAMINOPHEN 500 MG
650 TABLET ORAL EVERY 6 HOURS
Refills: 0 | Status: DISCONTINUED | OUTPATIENT
Start: 2022-01-06 | End: 2022-01-14

## 2022-01-06 RX ORDER — LIDOCAINE 4 G/100G
1 CREAM TOPICAL ONCE
Refills: 0 | Status: COMPLETED | OUTPATIENT
Start: 2022-01-06 | End: 2022-01-06

## 2022-01-06 RX ORDER — REMDESIVIR 5 MG/ML
200 INJECTION INTRAVENOUS EVERY 24 HOURS
Refills: 0 | Status: COMPLETED | OUTPATIENT
Start: 2022-01-06 | End: 2022-01-06

## 2022-01-06 RX ORDER — HEPARIN SODIUM 5000 [USP'U]/ML
4500 INJECTION INTRAVENOUS; SUBCUTANEOUS ONCE
Refills: 0 | Status: COMPLETED | OUTPATIENT
Start: 2022-01-06 | End: 2022-01-06

## 2022-01-06 RX ORDER — GUAIFENESIN/DEXTROMETHORPHAN 600MG-30MG
10 TABLET, EXTENDED RELEASE 12 HR ORAL EVERY 4 HOURS
Refills: 0 | Status: DISCONTINUED | OUTPATIENT
Start: 2022-01-06 | End: 2022-01-14

## 2022-01-06 RX ORDER — ALBUTEROL 90 UG/1
2 AEROSOL, METERED ORAL EVERY 4 HOURS
Refills: 0 | Status: DISCONTINUED | OUTPATIENT
Start: 2022-01-06 | End: 2022-01-14

## 2022-01-06 RX ORDER — SODIUM CHLORIDE 9 MG/ML
1000 INJECTION, SOLUTION INTRAVENOUS
Refills: 0 | Status: DISCONTINUED | OUTPATIENT
Start: 2022-01-06 | End: 2022-01-09

## 2022-01-06 RX ORDER — ONDANSETRON 8 MG/1
4 TABLET, FILM COATED ORAL EVERY 8 HOURS
Refills: 0 | Status: DISCONTINUED | OUTPATIENT
Start: 2022-01-06 | End: 2022-01-14

## 2022-01-06 RX ORDER — REMDESIVIR 5 MG/ML
100 INJECTION INTRAVENOUS EVERY 24 HOURS
Refills: 0 | Status: COMPLETED | OUTPATIENT
Start: 2022-01-07 | End: 2022-01-10

## 2022-01-06 RX ORDER — ACETAMINOPHEN 500 MG
1000 TABLET ORAL ONCE
Refills: 0 | Status: COMPLETED | OUTPATIENT
Start: 2022-01-06 | End: 2022-01-06

## 2022-01-06 RX ORDER — TRAMADOL HYDROCHLORIDE 50 MG/1
25 TABLET ORAL
Refills: 0 | Status: DISCONTINUED | OUTPATIENT
Start: 2022-01-06 | End: 2022-01-13

## 2022-01-06 RX ORDER — DEXAMETHASONE 0.5 MG/5ML
6 ELIXIR ORAL DAILY
Refills: 0 | Status: DISCONTINUED | OUTPATIENT
Start: 2022-01-06 | End: 2022-01-14

## 2022-01-06 RX ADMIN — LIDOCAINE 1 PATCH: 4 CREAM TOPICAL at 20:29

## 2022-01-06 RX ADMIN — SODIUM CHLORIDE 100 MILLILITER(S): 9 INJECTION, SOLUTION INTRAVENOUS at 06:54

## 2022-01-06 RX ADMIN — SODIUM CHLORIDE 100 MILLILITER(S): 9 INJECTION, SOLUTION INTRAVENOUS at 17:19

## 2022-01-06 RX ADMIN — HEPARIN SODIUM 4500 UNIT(S): 5000 INJECTION INTRAVENOUS; SUBCUTANEOUS at 07:02

## 2022-01-06 RX ADMIN — Medication 400 MILLIGRAM(S): at 05:50

## 2022-01-06 RX ADMIN — HEPARIN SODIUM 900 UNIT(S)/HR: 5000 INJECTION INTRAVENOUS; SUBCUTANEOUS at 19:48

## 2022-01-06 RX ADMIN — HEPARIN SODIUM 900 UNIT(S)/HR: 5000 INJECTION INTRAVENOUS; SUBCUTANEOUS at 18:16

## 2022-01-06 RX ADMIN — SODIUM CHLORIDE 1000 MILLILITER(S): 9 INJECTION INTRAMUSCULAR; INTRAVENOUS; SUBCUTANEOUS at 01:16

## 2022-01-06 RX ADMIN — HEPARIN SODIUM 0 UNIT(S)/HR: 5000 INJECTION INTRAVENOUS; SUBCUTANEOUS at 17:07

## 2022-01-06 RX ADMIN — HEPARIN SODIUM 1100 UNIT(S)/HR: 5000 INJECTION INTRAVENOUS; SUBCUTANEOUS at 06:58

## 2022-01-06 RX ADMIN — LIDOCAINE 1 PATCH: 4 CREAM TOPICAL at 07:54

## 2022-01-06 RX ADMIN — REMDESIVIR 500 MILLIGRAM(S): 5 INJECTION INTRAVENOUS at 06:19

## 2022-01-06 RX ADMIN — Medication 102 MILLIGRAM(S): at 00:45

## 2022-01-06 RX ADMIN — Medication 6 MILLIGRAM(S): at 07:54

## 2022-01-06 RX ADMIN — TRAMADOL HYDROCHLORIDE 25 MILLIGRAM(S): 50 TABLET ORAL at 20:41

## 2022-01-06 RX ADMIN — TRAMADOL HYDROCHLORIDE 25 MILLIGRAM(S): 50 TABLET ORAL at 21:44

## 2022-01-06 RX ADMIN — Medication 1000 MILLIGRAM(S): at 06:50

## 2022-01-06 NOTE — CHART NOTE - NSCHARTNOTEFT_GEN_A_CORE
Patient now more awake/arousable.  Conversing with staff  Knows name, Able to follow commands/move all extremities  Requesting pain meds - will give Ofimev 1g IV x1 and Lidocaine patch for low back pain  Keep NPO until more awake  Weight obtained  Start Heparin gtt for full AC  Monitor Closely

## 2022-01-06 NOTE — H&P ADULT - ASSESSMENT
ASSESSMENT:  87F with PMHX HTN, AFib on Xarelto, Spinal Stenosis/LBP, Chronic Pain Syndrome BIBEMS from home unresponsive, hypoxic, with constricted pupils bilaterally admitted for Acute Hypoxic Respiratory Failure and AMS/TME 2/2 COVID PNA.    PLAN:  AMS/Acute Hypoxic Respiratory Failure 2/2 COVID19 PNA   -CTH WO no acute findings   -Unresponsive to Narcan 4mg IV in ED  -COVID19 PCR positive  -CXR poor film some infiltrates noted  -BCX pending x2  -Satting SPO2 70s on 10L NC per EMS  -Continue HFNC 40L 100% FIO2   -Titrate FIO2/LPM for SPO2 90-96  -VBG normal PH check ABG in AM   -Dexamethasone 6mg IV q24  -Remdesivir 200mg IV x1 then 100mg IV q24  -Isolation Precautions  -VTE PPX SQH  -NPO    ARF v CKD, AGMA  -Unclear baseline Cr. 1L IVFB NSS in ED.   -Start IVF LR 100cc/hr.  -Repeat Labs. Trend BMP.   -Check UA/CPK    AFIB  -Check Stat Weight then start Heparin gtt for AC while NPO  -Hold Xarelto while unresponsive    Chronic Back Pain  -UDS +THC  -Narcan 4mg given PTA   -Hold Tramadol/Gabapentin/CBD    Goals of Care  -ER spoke with Patient's Son/HCP Bony Garnica [phone] who confirmed DNR/DNI and all medical management apart from intubation/CPR. To upload paperwork to [employer] [facility].  -Palliative Care Consult AM    Medication Reconciliation  -Patient unresponsive/unable to provide med rec or pharmacy. Family no longer at bedisde. Attempted call. F/u med rec in AM if able.

## 2022-01-06 NOTE — H&P ADULT - NSHPLABSRESULTS_GEN_ALL_CORE
CTH WO: IMPRESSION:  Motion degraded exam shows no gross acute intracranial hemorrhage or mass   effect. Moderate chronic ischemic changes in the frontoparietal white   matter.

## 2022-01-06 NOTE — H&P ADULT - HISTORY OF PRESENT ILLNESS
87F with PMHX HTN, AFib on Xarelto, Spinal Stenosis/LBP, Chronic Pain Syndrome BIBEMS from home unresponsive, hypoxic, with constricted pupils bilaterally. Patient last seen lethargic but awake/arousable at eating approx 1430PM today then went back to bed per family. Patient hypoxic en route reportedly satting 70s on 10L NC per EMS. Hypoxia improved on HFNC/NRB in ED satting 99% on 40L 100% FIO2 HFNC at time of exam. Patient given 4mg Narcan IV x1 in ED for opiate reversal without much improvement. Minimally responsive on exam. Withdraws to painful stimuli. DNR/DNI code status confirmed by ED. Patient COVID positive and placed in isolation. Unable to provide further HPI/ROS due to current mental status.     PMHX: HTN, AFib on Xarelto, Spinal Stenosis/LBP, Chronic Pain Syndrome  PSHX: Unknown  Family Hx: Unknown  Social Hx: Unknown

## 2022-01-06 NOTE — H&P ADULT - NSHPPHYSICALEXAM_GEN_ALL_CORE
Vital Signs Last 24 Hrs  T(C): 37.6 (05 Jan 2022 21:20), Max: 37.6 (05 Jan 2022 21:20)  T(F): 99.6 (05 Jan 2022 21:20), Max: 99.6 (05 Jan 2022 21:20)  HR: 85 (05 Jan 2022 21:20) (85 - 92)  BP: 145/86 (05 Jan 2022 21:20) (145/86 - 145/86)  BP(mean): --  RR: 20 (05 Jan 2022 21:20) (20 - 24)  SpO2: 100% (05 Jan 2022 21:20) (82% - 100%)    Constitutional: Elderly/Frail-appearing, NAD, VSS  Head: NC/AT  Eyes: PERRL, anicteric sclera, conjunctiva WNL  ENT: Normal Pharynx, No tonsillar exudate/erythema, +dry oral mucosa  Neck: Supple, Non-tender  Chest: Non-tender, no rashes  Cardio: RRR, s1/s2, no appreciable murmurs/rubs/gallops  Resp: BS CTA bilaterally, no wheezing/rhonchi/rales  Abd: Soft, Non-tender, Non-distended, no rebound/guarding/rigidity  : not examined  Rectal: not examined  MSK/Ext: moving all extremitiespalpable distal pulses, good capillary refill  Psych: unresponsive/obtunded  Neuro: unresponsive/obtunded, arousable to painful stimuli   Skin: Warm/Dry.

## 2022-01-06 NOTE — CONSULT NOTE ADULT - TIME BILLING
Patient assessment, care plan. Review of chart, labs, imaging.   Discussion with  Interdisciplinary team  ISTOP review Patient assessment, care plan. Review of chart, labs, imaging.   Discussion with  Interdisciplinary team

## 2022-01-06 NOTE — PROGRESS NOTE ADULT - ASSESSMENT
87y/oF PMH HTN, afib on xarelto, spinal stenosis/lbp, chronic pain syndrome bibems from home unresponsive, hypoxic, with constricted pupils admitted with acute hypoxic respiratory failure and ams    metabolic encephalopathy   acute hypoxic respiratory failure 2/2 COVID-19 PNA   -pt received J&J vaccine 3/11/21   -cth neg for acute findings   -unresponsive to narcan 4mg iv in ER   -titrate supplemental o2 as tolerated, seen on hfnc   -decadron, remdesivir   -ID consulted  -npo   -dysphagia screen  -f/u swallow eval     SORAIDA vs CKD   AGMA     -cont ivf   - 87y/oF PMH HTN, afib on xarelto, spinal stenosis/lbp, chronic pain syndrome bibems from home unresponsive, hypoxic, with constricted pupils admitted with acute hypoxic respiratory failure and ams    metabolic encephalopathy   acute hypoxic respiratory failure 2/2 COVID-19 PNA   -pt received J&J vaccine 3/11/21   -cth neg for acute findings   -unresponsive to narcan 4mg iv in ER   -titrate supplemental o2 as tolerated, seen on hfnc   -decadron, remdesivir   -ID consulted  -npo   -dysphagia screen  -f/u swallow eval     SORAIDA vs CKD   AGMA   Hyponatremia   -baseline sCr unclear  -cont ivf   -cont to monitor     chronic afib   -holding home xarelto due to unresponsive on arrival   -cont heparin gtt while npo     dysphagia   -hx vocal cord paralysis as per son   -f/u swallow eval     chronic back pain   -UDS: +THC   -s/p narcan in ER   -holding gabapentin, cbd   -will start low dose tramadol, hold for lethargy     pt's son, Bony Garnica [phone], updated

## 2022-01-06 NOTE — PROVIDER CONTACT NOTE (OTHER) - ACTION/TREATMENT ORDERED:
MD Francie Rojas made aware. STAT labs ordered including repeat CBC to confirm result and type and screen. Awaiting aPTT results, will make provider aware of aPTT value.

## 2022-01-06 NOTE — PROGRESS NOTE ADULT - SUBJECTIVE AND OBJECTIVE BOX
[facility]    [id number]    87y      Female    CC: ams    INTERVAL HPI/OVERNIGHT EVENTS: pt seen and examined. admitted o/n with covid pna. remains on hfnc.     REVIEW OF SYSTEMS:    CONSTITUTIONAL: No fever, weight loss  RESPIRATORY: No wheezing, hemoptysis  CARDIOVASCULAR: No chest pain, palpitations  GASTROINTESTINAL: No abdominal or epigastric pain. No nausea, vomiting  NEUROLOGICAL: No headaches    Vital Signs Last 24 Hrs  T(C): 36.7 (2022 10:29), Max: 37.6 (2022 21:20)  T(F): 98 (2022 10:29), Max: 99.6 (2022 21:20)  HR: 80 (2022 10:29) (80 - 92)  BP: 109/70 (2022 10:29) (106/51 - 145/86)  BP(mean): --  RR: 19 (2022 10:29) (18 - 24)  SpO2: 98% (2022 10:29) (82% - 100%)    PHYSICAL EXAM:    GENERAL: NAD  HEENT: PERRL, +EOMI  NECK: soft, supple  CHEST/LUNG: decreased breath sounds b/l bases; respirations unlabored on hfnc   HEART: S1S2+, Regular rate and rhythm  ABDOMEN: Soft, Nontender, Nondistended; Bowel sounds present  SKIN: warm, dry  NEURO: AAOX3, no focal deficits  PSYCH: normal affect     LABS:                        13.0   13.93 )-----------( 225      ( 2022 07:03 )             38.0     01-06    133<L>  |  93<L>  |  33.1<H>  ----------------------------<  143<H>  3.6   |  19.0<L>  |  1.87<H>    Ca    8.5<L>      2022 07:03  Phos  5.5     01-06  Mg     2.0     -06    TPro  6.2<L>  /  Alb  3.4  /  TBili  0.8  /  DBili  x   /  AST  36<H>  /  ALT  15  /  AlkPhos  197<H>  -06    PT/INR - ( 2022 07:03 )   PT: 17.8 sec;   INR: 1.57 ratio         PTT - ( 2022 07:03 )  PTT:35.9 sec  Urinalysis Basic - ( 2022 01:25 )    Color: Yellow / Appearance: Slightly Turbid / S.010 / pH: x  Gluc: x / Ketone: Small  / Bili: Negative / Urobili: 1 mg/dL   Blood: x / Protein: 30 mg/dL / Nitrite: Negative   Leuk Esterase: Trace / RBC: 0-2 /HPF / WBC 3-5   Sq Epi: x / Non Sq Epi: Occasional / Bacteria: Occasional          MEDICATIONS  (STANDING):  dexAMETHasone  Injectable 6 milliGRAM(s) IV Push daily  heparin  Infusion.  Unit(s)/Hr (11 mL/Hr) IV Continuous <Continuous>  lactated ringers. 1000 milliLiter(s) (100 mL/Hr) IV Continuous <Continuous>  remdesivir  IVPB   IV Intermittent     MEDICATIONS  (PRN):  acetaminophen     Tablet .. 650 milliGRAM(s) Oral every 6 hours PRN Temp greater or equal to 38C (100.4F), Mild Pain (1 - 3)  ALBUTerol    90 MICROgram(s) HFA Inhaler 2 Puff(s) Inhalation every 4 hours PRN Shortness of Breath and/or Wheezing  aluminum hydroxide/magnesium hydroxide/simethicone Suspension 30 milliLiter(s) Oral every 4 hours PRN Dyspepsia  benzonatate 100 milliGRAM(s) Oral three times a day PRN Cough  guaifenesin/dextromethorphan Oral Liquid 10 milliLiter(s) Oral every 4 hours PRN Cough  heparin   Injectable 4500 Unit(s) IV Push every 6 hours PRN For aPTT less than 40  heparin   Injectable 2000 Unit(s) IV Push every 6 hours PRN For aPTT between 40 - 57  melatonin 3 milliGRAM(s) Oral at bedtime PRN Insomnia  ondansetron Injectable 4 milliGRAM(s) IV Push every 8 hours PRN Nausea and/or Vomiting      RADIOLOGY & ADDITIONAL TESTS:     [facility]    [id number]    87y      Female    CC: ams    INTERVAL HPI/OVERNIGHT EVENTS: pt seen and examined. admitted o/n with covid pna. remains on hfnc.     REVIEW OF SYSTEMS:    CONSTITUTIONAL: No fever, weight loss  RESPIRATORY: No wheezing, hemoptysis  CARDIOVASCULAR: No chest pain, palpitations  GASTROINTESTINAL: No abdominal or epigastric pain. No nausea, vomiting  NEUROLOGICAL: No headaches    Vital Signs Last 24 Hrs  T(C): 36.7 (2022 10:29), Max: 37.6 (2022 21:20)  T(F): 98 (2022 10:29), Max: 99.6 (2022 21:20)  HR: 80 (2022 10:29) (80 - 92)  BP: 109/70 (2022 10:29) (106/51 - 145/86)  BP(mean): --  RR: 19 (2022 10:29) (18 - 24)  SpO2: 98% (2022 10:29) (82% - 100%)    PHYSICAL EXAM:    GENERAL: NAD  HEENT: PERRL, +EOMI  NECK: soft, supple  CHEST/LUNG: decreased breath sounds b/l bases; respirations unlabored on hfnc   HEART: S1S2+, Regular rate and rhythm  ABDOMEN: Soft, Nontender, Nondistended; Bowel sounds present  SKIN: warm, dry  NEURO: Awake, alert, no focal deficits  PSYCH: normal affect     LABS:                        13.0   13.93 )-----------( 225      ( 2022 07:03 )             38.0     -    133<L>  |  93<L>  |  33.1<H>  ----------------------------<  143<H>  3.6   |  19.0<L>  |  1.87<H>    Ca    8.5<L>      2022 07:03  Phos  5.5     -06  Mg     2.0     -06    TPro  6.2<L>  /  Alb  3.4  /  TBili  0.8  /  DBili  x   /  AST  36<H>  /  ALT  15  /  AlkPhos  197<H>  -06    PT/INR - ( 2022 07:03 )   PT: 17.8 sec;   INR: 1.57 ratio         PTT - ( 2022 07:03 )  PTT:35.9 sec  Urinalysis Basic - ( 2022 01:25 )    Color: Yellow / Appearance: Slightly Turbid / S.010 / pH: x  Gluc: x / Ketone: Small  / Bili: Negative / Urobili: 1 mg/dL   Blood: x / Protein: 30 mg/dL / Nitrite: Negative   Leuk Esterase: Trace / RBC: 0-2 /HPF / WBC 3-5   Sq Epi: x / Non Sq Epi: Occasional / Bacteria: Occasional          MEDICATIONS  (STANDING):  dexAMETHasone  Injectable 6 milliGRAM(s) IV Push daily  heparin  Infusion.  Unit(s)/Hr (11 mL/Hr) IV Continuous <Continuous>  lactated ringers. 1000 milliLiter(s) (100 mL/Hr) IV Continuous <Continuous>  remdesivir  IVPB   IV Intermittent     MEDICATIONS  (PRN):  acetaminophen     Tablet .. 650 milliGRAM(s) Oral every 6 hours PRN Temp greater or equal to 38C (100.4F), Mild Pain (1 - 3)  ALBUTerol    90 MICROgram(s) HFA Inhaler 2 Puff(s) Inhalation every 4 hours PRN Shortness of Breath and/or Wheezing  aluminum hydroxide/magnesium hydroxide/simethicone Suspension 30 milliLiter(s) Oral every 4 hours PRN Dyspepsia  benzonatate 100 milliGRAM(s) Oral three times a day PRN Cough  guaifenesin/dextromethorphan Oral Liquid 10 milliLiter(s) Oral every 4 hours PRN Cough  heparin   Injectable 4500 Unit(s) IV Push every 6 hours PRN For aPTT less than 40  heparin   Injectable 2000 Unit(s) IV Push every 6 hours PRN For aPTT between 40 - 57  melatonin 3 milliGRAM(s) Oral at bedtime PRN Insomnia  ondansetron Injectable 4 milliGRAM(s) IV Push every 8 hours PRN Nausea and/or Vomiting      RADIOLOGY & ADDITIONAL TESTS:

## 2022-01-06 NOTE — CONSULT NOTE ADULT - ASSESSMENT
87yr woman  PMH HTN, afib, spinal stenosis with chronic pain syndrome admitted with acute hypoxemic respiratory failure with COVID PNA    1. Acute Hypoxemic Respiratory Failure  On 100% HF  Close monitoring of O2  Patient is DNR/I    2.  COVID PNA  Started Remedesivir and Decadron   monitor inflammatory markers    3.  Chronic Pain Syndrome- hx spinal stenosis  ISTOP reviewed  monitor for sedation  recommend IV Tylenol    4.  Dysphagia  NPO for now.  Aspiration precautions  Will need to see if can take po once clinical condition is improved      5.  Encounter for Palliative Care 87yr woman  PMH HTN, afib, spinal stenosis with chronic pain syndrome admitted with acute hypoxemic respiratory failure with COVID PNA    1. Acute Hypoxemic Respiratory Failure  On 100% HF  Close monitoring of O2  Patient is DNR/I    2.  COVID PNA  Started Remedesivir and Decadron   monitor inflammatory markers    3.  Chronic Pain Syndrome- hx spinal stenosis  ISTOP reviewed  monitor for sedation  recommend IV Tylenol    4.  Dysphagia  NPO for now.  Aspiration precautions  Will need to see if can take po once clinical condition is improved    5.  Anemia    6. SORAIDA  avoid nephrotoxic agents  close monitoring    7.  Encounter for Palliative Care 87yr woman  PMH HTN, afib, spinal stenosis with chronic pain syndrome admitted with acute hypoxemic respiratory failure with COVID PNA    1. Acute Hypoxemic Respiratory Failure  On 100% HF  Close monitoring of O2  Patient is DNR/I    2.  COVID PNA  Started Remedesivir and Decadron   monitor inflammatory markers    3.  Chronic Pain Syndrome- hx spinal stenosis  monitor for sedation  recommend IV Tylenol PRN   holding home meds - Gabapentin /Tramadol 2/2 MS  ISTOP unable to review- patient without name yet.      4.  Dysphagia  NPO for now.  Aspiration precautions  Will need to see if can take po once clinical condition is improved    5.  Anemia  to be repeated  monitor closely  no sign of active bleeding    6. SORAIDA  avoid nephrotoxic agents  close monitoring    7.  Encounter for Palliative Care  Palliative Care consulted to assist with GOC.  Patient with respiratory failure with  COVID PNA .  Patient already DNR/I  Cont close monitoring for pain signs - rec IV Tylenol if needed  Will monitor hospital course. Continue medical management per primary team

## 2022-01-07 LAB
-  COAGULASE NEGATIVE STAPHYLOCOCCUS: SIGNIFICANT CHANGE UP
A1C WITH ESTIMATED AVERAGE GLUCOSE RESULT: 5.1 % — SIGNIFICANT CHANGE UP (ref 4–5.6)
ALBUMIN SERPL ELPH-MCNC: 3.3 G/DL — SIGNIFICANT CHANGE UP (ref 3.3–5.2)
ALP SERPL-CCNC: 162 U/L — HIGH (ref 40–120)
ALT FLD-CCNC: 18 U/L — SIGNIFICANT CHANGE UP
ANION GAP SERPL CALC-SCNC: 18 MMOL/L — HIGH (ref 5–17)
APTT BLD: 119.9 SEC — HIGH (ref 27.5–35.5)
APTT BLD: 130.5 SEC — CRITICAL HIGH (ref 27.5–35.5)
APTT BLD: 81.4 SEC — HIGH (ref 27.5–35.5)
AST SERPL-CCNC: 50 U/L — HIGH
BASOPHILS # BLD AUTO: 0 K/UL — SIGNIFICANT CHANGE UP (ref 0–0.2)
BASOPHILS NFR BLD AUTO: 0 % — SIGNIFICANT CHANGE UP (ref 0–2)
BILIRUB SERPL-MCNC: 0.7 MG/DL — SIGNIFICANT CHANGE UP (ref 0.4–2)
BUN SERPL-MCNC: 44.2 MG/DL — HIGH (ref 8–20)
BURR CELLS BLD QL SMEAR: PRESENT — SIGNIFICANT CHANGE UP
CALCIUM SERPL-MCNC: 8.4 MG/DL — LOW (ref 8.6–10.2)
CHLORIDE SERPL-SCNC: 98 MMOL/L — SIGNIFICANT CHANGE UP (ref 98–107)
CK MB CFR SERPL CALC: 12 NG/ML — HIGH (ref 0–6.7)
CK SERPL-CCNC: 682 U/L — HIGH (ref 25–170)
CO2 SERPL-SCNC: 23 MMOL/L — SIGNIFICANT CHANGE UP (ref 22–29)
CREAT SERPL-MCNC: 1.28 MG/DL — SIGNIFICANT CHANGE UP (ref 0.5–1.3)
CRP SERPL-MCNC: 202 MG/L — HIGH
D DIMER BLD IA.RAPID-MCNC: 1208 NG/ML DDU — HIGH
EOSINOPHIL # BLD AUTO: 0 K/UL — SIGNIFICANT CHANGE UP (ref 0–0.5)
EOSINOPHIL NFR BLD AUTO: 0 % — SIGNIFICANT CHANGE UP (ref 0–6)
ESTIMATED AVERAGE GLUCOSE: 100 MG/DL — SIGNIFICANT CHANGE UP (ref 68–114)
FERRITIN SERPL-MCNC: 395 NG/ML — HIGH (ref 15–150)
GLUCOSE BLDC GLUCOMTR-MCNC: 131 MG/DL — HIGH (ref 70–99)
GLUCOSE SERPL-MCNC: 141 MG/DL — HIGH (ref 70–99)
GRAM STN FLD: SIGNIFICANT CHANGE UP
GRAM STN FLD: SIGNIFICANT CHANGE UP
HCT VFR BLD CALC: 38.1 % — SIGNIFICANT CHANGE UP (ref 34.5–45)
HGB BLD-MCNC: 12.8 G/DL — SIGNIFICANT CHANGE UP (ref 11.5–15.5)
LDH SERPL L TO P-CCNC: 342 U/L — HIGH (ref 98–192)
LYMPHOCYTES # BLD AUTO: 1.53 K/UL — SIGNIFICANT CHANGE UP (ref 1–3.3)
LYMPHOCYTES # BLD AUTO: 8.7 % — LOW (ref 13–44)
MAGNESIUM SERPL-MCNC: 2.2 MG/DL — SIGNIFICANT CHANGE UP (ref 1.6–2.6)
MANUAL SMEAR VERIFICATION: SIGNIFICANT CHANGE UP
MCHC RBC-ENTMCNC: 28.8 PG — SIGNIFICANT CHANGE UP (ref 27–34)
MCHC RBC-ENTMCNC: 33.6 GM/DL — SIGNIFICANT CHANGE UP (ref 32–36)
MCV RBC AUTO: 85.6 FL — SIGNIFICANT CHANGE UP (ref 80–100)
METHOD TYPE: SIGNIFICANT CHANGE UP
MONOCYTES # BLD AUTO: 1.53 K/UL — HIGH (ref 0–0.9)
MONOCYTES NFR BLD AUTO: 8.7 % — SIGNIFICANT CHANGE UP (ref 2–14)
NEUTROPHILS # BLD AUTO: 14.52 K/UL — HIGH (ref 1.8–7.4)
NEUTROPHILS NFR BLD AUTO: 77.4 % — HIGH (ref 43–77)
NEUTS BAND # BLD: 5.2 % — SIGNIFICANT CHANGE UP (ref 0–8)
ORGANISM # SPEC MICROSCOPIC CNT: SIGNIFICANT CHANGE UP
OVALOCYTES BLD QL SMEAR: SLIGHT — SIGNIFICANT CHANGE UP
PLAT MORPH BLD: NORMAL — SIGNIFICANT CHANGE UP
PLATELET # BLD AUTO: 283 K/UL — SIGNIFICANT CHANGE UP (ref 150–400)
POIKILOCYTOSIS BLD QL AUTO: SLIGHT — SIGNIFICANT CHANGE UP
POLYCHROMASIA BLD QL SMEAR: SLIGHT — SIGNIFICANT CHANGE UP
POTASSIUM SERPL-MCNC: 4.3 MMOL/L — SIGNIFICANT CHANGE UP (ref 3.5–5.3)
POTASSIUM SERPL-SCNC: 4.3 MMOL/L — SIGNIFICANT CHANGE UP (ref 3.5–5.3)
PROCALCITONIN SERPL-MCNC: 1.56 NG/ML — HIGH (ref 0.02–0.1)
PROT SERPL-MCNC: 6.3 G/DL — LOW (ref 6.6–8.7)
RBC # BLD: 4.45 M/UL — SIGNIFICANT CHANGE UP (ref 3.8–5.2)
RBC # FLD: 14.4 % — SIGNIFICANT CHANGE UP (ref 10.3–14.5)
RBC BLD AUTO: ABNORMAL
SCHISTOCYTES BLD QL AUTO: SLIGHT — SIGNIFICANT CHANGE UP
SODIUM SERPL-SCNC: 139 MMOL/L — SIGNIFICANT CHANGE UP (ref 135–145)
SPECIMEN SOURCE: SIGNIFICANT CHANGE UP
SPECIMEN SOURCE: SIGNIFICANT CHANGE UP
TARGETS BLD QL SMEAR: SLIGHT — SIGNIFICANT CHANGE UP
WBC # BLD: 17.58 K/UL — HIGH (ref 3.8–10.5)
WBC # FLD AUTO: 17.58 K/UL — HIGH (ref 3.8–10.5)

## 2022-01-07 PROCEDURE — 99233 SBSQ HOSP IP/OBS HIGH 50: CPT

## 2022-01-07 PROCEDURE — 99232 SBSQ HOSP IP/OBS MODERATE 35: CPT

## 2022-01-07 PROCEDURE — 71045 X-RAY EXAM CHEST 1 VIEW: CPT | Mod: 26

## 2022-01-07 RX ADMIN — TRAMADOL HYDROCHLORIDE 25 MILLIGRAM(S): 50 TABLET ORAL at 02:53

## 2022-01-07 RX ADMIN — HEPARIN SODIUM 800 UNIT(S)/HR: 5000 INJECTION INTRAVENOUS; SUBCUTANEOUS at 18:30

## 2022-01-07 RX ADMIN — REMDESIVIR 500 MILLIGRAM(S): 5 INJECTION INTRAVENOUS at 06:02

## 2022-01-07 RX ADMIN — Medication 6 MILLIGRAM(S): at 05:52

## 2022-01-07 RX ADMIN — TRAMADOL HYDROCHLORIDE 25 MILLIGRAM(S): 50 TABLET ORAL at 04:30

## 2022-01-07 RX ADMIN — HEPARIN SODIUM 800 UNIT(S)/HR: 5000 INJECTION INTRAVENOUS; SUBCUTANEOUS at 02:47

## 2022-01-07 NOTE — SWALLOW BEDSIDE ASSESSMENT ADULT - COMMENTS
As per MD note: "87y/oF PMH HTN, afib on xarelto, spinal stenosis/lbp, chronic pain syndrome bibems from home unresponsive, hypoxic, with constricted pupils admitted with acute hypoxic respiratory failure and ams"

## 2022-01-07 NOTE — PROGRESS NOTE ADULT - SUBJECTIVE AND OBJECTIVE BOX
INFECTIOUS DISEASES AND INTERNAL MEDICINE at [place]  =======================================================  Sami Miguel MD  Diplomates American Board of Internal Medicine and Infectious Diseases  Telephone [phone]  Fax            [phone]  =======================================================    [place], [place] [id number]    Follow up: COVID     Allergies:  Allergy Status Unknown      Medications:  acetaminophen     Tablet .. 650 milliGRAM(s) Oral every 6 hours PRN  ALBUTerol    90 MICROgram(s) HFA Inhaler 2 Puff(s) Inhalation every 4 hours PRN  aluminum hydroxide/magnesium hydroxide/simethicone Suspension 30 milliLiter(s) Oral every 4 hours PRN  benzonatate 100 milliGRAM(s) Oral three times a day PRN  dexAMETHasone  Injectable 6 milliGRAM(s) IV Push daily  guaifenesin/dextromethorphan Oral Liquid 10 milliLiter(s) Oral every 4 hours PRN  heparin   Injectable 4500 Unit(s) IV Push every 6 hours PRN  heparin   Injectable 2000 Unit(s) IV Push every 6 hours PRN  heparin  Infusion.  Unit(s)/Hr IV Continuous <Continuous>  lactated ringers. 1000 milliLiter(s) IV Continuous <Continuous>  melatonin 3 milliGRAM(s) Oral at bedtime PRN  ondansetron Injectable 4 milliGRAM(s) IV Push every 8 hours PRN  remdesivir  IVPB   IV Intermittent   remdesivir  IVPB 100 milliGRAM(s) IV Intermittent every 24 hours  traMADol 25 milliGRAM(s) Oral two times a day PRN    SOCIAL       FAMILY   FAMILY HISTORY:    REVIEW OF SYSTEMS:  CONSTITUTIONAL:  No Fever or chills  HEENT:   No diplopia or blurred vision.  No earache, sore throat or runny nose.  CARDIOVASCULAR:  No pressure, squeezing, strangling, tightness, heaviness or aching about the chest, neck, axilla or epigastrium.  RESPIRATORY:  No cough, shortness of breath, PND or orthopnea.  GASTROINTESTINAL:  No nausea, vomiting or diarrhea.  GENITOURINARY:  No dysuria, frequency or urgency. No Blood in urine  MUSCULOSKELETAL:   moves all joints  SKIN:  No change in skin, hair or nails.  NEUROLOGIC:  No paresthesias, fasciculations, seizures or weakness.  PSYCHIATRIC:  No disorder of thought or mood.  ENDOCRINE:  No heat or cold intolerance, polyuria or polydipsia.  HEMATOLOGICAL:  No easy bruising or bleeding.            Physical Exam:  ICU Vital Signs Last 24 Hrs  T(C): 36.3 (07 Jan 2022 11:20), Max: 36.6 (06 Jan 2022 19:29)  T(F): 97.3 (07 Jan 2022 11:20), Max: 97.8 (06 Jan 2022 19:29)  HR: 93 (07 Jan 2022 11:20) (86 - 97)  BP: 122/75 (07 Jan 2022 11:20) (106/71 - 137/80)  BP(mean): --  ABP: --  ABP(mean): --  RR: 22 (07 Jan 2022 11:20) (18 - 22)  SpO2: 96% (07 Jan 2022 11:20) (87% - 99%)    GEN: NAD,   HEENT: normocephalic and atraumatic. EOMI. CLARK.    NECK: Supple. No carotid bruits.  No lymphadenopathy or thyromegaly.  LUNGS: Clear to auscultation.  HEART: Regular rate and rhythm without murmur.  ABDOMEN: Soft, nontender, and nondistended.  Positive bowel sounds.    : No CVA tenderness  EXTREMITIES: Without any cyanosis, clubbing, rash, lesions or edema.  MSK: no joint swelling  NEUROLOGIC: Cranial nerves II through XII are grossly intact.  PSYCHIATRIC: Appropriate affect .  SKIN: No ulceration or induration present.        Labs:  Vitals:  ============  T(F): 97.3 (07 Jan 2022 11:20), Max: 97.8 (06 Jan 2022 19:29)  HR: 93 (07 Jan 2022 11:20)  BP: 122/75 (07 Jan 2022 11:20)  RR: 22 (07 Jan 2022 11:20)  SpO2: 96% (07 Jan 2022 11:20) (87% - 99%)  temp max in last 48H T(F): , Max: 99.6 (01-05-22 @ 21:20)    =======================================================  Current Antibiotics:  remdesivir  IVPB   IV Intermittent   remdesivir  IVPB 100 milliGRAM(s) IV Intermittent every 24 hours    Other medications:  dexAMETHasone  Injectable 6 milliGRAM(s) IV Push daily  heparin  Infusion.  Unit(s)/Hr IV Continuous <Continuous>  lactated ringers. 1000 milliLiter(s) IV Continuous <Continuous>      =======================================================  Labs:                        12.8   17.58 )-----------( 283      ( 07 Jan 2022 07:17 )             38.1     01-07    139  |  98  |  44.2<H>  ----------------------------<  141<H>  4.3   |  23.0  |  1.28    Ca    8.4<L>      07 Jan 2022 07:17  Phos  5.5     01-06  Mg     2.2     01-07    TPro  6.3<L>  /  Alb  3.3  /  TBili  0.7  /  DBili  x   /  AST  50<H>  /  ALT  18  /  AlkPhos  162<H>  01-07      Culture - Blood (collected 01-06-22 @ 01:40)  Source: .Blood Blood-Peripheral  Gram Stain (01-07-22 @ 08:29):    Growth in anaerobic bottle: Gram Positive Cocci in Clusters    ***Blood Panel PCR results on this specimen are available    approximately 3 hours after the Gram stain result.***    Gram stain, PCR, and/or culture results may not always    correspond due todifference in methodologies.    ************************************************************    This PCR assay was performed using [employer].    The following targets are tested for: Enterococcus,    vancomycin resistant enterococci, Listeria monocytogenes,    coagulase negative staphylococci, S. aureus,    methicillin resistant S. aureus, Streptococcus agalactiae    (Group B), S. pneumoniae, S. pyogenes (Group A),    Acinetobacter baumannii, Enterobacter cloacae, E. coli,    Klebsiella oxytoca, K. pneumoniae, Proteus sp.,    Serratia marcescens, Haemophilus influenzae,    Neisseria meningitidis, Pseudomonas aeruginosa, Candida    albicans, C. glabrata, C krusei, C parapsilosis,    C. tropicalis and the KPC resistance gene.    Gram Stain and BCID performed by:    [facility]    [address] [place] [address]    .    TYPE: (C=Critical, N=Notification, A=Abnormal) C    TESTS:  _ GS    DATE/TIME CALLED: _ 01/07/2022 08:29:21    CALLED TO: Padmaja Stahl RN    READ BACK (2 Patient Identifiers)(Y/N): _ Y    READ BACK VALUES (Y/N): _ Y    CALLED BY: Padmaja Forte  Organism: Blood Culture PCR (01-07-22 @ 08:06)  Organism: Blood Culture PCR (01-07-22 @ 08:06)    Sensitivities:      -  Coagulase negative Staphylococcus: Detec      Method Type: PCR    Culture - Blood (collected 01-06-22 @ 01:40)  Source: .Blood Blood-Peripheral  Gram Stain (01-07-22 @ 08:29):    Growth in aerobic bottle: Gram Positive Cocci in Clusters    Gram Stain performed by:    [facility]    [address] [place] [address]    .    TYPE: (C=Critical, N=Notification, A=Abnormal) C    TESTS:  _ GS    DATE/TIME CALLED: _ 01/07/2022 08:28:40    CALLED TO: Padmaja Stahl RN    READ BACK (2 Patient Identifiers)(Y/N): _ Y    READ BACK VALUES (Y/N): _ Y    CALLED BY: Padmaja Forte      Creatinine, Serum: 1.28 mg/dL (01-07-22 @ 07:17)  Creatinine, Serum: 1.56 mg/dL (01-06-22 @ 14:45)  Creatinine, Serum: 1.87 mg/dL (01-06-22 @ 07:03)  Creatinine, Serum: 2.09 mg/dL (01-05-22 @ 20:53)    Procalcitonin, Serum: 1.56 ng/mL (01-07-22 @ 07:17)    D-Dimer Assay, Quantitative: 1208 ng/mL DDU (01-07-22 @ 07:17)    Ferritin, Serum: 395 ng/mL (01-07-22 @ 07:17)    C-Reactive Protein, Serum: 202 mg/L (01-07-22 @ 07:17)    WBC Count: 17.58 K/uL (01-07-22 @ 07:17)  WBC Count: 19.49 K/uL (01-06-22 @ 14:46)  WBC Count: 9.33 K/uL (01-06-22 @ 13:41)  WBC Count: 13.93 K/uL (01-06-22 @ 07:03)  WBC Count: 8.48 K/uL (01-05-22 @ 20:53)      SARS-CoV-2 Result: Detected (01-05-22 @ 20:59)    Lactate Dehydrogenase, Serum: 342 U/L (01-07-22 @ 07:17)    Alkaline Phosphatase, Serum: 162 U/L (01-07-22 @ 07:17)  Alkaline Phosphatase, Serum: 197 U/L (01-06-22 @ 07:03)  Alkaline Phosphatase, Serum: 246 U/L (01-05-22 @ 20:53)  Alanine Aminotransferase (ALT/SGPT): 18 U/L (01-07-22 @ 07:17)  Alanine Aminotransferase (ALT/SGPT): 15 U/L (01-06-22 @ 07:03)  Alanine Aminotransferase (ALT/SGPT): 17 U/L (01-05-22 @ 20:53)  Aspartate Aminotransferase (AST/SGOT): 50 U/L (01-07-22 @ 07:17)  Aspartate Aminotransferase (AST/SGOT): 36 U/L (01-06-22 @ 07:03)  Aspartate Aminotransferase (AST/SGOT): 35 U/L (01-05-22 @ 20:53)  Bilirubin Total, Serum: 0.7 mg/dL (01-07-22 @ 07:17)  Bilirubin Total, Serum: 0.8 mg/dL (01-06-22 @ 07:03)  Bilirubin Total, Serum: 0.9 mg/dL (01-05-22 @ 20:53)

## 2022-01-07 NOTE — PROGRESS NOTE ADULT - ASSESSMENT
87y/oF PMH HTN, afib on xarelto, spinal stenosis/lbp, chronic pain syndrome bibems from home unresponsive, hypoxic, with constricted pupils admitted with acute hypoxic respiratory failure and ams    metabolic encephalopathy   acute hypoxic respiratory failure 2/2 COVID-19 PNA   -pt received J&J vaccine 3/11/21   -cth neg for acute findings   -unresponsive to narcan 4mg iv in ER   -titrate supplemental o2 as tolerated, seen on hfnc   -decadron, remdesivir   -ID consult appreciated  -dysphagia screen  -swallow eval: puree    positive blood culture   -blood cx coag negative staph ?contaminant, f/u repeat     SORAIDA vs CKD   AGMA   Hyponatremia   -baseline sCr unclear  -cont ivf   -cont to monitor     chronic afib   -holding home xarelto due to unresponsive on arrival   -cont heparin gtt  -transition to xarelto when clinically improving, as per son, pt alternates 15 and 20mg    dysphagia   -hx vocal cord paralysis as per son   -puree diet     chronic back pain   -UDS: +THC   -s/p narcan in ER   -holding gabapentin, cbd   -cont tramadol, hold for lethargy     pt's sonBony [phone], updated

## 2022-01-07 NOTE — SWALLOW BEDSIDE ASSESSMENT ADULT - SLP GENERAL OBSERVATIONS
Pt received & seen seated upright via stretcher in ED, awake/alert, on HFNC: 80% with sats: 97%, hoarse vocal quality, reduced hearing acuity, decreased cognition, 0/10 pain

## 2022-01-07 NOTE — PROGRESS NOTE ADULT - SUBJECTIVE AND OBJECTIVE BOX
[facility]    [id number]    87y      Female    CC: ams    INTERVAL HPI/OVERNIGHT EVENTS: pt seen and examined. seen on hfnc    REVIEW OF SYSTEMS:    CONSTITUTIONAL: No fever  RESPIRATORY: No wheezing, hemoptysis  CARDIOVASCULAR: No chest pain, palpitations  GASTROINTESTINAL: No abdominal or epigastric pain. No nausea, vomiting  NEUROLOGICAL: No headaches    Vital Signs Last 24 Hrs  T(C): 36.3 (2022 11:20), Max: 36.6 (2022 19:29)  T(F): 97.3 (2022 11:20), Max: 97.8 (2022 19:29)  HR: 93 (2022 11:20) (86 - 97)  BP: 122/75 (2022 11:20) (106/71 - 137/80)  BP(mean): --  RR: 22 (2022 11:20) (18 - 22)  SpO2: 96% (2022 11:20) (87% - 99%)    PHYSICAL EXAM:    GENERAL: NAD  HEENT: PERRL, +EOMI  NECK: soft, supple  CHEST/LUNG: decreased breath sounds b/l bases; respirations unlabored on hfnc   HEART: S1S2+, Regular rate and rhythm  ABDOMEN: Soft, Nontender, Nondistended; Bowel sounds present  SKIN: warm, dry  NEURO: Awake, alert; non-focal  PSYCH: normal affect     LABS:                        12.8   17.58 )-----------( 283      ( 2022 07:17 )             38.1     01-07    139  |  98  |  44.2<H>  ----------------------------<  141<H>  4.3   |  23.0  |  1.28    Ca    8.4<L>      2022 07:17  Phos  5.5     -06  Mg     2.2     -07    TPro  6.3<L>  /  Alb  3.3  /  TBili  0.7  /  DBili  x   /  AST  50<H>  /  ALT  18  /  AlkPhos  162<H>  01-07    PT/INR - ( 2022 15:30 )   PT: 19.2 sec;   INR: 1.70 ratio         PTT - ( 2022 07:17 )  PTT:130.5 sec  Urinalysis Basic - ( 2022 01:25 )    Color: Yellow / Appearance: Slightly Turbid / S.010 / pH: x  Gluc: x / Ketone: Small  / Bili: Negative / Urobili: 1 mg/dL   Blood: x / Protein: 30 mg/dL / Nitrite: Negative   Leuk Esterase: Trace / RBC: 0-2 /HPF / WBC 3-5   Sq Epi: x / Non Sq Epi: Occasional / Bacteria: Occasional          MEDICATIONS  (STANDING):  dexAMETHasone  Injectable 6 milliGRAM(s) IV Push daily  heparin  Infusion.  Unit(s)/Hr (11 mL/Hr) IV Continuous <Continuous>  lactated ringers. 1000 milliLiter(s) (100 mL/Hr) IV Continuous <Continuous>  remdesivir  IVPB   IV Intermittent   remdesivir  IVPB 100 milliGRAM(s) IV Intermittent every 24 hours    MEDICATIONS  (PRN):  acetaminophen     Tablet .. 650 milliGRAM(s) Oral every 6 hours PRN Temp greater or equal to 38C (100.4F), Mild Pain (1 - 3)  ALBUTerol    90 MICROgram(s) HFA Inhaler 2 Puff(s) Inhalation every 4 hours PRN Shortness of Breath and/or Wheezing  aluminum hydroxide/magnesium hydroxide/simethicone Suspension 30 milliLiter(s) Oral every 4 hours PRN Dyspepsia  benzonatate 100 milliGRAM(s) Oral three times a day PRN Cough  guaifenesin/dextromethorphan Oral Liquid 10 milliLiter(s) Oral every 4 hours PRN Cough  heparin   Injectable 4500 Unit(s) IV Push every 6 hours PRN For aPTT less than 40  heparin   Injectable 2000 Unit(s) IV Push every 6 hours PRN For aPTT between 40 - 57  melatonin 3 milliGRAM(s) Oral at bedtime PRN Insomnia  ondansetron Injectable 4 milliGRAM(s) IV Push every 8 hours PRN Nausea and/or Vomiting  traMADol 25 milliGRAM(s) Oral two times a day PRN Severe Pain (7 - 10)      RADIOLOGY & ADDITIONAL TESTS:

## 2022-01-07 NOTE — PROGRESS NOTE ADULT - ASSESSMENT
87yr woman  PMH HTN, afib, spinal stenosis with chronic pain syndrome admitted with acute hypoxemic respiratory failure with COVID PNA    1. Acute Hypoxemic Respiratory Failure  Weaned to 90% HF  Close monitoring of O2  Patient is DNR/I  US duplex ordered    2.  COVID PNA  Started Remedesivir and Decadron   monitor inflammatory markers  Noted ID consult     3.  Chronic Pain Syndrome- hx spinal stenosis  cont Tramadol PRN  recommend IV Tylenol PRN   patient reported to be on Gabapentin - more alert this am, consider readding  monitor for sedation     4.  Dysphagia  Repeat bedside swallow done today  Spoke with SLP - dory alicia no liquids  Will need to eventual follow up for liquids  Aspiration precautions    5.  Anemia  Repeat Hg 14.1   no sign of active bleeding    6. SORAIDA  avoid nephrotoxic agents  close monitoring    7.  Encounter for Palliative Care  Continuing medical management, weaning Hiflo with close monitoring of O2  Eventual repeat SLP for liquids   Per documentation in  -  DNR/DNI code status confirmed by ED

## 2022-01-07 NOTE — SWALLOW BEDSIDE ASSESSMENT ADULT - SWALLOW EVAL: DIAGNOSIS
Oral dysphagia noted for assessed trials of puree, impacted by cognition. Pharyngeal stage of swallow clinically unremarkable for puree: no overt s/s aspiration noted. Pt however, is at risk , given cognition, therefore, no other PO trials were administered, at this time

## 2022-01-07 NOTE — PATIENT PROFILE ADULT - FALL HARM RISK - HARM RISK INTERVENTIONS
Assistance with ambulation/Assistance OOB with selected safe patient handling equipment/Communicate Risk of Fall with Harm to all staff/Monitor for mental status changes/Reinforce activity limits and safety measures with patient and family/Reorient to person, place and time as needed/Tailored Fall Risk Interventions/Use of alarms - bed, chair and/or voice tab/Visual Cue: Yellow wristband and red socks/Bed in lowest position, wheels locked, appropriate side rails in place/Call bell, personal items and telephone in reach/Instruct patient to call for assistance before getting out of bed or chair/Non-slip footwear when patient is out of bed/[place] to call system/Physically safe environment - no spills, clutter or unnecessary equipment/Purposeful Proactive Rounding/Room/bathroom lighting operational, light cord in reach

## 2022-01-07 NOTE — SWALLOW BEDSIDE ASSESSMENT ADULT - ORAL PHASE
impacted by reduced attention to bolus/Decreased anterior-posterior movement of the bolus/Delayed oral transit time

## 2022-01-07 NOTE — PROGRESS NOTE ADULT - ASSESSMENT
87F with PMHX HTN, AFib on Xarelto, Spinal Stenosis/LBP, Chronic Pain Syndrome BIBEMS from home unresponsive, hypoxic, with constricted pupils bilaterally. Patient last seen lethargic but awake/arousable at eating approx 1430PM today then went back to bed per family. Patient hypoxic en route reportedly satting 70s on 10L NC per EMS. Hypoxia improved on HFNC/NRB in ED satting 99% on 40L 100% FIO2 HFNC at time of exam. Patient given 4mg Narcan IV x1 in ED for opiate reversal without much improvement. Minimally responsive on exam. Withdraws to painful stimuli. DNR/DNI code status confirmed by ED. Patient COVID positive and placed in isolation. Unable to provide further HPI/ROS due to current mental status.  PT EXTREMELY HARD OF HEARING  AS ABOVE WITH COVID ON HIGH LULA  PT STARTED ON REMDESIIVIR/ DECADRON  ANTICOAGULATE AS PER PROTOCOLS  PT ALREADY ON HIGH FLOW   APPEARS CLINICALLY IMPROVED ON HI LULA   MORE AWAKE AND ALERT  WILL FOLLOWUP

## 2022-01-07 NOTE — PROGRESS NOTE ADULT - SUBJECTIVE AND OBJECTIVE BOX
CC:  follow up [facility]    OVERNIGHT EVENTS:  none reported - remains on HF    Present Symptoms:   Dyspnea: no  Nausea/Vomiting: No  Anxiety:  No  Depression: No  Fatigue: Yes   Loss of appetite:  NA has been NPO  Constipation: not reported    Pain: no            Character-            Duration-            Effect-            Factors-            Frequency-            Location-            Severity-    Pain AD Score:  [email or web address]://[email or web address]/[email or web address] (press ctrl + left click to view)    Review of Systems: Reviewed                      MEDICATIONS  (STANDING):  dexAMETHasone  Injectable 6 milliGRAM(s) IV Push daily  heparin  Infusion.  Unit(s)/Hr (11 mL/Hr) IV Continuous <Continuous>  lactated ringers. 1000 milliLiter(s) (100 mL/Hr) IV Continuous <Continuous>  remdesivir  IVPB   IV Intermittent   remdesivir  IVPB 100 milliGRAM(s) IV Intermittent every 24 hours    MEDICATIONS  (PRN):  acetaminophen     Tablet .. 650 milliGRAM(s) Oral every 6 hours PRN Temp greater or equal to 38C (100.4F), Mild Pain (1 - 3)  ALBUTerol    90 MICROgram(s) HFA Inhaler 2 Puff(s) Inhalation every 4 hours PRN Shortness of Breath and/or Wheezing  aluminum hydroxide/magnesium hydroxide/simethicone Suspension 30 milliLiter(s) Oral every 4 hours PRN Dyspepsia  benzonatate 100 milliGRAM(s) Oral three times a day PRN Cough  guaifenesin/dextromethorphan Oral Liquid 10 milliLiter(s) Oral every 4 hours PRN Cough  heparin   Injectable 4500 Unit(s) IV Push every 6 hours PRN For aPTT less than 40  heparin   Injectable 2000 Unit(s) IV Push every 6 hours PRN For aPTT between 40 - 57  melatonin 3 milliGRAM(s) Oral at bedtime PRN Insomnia  ondansetron Injectable 4 milliGRAM(s) IV Push every 8 hours PRN Nausea and/or Vomiting  traMADol 25 milliGRAM(s) Oral two times a day PRN Severe Pain (7 - 10)      PHYSICAL EXAM:    Vital Signs Last 24 Hrs  T(C): 36.3 (2022 11:20), Max: 36.6 (2022 19:29)  T(F): 97.3 (2022 11:20), Max: 97.8 (2022 19:29)  HR: 93 (2022 11:20) (86 - 97)  BP: 122/75 (2022 11:20) (106/71 - 137/80)  BP(mean): --  RR: 22 (2022 11:20) (18 - 22)  SpO2: 96% (2022 11:20) (87% - 99%)    General: Awake alert x2  NAD  Karnofsky:  %  HEENT: normal    Lungs: comfortable  CV: normal  tachycardia  GI: normal - soft NTND  : normal  MSK:  weakness    Skin: normal  pressure ulcers- Stage_____  no rash    LABS:                          12.8   17.58 )-----------( 283      ( 2022 07:17 )             38.1     01-07    139  |  98  |  44.2<H>  ----------------------------<  141<H>  4.3   |  23.0  |  1.28    Ca    8.4<L>      2022 07:17  Phos  5.5     01-06  Mg     2.2     01-07    TPro  6.3<L>  /  Alb  3.3  /  TBili  0.7  /  DBili  x   /  AST  50<H>  /  ALT  18  /  AlkPhos  162<H>  01-07    PT/INR - ( 2022 15:30 )   PT: 19.2 sec;   INR: 1.70 ratio         PTT - ( 2022 07:17 )  PTT:130.5 sec  Urinalysis Basic - ( 2022 01:25 )    Color: Yellow / Appearance: Slightly Turbid / S.010 / pH: x  Gluc: x / Ketone: Small  / Bili: Negative / Urobili: 1 mg/dL   Blood: x / Protein: 30 mg/dL / Nitrite: Negative   Leuk Esterase: Trace / RBC: 0-2 /HPF / WBC 3-5   Sq Epi: x / Non Sq Epi: Occasional / Bacteria: Occasional      I&O's Summary      RADIOLOGY & ADDITIONAL STUDIES:      ADVANCE DIRECTIVES/TREATMENT PREFERENCES:   DNR/DNI code status confirmed by ED

## 2022-01-08 LAB
ALBUMIN SERPL ELPH-MCNC: 2.8 G/DL — LOW (ref 3.3–5.2)
ALP SERPL-CCNC: 114 U/L — SIGNIFICANT CHANGE UP (ref 40–120)
ALT FLD-CCNC: 16 U/L — SIGNIFICANT CHANGE UP
ANION GAP SERPL CALC-SCNC: 16 MMOL/L — SIGNIFICANT CHANGE UP (ref 5–17)
APTT BLD: 60.1 SEC — HIGH (ref 27.5–35.5)
APTT BLD: 67.3 SEC — HIGH (ref 27.5–35.5)
AST SERPL-CCNC: 41 U/L — HIGH
BASOPHILS # BLD AUTO: 0.01 K/UL — SIGNIFICANT CHANGE UP (ref 0–0.2)
BASOPHILS NFR BLD AUTO: 0.1 % — SIGNIFICANT CHANGE UP (ref 0–2)
BILIRUB SERPL-MCNC: 0.8 MG/DL — SIGNIFICANT CHANGE UP (ref 0.4–2)
BUN SERPL-MCNC: 37.2 MG/DL — HIGH (ref 8–20)
CALCIUM SERPL-MCNC: 7.8 MG/DL — LOW (ref 8.6–10.2)
CHLORIDE SERPL-SCNC: 103 MMOL/L — SIGNIFICANT CHANGE UP (ref 98–107)
CK MB CFR SERPL CALC: 5.1 NG/ML — SIGNIFICANT CHANGE UP (ref 0–6.7)
CK SERPL-CCNC: 281 U/L — HIGH (ref 25–170)
CO2 SERPL-SCNC: 22 MMOL/L — SIGNIFICANT CHANGE UP (ref 22–29)
CREAT SERPL-MCNC: 0.7 MG/DL — SIGNIFICANT CHANGE UP (ref 0.5–1.3)
CULTURE RESULTS: SIGNIFICANT CHANGE UP
EOSINOPHIL # BLD AUTO: 0 K/UL — SIGNIFICANT CHANGE UP (ref 0–0.5)
EOSINOPHIL NFR BLD AUTO: 0 % — SIGNIFICANT CHANGE UP (ref 0–6)
GLUCOSE SERPL-MCNC: 109 MG/DL — HIGH (ref 70–99)
HCT VFR BLD CALC: 29.6 % — LOW (ref 34.5–45)
HGB BLD-MCNC: 10.3 G/DL — LOW (ref 11.5–15.5)
IMM GRANULOCYTES NFR BLD AUTO: 0.6 % — SIGNIFICANT CHANGE UP (ref 0–1.5)
LYMPHOCYTES # BLD AUTO: 0.58 K/UL — LOW (ref 1–3.3)
LYMPHOCYTES # BLD AUTO: 5.3 % — LOW (ref 13–44)
MAGNESIUM SERPL-MCNC: 2.1 MG/DL — SIGNIFICANT CHANGE UP (ref 1.6–2.6)
MCHC RBC-ENTMCNC: 29.9 PG — SIGNIFICANT CHANGE UP (ref 27–34)
MCHC RBC-ENTMCNC: 34.8 GM/DL — SIGNIFICANT CHANGE UP (ref 32–36)
MCV RBC AUTO: 85.8 FL — SIGNIFICANT CHANGE UP (ref 80–100)
MONOCYTES # BLD AUTO: 1.01 K/UL — HIGH (ref 0–0.9)
MONOCYTES NFR BLD AUTO: 9.3 % — SIGNIFICANT CHANGE UP (ref 2–14)
NEUTROPHILS # BLD AUTO: 9.21 K/UL — HIGH (ref 1.8–7.4)
NEUTROPHILS NFR BLD AUTO: 84.7 % — HIGH (ref 43–77)
ORGANISM # SPEC MICROSCOPIC CNT: SIGNIFICANT CHANGE UP
PHOSPHATE SERPL-MCNC: 1.8 MG/DL — LOW (ref 2.4–4.7)
PLATELET # BLD AUTO: 241 K/UL — SIGNIFICANT CHANGE UP (ref 150–400)
POTASSIUM SERPL-MCNC: 3.7 MMOL/L — SIGNIFICANT CHANGE UP (ref 3.5–5.3)
POTASSIUM SERPL-SCNC: 3.7 MMOL/L — SIGNIFICANT CHANGE UP (ref 3.5–5.3)
PROT SERPL-MCNC: 5.1 G/DL — LOW (ref 6.6–8.7)
RBC # BLD: 3.45 M/UL — LOW (ref 3.8–5.2)
RBC # FLD: 14.6 % — HIGH (ref 10.3–14.5)
SODIUM SERPL-SCNC: 141 MMOL/L — SIGNIFICANT CHANGE UP (ref 135–145)
WBC # BLD: 10.87 K/UL — HIGH (ref 3.8–10.5)
WBC # FLD AUTO: 10.87 K/UL — HIGH (ref 3.8–10.5)

## 2022-01-08 PROCEDURE — 99233 SBSQ HOSP IP/OBS HIGH 50: CPT

## 2022-01-08 PROCEDURE — 99232 SBSQ HOSP IP/OBS MODERATE 35: CPT

## 2022-01-08 PROCEDURE — 93970 EXTREMITY STUDY: CPT | Mod: 26

## 2022-01-08 RX ADMIN — HEPARIN SODIUM 800 UNIT(S)/HR: 5000 INJECTION INTRAVENOUS; SUBCUTANEOUS at 01:58

## 2022-01-08 RX ADMIN — Medication 6 MILLIGRAM(S): at 05:19

## 2022-01-08 RX ADMIN — REMDESIVIR 500 MILLIGRAM(S): 5 INJECTION INTRAVENOUS at 05:18

## 2022-01-08 RX ADMIN — TRAMADOL HYDROCHLORIDE 25 MILLIGRAM(S): 50 TABLET ORAL at 15:57

## 2022-01-08 RX ADMIN — SODIUM CHLORIDE 100 MILLILITER(S): 9 INJECTION, SOLUTION INTRAVENOUS at 05:18

## 2022-01-08 RX ADMIN — HEPARIN SODIUM 800 UNIT(S)/HR: 5000 INJECTION INTRAVENOUS; SUBCUTANEOUS at 07:55

## 2022-01-08 NOTE — PROGRESS NOTE ADULT - ASSESSMENT
87y/oF PMH HTN, afib on xarelto, spinal stenosis/lbp, chronic pain syndrome bibems from home unresponsive, hypoxic, with constricted pupils admitted with acute hypoxic respiratory failure and ams    metabolic encephalopathy   acute hypoxic respiratory failure 2/2 COVID-19 PNA   -pt received J&J vaccine 3/11/21   -cth neg for acute findings   -unresponsive to narcan 4mg iv in ER   -titrate supplemental o2 as tolerated, seen on 5LNC today  -decadron, remdesivir   -ID consult appreciated  -dysphagia screen  -swallow eval: puree  -f/u repeat urine studies, c/o dysuria    positive blood culture   -blood cx coag negative staph ?contaminant, f/u repeat     SORAIDA vs CKD   AGMA   Hyponatremia   -baseline sCr unclear  -cont ivf   -cont to monitor     chronic afib   -holding home xarelto due to unresponsive on arrival   -cont heparin gtt  -transition to xarelto when clinically improving, as per son, pt alternates 15 and 20mg    dysphagia   -hx vocal cord paralysis as per son   -puree diet     chronic back pain   -UDS: +THC   -s/p narcan in ER   -holding gabapentin, cbd   -cont tramadol, hold for lethargy

## 2022-01-08 NOTE — PROGRESS NOTE ADULT - SUBJECTIVE AND OBJECTIVE BOX
[facility]    [id number]    87y      Female    CC: ams    INTERVAL HPI/OVERNIGHT EVENTS: pt seen and examined. now on NC. much more alert today. +dysuria    REVIEW OF SYSTEMS:    CONSTITUTIONAL: No weight loss  RESPIRATORY: No cough, wheezing, hemoptysis; No shortness of breath  CARDIOVASCULAR: No chest pain, palpitations  GASTROINTESTINAL: No abdominal or epigastric pain. No nausea, vomiting  NEUROLOGICAL: No headaches    Vital Signs Last 24 Hrs  T(C): 36.7 (2022 06:46), Max: 36.8 (2022 19:00)  T(F): 98.1 (2022 06:46), Max: 98.3 (2022 19:00)  HR: 101 (2022 06:46) (90 - 101)  BP: 141/69 (2022 06:46) (135/65 - 141/69)  BP(mean): --  RR: 18 (2022 06:46) (18 - 18)  SpO2: 95% (2022 06:46) (95% - 96%)    PHYSICAL EXAM:    GENERAL: NAD  HEENT: PERRL, +EOMI  NECK: soft, supple  CHEST/LUNG: decreased breath sounds b/l bases; respirations unlabored on hfnc   HEART: S1S2+, Regular rate and rhythm  ABDOMEN: Soft, Nontender, Nondistended; Bowel sounds present  SKIN: warm, dry  NEURO: A&Ox3  PSYCH: normal affect     LABS:                        9.9    13.34 )-----------( 272      ( 2022 05:55 )             29.7     01-08    141  |  103  |  37.2<H>  ----------------------------<  109<H>  3.7   |  22.0  |  0.70    Ca    7.8<L>      2022 07:07  Phos  1.8     01-08  Mg     2.1     01-08    TPro  5.1<L>  /  Alb  2.8<L>  /  TBili  0.8  /  DBili  x   /  AST  41<H>  /  ALT  16  /  AlkPhos  114  01-08    PTT - ( 2022 05:55 )  PTT:44.9 sec  Urinalysis Basic - ( 2022 02:50 )    Color: Yellow / Appearance: very cloudy / S.010 / pH: x  Gluc: x / Ketone: Small  / Bili: Negative / Urobili: Negative   Blood: x / Protein: 30 mg/dL / Nitrite: Positive   Leuk Esterase: Moderate / RBC: 3-5 /HPF / WBC 6-10   Sq Epi: x / Non Sq Epi: Occasional / Bacteria: Moderate          MEDICATIONS  (STANDING):  cefTRIAXone   IVPB 1000 milliGRAM(s) IV Intermittent every 24 hours  dexAMETHasone  Injectable 6 milliGRAM(s) IV Push daily  heparin  Infusion.  Unit(s)/Hr (11 mL/Hr) IV Continuous <Continuous>  remdesivir  IVPB   IV Intermittent   remdesivir  IVPB 100 milliGRAM(s) IV Intermittent every 24 hours    MEDICATIONS  (PRN):  acetaminophen     Tablet .. 650 milliGRAM(s) Oral every 6 hours PRN Temp greater or equal to 38C (100.4F), Mild Pain (1 - 3)  ALBUTerol    90 MICROgram(s) HFA Inhaler 2 Puff(s) Inhalation every 4 hours PRN Shortness of Breath and/or Wheezing  aluminum hydroxide/magnesium hydroxide/simethicone Suspension 30 milliLiter(s) Oral every 4 hours PRN Dyspepsia  benzonatate 100 milliGRAM(s) Oral three times a day PRN Cough  guaifenesin/dextromethorphan Oral Liquid 10 milliLiter(s) Oral every 4 hours PRN Cough  heparin   Injectable 4500 Unit(s) IV Push every 6 hours PRN For aPTT less than 40  heparin   Injectable 2000 Unit(s) IV Push every 6 hours PRN For aPTT between 40 - 57  melatonin 3 milliGRAM(s) Oral at bedtime PRN Insomnia  ondansetron Injectable 4 milliGRAM(s) IV Push every 8 hours PRN Nausea and/or Vomiting  traMADol 25 milliGRAM(s) Oral two times a day PRN Severe Pain (7 - 10)      RADIOLOGY & ADDITIONAL TESTS:

## 2022-01-08 NOTE — PROGRESS NOTE ADULT - SUBJECTIVE AND OBJECTIVE BOX
INFECTIOUS DISEASES AND INTERNAL MEDICINE at [facility]  =======================================================  Sami Miguel MD  Diplomates American Board of Internal Medicine and Infectious Diseases  Telephone [phone]  Fax            [phone]  =======================================================    [place], [place] [address]    Follow up: COVID   now on 5l nc  states her name is Heather knows she is in "[facility]" unsure of year    Allergies:  Allergy Status Unknown      Medications:  acetaminophen     Tablet .. 650 milliGRAM(s) Oral every 6 hours PRN  ALBUTerol    90 MICROgram(s) HFA Inhaler 2 Puff(s) Inhalation every 4 hours PRN  aluminum hydroxide/magnesium hydroxide/simethicone Suspension 30 milliLiter(s) Oral every 4 hours PRN  benzonatate 100 milliGRAM(s) Oral three times a day PRN  dexAMETHasone  Injectable 6 milliGRAM(s) IV Push daily  guaifenesin/dextromethorphan Oral Liquid 10 milliLiter(s) Oral every 4 hours PRN  heparin   Injectable 4500 Unit(s) IV Push every 6 hours PRN  heparin   Injectable 2000 Unit(s) IV Push every 6 hours PRN  heparin  Infusion.  Unit(s)/Hr IV Continuous <Continuous>  lactated ringers. 1000 milliLiter(s) IV Continuous <Continuous>  melatonin 3 milliGRAM(s) Oral at bedtime PRN  ondansetron Injectable 4 milliGRAM(s) IV Push every 8 hours PRN  remdesivir  IVPB   IV Intermittent   remdesivir  IVPB 100 milliGRAM(s) IV Intermittent every 24 hours  traMADol 25 milliGRAM(s) Oral two times a day PRN      REVIEW OF SYSTEMS:  CONSTITUTIONAL:  No Fever or chills  HEENT:   No diplopia or blurred vision.  No earache, sore throat or runny nose.  CARDIOVASCULAR:  No pressure, squeezing, strangling, tightness, heaviness or aching about the chest, neck, axilla or epigastrium.  RESPIRATORY:  No cough, shortness of breath, PND or orthopnea.  GASTROINTESTINAL:  No nausea, vomiting or diarrhea.  GENITOURINARY:  No dysuria, frequency or urgency. No Blood in urine  MUSCULOSKELETAL:   moves all joints  SKIN:  No change in skin, hair or nails.  NEUROLOGIC:  No paresthesias, fasciculations, seizures or weakness.  PSYCHIATRIC:  No disorder of thought or mood.  ENDOCRINE:  No heat or cold intolerance, polyuria or polydipsia.  HEMATOLOGICAL:  No easy bruising or bleeding.            Physical Exam:  ICU Vital Signs Last 24 Hrs  T(C): 36.3 (07 Jan 2022 11:20), Max: 36.6 (06 Jan 2022 19:29)  T(F): 97.3 (07 Jan 2022 11:20), Max: 97.8 (06 Jan 2022 19:29)  HR: 93 (07 Jan 2022 11:20) (86 - 97)  BP: 122/75 (07 Jan 2022 11:20) (106/71 - 137/80)  BP(mean): --  ABP: --  ABP(mean): --  RR: 22 (07 Jan 2022 11:20) (18 - 22)  SpO2: 96% (07 Jan 2022 11:20) (87% - 99%)    GEN: NAD,   HEENT: normocephalic and atraumatic. EOMI. CLARK.    NECK: Supple. No carotid bruits.  No lymphadenopathy or thyromegaly.  LUNGS: Clear to auscultation.  HEART: Regular rate and rhythm without murmur.  ABDOMEN: Soft, nontender, and nondistended.  Positive bowel sounds.    : No CVA tenderness  EXTREMITIES: Without any cyanosis, clubbing, rash, lesions or edema.  MSK: no joint swelling  NEUROLOGIC: Cranial nerves II through XII are grossly intact.  PSYCHIATRIC: Appropriate affect .  SKIN: No ulceration or induration present.          Vitals:  ============  Vital Signs Last 24 Hrs  T(C): 36.8 (08 Jan 2022 19:00), Max: 36.9 (08 Jan 2022 11:18)  T(F): 98.3 (08 Jan 2022 19:00), Max: 98.4 (08 Jan 2022 11:18)  HR: 90 (08 Jan 2022 19:00) (68 - 95)  BP: 135/65 (08 Jan 2022 19:00) (115/60 - 135/65)  BP(mean): --  RR: 18 (08 Jan 2022 19:00) (18 - 18)  SpO2: 96% (08 Jan 2022 19:00) (94% - 96%)    =======================================================  Current Antibiotics:  remdesivir  IVPB   IV Intermittent   remdesivir  IVPB 100 milliGRAM(s) IV Intermittent every 24 hours    Other medications:  dexAMETHasone  Injectable 6 milliGRAM(s) IV Push daily  heparin  Infusion.  Unit(s)/Hr IV Continuous <Continuous>  lactated ringers. 1000 milliLiter(s) IV Continuous <Continuous>      =======================================================  Labs:                                    10.3   10.87 )-----------( 241      ( 08 Jan 2022 07:04 )             29.6       01-08    141  |  103  |  37.2<H>  ----------------------------<  109<H>  3.7   |  22.0  |  0.70    Ca    7.8<L>      08 Jan 2022 07:07  Phos  1.8     01-08  Mg     2.1     01-08    TPro  5.1<L>  /  Alb  2.8<L>  /  TBili  0.8  /  DBili  x   /  AST  41<H>  /  ALT  16  /  AlkPhos  114  01-08                  PTT - ( 08 Jan 2022 07:05 )  PTT:60.1 sec    CARDIAC MARKERS ( 08 Jan 2022 07:07 )  x     / x     / 281 U/L / x     / 5.1 ng/mL  CARDIAC MARKERS ( 07 Jan 2022 07:17 )  x     / x     / 682 U/L / x     / 12.0 ng/mL        CAPILLARY BLOOD GLUCOSE      POCT Blood Glucose.: 131 mg/dL (07 Jan 2022 17:13)                Culture - Blood (collected 01-06-22 @ 01:40)  Source: .Blood Blood-Peripheral  Gram Stain (01-07-22 @ 08:29):    Growth in anaerobic bottle: Gram Positive Cocci in Clusters    ***Blood Panel PCR results on this specimen are available    approximately 3 hours after the Gram stain result.***    Gram stain, PCR, and/or culture results may not always    correspond due todifference in methodologies.    ************************************************************    This PCR assay was performed using [employer].    The following targets are tested for: Enterococcus,    vancomycin resistant enterococci, Listeria monocytogenes,    coagulase negative staphylococci, S. aureus,    methicillin resistant S. aureus, Streptococcus agalactiae    (Group B), S. pneumoniae, S. pyogenes (Group A),    Acinetobacter baumannii, Enterobacter cloacae, E. coli,    Klebsiella oxytoca, K. pneumoniae, Proteus sp.,    Serratia marcescens, Haemophilus influenzae,    Neisseria meningitidis, Pseudomonas aeruginosa, Candida    albicans, C. glabrata, C krusei, C parapsilosis,    C. tropicalis and the KPC resistance gene.    Gram Stain and BCID performed by:    [facility]    [address] [place] [address]    .    TYPE: (C=Critical, N=Notification, A=Abnormal) C    TESTS:  _ GS    DATE/TIME CALLED: _ 01/07/2022 08:29:21    CALLED TO: Padmaja Stahl RN    READ BACK (2 Patient Identifiers)(Y/N): _ Y    READ BACK VALUES (Y/N): _ Y    CALLED BY: Padmaja Forte  Organism: Blood Culture PCR (01-07-22 @ 08:06)  Organism: Blood Culture PCR (01-07-22 @ 08:06)    Sensitivities:      -  Coagulase negative Staphylococcus: Detec      Method Type: PCR    Culture - Blood (collected 01-06-22 @ 01:40)  Source: .Blood Blood-Peripheral  Gram Stain (01-07-22 @ 08:29):    Growth in aerobic bottle: Gram Positive Cocci in Clusters    Gram Stain performed by:    [facility]    [address] [place] [id number]    .    TYPE: (C=Critical, N=Notification, A=Abnormal) C    TESTS:  _ GS    DATE/TIME CALLED: _ 01/07/2022 08:28:40    CALLED TO: Padmaja Stahl RN    READ BACK (2 Patient Identifiers)(Y/N): _ Y    READ BACK VALUES (Y/N): _ Y    CALLED BY: Padmaja Forte      Creatinine, Serum: 1.28 mg/dL (01-07-22 @ 07:17)  Creatinine, Serum: 1.56 mg/dL (01-06-22 @ 14:45)  Creatinine, Serum: 1.87 mg/dL (01-06-22 @ 07:03)  Creatinine, Serum: 2.09 mg/dL (01-05-22 @ 20:53)    Procalcitonin, Serum: 1.56 ng/mL (01-07-22 @ 07:17)    D-Dimer Assay, Quantitative: 1208 ng/mL DDU (01-07-22 @ 07:17)    Ferritin, Serum: 395 ng/mL (01-07-22 @ 07:17)    C-Reactive Protein, Serum: 202 mg/L (01-07-22 @ 07:17)    WBC Count: 17.58 K/uL (01-07-22 @ 07:17)  WBC Count: 19.49 K/uL (01-06-22 @ 14:46)  WBC Count: 9.33 K/uL (01-06-22 @ 13:41)  WBC Count: 13.93 K/uL (01-06-22 @ 07:03)  WBC Count: 8.48 K/uL (01-05-22 @ 20:53)      SARS-CoV-2 Result: Detected (01-05-22 @ 20:59)    Lactate Dehydrogenase, Serum: 342 U/L (01-07-22 @ 07:17)    Alkaline Phosphatase, Serum: 162 U/L (01-07-22 @ 07:17)  Alkaline Phosphatase, Serum: 197 U/L (01-06-22 @ 07:03)  Alkaline Phosphatase, Serum: 246 U/L (01-05-22 @ 20:53)  Alanine Aminotransferase (ALT/SGPT): 18 U/L (01-07-22 @ 07:17)  Alanine Aminotransferase (ALT/SGPT): 15 U/L (01-06-22 @ 07:03)  Alanine Aminotransferase (ALT/SGPT): 17 U/L (01-05-22 @ 20:53)  Aspartate Aminotransferase (AST/SGOT): 50 U/L (01-07-22 @ 07:17)  Aspartate Aminotransferase (AST/SGOT): 36 U/L (01-06-22 @ 07:03)  Aspartate Aminotransferase (AST/SGOT): 35 U/L (01-05-22 @ 20:53)  Bilirubin Total, Serum: 0.7 mg/dL (01-07-22 @ 07:17)  Bilirubin Total, Serum: 0.8 mg/dL (01-06-22 @ 07:03)  Bilirubin Total, Serum: 0.9 mg/dL (01-05-22 @ 20:53)

## 2022-01-08 NOTE — PROGRESS NOTE ADULT - ASSESSMENT
87F with PMHX HTN, AFib on Xarelto, Spinal Stenosis/LBP, Chronic Pain Syndrome BIBEMS from home unresponsive, hypoxic, with constricted pupils bilaterally. Patient last seen lethargic but awake/arousable at eating approx 1430PM today then went back to bed per family. Patient hypoxic en route reportedly satting 70s on 10L NC per EMS. Hypoxia improved on HFNC/NRB in ED satting 99% on 40L 100% FIO2 HFNC at time of exam. Patient given 4mg Narcan IV x1 in ED for opiate reversal without much improvement. Minimally responsive on exam. Withdraws to painful stimuli. DNR/DNI code status confirmed by ED. Patient COVID positive and placed in isolation. Unable to provide further HPI/ROS due to current mental status.  PT EXTREMELY HARD OF HEARING  COVId tapered down to NC  c/w  REMDESIIVIR/ DECADRON  ANTICOAGULATE AS PER PROTOCOLS    MORE AWAKE AND ALERT      WILL FOLLOWUP

## 2022-01-09 LAB
-  AMPICILLIN/SULBACTAM: SIGNIFICANT CHANGE UP
-  CEFAZOLIN: SIGNIFICANT CHANGE UP
-  CLINDAMYCIN: SIGNIFICANT CHANGE UP
-  ERYTHROMYCIN: SIGNIFICANT CHANGE UP
-  GENTAMICIN: SIGNIFICANT CHANGE UP
-  OXACILLIN: SIGNIFICANT CHANGE UP
-  PENICILLIN: SIGNIFICANT CHANGE UP
-  RIFAMPIN: SIGNIFICANT CHANGE UP
-  TETRACYCLINE: SIGNIFICANT CHANGE UP
-  TRIMETHOPRIM/SULFAMETHOXAZOLE: SIGNIFICANT CHANGE UP
-  VANCOMYCIN: SIGNIFICANT CHANGE UP
APPEARANCE UR: ABNORMAL
APTT BLD: 44.9 SEC — HIGH (ref 27.5–35.5)
APTT BLD: 50.3 SEC — HIGH (ref 27.5–35.5)
BACTERIA # UR AUTO: ABNORMAL
BILIRUB UR-MCNC: NEGATIVE — SIGNIFICANT CHANGE UP
COLOR SPEC: YELLOW — SIGNIFICANT CHANGE UP
COMMENT - URINE: SIGNIFICANT CHANGE UP
CULTURE RESULTS: SIGNIFICANT CHANGE UP
DIFF PNL FLD: ABNORMAL
EPI CELLS # UR: SIGNIFICANT CHANGE UP
GLUCOSE UR QL: NEGATIVE — SIGNIFICANT CHANGE UP
HCT VFR BLD CALC: 29.7 % — LOW (ref 34.5–45)
HCT VFR BLD CALC: 34.9 % — SIGNIFICANT CHANGE UP (ref 34.5–45)
HGB BLD-MCNC: 12 G/DL — SIGNIFICANT CHANGE UP (ref 11.5–15.5)
HGB BLD-MCNC: 9.9 G/DL — LOW (ref 11.5–15.5)
KETONES UR-MCNC: ABNORMAL
LEUKOCYTE ESTERASE UR-ACNC: ABNORMAL
MCHC RBC-ENTMCNC: 29 PG — SIGNIFICANT CHANGE UP (ref 27–34)
MCHC RBC-ENTMCNC: 29.3 PG — SIGNIFICANT CHANGE UP (ref 27–34)
MCHC RBC-ENTMCNC: 33.3 GM/DL — SIGNIFICANT CHANGE UP (ref 32–36)
MCHC RBC-ENTMCNC: 34.4 GM/DL — SIGNIFICANT CHANGE UP (ref 32–36)
MCV RBC AUTO: 85.3 FL — SIGNIFICANT CHANGE UP (ref 80–100)
MCV RBC AUTO: 87.1 FL — SIGNIFICANT CHANGE UP (ref 80–100)
METHOD TYPE: SIGNIFICANT CHANGE UP
NITRITE UR-MCNC: POSITIVE
OB PNL STL: POSITIVE
ORGANISM # SPEC MICROSCOPIC CNT: SIGNIFICANT CHANGE UP
ORGANISM # SPEC MICROSCOPIC CNT: SIGNIFICANT CHANGE UP
PH UR: 9 — HIGH (ref 5–8)
PLATELET # BLD AUTO: 272 K/UL — SIGNIFICANT CHANGE UP (ref 150–400)
PLATELET # BLD AUTO: 323 K/UL — SIGNIFICANT CHANGE UP (ref 150–400)
PROT UR-MCNC: 30 MG/DL
RBC # BLD: 3.41 M/UL — LOW (ref 3.8–5.2)
RBC # BLD: 4.09 M/UL — SIGNIFICANT CHANGE UP (ref 3.8–5.2)
RBC # FLD: 14.5 % — SIGNIFICANT CHANGE UP (ref 10.3–14.5)
RBC # FLD: 14.6 % — HIGH (ref 10.3–14.5)
RBC CASTS # UR COMP ASSIST: ABNORMAL /HPF (ref 0–4)
SP GR SPEC: 1.01 — SIGNIFICANT CHANGE UP (ref 1.01–1.02)
SPECIMEN SOURCE: SIGNIFICANT CHANGE UP
TRI-PHOS CRY UR QL COMP ASSIST: ABNORMAL
UROBILINOGEN FLD QL: NEGATIVE — SIGNIFICANT CHANGE UP
WBC # BLD: 13.34 K/UL — HIGH (ref 3.8–10.5)
WBC # BLD: 14.4 K/UL — HIGH (ref 3.8–10.5)
WBC # FLD AUTO: 13.34 K/UL — HIGH (ref 3.8–10.5)
WBC # FLD AUTO: 14.4 K/UL — HIGH (ref 3.8–10.5)
WBC UR QL: ABNORMAL

## 2022-01-09 PROCEDURE — 74176 CT ABD & PELVIS W/O CONTRAST: CPT | Mod: 26

## 2022-01-09 PROCEDURE — 93306 TTE W/DOPPLER COMPLETE: CPT | Mod: 26

## 2022-01-09 PROCEDURE — 70450 CT HEAD/BRAIN W/O DYE: CPT | Mod: 26

## 2022-01-09 PROCEDURE — 99233 SBSQ HOSP IP/OBS HIGH 50: CPT

## 2022-01-09 RX ORDER — CEFTRIAXONE 500 MG/1
1000 INJECTION, POWDER, FOR SOLUTION INTRAMUSCULAR; INTRAVENOUS EVERY 24 HOURS
Refills: 0 | Status: COMPLETED | OUTPATIENT
Start: 2022-01-09 | End: 2022-01-11

## 2022-01-09 RX ADMIN — HEPARIN SODIUM 900 UNIT(S)/HR: 5000 INJECTION INTRAVENOUS; SUBCUTANEOUS at 08:00

## 2022-01-09 RX ADMIN — Medication 6 MILLIGRAM(S): at 06:48

## 2022-01-09 RX ADMIN — HEPARIN SODIUM 2000 UNIT(S): 5000 INJECTION INTRAVENOUS; SUBCUTANEOUS at 08:03

## 2022-01-09 RX ADMIN — SODIUM CHLORIDE 100 MILLILITER(S): 9 INJECTION, SOLUTION INTRAVENOUS at 06:47

## 2022-01-09 RX ADMIN — CEFTRIAXONE 100 MILLIGRAM(S): 500 INJECTION, POWDER, FOR SOLUTION INTRAMUSCULAR; INTRAVENOUS at 06:47

## 2022-01-09 RX ADMIN — REMDESIVIR 500 MILLIGRAM(S): 5 INJECTION INTRAVENOUS at 06:47

## 2022-01-09 NOTE — PROGRESS NOTE ADULT - SUBJECTIVE AND OBJECTIVE BOX
Patient is a 87y old  Female who presents with a chief complaint of AMS and Acute Hypoxic Respiratory Failure 2/2 COVID PNA (2022 19:53)    Patient seen and examined at bedside.     ALLERGIES:  Allergy Status Unknown    MEDICATIONS  (STANDING):  cefTRIAXone   IVPB 1000 milliGRAM(s) IV Intermittent every 24 hours  dexAMETHasone  Injectable 6 milliGRAM(s) IV Push daily  heparin  Infusion.  Unit(s)/Hr (11 mL/Hr) IV Continuous <Continuous>  remdesivir  IVPB   IV Intermittent   remdesivir  IVPB 100 milliGRAM(s) IV Intermittent every 24 hours    MEDICATIONS  (PRN):  acetaminophen     Tablet .. 650 milliGRAM(s) Oral every 6 hours PRN Temp greater or equal to 38C (100.4F), Mild Pain (1 - 3)  ALBUTerol    90 MICROgram(s) HFA Inhaler 2 Puff(s) Inhalation every 4 hours PRN Shortness of Breath and/or Wheezing  aluminum hydroxide/magnesium hydroxide/simethicone Suspension 30 milliLiter(s) Oral every 4 hours PRN Dyspepsia  benzonatate 100 milliGRAM(s) Oral three times a day PRN Cough  guaifenesin/dextromethorphan Oral Liquid 10 milliLiter(s) Oral every 4 hours PRN Cough  heparin   Injectable 4500 Unit(s) IV Push every 6 hours PRN For aPTT less than 40  heparin   Injectable 2000 Unit(s) IV Push every 6 hours PRN For aPTT between 40 - 57  melatonin 3 milliGRAM(s) Oral at bedtime PRN Insomnia  ondansetron Injectable 4 milliGRAM(s) IV Push every 8 hours PRN Nausea and/or Vomiting  traMADol 25 milliGRAM(s) Oral two times a day PRN Severe Pain (7 - 10)    Vital Signs Last 24 Hrs  T(F): 98.1 (2022 06:46), Max: 98.3 (2022 19:00)  HR: 101 (2022 06:46) (90 - 101)  BP: 141/69 (2022 06:46) (135/65 - 141/69)  RR: 18 (2022 06:46) (18 - 18)  SpO2: 95% (2022 06:46) (95% - 96%)  I&O's Summary    2022 07:01  -  2022 07:00  --------------------------------------------------------  IN: 1188 mL / OUT: 0 mL / NET: 1188 mL      PHYSICAL EXAM:  General: NAD, alert to name not to location year or president  ENT: MMM, no thrush  Neck: Supple, No JVD  Lungs: decreased breath sounds b/l bases  Cardio: +s1/s2  Abdomen: Soft, Nontender, Nondistended; Bowel sounds present  Extremities: No calf tenderness, No pitting edema    LABS:                        9.9    13.34 )-----------( 272      ( 2022 05:55 )             29.7         141  |  103  |  37.2  ----------------------------<  109  3.7   |  22.0  |  0.70    Ca    7.8      2022 07:07  Phos  1.8       Mg     2.1         TPro  5.1  /  Alb  2.8  /  TBili  0.8  /  DBili  x   /  AST  41  /  ALT  16  /  AlkPhos  114      eGFR if Non African American: 78 mL/min/1.73M2 (22 @ 07:07)  eGFR if African American: 90 mL/min/1.73M2 (22 @ 07:07)    PT/INR - ( 2022 15:30 )   PT: 19.2 sec;   INR: 1.70 ratio    PTT - ( 2022 05:55 )  PTT:44.9 sec    Lactate, Blood: 2.5 mmol/L ( @ 14:45)    Procalcitonin, Serum: 1.56 ng/mL (22 @ 07:17)    CARDIAC MARKERS ( 2022 07:07 )  x     / x     / 281 U/L / x     / 5.1 ng/mL  CARDIAC MARKERS ( 2022 07:17 )  x     / x     / 682 U/L / x     / 12.0 ng/mL    Urinalysis Basic - ( 2022 02:50 )  Color: Yellow / Appearance: very cloudy / S.010 / pH: x  Gluc: x / Ketone: Small  / Bili: Negative / Urobili: Negative   Blood: x / Protein: 30 mg/dL / Nitrite: Positive   Leuk Esterase: Moderate / RBC: 3-5 /HPF / WBC 6-10   Sq Epi: x / Non Sq Epi: Occasional / Bacteria: Moderate    Cultures  Culture - Blood (collected 2022 01:40)  Source: .Blood Blood-Peripheral  Gram Stain (2022 08:29):    Growth in anaerobic bottle: Gram Positive Cocci in Clusters   Final Report (2022 12:33):    Growth in anaerobic bottle: Staphylococcus hominis "Susceptibilities not    performed"  Organism: Blood Culture PCR (2022 12:33)      -  Coagulase negative Staphylococcus: Detec      Method Type: PCR  Organism: Blood Culture PCR (2022 08:06)    Culture - Blood (collected 2022 01:40)  Source: .Blood Blood-Peripheral  Gram Stain (2022 08:29):    Growth in aerobic bottle: Gram Positive Cocci in Clusters  Final Report (2022 08:05):    Growth in aerobic bottle: Staphylococcus hominis  Organism: Staphylococcus hominis (2022 08:05)  Organism: Staphylococcus hominis (2022 08:05)    RADIOLOGY & ADDITIONAL TESTS:  - no new tests

## 2022-01-09 NOTE — PROGRESS NOTE ADULT - ASSESSMENT
88 yo female PMH HTN, afib on xarelto, spinal stenosis/lbp, chronic pain syndrome bibems from home unresponsive, hypoxic, with constricted pupils admitted with acute hypoxic respiratory failure and ams    #acute hypoxic respiratory failure   - w/ metabolic encephalopathy  - 2/2 covid 19   - ?worsening ams - ?dementia vs cva vs hospital acquired delerium- repeat ct head   - supportive care  - ID consult appreciated  - UA positive for uti - ceftriaxone  - decadron and remdesivir  - isolation precautions  - CHECK URINE CULTURE    #positive blood culture   - probable contaminated monitor     #chronic afib   - heparin drip for now resume xarelto on d/c    #dysphagia   - hx vocal cord paralysis as per son   - puree diet     #chronic back pain   - UDS: +THC   - s/p narcan in ER   - tramadol, hold for lethargy     case discussed with patient son Bony Garnica [phone]. hospital course reviewed. all questions answered

## 2022-01-10 LAB
ANION GAP SERPL CALC-SCNC: 14 MMOL/L — SIGNIFICANT CHANGE UP (ref 5–17)
ANION GAP SERPL CALC-SCNC: 27 MMOL/L — HIGH (ref 5–17)
BUN SERPL-MCNC: 25.9 MG/DL — HIGH (ref 8–20)
BUN SERPL-MCNC: 29.4 MG/DL — HIGH (ref 8–20)
CALCIUM SERPL-MCNC: 6.9 MG/DL — LOW (ref 8.6–10.2)
CALCIUM SERPL-MCNC: 8.9 MG/DL — SIGNIFICANT CHANGE UP (ref 8.6–10.2)
CHLORIDE SERPL-SCNC: 104 MMOL/L — SIGNIFICANT CHANGE UP (ref 98–107)
CHLORIDE SERPL-SCNC: 84 MMOL/L — LOW (ref 98–107)
CO2 SERPL-SCNC: 19 MMOL/L — LOW (ref 22–29)
CO2 SERPL-SCNC: 22 MMOL/L — SIGNIFICANT CHANGE UP (ref 22–29)
CREAT SERPL-MCNC: 0.6 MG/DL — SIGNIFICANT CHANGE UP (ref 0.5–1.3)
CREAT SERPL-MCNC: 0.66 MG/DL — SIGNIFICANT CHANGE UP (ref 0.5–1.3)
GLUCOSE BLDC GLUCOMTR-MCNC: 106 MG/DL — HIGH (ref 70–99)
GLUCOSE SERPL-MCNC: 130 MG/DL — HIGH (ref 70–99)
GLUCOSE SERPL-MCNC: 857 MG/DL — CRITICAL HIGH (ref 70–99)
HCT VFR BLD CALC: 28.9 % — LOW (ref 34.5–45)
HGB BLD-MCNC: 9.6 G/DL — LOW (ref 11.5–15.5)
MAGNESIUM SERPL-MCNC: 1.4 MG/DL — LOW (ref 1.8–2.6)
MCHC RBC-ENTMCNC: 29.6 PG — SIGNIFICANT CHANGE UP (ref 27–34)
MCHC RBC-ENTMCNC: 33.2 GM/DL — SIGNIFICANT CHANGE UP (ref 32–36)
MCV RBC AUTO: 89.2 FL — SIGNIFICANT CHANGE UP (ref 80–100)
PHOSPHATE SERPL-MCNC: 3.4 MG/DL — SIGNIFICANT CHANGE UP (ref 2.4–4.7)
PLATELET # BLD AUTO: 290 K/UL — SIGNIFICANT CHANGE UP (ref 150–400)
POTASSIUM SERPL-MCNC: 3 MMOL/L — LOW (ref 3.5–5.3)
POTASSIUM SERPL-MCNC: 3.7 MMOL/L — SIGNIFICANT CHANGE UP (ref 3.5–5.3)
POTASSIUM SERPL-SCNC: 3 MMOL/L — LOW (ref 3.5–5.3)
POTASSIUM SERPL-SCNC: 3.7 MMOL/L — SIGNIFICANT CHANGE UP (ref 3.5–5.3)
RBC # BLD: 3.24 M/UL — LOW (ref 3.8–5.2)
RBC # FLD: 15.3 % — HIGH (ref 10.3–14.5)
SODIUM SERPL-SCNC: 130 MMOL/L — LOW (ref 135–145)
SODIUM SERPL-SCNC: 140 MMOL/L — SIGNIFICANT CHANGE UP (ref 135–145)
WBC # BLD: 12.19 K/UL — HIGH (ref 3.8–10.5)
WBC # FLD AUTO: 12.19 K/UL — HIGH (ref 3.8–10.5)

## 2022-01-10 PROCEDURE — 99233 SBSQ HOSP IP/OBS HIGH 50: CPT

## 2022-01-10 PROCEDURE — 99223 1ST HOSP IP/OBS HIGH 75: CPT

## 2022-01-10 RX ORDER — MAGNESIUM SULFATE 500 MG/ML
2 VIAL (ML) INJECTION ONCE
Refills: 0 | Status: COMPLETED | OUTPATIENT
Start: 2022-01-10 | End: 2022-01-10

## 2022-01-10 RX ORDER — POTASSIUM CHLORIDE 20 MEQ
40 PACKET (EA) ORAL EVERY 4 HOURS
Refills: 0 | Status: COMPLETED | OUTPATIENT
Start: 2022-01-10 | End: 2022-01-10

## 2022-01-10 RX ORDER — PANTOPRAZOLE SODIUM 20 MG/1
40 TABLET, DELAYED RELEASE ORAL EVERY 12 HOURS
Refills: 0 | Status: DISCONTINUED | OUTPATIENT
Start: 2022-01-10 | End: 2022-01-11

## 2022-01-10 RX ORDER — POTASSIUM CHLORIDE 20 MEQ
10 PACKET (EA) ORAL
Refills: 0 | Status: COMPLETED | OUTPATIENT
Start: 2022-01-10 | End: 2022-01-10

## 2022-01-10 RX ADMIN — Medication 100 MILLIEQUIVALENT(S): at 09:54

## 2022-01-10 RX ADMIN — Medication 100 MILLIEQUIVALENT(S): at 12:58

## 2022-01-10 RX ADMIN — PANTOPRAZOLE SODIUM 40 MILLIGRAM(S): 20 TABLET, DELAYED RELEASE ORAL at 18:33

## 2022-01-10 RX ADMIN — Medication 100 MILLIEQUIVALENT(S): at 08:38

## 2022-01-10 RX ADMIN — Medication 25 GRAM(S): at 15:57

## 2022-01-10 RX ADMIN — CEFTRIAXONE 100 MILLIGRAM(S): 500 INJECTION, POWDER, FOR SOLUTION INTRAMUSCULAR; INTRAVENOUS at 05:23

## 2022-01-10 RX ADMIN — Medication 6 MILLIGRAM(S): at 05:21

## 2022-01-10 RX ADMIN — REMDESIVIR 500 MILLIGRAM(S): 5 INJECTION INTRAVENOUS at 05:21

## 2022-01-10 NOTE — PROGRESS NOTE ADULT - SUBJECTIVE AND OBJECTIVE BOX
CC:  follow up [facility] symptoms    OVERNIGHT EVENTS:  noted weekend events - found to have tarry stools  GI consulted  Present Symptoms:     Dyspnea: oNausea/Vomiting: Yes No  Anxiety:  No  Depression:  No  Fatigue: Yes  Loss of appetite: Yes   Constipation: not reported    Pain: no sign            Character-            Duration-            Effect-            Factors-            Frequency-            Location-            Severity-    Pain AD Score:  [email or web address] (press ctrl + left click to view)    Review of Systems: Reviewed                     Unable to obtain due to poor mentation       MEDICATIONS  (STANDING):  cefTRIAXone   IVPB 1000 milliGRAM(s) IV Intermittent every 24 hours  dexAMETHasone  Injectable 6 milliGRAM(s) IV Push daily  pantoprazole  Injectable 40 milliGRAM(s) IV Push every 12 hours  potassium chloride  10 mEq/100 mL IVPB 10 milliEquivalent(s) IV Intermittent every 1 hour    MEDICATIONS  (PRN):  acetaminophen     Tablet .. 650 milliGRAM(s) Oral every 6 hours PRN Temp greater or equal to 38C (100.4F), Mild Pain (1 - 3)  ALBUTerol    90 MICROgram(s) HFA Inhaler 2 Puff(s) Inhalation every 4 hours PRN Shortness of Breath and/or Wheezing  aluminum hydroxide/magnesium hydroxide/simethicone Suspension 30 milliLiter(s) Oral every 4 hours PRN Dyspepsia  benzonatate 100 milliGRAM(s) Oral three times a day PRN Cough  guaifenesin/dextromethorphan Oral Liquid 10 milliLiter(s) Oral every 4 hours PRN Cough  melatonin 3 milliGRAM(s) Oral at bedtime PRN Insomnia  ondansetron Injectable 4 milliGRAM(s) IV Push every 8 hours PRN Nausea and/or Vomiting  traMADol 25 milliGRAM(s) Oral two times a day PRN Severe Pain (7 - 10)      PHYSICAL EXAM:    Vital Signs Last 24 Hrs  T(C): 36.9 (10 Kyaw 2022 05:32), Max: 36.9 (10 Kyaw 2022 05:32)  T(F): 98.4 (10 Kyaw 2022 05:32), Max: 98.4 (10 Kyaw 2022 05:32)  HR: 104 (10 Kyaw 2022 05:32) (104 - 104)  BP: 150/71 (10 Kyaw 2022 05:32) (141/65 - 150/71)  BP(mean): --  RR: 19 (10 Kyaw 2022 05:32) (19 - 19)  SpO2: 97% (10 Kyaw 2022 05:32) (94% - 97%)    General: Elderly woman, lethargic arousable NAD   Karnofsky: 20 %  HEENT: normal    Lungs: comfortable + nasal canula  CV: normal  tachycardia  GI: soft NTND   :  incontinent  MSK:    weakness bedbound/wheelchair bound  Skin: normal    LABS:                          9.6    12.19 )-----------( 290      ( 10 Kyaw 2022 06:36 )             28.9     01-10    140  |  104  |  29.4<H>  ----------------------------<  130<H>  3.7   |  22.0  |  0.60    Ca    8.9      10 Kyaw 2022 09:14  Phos  3.4     01-10  Mg     1.4     -10      PTT - ( 2022 16:01 )  PTT:50.3 sec  Urinalysis Basic - ( 2022 02:50 )    Color: Yellow / Appearance: very cloudy / S.010 / pH: x  Gluc: x / Ketone: Small  / Bili: Negative / Urobili: Negative   Blood: x / Protein: 30 mg/dL / Nitrite: Positive   Leuk Esterase: Moderate / RBC: 3-5 /HPF / WBC 6-10   Sq Epi: x / Non Sq Epi: Occasional / Bacteria: Moderate      I&O's Summary    2022 07:01  -  10 Kyaw 2022 07:00  --------------------------------------------------------  IN: 0 mL / OUT: 600 mL / NET: -600 mL        RADIOLOGY & ADDITIONAL STUDIES:    < from: CT Head No Cont (22 @ 16:33) >    ACC: [id number] EXAM:  CT BRAIN                          PROCEDURE DATE:  2022          INTERPRETATION:  Clinical Indication: Altered mental status    5mm axial sections of the brain were obtained from base to vertex,   without the intravenous administration of contrast material. Coronal and   sagittal computer generated reconstructed views are available.    Comparison is made with the prior CT of 2022 and demonstrates no   significant interval change.          The ventricles and sulci are prominent consistent age appropriate   involutional changes. Small vessel white matter ischemic changes are   noted. There is no hemorrhage, mass, or shift of midline structures. Bone   window examination is unremarkable. There has been previous bilateral   lens replacement surgery.        IMPRESSION: Age-appropriate involutional and ischemic gliotic changes. No   hemorrhage. No change 2022.    --- End of Report ---        < end of copied text >    < from: CT Abdomen and Pelvis No Cont (22 @ 16:36) >  ACC: [id number] EXAM:  CT ABDOMEN AND PELVIS                          PROCEDURE DATE:  2022          INTERPRETATION:  CLINICAL INFORMATION: Generalized abdominal pain and   decreasing hemoglobin    COMPARISON: None.    CONTRAST/COMPLICATIONS:  IV Contrast: NONE  Oral Contrast: NONE  Complications: None reported at time of study completion    PROCEDURE:  CT of the Abdomen and Pelvis was performed.  Sagittal and coronal reformats were performed.    FINDINGS:  LOWER CHEST: Small bilateral pleural effusions and bibasilar atelectasis.   Patchy groundglass opacities.    LIVER: Normal.  BILE DUCTS: Nondilated.  GALLBLADDER: Gallstones.  SPLEEN: Normal.  PANCREAS: Limited  ADRENALS: Limited  KIDNEYS/URETERS: No hydronephrosis or urinary tract calculi.    BLADDER: Underdistended limiting evaluation.  REPRODUCTIVE ORGANS: Limited.    BOWEL: Limited. No bowel obstruction. Proctocolitis of the rectosigmoid   is suggested.  PERITONEUM: No free air or ascites.  VESSELS: Aortoiliac atherosclerosiswithout aneurysm.  RETROPERITONEUM/LYMPH NODES: No adenopathy or hematoma.  ABDOMINAL WALL: Diffuse body wall edema.  BONES: No acute bony abnormality.    IMPRESSION:  *  Limited exam  *  Proctocolitis of the rectosigmoid is suggested.  *  No retroperitoneal hematoma      --- End     < end of copied text >      ADVANCE DIRECTIVES/TREATMENT PREFERENCES:  DNR /I

## 2022-01-10 NOTE — CONSULT NOTE ADULT - ASSESSMENT
Anemia with + FOBT w/o clinically evident GI bleeding in a pt. who is extremely altered and tachypneic. She is presently far from optimized for any type of endoscopic procedure requiring IV sedation. Would Rx with IV Pantoprazole 40 mg. every 12 hours. Anemia with + FOBT w/o clinically evident GI bleeding in a pt. who is extremely altered and tachypneic. She is presently far from optimized for any type of endoscopic procedure requiring IV sedation. Would Rx with IV Pantoprazole 40 mg. every 12 hours. Diet as tolerated and appropriate by her mental status. Repeat labs ordered for the AM.

## 2022-01-10 NOTE — PROGRESS NOTE ADULT - ASSESSMENT
87F with PMHX HTN, AFib on Xarelto, Spinal Stenosis/LBP, Chronic Pain Syndrome BIBEMS from home unresponsive, hypoxic, with constricted pupils bilaterally. Patient last seen lethargic but awake/arousable at eating approx 1430PM today then went back to bed per family. Patient hypoxic en route reportedly satting 70s on 10L NC per EMS. Hypoxia improved on HFNC/NRB in ED satting 99% on 40L 100% FIO2 HFNC at time of exam. Patient given 4mg Narcan IV x1 in ED for opiate reversal without much improvement. Minimally responsive on exam. Withdraws to painful stimuli. DNR/DNI code status confirmed by ED. Patient COVID positive and placed in isolation. Unable to provide further HPI/ROS due to current mental status.  PT EXTREMELY HARD OF HEARING  COVId tapered down to NC  c/w  REMDESIIVIR/ DECADRON  ANTICOAGULATE AS PER PROTOCOLS  ON ROOM AIR  COAG NEG STAPH PROBABLE CONTAMINANT  SPKE TO HOSPITALIST TODAY TO COMPLETE ABX COURSE FOR POSSIBLE UTI  WILL FOLLOWUP AS NEEDED PLEASE CALL IF QUESTIONS

## 2022-01-10 NOTE — PROGRESS NOTE ADULT - ASSESSMENT
88 yo female PMH HTN, afib on xarelto, spinal stenosis/lbp, chronic pain syndrome bibems from home unresponsive, hypoxic, with constricted pupils admitted with acute hypoxic respiratory failure and ams    #acute hypoxic respiratory failure   - w/ metabolic encephalopathy  - 2/2 covid 19   - w/ bouts of ams- ?hospital acquired delerium   - supportive care  - ID consult appreciated  - UA positive for uti - ceftriaxone  - decadron and remdesivir  - isolation precautions  - monitor cultures  - wean 02 as tolerated-> now on room air     #+fobt  - monitor h and h  - gi consult appreciated  - protonix    #positive blood culture   - probable contaminated monitor     #chronic afib   - hold a/c for now given positive fobt    #dysphagia   - hx vocal cord paralysis as per son   - maral diet   - speech reeval pending     #hypomagnesiumia  - repleted  - monitor     #chronic back pain   - UDS: +THC   - s/p narcan in ER   - tramadol, hold for lethargy     case discussed with patient son Bony Garnica [phone]. hospital course reviewed. all questions answered. FaceTime with patient and family also completed

## 2022-01-10 NOTE — PROGRESS NOTE ADULT - SUBJECTIVE AND OBJECTIVE BOX
INFECTIOUS DISEASES AND INTERNAL MEDICINE at [facility]  =======================================================  Sami Miguel MD  Diplomates American Board of Internal Medicine and Infectious Diseases  Telephone [phone]  Fax            [phone]  =======================================================    [place], [place] [address]    Follow up: COVID   now on room air        Allergies:  Allergy Status Unknown      Medications:  acetaminophen     Tablet .. 650 milliGRAM(s) Oral every 6 hours PRN  ALBUTerol    90 MICROgram(s) HFA Inhaler 2 Puff(s) Inhalation every 4 hours PRN  aluminum hydroxide/magnesium hydroxide/simethicone Suspension 30 milliLiter(s) Oral every 4 hours PRN  benzonatate 100 milliGRAM(s) Oral three times a day PRN  dexAMETHasone  Injectable 6 milliGRAM(s) IV Push daily  guaifenesin/dextromethorphan Oral Liquid 10 milliLiter(s) Oral every 4 hours PRN  heparin   Injectable 4500 Unit(s) IV Push every 6 hours PRN  heparin   Injectable 2000 Unit(s) IV Push every 6 hours PRN  heparin  Infusion.  Unit(s)/Hr IV Continuous <Continuous>  lactated ringers. 1000 milliLiter(s) IV Continuous <Continuous>  melatonin 3 milliGRAM(s) Oral at bedtime PRN  ondansetron Injectable 4 milliGRAM(s) IV Push every 8 hours PRN  remdesivir  IVPB   IV Intermittent   remdesivir  IVPB 100 milliGRAM(s) IV Intermittent every 24 hours  traMADol 25 milliGRAM(s) Oral two times a day PRN      REVIEW OF SYSTEMS:  C          Physical Exam:  IC         Vitals:  ============  Vital Signs Last 24 Hrs  T(C): 36.8 (08 Jan 2022 19:00), Max: 36.9 (08 Jan 2022 11:18)  T(F): 98.3 (08 Jan 2022 19:00), Max: 98.4 (08 Jan 2022 11:18)  HR: 90 (08 Jan 2022 19:00) (68 - 95)  BP: 135/65 (08 Jan 2022 19:00) (115/60 - 135/65)  BP(mean): --  RR: 18 (08 Jan 2022 19:00) (18 - 18)  SpO2: 96% (08 Jan 2022 19:00) (94% - 96%)    =======================================================  Current Antibiotics:  remdesivir  IVPB   IV Intermittent   remdesivir  IVPB 100 milliGRAM(s) IV Intermittent every 24 hours    Other medications:  dexAMETHasone  Injectable 6 milliGRAM(s) IV Push daily  heparin  Infusion.  Unit(s)/Hr IV Continuous <Continuous>  lactated ringers. 1000 milliLiter(s) IV Continuous <Continuous>      =======================================================  Labs:                                 9.6    12.19 )-----------( 290      ( 10 Kyaw 2022 06:36 )             28.9   01-10    140  |  104  |  29.4<H>  ----------------------------<  130<H>  3.7   |  22.0  |  0.60    Ca    8.9      10 Kyaw 2022 09:14  Phos  3.4     01-10  Mg     1.4     01-10              PTT - ( 08 Jan 2022 07:05 )  PTT:60.1 sec    CARDIAC MARKERS ( 08 Jan 2022 07:07 )  x     / x     / 281 U/L / x     / 5.1 ng/mL  CARDIAC MARKERS ( 07 Jan 2022 07:17 )  x     / x     / 682 U/L / x     / 12.0 ng/mL        CAPILLARY BLOOD GLUCOSE      POCT Blood Glucose.: 131 mg/dL (07 Jan 2022 17:13)                Culture - Blood (collected 01-06-22 @ 01:40)  Source: .Blood Blood-Peripheral  Gram Stain (01-07-22 @ 08:29):    Growth in anaerobic bottle: Gram Positive Cocci in Clusters    ***Blood Panel PCR results on this specimen are available    approximately 3 hours after the Gram stain result.***    Gram stain, PCR, and/or culture results may not always    correspond due todifference in methodologies.    ************************************************************    This PCR assay was performed using [employer].    The following targets are tested for: Enterococcus,    vancomycin resistant enterococci, Listeria monocytogenes,    coagulase negative staphylococci, S. aureus,    methicillin resistant S. aureus, Streptococcus agalactiae    (Group B), S. pneumoniae, S. pyogenes (Group A),    Acinetobacter baumannii, Enterobacter cloacae, E. coli,    Klebsiella oxytoca, K. pneumoniae, Proteus sp.,    Serratia marcescens, Haemophilus influenzae,    Neisseria meningitidis, Pseudomonas aeruginosa, Candida    albicans, C. glabrata, C krusei, C parapsilosis,    C. tropicalis and the KPC resistance gene.    Gram Stain and BCID performed by:    [facility]    [address] [place] [address]    .    TYPE: (C=Critical, N=Notification, A=Abnormal) C    TESTS:  _     DATE/TIME CALLED: _ 01/07/2022 08:29:21    CALLED TO: Padmaja Stahl RN    READ BACK (2 Patient Identifiers)(Y/N): _ Y    READ BACK VALUES (Y/N): _ Y    CALLED BY: Padmaja Forte  Organism: Blood Culture PCR (01-07-22 @ 08:06)  Organism: Blood Culture PCR (01-07-22 @ 08:06)    Sensitivities:      -  Coagulase negative Staphylococcus: Detec      Method Type: PCR    Culture - Blood (collected 01-06-22 @ 01:40)  Source: .Blood Blood-Peripheral  Gram Stain (01-07-22 @ 08:29):    Growth in aerobic bottle: Gram Positive Cocci in Clusters    Gram Stain performed by:    [facility]    [address] [place] [address]    .    TYPE: (C=Critical, N=Notification, A=Abnormal) C    TESTS:  _ GS    DATE/TIME CALLED: _ 01/07/2022 08:28:40    CALLED TO: Padmaja Stahl RN    READ BACK (2 Patient Identifiers)(Y/N): _ Y    READ BACK VALUES (Y/N): _ Y    CALLED BY: Padmaja Forte      Creatinine, Serum: 1.28 mg/dL (01-07-22 @ 07:17)  Creatinine, Serum: 1.56 mg/dL (01-06-22 @ 14:45)  Creatinine, Serum: 1.87 mg/dL (01-06-22 @ 07:03)  Creatinine, Serum: 2.09 mg/dL (01-05-22 @ 20:53)    Procalcitonin, Serum: 1.56 ng/mL (01-07-22 @ 07:17)    D-Dimer Assay, Quantitative: 1208 ng/mL DDU (01-07-22 @ 07:17)    Ferritin, Serum: 395 ng/mL (01-07-22 @ 07:17)    C-Reactive Protein, Serum: 202 mg/L (01-07-22 @ 07:17)    WBC Count: 17.58 K/uL (01-07-22 @ 07:17)  WBC Count: 19.49 K/uL (01-06-22 @ 14:46)  WBC Count: 9.33 K/uL (01-06-22 @ 13:41)  WBC Count: 13.93 K/uL (01-06-22 @ 07:03)  WBC Count: 8.48 K/uL (01-05-22 @ 20:53)      SARS-CoV-2 Result: Detected (01-05-22 @ 20:59)    Lactate Dehydrogenase, Serum: 342 U/L (01-07-22 @ 07:17)    Alkaline Phosphatase, Serum: 162 U/L (01-07-22 @ 07:17)  Alkaline Phosphatase, Serum: 197 U/L (01-06-22 @ 07:03)  Alkaline Phosphatase, Serum: 246 U/L (01-05-22 @ 20:53)  Alanine Aminotransferase (ALT/SGPT): 18 U/L (01-07-22 @ 07:17)  Alanine Aminotransferase (ALT/SGPT): 15 U/L (01-06-22 @ 07:03)  Alanine Aminotransferase (ALT/SGPT): 17 U/L (01-05-22 @ 20:53)  Aspartate Aminotransferase (AST/SGOT): 50 U/L (01-07-22 @ 07:17)  Aspartate Aminotransferase (AST/SGOT): 36 U/L (01-06-22 @ 07:03)  Aspartate Aminotransferase (AST/SGOT): 35 U/L (01-05-22 @ 20:53)  Bilirubin Total, Serum: 0.7 mg/dL (01-07-22 @ 07:17)  Bilirubin Total, Serum: 0.8 mg/dL (01-06-22 @ 07:03)  Bilirubin Total, Serum: 0.9 mg/dL (01-05-22 @ 20:53)

## 2022-01-10 NOTE — PROGRESS NOTE ADULT - ASSESSMENT
87yr woman  PMH HTN, afib, spinal stenosis with chronic pain syndrome admitted with acute hypoxemic respiratory failure with COVID PNA, mental status poor    1. Acute Hypoxemic Respiratory Failure  Weaned to nasal caula  Close monitoring of O2  Patient is DNR/I      2.  COVID PNA  Started Remedesivir and Decadron   monitor inflammatory markers  Noted ID consult     3. Encephalopathy  CT reviewed - negative  MF - infection, delirium ( hospital setting)       3.  Chronic Pain Syndrome- hx spinal stenosis  cont Tramadol PRN  recommend IV Tylenol PRN   monitor for sedation     4.  Dysphagia  Repeat bedside swallow done today  Spoke with SLP - rec puree no liquids  Will need to eventual follow up for liquids  Aspiration precautions    5.  GI bleed  reported hx of blood in stool  GI consulted  PPI  monitor Hg    6. UTI  cont IV abx    7.  Encounter for Palliative Care  Patient weaned to nasal canula.  Mental status appears more lethargic this am - closely monitor.  CT scans negative.  Baseline dementia?  Eventual SLP when more awake to see if can advance to liquids

## 2022-01-10 NOTE — PHYSICAL THERAPY INITIAL EVALUATION ADULT - GAIT DEVIATIONS NOTED, PT EVAL
assist needed to weight shift, pt able to slightly slide LLE to left with minimal clearance of foot from floor./decreased kecia/decreased weight-shifting ability

## 2022-01-10 NOTE — PHYSICAL THERAPY INITIAL EVALUATION ADULT - ADDITIONAL COMMENTS
pt is A&O x1, unable to give reliable history. No previous evaluation or CCC assessments to reference.

## 2022-01-10 NOTE — PHYSICAL THERAPY INITIAL EVALUATION ADULT - PERTINENT HX OF CURRENT PROBLEM, REHAB EVAL
89 yo female PMH HTN, afib on xarelto, spinal stenosis/lbp, chronic pain syndrome bibems from home unresponsive, hypoxic, with constricted pupils admitted with acute hypoxic respiratory failure and ams. Pt is COVID +

## 2022-01-10 NOTE — PROGRESS NOTE ADULT - SUBJECTIVE AND OBJECTIVE BOX
Patient is a 87y old  Female who presents with a chief complaint of AMS and Acute Hypoxic Respiratory Failure 2/2 COVID PNA (10 Kyaw 2022 11:02)    Patient seen and examined at bedside.     ALLERGIES:  Allergy Status Unknown    MEDICATIONS  (STANDING):  cefTRIAXone   IVPB 1000 milliGRAM(s) IV Intermittent every 24 hours  dexAMETHasone  Injectable 6 milliGRAM(s) IV Push daily  magnesium sulfate  IVPB 2 Gram(s) IV Intermittent once  pantoprazole  Injectable 40 milliGRAM(s) IV Push every 12 hours    MEDICATIONS  (PRN):  acetaminophen     Tablet .. 650 milliGRAM(s) Oral every 6 hours PRN Temp greater or equal to 38C (100.4F), Mild Pain (1 - 3)  ALBUTerol    90 MICROgram(s) HFA Inhaler 2 Puff(s) Inhalation every 4 hours PRN Shortness of Breath and/or Wheezing  aluminum hydroxide/magnesium hydroxide/simethicone Suspension 30 milliLiter(s) Oral every 4 hours PRN Dyspepsia  benzonatate 100 milliGRAM(s) Oral three times a day PRN Cough  guaifenesin/dextromethorphan Oral Liquid 10 milliLiter(s) Oral every 4 hours PRN Cough  melatonin 3 milliGRAM(s) Oral at bedtime PRN Insomnia  ondansetron Injectable 4 milliGRAM(s) IV Push every 8 hours PRN Nausea and/or Vomiting  traMADol 25 milliGRAM(s) Oral two times a day PRN Severe Pain (7 - 10)    Vital Signs Last 24 Hrs  T(F): 98.4 (10 Kyaw 2022 05:32), Max: 98.4 (10 Kyaw 2022 05:32)  HR: 104 (10 Kyaw 2022 05:32) (104 - 104)  BP: 150/71 (10 Kyaw 2022 05:32) (141/65 - 150/71)  RR: 19 (10 Kyaw 2022 05:32) (19 - 19)  SpO2: 97% (10 Kyaw 2022 05:32) (94% - 97%)  I&O's Summary    2022 07:01  -  10 Kyaw 2022 07:00  --------------------------------------------------------  IN: 0 mL / OUT: 600 mL / NET: -600 mL      PHYSICAL EXAM:  General: NAD, aaox3  ENT: MMM, no thrush  Neck: Supple, No JVD  Lungs: decreased breath sounds b/l bases  Cardio: +s1/s2  Abdomen: Soft, Nontender, Nondistended; Bowel sounds present  Extremities: No calf tenderness, No pitting edema      LABS:                        9.6    12.19 )-----------( 290      ( 10 Kyaw 2022 06:36 )             28.9     01-10    140  |  104  |  29.4  ----------------------------<  130  3.7   |  22.0  |  0.60    Ca    8.9      10 Kyaw 2022 09:14  Phos  3.4     01-10  Mg     1.4     01-10    TPro  5.1  /  Alb  2.8  /  TBili  0.8  /  DBili  x   /  AST  41  /  ALT  16  /  AlkPhos  114      eGFR if Non African American: 82 mL/min/1.73M2 (01-10-22 @ 09:14)  eGFR if African American: 95 mL/min/1.73M2 (01-10-22 @ 09:14)    PTT - ( 2022 16:01 )  PTT:50.3 sec      CARDIAC MARKERS ( 2022 07:07 )  x     / x     / 281 U/L / x     / 5.1 ng/mL    Glucose  POCT Blood Glucose.: 106 mg/dL (10 Kyaw 2022 07:51)    Urinalysis Basic - ( 2022 02:50 )  Color: Yellow / Appearance: very cloudy / S.010 / pH: x  Gluc: x / Ketone: Small  / Bili: Negative / Urobili: Negative   Blood: x / Protein: 30 mg/dL / Nitrite: Positive   Leuk Esterase: Moderate / RBC: 3-5 /HPF / WBC 6-10   Sq Epi: x / Non Sq Epi: Occasional / Bacteria: Moderate    Cultures  Culture - Blood (collected 2022 14:29)  Source: .Blood Blood-Peripheral  Preliminary Report (2022 15:01):    No growth at 48 hours    Culture - Blood (collected 2022 14:28)  Source: .Blood Blood-Peripheral  Preliminary Report (2022 15:01):    No growth at 48 hours    Culture - Blood (collected 2022 01:40)  Source: .Blood Blood-Peripheral  Gram Stain (2022 08:29):    Growth in anaerobic bottle: Gram Positive Cocci in Clusters    Final Report (2022 12:33):    Growth in anaerobic bottle: Staphylococcus hominis "Susceptibilities not    performed"  Organism: Blood Culture PCR (2022 12:33)      -  Coagulase negative Staphylococcus: Detec      Method Type: PCR  Organism: Blood Culture PCR (2022 08:06)    Culture - Blood (collected 2022 01:40)  Source: .Blood Blood-Peripheral  Gram Stain (2022 08:29):    Growth in aerobic bottle: Gram Positive Cocci in Clusters  Final Report (2022 08:05):    Growth in aerobic bottle: Staphylococcus hominis  Organism: Staphylococcus hominis (2022 08:05)  Organism: Staphylococcus hominis (2022 08:05)       RADIOLOGY & ADDITIONAL TESTS:    Care Discussed with Consultants/Other Providers:

## 2022-01-10 NOTE — PHYSICAL THERAPY INITIAL EVALUATION ADULT - GENERAL OBSERVATIONS, REHAB EVAL
Pt received supine in bed, + IV + Primafit + telemetry/. C/o 0/10 pre/post PT pain, Pt agreeable to PT,

## 2022-01-10 NOTE — CONSULT NOTE ADULT - SUBJECTIVE AND OBJECTIVE BOX
HPI:  87F with PMHX HTN, AFib on Xarelto, Spinal Stenosis/LBP, Chronic Pain Syndrome BIBEMS from home unresponsive, hypoxic, with constricted pupils bilaterally. Patient last seen lethargic but awake/arousable at eating approx 1430PM today then went back to bed per family. Patient hypoxic en route reportedly satting 70s on 10L NC per EMS. Hypoxia improved on HFNC/NRB in ED satting 99% on 40L 100% FIO2 HFNC at time of exam. Patient given 4mg Narcan IV x1 in ED for opiate reversal without much improvement. Minimally responsive on exam. Withdraws to painful stimuli. DNR/DNI code status confirmed by ED. Patient COVID positive and placed in isolation. Unable to provide further HPI/ROS due to current mental status.     PMHX: HTN, AFib on Xarelto, Spinal Stenosis/LBP, Chronic Pain Syndrome  PSHX: Unknown  Family Hx: Unknown  Social Hx: Unknown   (2022 04:23)      PERTINENT PMH REVIEWED: Yes     PAST MEDICAL & SURGICAL HISTORY:  Afib    History of valvular heart disease    HTN (hypertension)    Chronic back pain        SOCIAL HISTORY:  Unable to obtain secondary to poor mentation/historian                    Substance history:                    Admitted from:  home  SNF  [facility]                     Confucianism/spirituality:                    Cultural concerns:                      Surrogate/HCP/Guardian: Phone#: son Bony Garnica    FAMILY HISTORY: Unable to obtain secondary to poor mentation/historian      Allergies    Allergy Status Unknown    Intolerances        ADVANCE DIRECTIVES/TREATMENT PREFERENCES:  DNR/DNI - MOLST, continue all other medical treatments    Unable to obtain secondary to poor mentation/historian  Baseline ADLs (prior to admission):   Independent/ Dependent      Karnofsky/Palliative Performance Status Version 2: 20 %  http://npcrc.org/files/news/palliative_performance_scale_ppsv2.pdf    Present Symptoms:     Dyspnea: mild on 100% HF  Nausea/Vomiting:  No  Anxiety:   No  Depression: No  Fatigue: Yes   Loss of appetite: Yes   Constipation: not reported    Pain: no sign            Character-            Duration-            Effect-            Factors-            Frequency-            Location-            Severity-    Pain AD Score:  [email or web address]/[email or web address] (press ctrl + left click to view)    Review of Systems:   Unable to obtain due to poor mentation       MEDICATIONS  (STANDING):  dexAMETHasone  Injectable 6 milliGRAM(s) IV Push daily  heparin  Infusion.  Unit(s)/Hr (11 mL/Hr) IV Continuous <Continuous>  lactated ringers. 1000 milliLiter(s) (100 mL/Hr) IV Continuous <Continuous>  remdesivir  IVPB   IV Intermittent     MEDICATIONS  (PRN):  acetaminophen     Tablet .. 650 milliGRAM(s) Oral every 6 hours PRN Temp greater or equal to 38C (100.4F), Mild Pain (1 - 3)  ALBUTerol    90 MICROgram(s) HFA Inhaler 2 Puff(s) Inhalation every 4 hours PRN Shortness of Breath and/or Wheezing  aluminum hydroxide/magnesium hydroxide/simethicone Suspension 30 milliLiter(s) Oral every 4 hours PRN Dyspepsia  benzonatate 100 milliGRAM(s) Oral three times a day PRN Cough  guaifenesin/dextromethorphan Oral Liquid 10 milliLiter(s) Oral every 4 hours PRN Cough  heparin   Injectable 4500 Unit(s) IV Push every 6 hours PRN For aPTT less than 40  heparin   Injectable 2000 Unit(s) IV Push every 6 hours PRN For aPTT between 40 - 57  melatonin 3 milliGRAM(s) Oral at bedtime PRN Insomnia  ondansetron Injectable 4 milliGRAM(s) IV Push every 8 hours PRN Nausea and/or Vomiting  traMADol 25 milliGRAM(s) Oral two times a day PRN Severe Pain (7 - 10)      PHYSICAL EXAM:    Vital Signs Last 24 Hrs  T(C): 36.7 (2022 10:29), Max: 37.6 (2022 21:20)  T(F): 98 (2022 10:29), Max: 99.6 (2022 21:20)  HR: 80 (2022 10:29) (80 - 92)  BP: 109/70 (2022 10:29) (106/51 - 145/86)  BP(mean): --  RR: 19 (2022 10:29) (18 - 24)  SpO2: 98% (2022 10:29) (82% - 100%)    General: Elderly woman NAD sleeping arousable  HEENT: normal    Lungs: comfortable + [place]  CV: normal  GI: normal    : normal    MSK: weakness    Neuro: no focal deficits   Skin: normal     LABS:                        13.0   13.93 )-----------( 225      ( 2022 07:03 )             38.0     01-06    133<L>  |  93<L>  |  33.1<H>  ----------------------------<  143<H>  3.6   |  19.0<L>  |  1.87<H>    Ca    8.5<L>      2022 07:03  Phos  5.5     -  Mg     2.0     -    TPro  6.2<L>  /  Alb  3.4  /  TBili  0.8  /  DBili  x   /  AST  36<H>  /  ALT  15  /  AlkPhos  197<H>  -    PT/INR - ( 2022 07:03 )   PT: 17.8 sec;   INR: 1.57 ratio         PTT - ( 2022 07:03 )  PTT:35.9 sec  Urinalysis Basic - ( 2022 01:25 )    Color: Yellow / Appearance: Slightly Turbid / S.010 / pH: x  Gluc: x / Ketone: Small  / Bili: Negative / Urobili: 1 mg/dL   Blood: x / Protein: 30 mg/dL / Nitrite: Negative   Leuk Esterase: Trace / RBC: 0-2 /HPF / WBC 3-5   Sq Epi: x / Non Sq Epi: Occasional / Bacteria: Occasional      I&O's Summary      RADIOLOGY & ADDITIONAL STUDIES:  < from: Xray Chest 1 View-PORTABLE IMMEDIATE (Xray Chest 1 View-PORTABLE IMMEDIATE .) (22 @ 21:54) >  ACC: [id number] EXAM:  XR CHEST PORTABLE IMMED 1V                          PROCEDURE DATE:  2022          INTERPRETATION:  Clinical history: 87-year-old female, altered mental   status.    Portable/expiratory view of the chest without comparison demonstrates a   normal cardiac silhouette and normal pulmonary vasculature with no   consolidation, effusion, gross adenopathy or pneumothorax.    Severe thoracic dextroscoliosis/degenerative change is noted. Limited   evaluation of the retrocardiac region due to underpenetration    Post TAVR.    IMPRESSION:  No acute radiographic findings, limited evaluation of the retrocardiac   region    --- End of Report ---    < end of copied text >    < from: CT Head No Cont (22 @ 22:34) >    ACC: [id number] EXAM:  CT BRAIN                          PROCEDURE DATE:  2022          INTERPRETATION:  NONCONTRAST CT OF THE BRAIN DATED 2022.    CLINICAL INFORMATION:  Mental status change.    TECHNIQUE: Axial CT images are obtained fromthe cranial vertex to the   skull base without the administration of IV contrast. Images are   reformatted in sagittal and coronal planes.    No prior studies are available for comparison.    FINDINGS:    Evaluation is mildly degraded by motion. There is no gross acute   intra-axial or extra axial hemorrhage. There is no significant mass   effect or shift of the midline. The basal cisterns are not effaced. There   is cerebral and cerebellar volume loss with prominence of the ventricles,   sulci, and cerebellar folia. There are moderate chronic ischemic changes   in the frontoparietal white matter. There are atherosclerotic   calcifications of the intracranial carotid arteries and vertebrobasilar   system.    The regional soft tissues and osseous structures are unremarkable. The   visualized paranasal sinuses and tympanic/mastoid cavities are clear   apart from minimal left maxillary antral mucosal thickening. There is   evidence of bilateral lens surgery.    IMPRESSION:    Motion degradedexam shows no gross acute intracranial hemorrhage or mass   effect. Moderate chronic ischemic changes in the frontoparietal white   matter.    --- End of Report ---          < end of copied text >        
INFECTIOUS DISEASES AND INTERNAL MEDICINE at [place]  =======================================================  Sami Miguel MD  Diplomates American Board of Internal Medicine and Infectious Diseases  Telephone [phone]  Fax            [phone]  =======================================================    STACIA [id number]      HPI:  87F with PMHX HTN, AFib on Xarelto, Spinal Stenosis/LBP, Chronic Pain Syndrome BIBEMS from home unresponsive, hypoxic, with constricted pupils bilaterally. Patient last seen lethargic but awake/arousable at eating approx 1430PM today then went back to bed per family. Patient hypoxic en route reportedly satting 70s on 10L NC per EMS. Hypoxia improved on HFNC/NRB in ED satting 99% on 40L 100% FIO2 HFNC at time of exam. Patient given 4mg Narcan IV x1 in ED for opiate reversal without much improvement. Minimally responsive on exam. Withdraws to painful stimuli. DNR/DNI code status confirmed by ED. Patient COVID positive and placed in isolation. Unable to provide further HPI/ROS due to current mental status.  PT EXTREMELY HARD OF HEARING  AS ABOVE WITH COVID  ASKED TO EVALUATE FROM ID STANDPOINT      PMHX: HTN, AFib on Xarelto, Spinal Stenosis/LBP, Chronic Pain Syndrome  PSHX: Unknown  Family Hx: Unknown  Social Hx: Unknown   (2022 04:23)      PAST MEDICAL & SURGICAL HISTORY:  Afib    History of valvular heart disease    HTN (hypertension)    Chronic back pain        ANTIBIOTICS  remdesivir  IVPB   IV Intermittent       Allergies    Allergy Status Unknown    Intolerances        SOCIAL HISTORY:     FAMILY HX   FAMILY HISTORY:      Vital Signs Last 24 Hrs  T(C): 36.7 (2022 10:29), Max: 37.6 (2022 21:20)  T(F): 98 (2022 10:29), Max: 99.6 (2022 21:20)  HR: 80 (2022 10:29) (80 - 92)  BP: 109/70 (2022 10:29) (106/51 - 145/86)  BP(mean): --  RR: 19 (2022 10:29) (18 - 24)  SpO2: 98% (2022 10:29) (82% - 100%)  Drug Dosing Weight    Weight (kg): 58 (2022 05:08)      REVIEW OF SYSTEMS:    CONSTITUTIONAL:  As per HPI.    HEENT:  Eyes:  No diplopia or blurred vision. ENT:  No earache, sore throat or runny nose.    CARDIOVASCULAR:  No pressure, squeezing, strangling, tightness, heaviness or aching about the chest, neck, axilla or epigastrium.    RESPIRATORY:  No cough, shortness of breath, PND or orthopnea.    GASTROINTESTINAL:  No nausea, vomiting or diarrhea.    GENITOURINARY:  No dysuria, frequency or urgency.    MUSCULOSKELETAL:  As per HPI.    SKIN:  No change in skin, hair or nails.    NEUROLOGIC:  No paresthesias, fasciculations, seizures or weakness.                  PHYSICAL EXAMINATION:    GENERAL: The patient is a _____in no apparent distress. ___     VITAL SIGNS: T(C): 36.7 (22 @ 10:29), Max: 37.6 (22 @ 21:20)  HR: 80 (22 @ 10:29) (80 - 92)  BP: 109/70 (22 @ 10:29) (106/51 - 145/86)  RR: 19 (22 @ 10:29) (18 - 24)  SpO2: 98% (22 @ 10:29) (82% - 100%)  Wt(kg): --    HEENT: Head is normocephalic and atraumatic.  ANICTERIC  NECK: Supple. No carotid bruits.  No lymphadenopathy or thyromegaly.    LUNGS:COARSE BREATH SOUNDS    HEART: Regular rate and rhythm without murmur.    ABDOMEN: Soft, nontender, and nondistended.  Positive bowel sounds.  No hepatosplenomegaly was noted. NO REBOUND NO GUARDING    EXTREMITIES: NO EDEMA NO ERYTHEMA    NEUROLOGIC: NON FOCAL CONFESSED                                                                                                                                                                                                                                                                                                                                                                                                                                      POOR HEARING      SKIN: No ulceration or induration present. NO RASH        BLOOD CULTURES       URINE CX          LABS:                        14.1   19.49 )-----------( 242      ( 2022 14:46 )             40.7     01-06    133<L>  |  96<L>  |  35.5<H>  ----------------------------<  156<H>  4.3   |  17.0<L>  |  1.56<H>    Ca    8.4<L>      2022 14:45  Phos  5.5     -  Mg     2.0     -06    TPro  6.2<L>  /  Alb  3.4  /  TBili  0.8  /  DBili  x   /  AST  36<H>  /  ALT  15  /  AlkPhos  197<H>  -06    PT/INR - ( 2022 07:03 )   PT: 17.8 sec;   INR: 1.57 ratio         PTT - ( 2022 07:03 )  PTT:35.9 sec  Urinalysis Basic - ( 2022 01:25 )    Color: Yellow / Appearance: Slightly Turbid / S.010 / pH: x  Gluc: x / Ketone: Small  / Bili: Negative / Urobili: 1 mg/dL   Blood: x / Protein: 30 mg/dL / Nitrite: Negative   Leuk Esterase: Trace / RBC: 0-2 /HPF / WBC 3-5   Sq Epi: x / Non Sq Epi: Occasional / Bacteria: Occasional        RADIOLOGY & ADDITIONAL STUDIES:      ASSESSMENT/PLAN  87F with PMHX HTN, AFib on Xarelto, Spinal Stenosis/LBP, Chronic Pain Syndrome BIBEMS from home unresponsive, hypoxic, with constricted pupils bilaterally. Patient last seen lethargic but awake/arousable at eating approx 1430PM today then went back to bed per family. Patient hypoxic en route reportedly satting 70s on 10L NC per EMS. Hypoxia improved on HFNC/NRB in ED satting 99% on 40L 100% FIO2 HFNC at time of exam. Patient given 4mg Narcan IV x1 in ED for opiate reversal without much improvement. Minimally responsive on exam. Withdraws to painful stimuli. DNR/DNI code status confirmed by ED. Patient COVID positive and placed in isolation. Unable to provide further HPI/ROS due to current mental status.  PT EXTREMELY HARD OF HEARING  AS ABOVE WITH COVID ON HIGH LULA  PT STARTED ON REMDESIIVIR/ DECADRON  ANTICOAGULATE AS PER PROTOCOLS  PT ALREADY ON HIGH FLOW   CONSIDER CT CHEST R/O PE  BEFORE CONSIDERING ACTEMRA  WILL FOLLOWUP WITH FURTHER RECOMMEDATIONS                    BARBARA PATEL MD
Patient is a 87y old  Female who presents with a chief complaint of AMS and Acute Hypoxic Respiratory Failure 2/2 COVID PNA (09 Jan 2022 13:13)      HPI:  87F with PMHX HTN, AFib on Xarelto, Spinal Stenosis/LBP, Chronic Pain Syndrome BIBEMS from home unresponsive, hypoxic, with constricted pupils bilaterally. Patient last seen lethargic but awake/arousable at eating approx 1430PM today then went back to bed per family. Patient hypoxic en route reportedly satting 70s on 10L NC per EMS. Hypoxia improved on HFNC/NRB in ED satting 99% on 40L 100% FIO2 HFNC at time of exam. Patient given 4mg Narcan IV x1 in ED for opiate reversal without much improvement. Minimally responsive on exam. Withdraws to painful stimuli. DNR/DNI code status confirmed by ED. Patient COVID positive and placed in isolation. Unable to provide further HPI/ROS due to current mental status. GI asked to evaluate pt for drop in Hb with hemoccult + dark stool. Pt. is presently minimally arouseable and extremely lethargic and tachypneic.    PMHX: HTN, AFib on Xarelto, Spinal Stenosis/LBP, Chronic Pain Syndrome  PSHX: Unknown  Family Hx: Unknown  Social Hx: Unknown   (06 Jan 2022 04:23)      REVIEW OF SYSTEMS:  Unobtainable in this pt.    PAST MEDICAL & SURGICAL HISTORY:  Afib    History of valvular heart disease    HTN (hypertension)    Chronic back pain        FAMILY HISTORY:      SOCIAL HISTORY:  Smoking Status: Unknown  Pack Years: Unknown. Unknown ETOH or drug abuse history.    MEDICATIONS:  MEDICATIONS  (STANDING):  cefTRIAXone   IVPB 1000 milliGRAM(s) IV Intermittent every 24 hours  dexAMETHasone  Injectable 6 milliGRAM(s) IV Push daily  potassium chloride    Tablet ER 40 milliEquivalent(s) Oral every 4 hours  potassium chloride  10 mEq/100 mL IVPB 10 milliEquivalent(s) IV Intermittent every 1 hour    MEDICATIONS  (PRN):  acetaminophen     Tablet .. 650 milliGRAM(s) Oral every 6 hours PRN Temp greater or equal to 38C (100.4F), Mild Pain (1 - 3)  ALBUTerol    90 MICROgram(s) HFA Inhaler 2 Puff(s) Inhalation every 4 hours PRN Shortness of Breath and/or Wheezing  aluminum hydroxide/magnesium hydroxide/simethicone Suspension 30 milliLiter(s) Oral every 4 hours PRN Dyspepsia  benzonatate 100 milliGRAM(s) Oral three times a day PRN Cough  guaifenesin/dextromethorphan Oral Liquid 10 milliLiter(s) Oral every 4 hours PRN Cough  melatonin 3 milliGRAM(s) Oral at bedtime PRN Insomnia  ondansetron Injectable 4 milliGRAM(s) IV Push every 8 hours PRN Nausea and/or Vomiting  traMADol 25 milliGRAM(s) Oral two times a day PRN Severe Pain (7 - 10)      Allergies    Allergy Status Unknown    Intolerances        Vital Signs Last 24 Hrs  T(C): 36.9 (10 Kyaw 2022 05:32), Max: 36.9 (10 Kyaw 2022 05:32)  T(F): 98.4 (10 Kyaw 2022 05:32), Max: 98.4 (10 Kyaw 2022 05:32)  HR: 104 (10 Kyaw 2022 05:32) (104 - 104)  BP: 150/71 (10 Kyaw 2022 05:32) (141/65 - 150/71)  BP(mean): --  RR: 19 (10 Kyaw 2022 05:32) (19 - 19)  SpO2: 97% (10 Kyaw 2022 05:32) (94% - 97%)    01-09 @ 07:01  -  01-10 @ 07:00  --------------------------------------------------------  IN: 0 mL / OUT: 600 mL / NET: -600 mL          PHYSICAL EXAM:    General: Well developed; Lethargic, minimally arouseable and tachypneic  HEENT: MMM, conjunctiva pink and sclera anicteric.  Lungs: Decreased breath sounds bilaterally.  Cor: RRR S1, S2 only.  Gastrointestinal: Abdomen: Soft, non-tender non-distended; Normal bowel sounds; No rebound or guarding or HSM.  AR: Dark brown stool  Extremities: Normal range of motion, No clubbing, cyanosis or edema  Neurological: Alert and oriented x3  Skin: Warm and dry. No obvious rash      LABS:                        9.6    12.19 )-----------( 290      ( 10 Kyaw 2022 06:36 )             28.9     01-10    130<L>  |  84<L>  |  25.9<H>  ----------------------------<  857<HH>  3.0<L>   |  19.0<L>  |  0.66    Ca    6.9<L>      10 Kyaw 2022 06:36  Phos  3.4     01-10  Mg     1.4     01-10            RADIOLOGY & ADDITIONAL STUDIES:   Negative CTA

## 2022-01-10 NOTE — CONSULT NOTE ADULT - REASON FOR ADMISSION
AMS and Acute Hypoxic Respiratory Failure 2/2 COVID PNA

## 2022-01-10 NOTE — PROGRESS NOTE ADULT - TIME BILLING
Patient assessment, care plan. Review of chart, labs, imaging.   Discussion with  Interdisciplinary team

## 2022-01-11 ENCOUNTER — TRANSCRIPTION ENCOUNTER (OUTPATIENT)
Age: 87
End: 2022-01-11

## 2022-01-11 DIAGNOSIS — D64.9 ANEMIA, UNSPECIFIED: ICD-10-CM

## 2022-01-11 LAB
ANION GAP SERPL CALC-SCNC: 11 MMOL/L — SIGNIFICANT CHANGE UP (ref 5–17)
BUN SERPL-MCNC: 29.4 MG/DL — HIGH (ref 8–20)
CALCIUM SERPL-MCNC: 8.6 MG/DL — SIGNIFICANT CHANGE UP (ref 8.6–10.2)
CHLORIDE SERPL-SCNC: 107 MMOL/L — SIGNIFICANT CHANGE UP (ref 98–107)
CO2 SERPL-SCNC: 23 MMOL/L — SIGNIFICANT CHANGE UP (ref 22–29)
CREAT SERPL-MCNC: 0.61 MG/DL — SIGNIFICANT CHANGE UP (ref 0.5–1.3)
CULTURE RESULTS: SIGNIFICANT CHANGE UP
GLUCOSE SERPL-MCNC: 106 MG/DL — HIGH (ref 70–99)
HCT VFR BLD CALC: 32.8 % — LOW (ref 34.5–45)
HGB BLD-MCNC: 11.1 G/DL — LOW (ref 11.5–15.5)
MAGNESIUM SERPL-MCNC: 2 MG/DL — SIGNIFICANT CHANGE UP (ref 1.6–2.6)
MCHC RBC-ENTMCNC: 29.1 PG — SIGNIFICANT CHANGE UP (ref 27–34)
MCHC RBC-ENTMCNC: 33.8 GM/DL — SIGNIFICANT CHANGE UP (ref 32–36)
MCV RBC AUTO: 85.9 FL — SIGNIFICANT CHANGE UP (ref 80–100)
PHOSPHATE SERPL-MCNC: 2.7 MG/DL — SIGNIFICANT CHANGE UP (ref 2.4–4.7)
PLATELET # BLD AUTO: 372 K/UL — SIGNIFICANT CHANGE UP (ref 150–400)
POTASSIUM SERPL-MCNC: 4.1 MMOL/L — SIGNIFICANT CHANGE UP (ref 3.5–5.3)
POTASSIUM SERPL-SCNC: 4.1 MMOL/L — SIGNIFICANT CHANGE UP (ref 3.5–5.3)
RBC # BLD: 3.82 M/UL — SIGNIFICANT CHANGE UP (ref 3.8–5.2)
RBC # FLD: 14.6 % — HIGH (ref 10.3–14.5)
SODIUM SERPL-SCNC: 141 MMOL/L — SIGNIFICANT CHANGE UP (ref 135–145)
SPECIMEN SOURCE: SIGNIFICANT CHANGE UP
WBC # BLD: 10.78 K/UL — HIGH (ref 3.8–10.5)
WBC # FLD AUTO: 10.78 K/UL — HIGH (ref 3.8–10.5)

## 2022-01-11 PROCEDURE — 99233 SBSQ HOSP IP/OBS HIGH 50: CPT

## 2022-01-11 PROCEDURE — 99232 SBSQ HOSP IP/OBS MODERATE 35: CPT

## 2022-01-11 RX ORDER — PANTOPRAZOLE SODIUM 20 MG/1
40 TABLET, DELAYED RELEASE ORAL
Refills: 0 | Status: DISCONTINUED | OUTPATIENT
Start: 2022-01-11 | End: 2022-01-11

## 2022-01-11 RX ORDER — PANTOPRAZOLE SODIUM 20 MG/1
40 TABLET, DELAYED RELEASE ORAL EVERY 12 HOURS
Refills: 0 | Status: DISCONTINUED | OUTPATIENT
Start: 2022-01-11 | End: 2022-01-14

## 2022-01-11 RX ORDER — ACETAMINOPHEN 500 MG
2 TABLET ORAL
Qty: 0 | Refills: 0 | DISCHARGE
Start: 2022-01-11

## 2022-01-11 RX ORDER — CIPROFLOXACIN LACTATE 400MG/40ML
1 VIAL (ML) INTRAVENOUS
Qty: 5 | Refills: 0
Start: 2022-01-11 | End: 2022-01-15

## 2022-01-11 RX ORDER — RIVAROXABAN 15 MG-20MG
1 KIT ORAL
Qty: 30 | Refills: 0
Start: 2022-01-11 | End: 2022-02-09

## 2022-01-11 RX ORDER — DEXAMETHASONE 0.5 MG/5ML
1 ELIXIR ORAL
Qty: 5 | Refills: 0
Start: 2022-01-11 | End: 2022-01-15

## 2022-01-11 RX ORDER — PANTOPRAZOLE SODIUM 20 MG/1
1 TABLET, DELAYED RELEASE ORAL
Qty: 60 | Refills: 0
Start: 2022-01-11 | End: 2022-02-09

## 2022-01-11 RX ADMIN — PANTOPRAZOLE SODIUM 40 MILLIGRAM(S): 20 TABLET, DELAYED RELEASE ORAL at 06:18

## 2022-01-11 RX ADMIN — PANTOPRAZOLE SODIUM 40 MILLIGRAM(S): 20 TABLET, DELAYED RELEASE ORAL at 18:23

## 2022-01-11 RX ADMIN — Medication 6 MILLIGRAM(S): at 06:18

## 2022-01-11 RX ADMIN — CEFTRIAXONE 100 MILLIGRAM(S): 500 INJECTION, POWDER, FOR SOLUTION INTRAMUSCULAR; INTRAVENOUS at 06:12

## 2022-01-11 NOTE — PROGRESS NOTE ADULT - SUBJECTIVE AND OBJECTIVE BOX
Chief Complaint: This is a 89y old woman patient being seen in follow-up consultation for anemia, +FOBT    Interval HPI / 24H events:  Patient seen and evaluated at bedside, reporting no complaints, no overnight events. Reported poor appetite. Patient denies abdominal pain, nausea, vomiting, diarrhea, constipation, hematemesis, hematochezia, melena.                                                        .  Review of Systems:  · ENMT: negative  · Respiratory and Thorax: negative  · Cardiovascular: negative  · Gastrointestinal: see above.  · Genitourinary:	negative  · Musculoskeletal: negative  · Neurological: negative  · Psychiatric: negative  · Hematology/Lymphatics: negative  · Endocrine: negative      PAST MEDICAL/SURGICAL HISTORY:  Afib    History of valvular heart disease    HTN (hypertension)    Chronic back pain      MEDICATIONS  (STANDING):  dexAMETHasone  Injectable 6 milliGRAM(s) IV Push daily  pantoprazole  Injectable 40 milliGRAM(s) IV Push every 12 hours    MEDICATIONS  (PRN):  acetaminophen     Tablet .. 650 milliGRAM(s) Oral every 6 hours PRN Temp greater or equal to 38C (100.4F), Mild Pain (1 - 3)  ALBUTerol    90 MICROgram(s) HFA Inhaler 2 Puff(s) Inhalation every 4 hours PRN Shortness of Breath and/or Wheezing  aluminum hydroxide/magnesium hydroxide/simethicone Suspension 30 milliLiter(s) Oral every 4 hours PRN Dyspepsia  benzonatate 100 milliGRAM(s) Oral three times a day PRN Cough  guaifenesin/dextromethorphan Oral Liquid 10 milliLiter(s) Oral every 4 hours PRN Cough  melatonin 3 milliGRAM(s) Oral at bedtime PRN Insomnia  ondansetron Injectable 4 milliGRAM(s) IV Push every 8 hours PRN Nausea and/or Vomiting  traMADol 25 milliGRAM(s) Oral two times a day PRN Severe Pain (7 - 10)    Allergy Status Unknown    T(C): 36.8 (01-11-22 @ 05:43), Max: 36.9 (01-10-22 @ 12:00)  HR: 98 (01-11-22 @ 05:43) (96 - 103)  BP: 132/68 (01-11-22 @ 05:43) (132/68 - 154/71)  RR: 18 (01-11-22 @ 05:43) (18 - 18)  SpO2: 95% (01-11-22 @ 05:43) (95% - 99%)    PHYSICAL EXAM:    Constitutional: No acute distress  Neuro: Awake alert, oriented   HEENT: PERRL, anicteric sclerae  Neck: supple, no JVD  CV: +S1S2,   Pulm/chest: breath sounds clear bilaterally, no accessory muscle use noted  Abd: soft, NT, ND, +BS  Ext:  no Cyanosis, clubbing or edema  Skin: warm, well perfused, no jaundice   Psych: calm, appropriate affect    LABS:               11.1   10.78 )-----------( 372      ( 01-11 @ 07:33 )             32.8                9.6    12.19 )-----------( 290      ( 01-10 @ 06:36 )             28.9                12.0   14.40 )-----------( 323      ( 01-09 @ 19:57 )             34.9                9.9    13.34 )-----------( 272      ( 01-09 @ 05:55 )             29.7       01-11    141  |  107  |  29.4<H>  ----------------------------<  106<H>  4.1   |  23.0  |  0.61    Ca    8.6      11 Jan 2022 07:33  Phos  2.7     01-11  Mg     2.0     01-11      PTT - ( 09 Jan 2022 16:01 )  PTT:50.3 sec  Ferritin, Serum: 395 ng/mL *H* (01-07-22 @ 07:17)    Culture - Urine (collected 10 Kyaw 2022 12:12)  Source: Clean Catch Clean Catch (Midstream)  Final Report (11 Jan 2022 08:04):    <10,000 CFU/mL Normal Urogenital Aliya

## 2022-01-11 NOTE — PROGRESS NOTE ADULT - PROBLEM SELECTOR PLAN 1
Normocytic anemia with + FOBT. No evicdence of overt GI bleeding. Hemoglobin remain stable, today 11.1 gm uptrend from 9.6 yeaterday.   Continue IV Pantoprazole 40 mg every 12 hours.   Diet as tolerated and appropriate by her mental status  Eventaul EGD and colonoscopy once medically optimized  Trend CBC, transfuse to Hgb 8 or higher

## 2022-01-11 NOTE — PROGRESS NOTE ADULT - ATTENDING COMMENTS
89y old  Female who presents with AMS and admitted for Acute Hypoxic Respiratory Failure 2/2 COVID PNA,  GI following for anemia. Pt has normocytic anemia, h/h uptrending, VSS, no melena/ hematochezia. C/w PPI po. Outpatient f/u with GI for egd/ cscope for further evaluations of anemia once medically optimized. GI will sign off, please contact if further questions/concerns.

## 2022-01-11 NOTE — PROGRESS NOTE ADULT - SUBJECTIVE AND OBJECTIVE BOX
CC:  follow up GOC    OVERNIGHT EVENTS:  patient was upgraded to thickened liquids  patient wants water - not thickened    Present Symptoms:     Dyspnea: no  Nausea/Vomiting:  No  Anxiety:  No  Depression: No  Fatigue: Yes   Loss of appetite: Yes   Constipation:     Pain: no            Character-            Duration-            Effect-            Factors-            Frequency-            Location-            Severity-    Pain AD Score:  [email or web address] (press ctrl + left click to view)    Review of Systems: Reviewed                     Negative: no SOB, no CP                     Positive:  none  Limited due to poor historian    MEDICATIONS  (STANDING):  dexAMETHasone  Injectable 6 milliGRAM(s) IV Push daily  pantoprazole  Injectable 40 milliGRAM(s) IV Push every 12 hours    MEDICATIONS  (PRN):  acetaminophen     Tablet .. 650 milliGRAM(s) Oral every 6 hours PRN Temp greater or equal to 38C (100.4F), Mild Pain (1 - 3)  ALBUTerol    90 MICROgram(s) HFA Inhaler 2 Puff(s) Inhalation every 4 hours PRN Shortness of Breath and/or Wheezing  aluminum hydroxide/magnesium hydroxide/simethicone Suspension 30 milliLiter(s) Oral every 4 hours PRN Dyspepsia  benzonatate 100 milliGRAM(s) Oral three times a day PRN Cough  guaifenesin/dextromethorphan Oral Liquid 10 milliLiter(s) Oral every 4 hours PRN Cough  melatonin 3 milliGRAM(s) Oral at bedtime PRN Insomnia  ondansetron Injectable 4 milliGRAM(s) IV Push every 8 hours PRN Nausea and/or Vomiting  traMADol 25 milliGRAM(s) Oral two times a day PRN Severe Pain (7 - 10)      PHYSICAL EXAM:    Vital Signs Last 24 Hrs  T(C): 36.8 (11 Jan 2022 05:43), Max: 36.9 (10 Kyaw 2022 12:00)  T(F): 98.2 (11 Jan 2022 05:43), Max: 98.4 (10 Kyaw 2022 12:00)  HR: 98 (11 Jan 2022 05:43) (96 - 103)  BP: 132/68 (11 Jan 2022 05:43) (132/68 - 154/71)  BP(mean): --  RR: 18 (11 Jan 2022 05:43) (18 - 18)  SpO2: 95% (11 Jan 2022 05:43) (95% - 99%)    General: Awake alert NAD  Karnofsky: 40 %  HEENT: normal   Lungs: comfortable  CV: normal   GI: soft NTND  : normal    MSK:  weakness   Skin: normal     LABS:                          11.1   10.78 )-----------( 372      ( 11 Jan 2022 07:33 )             32.8     01-11    141  |  107  |  29.4<H>  ----------------------------<  106<H>  4.1   |  23.0  |  0.61    Ca    8.6      11 Jan 2022 07:33  Phos  2.7     01-11  Mg     2.0     01-11      PTT - ( 09 Jan 2022 16:01 )  PTT:50.3 sec    I&O's Summary      RADIOLOGY & ADDITIONAL STUDIES:      ADVANCE DIRECTIVES/TREATMENT PREFERENCES:  DNR /I

## 2022-01-11 NOTE — DISCHARGE NOTE PROVIDER - NSDCCPCAREPLAN_GEN_ALL_CORE_FT
PRINCIPAL DISCHARGE DIAGNOSIS  Diagnosis: Acute respiratory failure due to COVID-19  Assessment and Plan of Treatment: - take medications as rx   - follow up with pcp       PRINCIPAL DISCHARGE DIAGNOSIS  Diagnosis: Acute respiratory failure due to COVID-19  Assessment and Plan of Treatment: - take medications  as prescribed  - follow up with pcp      SECONDARY DISCHARGE DIAGNOSES  Diagnosis: ARF (acute renal failure)  Assessment and Plan of Treatment: improved post fluids.

## 2022-01-11 NOTE — DISCHARGE NOTE PROVIDER - NSDCMRMEDTOKEN_GEN_ALL_CORE_FT
acetaminophen 325 mg oral tablet: 2 tab(s) orally every 6 hours, As needed, Temp greater or equal to 38C (100.4F), Mild Pain (1 - 3)  dexamethasone 6 mg oral tablet: 1 tab(s) orally once a day   levoFLOXacin 750 mg oral tablet: 1 tab(s) orally once a day   Protonix 40 mg oral delayed release tablet: 1 tab(s) orally every 12 hours   Xarelto 10 mg oral tablet: 1 tab(s) orally once a day    acetaminophen 325 mg oral tablet: 2 tab(s) orally every 6 hours, As needed, Temp greater or equal to 38C (100.4F), Mild Pain (1 - 3)  dexamethasone 6 mg oral tablet: 1 tab(s) orally once a day   morphine 20 mg/mL oral concentrate: 0.25 milliliter(s) sublingual 4 times a day, As Needed -for shortness of breath and/or wheezing - for severe pain MDD:2ml   Protonix 40 mg oral delayed release tablet: 1 tab(s) orally every 12 hours   Xarelto 10 mg oral tablet: 1 tab(s) orally once a day

## 2022-01-11 NOTE — DISCHARGE NOTE PROVIDER - HOSPITAL COURSE
89 yo female PMH HTN, afib on xarelto, spinal stenosis/lbp, chronic pain syndrome bibems from home unresponsive, hypoxic, with constricted pupils admitted with acute hypoxic respiratory failure and ams. patient found to have uti and covid seen by id and treated for both. now stable and being discharged on room air as improved    Time spent on patients discharge 32 minutes    89 yo female PMH HTN, afib on xarelto, spinal stenosis/lbp, chronic pain syndrome bibems from home unresponsive, hypoxic, with constricted pupils admitted with acute hypoxic respiratory failure and ams. patient found to have uti and covid seen by id and treated for both. now stable and being discharged on room air as improved. planned for home hospice dc.

## 2022-01-11 NOTE — PROGRESS NOTE ADULT - ASSESSMENT
89 yo female PMH HTN, afib on xarelto, spinal stenosis/lbp, chronic pain syndrome bibems from home unresponsive, hypoxic, with constricted pupils admitted with acute hypoxic respiratory failure and ams    #acute hypoxic respiratory failure   - w/ metabolic encephalopathy  - 2/2 covid 19   - w/ bouts of ams- ?hospital acquired delerium   - supportive care  - ID consult appreciated  - UA positive for uti - ceftriaxone  - decadron and remdesivir  - isolation precautions  - monitor cultures  - wean 02 as tolerated-> now on room air     #+fobt  - monitor h and h  - gi consult appreciated  - protonix    #positive blood culture   - probable contaminated monitor     #chronic afib   - hold a/c for now given positive fobt    #dysphagia   - hx vocal cord paralysis as per son   - puree diet     #hypomagnesiumia  - repleted  - monitor     #chronic back pain   - UDS: +THC   - s/p narcan in ER   - tramadol, hold for lethargy     case discussed with patient son Bony Garnica [phone].  89 yo female PMH HTN, afib on xarelto, spinal stenosis/lbp, chronic pain syndrome bibems from home unresponsive, hypoxic, with constricted pupils admitted with acute hypoxic respiratory failure and ams    #acute hypoxic respiratory failure   - w/ metabolic encephalopathy  - 2/2 covid 19   - w/ bouts of ams- ?hospital acquired delerium   - supportive care  - ID consult appreciated  - UA positive for uti - s/p ceftriaxone  - decadron and remdesivir  - isolation precautions  - monitor cultures  - wean 02 as tolerated-> now on room air     #+fobt  - monitor h and h  - gi consult appreciated  - protonix    #positive blood culture   - probable contaminated monitor     #chronic afib   - hold a/c for now given positive fobt    #dysphagia   - hx vocal cord paralysis as per son   - puree diet     #hypomagnesiumia  - repleted  - monitor     #chronic back pain   - UDS: +THC   - s/p narcan in ER   - tramadol, hold for lethargy     case discussed with patient son Bony Garnica [phone].

## 2022-01-11 NOTE — DISCHARGE NOTE PROVIDER - PROVIDER TOKENS
PROVIDER:[TOKEN:[[id number]]],FREE:[LAST:[Primary Care Doctor],PHONE:[(   )    -],FAX:[(   )    -]]

## 2022-01-11 NOTE — DISCHARGE NOTE PROVIDER - ATTENDING DISCHARGE PHYSICAL EXAMINATION:
awake, confused, nonfocal neuro generalized debility  ctab anteriorly, poor effort, rrr s1s2 soft abdomen  ros limited 2/2 mental status

## 2022-01-11 NOTE — PROGRESS NOTE ADULT - SUBJECTIVE AND OBJECTIVE BOX
Patient is a 89y old  Female who presents with a chief complaint of AMS and Acute Hypoxic Respiratory Failure 2/2 COVID PNA (10 Kyaw 2022 13:31)    Patient seen and examined at bedside.     ALLERGIES:  Allergy Status Unknown    MEDICATIONS  (STANDING):  dexAMETHasone  Injectable 6 milliGRAM(s) IV Push daily  pantoprazole  Injectable 40 milliGRAM(s) IV Push every 12 hours    MEDICATIONS  (PRN):  acetaminophen     Tablet .. 650 milliGRAM(s) Oral every 6 hours PRN Temp greater or equal to 38C (100.4F), Mild Pain (1 - 3)  ALBUTerol    90 MICROgram(s) HFA Inhaler 2 Puff(s) Inhalation every 4 hours PRN Shortness of Breath and/or Wheezing  aluminum hydroxide/magnesium hydroxide/simethicone Suspension 30 milliLiter(s) Oral every 4 hours PRN Dyspepsia  benzonatate 100 milliGRAM(s) Oral three times a day PRN Cough  guaifenesin/dextromethorphan Oral Liquid 10 milliLiter(s) Oral every 4 hours PRN Cough  melatonin 3 milliGRAM(s) Oral at bedtime PRN Insomnia  ondansetron Injectable 4 milliGRAM(s) IV Push every 8 hours PRN Nausea and/or Vomiting  traMADol 25 milliGRAM(s) Oral two times a day PRN Severe Pain (7 - 10)    Vital Signs Last 24 Hrs  T(F): 98.2 (2022 05:43), Max: 98.4 (10 Kyaw 2022 12:00)  HR: 98 (2022 05:43) (96 - 103)  BP: 132/68 (2022 05:43) (132/68 - 154/71)  RR: 18 (2022 05:43) (18 - 18)  SpO2: 95% (2022 05:43) (95% - 99%)  I&O's Summary    PHYSICAL EXAM:  General: NAD, aaox3  ENT: MMM, no thrush  Neck: Supple, No JVD  Lungs: decreased breath sounds b/l bases  Cardio: +s1/s2  Abdomen: Soft, Nontender, Nondistended; Bowel sounds present  Extremities: No calf tenderness, No pitting edema      LABS:                        11.1   10.78 )-----------( 372      ( 2022 07:33 )             32.8     01-10    140  |  104  |  29.4  ----------------------------<  130  3.7   |  22.0  |  0.60    Ca    8.9      10 Kyaw 2022 09:14  Phos  3.4     01-10  Mg     1.4     01-10    eGFR if Non African American: 82 mL/min/1.73M2 (01-10-22 @ 09:14)  eGFR if African American: 95 mL/min/1.73M2 (01-10-22 @ 09:14)    PTT - ( 2022 16:01 )  PTT:50.3 sec    Urinalysis Basic - ( 2022 02:50 )  Color: Yellow / Appearance: very cloudy / S.010 / pH: x  Gluc: x / Ketone: Small  / Bili: Negative / Urobili: Negative   Blood: x / Protein: 30 mg/dL / Nitrite: Positive   Leuk Esterase: Moderate / RBC: 3-5 /HPF / WBC 6-10   Sq Epi: x / Non Sq Epi: Occasional / Bacteria: Moderate    Cultures  Culture - Blood (collected 2022 14:29)  Source: .Blood Blood-Peripheral  Preliminary Report (2022 15:01):    No growth at 48 hours    Culture - Blood (collected 2022 14:28)  Source: .Blood Blood-Peripheral  Preliminary Report (2022 15:01):    No growth at 48 hours    Culture - Blood (collected 2022 01:40)  Source: .Blood Blood-Peripheral  Gram Stain (2022 08:29):    Growth in anaerobic bottle: Gram Positive Cocci in Clusters    Final Report (2022 12:33):    Growth in anaerobic bottle: Staphylococcus hominis "Susceptibilities not    performed"  Organism: Blood Culture PCR (2022 12:33)      -  Coagulase negative Staphylococcus: Detec      Method Type: PCR  Organism: Blood Culture PCR (2022 08:06)    Culture - Blood (collected 2022 01:40)  Source: .Blood Blood-Peripheral  Gram Stain (2022 08:29):    Growth in aerobic bottle: Gram Positive Cocci in Clusters    Gram Stain performed by:  Final Report (2022 08:05):    Growth in aerobic bottle: Staphylococcus hominis  Organism: Staphylococcus hominis (2022 08:05)  Organism: Staphylococcus hominis (2022 08:05)    RADIOLOGY & ADDITIONAL TESTS:  - no new tests

## 2022-01-11 NOTE — PROGRESS NOTE ADULT - ASSESSMENT
87yr woman  PMH HTN, afib, spinal stenosis with chronic pain syndrome admitted with acute hypoxemic respiratory failure with COVID PNA, mental status poor    1. Acute Hypoxemic Respiratory Failure  Weaned to nasal canula  O2 95%  Patient is DNR/I      2.  COVID PNA  Continuing Decadron   monitor inflammatory markers  Noted ID consult     3. Encephalopathy  More alert today  CT reviewed - negative    3.  Chronic Pain Syndrome- hx spinal stenosis  cont Tramadol PRN  recommend IV Tylenol PRN   monitor for sedation     4.  Dysphagia  Patient requesting thin liquids - currently with thickener  Requested SLP to re-eval  Aspiration precautions    5.  GI bleed  reported hx of blood in stool  GI consulted  PPI  monitor Hg    6. UTI  s/p  abx    7.  Encounter for Palliative Care  patient more alert today. Requesting water without thickener  Requested SLP eval  Planning for discharge to [facility]

## 2022-01-11 NOTE — DISCHARGE NOTE PROVIDER - CARE PROVIDER_API CALL
Adin Barnes)  Gastroenterology; Internal Medicine  [address], [address]  [place] [address]  Phone: [phone]  Fax: [phone]  Follow Up Time:     Primary Care Doctor,   Phone: (   )    -  Fax: (   )    -  Follow Up Time:

## 2022-01-11 NOTE — DISCHARGE NOTE PROVIDER - NSDCFUADDINST_GEN_ALL_CORE_FT
It has been determined that you no longer need hospitalization and can recover while remaining in self-quarantine at home for 14 days. You should follow the prevention steps below until a healthcare provider or [facility] says you can return to your normal activities.  Please remain in quarantine until then. You should restrict activities outside your home except for getting medical care.  Do not go to work, school or public areas.  Avoid using public transportation.  Separate yourself from other people and animals in your home.  Cover your coughs and sneezes.  Clean your hands often. Avoid sharing personal household items. Clean all high touch surfaces. Monitor your symptoms, if you have a medical emergency call 911.

## 2022-01-11 NOTE — DISCHARGE NOTE PROVIDER - DETAILS OF MALNUTRITION DIAGNOSIS/DIAGNOSES
This patient has been assessed with a concern for Malnutrition and was treated during this hospitalization for the following Nutrition diagnosis/diagnoses:     -  01/12/2022: Severe protein-calorie malnutrition

## 2022-01-12 LAB
CULTURE RESULTS: SIGNIFICANT CHANGE UP
CULTURE RESULTS: SIGNIFICANT CHANGE UP
SARS-COV-2 RNA SPEC QL NAA+PROBE: DETECTED
SPECIMEN SOURCE: SIGNIFICANT CHANGE UP
SPECIMEN SOURCE: SIGNIFICANT CHANGE UP

## 2022-01-12 PROCEDURE — 99233 SBSQ HOSP IP/OBS HIGH 50: CPT

## 2022-01-12 RX ORDER — MIRTAZAPINE 45 MG/1
15 TABLET, ORALLY DISINTEGRATING ORAL AT BEDTIME
Refills: 0 | Status: DISCONTINUED | OUTPATIENT
Start: 2022-01-12 | End: 2022-01-14

## 2022-01-12 RX ADMIN — PANTOPRAZOLE SODIUM 40 MILLIGRAM(S): 20 TABLET, DELAYED RELEASE ORAL at 17:29

## 2022-01-12 RX ADMIN — MIRTAZAPINE 15 MILLIGRAM(S): 45 TABLET, ORALLY DISINTEGRATING ORAL at 22:32

## 2022-01-12 RX ADMIN — Medication 6 MILLIGRAM(S): at 05:50

## 2022-01-12 RX ADMIN — PANTOPRAZOLE SODIUM 40 MILLIGRAM(S): 20 TABLET, DELAYED RELEASE ORAL at 05:50

## 2022-01-12 NOTE — PROGRESS NOTE ADULT - ASSESSMENT
89 yo female PMH HTN, afib on xarelto, spinal stenosis/lbp, chronic pain syndrome bibems from home unresponsive, hypoxic, with constricted pupils admitted with acute hypoxic respiratory failure and ams    #acute hypoxic respiratory failure   - w/ metabolic encephalopathy  - 2/2 covid 19   - w/ bouts of ams- ?hospital acquired delerium   - supportive care  - ID consult appreciated  - UA positive for uti - s/p ceftriaxone  - decadron and remdesivir  - isolation precautions  - monitor cultures  - wean 02 as tolerated-> now on room air     #+fobt  - monitor h and h  - gi consult appreciated  - protonix    #positive blood culture   - probable contaminated monitor     #chronic afib   - hold a/c for now given positive fobt    #dysphagia   - hx vocal cord paralysis as per son   - puree diet     #refusal to consume po intake  - patient stating does not want to take po intake understands needs to do this to be discharged- d/w patient son as well states will see mom and try to convince to eat however if unable to will consider home hospice. does not want peg or ng tube at this time; stating mom does like sweet foods and did consume po food last night over facetime ; d/w YEIMY Sow that he will come see mom to establish further goc  - start mirtazapine    #severe protein calorie malnutrition  - nutritionist consult appreciated    #chronic back pain   - UDS: +THC   - s/p narcan in ER   - tramadol, hold for lethargy     case discussed with patient son Bony Garnica [phone].

## 2022-01-12 NOTE — DIETITIAN INITIAL EVALUATION ADULT. - OTHER INFO
87F with PMHX HTN, AFib on Xarelto, Spinal Stenosis/LBP, Chronic Pain Syndrome BIBEMS from home unresponsive, hypoxic, with constricted pupils bilaterally. Patient last seen lethargic but awake/arousable at eating approx 1430PM today then went back to bed per family. Patient hypoxic en route reportedly satting 70s on 10L NC per EMS. Hypoxia improved on HFNC/NRB in ED satting 99% on 40L 100% FIO2 HFNC at time of exam. Patient given Narcan IV x1 in ED for opiate reversal without much improvement. Minimally responsive on exam. Withdraws to painful stimuli. DNR/DNI code status confirmed by ED. Patient COVID positive and placed in isolation. PT remains confused, SLP eval completed 1/7, mild thick liquids added 1/10. Pt completing <50% of meals with assistance. RD bedscale weight 100lbs, question accuracy of weights, will continue to monitor

## 2022-01-12 NOTE — DIETITIAN INITIAL EVALUATION ADULT. - ETIOLOGY
related to inability to meet sufficient protein-energy needs in setting of AMS 2/2 COVID, dysphagia, advanced age

## 2022-01-12 NOTE — PROGRESS NOTE ADULT - SUBJECTIVE AND OBJECTIVE BOX
Patient is a 89y old  Female who presents with a chief complaint of AMS and Acute Hypoxic Respiratory Failure 2/2 COVID PNA (12 Jan 2022 11:17)    Patient seen and examined at bedside.     ALLERGIES:  Allergy Status Unknown    MEDICATIONS  (STANDING):  dexAMETHasone  Injectable 6 milliGRAM(s) IV Push daily  mirtazapine Soltab 15 milliGRAM(s) Oral at bedtime  pantoprazole  Injectable 40 milliGRAM(s) IV Push every 12 hours    MEDICATIONS  (PRN):  acetaminophen     Tablet .. 650 milliGRAM(s) Oral every 6 hours PRN Temp greater or equal to 38C (100.4F), Mild Pain (1 - 3)  ALBUTerol    90 MICROgram(s) HFA Inhaler 2 Puff(s) Inhalation every 4 hours PRN Shortness of Breath and/or Wheezing  aluminum hydroxide/magnesium hydroxide/simethicone Suspension 30 milliLiter(s) Oral every 4 hours PRN Dyspepsia  benzonatate 100 milliGRAM(s) Oral three times a day PRN Cough  guaifenesin/dextromethorphan Oral Liquid 10 milliLiter(s) Oral every 4 hours PRN Cough  melatonin 3 milliGRAM(s) Oral at bedtime PRN Insomnia  ondansetron Injectable 4 milliGRAM(s) IV Push every 8 hours PRN Nausea and/or Vomiting  traMADol 25 milliGRAM(s) Oral two times a day PRN Severe Pain (7 - 10)    Vital Signs Last 24 Hrs  T(F): 98.2 (12 Jan 2022 10:46), Max: 98.2 (12 Jan 2022 10:46)  HR: 96 (12 Jan 2022 10:46) (93 - 96)  BP: 145/82 (12 Jan 2022 10:46) (145/82 - 160/69)  RR: 18 (12 Jan 2022 10:46) (18 - 18)  SpO2: 98% (12 Jan 2022 10:46) (94% - 98%)  I&O's Summary    PHYSICAL EXAM:  General: NAD, aaox3  ENT: MMM, no thrush  Neck: Supple, No JVD  Lungs: decreased breath sounds b/l bases  Cardio: +s1/s2  Abdomen: Soft, Nontender, Nondistended; Bowel sounds present  Extremities: No calf tenderness, No pitting edema      LABS:                        11.1   10.78 )-----------( 372      ( 11 Jan 2022 07:33 )             32.8     01-11    141  |  107  |  29.4  ----------------------------<  106  4.1   |  23.0  |  0.61    Ca    8.6      11 Jan 2022 07:33  Phos  2.7     01-11  Mg     2.0     01-11    eGFR if Non African American: 80 mL/min/1.73M2 (01-11-22 @ 07:33)  eGFR if : 93 mL/min/1.73M2 (01-11-22 @ 07:33)    Cultures  Culture - Urine (collected 10 Kyaw 2022 12:12)  Source: Clean Catch Clean Catch (Midstream)  Final Report (11 Jan 2022 08:04):    <10,000 CFU/mL Normal Urogenital Aliya    Culture - Blood (collected 07 Jan 2022 14:29)  Source: .Blood Blood-Peripheral  Final Report (12 Jan 2022 15:01):    No growth at 5 days.    Culture - Blood (collected 07 Jan 2022 14:28)  Source: .Blood Blood-Peripheral  Final Report (12 Jan 2022 15:01):    No growth at 5 days.    Culture - Blood (collected 06 Jan 2022 01:40)  Source: .Blood Blood-Peripheral  Gram Stain (07 Jan 2022 08:29):    Growth in anaerobic bottle: Gram Positive Cocci in Clusters    Final Report (08 Jan 2022 12:33):    Growth in anaerobic bottle: Staphylococcus hominis "Susceptibilities not    performed"  Organism: Blood Culture PCR (08 Jan 2022 12:33)      -  Coagulase negative Staphylococcus: Detec      Method Type: PCR  Organism: Blood Culture PCR (07 Jan 2022 08:06)    Culture - Blood (collected 06 Jan 2022 01:40)  Source: .Blood Blood-Peripheral  Gram Stain (07 Jan 2022 08:29):    Growth in aerobic bottle: Gram Positive Cocci in Clusters   Final Report (09 Jan 2022 08:05):    Growth in aerobic bottle: Staphylococcus hominis  Organism: Staphylococcus hominis (09 Jan 2022 08:05)  Organism: Staphylococcus hominis (09 Jan 2022 08:05)    COVID-19 PCR: Detected (01-12-22 @ 11:04)    RADIOLOGY & ADDITIONAL TESTS:  - no new tests

## 2022-01-12 NOTE — DIETITIAN NUTRITION RISK NOTIFICATION - ADDITIONAL COMMENTS/DIETITIAN RECOMMENDATIONS
1) Add Ensure Enlive TID   2) Rx MVI daily   3) SLP intervention as feasible  4) Encourage HBV protein sources   5) Monitor weights daily for trend/accuracy   6) Provide encouragement/assistance as needed during mealtimes to inc PO

## 2022-01-12 NOTE — DIETITIAN INITIAL EVALUATION ADULT. - ADD RECOMMEND
Add Ensure Enlive TID; Rx MVI daily; Provide encouragement/assistance as needed during mealtimes to inc PO; Encourage HBV protein sources

## 2022-01-13 PROCEDURE — 99232 SBSQ HOSP IP/OBS MODERATE 35: CPT

## 2022-01-13 PROCEDURE — 99233 SBSQ HOSP IP/OBS HIGH 50: CPT

## 2022-01-13 RX ADMIN — PANTOPRAZOLE SODIUM 40 MILLIGRAM(S): 20 TABLET, DELAYED RELEASE ORAL at 18:15

## 2022-01-13 RX ADMIN — Medication 6 MILLIGRAM(S): at 05:43

## 2022-01-13 RX ADMIN — PANTOPRAZOLE SODIUM 40 MILLIGRAM(S): 20 TABLET, DELAYED RELEASE ORAL at 05:43

## 2022-01-13 RX ADMIN — MIRTAZAPINE 15 MILLIGRAM(S): 45 TABLET, ORALLY DISINTEGRATING ORAL at 21:24

## 2022-01-13 RX ADMIN — TRAMADOL HYDROCHLORIDE 25 MILLIGRAM(S): 50 TABLET ORAL at 18:15

## 2022-01-13 NOTE — PROGRESS NOTE ADULT - ASSESSMENT
87yr woman  PMH HTN, afib, spinal stenosis with chronic pain syndrome admitted with acute hypoxemic respiratory failure with COVID PNA, Patient weaned to nasal canula, remains debilitated minimally  ambulatory with poor po intake.     1. Acute Hypoxemic Respiratory Failure  Weaned to nasal canula - 2L  will need home O2   Patient is DNR/I    2.  COVID PNA  s/p Remedesivir and Decadron   monitor inflammatory markers    3. Encephalopathy  CT reviewed - negative  mental status improved - though can still wax/wane given hospital setting    3.  Chronic Pain Syndrome- hx spinal stenosis  s/p Narcan  cont Tramadol PRN  monitor for sedation     4.  Dysphagia  SLP recs noted  modified diet - puree with mild thickened liquids  Aspiration precautions    5.  GI bleed  reported hx of blood in stool  Noted GI consult   PPI  monitor Hg    6. UTI  s/p IV abx    7. Loss of Appetite  Patient poor po intake  Agree with addition of Mirtazapine  Nutritional supplements  Son allowed visitation last night     7.  Encounter for Palliative Care  Patient with poor po intake.  Discussed with SW/CM - patient lives alone and patient's sister to help in her care  Home hospice discussed. Patient will likely have further decline in her baseline functional status. She is  s/p COVID and now on Oxygen,   Reported patient minimally ambulatory.  PT to reeval.   This may qualify her to obtain Hospice Care  Will discuss with son and hospice 87yr woman  PMH HTN, afib, spinal stenosis with chronic pain syndrome admitted with acute hypoxemic respiratory failure with COVID PNA, Patient weaned to nasal canula, remains debilitated minimally  ambulatory with poor po intake.     1. Acute Hypoxemic Respiratory Failure  Weaned to nasal canula - 2L  will need home O2   Patient is DNR/I    2.  COVID PNA  s/p Remedesivir and Decadron   monitor inflammatory markers    3. Encephalopathy  CT reviewed - negative  mental status improved - though can still wax/wane given hospital setting    3.  Chronic Pain Syndrome- hx spinal stenosis  s/p Narcan  cont Tramadol PRN  monitor for sedation     4.  Dysphagia  SLP recs noted  modified diet - puree with mild thickened liquids  Aspiration precautions    5.  GI bleed  reported hx of blood in stool  Noted GI consult   PPI  monitor Hg    6. UTI  s/p IV abx    7. Loss of Appetite  Patient poor po intake  Agree with addition of Mirtazapine  Nutritional supplements  Son allowed visitation last night     7.  Encounter for Palliative Care  Patient with poor po intake.  Discussed with SW/CM - patient lives alone and patient's sister to help in her care  Home hospice discussed. Patient will likely have further decline in her baseline functional status. She is  s/p COVID and now on Oxygen,   Patient may be hospice appropriate  Reported patient minimally ambulatory.  PT to reeval.   Will discuss with son and hospice 87yr woman  PMH HTN, afib, spinal stenosis with chronic pain syndrome admitted with acute hypoxemic respiratory failure with COVID PNA, Patient weaned to nasal canula, remains debilitated minimally  ambulatory with poor po intake.     1. Acute Hypoxemic Respiratory Failure  Weaned to nasal canula - 2L  will need home O2   Patient is DNR/I    2.  COVID PNA  s/p Remedesivir and Decadron   monitor inflammatory markers    3. Encephalopathy  CT reviewed - negative  mental status improved - though can still wax/wane given hospital setting    3.  Chronic Pain Syndrome- hx spinal stenosis  s/p Narcan  cont Tramadol PRN  monitor for sedation     4.  Dysphagia  SLP recs noted  modified diet - puree with mild thickened liquids  Aspiration precautions    5.  GI bleed  reported hx of blood in stool  Noted GI consult   PPI  monitor Hg    6. UTI  s/p IV abx    7. Loss of Appetite  Patient poor po intake  Agree with addition of Mirtazapine  Nutritional supplements  Son allowed visitation last night     7.  Encounter for Palliative Care  Patient with poor po intake.  Discussed with HIEU/JHONNY - patient lives alone and patient's sister to help in her care  Home hospice discussed. Patient will likely have further decline in her baseline functional status. She is  s/p COVID and now on Oxygen,   Patient may be hospice appropriate  Reported patient minimally ambulatory.  PT to reeval.   Spoke with HIEU Mims - requested evaluation by hospice to see if she can be approved for services

## 2022-01-13 NOTE — PROGRESS NOTE ADULT - TIME BILLING
Patient assessment, care plan. Review of chart, labs, imaging.   Discussion with  Interdisciplinary team Patient assessment, care plan. Review of chart, labs, imaging.   Discussion with  Interdisciplinary team HIEU/JHONNY

## 2022-01-13 NOTE — PROGRESS NOTE ADULT - SUBJECTIVE AND OBJECTIVE BOX
seen for covid, ftt    no acute complaints/events  ros negative for cp/sob    MEDICATIONS  (STANDING):  dexAMETHasone  Injectable 6 milliGRAM(s) IV Push daily  mirtazapine Soltab 15 milliGRAM(s) Oral at bedtime  pantoprazole  Injectable 40 milliGRAM(s) IV Push every 12 hours    MEDICATIONS  (PRN):  acetaminophen     Tablet .. 650 milliGRAM(s) Oral every 6 hours PRN Temp greater or equal to 38C (100.4F), Mild Pain (1 - 3)  ALBUTerol    90 MICROgram(s) HFA Inhaler 2 Puff(s) Inhalation every 4 hours PRN Shortness of Breath and/or Wheezing  aluminum hydroxide/magnesium hydroxide/simethicone Suspension 30 milliLiter(s) Oral every 4 hours PRN Dyspepsia  benzonatate 100 milliGRAM(s) Oral three times a day PRN Cough  guaifenesin/dextromethorphan Oral Liquid 10 milliLiter(s) Oral every 4 hours PRN Cough  melatonin 3 milliGRAM(s) Oral at bedtime PRN Insomnia  ondansetron Injectable 4 milliGRAM(s) IV Push every 8 hours PRN Nausea and/or Vomiting  traMADol 25 milliGRAM(s) Oral two times a day PRN Severe Pain (7 - 10)      Allergies    Allergy Status Unknown      Vital Signs Last 24 Hrs  T(C): 36.7 (13 Jan 2022 11:15), Max: 36.8 (13 Jan 2022 04:00)  T(F): 98 (13 Jan 2022 11:15), Max: 98.3 (13 Jan 2022 04:00)  HR: 105 (13 Jan 2022 11:15) (76 - 105)  BP: 132/74 (13 Jan 2022 11:15) (132/74 - 155/77)  BP(mean): --  RR: 18 (13 Jan 2022 11:15) (18 - 18)  SpO2: 93% (13 Jan 2022 11:15) (93% - 96%)    PHYSICAL EXAM:    GENERAL: NAD ,frail  CHEST/LUNG: ctab poor effort  HEART: Regular rate and rhythm; S1 S2  ABDOMEN: Soft, Nondistended; Bowel sounds present  EXTREMITIES: no edema   NERVOUS SYSTEM: awake, follows commands, gen debility    LABS:                CAPILLARY BLOOD GLUCOSE            RADIOLOGY & ADDITIONAL TESTS:

## 2022-01-13 NOTE — PROGRESS NOTE ADULT - NUTRITIONAL ASSESSMENT
This patient has been assessed with a concern for Malnutrition and has been determined to have a diagnosis/diagnoses of Severe protein-calorie malnutrition.    This patient is being managed with:   Diet Pureed-  Mildly Thick Liquids (MILDTHICKLIQS)  Supplement Feeding Modality:  Oral  Ensure Pudding Cans or Servings Per Day:  2       Frequency:  Three Times a day  Entered: Jan 12 2022  4:02PM

## 2022-01-13 NOTE — HOSPICE CARE NOTE - CONVESATION DETAILS
Referral received, approved for home hospice. Call to son Bony, hospice care explained, son unsure if hospice care is needed at this time, states he will discuss further with medical team and will call [facility] RN back. [facility] RN will remain available.       BERYL Posey RN

## 2022-01-13 NOTE — PROGRESS NOTE ADULT - SUBJECTIVE AND OBJECTIVE BOX
CC:  follow up symptoms GOC    OVERNIGHT EVENTS:  poor po intake  Present Symptoms:     Dyspnea: no  Nausea/Vomiting: No  Anxiety:   No  Depression: possible  Fatigue: Yes   Loss of appetite: Yes   Constipation: no    Pain: no            Character-            Duration-            Effect-            Factors-            Frequency-            Location-            Severity-    Pain AD Score:  [email or web address] (press ctrl + left click to view)    Review of Systems: Reviewed                     Limited due to poor historian    MEDICATIONS  (STANDING):  dexAMETHasone  Injectable 6 milliGRAM(s) IV Push daily  mirtazapine Soltab 15 milliGRAM(s) Oral at bedtime  pantoprazole  Injectable 40 milliGRAM(s) IV Push every 12 hours    MEDICATIONS  (PRN):  acetaminophen     Tablet .. 650 milliGRAM(s) Oral every 6 hours PRN Temp greater or equal to 38C (100.4F), Mild Pain (1 - 3)  ALBUTerol    90 MICROgram(s) HFA Inhaler 2 Puff(s) Inhalation every 4 hours PRN Shortness of Breath and/or Wheezing  aluminum hydroxide/magnesium hydroxide/simethicone Suspension 30 milliLiter(s) Oral every 4 hours PRN Dyspepsia  benzonatate 100 milliGRAM(s) Oral three times a day PRN Cough  guaifenesin/dextromethorphan Oral Liquid 10 milliLiter(s) Oral every 4 hours PRN Cough  melatonin 3 milliGRAM(s) Oral at bedtime PRN Insomnia  ondansetron Injectable 4 milliGRAM(s) IV Push every 8 hours PRN Nausea and/or Vomiting  traMADol 25 milliGRAM(s) Oral two times a day PRN Severe Pain (7 - 10)      PHYSICAL EXAM:    Vital Signs Last 24 Hrs  T(C): 36.8 (13 Jan 2022 04:00), Max: 36.8 (13 Jan 2022 04:00)  T(F): 98.3 (13 Jan 2022 04:00), Max: 98.3 (13 Jan 2022 04:00)  HR: 100 (13 Jan 2022 04:00) (76 - 100)  BP: 149/79 (13 Jan 2022 04:00) (149/79 - 155/77)  BP(mean): --  RR: 18 (13 Jan 2022 04:00) (18 - 18)  SpO2: 94% (13 Jan 2022 04:00) (93% - 96%)    General: Elderly woman, alert NAD   Karnofsky:  50%  HEENT: normal    Lungs: comfortable   CV: normal   GI: normal    last BM:   :  incontinent  MSK:   weakness    Skin: normal  LABS:    I&O's Summary    12 Jan 2022 07:01  -  13 Jan 2022 07:00  --------------------------------------------------------  IN: 0 mL / OUT: 350 mL / NET: -350 mL        RADIOLOGY & ADDITIONAL STUDIES:  < from: CT Abdomen and Pelvis No Cont (01.09.22 @ 16:36) >  LIVER: Normal.  BILE DUCTS: Nondilated.  GALLBLADDER: Gallstones.  SPLEEN: Normal.  PANCREAS: Limited  ADRENALS: Limited  KIDNEYS/URETERS: No hydronephrosis or urinary tract calculi.    BLADDER: Underdistended limiting evaluation.  REPRODUCTIVE ORGANS: Limited.    BOWEL: Limited. No bowel obstruction. Proctocolitis of the rectosigmoid   is suggested.  PERITONEUM: No free air or ascites.  VESSELS: Aortoiliac atherosclerosiswithout aneurysm.  RETROPERITONEUM/LYMPH NODES: No adenopathy or hematoma.  ABDOMINAL WALL: Diffuse body wall edema.  BONES: No acute bony abnormality.    IMPRESSION:  *  Limited exam  *  Proctocolitis of the rectosigmoid is suggested.  *  No retroperitoneal hematoma      --- End of Report ---    < end of copied text >      ADVANCE DIRECTIVES/TREATMENT PREFERENCES:  DNR/I

## 2022-01-13 NOTE — GOALS OF CARE CONVERSATION - ADVANCED CARE PLANNING - CONVERSATION DETAILS
Called son Bony Garnica to complete MOLST via phone. Upon discussion of advanced directives, it became evident that the pt is not in fact DNR/I but wishes for CPR/trial of intubation. Son states that while she would want "no extreme measures", she would like an attempt at resuscitation. I explained that these wishes are somewhat incongruent given her state of health/age/comorbidities, explained CPR, but he confirmed such wishes. He states that she would not want to be on machines to breathe indefinitely, but would want to make an attempt to recover if such an event were to happen. He states that he does not feel that this is an end of life situation at this time and he is not interested in hospice services. [facility] updated to CPR with trial of intubation. MOLST completed, placed in chart, witnessed by RN and PA. Updated Dr. Collado. Called son Bony Garnica [phone] to complete MOLST via phone. Upon discussion of advanced directives, it became evident that the pt is not in fact DNR/I but wishes for CPR/trial of intubation. Son states that while she would want "no extreme measures", she would like an attempt at resuscitation. I explained that these wishes are somewhat incongruent given her state of health/age/comorbidities, explained CPR, but he confirmed such wishes. He states that she would not want to be on machines to breathe indefinitely, but would want to make an attempt to recover if such an event were to happen. He states that he does not feel that this is an end of life situation at this time and he is not interested in hospice services. [facility] updated to CPR with trial of intubation. MOLST completed, placed in chart, witnessed by RN and PA. Updated Dr. Collado.

## 2022-01-13 NOTE — PROGRESS NOTE ADULT - ASSESSMENT
88 yo female PMH HTN, afib on xarelto, spinal stenosis/lbp, chronic pain syndrome bibems from home unresponsive, hypoxic, with constricted pupils admitted with acute hypoxic respiratory failure and ams    #acute hypoxic respiratory failure   - w/ metabolic encephalopathy  - 2/2 covid 19   - w/ bouts of ams- ?hospital acquired delerium   - supportive care  - ID consult appreciated  - UA positive for uti - s/p ceftriaxone  - decadron and remdesivir  - isolation precautions  - monitor cultures  - wean 02 as tolerated-> check home o2    #+fobt  - monitor h and h  - gi consult appreciated  - protonix    #positive blood culture   - probable contaminated monitor     #chronic afib   - hold a/c for now given positive fobt    #dysphagia   - hx vocal cord paralysis as per son   - purekal diet     #FTT: mirtazapine initiated.    eval for home hospice    #severe protein calorie malnutrition  - nutritionist consult appreciated    #chronic back pain   - UDS: +THC   - s/p narcan in ER   - tramadol, hold for lethargy       patient son Bony Garnica [phone].   home hospice evaluation pending.

## 2022-01-14 ENCOUNTER — TRANSCRIPTION ENCOUNTER (OUTPATIENT)
Age: 87
End: 2022-01-14

## 2022-01-14 VITALS
RESPIRATION RATE: 18 BRPM | HEART RATE: 95 BPM | OXYGEN SATURATION: 95 % | SYSTOLIC BLOOD PRESSURE: 115 MMHG | DIASTOLIC BLOOD PRESSURE: 88 MMHG | TEMPERATURE: 97 F

## 2022-01-14 PROCEDURE — 87040 BLOOD CULTURE FOR BACTERIA: CPT

## 2022-01-14 PROCEDURE — 96374 THER/PROPH/DIAG INJ IV PUSH: CPT

## 2022-01-14 PROCEDURE — 84145 PROCALCITONIN (PCT): CPT

## 2022-01-14 PROCEDURE — 96375 TX/PRO/DX INJ NEW DRUG ADDON: CPT

## 2022-01-14 PROCEDURE — 82435 ASSAY OF BLOOD CHLORIDE: CPT

## 2022-01-14 PROCEDURE — 85025 COMPLETE CBC W/AUTO DIFF WBC: CPT

## 2022-01-14 PROCEDURE — 99232 SBSQ HOSP IP/OBS MODERATE 35: CPT

## 2022-01-14 PROCEDURE — 85014 HEMATOCRIT: CPT

## 2022-01-14 PROCEDURE — 99239 HOSP IP/OBS DSCHRG MGMT >30: CPT

## 2022-01-14 PROCEDURE — 85027 COMPLETE CBC AUTOMATED: CPT

## 2022-01-14 PROCEDURE — 80307 DRUG TEST PRSMV CHEM ANLYZR: CPT

## 2022-01-14 PROCEDURE — 86140 C-REACTIVE PROTEIN: CPT

## 2022-01-14 PROCEDURE — 87637 SARSCOV2&INF A&B&RSV AMP PRB: CPT

## 2022-01-14 PROCEDURE — 82947 ASSAY GLUCOSE BLOOD QUANT: CPT

## 2022-01-14 PROCEDURE — 82962 GLUCOSE BLOOD TEST: CPT

## 2022-01-14 PROCEDURE — 82553 CREATINE MB FRACTION: CPT

## 2022-01-14 PROCEDURE — 83605 ASSAY OF LACTIC ACID: CPT

## 2022-01-14 PROCEDURE — 86850 RBC ANTIBODY SCREEN: CPT

## 2022-01-14 PROCEDURE — 87150 DNA/RNA AMPLIFIED PROBE: CPT

## 2022-01-14 PROCEDURE — 85018 HEMOGLOBIN: CPT

## 2022-01-14 PROCEDURE — 99285 EMERGENCY DEPT VISIT HI MDM: CPT | Mod: 25

## 2022-01-14 PROCEDURE — 83735 ASSAY OF MAGNESIUM: CPT

## 2022-01-14 PROCEDURE — 84295 ASSAY OF SERUM SODIUM: CPT

## 2022-01-14 PROCEDURE — 74176 CT ABD & PELVIS W/O CONTRAST: CPT

## 2022-01-14 PROCEDURE — 36415 COLL VENOUS BLD VENIPUNCTURE: CPT

## 2022-01-14 PROCEDURE — 83036 HEMOGLOBIN GLYCOSYLATED A1C: CPT

## 2022-01-14 PROCEDURE — 82272 OCCULT BLD FECES 1-3 TESTS: CPT

## 2022-01-14 PROCEDURE — 84100 ASSAY OF PHOSPHORUS: CPT

## 2022-01-14 PROCEDURE — 84484 ASSAY OF TROPONIN QUANT: CPT

## 2022-01-14 PROCEDURE — 96365 THER/PROPH/DIAG IV INF INIT: CPT

## 2022-01-14 PROCEDURE — 87077 CULTURE AEROBIC IDENTIFY: CPT

## 2022-01-14 PROCEDURE — 85610 PROTHROMBIN TIME: CPT

## 2022-01-14 PROCEDURE — 94760 N-INVAS EAR/PLS OXIMETRY 1: CPT

## 2022-01-14 PROCEDURE — G1004: CPT

## 2022-01-14 PROCEDURE — 82728 ASSAY OF FERRITIN: CPT

## 2022-01-14 PROCEDURE — 86900 BLOOD TYPING SEROLOGIC ABO: CPT

## 2022-01-14 PROCEDURE — 87086 URINE CULTURE/COLONY COUNT: CPT

## 2022-01-14 PROCEDURE — 82550 ASSAY OF CK (CPK): CPT

## 2022-01-14 PROCEDURE — 82330 ASSAY OF CALCIUM: CPT

## 2022-01-14 PROCEDURE — 97163 PT EVAL HIGH COMPLEX 45 MIN: CPT

## 2022-01-14 PROCEDURE — 83615 LACTATE (LD) (LDH) ENZYME: CPT

## 2022-01-14 PROCEDURE — 85379 FIBRIN DEGRADATION QUANT: CPT

## 2022-01-14 PROCEDURE — 85730 THROMBOPLASTIN TIME PARTIAL: CPT

## 2022-01-14 PROCEDURE — 93005 ELECTROCARDIOGRAM TRACING: CPT

## 2022-01-14 PROCEDURE — U0005: CPT

## 2022-01-14 PROCEDURE — 92610 EVALUATE SWALLOWING FUNCTION: CPT

## 2022-01-14 PROCEDURE — 71045 X-RAY EXAM CHEST 1 VIEW: CPT

## 2022-01-14 PROCEDURE — 81001 URINALYSIS AUTO W/SCOPE: CPT

## 2022-01-14 PROCEDURE — 87186 SC STD MICRODIL/AGAR DIL: CPT

## 2022-01-14 PROCEDURE — 93970 EXTREMITY STUDY: CPT

## 2022-01-14 PROCEDURE — 82803 BLOOD GASES ANY COMBINATION: CPT

## 2022-01-14 PROCEDURE — 92526 ORAL FUNCTION THERAPY: CPT

## 2022-01-14 PROCEDURE — 84132 ASSAY OF SERUM POTASSIUM: CPT

## 2022-01-14 PROCEDURE — 97530 THERAPEUTIC ACTIVITIES: CPT

## 2022-01-14 PROCEDURE — U0003: CPT

## 2022-01-14 PROCEDURE — 86901 BLOOD TYPING SEROLOGIC RH(D): CPT

## 2022-01-14 PROCEDURE — 93308 TTE F-UP OR LMTD: CPT

## 2022-01-14 PROCEDURE — 80053 COMPREHEN METABOLIC PANEL: CPT

## 2022-01-14 PROCEDURE — 70450 CT HEAD/BRAIN W/O DYE: CPT | Mod: ME

## 2022-01-14 PROCEDURE — 80048 BASIC METABOLIC PNL TOTAL CA: CPT

## 2022-01-14 PROCEDURE — 97110 THERAPEUTIC EXERCISES: CPT

## 2022-01-14 RX ORDER — MORPHINE SULFATE 50 MG/1
0.25 CAPSULE, EXTENDED RELEASE ORAL
Qty: 30 | Refills: 0
Start: 2022-01-14 | End: 2022-02-12

## 2022-01-14 RX ORDER — DEXAMETHASONE 0.5 MG/5ML
1 ELIXIR ORAL
Qty: 2 | Refills: 0
Start: 2022-01-14 | End: 2022-01-15

## 2022-01-14 RX ADMIN — PANTOPRAZOLE SODIUM 40 MILLIGRAM(S): 20 TABLET, DELAYED RELEASE ORAL at 05:24

## 2022-01-14 RX ADMIN — Medication 6 MILLIGRAM(S): at 05:25

## 2022-01-14 NOTE — PROGRESS NOTE ADULT - REASON FOR ADMISSION
AMS and Acute Hypoxic Respiratory Failure 2/2 COVID PNA

## 2022-01-14 NOTE — PROGRESS NOTE ADULT - TIME BILLING
Patient assessment, care plan. Review of chart, labs, imaging.   Discussion with  Interdisciplinary team - CM, hospice

## 2022-01-14 NOTE — PROGRESS NOTE ADULT - ASSESSMENT
87yr woman  PMH HTN, afib, spinal stenosis with chronic pain syndrome admitted with acute hypoxemic respiratory failure with COVID PNA, Patient weaned to nasal canula, remains debilitated minimally  ambulatory with poor po intake.     1. Acute Hypoxemic Respiratory Failure  Weaned to nasal canula - 2L  will need home O2   Noted overnight events -Rescinded DNR/I     2.  COVID PNA  s/p Remedesivir and Decadron   monitor inflammatory markers    3. Encephalopathy  CT reviewed - negative  mental status improved - though can still wax/wane given hospital setting    3.  Chronic Pain Syndrome- hx spinal stenosis  s/p Narcan  cont Tramadol PRN  monitor for sedation     4.  Dysphagia  SLP recs noted  modified diet - puree with mild thickened liquids  Aspiration precautions    5.  GI bleed  reported hx of blood in stool  Noted GI consult   PPI  monitor Hg    6. UTI  s/p IV abx    7. Loss of Appetite  Patient poor po intake - minimally consumed breakfast  Agree with addition of Mirtazapine  Nutritional supplements    7.  Encounter for Palliative Care  Patient approved for home hospice services.  Son initially declined services.  Informed by CM now has Accepted -Plan for home hospice upon discharge  Would continue advance directives discussions.  Limited benefit if to be resuscitated and intubated given advance age and comorbidities

## 2022-01-14 NOTE — PROGRESS NOTE ADULT - PROVIDER SPECIALTY LIST ADULT
Hospitalist
Infectious Disease
Palliative Care
Hospitalist
Infectious Disease
Palliative Care
Palliative Care
Gastroenterology
Hospitalist
Infectious Disease
Palliative Care
Palliative Care

## 2022-01-14 NOTE — PATIENT PROFILE ADULT - INDICATE TYPE
Called pt again; he states he wants to cancel procedure; doesn't want to do it.
Called pt to r/s PAT; no answer at home phoen and no VM set up    Called mobile phone and LVM asking pt to call back. Also sent [employer] message to pt.
Notified by [facility] that pt missed PAT call.
Received a call from [facility] wanting to verify appointment information and advised that the patient would need to call. Does not have anyone listed on his Hippa.
Health Care Proxy (HCP)

## 2022-01-14 NOTE — DISCHARGE NOTE NURSING/CASE MANAGEMENT/SOCIAL WORK - PATIENT PORTAL LINK FT
You can access the FollowMyHealth Patient Portal offered by [facility] by registering at the following website: [email or web address]. By joining [employer]’s FollowMyHealth portal, you will also be able to view your health information using other applications (apps) compatible with our system.

## 2022-01-14 NOTE — DISCHARGE NOTE NURSING/CASE MANAGEMENT/SOCIAL WORK - NSDCPEFALRISK_GEN_ALL_CORE
For information on Fall & Injury Prevention, visit: [email or web address] OR  [email or web address] OR  https://www.cdc.gov/steadi/patient.html

## 2022-01-14 NOTE — PROGRESS NOTE ADULT - SUBJECTIVE AND OBJECTIVE BOX
CC:  follow  up [facility]    OVERNIGHT EVENTS:  approved for hospice  per CM Sarita - patient needing much assistance with PT  rescinded DNR    Present Symptoms:     Dyspnea: no  Nausea/Vomiting: No  Anxiety:   No  Depression: na  Fatigue: Yes  Loss of appetite: Yes   Constipation: no    Pain: no             Character-            Duration-            Effect-            Factors-            Frequency-            Location-            Severity-    Pain AD Score:  [email or web address] (press ctrl + left click to view)    Review of Systems: Reviewed                   Unable to obtain due to poor historian       MEDICATIONS  (STANDING):  dexAMETHasone  Injectable 6 milliGRAM(s) IV Push daily  mirtazapine Soltab 15 milliGRAM(s) Oral at bedtime  pantoprazole  Injectable 40 milliGRAM(s) IV Push every 12 hours    MEDICATIONS  (PRN):  acetaminophen     Tablet .. 650 milliGRAM(s) Oral every 6 hours PRN Temp greater or equal to 38C (100.4F), Mild Pain (1 - 3)  ALBUTerol    90 MICROgram(s) HFA Inhaler 2 Puff(s) Inhalation every 4 hours PRN Shortness of Breath and/or Wheezing  aluminum hydroxide/magnesium hydroxide/simethicone Suspension 30 milliLiter(s) Oral every 4 hours PRN Dyspepsia  benzonatate 100 milliGRAM(s) Oral three times a day PRN Cough  guaifenesin/dextromethorphan Oral Liquid 10 milliLiter(s) Oral every 4 hours PRN Cough  melatonin 3 milliGRAM(s) Oral at bedtime PRN Insomnia  ondansetron Injectable 4 milliGRAM(s) IV Push every 8 hours PRN Nausea and/or Vomiting      PHYSICAL EXAM:    Vital Signs Last 24 Hrs  T(C): 36.3 (14 Jan 2022 11:09), Max: 36.6 (14 Jan 2022 04:00)  T(F): 97.3 (14 Jan 2022 11:09), Max: 97.8 (14 Jan 2022 04:00)  HR: 95 (14 Jan 2022 11:09) (95 - 106)  BP: 115/88 (14 Jan 2022 11:09) (113/65 - 131/83)  BP(mean): --  RR: 18 (14 Jan 2022 11:09) (18 - 19)  SpO2: 95% (14 Jan 2022 11:09) (92% - 95%)    General: Elderly woman NAD sleepy arousable  Karnofsky: 40 %  HEENT: normal   Lungs: comfortable   CV: normal    GI: normal   : normal  MSK:   weakness  bedbound/wheelchair bound    Skin: normal  pressure ulcers- Stage_____  no rash    LABS:                I&O's Summary    14 Jan 2022 07:01  -  14 Jan 2022 16:12  --------------------------------------------------------  IN: 0 mL / OUT: 300 mL / NET: -300 mL        RADIOLOGY & ADDITIONAL STUDIES:    ADVANCE DIRECTIVES/TREATMENT PREFERENCES:  Full code

## 2022-01-26 NOTE — ED ADULT TRIAGE NOTE - NS ED TRIAGE AVPU SCALE
Alert-The patient is alert, awake and responds to voice. The patient is oriented to time, place, and person. The triage nurse is able to obtain subjective information.
Opt out

## 2022-03-14 ENCOUNTER — RX RENEWAL (OUTPATIENT)
Age: 87
End: 2022-03-14

## 2022-03-28 ENCOUNTER — RX RENEWAL (OUTPATIENT)
Age: 87
End: 2022-03-28

## 2022-04-03 NOTE — ED PROVIDER NOTE - TIMING
Care After Your Knee Arthroscopy  Tian Israel D.O.  [phone] or [phone]  Activity  • For the next 24 hours:  Do not stay alone, drive a car, operate electrical/power tools or appliances, drink alcohol, or sign any legal papers.  • You may weight-bear as tolerated.    Diet  • Nausea may occur after general anesthesia.  Call Dr. Israel if nausea persists more than 24 hours.  • Begin with clear liquids and advance your diet to solids as tolerated.      Medications  • This medication may cause drowsiness and constipation.  It is best taken with meals.  Increase your fluid intake.  • Discontinue pain medication if it upsets your stomach or causes a rash.   • Call us if you are experiencing problems so that we may change your medication.   • You may resume your home medications.    Care of your dressing/incision  • Occasionally the dressing may spot with blood.  This is usually self-limiting.  If the spotting becomes more extensive, call us so we can decide if you need to come in for a dressing change.  • You may remove the dressing in 3 days.     Bathing  • Do not bathe unless able to effectively cover the dressing.  Keep dressing and incision area dry until instructed to remove.  • You may wash gently around the incisions once the dressing is removed.  • Do not submerge your incisions in the bath tub, hot tub, swimming pool, etc.    Special instructions  • Limit your activity.  • Keep your affected extremity elevated on pillows at the level of your heart as often as possible.    • Apply a large ice bag to the affected extremity for 20 minutes every hour to decrease pain and swelling.       Call the doctor if you have:  • Any problems or questions.   • Severe pain.  • Numbness  • Fever greater than 101 degrees.  • Change in temperature or color of the affected extremity.  • Calf pain (this may signify a blood clot in your calf).     gradual onset

## 2022-04-16 NOTE — DISCHARGE NOTE ADULT - CARE PROVIDERS DIRECT ADDRESSES
Offered pt bath, pt refused x2  . Marta Herrera ,[email or web address]

## 2022-06-07 NOTE — PHYSICAL THERAPY INITIAL EVALUATION ADULT - LEVEL OF INDEPENDENCE: BED TO CHAIR, REHAB EVAL
Detail Level: Zone
independent
O-L Flap Text: The defect edges were debeveled with a #15 scalpel blade.  Given the location of the defect, shape of the defect and the proximity to free margins an O-L flap was deemed most appropriate.  Using a sterile surgical marker, an appropriate advancement flap was drawn incorporating the defect and placing the expected incisions within the relaxed skin tension lines where possible.    The area thus outlined was incised deep to adipose tissue with a #15 scalpel blade.  The skin margins were undermined to an appropriate distance in all directions utilizing iris scissors.

## 2022-06-22 ENCOUNTER — APPOINTMENT (OUTPATIENT)
Dept: CARDIOLOGY | Facility: CLINIC | Age: 87
End: 2022-06-22

## 2022-06-22 ENCOUNTER — NON-APPOINTMENT (OUTPATIENT)
Age: 87
End: 2022-06-22

## 2022-06-22 VITALS
HEIGHT: 58 IN | TEMPERATURE: 97.8 F | SYSTOLIC BLOOD PRESSURE: 136 MMHG | DIASTOLIC BLOOD PRESSURE: 74 MMHG | OXYGEN SATURATION: 98 % | HEART RATE: 108 BPM

## 2022-06-22 PROCEDURE — 99215 OFFICE O/P EST HI 40 MIN: CPT

## 2022-06-22 PROCEDURE — 93000 ELECTROCARDIOGRAM COMPLETE: CPT

## 2022-06-22 RX ORDER — UBIDECARENONE 200 MG
500 CAPSULE ORAL DAILY
Refills: 0 | Status: DISCONTINUED | COMMUNITY
End: 2022-06-22

## 2022-06-22 RX ORDER — PANTOPRAZOLE 20 MG/1
20 TABLET, DELAYED RELEASE ORAL
Qty: 90 | Refills: 3 | Status: DISCONTINUED | COMMUNITY
Start: 2021-01-20 | End: 2022-06-22

## 2022-06-22 RX ORDER — CALCIUM CARBONATE/VITAMIN D3 600 MG-10
400 TABLET ORAL
Refills: 0 | Status: DISCONTINUED | COMMUNITY
Start: 2018-12-05 | End: 2022-06-22

## 2022-06-22 RX ORDER — COLLAGENASE SANTYL 250 [ARB'U]/G
250 OINTMENT TOPICAL
Qty: 30 | Refills: 0 | Status: DISCONTINUED | COMMUNITY
Start: 2022-02-23

## 2022-06-22 RX ORDER — LOSARTAN POTASSIUM AND HYDROCHLOROTHIAZIDE 25; 100 MG/1; MG/1
100-25 TABLET ORAL
Qty: 90 | Refills: 3 | Status: DISCONTINUED | COMMUNITY
Start: 2020-05-05 | End: 2022-06-22

## 2022-06-22 RX ORDER — METRONIDAZOLE 500 MG/1
500 TABLET ORAL
Qty: 42 | Refills: 0 | Status: DISCONTINUED | COMMUNITY
Start: 2022-02-15

## 2022-06-22 RX ORDER — RIVAROXABAN 10 MG/1
10 TABLET, FILM COATED ORAL
Qty: 90 | Refills: 0 | Status: DISCONTINUED | COMMUNITY
Start: 2022-03-03 | End: 2022-06-22

## 2022-06-22 RX ORDER — AMLODIPINE BESYLATE 10 MG/1
10 TABLET ORAL
Qty: 90 | Refills: 3 | Status: DISCONTINUED | COMMUNITY
Start: 2019-10-23 | End: 2022-06-22

## 2022-06-22 RX ORDER — RIVAROXABAN 20 MG/1
20 TABLET, FILM COATED ORAL
Qty: 45 | Refills: 3 | Status: DISCONTINUED | COMMUNITY
Start: 2019-02-20 | End: 2022-06-22

## 2022-06-22 RX ORDER — CEPHALEXIN 500 MG/1
500 CAPSULE ORAL
Qty: 4 | Refills: 0 | Status: DISCONTINUED | COMMUNITY
Start: 2022-03-08

## 2022-07-08 NOTE — PROGRESS NOTE ADULT - PROBLEM/PLAN-4
DISPLAY PLAN FREE TEXT
hard copy, drawn during this pregnancy

## 2022-07-11 NOTE — CARDIOLOGY SUMMARY
[___] : [unfilled] [LVEF ___%] : LVEF [unfilled]% [Mild] : mild pulmonary hypertension [Normal] : normal LA size [None] : no mitral regurgitation [___] : [unfilled] [de-identified] : 6 22 202 \par Atrial fibrillation \par -Old inferior-apical infarct  -Poor R-wave progression -nonspecific -consider old anterior infarct  -Left axis secondary to infarct -consider anterior fascicular block. \par \par ABNORMAL \par \par 12 15 2021  Sinus  Bradycardia  -First degree A-V block \par Javier = 256\par -Left axis -anterior fascicular block. \par  -Anteroseptal infarct -age undetermined. \par \par ABNORMAL \par 7 21 2021 Sinus  Rhythm  -First degree A-V block \par Javier = 260\par -Left axis -anterior fascicular block. \par  -Anteroseptal infarct -age undetermined. \par \par ABNORMAL

## 2022-07-11 NOTE — HISTORY OF PRESENT ILLNESS
[FreeTextEntry1] : Hypertension and dyslipidemia; biop aortic valve replacement TAVR \par \par HPI for today: :  she had covid in jan . she was dnr and Dni. she made it.  she went home  with hospice.  \par and then she made it.  she  was hypoxic respiratory failure. she had back ulcer and now and she ahd wound vac.\par no leg edmea. no dyspnea on exertion . she is not on oxygen\par she is off amlodipine.  as\par  \par \par \par old note: she gets tired when she uses walker.  if she cannot use the walker.  she gets tired.\par no syncope.   no paliptations. no dizziness. \par \par \par \par old note: she feels good. she does get out of breath sometimes. she lost 20 ln 2 years.  \par she can walk in the house,. \par she eats well. she does shoping.  she still drives. \par \par \par old note: : : she had episode of chest pain and dyspnea on exertion \par she wwas started on rtanexa and given nuclear stress test and PPi.  \par she feels better now. she does not wwant to do the stress test.\par she is awaiting the vaccine.\par \par \par \par old noteL: : feels good. no chest pain. no ehadahces. no LE edema. no headahces. no dizziness\par \par \par \par old noteL:   eats a lot of salty food.  compliatn with meds.  no dizzines.s no headaches. \par \par \par old note:  no chest pain. no dyspnea,. bno headaches. no dizziness. no palpitaitons./ ] \par compalisn of lef thip pain.\par  \par \par old note: she has pain all over. pain in the legs both legs.  and groi pain. \par every once in a while sharp ache onver chest. difficulty swallowing. \par she is still feels fatigue and weakness and has ongoing atrial flutter or fibrillation. \par \par \par old note:  patient is very short of breathe. \par she took off baby aspirin due to nasal bleeding. She is taking xarelto. \par Multiple nasal bleeding and cauterisation. \par She cannot walk muich. is passing uring in the am. No swelling of feet. \par She feels very short of breathe. \par \par \par old note: No chest pain. no dyspnea. no dizziness.s\par patient was recently admitted to hospital for pneumonia. probably aspiration pneumonia. \par \par \par old note: Feels better. "wonderful" . but  pain in the left leg. \par \par back pain radiating to the left. pain. sharp pain in the calf. releived with tramadol./ no headaches. \par \par \par old note: patient s/p JORGE.  tolerated procedure well. no stroke. no hematoma.  feels 75% better.\par no chest pain. no dizziness. + wheezing. \par old note: patient was seen in the hospital.  has diastolic heart failure. moderate to severe aortic stenosis. \par scheduled for jorge. Was discharged on lasix 20 mg daily. Patient has been complianing of LE edema and dyspnea nad wheezing.\par congestive heart failure.  .progressively worsneing dyspnea and LE edeam and orthopnea for last few days. \par \par old note: This is a 83 year old woman with history of hypertension, dyslipidemia, was referred for abnormal EKG.  Patient complains of chest pain on exertion.  No radiation. Not associated with dyspnea. Denies dizziness. No syncopal episodes. Denies any headache.

## 2022-07-11 NOTE — DISCUSSION/SUMMARY
[Patient] : the patient [Risks] : risks [Benefits] : benefits [Alternatives] : alternatives [With Me] : with me [___ Month(s)] : in [unfilled] month(s) [EKG obtained to assist in diagnosis and management of assessed problem(s)] : EKG obtained to assist in diagnosis and management of assessed problem(s) [FreeTextEntry1] : This is a 83 F with HTN, dyslipidemia, here with abnormal EKG and chest pain.\par \par 1) Angina equivalent:  resolved. Dyspnea diastolic heart failure vs. coronary artery disease \par s/p PCI to Cx .   xarelto for atrial fibrillation   severe circumflex disease s/p PCI in nov 2018  \par ranexa . \par 2) GERD: PPI. \par 3)dyspnea  Diastolic heart failure . controlled. . aldactone 25 mg daily.  \par 4) severe aortic stenosis. :s/p KINZA. cannot take aspirin due to nasal bleeding.  on anticoagulation for afib. \par 5) hypertension     ct amlodipine 10 mg dial.y . aldactone 25 mg dailty. may change norvasc to cardizem if tachcyardia. \par 5) atrial fibrillation/atrial flutter: : rate controlled.  meds.  ct anticoagulation CHADVASC > 3.  On xarelto 15 mg   \par 7) coronary artery disease : : rca with collaterals. s/p PCI to  LCx. \par Will order and review ECG for the above mentioned diagnosis/condition/symptoms  as

## 2022-08-29 NOTE — PATIENT PROFILE ADULT. - ABILITY TO HEAR (WITH HEARING AID OR HEARING APPLIANCE IF NORMALLY USED):
Mildly to Moderately Impaired: difficulty hearing in some environments or speaker may need to increase volume or speak distinctly Topical Clindamycin Counseling: Patient counseled that this medication may cause skin irritation or allergic reactions.  In the event of skin irritation, the patient was advised to reduce the amount of the drug applied or use it less frequently.   The patient verbalized understanding of the proper use and possible adverse effects of clindamycin.  All of the patient's questions and concerns were addressed.

## 2022-12-28 NOTE — H&P ADULT - NSHPLANGLIMITEDENGLISH_GEN_A_CORE
Pt stable & VS within ordered parameters. Neurological status continues to wax and wane in presentation. Consistently, pt is able to withdraw all extremities to painful stimuli, is unable to localize stimuli, all reflexes present, pupils sluggish, and is starting to turn head towards vocal stimuli. At times BUE move spontaneously but without purpose. Copious secretions orally and within oropharynx, frequent suctioning required. Increased oxygenation requirements on the ventilator throughout the shift. TVP remains in place with no s/s of complications. Pt continues to tolerate TF. Bilateral wrist restraints remain in use with no s/s of injury noted. All safety precautions in place. Bedside report to be given to oncoming RN.   No

## 2023-01-03 NOTE — ED PROVIDER NOTE - NS ED SCRIBE STATEMENT
Dr. Mckeon please advise. Return call and spoke with patient. Per patient has sever pains on the left side patient pain is about a 8 today and she has taken medication hydrocodone 5-3.25 mg tab ab 12 pm no relief. MA inform patient that a pain scale of 8 or higher she must go to the Er or urgent care for immediate evaluation.     Patient verbalized understanding and wanted to know what her recent colonoscopy results.       Attending

## 2023-02-08 NOTE — H&P PST ADULT - NEGATIVE OPHTHALMOLOGIC SYMPTOMS
Occupational Therapy Evaluation    Visit Type: Initial Evaluation  Visit: 1  Referring Provider: Katy Nielson DO  Next referring provider visit: 2/22/2023  Medical Diagnosis (from order): Diagnosis Information    Diagnosis  729.5 (ICD-9-CM) - M79.641 (ICD-10-CM) - Right hand pain       Treatment Diagnosis: right hand with increased pain/symptoms, impaired range of motion, impaired strength, impaired activity tolerance, impaired coordination and impaired motor function/performance/coordination.  Onset  - Date of onset:  approx November 2021  Patient alert and oriented X3.  Chart reviewed at time of initial evaluation (relevant co-morbidities, allergies, tests and medications listed):  - Diagnostic tests reviewed: X-Ray  Current Outpatient Medications:  atorvastatin (LIPITOR) 10 MG tablet, Take 1 tablet by mouth daily., Disp: 90 tablet, Rfl: 0  hydroCORTisone (CORTIZONE) 2.5 % cream, Apply 1 application topically 2 times daily., Disp: 30 g, Rfl: 0  Multiple Vitamin (MULTIVITAMIN ADULT PO), , Disp: , Rfl:   Omega 3-6-9 Fatty Acids (OMEGA 3-6-9 PO), Take by mouth daily., Disp: , Rfl:   ECHINACEA EXTRACT PO, , Disp: , Rfl:   Cholecalciferol (VITAMIN D3 PO), Take 500 Units by mouth daily., Disp: , Rfl:     No current facility-administered medications for this encounter.    Past Medical History:  No date: HTN (hypertension)  No date: Mixed hyperlipidemia  ALLERGIES:   -- Tetracycline -- ANAPHYLAXIS   -- Sulfa Antibiotics -- RASH        SUBJECTIVE                                                                                                               2/8/23 Pt reports the doctor said I have arthritis and a bone spur. Pt reports M.D. recommends surgery. Pt reports \"I used a brace one time but it made my hand break out in a rash\".     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  - meal/food prep, lower body dressing, computer tasks, driving/riding in a vehicle, grocery shopping, writing, house/yard work  Prior Level of Function: declining function, therefore referred to therapy,  Personal Occupations Profile Affected: bathing/showering, lower body dressing, meal preparation/cleanup, shopping, home establishment/managements, care of pets    Patient Goals: decreased pain, return to work and increased strength.    Prior treatment  - no therapies  - Discharged from hospital, home health, or skilled nursing facility in last 30 days: no  Home Environment   - Patient lives with: adult children  - Type of home: mobile home  - Assistance available: as needed  - Denies 2 or more falls or an unexplained fall with injury in the last year.  - Feel safe at home / work / school: yes     OBJECTIVE                                                                                                                    Hand Dominance: right-handed      Strength  (out of 5 unless noted, standard test position unless noted)   : (lbs)    - Neutral:        • Left: Trial(s): 45        • Right: Trial(s): 35  Pinch: (lbs)    - Lateral:         • Left: Trial(s): 15        • Right: Trial(s): 10    - 3 Point:         • Left: Trial(s) 10        • Right: Trial(s): 6    - Tip:         • Left: Trial(s): 7        • Right: Trial(s): 7                  Range of Motion (ROM) (hand specific)  (degrees unless noted; active unless noted; norms in ( ); negative=lacking to 0, positive=beyond 0)  Hand  Functional Range of Motion:     - Thumb Tip Opposition: • Left: 0 cm • Right: 0 cm    - Thumb Opposition to Base of Small Finger: • Left: 0 cm • Right: 0 cm    - Index Finger Distance to Distal Palmar Crease: • Left: 0 cm  • Right: 0 cm    - Middle Finger Distance to Distal Palmar Crease: • Left: 0 cm • Right: 0 cm    - Ring Finger Distance to Distal Palmar Crease: • Left: 0 cm • Right: 0 cm    - Small Finger Distance to Distal Palmar Crease: • Left 0 cm • Right: 0 cm    Mild discomfort with R thumb to IF opposition  -- ? [place]-Nathalie Anomaly ?       Outcome/Assessments  Outcome Measures:   Quick Disabilities of the Arm, Shoulder and Hand: QuickDash Total Score (Score will not calculate if more then 2 questions are left blank): 59.09  (scored 0-100; a higher score indicates greater disability) see flowsheet for additional documentation        Treatment     Therapeutic Exercise  Educate Tendon glides  Educate Thumb opposition    Manual Therapy   Web space release with 15 count hold x2  K-tape application    Therapeutic Activity  Educate patient on joint protection and exercise for  --work, writing, keyboarding  --cooking  --opening doors    Skilled input: verbal instruction/cues and tactile instruction/cues    Writer verbally educated and received verbal consent for hand placement, positioning of patient, and techniques to be performed today from patient for hand placement and palpation for techniques as described above and how they are pertinent to the patient's plan of care.    Home Exercise Program  Access Code: [email or web address]  URL: [email or web address]  Date: 02/08/2023  Prepared by: Royal Mcrae/Berna PERRY/L, CHT    Exercises  Wrist Tendon Gliding - 4 x daily - 7 x weekly - 5 reps  Thumb Opposition - 4 x daily - 7 x weekly - 5 reps  Thumb Stabilization Web Space Release: Using Other Hand - 4 x daily - 7 x weekly - 2 reps - 15 hold        ASSESSMENT                                                                                                           64 year old patient has reported functional limitations listed above impacted by signs and symptoms consistent with treatment diagnosis below.  Treatment Diagnosis:   - Involved: right hand.  - Symptoms/impairments: increased pain/symptoms, impaired range of motion, impaired strength, impaired activity tolerance, impaired coordination and impaired motor function/performance/coordination.    Pt with dx of right hand pain presents with pain, limited thumb AROM and limited strength interfering with self-care, home care, meal prep, grocery shopping and keyboarding will benefit from additional visits in skilled Occupational Therapy with a Certified Hand Therapist for joint protection education, modalities, orthosis prn, manual therapy, therapeutic exercise/activity and HEP advancement to facilitate patient's ability to resume pre-morbid ADL/IADL with minimal discomfort.  Pain/symptoms after session (out of 10): 2    Prognosis: Patient will benefit from skilled therapy.  Rehabilitative potential is: good.  Clinical decision making: Low - Patient has few limitations (1-3), comorbidities and/or complexities, as noted in problem focused assessment noted above, that impact their occupational profile.  Resulting in few treatment options and no task modification consistent with low clinical decision making complexity.      Education:   - Present and ready to learn: patient  - Results of above outlined education: Verbalizes understanding, Demonstrates understanding and Needs reinforcement    PLAN                                                                                                                         The following skilled interventions to be implemented to achieve goals listed below:  Therapeutic Exercise (71952)  Heat/Cold (89718)  Ultrasound/Phonophoresis (38630)  Paraffin (92138)  Manual Therapy (47853)  Therapeutic Activity  (66429)  Fluidotherapy/Whirlpool (54645)  Electrical Stimulation Attended (77071)  Laser  Orthotic Management (30543/64842)  Splinting/Orthotics  Strapping    Frequency / Duration  1 times per week tapering as patient progresses for 6 weeks    Patient involved in and agreed to plan of care and goals.  Patient given attendance policy at time of initial evaluation.    Suggestions for next session as indicated: Progress per plan of care. Educate use of home paraffin, review joint protection. Assess benefit of k-tape. Strengthen as tolerated      Goals  Long Term Goals: to be met by end of plan of care   1. Pt will verbalize understanding of joint protection techniques to minimize right hand pain.  2. Pt will be able to keyboard for 8 hour work day with minimal right hand discomfort at end of day.  3. Pt will be able to open jars/containers with minimal discomfort R hand.   4. Patient will be independent with progressed and modified home exercise program.  5.  Quick DASH: Patient will complete form to reflect an improved calculated score to less than or equal to 20 to indicate patient reported improvement in function/disability/impairment. (minimal clinically important difference = 15.91)    Initial Eval 59.09      Therapy procedure time and total treatment time can be found documented on the Time Entry flowsheet   no lacrimation R/no diplopia/no photophobia/no lacrimation L

## 2023-03-26 ENCOUNTER — RX RENEWAL (OUTPATIENT)
Age: 88
End: 2023-03-26

## 2023-04-12 PROBLEM — I48.91 UNSPECIFIED ATRIAL FIBRILLATION: Chronic | Status: ACTIVE | Noted: 2022-01-05

## 2023-04-12 PROBLEM — M54.9 DORSALGIA, UNSPECIFIED: Chronic | Status: ACTIVE | Noted: 2022-01-05

## 2023-04-12 PROBLEM — I10 ESSENTIAL (PRIMARY) HYPERTENSION: Chronic | Status: ACTIVE | Noted: 2022-01-05

## 2023-04-12 PROBLEM — Z86.79 PERSONAL HISTORY OF OTHER DISEASES OF THE CIRCULATORY SYSTEM: Chronic | Status: ACTIVE | Noted: 2022-01-05

## 2023-04-24 ENCOUNTER — NON-APPOINTMENT (OUTPATIENT)
Age: 88
End: 2023-04-24

## 2023-04-24 ENCOUNTER — APPOINTMENT (OUTPATIENT)
Dept: CARDIOLOGY | Facility: CLINIC | Age: 88
End: 2023-04-24
Payer: MEDICARE

## 2023-04-24 VITALS
BODY MASS INDEX: 23.41 KG/M2 | SYSTOLIC BLOOD PRESSURE: 125 MMHG | DIASTOLIC BLOOD PRESSURE: 73 MMHG | HEART RATE: 89 BPM | TEMPERATURE: 98 F | OXYGEN SATURATION: 93 % | WEIGHT: 112 LBS

## 2023-04-24 DIAGNOSIS — Z95.3 PRESENCE OF XENOGENIC HEART VALVE: ICD-10-CM

## 2023-04-24 DIAGNOSIS — I48.92 UNSPECIFIED ATRIAL FLUTTER: ICD-10-CM

## 2023-04-24 PROCEDURE — 93000 ELECTROCARDIOGRAM COMPLETE: CPT

## 2023-04-24 PROCEDURE — 99214 OFFICE O/P EST MOD 30 MIN: CPT

## 2023-04-24 RX ORDER — RIVAROXABAN 15 MG/1
15 TABLET, FILM COATED ORAL
Qty: 90 | Refills: 3 | Status: ACTIVE | COMMUNITY
Start: 2018-02-14 | End: 1900-01-01

## 2023-05-05 ENCOUNTER — LABORATORY RESULT (OUTPATIENT)
Age: 88
End: 2023-05-05

## 2023-05-05 LAB
NT-PROBNP SERPL-MCNC: 1358 PG/ML
TSH SERPL-ACNC: 2.46 UIU/ML

## 2023-05-08 ENCOUNTER — NON-APPOINTMENT (OUTPATIENT)
Age: 88
End: 2023-05-08

## 2023-05-08 LAB
ALBUMIN SERPL ELPH-MCNC: 4.8 G/DL
ALP BLD-CCNC: 61 U/L
ALT SERPL-CCNC: 17 U/L
ANION GAP SERPL CALC-SCNC: 13 MMOL/L
AST SERPL-CCNC: 28 U/L
BASOPHILS # BLD AUTO: 0.05 K/UL
BASOPHILS NFR BLD AUTO: 1.1 %
BILIRUB SERPL-MCNC: 0.8 MG/DL
BUN SERPL-MCNC: 28 MG/DL
CALCIUM SERPL-MCNC: 10.4 MG/DL
CHLORIDE SERPL-SCNC: 102 MMOL/L
CHOLEST SERPL-MCNC: 229 MG/DL
CO2 SERPL-SCNC: 28 MMOL/L
CREAT SERPL-MCNC: 1.28 MG/DL
EGFR: 40 ML/MIN/1.73M2
EOSINOPHIL # BLD AUTO: 0.04 K/UL
EOSINOPHIL NFR BLD AUTO: 0.9 %
ESTIMATED AVERAGE GLUCOSE: 114 MG/DL
GLUCOSE SERPL-MCNC: 85 MG/DL
HBA1C MFR BLD HPLC: 5.6 %
HCT VFR BLD CALC: 40.7 %
HDLC SERPL-MCNC: 94 MG/DL
HGB BLD-MCNC: 13 G/DL
IMM GRANULOCYTES NFR BLD AUTO: 0 %
LDLC SERPL CALC-MCNC: 118 MG/DL
LYMPHOCYTES # BLD AUTO: 1.74 K/UL
LYMPHOCYTES NFR BLD AUTO: 38.2 %
MAN DIFF?: NORMAL
MCHC RBC-ENTMCNC: 30.1 PG
MCHC RBC-ENTMCNC: 31.9 GM/DL
MCV RBC AUTO: 94.2 FL
MONOCYTES # BLD AUTO: 0.93 K/UL
MONOCYTES NFR BLD AUTO: 20.4 %
NEUTROPHILS # BLD AUTO: 1.79 K/UL
NEUTROPHILS NFR BLD AUTO: 39.4 %
NONHDLC SERPL-MCNC: 136 MG/DL
PLATELET # BLD AUTO: 199 K/UL
POTASSIUM SERPL-SCNC: 4.6 MMOL/L
PROT SERPL-MCNC: 7 G/DL
RBC # BLD: 4.32 M/UL
RBC # FLD: 14.6 %
SODIUM SERPL-SCNC: 143 MMOL/L
TRIGL SERPL-MCNC: 88 MG/DL
WBC # FLD AUTO: 4.55 K/UL

## 2023-06-05 ENCOUNTER — RX RENEWAL (OUTPATIENT)
Age: 88
End: 2023-06-05

## 2023-06-16 NOTE — ED PROVIDER NOTE - CROS ED RESP ALL NEG
Pt pos for covid. Requests antiviral.   gfr 81. No drug-drug interactions noted. Pt is on day 4 of illness. Paxlovid sent to pharmacy. Discussed side effects/ risks vs benefits. Pt voiced understanding. Has not started zpack. Advised not to start. If sx worsen go to IC or ED if sx are severe.
- - -

## 2023-08-02 ENCOUNTER — NON-APPOINTMENT (OUTPATIENT)
Age: 88
End: 2023-08-02

## 2023-09-07 ENCOUNTER — INPATIENT (INPATIENT)
Facility: HOSPITAL | Age: 88
LOS: 3 days | Discharge: NURSING, REHAB OR LONG-TERM CARE | DRG: 536 | End: 2023-09-11
Attending: STUDENT IN AN ORGANIZED HEALTH CARE EDUCATION/TRAINING PROGRAM | Admitting: STUDENT IN AN ORGANIZED HEALTH CARE EDUCATION/TRAINING PROGRAM
Payer: MEDICARE

## 2023-09-07 VITALS
DIASTOLIC BLOOD PRESSURE: 70 MMHG | OXYGEN SATURATION: 96 % | RESPIRATION RATE: 18 BRPM | SYSTOLIC BLOOD PRESSURE: 136 MMHG | TEMPERATURE: 97 F | HEART RATE: 66 BPM

## 2023-09-07 DIAGNOSIS — Z95.3 PRESENCE OF XENOGENIC HEART VALVE: Chronic | ICD-10-CM

## 2023-09-07 DIAGNOSIS — Z98.1 ARTHRODESIS STATUS: Chronic | ICD-10-CM

## 2023-09-07 PROCEDURE — 73502 X-RAY EXAM HIP UNI 2-3 VIEWS: CPT | Mod: 26,RT

## 2023-09-07 PROCEDURE — 99285 EMERGENCY DEPT VISIT HI MDM: CPT

## 2023-09-07 PROCEDURE — 73030 X-RAY EXAM OF SHOULDER: CPT | Mod: 26,RT

## 2023-09-07 PROCEDURE — 72192 CT PELVIS W/O DYE: CPT | Mod: 26,MA

## 2023-09-07 PROCEDURE — 70450 CT HEAD/BRAIN W/O DYE: CPT | Mod: 26,MA

## 2023-09-07 PROCEDURE — 72125 CT NECK SPINE W/O DYE: CPT | Mod: 26,MA

## 2023-09-07 RX ORDER — MORPHINE SULFATE 50 MG/1
2 CAPSULE, EXTENDED RELEASE ORAL ONCE
Refills: 0 | Status: DISCONTINUED | OUTPATIENT
Start: 2023-09-07 | End: 2023-09-07

## 2023-09-07 RX ORDER — ACETAMINOPHEN 500 MG
1000 TABLET ORAL ONCE
Refills: 0 | Status: COMPLETED | OUTPATIENT
Start: 2023-09-07 | End: 2023-09-08

## 2023-09-07 NOTE — ED ADULT NURSE NOTE - CHIEF COMPLAINT QUOTE
Pt. JUJU s/p fall. On Xarelto. Denies head strike or LOC. Pt. states she was vacuuming her porch when she fell on her rt. shoulder. Obvious deformity noted to rt. shoulder. ROM limited and painful. Also endorses rt. hip pain. Hx of rt. hip replacement.

## 2023-09-07 NOTE — ED ADULT NURSE NOTE - OBJECTIVE STATEMENT
PT is A&Ox4, PT reports to ED with complaints of fall on blood thinners, PT states she stepped over her doorway to throw out trash, falling striking right side, denies LOC or head strike, PT on blood thinners, no obvious trauma, noted, States she feel she cant move right arm, PT has pulses and sensory postive x4

## 2023-09-07 NOTE — ED ADULT TRIAGE NOTE - CHIEF COMPLAINT QUOTE
Pt. JUJU s/p fall. On Xarelto. Pt. states she was vacuuming her porch when she fell on her rt. shoulder. Obvious deformity noted to rt. shoulder. Also endorses rt. hip pain. Hx of rt. hip replacement. Pt. JUJU s/p fall. On Xarelto. Pt. states she was vacuuming her porch when she fell on her rt. shoulder. Obvious deformity noted to rt. shoulder. ROM limited and painful. Also endorses rt. hip pain. Hx of rt. hip replacement. Pt. JUJU s/p fall. On Xarelto. Denies head strike or LOC. Pt. states she was vacuuming her porch when she fell on her rt. shoulder. Obvious deformity noted to rt. shoulder. ROM limited and painful. Also endorses rt. hip pain. Hx of rt. hip replacement.

## 2023-09-08 ENCOUNTER — TRANSCRIPTION ENCOUNTER (OUTPATIENT)
Age: 88
End: 2023-09-08

## 2023-09-08 DIAGNOSIS — S32.599A OTHER SPECIFIED FRACTURE OF UNSPECIFIED PUBIS, INITIAL ENCOUNTER FOR CLOSED FRACTURE: ICD-10-CM

## 2023-09-08 LAB
ANION GAP SERPL CALC-SCNC: 13 MMOL/L — SIGNIFICANT CHANGE UP (ref 5–17)
ANISOCYTOSIS BLD QL: SLIGHT — SIGNIFICANT CHANGE UP
BASOPHILS # BLD AUTO: 0 K/UL — SIGNIFICANT CHANGE UP (ref 0–0.2)
BASOPHILS NFR BLD AUTO: 0 % — SIGNIFICANT CHANGE UP (ref 0–2)
BUN SERPL-MCNC: 26 MG/DL — HIGH (ref 8–20)
CALCIUM SERPL-MCNC: 9.7 MG/DL — SIGNIFICANT CHANGE UP (ref 8.4–10.5)
CHLORIDE SERPL-SCNC: 98 MMOL/L — SIGNIFICANT CHANGE UP (ref 96–108)
CO2 SERPL-SCNC: 26 MMOL/L — SIGNIFICANT CHANGE UP (ref 22–29)
CREAT SERPL-MCNC: 1.35 MG/DL — HIGH (ref 0.5–1.3)
EGFR: 37 ML/MIN/1.73M2 — LOW
EOSINOPHIL # BLD AUTO: 0 K/UL — SIGNIFICANT CHANGE UP (ref 0–0.5)
EOSINOPHIL NFR BLD AUTO: 0 % — SIGNIFICANT CHANGE UP (ref 0–6)
GIANT PLATELETS BLD QL SMEAR: PRESENT — SIGNIFICANT CHANGE UP
GLUCOSE SERPL-MCNC: 117 MG/DL — HIGH (ref 70–99)
HCT VFR BLD CALC: 34.6 % — SIGNIFICANT CHANGE UP (ref 34.5–45)
HGB BLD-MCNC: 11.3 G/DL — LOW (ref 11.5–15.5)
LYMPHOCYTES # BLD AUTO: 0.73 K/UL — LOW (ref 1–3.3)
LYMPHOCYTES # BLD AUTO: 8.7 % — LOW (ref 13–44)
MANUAL SMEAR VERIFICATION: SIGNIFICANT CHANGE UP
MCHC RBC-ENTMCNC: 29.7 PG — SIGNIFICANT CHANGE UP (ref 27–34)
MCHC RBC-ENTMCNC: 32.7 GM/DL — SIGNIFICANT CHANGE UP (ref 32–36)
MCV RBC AUTO: 91.1 FL — SIGNIFICANT CHANGE UP (ref 80–100)
MICROCYTES BLD QL: SLIGHT — SIGNIFICANT CHANGE UP
MONOCYTES # BLD AUTO: 1.17 K/UL — HIGH (ref 0–0.9)
MONOCYTES NFR BLD AUTO: 13.9 % — SIGNIFICANT CHANGE UP (ref 2–14)
NEUTROPHILS # BLD AUTO: 6.52 K/UL — SIGNIFICANT CHANGE UP (ref 1.8–7.4)
NEUTROPHILS NFR BLD AUTO: 77.4 % — HIGH (ref 43–77)
NT-PROBNP SERPL-SCNC: 1281 PG/ML — HIGH (ref 0–300)
OVALOCYTES BLD QL SMEAR: SLIGHT — SIGNIFICANT CHANGE UP
PLAT MORPH BLD: NORMAL — SIGNIFICANT CHANGE UP
PLATELET # BLD AUTO: 182 K/UL — SIGNIFICANT CHANGE UP (ref 150–400)
POIKILOCYTOSIS BLD QL AUTO: SLIGHT — SIGNIFICANT CHANGE UP
POLYCHROMASIA BLD QL SMEAR: SLIGHT — SIGNIFICANT CHANGE UP
POTASSIUM SERPL-MCNC: 4.8 MMOL/L — SIGNIFICANT CHANGE UP (ref 3.5–5.3)
POTASSIUM SERPL-SCNC: 4.8 MMOL/L — SIGNIFICANT CHANGE UP (ref 3.5–5.3)
RBC # BLD: 3.8 M/UL — SIGNIFICANT CHANGE UP (ref 3.8–5.2)
RBC # FLD: 14.3 % — SIGNIFICANT CHANGE UP (ref 10.3–14.5)
RBC BLD AUTO: ABNORMAL
SODIUM SERPL-SCNC: 136 MMOL/L — SIGNIFICANT CHANGE UP (ref 135–145)
WBC # BLD: 8.42 K/UL — SIGNIFICANT CHANGE UP (ref 3.8–10.5)
WBC # FLD AUTO: 8.42 K/UL — SIGNIFICANT CHANGE UP (ref 3.8–10.5)

## 2023-09-08 PROCEDURE — 99223 1ST HOSP IP/OBS HIGH 75: CPT

## 2023-09-08 PROCEDURE — 71045 X-RAY EXAM CHEST 1 VIEW: CPT | Mod: 26

## 2023-09-08 RX ORDER — RIVAROXABAN 15 MG-20MG
10 KIT ORAL DAILY
Refills: 0 | Status: DISCONTINUED | OUTPATIENT
Start: 2023-09-08 | End: 2023-09-08

## 2023-09-08 RX ORDER — ACETAMINOPHEN 500 MG
650 TABLET ORAL EVERY 6 HOURS
Refills: 0 | Status: DISCONTINUED | OUTPATIENT
Start: 2023-09-08 | End: 2023-09-11

## 2023-09-08 RX ORDER — ASPIRIN/CALCIUM CARB/MAGNESIUM 324 MG
81 TABLET ORAL DAILY
Refills: 0 | Status: DISCONTINUED | OUTPATIENT
Start: 2023-09-08 | End: 2023-09-11

## 2023-09-08 RX ORDER — HYDROMORPHONE HYDROCHLORIDE 2 MG/ML
0.2 INJECTION INTRAMUSCULAR; INTRAVENOUS; SUBCUTANEOUS EVERY 4 HOURS
Refills: 0 | Status: DISCONTINUED | OUTPATIENT
Start: 2023-09-08 | End: 2023-09-11

## 2023-09-08 RX ORDER — VITAMIN E 100 UNIT
1 CAPSULE ORAL
Qty: 0 | Refills: 0 | DISCHARGE

## 2023-09-08 RX ORDER — AMLODIPINE BESYLATE 2.5 MG/1
1 TABLET ORAL
Qty: 0 | Refills: 0 | DISCHARGE

## 2023-09-08 RX ORDER — RIVAROXABAN 15 MG-20MG
15 KIT ORAL
Refills: 0 | Status: DISCONTINUED | OUTPATIENT
Start: 2023-09-08 | End: 2023-09-11

## 2023-09-08 RX ORDER — CHOLECALCIFEROL (VITAMIN D3) 125 MCG
2 CAPSULE ORAL
Qty: 0 | Refills: 0 | DISCHARGE

## 2023-09-08 RX ORDER — ATORVASTATIN CALCIUM 80 MG/1
20 TABLET, FILM COATED ORAL AT BEDTIME
Refills: 0 | Status: DISCONTINUED | OUTPATIENT
Start: 2023-09-08 | End: 2023-09-11

## 2023-09-08 RX ORDER — INFLUENZA VIRUS VACCINE 15; 15; 15; 15 UG/.5ML; UG/.5ML; UG/.5ML; UG/.5ML
0.7 SUSPENSION INTRAMUSCULAR ONCE
Refills: 0 | Status: DISCONTINUED | OUTPATIENT
Start: 2023-09-08 | End: 2023-09-11

## 2023-09-08 RX ORDER — GABAPENTIN 400 MG/1
300 CAPSULE ORAL ONCE
Refills: 0 | Status: COMPLETED | OUTPATIENT
Start: 2023-09-08 | End: 2023-09-08

## 2023-09-08 RX ORDER — GABAPENTIN 400 MG/1
600 CAPSULE ORAL THREE TIMES A DAY
Refills: 0 | Status: DISCONTINUED | OUTPATIENT
Start: 2023-09-08 | End: 2023-09-11

## 2023-09-08 RX ORDER — RANOLAZINE 500 MG/1
500 TABLET, FILM COATED, EXTENDED RELEASE ORAL
Refills: 0 | Status: DISCONTINUED | OUTPATIENT
Start: 2023-09-08 | End: 2023-09-11

## 2023-09-08 RX ORDER — TRAMADOL HYDROCHLORIDE 50 MG/1
50 TABLET ORAL EVERY 6 HOURS
Refills: 0 | Status: DISCONTINUED | OUTPATIENT
Start: 2023-09-08 | End: 2023-09-11

## 2023-09-08 RX ORDER — LANOLIN ALCOHOL/MO/W.PET/CERES
3 CREAM (GRAM) TOPICAL AT BEDTIME
Refills: 0 | Status: DISCONTINUED | OUTPATIENT
Start: 2023-09-08 | End: 2023-09-11

## 2023-09-08 RX ORDER — PANTOPRAZOLE SODIUM 20 MG/1
40 TABLET, DELAYED RELEASE ORAL
Refills: 0 | Status: DISCONTINUED | OUTPATIENT
Start: 2023-09-08 | End: 2023-09-11

## 2023-09-08 RX ORDER — LOSARTAN POTASSIUM 100 MG/1
1 TABLET, FILM COATED ORAL
Qty: 0 | Refills: 0 | DISCHARGE

## 2023-09-08 RX ORDER — AMLODIPINE BESYLATE 2.5 MG/1
10 TABLET ORAL DAILY
Refills: 0 | Status: DISCONTINUED | OUTPATIENT
Start: 2023-09-08 | End: 2023-09-11

## 2023-09-08 RX ORDER — ONDANSETRON 8 MG/1
4 TABLET, FILM COATED ORAL EVERY 8 HOURS
Refills: 0 | Status: DISCONTINUED | OUTPATIENT
Start: 2023-09-08 | End: 2023-09-11

## 2023-09-08 RX ORDER — TRAMADOL HYDROCHLORIDE 50 MG/1
50 TABLET ORAL ONCE
Refills: 0 | Status: DISCONTINUED | OUTPATIENT
Start: 2023-09-08 | End: 2023-09-08

## 2023-09-08 RX ORDER — DILTIAZEM HCL 120 MG
1 CAPSULE, EXT RELEASE 24 HR ORAL
Qty: 0 | Refills: 0 | DISCHARGE

## 2023-09-08 RX ORDER — SODIUM CHLORIDE 9 MG/ML
1000 INJECTION, SOLUTION INTRAVENOUS
Refills: 0 | Status: DISCONTINUED | OUTPATIENT
Start: 2023-09-08 | End: 2023-09-09

## 2023-09-08 RX ADMIN — RANOLAZINE 500 MILLIGRAM(S): 500 TABLET, FILM COATED, EXTENDED RELEASE ORAL at 16:57

## 2023-09-08 RX ADMIN — Medication 81 MILLIGRAM(S): at 13:25

## 2023-09-08 RX ADMIN — GABAPENTIN 600 MILLIGRAM(S): 400 CAPSULE ORAL at 13:25

## 2023-09-08 RX ADMIN — SODIUM CHLORIDE 75 MILLILITER(S): 9 INJECTION, SOLUTION INTRAVENOUS at 15:45

## 2023-09-08 RX ADMIN — TRAMADOL HYDROCHLORIDE 50 MILLIGRAM(S): 50 TABLET ORAL at 13:24

## 2023-09-08 RX ADMIN — SODIUM CHLORIDE 75 MILLILITER(S): 9 INJECTION, SOLUTION INTRAVENOUS at 23:49

## 2023-09-08 RX ADMIN — Medication 650 MILLIGRAM(S): at 10:39

## 2023-09-08 RX ADMIN — TRAMADOL HYDROCHLORIDE 50 MILLIGRAM(S): 50 TABLET ORAL at 04:22

## 2023-09-08 RX ADMIN — GABAPENTIN 600 MILLIGRAM(S): 400 CAPSULE ORAL at 22:21

## 2023-09-08 RX ADMIN — Medication 1000 MILLIGRAM(S): at 02:06

## 2023-09-08 RX ADMIN — MORPHINE SULFATE 2 MILLIGRAM(S): 50 CAPSULE, EXTENDED RELEASE ORAL at 01:04

## 2023-09-08 RX ADMIN — GABAPENTIN 300 MILLIGRAM(S): 400 CAPSULE ORAL at 04:22

## 2023-09-08 RX ADMIN — ATORVASTATIN CALCIUM 20 MILLIGRAM(S): 80 TABLET, FILM COATED ORAL at 22:22

## 2023-09-08 RX ADMIN — Medication 400 MILLIGRAM(S): at 02:06

## 2023-09-08 RX ADMIN — HYDROMORPHONE HYDROCHLORIDE 0.2 MILLIGRAM(S): 2 INJECTION INTRAMUSCULAR; INTRAVENOUS; SUBCUTANEOUS at 17:04

## 2023-09-08 RX ADMIN — HYDROMORPHONE HYDROCHLORIDE 0.2 MILLIGRAM(S): 2 INJECTION INTRAMUSCULAR; INTRAVENOUS; SUBCUTANEOUS at 16:34

## 2023-09-08 RX ADMIN — RIVAROXABAN 15 MILLIGRAM(S): KIT at 16:55

## 2023-09-08 NOTE — H&P ADULT - HISTORY OF PRESENT ILLNESS
89 y/o F w/ PMH HTN, afib on xarelto, spinal stenosis/lbp, chronic pain syndrome, severe covid infection (1/2022) here for mechanical fall. patient states she was vacuuming when she tripped and fell over, was conscious the entire time. Found to have R sided hymeral neck fx (minimally displaced) and R sided superior and inferior pubic rami fx. CT H negative, Seen by PT recommending GARRETT. Ortho consulted, likely non-op. Patient seen at bedside, in NAD, complaining of chronic pain and pain in R arm and hip. Denies HA, CP, SOB, dizziness, lethargy, LOC, abd pain, N/V/D, Dysuria.

## 2023-09-08 NOTE — ED PROVIDER NOTE - CARE PLAN
1 Principal Discharge DX:	Fracture of multiple pubic rami  Secondary Diagnosis:	Humeral head fracture

## 2023-09-08 NOTE — DISCHARGE NOTE PROVIDER - ATTENDING DISCHARGE PHYSICAL EXAMINATION:
Constitutional: NAD, Resting, frail female  ENT: Supple, No JVD  Lungs: CTA B/L, Non-labored breathing  Cardio: RRR, S1/S2, No murmur  Abdomen: Soft, Nontender, Nondistended; Bowel sounds present  Extremities: No calf tenderness, No pitting edema, Right Arm in sling  Musculoskeletal:   No joint swelling  Psych: Calm, cooperative affect appropriate  Neuro: Awake and alert  Skin: No rashes; no palpable lesions

## 2023-09-08 NOTE — ED CDU PROVIDER INITIAL DAY NOTE - OBJECTIVE STATEMENT
89 y/o F c/o mechanical fall onto right side which occurred earlier today.  Denies head trauma, LOC.  Patient is c/o pain in right shoulder.  Denies nausea/vomiting, chest pain, shortness of breath, focal weaknesses.

## 2023-09-08 NOTE — ED ADULT NURSE REASSESSMENT NOTE - NS ED NURSE REASSESS COMMENT FT1
[facility] care 0730, received report from Marivel RN, pt a&ox4, VSS, respirations even and unlabored on room air, made more comfortable in bed, awaiting GARRETT placement, no distress noted.

## 2023-09-08 NOTE — ED PROVIDER NOTE - OBJECTIVE STATEMENT
89 year old female with PMH AS s/p TAVR, afib, chf, htn, hld, cad presents with fall. Pt states that she had a mechanical trip and fall onto her R side. he denies hitting her head. She is c/o R sided arm and hip pain that is constant, throbbing, worse with movement and unable to bear any weight. She denies chest pain, abd pain nausea, vomiting, neck pain

## 2023-09-08 NOTE — PATIENT PROFILE ADULT - FALL HARM RISK - HARM RISK INTERVENTIONS
Impaired Gait
Assistance with ambulation/Assistance OOB with selected safe patient handling equipment/Communicate Risk of Fall with Harm to all staff/Discuss with provider need for PT consult/Monitor gait and stability/Provide patient with walking aids - walker, cane, crutches/Reinforce activity limits and safety measures with patient and family/Tailored Fall Risk Interventions/Visual Cue: Yellow wristband and red socks/Bed in lowest position, wheels locked, appropriate side rails in place/Call bell, personal items and telephone in reach/Instruct patient to call for assistance before getting out of bed or chair/Non-slip footwear when patient is out of bed/[place] to call system/Physically safe environment - no spills, clutter or unnecessary equipment/Purposeful Proactive Rounding/Room/bathroom lighting operational, light cord in reach

## 2023-09-08 NOTE — ED PROVIDER NOTE - NSICDXFAMILYHX_GEN_ALL_CORE_FT
FAMILY HISTORY:  Father  Still living? Unknown  Family history of esophageal cancer, Age at diagnosis: Age Unknown    Mother  Still living? Unknown  Family history of cerebrovascular accident (CVA), Age at diagnosis: Age Unknown    Sibling  Still living? Unknown  Family history of breast cancer, Age at diagnosis: Age Unknown

## 2023-09-08 NOTE — ED CDU PROVIDER INITIAL DAY NOTE - ATTENDING CONTRIBUTION TO CARE
I, Dalton Amador, performed a face to face bedside interview with this patient regarding history of present illness, review of symptoms and relevant past medical, social and family history.  I completed an independent physical examination. I have communicated the patient’s plan of care and disposition with the ACP  89 year old presents s/p fall with humeral and pelvic fracture, place don obs for PT, GARRETT  Gen: NAD, well appearing  CV: RRR  Pul: CTA b/l  Abd: Soft, non-distended, non-tender  Neuro: no focal deficits

## 2023-09-08 NOTE — DISCHARGE NOTE PROVIDER - NSDCMRMEDTOKEN_GEN_ALL_CORE_FT
acetaminophen 325 mg oral tablet: 2 tab(s) orally every 6 hours, As needed, Temp greater or equal to 38C (100.4F), Mild Pain (1 - 3)  Adult Aspirin 81 mg oral tablet, chewable: 1 tab(s) chewed once a day   amLODIPine 10 mg oral tablet: 1 tab(s) orally once a day  atorvastatin 20 mg oral tablet: 1 tab(s) orally once a day (at bedtime)  gabapentin 300 mg oral capsule: 2 cap(s) orally 3 times a day  Protonix 40 mg oral delayed release tablet: 1 tab(s) orally once a day   Protonix 40 mg oral delayed release tablet: 1 tab(s) orally every 12 hours   Ranexa 500 mg oral tablet, extended release: 1 tab(s) orally 2 times a day  spironolactone 25 mg oral tablet: orally once a day  traMADol 50 mg oral tablet: 1 tab(s) orally every 6 hours, As needed, Moderate Pain (4 - 6)  Ventolin HFA 90 mcg/inh inhalation aerosol: 2 puff(s) inhaled 4 times a day, As Needed  Xarelto 15 mg oral tablet: 1 tab(s) orally once a day (in the evening)    TAKE ON ODD NUMBER DAYS   acetaminophen 325 mg oral tablet: 2 tab(s) orally every 6 hours, As needed, Temp greater or equal to 38C (100.4F), Mild Pain (1 - 3)  Adult Aspirin 81 mg oral tablet, chewable: 1 tab(s) chewed once a day   amLODIPine 10 mg oral tablet: 1 tab(s) orally once a day  atorvastatin 20 mg oral tablet: 1 tab(s) orally once a day (at bedtime)  furosemide 20 mg oral tablet: 1 tab(s) orally once a day  gabapentin 300 mg oral capsule: 2 cap(s) orally 3 times a day  Protonix 40 mg oral delayed release tablet: 1 tab(s) orally every 12 hours   Ranexa 500 mg oral tablet, extended release: 1 tab(s) orally 2 times a day  spironolactone 25 mg oral tablet: orally once a day  traMADol 50 mg oral tablet: 1 tab(s) orally every 6 hours, As needed, Moderate Pain (4 - 6)  Ventolin HFA 90 mcg/inh inhalation aerosol: 2 puff(s) inhaled 4 times a day, As Needed  Xarelto 15 mg oral tablet: 1 tab(s) orally once a day (in the evening)

## 2023-09-08 NOTE — ED PROVIDER NOTE - RESPIRATORY, MLM
PRIMARY DIAGNOSIS: GI BLEED    OUTPATIENT/OBSERVATION GOALS TO BE MET BEFORE DISCHARGE  1. Orthostatic performed: N/A    2. Stable Hgb Yes.   Recent Labs   Lab Test 12/16/22  2207 12/16/22  1406 12/16/22  0723   HGB 15.6 16.3 17.1       3. Resolved or declined bleeding episodes: Yes Last episode: 12/16 AM prior to admission    4. Appropriate testing complete: Yes    5. Cleared for discharge by consultants (if involved): Yes, SW saw patient and provided Rule 25 information    6. Safe discharge environment identified: Yes    Discharge Planner Nurse   Safe discharge environment identified: Yes  Barriers to discharge: No       Entered by: Audrey Maldonado RN 12/17/2022 6:12 AM     Pt A&Ox4. VSS on RA. Up with independently in room. Denies nausea overnight. Denies pain. IV SL following vitamin infusion. CIWA score was 1 and 3 overnight for mild sweating and anxiety. Hemoglobin stable, recheck at 0615 this AM pending. Will continue to monitor.        Breath sounds clear and equal bilaterally.

## 2023-09-08 NOTE — ED CDU PROVIDER DISPOSITION NOTE - CLINICAL COURSE
89yF presenting after mechanical fall. Patient found to have a right sided humeral neck fracture (minimally displaced) and right sided superior and inferior pubic rami fractures. CT head negative for acute intracranial pathology. PT recommending GARRETT. Will admit patient to medicine for placement. Ortho consulted to follow. Likely non-op

## 2023-09-08 NOTE — ED PROVIDER NOTE - CLINICAL SUMMARY MEDICAL DECISION MAKING FREE TEXT BOX
pt with pubic rami fracture and humeral head fracture, placed on obs for PT eval and likely GARRETT placement

## 2023-09-08 NOTE — H&P ADULT - ASSESSMENT
89 y/o F w/ PMH HTN, afib on xarelto, spinal stenosis/lbp, chronic pain syndrome, severe covid infection (1/2022) here for mgmt of mechanical fall w/ injury    #Mechanical Fall w/ injury  RUE humeral fx and R sided pelvic fx  pain controlled at this time  no LOC, no dizziness  - Pain control PRN  - Ortho consult  - GARRETT as per PT   - Admit to medicine    #SORAIDA  likely 2/2 medication side effect  Cr 1.35, baseline <1  on lasix for HFpEF  Not acutely overloaded  - trend Cr  - Hold lasix and spironolactone at this time  - caution w/ nephrotoxic meds  - if Cr worsens, consider nephro consult    #Hx of Atrial fibrillation on AC  Rate controlled  - c/w xarelto  - not in RVR, no need for tele monitoring at this time  - Vitals Q4    #Chronic Pain Syndrome  #Spinal Stenosis  on chronic pain meds  - c/w tramadol PRN  - monitor for acute breakthrough pain    #healthcare maintenance  DVT PPx - Xarelto  Dispo - GARRETT    GOC: DNR/DNI, No heroic measures

## 2023-09-08 NOTE — DISCHARGE NOTE PROVIDER - HOSPITAL COURSE
89 y/o F w/ PMH HTN, afib on xarelto, spinal stenosis/lbp, chronic pain syndrome, severe covid infection (1/2022) here for mgmt of mechanical fall w/ injury. Imagining confirmed   RUE humeral fx and R sided pelvic fx. Seen by Ortho with no plans for acute intervention. Optimized by ortho. PT recs franky. Medically stable for FRANKY.

## 2023-09-08 NOTE — DISCHARGE NOTE PROVIDER - PROVIDER TOKENS
PROVIDER:[TOKEN:[[id number]]] PROVIDER:[TOKEN:[[id number]]],FREE:[LAST:[PCP],PHONE:[(   )    -],FAX:[(   )    -],FOLLOWUP:[1-3 days]]

## 2023-09-08 NOTE — ED ADULT NURSE REASSESSMENT NOTE - NS ED NURSE REASSESS COMMENT FT1
Gave report to Angelika in CDU, pt VSS, respirations even and unlabored on room air, primafit in place, pt awaiting admission to floor for fractures, no distress noted. Made receiving nurse aware pt has not urinated and MD was notified and IVF administered.

## 2023-09-08 NOTE — H&P ADULT - NSHPPHYSICALEXAM_GEN_ALL_CORE
CONSTITUTIONAL: Well appearing, awake, alert, oriented to person, place, time/situation and in no apparent distress.  CARDIAC: Normal rate, regular rhythm.  Heart sounds S1, S2.  No murmurs, rubs or gallops.  RESPIRATORY: Breath sounds clear and equal bilaterally.  GASTROINTESTINAL: Abdomen soft, non-tender, no rebound, no guarding.  MUSCULOSKELETAL: Spine appears normal, range of motion is not limited, TTP to pelvis and RUE  NEUROLOGICAL: Alert and oriented, no focal deficits, no motor or sensory deficits.  SKIN: Skin normal color for race, warm, dry and intact. No evidence of rash.

## 2023-09-08 NOTE — CONSULT NOTE ADULT - SUBJECTIVE AND OBJECTIVE BOX
Patient is a 89y Female presenting to the emergency department with a chief complaint of right hip/groin pain and right shoulder pain s/p mechanical fall occurring earlier today at home tripping over the sill of her front door. Patient denies pain anywhere else. Patient normally ambulates independently. Patient lives at home alone. As per ED patient being admitted for rehab placement.    Review of Systems:    Denies fever, chills  Denies rashes  Denies SOB  Denies CP  Denies Abdominal pain  Denies paresthesias  Denies dysuria  + right groin and shoulder pain    PAST MEDICAL & SURGICAL HISTORY:  Essential hypertension  Chronic back pain  Hyperlipidemia, unspecified hyperlipidemia type  PNA (pneumonia)  november 2017  Lung nodule  Thyroid nodule  Acute deep vein thrombosis (DVT) of popliteal vein of both lower extremities  Pelvic fracture  Closed fracture of multiple ribs of right side, initial encounter  Aortic stenosis  s/p TAVR bioprosthetic Jan 2018 [facility] Dr. Aragon  Atherosclerosis of native coronary artery of native heart with angina pectoris  Hypercholesteremia  CURRY (dyspnea on exertion)  AF (atrial fibrillation)  Persistent/chronic  Asthma  AV block  Bronchiectasis  DVT (deep venous thrombosis)  left lower extremity  Kyphoscoliosis and scoliosis  Stenocardia  Spinal stenosis  Nosebleed  prior left nostrils cauterize x2  Murmur, cardiac  gr3/6  Neuropathy  Diastolic CHF  NYHA class 3  Aspiration pneumonia  Nosebleed  severe on aspirin; had nose cautery done in 2018  Anxiety  HTN (hypertension)  GERD (gastroesophageal reflux disease)  Closed pelvic ring fracture  Hip arthritis  Angina pectoris  class IV  [place] (hard of hearing)  wears bilateral hearing aides  Afib  History of valvular heart disease  HTN (hypertension)  Chronic back pain  S/P spinal fusion  L spines  S/p TAVR (transcatheter aortic valve replacement), bioprosthetic    Allergies: No Known Allergies      Medications: see med rec      FAMILY HISTORY:  Family history of breast cancer (Sibling)    Family history of cerebrovascular accident (CVA) (Mother)    Family history of esophageal cancer (Father)    : non-contributory    Social History: lives at home alone  ETOH: denies  Smoking: denies    Ambulation: as above                          11.3   8.42  )-----------( 182      ( 08 Sep 2023 00:06 )             34.6     09-08    136  |  98  |  26.0<H>  ----------------------------<  117<H>  4.8   |  26.0  |  1.35<H>    Ca    9.7      08 Sep 2023 00:06      PHYSICAL EXAM:    Vital Signs Last 24 Hrs  T(C): 36.8 (08 Sep 2023 06:32), Max: 36.8 (08 Sep 2023 06:32)  T(F): 98.3 (08 Sep 2023 06:32), Max: 98.3 (08 Sep 2023 06:32)  HR: 66 (08 Sep 2023 06:32) (66 - 66)  BP: 127/66 (08 Sep 2023 06:32) (127/66 - 136/70)  BP(mean): 86 (08 Sep 2023 06:32) (86 - 86)  RR: 16 (08 Sep 2023 06:32) (16 - 18)  SpO2: 95% (08 Sep 2023 06:32) (95% - 96%)      Appearance: Alert, responsive, in no acute distress.  Right Lower extremity:  Skin intact,. no open wounds or ecchymosis, no obvious deformity.   Negative log roll, negative pelvic compression test  Unable to perform Straight leg raise  EHL/TA/GS/FHL intact, Gross SILT distally s/s/DP/SP/tib distrib  DP pulse 2+ PT pulse 1+   Calf soft, NT B/L    Right UE: Skin intact, no obvious deformities, no ecchymosis, rashes or s/o infx  +TTP proximal humerus, clavicle grossly NT. Minimal ROM due to pain of right shoulder  Right elbow grossly NT  AIN/PIN/intrinsics intact  SILT Rad/med/uln distrib. Rad pulse +2    Imaging Studies: < from: CT Pelvis Bony Only No Cont (09.07.23 @ 22:24) >  IMPRESSION:  Acute minimally displaced right superior and inferior pubic rami   fractures.    < end of copied text >    right shoulder xray - nondisplaced mildly compressed surgical neck proximal humerus fracture, no dislocation noted.    A/P:  Pt is a  89y Female with right proximal humerus fracture, right sup/inf pubic rami fracture    PLAN:   ·	Sling right UE at all times  ·	NWB right UE  ·	WBAT LEs B/L  ·	Pain control  ·	DVT propx recommended due to PMH of DVT - defer to medicine  ·	Cont care as per primary team  ·	No ortho surgery indicated at this time  ·	D/w Dr. Tomlinson

## 2023-09-08 NOTE — H&P ADULT - NSHPLABSRESULTS_GEN_ALL_CORE
LABS:                         11.3   8.42  )-----------( 182      ( 08 Sep 2023 00:06 )             34.6     09-08    136  |  98  |  26.0<H>  ----------------------------<  117<H>  4.8   |  26.0  |  1.35<H>    Ca    9.7      08 Sep 2023 00:06        Urinalysis Basic - ( 08 Sep 2023 00:06 )    Color: x / Appearance: x / SG: x / pH: x  Gluc: 117 mg/dL / Ketone: x  / Bili: x / Urobili: x   Blood: x / Protein: x / Nitrite: x   Leuk Esterase: x / RBC: x / WBC x   Sq Epi: x / Non Sq Epi: x / Bacteria: x                RADIOLOGY, EKG & ADDITIONAL TESTS:    CT H and C Spine:  CT HEAD: No acute intracranial hemorrhage, mass effect, or osseous   fracture. Chronic changes, as described.    CT CERVICAL SPINE: No acute cervical spine fracture or traumatic   malalignment. Multilevel cervical spondylosis.    CT Pelvis:  Acute minimally displaced right superior and inferior pubic rami   fractures.

## 2023-09-08 NOTE — ED ADULT NURSE REASSESSMENT NOTE - NS ED NURSE REASSESS COMMENT FT1
Called MD Barnes to make aware pt has not urinated all day, bladder scanner revealed only 38ml volume, awaiting order for IVFs, no distress noted.

## 2023-09-08 NOTE — DISCHARGE NOTE PROVIDER - NSDCCPCAREPLAN_GEN_ALL_CORE_FT
PRINCIPAL DISCHARGE DIAGNOSIS  Diagnosis: Fracture of multiple pubic rami  Assessment and Plan of Treatment: Pain control. Follow up with ortho. Work with PT.      SECONDARY DISCHARGE DIAGNOSES  Diagnosis: Humeral head fracture  Assessment and Plan of Treatment: Pain control. Follow up with ortho. Work with PT.

## 2023-09-08 NOTE — H&P ADULT - CONVERSATION DETAILS
Due to patient's advanced age and previous molst for comfort care/hospice, revisited [facility] for updated MOLST with patient family, patient's fam requesting to maintain DNR/DNI w/ limited interventions, no heroic measures, feeding tubes or HD. At this time, patient and family's goal is to get strong enough to go to rehab in order to get strong enough to go home

## 2023-09-08 NOTE — DISCHARGE NOTE PROVIDER - NSDCFUADDINST_GEN_ALL_CORE_FT
ORTHOPEDIC RECOMMENDATIONS: There will be no acute orthopedic surgical intervention at this time. Patient will be NON WEIGHT BEARING to RIGHT UPPER EXTREMITY. Continue sling to right upper extremity as applied. Patient will be WEIGHT BEARING AS TOLERATED to RIGHT LOWER EXTREMITY. Pain control. Follow-up in the office in 2 weeks for re-evaluation.

## 2023-09-08 NOTE — DISCHARGE NOTE PROVIDER - NSDCHC_MEDRECSTATUS_GEN_ALL_CORE
Admission Reconciliation is Completed  Discharge Reconciliation is Not Complete Admission Reconciliation is Completed  Discharge Reconciliation is Completed [] : No [de-identified] : 0/10

## 2023-09-08 NOTE — ED CDU PROVIDER INITIAL DAY NOTE - CLINICAL SUMMARY MEDICAL DECISION MAKING FREE TEXT BOX
89 y/o F c/o mechanical fall - right humeral head fracture, pubic rami fracture - will place in obs for PT/GARRETT

## 2023-09-08 NOTE — ED PROVIDER NOTE - NSICDXPASTMEDICALHX_GEN_ALL_CORE_FT
PAST MEDICAL HISTORY:  Acute deep vein thrombosis (DVT) of popliteal vein of both lower extremities     AF (atrial fibrillation) Persistent/chronic    Afib     Angina pectoris class IV    Anxiety     Aortic stenosis s/p TAVR bioprosthetic Jan 2018 [facility] Dr. Aragon    Aspiration pneumonia     Asthma     Atherosclerosis of native coronary artery of native heart with angina pectoris     AV block     Bronchiectasis     Chronic back pain     Chronic back pain     Closed fracture of multiple ribs of right side, initial encounter     Closed pelvic ring fracture     Diastolic CHF NYHA class 3    CURRY (dyspnea on exertion)     DVT (deep venous thrombosis) left lower extremity    Essential hypertension     GERD (gastroesophageal reflux disease)     Hip arthritis     History of valvular heart disease     [place] (hard of hearing) wears bilateral hearing aides    HTN (hypertension)     HTN (hypertension)     Hypercholesteremia     Hyperlipidemia, unspecified hyperlipidemia type     Kyphoscoliosis and scoliosis     Lung nodule     Murmur, cardiac gr3/6    Neuropathy     Nosebleed prior left nostrils cauterize x2    Nosebleed severe on aspirin; had nose cautery done in 2018    Pelvic fracture     PNA (pneumonia) november 2017    Spinal stenosis     Stenocardia     Thyroid nodule

## 2023-09-08 NOTE — ED PROVIDER NOTE - NSICDXPASTSURGICALHX_GEN_ALL_CORE_FT
PAST SURGICAL HISTORY:  S/P spinal fusion L spines    S/p TAVR (transcatheter aortic valve replacement), bioprosthetic

## 2023-09-08 NOTE — PROVIDER CONTACT NOTE (OTHER) - ASSESSMENT
PT ordered. chart reviewed and noted. pt received in ED, semifowler position in stretcher, agreeable to PT. pt has difficulties with functional mobility due to strength and balance deficits. right shoulder pain pre/post session: 3/10. pt left as received will continue to follow, NAD, all lines intact, sling right UE  goals: 2-3x a week for 5-7 days  bed mobility:S  transfers:S  gait: 50 feet RW Jethro  stairs: TBA

## 2023-09-08 NOTE — DISCHARGE NOTE PROVIDER - CARE PROVIDER_API CALL
Ezequiel Tomlinson  Orthopaedic Surgery  [address] [place] [id number]  Phone: [phone]  Fax: [phone]  Follow Up Time:    Ezequiel Tomlinson  Orthopaedic Surgery  [address] [place] [id number]  Phone: [phone]  Fax: [phone]  Follow Up Time:     PCP,   Phone: (   )    -  Fax: (   )    -  Follow Up Time: 1-3 days

## 2023-09-08 NOTE — ED CDU PROVIDER DISPOSITION NOTE - ATTENDING CONTRIBUTION TO CARE
89yF presenting after mechanical fall. Patient found to have a right sided humeral neck fracture (minimally displaced) and right sided superior and inferior pubic rami fractures. CT head negative for acute intracranial pathology. PT recommending GARRETT. Will admit patient to medicine for placement. Ortho consulted to follow. Likely non-op    Pain well controlled. stable for admit at this time. CM aware

## 2023-09-09 LAB
A1C WITH ESTIMATED AVERAGE GLUCOSE RESULT: 5.2 % — SIGNIFICANT CHANGE UP (ref 4–5.6)
ALBUMIN SERPL ELPH-MCNC: 3.9 G/DL — SIGNIFICANT CHANGE UP (ref 3.3–5.2)
ALP SERPL-CCNC: 48 U/L — SIGNIFICANT CHANGE UP (ref 40–120)
ALT FLD-CCNC: 15 U/L — SIGNIFICANT CHANGE UP
ANION GAP SERPL CALC-SCNC: 10 MMOL/L — SIGNIFICANT CHANGE UP (ref 5–17)
AST SERPL-CCNC: 23 U/L — SIGNIFICANT CHANGE UP
BASOPHILS # BLD AUTO: 0 K/UL — SIGNIFICANT CHANGE UP (ref 0–0.2)
BASOPHILS NFR BLD AUTO: 0 % — SIGNIFICANT CHANGE UP (ref 0–2)
BILIRUB SERPL-MCNC: 0.8 MG/DL — SIGNIFICANT CHANGE UP (ref 0.4–2)
BUN SERPL-MCNC: 20 MG/DL — SIGNIFICANT CHANGE UP (ref 8–20)
CALCIUM SERPL-MCNC: 9.5 MG/DL — SIGNIFICANT CHANGE UP (ref 8.4–10.5)
CHLORIDE SERPL-SCNC: 99 MMOL/L — SIGNIFICANT CHANGE UP (ref 96–108)
CHOLEST SERPL-MCNC: 221 MG/DL — HIGH
CO2 SERPL-SCNC: 26 MMOL/L — SIGNIFICANT CHANGE UP (ref 22–29)
CREAT SERPL-MCNC: 0.97 MG/DL — SIGNIFICANT CHANGE UP (ref 0.5–1.3)
EGFR: 55 ML/MIN/1.73M2 — LOW
EOSINOPHIL # BLD AUTO: 0 K/UL — SIGNIFICANT CHANGE UP (ref 0–0.5)
EOSINOPHIL NFR BLD AUTO: 0 % — SIGNIFICANT CHANGE UP (ref 0–6)
ESTIMATED AVERAGE GLUCOSE: 103 MG/DL — SIGNIFICANT CHANGE UP (ref 68–114)
GIANT PLATELETS BLD QL SMEAR: PRESENT — SIGNIFICANT CHANGE UP
GLUCOSE SERPL-MCNC: 98 MG/DL — SIGNIFICANT CHANGE UP (ref 70–99)
HCT VFR BLD CALC: 33.4 % — LOW (ref 34.5–45)
HDLC SERPL-MCNC: 89 MG/DL — SIGNIFICANT CHANGE UP
HGB BLD-MCNC: 11.2 G/DL — LOW (ref 11.5–15.5)
LIPID PNL WITH DIRECT LDL SERPL: 119 MG/DL — HIGH
LYMPHOCYTES # BLD AUTO: 0.69 K/UL — LOW (ref 1–3.3)
LYMPHOCYTES # BLD AUTO: 7 % — LOW (ref 13–44)
MANUAL SMEAR VERIFICATION: SIGNIFICANT CHANGE UP
MCHC RBC-ENTMCNC: 30.7 PG — SIGNIFICANT CHANGE UP (ref 27–34)
MCHC RBC-ENTMCNC: 33.5 GM/DL — SIGNIFICANT CHANGE UP (ref 32–36)
MCV RBC AUTO: 91.5 FL — SIGNIFICANT CHANGE UP (ref 80–100)
MONOCYTES # BLD AUTO: 1.82 K/UL — HIGH (ref 0–0.9)
MONOCYTES NFR BLD AUTO: 18.4 % — HIGH (ref 2–14)
NEUTROPHILS # BLD AUTO: 7.3 K/UL — SIGNIFICANT CHANGE UP (ref 1.8–7.4)
NEUTROPHILS NFR BLD AUTO: 73.7 % — SIGNIFICANT CHANGE UP (ref 43–77)
NON HDL CHOLESTEROL: 132 MG/DL — HIGH
PLAT MORPH BLD: NORMAL — SIGNIFICANT CHANGE UP
PLATELET # BLD AUTO: 162 K/UL — SIGNIFICANT CHANGE UP (ref 150–400)
POTASSIUM SERPL-MCNC: 5.4 MMOL/L — HIGH (ref 3.5–5.3)
POTASSIUM SERPL-SCNC: 5.4 MMOL/L — HIGH (ref 3.5–5.3)
PROMYELOCYTES # FLD: 0.9 % — HIGH (ref 0–0)
PROT SERPL-MCNC: 6.2 G/DL — LOW (ref 6.6–8.7)
RBC # BLD: 3.65 M/UL — LOW (ref 3.8–5.2)
RBC # FLD: 14.3 % — SIGNIFICANT CHANGE UP (ref 10.3–14.5)
RBC BLD AUTO: ABNORMAL
SODIUM SERPL-SCNC: 135 MMOL/L — SIGNIFICANT CHANGE UP (ref 135–145)
TRIGL SERPL-MCNC: 64 MG/DL — SIGNIFICANT CHANGE UP
WBC # BLD: 9.9 K/UL — SIGNIFICANT CHANGE UP (ref 3.8–10.5)
WBC # FLD AUTO: 9.9 K/UL — SIGNIFICANT CHANGE UP (ref 3.8–10.5)

## 2023-09-09 PROCEDURE — 99232 SBSQ HOSP IP/OBS MODERATE 35: CPT

## 2023-09-09 RX ORDER — SODIUM ZIRCONIUM CYCLOSILICATE 10 G/10G
5 POWDER, FOR SUSPENSION ORAL ONCE
Refills: 0 | Status: COMPLETED | OUTPATIENT
Start: 2023-09-09 | End: 2023-09-09

## 2023-09-09 RX ADMIN — RANOLAZINE 500 MILLIGRAM(S): 500 TABLET, FILM COATED, EXTENDED RELEASE ORAL at 18:04

## 2023-09-09 RX ADMIN — Medication 650 MILLIGRAM(S): at 13:21

## 2023-09-09 RX ADMIN — GABAPENTIN 600 MILLIGRAM(S): 400 CAPSULE ORAL at 21:52

## 2023-09-09 RX ADMIN — GABAPENTIN 600 MILLIGRAM(S): 400 CAPSULE ORAL at 05:45

## 2023-09-09 RX ADMIN — SODIUM ZIRCONIUM CYCLOSILICATE 5 GRAM(S): 10 POWDER, FOR SUSPENSION ORAL at 09:56

## 2023-09-09 RX ADMIN — Medication 650 MILLIGRAM(S): at 22:32

## 2023-09-09 RX ADMIN — RANOLAZINE 500 MILLIGRAM(S): 500 TABLET, FILM COATED, EXTENDED RELEASE ORAL at 05:46

## 2023-09-09 RX ADMIN — ATORVASTATIN CALCIUM 20 MILLIGRAM(S): 80 TABLET, FILM COATED ORAL at 21:52

## 2023-09-09 RX ADMIN — GABAPENTIN 600 MILLIGRAM(S): 400 CAPSULE ORAL at 14:45

## 2023-09-09 RX ADMIN — Medication 650 MILLIGRAM(S): at 12:21

## 2023-09-09 RX ADMIN — RIVAROXABAN 15 MILLIGRAM(S): KIT at 18:04

## 2023-09-09 RX ADMIN — PANTOPRAZOLE SODIUM 40 MILLIGRAM(S): 20 TABLET, DELAYED RELEASE ORAL at 05:48

## 2023-09-09 RX ADMIN — Medication 650 MILLIGRAM(S): at 23:02

## 2023-09-09 RX ADMIN — Medication 81 MILLIGRAM(S): at 12:12

## 2023-09-09 RX ADMIN — AMLODIPINE BESYLATE 10 MILLIGRAM(S): 2.5 TABLET ORAL at 05:46

## 2023-09-09 NOTE — PROGRESS NOTE ADULT - ASSESSMENT
89 y/o F w/ PMH HTN, afib on xarelto, spinal stenosis/lbp, chronic pain syndrome, severe covid infection (1/2022) here for mgmt of mechanical fall w/ injury    #Mechanical Fall w/ injury  RUE humeral fx and R sided pelvic fx  pain controlled at this time  no LOC, no dizziness  - Pain control PRN  - Ortho Recs appreciated  - [facility] as per PT     #SORAIDA  likely 2/2 medication side effect  on lasix for HFpEF  Not acutely overloaded  - trend Cr  - Hold lasix and spironolactone at this time - RESTART in AM   - caution w/ nephrotoxic meds    #Hx of Atrial fibrillation on AC  Rate controlled  - c/w xarelto  - not in RVR, no need for tele monitoring at this time  - Vitals Q4    #Chronic Pain Syndrome  #Spinal Stenosis  on chronic pain meds  - c/w tramadol PRN  - monitor for acute breakthrough pain    #healthcare maintenance  DVT PPx - Xarelto  Dispo - [facility]    GOC: DNR/DNI, No heroic measures    Stable for DC to [facility].  89 y/o F w/ PMH HTN, afib on xarelto, spinal stenosis/lbp, chronic pain syndrome, severe covid infection (1/2022) here for mgmt of mechanical fall w/ injury    #Mechanical Fall w/ injury  RUE humeral fx and R sided pelvic fx  pain controlled at this time  no LOC, no dizziness  - Pain control PRN  - Ortho Recs appreciated  - FRANKY as per PT     #SORAIDA  likely 2/2 medication side effect  on lasix for HFpEF  Not acutely overloaded  - trend Cr  - Hold lasix and spironolactone at this time - RESTART in AM   - caution w/ nephrotoxic meds    #Hx of Atrial fibrillation on AC  Rate controlled  - c/w xarelto  - not in RVR, no need for tele monitoring at this time  - Vitals Q4    #Chronic Pain Syndrome  #Spinal Stenosis  on chronic pain meds  - c/w tramadol PRN  - monitor for acute breakthrough pain    #healthcare maintenance  DVT PPx - Xarelto  Dispo - [facility]    GOC: DNR/DNI, No heroic measures    Stable for DC to [facility].     Spoke with patients son and updated.

## 2023-09-10 LAB
ANION GAP SERPL CALC-SCNC: 11 MMOL/L — SIGNIFICANT CHANGE UP (ref 5–17)
BASOPHILS # BLD AUTO: 0.04 K/UL — SIGNIFICANT CHANGE UP (ref 0–0.2)
BASOPHILS NFR BLD AUTO: 0.5 % — SIGNIFICANT CHANGE UP (ref 0–2)
BUN SERPL-MCNC: 24 MG/DL — HIGH (ref 8–20)
CALCIUM SERPL-MCNC: 9.2 MG/DL — SIGNIFICANT CHANGE UP (ref 8.4–10.5)
CHLORIDE SERPL-SCNC: 96 MMOL/L — SIGNIFICANT CHANGE UP (ref 96–108)
CO2 SERPL-SCNC: 24 MMOL/L — SIGNIFICANT CHANGE UP (ref 22–29)
CREAT SERPL-MCNC: 1 MG/DL — SIGNIFICANT CHANGE UP (ref 0.5–1.3)
EGFR: 53 ML/MIN/1.73M2 — LOW
EOSINOPHIL # BLD AUTO: 0.03 K/UL — SIGNIFICANT CHANGE UP (ref 0–0.5)
EOSINOPHIL NFR BLD AUTO: 0.3 % — SIGNIFICANT CHANGE UP (ref 0–6)
GLUCOSE SERPL-MCNC: 99 MG/DL — SIGNIFICANT CHANGE UP (ref 70–99)
HCT VFR BLD CALC: 33.2 % — LOW (ref 34.5–45)
HGB BLD-MCNC: 10.8 G/DL — LOW (ref 11.5–15.5)
IMM GRANULOCYTES NFR BLD AUTO: 0.2 % — SIGNIFICANT CHANGE UP (ref 0–0.9)
LYMPHOCYTES # BLD AUTO: 1.36 K/UL — SIGNIFICANT CHANGE UP (ref 1–3.3)
LYMPHOCYTES # BLD AUTO: 15.8 % — SIGNIFICANT CHANGE UP (ref 13–44)
MCHC RBC-ENTMCNC: 30.3 PG — SIGNIFICANT CHANGE UP (ref 27–34)
MCHC RBC-ENTMCNC: 32.5 GM/DL — SIGNIFICANT CHANGE UP (ref 32–36)
MCV RBC AUTO: 93 FL — SIGNIFICANT CHANGE UP (ref 80–100)
MONOCYTES # BLD AUTO: 2.59 K/UL — HIGH (ref 0–0.9)
MONOCYTES NFR BLD AUTO: 30.1 % — HIGH (ref 2–14)
NEUTROPHILS # BLD AUTO: 4.56 K/UL — SIGNIFICANT CHANGE UP (ref 1.8–7.4)
NEUTROPHILS NFR BLD AUTO: 53.1 % — SIGNIFICANT CHANGE UP (ref 43–77)
PLATELET # BLD AUTO: 161 K/UL — SIGNIFICANT CHANGE UP (ref 150–400)
POTASSIUM SERPL-MCNC: 4.6 MMOL/L — SIGNIFICANT CHANGE UP (ref 3.5–5.3)
POTASSIUM SERPL-SCNC: 4.6 MMOL/L — SIGNIFICANT CHANGE UP (ref 3.5–5.3)
RBC # BLD: 3.57 M/UL — LOW (ref 3.8–5.2)
RBC # FLD: 14.5 % — SIGNIFICANT CHANGE UP (ref 10.3–14.5)
SODIUM SERPL-SCNC: 131 MMOL/L — LOW (ref 135–145)
WBC # BLD: 8.6 K/UL — SIGNIFICANT CHANGE UP (ref 3.8–10.5)
WBC # FLD AUTO: 8.6 K/UL — SIGNIFICANT CHANGE UP (ref 3.8–10.5)

## 2023-09-10 PROCEDURE — 99232 SBSQ HOSP IP/OBS MODERATE 35: CPT

## 2023-09-10 RX ORDER — FUROSEMIDE 40 MG
20 TABLET ORAL DAILY
Refills: 0 | Status: DISCONTINUED | OUTPATIENT
Start: 2023-09-10 | End: 2023-09-11

## 2023-09-10 RX ADMIN — Medication 20 MILLIGRAM(S): at 13:09

## 2023-09-10 RX ADMIN — Medication 650 MILLIGRAM(S): at 06:00

## 2023-09-10 RX ADMIN — GABAPENTIN 600 MILLIGRAM(S): 400 CAPSULE ORAL at 13:09

## 2023-09-10 RX ADMIN — GABAPENTIN 600 MILLIGRAM(S): 400 CAPSULE ORAL at 05:21

## 2023-09-10 RX ADMIN — Medication 650 MILLIGRAM(S): at 22:40

## 2023-09-10 RX ADMIN — RANOLAZINE 500 MILLIGRAM(S): 500 TABLET, FILM COATED, EXTENDED RELEASE ORAL at 16:21

## 2023-09-10 RX ADMIN — Medication 650 MILLIGRAM(S): at 22:10

## 2023-09-10 RX ADMIN — RANOLAZINE 500 MILLIGRAM(S): 500 TABLET, FILM COATED, EXTENDED RELEASE ORAL at 05:21

## 2023-09-10 RX ADMIN — Medication 81 MILLIGRAM(S): at 13:09

## 2023-09-10 RX ADMIN — PANTOPRAZOLE SODIUM 40 MILLIGRAM(S): 20 TABLET, DELAYED RELEASE ORAL at 05:22

## 2023-09-10 RX ADMIN — Medication 650 MILLIGRAM(S): at 05:21

## 2023-09-10 RX ADMIN — GABAPENTIN 600 MILLIGRAM(S): 400 CAPSULE ORAL at 22:10

## 2023-09-10 RX ADMIN — ATORVASTATIN CALCIUM 20 MILLIGRAM(S): 80 TABLET, FILM COATED ORAL at 22:10

## 2023-09-10 RX ADMIN — AMLODIPINE BESYLATE 10 MILLIGRAM(S): 2.5 TABLET ORAL at 05:21

## 2023-09-10 RX ADMIN — RIVAROXABAN 15 MILLIGRAM(S): KIT at 16:20

## 2023-09-10 NOTE — PROGRESS NOTE ADULT - ASSESSMENT
89 y/o F w/ PMH HTN, afib on xarelto, spinal stenosis/lbp, chronic pain syndrome, severe covid infection (1/2022) here for mgmt of mechanical fall w/ injury    #Mechanical Fall w/ injury  RUE humeral fx and R sided pelvic fx  pain controlled at this time  no LOC, no dizziness  - Pain control PRN  - Ortho Recs appreciated  - FRANKY as per PT     #SORAIDA  likely 2/2 medication side effect  on lasix for HFpEF  Not acutely overloaded  - trend Cr  - Restart lasix  - caution w/ nephrotoxic meds    #Hx of Atrial fibrillation on AC  Rate controlled  - c/w xarelto  - not in RVR, no need for tele monitoring at this time  - Vitals Q4    #Chronic Pain Syndrome  #Spinal Stenosis  on chronic pain meds  - c/w tramadol PRN  - monitor for acute breakthrough pain    #healthcare maintenance  DVT PPx - Xarelto  Dispo - [facility]    GOC: DNR/DNI, No heroic measures    Stable for DC to [facility].     Spoke with patients son at bedside

## 2023-09-11 ENCOUNTER — TRANSCRIPTION ENCOUNTER (OUTPATIENT)
Age: 88
End: 2023-09-11

## 2023-09-11 VITALS
OXYGEN SATURATION: 96 % | HEART RATE: 76 BPM | TEMPERATURE: 98 F | DIASTOLIC BLOOD PRESSURE: 66 MMHG | RESPIRATION RATE: 18 BRPM | SYSTOLIC BLOOD PRESSURE: 134 MMHG

## 2023-09-11 PROCEDURE — 80053 COMPREHEN METABOLIC PANEL: CPT

## 2023-09-11 PROCEDURE — 73502 X-RAY EXAM HIP UNI 2-3 VIEWS: CPT

## 2023-09-11 PROCEDURE — 71045 X-RAY EXAM CHEST 1 VIEW: CPT | Mod: 26

## 2023-09-11 PROCEDURE — 72192 CT PELVIS W/O DYE: CPT | Mod: MA

## 2023-09-11 PROCEDURE — 36415 COLL VENOUS BLD VENIPUNCTURE: CPT

## 2023-09-11 PROCEDURE — 96374 THER/PROPH/DIAG INJ IV PUSH: CPT

## 2023-09-11 PROCEDURE — G0378: CPT

## 2023-09-11 PROCEDURE — 80048 BASIC METABOLIC PNL TOTAL CA: CPT

## 2023-09-11 PROCEDURE — 70450 CT HEAD/BRAIN W/O DYE: CPT | Mod: MA

## 2023-09-11 PROCEDURE — 99285 EMERGENCY DEPT VISIT HI MDM: CPT | Mod: 25

## 2023-09-11 PROCEDURE — 72125 CT NECK SPINE W/O DYE: CPT | Mod: MA

## 2023-09-11 PROCEDURE — 85025 COMPLETE CBC W/AUTO DIFF WBC: CPT

## 2023-09-11 PROCEDURE — 73030 X-RAY EXAM OF SHOULDER: CPT

## 2023-09-11 PROCEDURE — 83880 ASSAY OF NATRIURETIC PEPTIDE: CPT

## 2023-09-11 PROCEDURE — 71045 X-RAY EXAM CHEST 1 VIEW: CPT

## 2023-09-11 PROCEDURE — 80061 LIPID PANEL: CPT

## 2023-09-11 PROCEDURE — 99239 HOSP IP/OBS DSCHRG MGMT >30: CPT

## 2023-09-11 PROCEDURE — 83036 HEMOGLOBIN GLYCOSYLATED A1C: CPT

## 2023-09-11 PROCEDURE — 96375 TX/PRO/DX INJ NEW DRUG ADDON: CPT

## 2023-09-11 RX ORDER — FUROSEMIDE 40 MG
1 TABLET ORAL
Qty: 0 | Refills: 0 | DISCHARGE
Start: 2023-09-11

## 2023-09-11 RX ADMIN — RIVAROXABAN 15 MILLIGRAM(S): KIT at 17:11

## 2023-09-11 RX ADMIN — GABAPENTIN 600 MILLIGRAM(S): 400 CAPSULE ORAL at 14:24

## 2023-09-11 RX ADMIN — Medication 81 MILLIGRAM(S): at 12:28

## 2023-09-11 RX ADMIN — TRAMADOL HYDROCHLORIDE 50 MILLIGRAM(S): 50 TABLET ORAL at 12:28

## 2023-09-11 RX ADMIN — Medication 650 MILLIGRAM(S): at 05:04

## 2023-09-11 RX ADMIN — GABAPENTIN 600 MILLIGRAM(S): 400 CAPSULE ORAL at 05:05

## 2023-09-11 RX ADMIN — TRAMADOL HYDROCHLORIDE 50 MILLIGRAM(S): 50 TABLET ORAL at 13:28

## 2023-09-11 RX ADMIN — PANTOPRAZOLE SODIUM 40 MILLIGRAM(S): 20 TABLET, DELAYED RELEASE ORAL at 05:05

## 2023-09-11 RX ADMIN — RANOLAZINE 500 MILLIGRAM(S): 500 TABLET, FILM COATED, EXTENDED RELEASE ORAL at 05:05

## 2023-09-11 RX ADMIN — Medication 20 MILLIGRAM(S): at 05:05

## 2023-09-11 RX ADMIN — Medication 650 MILLIGRAM(S): at 05:30

## 2023-09-11 RX ADMIN — RANOLAZINE 500 MILLIGRAM(S): 500 TABLET, FILM COATED, EXTENDED RELEASE ORAL at 17:11

## 2023-09-11 RX ADMIN — AMLODIPINE BESYLATE 10 MILLIGRAM(S): 2.5 TABLET ORAL at 05:05

## 2023-09-11 NOTE — PROGRESS NOTE ADULT - ASSESSMENT
89 y/o F w/ PMH HTN, afib on xarelto, spinal stenosis/lbp, chronic pain syndrome, severe covid infection (1/2022) here for mgmt of mechanical fall w/ injury    #Mechanical Fall w/ injury  RUE humeral fx and R sided pelvic fx  pain controlled at this time  no LOC, no dizziness  - Pain control PRN  - Ortho Recs appreciated  - FRANKY as per PT     #SORAIDA  likely 2/2 medication side effect  on lasix for HFpEF  Not acutely overloaded  - trend Cr  - Restart lasix  - caution w/ nephrotoxic meds    #Hx of Atrial fibrillation on AC  Rate controlled  - c/w xarelto  - not in RVR, no need for tele monitoring at this time  - Vitals Q4    #Chronic Pain Syndrome  #Spinal Stenosis  on chronic pain meds  - c/w tramadol PRN  - monitor for acute breakthrough pain    #healthcare maintenance  DVT PPx - Xarelto  Dispo - [facility]    GOC: DNR/DNI, No heroic measures    Stable for DC to [facility].

## 2023-09-11 NOTE — PROGRESS NOTE ADULT - REASON FOR ADMISSION
Mechanical Fall w/ Humerus and Pelvic Fx

## 2023-09-11 NOTE — DISCHARGE NOTE NURSING/CASE MANAGEMENT/SOCIAL WORK - PATIENT PORTAL LINK FT
You can access the FollowMyHealth Patient Portal offered by [facility] by registering at the following website: [email or web address]. By joining [facility]’s FollowMyHealth portal, you will also be able to view your health information using other applications (apps) compatible with our system.

## 2023-09-11 NOTE — PROGRESS NOTE ADULT - SUBJECTIVE AND OBJECTIVE BOX
Hospitalist Daily Progress Note    Chief Complaint:  Patient is a 89y old  Female who presents with a chief complaint of Mechanical Fall w/ Humerus and Pelvic Fx (08 Sep 2023 10:29)      SUBJECTIVE / OVERNIGHT EVENTS:  Patient was seen and examined at bedside. Complains of pain when she is moved.   Patient denies chest pain, SOB, abd pain, N/V, fever, chills, dysuria or any other complaints. All remainder ROS negative.     MEDICATIONS  (STANDING):  amLODIPine   Tablet 10 milliGRAM(s) Oral daily  aspirin  chewable 81 milliGRAM(s) Oral daily  atorvastatin 20 milliGRAM(s) Oral at bedtime  gabapentin 600 milliGRAM(s) Oral three times a day  influenza  Vaccine (HIGH DOSE) 0.7 milliLiter(s) IntraMuscular once  pantoprazole    Tablet 40 milliGRAM(s) Oral before breakfast  ranolazine 500 milliGRAM(s) Oral two times a day  rivaroxaban 15 milliGRAM(s) Oral with dinner    MEDICATIONS  (PRN):  acetaminophen     Tablet .. 650 milliGRAM(s) Oral every 6 hours PRN Temp greater or equal to 38C (100.4F), Mild Pain (1 - 3)  aluminum hydroxide/magnesium hydroxide/simethicone Suspension 30 milliLiter(s) Oral every 4 hours PRN Dyspepsia  HYDROmorphone  Injectable 0.2 milliGRAM(s) IV Push every 4 hours PRN Severe Pain (7 - 10)  melatonin 3 milliGRAM(s) Oral at bedtime PRN Insomnia  ondansetron Injectable 4 milliGRAM(s) IV Push every 8 hours PRN Nausea and/or Vomiting  traMADol 50 milliGRAM(s) Oral every 6 hours PRN Moderate Pain (4 - 6)        I&O's Summary      PHYSICAL EXAM:  Vital Signs Last 24 Hrs  T(C): 36.4 (09 Sep 2023 12:22), Max: 37 (09 Sep 2023 00:15)  T(F): 97.5 (09 Sep 2023 12:22), Max: 98.6 (09 Sep 2023 00:15)  HR: 74 (09 Sep 2023 12:22) (64 - 97)  BP: 108/70 (09 Sep 2023 12:22) (108/70 - 146/81)  BP(mean): --  RR: 18 (09 Sep 2023 12:22) (18 - 19)  SpO2: 93% (09 Sep 2023 12:22) (89% - 95%)    Parameters below as of 09 Sep 2023 12:22  Patient On (Oxygen Delivery Method): room air          Constitutional: NAD, Resting, frail female  ENT: Supple, No JVD  Lungs: CTA B/L, Non-labored breathing  Cardio: RRR, S1/S2, No murmur  Abdomen: Soft, Nontender, Nondistended; Bowel sounds present  Extremities: No calf tenderness, No pitting edema, RIGHT Arm in sling  Musculoskeletal:   No joint swelling  Psych: Calm, cooperative affect appropriate  Neuro: Awake and alert  Skin: No rashes; no palpable lesions    LABS:                        11.2   9.90  )-----------( 162      ( 09 Sep 2023 05:48 )             33.4     09-09    135  |  99  |  20.0  ----------------------------<  98  5.4<H>   |  26.0  |  0.97    Ca    9.5      09 Sep 2023 05:48    TPro  6.2<L>  /  Alb  3.9  /  TBili  0.8  /  DBili  x   /  AST  23  /  ALT  15  /  AlkPhos  48  09-09          Urinalysis Basic - ( 09 Sep 2023 05:48 )    Color: x / Appearance: x / SG: x / pH: x  Gluc: 98 mg/dL / Ketone: x  / Bili: x / Urobili: x   Blood: x / Protein: x / Nitrite: x   Leuk Esterase: x / RBC: x / WBC x   Sq Epi: x / Non Sq Epi: x / Bacteria: x        CAPILLARY BLOOD GLUCOSE            RADIOLOGY REVIEWED  
Hospitalist Daily Progress Note    Chief Complaint:  Patient is a 89y old  Female who presents with a chief complaint of Mechanical Fall w/ Humerus and Pelvic Fx (10 Sep 2023 11:39)      SUBJECTIVE / OVERNIGHT EVENTS:  Patient was seen and examined at bedside. Feels well.   Patient denies chest pain, SOB, abd pain, N/V, fever, chills, dysuria or any other complaints. All remainder ROS negative.     MEDICATIONS  (STANDING):  amLODIPine   Tablet 10 milliGRAM(s) Oral daily  aspirin  chewable 81 milliGRAM(s) Oral daily  atorvastatin 20 milliGRAM(s) Oral at bedtime  furosemide    Tablet 20 milliGRAM(s) Oral daily  gabapentin 600 milliGRAM(s) Oral three times a day  influenza  Vaccine (HIGH DOSE) 0.7 milliLiter(s) IntraMuscular once  pantoprazole    Tablet 40 milliGRAM(s) Oral before breakfast  ranolazine 500 milliGRAM(s) Oral two times a day  rivaroxaban 15 milliGRAM(s) Oral with dinner    MEDICATIONS  (PRN):  acetaminophen     Tablet .. 650 milliGRAM(s) Oral every 6 hours PRN Temp greater or equal to 38C (100.4F), Mild Pain (1 - 3)  aluminum hydroxide/magnesium hydroxide/simethicone Suspension 30 milliLiter(s) Oral every 4 hours PRN Dyspepsia  HYDROmorphone  Injectable 0.2 milliGRAM(s) IV Push every 4 hours PRN Severe Pain (7 - 10)  melatonin 3 milliGRAM(s) Oral at bedtime PRN Insomnia  ondansetron Injectable 4 milliGRAM(s) IV Push every 8 hours PRN Nausea and/or Vomiting  traMADol 50 milliGRAM(s) Oral every 6 hours PRN Moderate Pain (4 - 6)        I&O's Summary    10 Sep 2023 07:01  -  11 Sep 2023 07:00  --------------------------------------------------------  IN: 0 mL / OUT: 1465 mL / NET: -1465 mL    11 Sep 2023 07:01  -  11 Sep 2023 13:32  --------------------------------------------------------  IN: 240 mL / OUT: 400 mL / NET: -160 mL        PHYSICAL EXAM:  Vital Signs Last 24 Hrs  T(C): 36.4 (11 Sep 2023 11:23), Max: 36.9 (11 Sep 2023 04:26)  T(F): 97.5 (11 Sep 2023 11:23), Max: 98.4 (11 Sep 2023 04:26)  HR: 74 (11 Sep 2023 11:23) (74 - 86)  BP: 126/77 (11 Sep 2023 11:23) (119/61 - 137/64)  BP(mean): --  RR: 18 (11 Sep 2023 11:23) (18 - 18)  SpO2: 99% (11 Sep 2023 11:23) (85% - 99%)    Parameters below as of 11 Sep 2023 11:23  Patient On (Oxygen Delivery Method): nasal cannula  O2 Flow (L/min): 2    Constitutional: NAD, Resting, frail female  ENT: Supple, No JVD  Lungs: CTA B/L, Non-labored breathing  Cardio: RRR, S1/S2, No murmur  Abdomen: Soft, Nontender, Nondistended; Bowel sounds present  Extremities: No calf tenderness, No pitting edema, Right Arm in sling  Musculoskeletal:   No joint swelling  Psych: Calm, cooperative affect appropriate  Neuro: Awake and alert  Skin: No rashes; no palpable lesions    LABS:                        10.8   8.60  )-----------( 161      ( 10 Sep 2023 05:22 )             33.2     09-10    131<L>  |  96  |  24.0<H>  ----------------------------<  99  4.6   |  24.0  |  1.00    Ca    9.2      10 Sep 2023 05:22            Urinalysis Basic - ( 10 Sep 2023 05:22 )    Color: x / Appearance: x / SG: x / pH: x  Gluc: 99 mg/dL / Ketone: x  / Bili: x / Urobili: x   Blood: x / Protein: x / Nitrite: x   Leuk Esterase: x / RBC: x / WBC x   Sq Epi: x / Non Sq Epi: x / Bacteria: x        CAPILLARY BLOOD GLUCOSE            RADIOLOGY REVIEWED  
Hospitalist Daily Progress Note    Chief Complaint:  Patient is a 89y old  Female who presents with a chief complaint of Mechanical Fall w/ Humerus and Pelvic Fx (09 Sep 2023 13:44)      SUBJECTIVE / OVERNIGHT EVENTS:  Patient was seen and examined at bedside.   Patient denies chest pain, SOB, abd pain, N/V, fever, chills, dysuria or any other complaints. All remainder ROS negative.     MEDICATIONS  (STANDING):  amLODIPine   Tablet 10 milliGRAM(s) Oral daily  aspirin  chewable 81 milliGRAM(s) Oral daily  atorvastatin 20 milliGRAM(s) Oral at bedtime  gabapentin 600 milliGRAM(s) Oral three times a day  influenza  Vaccine (HIGH DOSE) 0.7 milliLiter(s) IntraMuscular once  pantoprazole    Tablet 40 milliGRAM(s) Oral before breakfast  ranolazine 500 milliGRAM(s) Oral two times a day  rivaroxaban 15 milliGRAM(s) Oral with dinner    MEDICATIONS  (PRN):  acetaminophen     Tablet .. 650 milliGRAM(s) Oral every 6 hours PRN Temp greater or equal to 38C (100.4F), Mild Pain (1 - 3)  aluminum hydroxide/magnesium hydroxide/simethicone Suspension 30 milliLiter(s) Oral every 4 hours PRN Dyspepsia  HYDROmorphone  Injectable 0.2 milliGRAM(s) IV Push every 4 hours PRN Severe Pain (7 - 10)  melatonin 3 milliGRAM(s) Oral at bedtime PRN Insomnia  ondansetron Injectable 4 milliGRAM(s) IV Push every 8 hours PRN Nausea and/or Vomiting  traMADol 50 milliGRAM(s) Oral every 6 hours PRN Moderate Pain (4 - 6)        I&O's Summary    09 Sep 2023 07:01  -  10 Sep 2023 07:00  --------------------------------------------------------  IN: 0 mL / OUT: 1100 mL / NET: -1100 mL    10 Sep 2023 07:01  -  10 Sep 2023 11:39  --------------------------------------------------------  IN: 0 mL / OUT: 415 mL / NET: -415 mL        PHYSICAL EXAM:  Vital Signs Last 24 Hrs  T(C): 36.8 (10 Sep 2023 08:30), Max: 36.8 (10 Sep 2023 08:30)  T(F): 98.2 (10 Sep 2023 08:30), Max: 98.2 (10 Sep 2023 08:30)  HR: 70 (10 Sep 2023 08:30) (64 - 83)  BP: 138/79 (10 Sep 2023 08:30) (108/70 - 144/68)  BP(mean): --  RR: 18 (10 Sep 2023 08:30) (18 - 18)  SpO2: 86% (10 Sep 2023 08:30) (86% - 93%)    Parameters below as of 10 Sep 2023 08:30  Patient On (Oxygen Delivery Method): room air       Constitutional: NAD, Resting, frail female  ENT: Supple, No JVD  Lungs: CTA B/L, Non-labored breathing  Cardio: RRR, S1/S2, No murmur  Abdomen: Soft, Nontender, Nondistended; Bowel sounds present  Extremities: No calf tenderness, No pitting edema, Right Arm in sling  Musculoskeletal:   No joint swelling  Psych: Calm, cooperative affect appropriate  Neuro: Awake and alert  Skin: No rashes; no palpable lesions    LABS:                        10.8   8.60  )-----------( 161      ( 10 Sep 2023 05:22 )             33.2     09-10    131<L>  |  96  |  24.0<H>  ----------------------------<  99  4.6   |  24.0  |  1.00    Ca    9.2      10 Sep 2023 05:22    TPro  6.2<L>  /  Alb  3.9  /  TBili  0.8  /  DBili  x   /  AST  23  /  ALT  15  /  AlkPhos  48  09-09          Urinalysis Basic - ( 10 Sep 2023 05:22 )    Color: x / Appearance: x / SG: x / pH: x  Gluc: 99 mg/dL / Ketone: x  / Bili: x / Urobili: x   Blood: x / Protein: x / Nitrite: x   Leuk Esterase: x / RBC: x / WBC x   Sq Epi: x / Non Sq Epi: x / Bacteria: x        CAPILLARY BLOOD GLUCOSE            RADIOLOGY REVIEWED

## 2023-10-05 ENCOUNTER — APPOINTMENT (OUTPATIENT)
Dept: PHYSICAL MEDICINE AND REHAB | Facility: CLINIC | Age: 88
End: 2023-10-05
Payer: MEDICARE

## 2023-10-05 ENCOUNTER — RESULT REVIEW (OUTPATIENT)
Age: 88
End: 2023-10-05

## 2023-10-05 ENCOUNTER — APPOINTMENT (OUTPATIENT)
Dept: ORTHOPEDIC SURGERY | Facility: CLINIC | Age: 88
End: 2023-10-05

## 2023-10-05 ENCOUNTER — OUTPATIENT (OUTPATIENT)
Dept: OUTPATIENT SERVICES | Facility: HOSPITAL | Age: 88
LOS: 1 days | End: 2023-10-05
Payer: MEDICARE

## 2023-10-05 VITALS
SYSTOLIC BLOOD PRESSURE: 136 MMHG | HEIGHT: 59 IN | BODY MASS INDEX: 21.37 KG/M2 | WEIGHT: 106 LBS | DIASTOLIC BLOOD PRESSURE: 81 MMHG | HEART RATE: 72 BPM

## 2023-10-05 DIAGNOSIS — S42.91XA FRACTURE OF RIGHT SHOULDER GIRDLE, PART UNSPECIFIED, INITIAL ENCOUNTER FOR CLOSED FRACTURE: ICD-10-CM

## 2023-10-05 DIAGNOSIS — M25.511 PAIN IN RIGHT SHOULDER: ICD-10-CM

## 2023-10-05 DIAGNOSIS — Z98.1 ARTHRODESIS STATUS: Chronic | ICD-10-CM

## 2023-10-05 DIAGNOSIS — Z95.3 PRESENCE OF XENOGENIC HEART VALVE: Chronic | ICD-10-CM

## 2023-10-05 PROCEDURE — 99203 OFFICE O/P NEW LOW 30 MIN: CPT

## 2023-10-05 PROCEDURE — 73030 X-RAY EXAM OF SHOULDER: CPT | Mod: 26,RT

## 2023-10-11 ENCOUNTER — APPOINTMENT (OUTPATIENT)
Dept: ORTHOPEDIC SURGERY | Facility: CLINIC | Age: 88
End: 2023-10-11
Payer: MEDICARE

## 2023-10-11 VITALS — WEIGHT: 106 LBS | BODY MASS INDEX: 21.37 KG/M2 | HEIGHT: 59 IN

## 2023-10-11 DIAGNOSIS — S42.201A UNSPECIFIED FRACTURE OF UPPER END OF RIGHT HUMERUS, INITIAL ENCOUNTER FOR CLOSED FRACTURE: ICD-10-CM

## 2023-10-11 PROCEDURE — 23600 CLTX PROX HUMRL FX W/O MNPJ: CPT | Mod: RT

## 2023-10-11 PROCEDURE — 99204 OFFICE O/P NEW MOD 45 MIN: CPT | Mod: 57

## 2023-10-12 NOTE — H&P ADULT - EXTREMITIES
----- Message from Gil Ledesma PA-C sent at 10/11/2023  2:02 PM CDT -----  MRI lumbar spine shows mild progression of previously noted L2-3 canal stenosis, L4-5 left neuroforaminal stenosis, and L5-S1 bilateral neuroforaminal stenosis. Please touch base with Dr. Garza to see if surgery is an option.   detailed exam

## 2023-10-23 NOTE — PHYSICAL THERAPY INITIAL EVALUATION ADULT - GENERAL OBSERVATIONS, REHAB EVAL
received semi mccollum position in bed, NAD, agreeable to PT Medical Necessity Information: It is in your best interest to select a reason for this procedure from the list below. All of these items fulfill various CMS LCD requirements except the new and changing color options. Medical Necessity Clause: This procedure was medically necessary because the lesion that was treated was: Lab: [id number] Lab Facility: [id number] Detail Level: Detailed Was A Bandage Applied: Yes Size Of Lesion In Cm (Required): 1.3 Size Of Margin In Cm (Margins Are Not Added To Billing Dimensions): 0.1 Depth Of Shave: dermis Biopsy Method: Dermablade Anesthesia Type: 1% lidocaine with epinephrine Anesthesia Volume In Cc: 0.3 Hemostasis: Drysol Wound Care: Petrolatum Render Path Notes In Note?: No Consent was obtained from the patient. The risks and benefits to therapy were discussed in detail. Specifically, the risks of infection, scarring, bleeding, prolonged wound healing, incomplete removal, allergy to anesthesia, nerve injury and recurrence were addressed. Prior to the procedure, the treatment site was clearly identified and confirmed by the patient. All components of Universal Protocol/PAUSE Rule completed. Post-Care Instructions: I reviewed with the patient in detail post-care instructions. Patient is to keep the biopsy site dry overnight, and then apply bacitracin twice daily until healed. Patient may apply hydrogen peroxide soaks to remove any crusting. Notification Instructions: Patient will be notified of pathology results. However, patient instructed to call the office if not contacted within 2 weeks. Billing Type: Third-Party Bill

## 2023-10-28 NOTE — PATIENT PROFILE ADULT. - HEALTH/HEALTHCARE ANXIETIES, PROFILE
Patient has flat affect with poor eye contact. Patient is visible on the periphery but little interaction with staff or peers. Patient prefers to be quiet and reserved to self. She denies AVH/SI/HI but reports her depression is the same. She reports it is "Stable" and that she could come to staff if she felt unsafe or it was getting worse. Patient is medication compliant, no behavioral issues noted. None

## 2023-11-07 ENCOUNTER — INPATIENT (INPATIENT)
Facility: HOSPITAL | Age: 88
LOS: 3 days | Discharge: HOME | DRG: 291 | End: 2023-11-11
Attending: HOSPITALIST
Payer: MEDICARE

## 2023-11-07 ENCOUNTER — TRANSCRIPTION ENCOUNTER (OUTPATIENT)
Age: 88
End: 2023-11-07

## 2023-11-07 VITALS
TEMPERATURE: 98 F | SYSTOLIC BLOOD PRESSURE: 112 MMHG | RESPIRATION RATE: 22 BRPM | DIASTOLIC BLOOD PRESSURE: 70 MMHG | WEIGHT: 125 LBS | HEART RATE: 92 BPM | OXYGEN SATURATION: 94 % | HEIGHT: 63 IN

## 2023-11-07 DIAGNOSIS — I48.91 UNSPECIFIED ATRIAL FIBRILLATION: ICD-10-CM

## 2023-11-07 DIAGNOSIS — I25.10 ATHEROSCLEROTIC HEART DISEASE OF NATIVE CORONARY ARTERY WITHOUT ANGINA PECTORIS: ICD-10-CM

## 2023-11-07 DIAGNOSIS — Z95.3 PRESENCE OF XENOGENIC HEART VALVE: Chronic | ICD-10-CM

## 2023-11-07 DIAGNOSIS — I50.9 HEART FAILURE, UNSPECIFIED: ICD-10-CM

## 2023-11-07 DIAGNOSIS — I10 ESSENTIAL (PRIMARY) HYPERTENSION: ICD-10-CM

## 2023-11-07 DIAGNOSIS — Z98.1 ARTHRODESIS STATUS: Chronic | ICD-10-CM

## 2023-11-07 DIAGNOSIS — E78.5 HYPERLIPIDEMIA, UNSPECIFIED: ICD-10-CM

## 2023-11-07 DIAGNOSIS — I50.23 ACUTE ON CHRONIC SYSTOLIC (CONGESTIVE) HEART FAILURE: ICD-10-CM

## 2023-11-07 LAB
ALBUMIN SERPL ELPH-MCNC: 4.3 G/DL — SIGNIFICANT CHANGE UP (ref 3.3–5.2)
ALBUMIN SERPL ELPH-MCNC: 4.3 G/DL — SIGNIFICANT CHANGE UP (ref 3.3–5.2)
ALP SERPL-CCNC: 96 U/L — SIGNIFICANT CHANGE UP (ref 40–120)
ALP SERPL-CCNC: 96 U/L — SIGNIFICANT CHANGE UP (ref 40–120)
ALT FLD-CCNC: 15 U/L — SIGNIFICANT CHANGE UP
ALT FLD-CCNC: 15 U/L — SIGNIFICANT CHANGE UP
ANION GAP SERPL CALC-SCNC: 11 MMOL/L — SIGNIFICANT CHANGE UP (ref 5–17)
ANION GAP SERPL CALC-SCNC: 11 MMOL/L — SIGNIFICANT CHANGE UP (ref 5–17)
APPEARANCE UR: ABNORMAL
APPEARANCE UR: ABNORMAL
APTT BLD: 38.5 SEC — HIGH (ref 24.5–35.6)
APTT BLD: 38.5 SEC — HIGH (ref 24.5–35.6)
AST SERPL-CCNC: 26 U/L — SIGNIFICANT CHANGE UP
AST SERPL-CCNC: 26 U/L — SIGNIFICANT CHANGE UP
BACTERIA # UR AUTO: ABNORMAL /HPF
BACTERIA # UR AUTO: ABNORMAL /HPF
BASE EXCESS BLDV CALC-SCNC: 4.8 MMOL/L — HIGH (ref -2–3)
BASE EXCESS BLDV CALC-SCNC: 4.8 MMOL/L — HIGH (ref -2–3)
BASOPHILS # BLD AUTO: 0 K/UL — SIGNIFICANT CHANGE UP (ref 0–0.2)
BASOPHILS # BLD AUTO: 0 K/UL — SIGNIFICANT CHANGE UP (ref 0–0.2)
BASOPHILS NFR BLD AUTO: 0 % — SIGNIFICANT CHANGE UP (ref 0–2)
BASOPHILS NFR BLD AUTO: 0 % — SIGNIFICANT CHANGE UP (ref 0–2)
BILIRUB SERPL-MCNC: 0.6 MG/DL — SIGNIFICANT CHANGE UP (ref 0.4–2)
BILIRUB SERPL-MCNC: 0.6 MG/DL — SIGNIFICANT CHANGE UP (ref 0.4–2)
BILIRUB UR-MCNC: ABNORMAL
BILIRUB UR-MCNC: ABNORMAL
BUN SERPL-MCNC: 42 MG/DL — HIGH (ref 8–20)
BUN SERPL-MCNC: 42 MG/DL — HIGH (ref 8–20)
CALCIUM SERPL-MCNC: 9.9 MG/DL — SIGNIFICANT CHANGE UP (ref 8.4–10.5)
CALCIUM SERPL-MCNC: 9.9 MG/DL — SIGNIFICANT CHANGE UP (ref 8.4–10.5)
CHLORIDE SERPL-SCNC: 91 MMOL/L — LOW (ref 96–108)
CHLORIDE SERPL-SCNC: 91 MMOL/L — LOW (ref 96–108)
CO2 SERPL-SCNC: 29 MMOL/L — SIGNIFICANT CHANGE UP (ref 22–29)
CO2 SERPL-SCNC: 29 MMOL/L — SIGNIFICANT CHANGE UP (ref 22–29)
COLOR SPEC: ABNORMAL
COLOR SPEC: ABNORMAL
COMMENT - URINE: SIGNIFICANT CHANGE UP
COMMENT - URINE: SIGNIFICANT CHANGE UP
CREAT SERPL-MCNC: 1.56 MG/DL — HIGH (ref 0.5–1.3)
CREAT SERPL-MCNC: 1.56 MG/DL — HIGH (ref 0.5–1.3)
DIFF PNL FLD: ABNORMAL
DIFF PNL FLD: ABNORMAL
EGFR: 31 ML/MIN/1.73M2 — LOW
EGFR: 31 ML/MIN/1.73M2 — LOW
EOSINOPHIL # BLD AUTO: 0.08 K/UL — SIGNIFICANT CHANGE UP (ref 0–0.5)
EOSINOPHIL # BLD AUTO: 0.08 K/UL — SIGNIFICANT CHANGE UP (ref 0–0.5)
EOSINOPHIL NFR BLD AUTO: 0.9 % — SIGNIFICANT CHANGE UP (ref 0–6)
EOSINOPHIL NFR BLD AUTO: 0.9 % — SIGNIFICANT CHANGE UP (ref 0–6)
EPI CELLS # UR: PRESENT
EPI CELLS # UR: PRESENT
GAS PNL BLDV: SIGNIFICANT CHANGE UP
GAS PNL BLDV: SIGNIFICANT CHANGE UP
GLUCOSE SERPL-MCNC: 139 MG/DL — HIGH (ref 70–99)
GLUCOSE SERPL-MCNC: 139 MG/DL — HIGH (ref 70–99)
GLUCOSE UR QL: NEGATIVE MG/DL — SIGNIFICANT CHANGE UP
GLUCOSE UR QL: NEGATIVE MG/DL — SIGNIFICANT CHANGE UP
HCO3 BLDV-SCNC: 31 MMOL/L — HIGH (ref 22–29)
HCO3 BLDV-SCNC: 31 MMOL/L — HIGH (ref 22–29)
HCT VFR BLD CALC: 32.1 % — LOW (ref 34.5–45)
HCT VFR BLD CALC: 32.1 % — LOW (ref 34.5–45)
HGB BLD-MCNC: 10.7 G/DL — LOW (ref 11.5–15.5)
HGB BLD-MCNC: 10.7 G/DL — LOW (ref 11.5–15.5)
HYALINE CASTS # UR AUTO: PRESENT
HYALINE CASTS # UR AUTO: PRESENT
INR BLD: 1.56 RATIO — HIGH (ref 0.85–1.18)
INR BLD: 1.56 RATIO — HIGH (ref 0.85–1.18)
KETONES UR-MCNC: NEGATIVE MG/DL — SIGNIFICANT CHANGE UP
KETONES UR-MCNC: NEGATIVE MG/DL — SIGNIFICANT CHANGE UP
LEUKOCYTE ESTERASE UR-ACNC: ABNORMAL
LEUKOCYTE ESTERASE UR-ACNC: ABNORMAL
LYMPHOCYTES # BLD AUTO: 1.17 K/UL — SIGNIFICANT CHANGE UP (ref 1–3.3)
LYMPHOCYTES # BLD AUTO: 1.17 K/UL — SIGNIFICANT CHANGE UP (ref 1–3.3)
LYMPHOCYTES # BLD AUTO: 12.4 % — LOW (ref 13–44)
LYMPHOCYTES # BLD AUTO: 12.4 % — LOW (ref 13–44)
MCHC RBC-ENTMCNC: 30.1 PG — SIGNIFICANT CHANGE UP (ref 27–34)
MCHC RBC-ENTMCNC: 30.1 PG — SIGNIFICANT CHANGE UP (ref 27–34)
MCHC RBC-ENTMCNC: 33.3 GM/DL — SIGNIFICANT CHANGE UP (ref 32–36)
MCHC RBC-ENTMCNC: 33.3 GM/DL — SIGNIFICANT CHANGE UP (ref 32–36)
MCV RBC AUTO: 90.2 FL — SIGNIFICANT CHANGE UP (ref 80–100)
MCV RBC AUTO: 90.2 FL — SIGNIFICANT CHANGE UP (ref 80–100)
MONOCYTES # BLD AUTO: 1.42 K/UL — HIGH (ref 0–0.9)
MONOCYTES # BLD AUTO: 1.42 K/UL — HIGH (ref 0–0.9)
MONOCYTES NFR BLD AUTO: 15 % — HIGH (ref 2–14)
MONOCYTES NFR BLD AUTO: 15 % — HIGH (ref 2–14)
NEUTROPHILS # BLD AUTO: 6.77 K/UL — SIGNIFICANT CHANGE UP (ref 1.8–7.4)
NEUTROPHILS # BLD AUTO: 6.77 K/UL — SIGNIFICANT CHANGE UP (ref 1.8–7.4)
NEUTROPHILS NFR BLD AUTO: 71.7 % — SIGNIFICANT CHANGE UP (ref 43–77)
NEUTROPHILS NFR BLD AUTO: 71.7 % — SIGNIFICANT CHANGE UP (ref 43–77)
NITRITE UR-MCNC: POSITIVE
NITRITE UR-MCNC: POSITIVE
PCO2 BLDV: 60 MMHG — HIGH (ref 39–42)
PCO2 BLDV: 60 MMHG — HIGH (ref 39–42)
PH BLDV: 7.32 — SIGNIFICANT CHANGE UP (ref 7.32–7.43)
PH BLDV: 7.32 — SIGNIFICANT CHANGE UP (ref 7.32–7.43)
PH UR: 8.5 (ref 5–8)
PH UR: 8.5 (ref 5–8)
PLATELET # BLD AUTO: 247 K/UL — SIGNIFICANT CHANGE UP (ref 150–400)
PLATELET # BLD AUTO: 247 K/UL — SIGNIFICANT CHANGE UP (ref 150–400)
PO2 BLDV: 55 MMHG — HIGH (ref 25–45)
PO2 BLDV: 55 MMHG — HIGH (ref 25–45)
POTASSIUM SERPL-MCNC: 4.8 MMOL/L — SIGNIFICANT CHANGE UP (ref 3.5–5.3)
POTASSIUM SERPL-MCNC: 4.8 MMOL/L — SIGNIFICANT CHANGE UP (ref 3.5–5.3)
POTASSIUM SERPL-SCNC: 4.8 MMOL/L — SIGNIFICANT CHANGE UP (ref 3.5–5.3)
POTASSIUM SERPL-SCNC: 4.8 MMOL/L — SIGNIFICANT CHANGE UP (ref 3.5–5.3)
PROT SERPL-MCNC: 7.4 G/DL — SIGNIFICANT CHANGE UP (ref 6.6–8.7)
PROT SERPL-MCNC: 7.4 G/DL — SIGNIFICANT CHANGE UP (ref 6.6–8.7)
PROT UR-MCNC: 300 MG/DL
PROT UR-MCNC: 300 MG/DL
PROTHROM AB SERPL-ACNC: 17.1 SEC — HIGH (ref 9.5–13)
PROTHROM AB SERPL-ACNC: 17.1 SEC — HIGH (ref 9.5–13)
RAPID RVP RESULT: SIGNIFICANT CHANGE UP
RAPID RVP RESULT: SIGNIFICANT CHANGE UP
RBC # BLD: 3.56 M/UL — LOW (ref 3.8–5.2)
RBC # BLD: 3.56 M/UL — LOW (ref 3.8–5.2)
RBC # FLD: 15.1 % — HIGH (ref 10.3–14.5)
RBC # FLD: 15.1 % — HIGH (ref 10.3–14.5)
RBC CASTS # UR COMP ASSIST: >1900 /HPF — HIGH (ref 0–4)
RBC CASTS # UR COMP ASSIST: >1900 /HPF — HIGH (ref 0–4)
SAO2 % BLDV: 86.5 % — SIGNIFICANT CHANGE UP
SAO2 % BLDV: 86.5 % — SIGNIFICANT CHANGE UP
SARS-COV-2 RNA SPEC QL NAA+PROBE: SIGNIFICANT CHANGE UP
SARS-COV-2 RNA SPEC QL NAA+PROBE: SIGNIFICANT CHANGE UP
SODIUM SERPL-SCNC: 131 MMOL/L — LOW (ref 135–145)
SODIUM SERPL-SCNC: 131 MMOL/L — LOW (ref 135–145)
SP GR SPEC: 1.02 — SIGNIFICANT CHANGE UP (ref 1–1.03)
SP GR SPEC: 1.02 — SIGNIFICANT CHANGE UP (ref 1–1.03)
TROPONIN T, HIGH SENSITIVITY RESULT: 90 NG/L — HIGH (ref 0–51)
TROPONIN T, HIGH SENSITIVITY RESULT: 90 NG/L — HIGH (ref 0–51)
UROBILINOGEN FLD QL: 0.2 MG/DL — SIGNIFICANT CHANGE UP (ref 0.2–1)
UROBILINOGEN FLD QL: 0.2 MG/DL — SIGNIFICANT CHANGE UP (ref 0.2–1)
WBC # BLD: 9.44 K/UL — SIGNIFICANT CHANGE UP (ref 3.8–10.5)
WBC # BLD: 9.44 K/UL — SIGNIFICANT CHANGE UP (ref 3.8–10.5)
WBC # FLD AUTO: 9.44 K/UL — SIGNIFICANT CHANGE UP (ref 3.8–10.5)
WBC # FLD AUTO: 9.44 K/UL — SIGNIFICANT CHANGE UP (ref 3.8–10.5)
WBC UR QL: >998 /HPF — HIGH (ref 0–5)
WBC UR QL: >998 /HPF — HIGH (ref 0–5)

## 2023-11-07 PROCEDURE — 99497 ADVNCD CARE PLAN 30 MIN: CPT | Mod: 25

## 2023-11-07 PROCEDURE — 70450 CT HEAD/BRAIN W/O DYE: CPT | Mod: 26,MG

## 2023-11-07 PROCEDURE — 99223 1ST HOSP IP/OBS HIGH 75: CPT

## 2023-11-07 PROCEDURE — 93010 ELECTROCARDIOGRAM REPORT: CPT

## 2023-11-07 PROCEDURE — 99285 EMERGENCY DEPT VISIT HI MDM: CPT

## 2023-11-07 PROCEDURE — G1004: CPT

## 2023-11-07 PROCEDURE — 72170 X-RAY EXAM OF PELVIS: CPT | Mod: 26

## 2023-11-07 PROCEDURE — 93306 TTE W/DOPPLER COMPLETE: CPT | Mod: 26

## 2023-11-07 PROCEDURE — 71045 X-RAY EXAM CHEST 1 VIEW: CPT | Mod: 26

## 2023-11-07 RX ORDER — IPRATROPIUM/ALBUTEROL SULFATE 18-103MCG
3 AEROSOL WITH ADAPTER (GRAM) INHALATION ONCE
Refills: 0 | Status: COMPLETED | OUTPATIENT
Start: 2023-11-07 | End: 2023-11-07

## 2023-11-07 RX ORDER — FUROSEMIDE 40 MG
40 TABLET ORAL ONCE
Refills: 0 | Status: COMPLETED | OUTPATIENT
Start: 2023-11-07 | End: 2023-11-07

## 2023-11-07 RX ORDER — FUROSEMIDE 40 MG
40 TABLET ORAL DAILY
Refills: 0 | Status: DISCONTINUED | OUTPATIENT
Start: 2023-11-08 | End: 2023-11-11

## 2023-11-07 RX ORDER — ALBUTEROL 90 UG/1
2 AEROSOL, METERED ORAL EVERY 6 HOURS
Refills: 0 | Status: DISCONTINUED | OUTPATIENT
Start: 2023-11-07 | End: 2023-11-11

## 2023-11-07 RX ORDER — ASPIRIN/CALCIUM CARB/MAGNESIUM 324 MG
81 TABLET ORAL DAILY
Refills: 0 | Status: DISCONTINUED | OUTPATIENT
Start: 2023-11-07 | End: 2023-11-11

## 2023-11-07 RX ORDER — ASPIRIN/CALCIUM CARB/MAGNESIUM 324 MG
324 TABLET ORAL ONCE
Refills: 0 | Status: COMPLETED | OUTPATIENT
Start: 2023-11-07 | End: 2023-11-07

## 2023-11-07 RX ORDER — ATORVASTATIN CALCIUM 80 MG/1
80 TABLET, FILM COATED ORAL AT BEDTIME
Refills: 0 | Status: DISCONTINUED | OUTPATIENT
Start: 2023-11-07 | End: 2023-11-11

## 2023-11-07 RX ORDER — RANOLAZINE 500 MG/1
500 TABLET, FILM COATED, EXTENDED RELEASE ORAL
Refills: 0 | Status: DISCONTINUED | OUTPATIENT
Start: 2023-11-07 | End: 2023-11-11

## 2023-11-07 RX ORDER — ENOXAPARIN SODIUM 100 MG/ML
30 INJECTION SUBCUTANEOUS EVERY 24 HOURS
Refills: 0 | Status: DISCONTINUED | OUTPATIENT
Start: 2023-11-07 | End: 2023-11-09

## 2023-11-07 RX ORDER — GABAPENTIN 400 MG/1
600 CAPSULE ORAL THREE TIMES A DAY
Refills: 0 | Status: DISCONTINUED | OUTPATIENT
Start: 2023-11-07 | End: 2023-11-11

## 2023-11-07 RX ADMIN — Medication 3 MILLILITER(S): at 14:49

## 2023-11-07 RX ADMIN — ATORVASTATIN CALCIUM 80 MILLIGRAM(S): 80 TABLET, FILM COATED ORAL at 22:40

## 2023-11-07 RX ADMIN — Medication 40 MILLIGRAM(S): at 18:29

## 2023-11-07 RX ADMIN — Medication 324 MILLIGRAM(S): at 18:29

## 2023-11-07 NOTE — ED PROVIDER NOTE - IV ALTEPLASE DOOR HIDDEN
Patient cleared to admit today to ARU bed 0641a. Orders in [facility]. show

## 2023-11-07 NOTE — CONSULT NOTE ADULT - PROBLEM SELECTOR RECOMMENDATION 9
HFpEF 70-75% on 1/2022   Pt with PMHx of CAD s/p PCI , atrial fibrillation on Xarelto, DVT,  severe AS s/p TAVR, HFpEF, carotid atherosclerosis, recent fall, hypertension, hyperlipidemia, presented co legs weakness sp fall. Son reported she was drinking a lot of water lately   Euvolemic on exam sp Lasix 40 mg IVP x 1. Chest x-ray showed mild CHF and p-BNP is elevated 8444. Continue Lasix 40 mg IVP BID x 2 doses total and re-assess. If euvolemic, continue home dose Lasix 20 mg PO   Trop T x 1 elevated (77) in the settings of [facility] ( Cr 1.56)  ECG: A-flutter/a-fib. No acute changes   Telemetry  Trend p-BNP  O2 NC PRN   Maintain K+>4 and mag >2  Continue home meds with strict parameters   Trend CE. Trend trops x 3 q6. Serial EKGs  Obtain A1C, lipid panel fasting, TFTs, sed rate to ro comorbidities   DASH diet  Daily weight monitor  Strict I&Os Monitor  Obtain TTE in AM to assess functional and structural status HFpEF 70-75% on 1/2022   Pt with PMHx of CAD s/p PCI , atrial fibrillation on Xarelto, DVT,  severe AS s/p TAVR, HFpEF, carotid atherosclerosis, recent fall, hypertension, hyperlipidemia, presented co legs weakness sp fall. Son reported she was drinking a lot of water lately   Chest x-ray showed mild CHF and p-BNP is elevated 8444. Continue Lasix 40 mg IVP BID x 2 doses total and re-assess. If euvolemic, continue home dose Lasix 20 mg PO   Trop T x 1 elevated (77) in the settings of SORAIDA ( Cr 1.56)  ECG: A-flutter/a-fib. No acute changes   Telemetry  Trend p-BNP  O2 NC PRN   Maintain K+>4 and mag >2  Continue home meds with strict parameters   Trend CE. Trend trops x 3 q6. Serial EKGs  Obtain A1C, lipid panel fasting, TFTs, sed rate to ro comorbidities   DASH diet  Daily weight monitor  Strict I&Os Monitor  Obtain TTE in AM to assess functional and structural status

## 2023-11-07 NOTE — ED PROVIDER NOTE - OBJECTIVE STATEMENT
Pt is a 89 yo F brought in by family for sob, weakness.  PT was recently sent to [facility] from here after having a fall. PT was discharged a few days ago and since yesterday afternoon has been weak again and shaky with increasing sob.

## 2023-11-07 NOTE — ED ADULT NURSE REASSESSMENT NOTE - NS ED NURSE REASSESS COMMENT FT1
Pt A&Ox4 resting in stretcher. No acute distress noted. Bed locked in lowest position. Call bell within reach.

## 2023-11-07 NOTE — PATIENT PROFILE ADULT - PATIENT REPRESENTATIVE: ( YOU CAN CHOOSE ANY PERSON THAT CAN ASSIST YOU WITH YOUR HEALTH CARE PREFERENCES, DOES NOT HAVE TO BE A SPOUSE, IMMEDIATE FAMILY OR SIGNIFICANT OTHER/PARTNER)
Occupational Therapy    Patient not seen in therapy.     Received orders for Occupational Therapy evaluation. Per RN, patient is independent and up ad magi in room with no implications for Occupational Therapy eval. RN in agreement with Occupational Therapy singing off at this time. Doctor notified via perfect serve by physical therapist.       OBJECTIVE                       Documented in the chart in the following areas: Assessment/Plan.      Therapy procedure time and total treatment time can be found documented on the Time Entry flowsheet   declines

## 2023-11-07 NOTE — GOALS OF CARE CONVERSATION - ADVANCED CARE PLANNING - CONVERSATION DETAILS
Reviewed prior GOC in 9/2023.  Patient confused, asked me to call son Bony. Verified code status, patient is DNR/DNI as per son. Advised to bring in MOLST form for records.

## 2023-11-07 NOTE — CONSULT NOTE ADULT - SUBJECTIVE AND OBJECTIVE BOX
[facility]                                              CARDIOLOGY AT [address], [place] [address]                                             Telephone: [phone]. Fax:[phone]      CARDIOLOGY CONSULTATION NOTE                                                                                             History obtained by: Patient and medical record  Community Cardiologist: Dr Rubio    obtained: No   Reason for Consultation: CHF    Chief complaint:   Patient is a 89y old  Female who presents with a chief complaint of fall (07 Nov 2023 18:58)    HPI: 89 yr old female with CAD s/p PCI , GERD, atrial fibrillation on Xarelto, DVT,  severe AS s/p TAVR, HFpEF, carotid atherosclerosis, recent fall, hypertension, hyperlipidemia, recently discharged from rehab on 11/3 presented with legs weakness sp fall.  ED MD, Called son Bony, states patient lives next door to him, was doing well until last night when she fell off the toilet and could not get back up. She called for help and son helped her get back in bed at night. This am, she seemed weaker and trembling per son. She was walking with a walker at home post rehab, noted to have some shortness of breath per son . He reports she was drinking a lot of water. She denies chest pain.     CARDIAC TESTING   TTE Echo Limited or F/U (01.09.22 @ 10:36) >  Summary:   1. Hyperdynamic global left ventricular systolic function.   2. Left ventricular ejection fraction, by visual estimation, is 70 to 75%.   3. The mitral in-flow pattern reveals no discernable A-wave, therefore   no comment on diastolic function can be made.   4. There is moderate concentric left ventricular hypertrophy.   5. There is a dynamic LV gradient present throughout all levels of LV.   The highest measured dynamic gradient was 38 mmHg, measured at mid LV level at rest.   6. Normal right ventricular size and function.   7. Estimated pulmonary artery systolic pressure is 45.4 mmHg assuming a   right atrial pressure of 3 mmHg, which is consistent with mild pulmonary hypertension.   8. Right atrial enlargement.   9. Severely enlarged left atrium.  10. Moderate to severe mitral annular calcification.  11. Mild thickening of the anterior and posterior mitral valve leaflets.  12. Mild mitral valve regurgitation.  13. Mild tricuspid regurgitation.  14. [place]ila TAVR Bioprosthesis in the aortic position with mild   paravalvular regurgitation.  15. There is a significant pericardial fat pad present.  16. There is no evidence of pericardial effusion.  17. No obvious vegetation, however BIRGIT study with low sensitivity.  Ryan Hari DO Electronically signed on 1/9/2022 at 4:32:43 PM  < end of copied text >    Nuclear Stress Test-Pharmacologic (07.29.15 @ 08:02) >  IMPRESSIONS: Normal Study  < end of copied text >    Cardiac Cath Lab - Adult (11.12.18 @ 10:59) >  VENTRICLES: No LV gram was performed; however, a recent echocardiogram  demonstrated an EF of 55 %.  CORONARY VESSELS: The coronary circulation is co-dominant.  LM:   --  LM: Normal.  LAD:   --  Mid LAD: There was a 40 % stenosis.  CX:   --  Mid circumflex: There was a 80 % stenosis.  RCA:   -- Mid RCA: There was a 100 % stenosis.  SUMMARY:  HEMODYNAMICS: Hemodynamic assessment demonstrates mildly to moderately  elevated pulmonary capillary wedge pressure.  DIAGNOSTIC IMPRESSIONS: Severe circumflex disease. PCI performed with 1 JARETT.  Mild to moderate pulmonary hypertension. Mild to moderately elevated wedge  pressure.  < end of copied text >    PAST MEDICAL HISTORY  Essential hypertension  Chronic back pain  Hyperlipidemia, unspecified hyperlipidemia type  PNA (pneumonia)  Lung nodule  Thyroid nodule  Acute deep vein thrombosis (DVT) of popliteal vein of both lower extremities  Pelvic fracture  Closed fracture of multiple ribs of right side, initial encounter  Aortic stenosis  Atherosclerosis of native coronary artery of native heart with angina pectoris  Hypercholesteremia  CURRY (dyspnea on exertion)  AF (atrial fibrillation)  Asthma  AV block  Bronchiectasis  Kyphoscoliosis and scoliosis  Stenocardia  Spinal stenosis  Nosebleed  Murmur, cardiac  Neuropathy  Diastolic CHF  Aspiration pneumonia  Nosebleed  Anxiety  GERD (gastroesophageal reflux disease)  Closed pelvic ring fracture  Hip arthritis  Angina pectoris  [place] (hard of hearing)    PAST SURGICAL HISTORY  S/P spinal fusion  History of pelvic fracture  S/p TAVR (transcatheter aortic valve replacement), bioprosthetic    SOCIAL HISTORY: + social alcohol. Denies smoking/drugs    FAMILY HISTORY:  Family history of breast cancer (Sibling)  Family history of cerebrovascular accident (CVA) (Mother)  Family history of esophageal cancer (Father)    Family History of Cardiovascular Disease:  No   Coronary Artery Disease in first degree relative:  No   Sudden Cardiac Death in First degree relative: No     HOME MEDICATIONS:   Albuterol (Eqv-ProAir HFA) 90 mcg/inh inhalation aerosol: 2 puff(s) inhaled every 4 hours as needed for  shortness of breath and/or wheezing (07 Nov 2023 18:45)  amLODIPine 10 mg oral tablet: 1 tab(s) orally once a day (07 Nov 2023 18:45)  atorvastatin 20 mg oral tablet: 1 tab(s) orally once a day (at bedtime) (07 Nov 2023 18:45)  furosemide 20 mg oral tablet: 1 tab(s) orally once a day (07 Nov 2023 18:45)  gabapentin 300 mg oral capsule: 2 cap(s) orally 3 times a day (07 Nov 2023 18:45)  Ranexa 500 mg oral tablet, extended release: 1 tab(s) orally 2 times a day (07 Nov 2023 18:45)  spironolactone 25 mg oral tablet: orally once a day (07 Nov 2023 18:45)  Ventolin HFA 90 mcg/inh inhalation aerosol: 2 puff(s) inhaled 4 times a day, As Needed (07 Nov 2023 18:45)  Xarelto 15 mg oral tablet: 1 tab(s) orally once a day (in the evening) (07 Nov 2023 18:45)    CURRENT CARDIAC MEDICATIONS:   ranolazine 500 milliGRAM(s) Oral two times a day    CURRENT OTHER MEDICATIONS:   albuterol    90 MICROgram(s) HFA Inhaler 2 Puff(s) Inhalation every 6 hours PRN for shortness of breath and/or wheezing  gabapentin 600 milliGRAM(s) Oral three times a day  aspirin enteric coated 81 milliGRAM(s) Oral daily  atorvastatin 80 milliGRAM(s) Oral at bedtime  enoxaparin Injectable 30 milliGRAM(s) SubCutaneous every 24 hours    ALLERGIES:   No Known Allergies    REVIEW OF SYMPTOMS:   CONSTITUTIONAL: No fever, no chills, no weight loss, no weight gain, no fatigue   ENMT:  No vertigo; No sinus or throat pain  NECK: No pain or stiffness  CARDIOVASCULAR: No chest pain, no dyspnea, no syncope/presyncope, no fatigue, no palpitations, no dizziness, no Orthopnea, no Paroxsymal nocturnal dyspnea  RESPIRATORY: No shortness of breath, no cough  : No dysuria, no hematuria   GI: no nausea, no diarrhea, no constipation, no abdominal pain  NEURO: No headache, no slurred speech   MUSCULOSKELETAL: No joint pain or swelling; No muscle, back, or extremity pain  PSYCH: No agitation, no anxiety.    ALL OTHER REVIEW OF SYSTEMS ARE NEGATIVE.    VITAL SIGNS:   T(C): 36.7 (11-07-23 @ 18:56), Max: 36.7 (11-07-23 @ 13:41)  T(F): 98 (11-07-23 @ 18:56), Max: 98.1 (11-07-23 @ 13:41)  HR: 80 (11-07-23 @ 18:56) (77 - 92)  BP: 130/57 (11-07-23 @ 18:56) (112/70 - 130/57)  RR: 18 (11-07-23 @ 18:26) (18 - 22)  SpO2: 93% (11-07-23 @ 18:56) (93% - 95%)    PHYSICAL EXAM:   Constitutional: Comfortable . No acute distress.   HEENT: Atraumatic and normocephalic , neck is supple . no JVD.   CNS: A&Ox3. No focal deficits.   Respiratory:  unlabored. No wheezing, No rhonchi. + minimal  crackles R lung  Cardiovascular: + irregular, irregular, normal s1 s2. No murmur. No rubs or gallop.  Gastrointestinal: Soft, non-tender. +Bowel sounds.   Extremities: 2+ Peripheral Pulses, No edema  Psychiatric: Calm . no agitation.   Skin: Warm and dry    LABS:                         10.7   9.44  )-----------( 247      ( 07 Nov 2023 14:45 )             32.1     11-07    131<L>  |  91<L>  |  42.0<H>  ----------------------------<  139<H>  4.8   |  29.0  |  1.56<H>    Ca    9.9      07 Nov 2023 14:45    TPro  7.4  /  Alb  4.3  /  TBili  0.6  /  DBili  x   /  AST  26  /  ALT  15  /  AlkPhos  96  11-07    PT/INR - ( 07 Nov 2023 19:20 )   PT: 17.1 sec;   INR: 1.56 ratio       PTT - ( 07 Nov 2023 19:20 )  PTT:38.5 sec    ECG: A-flutter/a-fib. No acute changes   Prior ECG: Yes     RADIOLOGY & ADDITIONAL STUDIES:   CT Head No Cont (11.07.23 @ 17:27) >  IMPRESSION:  1. Decreased attenuation involving the bilateral posterior temporal   occipital brain parenchyma, slightly more prominent on the left than the   right. Cannot exclude evolving acute bilateral PCA territory infarcts.   Correlate with clinical scenario. Consider further evaluation via MR   imaging to include DWI and ADC mapping techniques, provided there are no   contraindications.  2. No acute intracranial hemorrhage.  < end of copied text >    Xray Chest 1 View- PORTABLE-Urgent (Xray Chest 1 View- PORTABLE-Urgent .) (11.07.23 @ 15:08) >  IMPRESSION: Mild CHF increased from prior. No acute fracture.  < end of copied text >      Preliminary evaluation, please await official recommendations     Assessment and recommendations are final when note is signed by the attending.

## 2023-11-07 NOTE — DISCHARGE NOTE NURSING/CASE MANAGEMENT/SOCIAL WORK - NSDCPEFALRISK_GEN_ALL_CORE
For information on Fall & Injury Prevention, visit: [email or web address] OR  [email or web address] OR  https://www.cdc.gov/steadi/patient.html

## 2023-11-07 NOTE — ED PROVIDER NOTE - CADM POA URETHRAL CATHETER
Good nutrition is important when healing from an illness, injury, or surgery. Follow any nutrition recommendations given to you during your hospital stay. If you were given an oral nutrition supplement while in the hospital, continue to take this supplement at home. You can take it with meals, in-between meals, and/or before bedtime. These supplements can be purchased at most local grocery stores, pharmacies, and chain [employer]-stores. If you have any questions about your diet or nutrition, call the hospital and ask for the dietitian.       REGULAR DIET No

## 2023-11-07 NOTE — H&P ADULT - HISTORY OF PRESENT ILLNESS
89 yr old female with CAD s/p PCI, GERD, atrial fibrillation on Xarelto, severe AS s/p TAVR, HFpEF, carotid atherosclerosis, recent fall, hypertension, hyperlipidemia, recently discharged from rehab on 11/3 presented with weakness, shortness of breath and  a fall as noted by family. Patient states she does not remember much since yesterday, asked me to call her son for more information. Feels weak, uses oxygen at home.  Called son Bony, states patient lives next door to him, was doing well until last night when she fell off the toilet and could not get back up. She called for help and son helped her get back in bed at night. This am, she seemed weaker and trembling per son. She was walking with a walker at home post rehab, noted to have some shortness of breath per son . He reports she was drinking a lot of water.   She denies chest pain.   Reviewed recent outpatient meds, no Xarelto listed when she left rehab but son states she has been receiving it.

## 2023-11-07 NOTE — ED PROVIDER NOTE - CLINICAL SUMMARY MEDICAL DECISION MAKING FREE TEXT BOX
labs and imaging reviewed.  ct concerning for cva. asa given.  CXR c/w chf.  lasix given. case d/w dr. smalls for admission. neuro consulted for cva.

## 2023-11-07 NOTE — ED ADULT NURSE NOTE - OBJECTIVE STATEMENT
Pt A&Ox4 comes to ED stating she has been weak and unable to walk normally since returning home after being in a nursing home due to a hip replacement after a fall. Family also states her pulse ox was low at home. Airway patent. Respirations equal and unlabored on 3L NC. No JVD or edema. Strengths equal in all 4 extremities on assessment. Sensory in tact. Bed locked in the lowest position. Call bell within reach.

## 2023-11-07 NOTE — DISCHARGE NOTE NURSING/CASE MANAGEMENT/SOCIAL WORK - NSDCFUADDAPPT_GEN_ALL_CORE_FT
Star pt. for CHF    Appointment made with  Aman Rueda  on  11/29  at  8:00  39 [facility].   If you are unable to attend your pre-scheduled appointment, please contact the office directly at [phone]  to reschedule.    Star pt. for CHF    Appointment made with  Aman Rueda  on  11/29  at  8:00  39 [facility].   If you are unable to attend your pre-scheduled appointment, please contact the office directly at [phone]  to reschedule.      PCP appt-   [facility] pt. for CHF    Appointment made with  Aman Rueda  on  11/29  at  8:00  39 [facility].   If you are unable to attend your pre-scheduled appointment, please contact the office directly at [phone]  to reschedule.      PCP appt-    Pcp- Appointment made with  Dr. Keyes  on  11/27  at  1:45 .   If you are unable to attend your pre-scheduled appointment, please contact the office directly at [phone]- to reschedule.

## 2023-11-07 NOTE — CONSULT NOTE ADULT - PROBLEM SELECTOR RECOMMENDATION 2
On A-flutter on monitor   HR controlled. Taking Xarelto   Continue home meds with strict parameters   Replace all electrolytes. Maintain K+~4 and mag~2

## 2023-11-07 NOTE — DISCHARGE NOTE NURSING/CASE MANAGEMENT/SOCIAL WORK - PATIENT PORTAL LINK FT
You can access the FollowMyHealth Patient Portal offered by [facility] by registering at the following website: [email or web address]. By joining [facility]’s FollowMyHealth portal, you will also be able to view your health information using other applications (apps) compatible with our system.

## 2023-11-07 NOTE — PATIENT PROFILE ADULT - FUNCTIONAL ASSESSMENT - BASIC MOBILITY 6.
2-calculated by average/Not able to assess (calculate score using [facility] averaging method)

## 2023-11-07 NOTE — ED ADULT TRIAGE NOTE - CHIEF COMPLAINT QUOTE
pt BIBA from home s.p SOB and mechanical fall, states she was told she has COPD. pt hypoxic on room air, arrives on nasal cannula @ 3 LPM. pt awake, alert, [place]. no distress, no chest pain, no other complaints.

## 2023-11-07 NOTE — CONSULT NOTE ADULT - PROBLEM SELECTOR RECOMMENDATION 5
Lipid panel fasting  Continue Statins. Monitor LFTs  DASH diet    Further recommendations base on above findings Lipid panel fasting  Continue Statins. Monitor LFTs  DASH diet    Further recommendations base on above findings  Case discuss with Dr Greene

## 2023-11-07 NOTE — ED ADULT NURSE NOTE - CHIEF COMPLAINT QUOTE
pt BIBA from home s.p SOB and mechanical fall, states she was told she has COPD. pt hypoxic on room air, arrives on nasal cannula @ 3 LPM. pt awake, alert, [place]. no distress, no chest pain, no other complaints.

## 2023-11-07 NOTE — H&P ADULT - NSICDXPASTMEDICALHX_GEN_ALL_CORE_FT
PAST MEDICAL HISTORY:  Acute deep vein thrombosis (DVT) of popliteal vein of both lower extremities     AF (atrial fibrillation) Persistent/chronic    Afib     Angina pectoris class IV    Anxiety     Aortic stenosis s/p TAVR bioprosthetic Jan 2018 [facility] [address]. [place]    Aspiration pneumonia     Asthma     Atherosclerosis of native coronary artery of native heart with angina pectoris     AV block     Bronchiectasis     Chronic back pain     Chronic back pain     Closed fracture of multiple ribs of right side, initial encounter     Closed pelvic ring fracture     Diastolic CHF NYHA class 3    CURRY (dyspnea on exertion)     DVT (deep venous thrombosis) left lower extremity    Essential hypertension     GERD (gastroesophageal reflux disease)     Hip arthritis     History of valvular heart disease     [place] (hard of hearing) wears bilateral hearing aides    HTN (hypertension)     HTN (hypertension)     Hypercholesteremia     Hyperlipidemia, unspecified hyperlipidemia type     Kyphoscoliosis and scoliosis     Lung nodule     Murmur, cardiac gr3/6    Neuropathy     Nosebleed prior left nostrils cauterize x2    Nosebleed severe on aspirin; had nose cautery done in 2018    Pelvic fracture     PNA (pneumonia) november 2017    Spinal stenosis     Stenocardia     Thyroid nodule

## 2023-11-07 NOTE — H&P ADULT - ASSESSMENT
89 yr old female with CAD s/p PCI, GERD, atrial fibrillation on Xarelto, severe AS s/p TAVR, HFpEF, carotid atherosclerosis, recent fall, hypertension, hyperlipidemia, recently discharged from rehab on 11/3 presented with weakness, shortness of breath and  a fall as noted by family. Labs reviewed, noted to have SORAIDA, creatinine 1.5, elevated BNP, CXR consistent with CHF, EKG with atrial flutter 79. CT head with bilateral acute PCA strokes,    1. Acute CVA:  Likely cause for fall  admit to stroke unit  Neuro checks  MRI brain  Stroke team consulted, advised continue aspirin, statin and hold Xarelto until MRI completed  check coags  fall precautions  PT evaluation.  Check ECHO  serial cardiac enzymes.    2. Fall:  Likely sec to CVA  fall precautions, PT eval    3. Atrial fibrillation, CAD s/p PCI, elevated troponin:  Telemetry  cardiology consulted  trend troponins  Hold Xarelto per neurology  continue aspirin, statin  On Ranexa  troponin elevated ? SORAIDA  Hold anti hypertensives for now, acute CVA  monitor BP    4. Acute diastolic CHF:  ? Excessive fluid intake  s/p Lasix today  Monitor I/O. daily weights  cardiology eval  ECHO ordered    5. SORAIDA;  ?suspect related to CHF  s/p Lasix  monitor BMP    6. GERD:  Continue Pantoprazole.    7. DVT ppx:  Lovenox    Discussed with stroke team and ED.  Updated son Bony.

## 2023-11-07 NOTE — PATIENT PROFILE ADULT - FALL HARM RISK - HARM RISK INTERVENTIONS

## 2023-11-07 NOTE — H&P ADULT - RESPIRATORY
no respiratory distress/no use of accessory muscles/airway patent/breath sounds equal/good air movement/respirations non-labored

## 2023-11-07 NOTE — ED PROVIDER NOTE - CARE PLAN
Principal Discharge DX:	Acute on chronic systolic congestive heart failure  Secondary Diagnosis:	Ischemic cerebrovascular accident (CVA)   1

## 2023-11-07 NOTE — PROVIDER CONTACT NOTE (EICU) - SITUATION
Patient with NIH and v/s q2h refusing all attempts to assess at this time despite education on purpose, risk of refusal and multiple attempts by different RNs.  Pt refusing bladder scan and straight cath.  Pt yells at staff, refuses to answer questions and stated "Leave me alone.  If you keep bothering me, I will have my son get a group of people together to do something to you."

## 2023-11-07 NOTE — CHART NOTE - NSCHARTNOTEFT_GEN_A_CORE
Noted U/A results, ?accuracy. Per RN, U/A that was sent to lab tonight was not collected by RN tonight, unsure when and how urine was collected. Pt is incontinent with a Prima-Fit in place. Ordered repeat U/A and U/C STAT, straight cath X1 to obtain samples. Noted U/A results, ?accuracy. Per RN, U/A that was sent to lab tonight was not collected by RN tonight, unsure when and how urine was collected. Pt is incontinent with a Prima-Fit in place. Ordered repeat U/A and U/C STAT, straight cath X1 to obtain samples.    111/8/2023 00:10: Notified by RN pt uncooperative and refusing all care. Patient lying in bed sleeping, appears comfortable. When woken up patient refusing to answer questions, yelling "leave me alone!" Patient educated on reason for admission, current medical status, medical necessity of neuro checks/VS/providing care and risks associated with nontreatment including but not limited to deterioration of health, disability and death. Pt remains uncooperative and continues to yell, pt attempting to hit staff at bedside. Called pt's son Bony X3, no answer. Called pt's daughter-in-law [place] and discussed pt current events, refusal of care and risks involved if care cannot be provided. [place] and Bony on the phone agree to allow chemical restraint. Discussed with RN. Noted U/A results, ?accuracy. Per RN, U/A that was sent to lab tonight was not collected by RN tonight, unsure when and how urine was collected. Pt is incontinent with a Prima-Fit in place. Ordered repeat U/A and U/C STAT, straight cath X1 to obtain samples.    111/8/2023 00:10: Notified by RN pt uncooperative and refusing all care. Another RN attempted to provide care without success. Patient lying in bed sleeping, appears comfortable. When woken up patient refusing to answer questions, yelling "leave me alone!" Patient educated on reason for admission, current medical status, medical necessity of neuro checks/VS/providing care and risks associated with nontreatment including but not limited to deterioration of health, disability and death. Pt remains uncooperative and continues to yell, pt attempting to hit staff at bedside. Called pt's son Bony X3, no answer. Called pt's daughter-in-law [place] and discussed pt current events, refusal of care and risks involved if care cannot be provided. [place] and Bony on the phone, they agree to allow chemical restraint. Discussed with RN.    F/U patient agreed to allow VS/Neuro check/straight cath. Zyprexa dose not utilized. 800mL urine collected. U/A, U/C repeated. U/A negative. RN to continue to monitor and escalate to PA PRN.

## 2023-11-07 NOTE — ED ADULT NURSE NOTE - NSFALLHARMRISKINTERV_ED_ALL_ED

## 2023-11-08 LAB
A1C WITH ESTIMATED AVERAGE GLUCOSE RESULT: 4.8 % — SIGNIFICANT CHANGE UP (ref 4–5.6)
A1C WITH ESTIMATED AVERAGE GLUCOSE RESULT: 4.8 % — SIGNIFICANT CHANGE UP (ref 4–5.6)
ANION GAP SERPL CALC-SCNC: 9 MMOL/L — SIGNIFICANT CHANGE UP (ref 5–17)
ANION GAP SERPL CALC-SCNC: 9 MMOL/L — SIGNIFICANT CHANGE UP (ref 5–17)
APPEARANCE UR: CLEAR — SIGNIFICANT CHANGE UP
APPEARANCE UR: CLEAR — SIGNIFICANT CHANGE UP
BACTERIA # UR AUTO: NEGATIVE /HPF — SIGNIFICANT CHANGE UP
BACTERIA # UR AUTO: NEGATIVE /HPF — SIGNIFICANT CHANGE UP
BILIRUB UR-MCNC: NEGATIVE — SIGNIFICANT CHANGE UP
BILIRUB UR-MCNC: NEGATIVE — SIGNIFICANT CHANGE UP
BUN SERPL-MCNC: 49.8 MG/DL — HIGH (ref 8–20)
BUN SERPL-MCNC: 49.8 MG/DL — HIGH (ref 8–20)
CALCIUM SERPL-MCNC: 9.2 MG/DL — SIGNIFICANT CHANGE UP (ref 8.4–10.5)
CALCIUM SERPL-MCNC: 9.2 MG/DL — SIGNIFICANT CHANGE UP (ref 8.4–10.5)
CAST: 4 /LPF — SIGNIFICANT CHANGE UP (ref 0–4)
CAST: 4 /LPF — SIGNIFICANT CHANGE UP (ref 0–4)
CHLORIDE SERPL-SCNC: 95 MMOL/L — LOW (ref 96–108)
CHLORIDE SERPL-SCNC: 95 MMOL/L — LOW (ref 96–108)
CHOLEST SERPL-MCNC: 158 MG/DL — SIGNIFICANT CHANGE UP
CHOLEST SERPL-MCNC: 158 MG/DL — SIGNIFICANT CHANGE UP
CO2 SERPL-SCNC: 31 MMOL/L — HIGH (ref 22–29)
CO2 SERPL-SCNC: 31 MMOL/L — HIGH (ref 22–29)
COLOR SPEC: YELLOW — SIGNIFICANT CHANGE UP
COLOR SPEC: YELLOW — SIGNIFICANT CHANGE UP
CREAT ?TM UR-MCNC: 67 MG/DL — SIGNIFICANT CHANGE UP
CREAT ?TM UR-MCNC: 67 MG/DL — SIGNIFICANT CHANGE UP
CREAT SERPL-MCNC: 1.75 MG/DL — HIGH (ref 0.5–1.3)
CREAT SERPL-MCNC: 1.75 MG/DL — HIGH (ref 0.5–1.3)
DIFF PNL FLD: NEGATIVE — SIGNIFICANT CHANGE UP
DIFF PNL FLD: NEGATIVE — SIGNIFICANT CHANGE UP
EGFR: 27 ML/MIN/1.73M2 — LOW
EGFR: 27 ML/MIN/1.73M2 — LOW
ESTIMATED AVERAGE GLUCOSE: 91 MG/DL — SIGNIFICANT CHANGE UP (ref 68–114)
ESTIMATED AVERAGE GLUCOSE: 91 MG/DL — SIGNIFICANT CHANGE UP (ref 68–114)
GLUCOSE SERPL-MCNC: 102 MG/DL — HIGH (ref 70–99)
GLUCOSE SERPL-MCNC: 102 MG/DL — HIGH (ref 70–99)
GLUCOSE UR QL: NEGATIVE MG/DL — SIGNIFICANT CHANGE UP
GLUCOSE UR QL: NEGATIVE MG/DL — SIGNIFICANT CHANGE UP
HDLC SERPL-MCNC: 77 MG/DL — SIGNIFICANT CHANGE UP
HDLC SERPL-MCNC: 77 MG/DL — SIGNIFICANT CHANGE UP
KETONES UR-MCNC: NEGATIVE MG/DL — SIGNIFICANT CHANGE UP
KETONES UR-MCNC: NEGATIVE MG/DL — SIGNIFICANT CHANGE UP
LEUKOCYTE ESTERASE UR-ACNC: ABNORMAL
LEUKOCYTE ESTERASE UR-ACNC: ABNORMAL
LIPID PNL WITH DIRECT LDL SERPL: 71 MG/DL — SIGNIFICANT CHANGE UP
LIPID PNL WITH DIRECT LDL SERPL: 71 MG/DL — SIGNIFICANT CHANGE UP
NITRITE UR-MCNC: NEGATIVE — SIGNIFICANT CHANGE UP
NITRITE UR-MCNC: NEGATIVE — SIGNIFICANT CHANGE UP
NON HDL CHOLESTEROL: 81 MG/DL — SIGNIFICANT CHANGE UP
NON HDL CHOLESTEROL: 81 MG/DL — SIGNIFICANT CHANGE UP
OSMOLALITY UR: 400 MOSM/KG — SIGNIFICANT CHANGE UP (ref 300–1000)
OSMOLALITY UR: 400 MOSM/KG — SIGNIFICANT CHANGE UP (ref 300–1000)
PH UR: 5.5 — SIGNIFICANT CHANGE UP (ref 5–8)
PH UR: 5.5 — SIGNIFICANT CHANGE UP (ref 5–8)
POTASSIUM SERPL-MCNC: 4.5 MMOL/L — SIGNIFICANT CHANGE UP (ref 3.5–5.3)
POTASSIUM SERPL-MCNC: 4.5 MMOL/L — SIGNIFICANT CHANGE UP (ref 3.5–5.3)
POTASSIUM SERPL-SCNC: 4.5 MMOL/L — SIGNIFICANT CHANGE UP (ref 3.5–5.3)
POTASSIUM SERPL-SCNC: 4.5 MMOL/L — SIGNIFICANT CHANGE UP (ref 3.5–5.3)
PROT ?TM UR-MCNC: 10 MG/DL — SIGNIFICANT CHANGE UP (ref 0–12)
PROT ?TM UR-MCNC: 10 MG/DL — SIGNIFICANT CHANGE UP (ref 0–12)
PROT UR-MCNC: NEGATIVE MG/DL — SIGNIFICANT CHANGE UP
PROT UR-MCNC: NEGATIVE MG/DL — SIGNIFICANT CHANGE UP
PROT/CREAT UR-RTO: 0.1 RATIO — SIGNIFICANT CHANGE UP
PROT/CREAT UR-RTO: 0.1 RATIO — SIGNIFICANT CHANGE UP
RBC CASTS # UR COMP ASSIST: 1 /HPF — SIGNIFICANT CHANGE UP (ref 0–4)
RBC CASTS # UR COMP ASSIST: 1 /HPF — SIGNIFICANT CHANGE UP (ref 0–4)
SODIUM SERPL-SCNC: 135 MMOL/L — SIGNIFICANT CHANGE UP (ref 135–145)
SODIUM SERPL-SCNC: 135 MMOL/L — SIGNIFICANT CHANGE UP (ref 135–145)
SODIUM UR-SCNC: <30 MMOL/L — SIGNIFICANT CHANGE UP
SODIUM UR-SCNC: <30 MMOL/L — SIGNIFICANT CHANGE UP
SP GR SPEC: 1.02 — SIGNIFICANT CHANGE UP (ref 1–1.03)
SP GR SPEC: 1.02 — SIGNIFICANT CHANGE UP (ref 1–1.03)
SQUAMOUS # UR AUTO: 0 /HPF — SIGNIFICANT CHANGE UP (ref 0–5)
SQUAMOUS # UR AUTO: 0 /HPF — SIGNIFICANT CHANGE UP (ref 0–5)
TRIGL SERPL-MCNC: 51 MG/DL — SIGNIFICANT CHANGE UP
TRIGL SERPL-MCNC: 51 MG/DL — SIGNIFICANT CHANGE UP
TROPONIN T, HIGH SENSITIVITY RESULT: 81 NG/L — HIGH (ref 0–51)
TROPONIN T, HIGH SENSITIVITY RESULT: 81 NG/L — HIGH (ref 0–51)
UROBILINOGEN FLD QL: 1 MG/DL — SIGNIFICANT CHANGE UP (ref 0.2–1)
UROBILINOGEN FLD QL: 1 MG/DL — SIGNIFICANT CHANGE UP (ref 0.2–1)
WBC UR QL: 1 /HPF — SIGNIFICANT CHANGE UP (ref 0–5)
WBC UR QL: 1 /HPF — SIGNIFICANT CHANGE UP (ref 0–5)

## 2023-11-08 PROCEDURE — 99233 SBSQ HOSP IP/OBS HIGH 50: CPT

## 2023-11-08 PROCEDURE — 99223 1ST HOSP IP/OBS HIGH 75: CPT

## 2023-11-08 RX ORDER — SENNA PLUS 8.6 MG/1
2 TABLET ORAL AT BEDTIME
Refills: 0 | Status: DISCONTINUED | OUTPATIENT
Start: 2023-11-08 | End: 2023-11-11

## 2023-11-08 RX ORDER — CHLORHEXIDINE GLUCONATE 213 G/1000ML
1 SOLUTION TOPICAL
Refills: 0 | Status: DISCONTINUED | OUTPATIENT
Start: 2023-11-08 | End: 2023-11-11

## 2023-11-08 RX ORDER — POLYETHYLENE GLYCOL 3350 17 G/17G
17 POWDER, FOR SOLUTION ORAL DAILY
Refills: 0 | Status: DISCONTINUED | OUTPATIENT
Start: 2023-11-08 | End: 2023-11-11

## 2023-11-08 RX ORDER — OLANZAPINE 15 MG/1
2.5 TABLET, FILM COATED ORAL ONCE
Refills: 0 | Status: COMPLETED | OUTPATIENT
Start: 2023-11-08 | End: 2023-11-08

## 2023-11-08 RX ORDER — INFLUENZA VIRUS VACCINE 15; 15; 15; 15 UG/.5ML; UG/.5ML; UG/.5ML; UG/.5ML
0.7 SUSPENSION INTRAMUSCULAR ONCE
Refills: 0 | Status: DISCONTINUED | OUTPATIENT
Start: 2023-11-08 | End: 2023-11-11

## 2023-11-08 RX ADMIN — Medication 81 MILLIGRAM(S): at 13:00

## 2023-11-08 RX ADMIN — RANOLAZINE 500 MILLIGRAM(S): 500 TABLET, FILM COATED, EXTENDED RELEASE ORAL at 05:46

## 2023-11-08 RX ADMIN — SENNA PLUS 2 TABLET(S): 8.6 TABLET ORAL at 21:58

## 2023-11-08 RX ADMIN — GABAPENTIN 600 MILLIGRAM(S): 400 CAPSULE ORAL at 21:58

## 2023-11-08 RX ADMIN — CHLORHEXIDINE GLUCONATE 1 APPLICATION(S): 213 SOLUTION TOPICAL at 13:00

## 2023-11-08 RX ADMIN — GABAPENTIN 600 MILLIGRAM(S): 400 CAPSULE ORAL at 05:46

## 2023-11-08 RX ADMIN — RANOLAZINE 500 MILLIGRAM(S): 500 TABLET, FILM COATED, EXTENDED RELEASE ORAL at 17:21

## 2023-11-08 RX ADMIN — Medication 40 MILLIGRAM(S): at 05:45

## 2023-11-08 RX ADMIN — ENOXAPARIN SODIUM 30 MILLIGRAM(S): 100 INJECTION SUBCUTANEOUS at 21:58

## 2023-11-08 RX ADMIN — POLYETHYLENE GLYCOL 3350 17 GRAM(S): 17 POWDER, FOR SOLUTION ORAL at 13:00

## 2023-11-08 RX ADMIN — GABAPENTIN 600 MILLIGRAM(S): 400 CAPSULE ORAL at 13:00

## 2023-11-08 RX ADMIN — ATORVASTATIN CALCIUM 80 MILLIGRAM(S): 80 TABLET, FILM COATED ORAL at 21:58

## 2023-11-08 NOTE — CONSULT NOTE ADULT - ASSESSMENT
COMPLETE A/P PENDING   ASSESSMENT:   89 yr old female with CAD s/p PCI, GERD, atrial fibrillation on Xarelto, severe AS s/p TAVR, HFpEF, carotid atherosclerosis, recent fall, hypertension, hyperlipidemia, recently discharged from rehab on 11/3 presented to the ED on 11/7 with weakness, shortness of breath and a fall as noted by family. Patient stated she did not recall the events leading up to ED presentation. Morning of 11/7 she seemed weaker and trembling per son. She was walking with a walker at home post rehab, noted to have some shortness of breath per son. CT head showed decreased attenuation involving the bilateral posterior temporal occipital brain parenchyma, slightly more prominent on the left than the right; cannot exclude evolving acute bilateral PCA territory infarcts. Patient was not a tenecteplase candidate due to unclear time of onset of symptoms. Currently pending MRI.     NEURO:   -Neurologically ---   -Continue close monitoring for neurologic deterioration    - Stroke neuro checks q _   - Permissive HTN or  SBP goal   -ANTITHROMBOTIC THERAPY:   -titrate statin to LDL goal less than 70-MRI Brain w/o, MRA Head w/o and Neck w/contrast  -Dysphagia screen: pass/fail   -Physical therapy/OT/Speech eval/treatment.       -CARDIOVASCULAR: check TTE, cardiac monitoring w/ telemetry for now, further evaluation pending findings of noted workup                              -HEMATOLOGY: H/H _, Platelets __, patient should have all age and risk appropriate malignancy screenings with PCP or sooner if clinically suspected      DVT ppx: Heparin s.c [] LMWH []     PULMONARY: CXR ___ , protecting airway, saturating well     RENAL: BUN/Cr _, monitor urine output, maintain adequate hydration       Na Goal:  135-145     Bertrand:    ID: afebrile, no leukocytosis, monitor for si/sx of infection     OTHER:  condition and plan of care d/w patient, questions and concerns addressed.     DISPOSITION: Rehab or home depending on PT eval once stable and workup is complete      CORE MEASURES:        Admission NIHSS:      Tenecteplase : [] YES [] NO      LDL/HDL/A1C:     Depression Screen- if depression hx and/or present      Statin Therapy:     Dysphagia Screen: [] PASS [] FAIL     Smoking [] YES [] NO      Afib [] YES [] NO     Stroke Education [] YES [] NO    Obtain screening lower extremity venous ultrasound in patients who meet 1 or more of the following criteria as patient is high risk for DVT/PE on admission:   [] History of DVT/PE  []Hypercoagulable states (Factor V Leiden, Cancer, OCP, etc. )  []Prolonged immobility (hemiplegia/hemiparesis/post operative or any other extended immobilization)  [] Transferred from outside facility (Rehab or Long term care)  [] Age </= to 50 ASSESSMENT:   89 yr old female with CAD s/p PCI, GERD, atrial fibrillation on Xarelto, severe AS s/p TAVR, HFpEF, carotid atherosclerosis, recent fall, hypertension, hyperlipidemia, recently discharged from rehab on 11/3 presented to the ED on 11/7 with weakness, shortness of breath and a fall as noted by family. Patient stated she did not recall the events leading up to ED presentation. Morning of 11/7 she seemed weaker and trembling per son. She was walking with a walker at home post rehab, noted to have some shortness of breath per son. CT head showed decreased attenuation involving the bilateral posterior temporal occipital brain parenchyma, slightly more prominent on the left than the right; cannot exclude evolving acute bilateral PCA territory infarcts. Patient was not a tenecteplase candidate due to unclear time of onset of symptoms. Currently pending MRI, would also recommend vessel imaging with MRA head/neck.     NEURO:   -Neurologically with no focal neurologic deficits   -Continue close monitoring for neurologic deterioration    -Stroke neuro checks q2hrs  -Permissive HTN or  SBP goal up to 180mmHg, avoid hypotension or rapid fluctuations in blood pressure   -ANTITHROMBOTIC THERAPY: 81 mg ASA, hold Xarelto until results of MRI  -titrate statin to LDL goal less than 70  -MRI Brain pending, MRA head w/o and neck w/ contrast recommended for further work up  -Dysphagia screen: pass  -Physical therapy/OT/Speech eval/treatment.     CARDIOVASCULAR:   -TTE findings as above  -cardiac monitoring w/ telemetry for now, further evaluation pending findings of noted workup                              HEMATOLOGY:   -H/H 10.7/32.1, Platelets 247, patient should have all age and risk appropriate malignancy screenings with PCP or sooner if clinically suspected   -DVT ppx: Heparin s.c [] LMWH [x]     PULMONARY:   -protecting airway, saturating well     RENAL:   -BUN/Cr 49.8/17.5, SORAIDA management per primary team, monitor urine output, maintain adequate hydration    -Na Goal:  135-145    ID:   -afebrile, no leukocytosis, monitor for si/sx of infection     OTHER:    -condition and plan of care d/w patient, questions and concerns addressed.     DISPOSITION: Rehab or home depending on PT eval once stable and workup is complete    CORE MEASURES:        Admission NIHSS: 0     Tenecteplase : [] YES [x] NO      LDL/HDL/A1C: 71/77/4.8     Depression Screen- if depression hx and/or present      Statin Therapy: Atorvastatin 80 mg      Dysphagia Screen: [x] PASS [] FAIL     Smoking [] YES [x] NO      Afib [x] YES [] NO     Stroke Education [x] YES [] NO    Obtain screening lower extremity venous ultrasound in patients who meet 1 or more of the following criteria as patient is high risk for DVT/PE on admission:   [] History of DVT/PE  []Hypercoagulable states (Factor V Leiden, Cancer, OCP, etc. )  []Prolonged immobility (hemiplegia/hemiparesis/post operative or any other extended immobilization)  [] Transferred from outside facility (Rehab or Long term care)  [] Age </= to 50

## 2023-11-08 NOTE — PROGRESS NOTE ADULT - SUBJECTIVE AND OBJECTIVE BOX
Patient is a 89y old  Female who presents with a chief complaint of fall (08 Nov 2023 09:32)    INTERVAL HPI/OVERNIGHT EVENTS: No acute events overnight. HD stable. Patient continues to require NC 6L.     MEDICATIONS  (STANDING):  aspirin enteric coated 81 milliGRAM(s) Oral daily  atorvastatin 80 milliGRAM(s) Oral at bedtime  chlorhexidine 2% Cloths 1 Application(s) Topical <User Schedule>  enoxaparin Injectable 30 milliGRAM(s) SubCutaneous every 24 hours  furosemide   Injectable 40 milliGRAM(s) IV Push daily  gabapentin 600 milliGRAM(s) Oral three times a day  influenza  Vaccine (HIGH DOSE) 0.7 milliLiter(s) IntraMuscular once  polyethylene glycol 3350 17 Gram(s) Oral daily  ranolazine 500 milliGRAM(s) Oral two times a day  senna 2 Tablet(s) Oral at bedtime    MEDICATIONS  (PRN):  albuterol    90 MICROgram(s) HFA Inhaler 2 Puff(s) Inhalation every 6 hours PRN for shortness of breath and/or wheezing      Allergies    No Known Allergies    Intolerances        REVIEW OF SYSTEMS: all negative with exception of above    Vital Signs Last 24 Hrs  T(C): 36.4 (08 Nov 2023 08:00), Max: 37 (08 Nov 2023 01:54)  T(F): 97.6 (08 Nov 2023 08:00), Max: 98.6 (08 Nov 2023 01:54)  HR: 86 (08 Nov 2023 10:00) (61 - 94)  BP: 109/53 (08 Nov 2023 10:00) (103/57 - 130/57)  BP(mean): 64 (08 Nov 2023 10:00) (64 - 92)  RR: 18 (08 Nov 2023 10:00) (15 - 22)  SpO2: 94% (08 Nov 2023 10:00) (89% - 100%)    Parameters below as of 08 Nov 2023 10:00  Patient On (Oxygen Delivery Method): nasal cannula  O2 Flow (L/min): 9      PHYSICAL EXAM:  GENERAL: NAD, well-groomed  NERVOUS SYSTEM:  Alert & Oriented X3, Good concentration; Motor Strength 5/5 B/L upper and lower extremities; DTRs 2+ intact and symmetric  CHEST/LUNG: Clear to percussion bilaterally; No rales, rhonchi, wheezing, or rubs  HEART: Irregularly irregular rhythm; No murmurs, rubs, or gallops  ABDOMEN: Soft, Nontender, Nondistended; Bowel sounds present  EXTREMITIES:  right LE 4/5, rest 5/5    LABS:                        10.7   9.44  )-----------( 247      ( 07 Nov 2023 14:45 )             32.1     11-08    135  |  95<L>  |  49.8<H>  ----------------------------<  102<H>  4.5   |  31.0<H>  |  1.75<H>    Ca    9.2      08 Nov 2023 02:50    TPro  7.4  /  Alb  4.3  /  TBili  0.6  /  DBili  x   /  AST  26  /  ALT  15  /  AlkPhos  96  11-07    PT/INR - ( 07 Nov 2023 19:20 )   PT: 17.1 sec;   INR: 1.56 ratio         PTT - ( 07 Nov 2023 19:20 )  PTT:38.5 sec  Urinalysis Basic - ( 08 Nov 2023 02:50 )    Color: x / Appearance: x / SG: x / pH: x  Gluc: 102 mg/dL / Ketone: x  / Bili: x / Urobili: x   Blood: x / Protein: x / Nitrite: x   Leuk Esterase: x / RBC: x / WBC x   Sq Epi: x / Non Sq Epi: x / Bacteria: x      CAPILLARY BLOOD GLUCOSE          RADIOLOGY & ADDITIONAL TESTS:    Imaging Personally Reviewed:  [ ] YES  [ ] NO  < from: TTE Echo Complete w/o Contrast w/ Doppler (11.07.23 @ 19:59) >  Summary:   1. Technically difficult study.   2. Left ventricular ejection fraction, by visual estimation, is >75%.   3. Hyperdynamic global left ventricular systolic function.   4. There is moderate concentric left ventricular hypertrophy.   5. Elevated left atrial and left ventricular end-diastolic pressures.   6. Severely enlarged left atrium.   7. Mild mitral valve regurgitation.   8. Moderate mitral annular calcification.   9. Thickening and calcification of the anterior and posterior mitral   valve leaflets.  10. Mild tricuspid regurgitation.  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  12. Bioprosthesis in the aortic position.  13. Mild aortic regurgitation.  14. Peak aortic valve gradient is 22.7 mmHg and the mean gradient is 13.0   mmHg, which isprobably normal in the setting of a prosthetic aortic   valve.  15. Mild pulmonic valve regurgitation.  16. Estimated pulmonary artery systolic pressure is 36.8 mmHg assuming a   right atrial pressure of 5 mmHg, which is consistent with borderline   pulmonary hypertension.    MD Jeff Electronically signed on 11/8/2023 at 6:56:26 AM            *** Final ***    < end of copied text >        Consultant(s) Notes Reviewed:  [ ] YES  [ ] NO    Care Discussed with Consultants/Other Providers [ ] YES  [ ] NO

## 2023-11-08 NOTE — CONSULT NOTE ADULT - ASSESSMENT
Acute on CKD Stage 3a  Urinary Retention  Acute on Chronic HFpEF  CVA      -SORAIDA multifactorial but mainly driven by acute urinary retention  -Bertrand in place  -Check urine indices  -Will order renal imaging if renal indices worsen  -IV lasix as ordered; monitor for renal tolerance  -No IVF at this time  -Neuro eval  -Daily chem    D/w Primary and RN    Spent > 45 mins

## 2023-11-08 NOTE — CONSULT NOTE ADULT - SUBJECTIVE AND OBJECTIVE BOX
Preliminary note, official note pending attending review/signature.                               [facility] Stroke Team  CC:  HPI:  89 yr old female with CAD s/p PCI, GERD, atrial fibrillation on Xarelto, severe AS s/p TAVR, HFpEF, carotid atherosclerosis, recent fall, hypertension, hyperlipidemia, recently discharged from rehab on 11/3 presented to the ED on 11/7 with weakness, shortness of breath and  a fall as noted by family. Patient states she does not remember much since yesterday, asked to call her son for more information. Feels weak, uses oxygen at home. Called son Bony, states patient lives next door to him, was doing well until last night when she fell off the toilet and could not get back up. She called for help and son helped her get back in bed at night. Morning of 11/7 she seemed weaker and trembling per son. She was walking with a walker at home post rehab, noted to have some shortness of breath per son . He reports she was drinking a lot of water.   She denies chest pain.     PAST MEDICAL & SURGICAL HISTORY:  Essential hypertension  Hyperlipidemia, unspecified hyperlipidemia type  Lung nodule  Thyroid nodule  Acute deep vein thrombosis (DVT) of popliteal vein of both lower extremities  Pelvic fracture  Closed fracture of multiple ribs of right side, initial encounter  Aortic stenosis-s/p TAVR bioprosthetic Jan 2018 [facility] Dr. Aragon  Atherosclerosis of native coronary artery of native heart with angina pectoris  Hypercholesteremia  CURRY (dyspnea on exertion)  AF (atrial fibrillation)-Persistent/chronic  Asthma  AV block  Bronchiectasis  Kyphoscoliosis and scoliosis  Stenocardia  Nosebleed-prior left nostrils cauterize x2  Murmur, cardiac-gr 3/6  Neuropathy  Diastolic CHF-NYHA class 3  Aspiration pneumonia  Anxiety  GERD (gastroesophageal reflux disease)  Closed pelvic ring fracture  Hip arthritis  Angina pectoris-class IV  [place] (hard of hearing)-wears bilateral hearing aides    MEDICATIONS  (STANDING):  aspirin enteric coated 81 milliGRAM(s) Oral daily  atorvastatin 80 milliGRAM(s) Oral at bedtime  enoxaparin Injectable 30 milliGRAM(s) SubCutaneous every 24 hours  furosemide   Injectable 40 milliGRAM(s) IV Push daily  gabapentin 600 milliGRAM(s) Oral three times a day  influenza  Vaccine (HIGH DOSE) 0.7 milliLiter(s) IntraMuscular once  polyethylene glycol 3350 17 Gram(s) Oral daily  ranolazine 500 milliGRAM(s) Oral two times a day  senna 2 Tablet(s) Oral at bedtime    MEDICATIONS  (PRN):  albuterol    90 MICROgram(s) HFA Inhaler 2 Puff(s) Inhalation every 6 hours PRN for shortness of breath and/or wheezing      Allergies  No Known Allergies    Intolerances    SOCIAL HISTORY:  no tob,   no alcohol   no drugs    FAMILY HISTORY:  Family history of breast cancer (Sibling)  Family history of cerebrovascular accident (CVA) (Mother)  Family history of esophageal cancer (Father)    ROS: Unable to obtain ROS due to patient's mental status     Vital Signs Last 24 Hrs  T(C): 36.4 (08 Nov 2023 08:00), Max: 37 (08 Nov 2023 01:54)  T(F): 97.6 (08 Nov 2023 08:00), Max: 98.6 (08 Nov 2023 01:54)  HR: 94 (08 Nov 2023 08:00) (61 - 94)  BP: 107/64 (08 Nov 2023 08:00) (103/57 - 130/57)  BP(mean): 75 (08 Nov 2023 08:00) (64 - 92)  RR: 16 (08 Nov 2023 08:00) (15 - 22)  SpO2: 93% (08 Nov 2023 08:00) (89% - 100%)    Parameters below as of 08 Nov 2023 08:00  Patient On (Oxygen Delivery Method): nasal cannula  O2 Flow (L/min): 6    COMPLETE EXAM PENDING     General: NAD    Detailed Neurologic Exam:    Mental status: The patient is awake and alert and has normal attention span.  The patient is fully oriented in 3 spheres. The patient is oriented to current events. The patient is able to name objects, follow commands, repeat sentences.    Cranial nerves: Pupils equal and react symmetrically to light. There is no visual field deficit to confrontation. Extraocular motion is full with no nystagmus. There is no ptosis. Facial sensation is intact. Facial musculature is symmetric. Palate elevates symmetrically. Tongue is midline.    Motor: There is normal bulk and tone.  There is no tremor.  Strength is 5/5 in the right arm and leg.   Strength is 5/5 in the left arm and leg.    Sensation: Intact to light touch and pin in 4 extremities    Reflexes: 1-2+ throughout and plantar responses are flexor.    Cerebellar: There is no dysmetria on finger to nose testing.    Gait : deferred    NIH SS:  DATE: 11/8/23  TIME:  1A: Level of consciousness (0-3):   1B: Questions (0-2):   1C: Commands (0-2):   2: Gaze (0-2):   3: Visual fields (0-3):   4: Facial palsy (0-3):   MOTOR:  5A: Left arm motor drift (0-4):   5B: Right arm motor drift (0-4):   6A: Left leg motor drift (0-4):   6B: Right leg motor drift (0-4):   7: Limb ataxia (0-2):   SENSORY:  8: Sensation (0-2):   SPEECH:  9: Language (0-3):   10: Dysarthria (0-2):   EXTINCTION:  11: Extinction/inattention (0-2):     TOTAL SCORE:     prehospital mRS=3      LABS:                         10.7   9.44  )-----------( 247      ( 07 Nov 2023 14:45 )             32.1       11-08    135  |  95<L>  |  49.8<H>  ----------------------------<  102<H>  4.5   |  31.0<H>  |  1.75<H>    Ca    9.2      08 Nov 2023 02:50    TPro  7.4  /  Alb  4.3  /  TBili  0.6  /  DBili  x   /  AST  26  /  ALT  15  /  AlkPhos  96  11-07      PT/INR - ( 07 Nov 2023 19:20 )   PT: 17.1 sec;   INR: 1.56 ratio         PTT - ( 07 Nov 2023 19:20 )  PTT:38.5 sec    Lipid Profile (11.08.23 @ 02:50)    Cholesterol: 158 mg/dL   Triglycerides, Serum: 51 mg/dL   HDL Cholesterol: 77 mg/dL   Non HDL Cholesterol: 81   LDL Cholesterol Calculated: 71 mg/dL    A1C with Estimated Average Glucose (11.08.23 @ 02:50)    A1C with Estimated Average Glucose Result: 4.8 %   Estimated Average Glucose: 91 mg/dL    RADIOLOGY & ADDITIONAL STUDIES (independently reviewed unless otherwise noted):  CT Head No Cont (11.07.23 @ 17:27)   1. Decreased attenuation involving the bilateral posterior temporal   occipital brain parenchyma, slightly more prominent on the left than the   right. Cannot exclude evolving acute bilateral PCA territory infarcts.   Correlate with clinical scenario. Consider further evaluation via MR   imaging to include DWI and ADC mapping techniques, provided there are no   contraindications.  2. No acute intracranial hemorrhage.    TTE Echo Complete w/o Contrast w/ Doppler (11.07.23 @ 19:59)   Summary:   1. Technically difficult study.   2. Left ventricular ejection fraction, by visual estimation, is >75%.   3. Hyperdynamic global left ventricular systolic function.   4. There is moderate concentric left ventricular hypertrophy.   5. Elevated left atrial and left ventricular end-diastolic pressures.   6. Severely enlarged left atrium.   7. Mild mitral valve regurgitation.   8. Moderate mitral annular calcification.   9. Thickening and calcification of the anterior and posterior mitral   valve leaflets.  10. Mild tricuspid regurgitation.  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  12. Bioprosthesis in the aortic position.  13. Mild aortic regurgitation.  14. Peak aortic valve gradient is 22.7 mmHg and the mean gradient is 13.0   mmHg, which isprobably normal in the setting of a prosthetic aortic   valve.  15. Mild pulmonic valve regurgitation.  16. Estimated pulmonary artery systolic pressure is 36.8 mmHg assuming a   right atrial pressure of 5 mmHg, which is consistent with borderline   pulmonary hypertension. Preliminary note, official note pending attending review/signature.                               [facility] Stroke Team  CC: fall, generalized weakness   HPI:  89 yr old female with CAD s/p PCI, GERD, atrial fibrillation on Xarelto, severe AS s/p TAVR, HFpEF, carotid atherosclerosis, recent fall, hypertension, hyperlipidemia, recently discharged from rehab on 11/3 presented to the ED on 11/7 with weakness, shortness of breath and  a fall as noted by family. Patient states she does not remember much since yesterday, asked to call her son for more information. Feels weak, uses oxygen at home. Called son Bony, states patient lives next door to him, was doing well until last night when she fell off the toilet and could not get back up. She called for help and son helped her get back in bed at night. Morning of 11/7 she seemed weaker and trembling per son. She was walking with a walker at home post rehab, noted to have some shortness of breath per son . He reports she was drinking a lot of water.   She denies chest pain.     PAST MEDICAL & SURGICAL HISTORY:  Essential hypertension  Hyperlipidemia, unspecified hyperlipidemia type  Lung nodule  Thyroid nodule  Acute deep vein thrombosis (DVT) of popliteal vein of both lower extremities  Pelvic fracture  Closed fracture of multiple ribs of right side, initial encounter  Aortic stenosis-s/p TAVR bioprosthetic Jan 2018 [facility] Dr. Aragon  Atherosclerosis of native coronary artery of native heart with angina pectoris  Hypercholesteremia  CURRY (dyspnea on exertion)  AF (atrial fibrillation)-Persistent/chronic  Asthma  AV block  Bronchiectasis  Kyphoscoliosis and scoliosis  Stenocardia  Nosebleed-prior left nostrils cauterize x2  Murmur, cardiac-gr 3/6  Neuropathy  Diastolic CHF-NYHA class 3  Aspiration pneumonia  Anxiety  GERD (gastroesophageal reflux disease)  Closed pelvic ring fracture  Hip arthritis  Angina pectoris-class IV  [place] (hard of hearing)-wears bilateral hearing aides    MEDICATIONS  (STANDING):  aspirin enteric coated 81 milliGRAM(s) Oral daily  atorvastatin 80 milliGRAM(s) Oral at bedtime  enoxaparin Injectable 30 milliGRAM(s) SubCutaneous every 24 hours  furosemide   Injectable 40 milliGRAM(s) IV Push daily  gabapentin 600 milliGRAM(s) Oral three times a day  influenza  Vaccine (HIGH DOSE) 0.7 milliLiter(s) IntraMuscular once  polyethylene glycol 3350 17 Gram(s) Oral daily  ranolazine 500 milliGRAM(s) Oral two times a day  senna 2 Tablet(s) Oral at bedtime    MEDICATIONS  (PRN):  albuterol    90 MICROgram(s) HFA Inhaler 2 Puff(s) Inhalation every 6 hours PRN for shortness of breath and/or wheezing      Allergies  No Known Allergies    Intolerances    SOCIAL HISTORY:  no tob,   no alcohol   no drugs    FAMILY HISTORY:  Family history of breast cancer (Sibling)  Family history of cerebrovascular accident (CVA) (Mother)  Family history of esophageal cancer (Father)    ROS: Unable to obtain ROS due to patient's mental status     Vital Signs Last 24 Hrs  T(C): 36.4 (08 Nov 2023 08:00), Max: 37 (08 Nov 2023 01:54)  T(F): 97.6 (08 Nov 2023 08:00), Max: 98.6 (08 Nov 2023 01:54)  HR: 94 (08 Nov 2023 08:00) (61 - 94)  BP: 107/64 (08 Nov 2023 08:00) (103/57 - 130/57)  BP(mean): 75 (08 Nov 2023 08:00) (64 - 92)  RR: 16 (08 Nov 2023 08:00) (15 - 22)  SpO2: 93% (08 Nov 2023 08:00) (89% - 100%)    Parameters below as of 08 Nov 2023 08:00  Patient On (Oxygen Delivery Method): nasal cannula  O2 Flow (L/min): 6    General: NAD    Detailed Neurologic Exam:    Mental status: The patient is awake and alert and has normal attention span.  The patient is fully oriented in 3 spheres. The patient is oriented to current events. The patient is able to name objects, follow commands, repeat sentences.    Cranial nerves: Pupils equal and react symmetrically to light. There is no visual field deficit to confrontation. Extraocular motion is full with no nystagmus. There is no ptosis. Facial sensation is intact. Facial musculature is symmetric. Palate elevates symmetrically. Tongue is midline.    Motor: There is normal bulk and tone.  There is no tremor.  Strength is 5/5 in the right arm and leg.   Strength is 5/5 in the left arm and leg.    Sensation: Intact to light touch and pin in 4 extremities    Cerebellar: There is no dysmetria on finger to nose testing.    Gait : deferred    NIH SS:  DATE: 11/8/23  TIME: 11:45 hrs   1A: Level of consciousness (0-3): 0  1B: Questions (0-2): 0  1C: Commands (0-2): 0  2: Gaze (0-2): 0  3: Visual fields (0-3): 0  4: Facial palsy (0-3): 0  MOTOR:  5A: Left arm motor drift (0-4): 0  5B: Right arm motor drift (0-4): 0  6A: Left leg motor drift (0-4): 0  6B: Right leg motor drift (0-4): 0  7: Limb ataxia (0-2): 0  SENSORY:  8: Sensation (0-2): 0  SPEECH:  9: Language (0-3): 0  10: Dysarthria (0-2): 0  EXTINCTION:  11: Extinction/inattention (0-2): 0    TOTAL SCORE: 0    prehospital mRS=3      LABS:                         10.7   9.44  )-----------( 247      ( 07 Nov 2023 14:45 )             32.1       11-08    135  |  95<L>  |  49.8<H>  ----------------------------<  102<H>  4.5   |  31.0<H>  |  1.75<H>    Ca    9.2      08 Nov 2023 02:50    TPro  7.4  /  Alb  4.3  /  TBili  0.6  /  DBili  x   /  AST  26  /  ALT  15  /  AlkPhos  96  11-07      PT/INR - ( 07 Nov 2023 19:20 )   PT: 17.1 sec;   INR: 1.56 ratio         PTT - ( 07 Nov 2023 19:20 )  PTT:38.5 sec    Lipid Profile (11.08.23 @ 02:50)    Cholesterol: 158 mg/dL   Triglycerides, Serum: 51 mg/dL   HDL Cholesterol: 77 mg/dL   Non HDL Cholesterol: 81   LDL Cholesterol Calculated: 71 mg/dL    A1C with Estimated Average Glucose (11.08.23 @ 02:50)    A1C with Estimated Average Glucose Result: 4.8 %   Estimated Average Glucose: 91 mg/dL    RADIOLOGY & ADDITIONAL STUDIES (independently reviewed unless otherwise noted):  CT Head No Cont (11.07.23 @ 17:27)   1. Decreased attenuation involving the bilateral posterior temporal   occipital brain parenchyma, slightly more prominent on the left than the   right. Cannot exclude evolving acute bilateral PCA territory infarcts.   Correlate with clinical scenario. Consider further evaluation via MR   imaging to include DWI and ADC mapping techniques, provided there are no   contraindications.  2. No acute intracranial hemorrhage.    TTE Echo Complete w/o Contrast w/ Doppler (11.07.23 @ 19:59)   Summary:   1. Technically difficult study.   2. Left ventricular ejection fraction, by visual estimation, is >75%.   3. Hyperdynamic global left ventricular systolic function.   4. There is moderate concentric left ventricular hypertrophy.   5. Elevated left atrial and left ventricular end-diastolic pressures.   6. Severely enlarged left atrium.   7. Mild mitral valve regurgitation.   8. Moderate mitral annular calcification.   9. Thickening and calcification of the anterior and posterior mitral   valve leaflets.  10. Mild tricuspid regurgitation.  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  12. Bioprosthesis in the aortic position.  13. Mild aortic regurgitation.  14. Peak aortic valve gradient is 22.7 mmHg and the mean gradient is 13.0   mmHg, which isprobably normal in the setting of a prosthetic aortic   valve.  15. Mild pulmonic valve regurgitation.  16. Estimated pulmonary artery systolic pressure is 36.8 mmHg assuming a   right atrial pressure of 5 mmHg, which is consistent with borderline   pulmonary hypertension.                              [facility] Stroke Team  CC: fall, generalized weakness   HPI:  89 yr old female with CAD s/p PCI, GERD, atrial fibrillation on Xarelto, severe AS s/p TAVR, HFpEF, carotid atherosclerosis, recent fall, hypertension, hyperlipidemia, recently discharged from rehab on 11/3 presented to the ED on 11/7 with weakness, shortness of breath and  a fall as noted by family. Patient states she does not remember much since yesterday, asked to call her son for more information. Feels weak, uses oxygen at home. Called son Bony, states patient lives next door to him, was doing well until last night when she fell off the toilet and could not get back up. She called for help and son helped her get back in bed at night. Morning of 11/7 she seemed weaker and trembling per son. She was walking with a walker at home post rehab, noted to have some shortness of breath per son . He reports she was drinking a lot of water.   She denies chest pain.     PAST MEDICAL & SURGICAL HISTORY:  Essential hypertension  Hyperlipidemia, unspecified hyperlipidemia type  Lung nodule  Thyroid nodule  Acute deep vein thrombosis (DVT) of popliteal vein of both lower extremities  Pelvic fracture  Closed fracture of multiple ribs of right side, initial encounter  Aortic stenosis-s/p TAVR bioprosthetic Jan 2018 [facility] Dr. Aragon  Atherosclerosis of native coronary artery of native heart with angina pectoris  Hypercholesteremia  CURRY (dyspnea on exertion)  AF (atrial fibrillation)-Persistent/chronic  Asthma  AV block  Bronchiectasis  Kyphoscoliosis and scoliosis  Stenocardia  Nosebleed-prior left nostrils cauterize x2  Murmur, cardiac-gr 3/6  Neuropathy  Diastolic CHF-NYHA class 3  Aspiration pneumonia  Anxiety  GERD (gastroesophageal reflux disease)  Closed pelvic ring fracture  Hip arthritis  Angina pectoris-class IV  [place] (hard of hearing)-wears bilateral hearing aides    MEDICATIONS  (STANDING):  aspirin enteric coated 81 milliGRAM(s) Oral daily  atorvastatin 80 milliGRAM(s) Oral at bedtime  enoxaparin Injectable 30 milliGRAM(s) SubCutaneous every 24 hours  furosemide   Injectable 40 milliGRAM(s) IV Push daily  gabapentin 600 milliGRAM(s) Oral three times a day  influenza  Vaccine (HIGH DOSE) 0.7 milliLiter(s) IntraMuscular once  polyethylene glycol 3350 17 Gram(s) Oral daily  ranolazine 500 milliGRAM(s) Oral two times a day  senna 2 Tablet(s) Oral at bedtime    MEDICATIONS  (PRN):  albuterol    90 MICROgram(s) HFA Inhaler 2 Puff(s) Inhalation every 6 hours PRN for shortness of breath and/or wheezing      Allergies  No Known Allergies    Intolerances    SOCIAL HISTORY:  no tob,   no alcohol   no drugs    FAMILY HISTORY:  Family history of breast cancer (Sibling)  Family history of cerebrovascular accident (CVA) (Mother)  Family history of esophageal cancer (Father)    ROS: Unable to obtain ROS due to patient's mental status     Vital Signs Last 24 Hrs  T(C): 36.4 (08 Nov 2023 08:00), Max: 37 (08 Nov 2023 01:54)  T(F): 97.6 (08 Nov 2023 08:00), Max: 98.6 (08 Nov 2023 01:54)  HR: 94 (08 Nov 2023 08:00) (61 - 94)  BP: 107/64 (08 Nov 2023 08:00) (103/57 - 130/57)  BP(mean): 75 (08 Nov 2023 08:00) (64 - 92)  RR: 16 (08 Nov 2023 08:00) (15 - 22)  SpO2: 93% (08 Nov 2023 08:00) (89% - 100%)    Parameters below as of 08 Nov 2023 08:00  Patient On (Oxygen Delivery Method): nasal cannula  O2 Flow (L/min): 6    General: NAD    Detailed Neurologic Exam:    Mental status: The patient is awake and alert and has normal attention span.  The patient is fully oriented in 3 spheres. The patient is oriented to current events. The patient is able to name objects, follow commands, repeat sentences.    Cranial nerves: Pupils equal and react symmetrically to light. There is no visual field deficit to confrontation. Extraocular motion is full with no nystagmus. There is no ptosis. Facial sensation is intact. Facial musculature is symmetric. Palate elevates symmetrically. Tongue is midline.    Motor: There is normal bulk and tone.  There is no tremor.  Strength is 5/5 in the right arm and leg.   Strength is 5/5 in the left arm and leg.    Sensation: Intact to light touch and pin in 4 extremities    Cerebellar: There is no dysmetria on finger to nose testing.    Gait : deferred    NIH SS:  DATE: 11/8/23  TIME: 11:45 hrs   1A: Level of consciousness (0-3): 0  1B: Questions (0-2): 0  1C: Commands (0-2): 0  2: Gaze (0-2): 0  3: Visual fields (0-3): 0  4: Facial palsy (0-3): 0  MOTOR:  5A: Left arm motor drift (0-4): 0  5B: Right arm motor drift (0-4): 0  6A: Left leg motor drift (0-4): 0  6B: Right leg motor drift (0-4): 0  7: Limb ataxia (0-2): 0  SENSORY:  8: Sensation (0-2): 0  SPEECH:  9: Language (0-3): 0  10: Dysarthria (0-2): 0  EXTINCTION:  11: Extinction/inattention (0-2): 0    TOTAL SCORE: 0    prehospital mRS=3      LABS:                         10.7   9.44  )-----------( 247      ( 07 Nov 2023 14:45 )             32.1       11-08    135  |  95<L>  |  49.8<H>  ----------------------------<  102<H>  4.5   |  31.0<H>  |  1.75<H>    Ca    9.2      08 Nov 2023 02:50    TPro  7.4  /  Alb  4.3  /  TBili  0.6  /  DBili  x   /  AST  26  /  ALT  15  /  AlkPhos  96  11-07      PT/INR - ( 07 Nov 2023 19:20 )   PT: 17.1 sec;   INR: 1.56 ratio         PTT - ( 07 Nov 2023 19:20 )  PTT:38.5 sec    Lipid Profile (11.08.23 @ 02:50)    Cholesterol: 158 mg/dL   Triglycerides, Serum: 51 mg/dL   HDL Cholesterol: 77 mg/dL   Non HDL Cholesterol: 81   LDL Cholesterol Calculated: 71 mg/dL    A1C with Estimated Average Glucose (11.08.23 @ 02:50)    A1C with Estimated Average Glucose Result: 4.8 %   Estimated Average Glucose: 91 mg/dL    RADIOLOGY & ADDITIONAL STUDIES (independently reviewed unless otherwise noted):  CT Head No Cont (11.07.23 @ 17:27)   1. Decreased attenuation involving the bilateral posterior temporal   occipital brain parenchyma, slightly more prominent on the left than the   right. Cannot exclude evolving acute bilateral PCA territory infarcts.   Correlate with clinical scenario. Consider further evaluation via MR   imaging to include DWI and ADC mapping techniques, provided there are no   contraindications.  2. No acute intracranial hemorrhage.    TTE Echo Complete w/o Contrast w/ Doppler (11.07.23 @ 19:59)   Summary:   1. Technically difficult study.   2. Left ventricular ejection fraction, by visual estimation, is >75%.   3. Hyperdynamic global left ventricular systolic function.   4. There is moderate concentric left ventricular hypertrophy.   5. Elevated left atrial and left ventricular end-diastolic pressures.   6. Severely enlarged left atrium.   7. Mild mitral valve regurgitation.   8. Moderate mitral annular calcification.   9. Thickening and calcification of the anterior and posterior mitral   valve leaflets.  10. Mild tricuspid regurgitation.  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  12. Bioprosthesis in the aortic position.  13. Mild aortic regurgitation.  14. Peak aortic valve gradient is 22.7 mmHg and the mean gradient is 13.0   mmHg, which isprobably normal in the setting of a prosthetic aortic   valve.  15. Mild pulmonic valve regurgitation.  16. Estimated pulmonary artery systolic pressure is 36.8 mmHg assuming a   right atrial pressure of 5 mmHg, which is consistent with borderline   pulmonary hypertension.

## 2023-11-08 NOTE — PROGRESS NOTE ADULT - ASSESSMENT
89 yr old female with CAD s/p PCI, GERD, atrial fibrillation on Xarelto, severe AS s/p TAVR, HFpEF, carotid atherosclerosis, recent fall, hypertension, hyperlipidemia, recently discharged from rehab on 11/3 presented with weakness, shortness of breath and  a fall as noted by family. Labs reviewed, noted to have SORAIDA, creatinine 1.5, elevated BNP, CXR consistent with CHF, EKG with atrial flutter 79. CT head with bilateral acute PCA strokes,    1. Acute CVA:  Likely cause for fall  admit to stroke unit  Neuro checks  - Stroke team consulted, advised continue aspirin, statin  - hold Xarelto until MRI completed  check coags  fall precautions  PT evaluation.  TTE reviewed  - MRI-H pending    2. Fall:  Likely sec to CVA  fall precautions, PT eval    3. Atrial fibrillation, CAD s/p PCI, elevated troponin:  Telemetry  cardiology consulted  trend troponins  Hold Xarelto per neurology  continue aspirin, statin  On Ranexa  troponin elevated ? SORAIDA  Hold anti hypertensives for now, acute CVA  monitor BP    4. Acute diastolic CHF:  ? Excessive fluid intake  - C/w IV lasix 40 mg QD  Monitor I/O. daily weights  cardiology eval  ECHO ordered    5. SORAIDA;  nephro c/s placed  s/p Lasix  monitor BMP    6. GERD:  Continue Pantoprazole.    7. DVT ppx:  Lovenox

## 2023-11-08 NOTE — SWALLOW BEDSIDE ASSESSMENT ADULT - SWALLOW EVAL: DIAGNOSIS
Oropharyngeal swallow appear clinically unremarkable, w/no overt s/s penetration or aspiration demonstrated across trials consumed.

## 2023-11-08 NOTE — CONSULT NOTE ADULT - SUBJECTIVE AND OBJECTIVE BOX
Patient is a 89y old  Female who presents with a chief complaint of fall (08 Nov 2023 12:21)       HPI:  89 yr old female with CAD s/p PCI, GERD, atrial fibrillation on Xarelto, severe AS s/p TAVR, HFpEF, carotid atherosclerosis, recent fall, hypertension, hyperlipidemia, recently discharged from rehab on 11/3 presented with weakness, shortness of breath and  a fall as noted by family. Patient states she does not remember much since yesterday, asked me to call her son for more information. Feels weak, uses oxygen at home.  Called son Bony, states patient lives next door to him, was doing well until last night when she fell off the toilet and could not get back up. She called for help and son helped her get back in bed at night. This am, she seemed weaker and trembling per son. She was walking with a walker at home post rehab, noted to have some shortness of breath per son . He reports she was drinking a lot of water.   She denies chest pain.   Reviewed recent outpatient meds, no Xarelto listed when she left rehab but son states she has been receiving it.  (07 Nov 2023 18:58)    Renal consulted for SORAIDA. Chart reviewed. Was found to be retaining urine, now with gilliland.         PAST MEDICAL & SURGICAL HISTORY:  Essential hypertension      Chronic back pain      Hyperlipidemia, unspecified hyperlipidemia type      PNA (pneumonia)  november 2017      Lung nodule      Thyroid nodule      Acute deep vein thrombosis (DVT) of popliteal vein of both lower extremities      Pelvic fracture      Closed fracture of multiple ribs of right side, initial encounter      Aortic stenosis  s/p TAVR bioprosthetic Jan 2018 [facility] Dr. Aragon      Atherosclerosis of native coronary artery of native heart with angina pectoris      Hypercholesteremia      CURRY (dyspnea on exertion)      AF (atrial fibrillation)  Persistent/chronic      Asthma      AV block      Bronchiectasis      DVT (deep venous thrombosis)  left lower extremity      Kyphoscoliosis and scoliosis      Stenocardia      Spinal stenosis      Nosebleed  prior left nostrils cauterize x2      Murmur, cardiac  gr3/6      Neuropathy      Diastolic CHF  NYHA class 3      Aspiration pneumonia      Nosebleed  severe on aspirin; had nose cautery done in 2018      Anxiety      HTN (hypertension)      GERD (gastroesophageal reflux disease)      Closed pelvic ring fracture      Hip arthritis      Angina pectoris  class IV      [place] (hard of hearing)  wears bilateral hearing aides      Afib      History of valvular heart disease      HTN (hypertension)      Chronic back pain      S/P spinal fusion  L spines      S/p TAVR (transcatheter aortic valve replacement), bioprosthetic           FAMILY HISTORY:  Family history of breast cancer (Sibling)    Family history of cerebrovascular accident (CVA) (Mother)    Family history of esophageal cancer (Father)    [place]    Social History:Non smoker    MEDICATIONS  (STANDING):  aspirin enteric coated 81 milliGRAM(s) Oral daily  atorvastatin 80 milliGRAM(s) Oral at bedtime  chlorhexidine 2% Cloths 1 Application(s) Topical <User Schedule>  enoxaparin Injectable 30 milliGRAM(s) SubCutaneous every 24 hours  furosemide   Injectable 40 milliGRAM(s) IV Push daily  gabapentin 600 milliGRAM(s) Oral three times a day  influenza  Vaccine (HIGH DOSE) 0.7 milliLiter(s) IntraMuscular once  polyethylene glycol 3350 17 Gram(s) Oral daily  ranolazine 500 milliGRAM(s) Oral two times a day  senna 2 Tablet(s) Oral at bedtime    MEDICATIONS  (PRN):  albuterol    90 MICROgram(s) HFA Inhaler 2 Puff(s) Inhalation every 6 hours PRN for shortness of breath and/or wheezing   Meds reviewed    Allergies    No Known Allergies    Intolerances         REVIEW OF SYSTEMS:    Review of Systems:   Constitutional: Denies fatigue  HEENT: Denies headaches and dizziness  Respiratory: denies SOB, cough, or wheezing  Cardiovascular: denies CP, palpitations  Gastrointestinal: Denies nausea, denies vomiting, diarrhea, constipation, abdominal pain, or bloody stools  Genitourinary: denies painful urination, increased frequency, urgency, or bloody urine  Skin: denies rashes or itching  Musculoskeletal: denies muscle aches, joint swelling  Neurologic: Denies generalized weakness, denies loss of sensation, numbness, or tingling      Vital Signs Last 24 Hrs  T(C): 36.4 (08 Nov 2023 08:00), Max: 37 (08 Nov 2023 01:54)  T(F): 97.6 (08 Nov 2023 08:00), Max: 98.6 (08 Nov 2023 01:54)  HR: 84 (08 Nov 2023 14:00) (61 - 94)  BP: 103/57 (08 Nov 2023 14:00) (103/57 - 130/57)  BP(mean): 69 (08 Nov 2023 14:00) (64 - 92)  RR: 18 (08 Nov 2023 14:00) (15 - 18)  SpO2: 95% (08 Nov 2023 14:00) (89% - 100%)    Parameters below as of 08 Nov 2023 14:00  Patient On (Oxygen Delivery Method): nasal cannula  O2 Flow (L/min): 6    Daily     Daily     PHYSICAL EXAM:    GENERAL: NAD  NECK: Supple, neck  veins full  NERVOUS SYSTEM:  Awake and Alert  CHEST/LUNG: Clear to percussion bilaterally; No rales, rhonchi, wheezing, or rubs  HEART: Regular rate and rhythm; No murmurs, rubs, or gallops  ABDOMEN: Soft, Nontender, Nondistended; Bowel sounds present  EXTREMITIES:  No Edema  : [place]      LABS:                        10.7   9.44  )-----------( 247      ( 07 Nov 2023 14:45 )             32.1     11-08    135  |  95<L>  |  49.8<H>  ----------------------------<  102<H>  4.5   |  31.0<H>  |  1.75<H>    Ca    9.2      08 Nov 2023 02:50    TPro  7.4  /  Alb  4.3  /  TBili  0.6  /  DBili  x   /  AST  26  /  ALT  15  /  AlkPhos  96  11-07    PT/INR - ( 07 Nov 2023 19:20 )   PT: 17.1 sec;   INR: 1.56 ratio         PTT - ( 07 Nov 2023 19:20 )  PTT:38.5 sec  Urinalysis Basic - ( 08 Nov 2023 02:50 )    Color: x / Appearance: x / SG: x / pH: x  Gluc: 102 mg/dL / Ketone: x  / Bili: x / Urobili: x   Blood: x / Protein: x / Nitrite: x   Leuk Esterase: x / RBC: x / WBC x   Sq Epi: x / Non Sq Epi: x / Bacteria: x              RADIOLOGY & ADDITIONAL TESTS:

## 2023-11-08 NOTE — SWALLOW BEDSIDE ASSESSMENT ADULT - COMMENTS
Pt known to this service from past admission in 2022, rx made for puree/thin liquids. Pt & family report consuming regular diet at home without issue

## 2023-11-08 NOTE — SWALLOW BEDSIDE ASSESSMENT ADULT - SLP PERTINENT HISTORY OF CURRENT PROBLEM
As per MD note, "89 yr old female with CAD s/p PCI, GERD, atrial fibrillation on Xarelto, severe AS s/p TAVR, HFpEF, carotid atherosclerosis, recent fall, hypertension, hyperlipidemia, recently discharged from rehab on 11/3 presented to the ED on 11/7 with weakness, shortness of breath and a fall as noted by family. Patient stated she did not recall the events leading up to ED presentation. Morning of 11/7 she seemed weaker and trembling per son. She was walking with a walker at home post rehab, noted to have some shortness of breath per son. CT head showed decreased attenuation involving the bilateral posterior temporal occipital brain parenchyma, slightly more prominent on the left than the right; cannot exclude evolving acute bilateral PCA territory infarcts. Patient was not a tenecteplase candidate due to unclear time of onset of symptoms. Currently pending MRI".

## 2023-11-08 NOTE — CONSULT NOTE ADULT - NS ATTEND AMEND GEN_ALL_CORE FT
Agree with PA's assessment and plan.
89 y/o F with PMH CAD s/p prior PCI to Cx and  to RCA on [facility] 11/2018, severe AS s/p TAVR 2018, HFpEF, AF admitted s/p fall. Found to have possible evolving acute bilateral PCA territory infarcts on CT head.  -pBNP 8000  -Basilar crackles R>L on exam   -Continue lasix 40mg IV daily; likely transition to PO tomorrow   -On spironolactone 25mg outpatient; hold due to SORAIDA   -A flutter rate controlled. On AC with xarelto outpatient; on hold due to possible acute CVA   -MRI pending

## 2023-11-08 NOTE — SWALLOW BEDSIDE ASSESSMENT ADULT - SLP GENERAL OBSERVATIONS
recd awake/upright in bed, A&A, pleasant/cooperative, 0/10 pain pre/post, tolerating RA NAD, daughter present

## 2023-11-09 DIAGNOSIS — I50.31 ACUTE DIASTOLIC (CONGESTIVE) HEART FAILURE: ICD-10-CM

## 2023-11-09 LAB
ALBUMIN SERPL ELPH-MCNC: 3.6 G/DL — SIGNIFICANT CHANGE UP (ref 3.3–5.2)
ALBUMIN SERPL ELPH-MCNC: 3.6 G/DL — SIGNIFICANT CHANGE UP (ref 3.3–5.2)
ALP SERPL-CCNC: 88 U/L — SIGNIFICANT CHANGE UP (ref 40–120)
ALP SERPL-CCNC: 88 U/L — SIGNIFICANT CHANGE UP (ref 40–120)
ALT FLD-CCNC: 12 U/L — SIGNIFICANT CHANGE UP
ALT FLD-CCNC: 12 U/L — SIGNIFICANT CHANGE UP
ANION GAP SERPL CALC-SCNC: 10 MMOL/L — SIGNIFICANT CHANGE UP (ref 5–17)
ANION GAP SERPL CALC-SCNC: 10 MMOL/L — SIGNIFICANT CHANGE UP (ref 5–17)
AST SERPL-CCNC: 22 U/L — SIGNIFICANT CHANGE UP
AST SERPL-CCNC: 22 U/L — SIGNIFICANT CHANGE UP
BILIRUB SERPL-MCNC: 1 MG/DL — SIGNIFICANT CHANGE UP (ref 0.4–2)
BILIRUB SERPL-MCNC: 1 MG/DL — SIGNIFICANT CHANGE UP (ref 0.4–2)
BUN SERPL-MCNC: 32.3 MG/DL — HIGH (ref 8–20)
BUN SERPL-MCNC: 32.3 MG/DL — HIGH (ref 8–20)
CALCIUM SERPL-MCNC: 9.3 MG/DL — SIGNIFICANT CHANGE UP (ref 8.4–10.5)
CALCIUM SERPL-MCNC: 9.3 MG/DL — SIGNIFICANT CHANGE UP (ref 8.4–10.5)
CHLORIDE SERPL-SCNC: 99 MMOL/L — SIGNIFICANT CHANGE UP (ref 96–108)
CHLORIDE SERPL-SCNC: 99 MMOL/L — SIGNIFICANT CHANGE UP (ref 96–108)
CO2 SERPL-SCNC: 32 MMOL/L — HIGH (ref 22–29)
CO2 SERPL-SCNC: 32 MMOL/L — HIGH (ref 22–29)
CREAT SERPL-MCNC: 1.2 MG/DL — SIGNIFICANT CHANGE UP (ref 0.5–1.3)
CREAT SERPL-MCNC: 1.2 MG/DL — SIGNIFICANT CHANGE UP (ref 0.5–1.3)
CULTURE RESULTS: NO GROWTH — SIGNIFICANT CHANGE UP
CULTURE RESULTS: NO GROWTH — SIGNIFICANT CHANGE UP
EGFR: 43 ML/MIN/1.73M2 — LOW
EGFR: 43 ML/MIN/1.73M2 — LOW
GLUCOSE SERPL-MCNC: 98 MG/DL — SIGNIFICANT CHANGE UP (ref 70–99)
GLUCOSE SERPL-MCNC: 98 MG/DL — SIGNIFICANT CHANGE UP (ref 70–99)
HCT VFR BLD CALC: 33.5 % — LOW (ref 34.5–45)
HCT VFR BLD CALC: 33.5 % — LOW (ref 34.5–45)
HGB BLD-MCNC: 11.1 G/DL — LOW (ref 11.5–15.5)
HGB BLD-MCNC: 11.1 G/DL — LOW (ref 11.5–15.5)
MCHC RBC-ENTMCNC: 30.1 PG — SIGNIFICANT CHANGE UP (ref 27–34)
MCHC RBC-ENTMCNC: 30.1 PG — SIGNIFICANT CHANGE UP (ref 27–34)
MCHC RBC-ENTMCNC: 33.1 GM/DL — SIGNIFICANT CHANGE UP (ref 32–36)
MCHC RBC-ENTMCNC: 33.1 GM/DL — SIGNIFICANT CHANGE UP (ref 32–36)
MCV RBC AUTO: 90.8 FL — SIGNIFICANT CHANGE UP (ref 80–100)
MCV RBC AUTO: 90.8 FL — SIGNIFICANT CHANGE UP (ref 80–100)
PLATELET # BLD AUTO: 268 K/UL — SIGNIFICANT CHANGE UP (ref 150–400)
PLATELET # BLD AUTO: 268 K/UL — SIGNIFICANT CHANGE UP (ref 150–400)
POTASSIUM SERPL-MCNC: 4.2 MMOL/L — SIGNIFICANT CHANGE UP (ref 3.5–5.3)
POTASSIUM SERPL-MCNC: 4.2 MMOL/L — SIGNIFICANT CHANGE UP (ref 3.5–5.3)
POTASSIUM SERPL-SCNC: 4.2 MMOL/L — SIGNIFICANT CHANGE UP (ref 3.5–5.3)
POTASSIUM SERPL-SCNC: 4.2 MMOL/L — SIGNIFICANT CHANGE UP (ref 3.5–5.3)
PROT SERPL-MCNC: 6.7 G/DL — SIGNIFICANT CHANGE UP (ref 6.6–8.7)
PROT SERPL-MCNC: 6.7 G/DL — SIGNIFICANT CHANGE UP (ref 6.6–8.7)
RBC # BLD: 3.69 M/UL — LOW (ref 3.8–5.2)
RBC # BLD: 3.69 M/UL — LOW (ref 3.8–5.2)
RBC # FLD: 15.4 % — HIGH (ref 10.3–14.5)
RBC # FLD: 15.4 % — HIGH (ref 10.3–14.5)
SODIUM SERPL-SCNC: 141 MMOL/L — SIGNIFICANT CHANGE UP (ref 135–145)
SODIUM SERPL-SCNC: 141 MMOL/L — SIGNIFICANT CHANGE UP (ref 135–145)
SPECIMEN SOURCE: SIGNIFICANT CHANGE UP
SPECIMEN SOURCE: SIGNIFICANT CHANGE UP
UUN UR-MCNC: 679 MG/DL — SIGNIFICANT CHANGE UP
UUN UR-MCNC: 679 MG/DL — SIGNIFICANT CHANGE UP
WBC # BLD: 6.41 K/UL — SIGNIFICANT CHANGE UP (ref 3.8–10.5)
WBC # BLD: 6.41 K/UL — SIGNIFICANT CHANGE UP (ref 3.8–10.5)
WBC # FLD AUTO: 6.41 K/UL — SIGNIFICANT CHANGE UP (ref 3.8–10.5)
WBC # FLD AUTO: 6.41 K/UL — SIGNIFICANT CHANGE UP (ref 3.8–10.5)

## 2023-11-09 PROCEDURE — 99232 SBSQ HOSP IP/OBS MODERATE 35: CPT

## 2023-11-09 PROCEDURE — 70548 MR ANGIOGRAPHY NECK W/DYE: CPT | Mod: 26

## 2023-11-09 PROCEDURE — 70551 MRI BRAIN STEM W/O DYE: CPT | Mod: 26

## 2023-11-09 PROCEDURE — 99222 1ST HOSP IP/OBS MODERATE 55: CPT

## 2023-11-09 PROCEDURE — 99234 HOSP IP/OBS SM DT SF/LOW 45: CPT

## 2023-11-09 PROCEDURE — 70545 MR ANGIOGRAPHY HEAD W/DYE: CPT | Mod: 26,59

## 2023-11-09 RX ORDER — RIVAROXABAN 15 MG-20MG
15 KIT ORAL
Refills: 0 | Status: DISCONTINUED | OUTPATIENT
Start: 2023-11-09 | End: 2023-11-11

## 2023-11-09 RX ADMIN — GABAPENTIN 600 MILLIGRAM(S): 400 CAPSULE ORAL at 13:07

## 2023-11-09 RX ADMIN — ATORVASTATIN CALCIUM 80 MILLIGRAM(S): 80 TABLET, FILM COATED ORAL at 22:27

## 2023-11-09 RX ADMIN — GABAPENTIN 600 MILLIGRAM(S): 400 CAPSULE ORAL at 05:25

## 2023-11-09 RX ADMIN — RIVAROXABAN 15 MILLIGRAM(S): KIT at 22:28

## 2023-11-09 RX ADMIN — Medication 40 MILLIGRAM(S): at 05:25

## 2023-11-09 RX ADMIN — Medication 81 MILLIGRAM(S): at 13:07

## 2023-11-09 RX ADMIN — RANOLAZINE 500 MILLIGRAM(S): 500 TABLET, FILM COATED, EXTENDED RELEASE ORAL at 05:25

## 2023-11-09 RX ADMIN — CHLORHEXIDINE GLUCONATE 1 APPLICATION(S): 213 SOLUTION TOPICAL at 05:25

## 2023-11-09 RX ADMIN — GABAPENTIN 600 MILLIGRAM(S): 400 CAPSULE ORAL at 22:27

## 2023-11-09 RX ADMIN — POLYETHYLENE GLYCOL 3350 17 GRAM(S): 17 POWDER, FOR SOLUTION ORAL at 13:08

## 2023-11-09 RX ADMIN — RANOLAZINE 500 MILLIGRAM(S): 500 TABLET, FILM COATED, EXTENDED RELEASE ORAL at 19:14

## 2023-11-09 NOTE — PROGRESS NOTE ADULT - ASSESSMENT
89 yr old female with CAD s/p PCI, GERD, atrial fibrillation on Xarelto, severe AS s/p TAVR, HFpEF, carotid atherosclerosis, recent fall, hypertension, hyperlipidemia, recently discharged from rehab on 11/3 presented with weakness, shortness of breath and  a fall as noted by family. Labs reviewed, noted to have SORAIDA, creatinine 1.5, elevated BNP, CXR consistent with CHF, EKG with atrial flutter 79. CT head with bilateral acute PCA strokes,    1. Acute CVA:  Likely cause for fall  admit to stroke unit  Neuro checks  - Stroke team consulted, advised continue aspirin, statin  - MRI-H and MRA-H/N reviewed  check coags  fall precautions  PT evaluation.  TTE reviewed  - MRI-H pending    2. Fall:  Likely sec to CVA  fall precautions, PT eval    3. Atrial fibrillation, CAD s/p PCI, elevated troponin:  Telemetry  cardiology consulted  trend troponins  restarted Xarelto  continue aspirin, statin  On Ranexa  troponin elevated ? SORAIDA  Hold anti hypertensives for now, acute CVA and soft BP  monitor BP    4. Acute diastolic CHF:  ? Excessive fluid intake  - C/w IV lasix 40 mg QD  Monitor I/O. daily weights  cardiology eval  ECHO ordered    5. SORAIDA;  nephro recs appreciated  s/p Lasix  monitor BMP    6. GERD:  Continue Pantoprazole.    7. DVT ppx:  Lovenox

## 2023-11-09 NOTE — PROGRESS NOTE ADULT - SUBJECTIVE AND OBJECTIVE BOX
[facility]                                                         CARDIOLOGY AT [place] [address]                                                         Telephone: [phone]. Fax:[phone]                                                                             PROGRESS NOTE    Reason for follow up: HFpEF, Atrial Flutter, CAD  Update: Patient states that her breathing is improving, but still with higher oxygen requirements. Patient's MRI is still pending. Echocardiogram showing EF>75%, mod LVH, severe LAE, mild MR, mild TR, TAVR with mild AI, mild LA, borderline pulmonary HTN.       Review of symptoms:   Cardiac:  No chest pain. + dyspnea. No palpitations.  Respiratory: no cough. + dyspnea  Gastrointestinal: No diarrhea. No abdominal pain. No bleeding.   Neuro: No focal neuro complaints.    Vitals:  T(C): 36.6 (11-09-23 @ 07:38), Max: 37 (11-08-23 @ 15:29)  HR: 76 (11-09-23 @ 10:00) (74 - 96)  BP: 117/48 (11-09-23 @ 10:00) (97/41 - 121/61)  RR: 19 (11-09-23 @ 10:00) (14 - 22)  SpO2: 100% (11-09-23 @ 10:00) (94% - 100%)  Wt(kg): --  I&O's Summary    08 Nov 2023 07:01  -  09 Nov 2023 07:00  --------------------------------------------------------  IN: 480 mL / OUT: 2750 mL / NET: -2270 mL    09 Nov 2023 07:01  -  09 Nov 2023 10:48  --------------------------------------------------------  IN: 240 mL / OUT: 500 mL / NET: -260 mL      Weight (kg): 56.7 (11-07 @ 13:41)    PHYSICAL EXAM:  Appearance: Comfortable. No acute distress  HEENT:  Atraumatic. Normocephalic.  Normal oral mucosa  Neurologic: A & O x 3, no gross focal deficits.  Cardiovascular: Irregularly irregular, no rubs/gallops. No JVD, II/VI systolic murmur lsb  Respiratory: Still with trace rales bilaterally  Gastrointestinal:  Soft, Non-tender, + BS  Lower Extremities: 2+ Peripheral Pulses, No clubbing, cyanosis, or edema  Psychiatry: Patient is calm. No agitation.   Skin: warm and dry.    CURRENT CARDIAC MEDICATIONS:  furosemide   Injectable 40 milliGRAM(s) IV Push daily  ranolazine 500 milliGRAM(s) Oral two times a day      CURRENT OTHER MEDICATIONS:  albuterol    90 MICROgram(s) HFA Inhaler 2 Puff(s) Inhalation every 6 hours PRN for shortness of breath and/or wheezing  gabapentin 600 milliGRAM(s) Oral three times a day  polyethylene glycol 3350 17 Gram(s) Oral daily  senna 2 Tablet(s) Oral at bedtime  atorvastatin 80 milliGRAM(s) Oral at bedtime  aspirin enteric coated 81 milliGRAM(s) Oral daily  chlorhexidine 2% Cloths 1 Application(s) Topical <User Schedule>  enoxaparin Injectable 30 milliGRAM(s) SubCutaneous every 24 hours  influenza  Vaccine (HIGH DOSE) 0.7 milliLiter(s) IntraMuscular once      LABS:	 	                            11.1   6.41  )-----------( 268      ( 09 Nov 2023 06:28 )             33.5     11-09    141  |  99  |  32.3<H>  ----------------------------<  98  4.2   |  32.0<H>  |  1.20    Ca    9.3      09 Nov 2023 06:28    TPro  6.7  /  Alb  3.6  /  TBili  1.0  /  DBili  x   /  AST  22  /  ALT  12  /  AlkPhos  88  11-09    PT/INR/PTT ( 07 Nov 2023 19:20 )                       :                       :      17.1         :       38.5                  .        .                   .              .           .       1.56        .                                       Lipid Profile: Date: 11-08 @ 02:50  Total cholesterol 158; Direct LDL: --; HDL: 77; Triglycerides:51    HgA1c:   TSH:     TELEMETRY: Atrial flutter, PVCs   ECG:    DIAGNOSTIC TESTING:  [ ] Echocardiogram:     < from: TTE Echo Complete w/o Contrast w/ Doppler (11.07.23 @ 19:59) >  PHYSICIAN INTERPRETATION:  Left Ventricle: The left ventricular internal cavity size is normal.  Global LV systolic function was hyperdynamic. Left ventricular ejection   fraction, by visual estimation, is >75%. Elevated left atrial and left   ventricular end-diastolic pressures.  Right Ventricle: Normal right ventricular size and function.  Left Atrium: Severely enlarged left atrium.  Right Atrium: Normal right atrial size.  Pericardium: There is no evidence of pericardial effusion.  Mitral Valve: Thickening and calcification of the anterior and posterior   mitral valve leaflets. There is moderate mitral annular calcification.   Mild mitral valve regurgitation is seen.  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 Mild tricuspid   regurgitation is visualized. Estimated pulmonary artery systolic pressure   is 36.8 mmHg assuming a right atrial pressure of 5 mmHg, which is   consistent with borderline pulmonary hypertension.  Aortic Valve: Peak aortic valve gradient is 22.7 mmHg and the mean   gradient is 13.0 mmHg, which is probably normal in the setting of a   prosthetic aortic valve. Mild aortic valve regurgitation is seen.  Pulmonic Valve: The pulmonic valve is thickened with good excursion. Mild   pulmonic valve regurgitation.  Aorta: The aortic root is normal in size and structure.  Pulmonary Artery: The pulmonary artery is of normal size and origin.  Venous: The pulmonary veins were not well visualized. The inferior vena   cava is not well visualized.  In comparison to the previous echocardiogram(s): Prior examinations are   available and were reviewed for comparison purposes.      Summary:   1. Technically difficult study.   2. Left ventricular ejection fraction, by visual estimation, is >75%.   3. Hyperdynamic global left ventricular systolic function.   4. There is moderate concentric left ventricular hypertrophy.   5. Elevated left atrial and left ventricular end-diastolic pressures.   6. Severely enlarged left atrium.   7. Mild mitral valve regurgitation.   8. Moderate mitral annular calcification.   9. Thickening and calcification of the anterior and posterior mitral   valve leaflets.  10. Mild tricuspid regurgitation.  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  12. Bioprosthesis in the aortic position.  13. Mild aortic regurgitation.  14. Peak aortic valve gradient is 22.7 mmHg and the mean gradient is 13.0   mmHg, which isprobably normal in the setting of a prosthetic aortic   valve.  15. Mild pulmonic valve regurgitation.  16. Estimated pulmonary artery systolic pressure is 36.8 mmHg assuming a   right atrial pressure of 5 mmHg, which is consistent with borderline   pulmonary hypertension.    MD Jeff Electronically signed on 11/8/2023 at 6:56:26 AM        < end of copied text >    [ ]  Catheterization:    < from: Cardiac Cath Lab - Adult (11.12.18 @ 10:59) >  VENTRICLES: No LV gram was performed; however, a recent echocardiogram  demonstrated an EF of 55 %.  CORONARY VESSELS: The coronary circulation is co-dominant.  LM:   --  LM: Normal.  LAD:   --  Mid LAD: There was a 40 % stenosis.  CX:   --  Mid circumflex: There was a 80 % stenosis.  RCA:   -- Mid RCA: There was a 100 % stenosis.    < end of copied text >  [ ] Stress Test:    OTHER:

## 2023-11-09 NOTE — PROGRESS NOTE ADULT - ASSESSMENT
A/P: 89 yr old female with CAD s/p PCI , GERD, atrial fibrillation on Xarelto, DVT,  severe AS s/p TAVR, HFpEF, carotid atherosclerosis, recent fall, hypertension, hyperlipidemia, recently discharged from rehab on 11/3 presented with legs weakness sp fall.  ED MD, Called son Bony, states patient lives next door to him, was doing well until last night when she fell off the toilet and could not get back up. She called for help and son helped her get back in bed at night. This am, she seemed weaker and trembling per son. She was walking with a walker at home post rehab, noted to have some shortness of breath per son . He reports she was drinking a lot of water. She denies chest pain.

## 2023-11-09 NOTE — PROGRESS NOTE ADULT - SUBJECTIVE AND OBJECTIVE BOX
Patient is a 89y old  Female who presents with a chief complaint of fall (08 Nov 2023 12:21)       HPI:  89 yr old female with CAD s/p PCI, GERD, atrial fibrillation on Xarelto, severe AS s/p TAVR, HFpEF, carotid atherosclerosis, recent fall, hypertension, hyperlipidemia, recently discharged from rehab on 11/3 presented with weakness, shortness of breath and  a fall as noted by family. Patient states she does not remember much since yesterday, asked me to call her son for more information. Feels weak, uses oxygen at home.  Called son Bony, states patient lives next door to him, was doing well until last night when she fell off the toilet and could not get back up. She called for help and son helped her get back in bed at night. This am, she seemed weaker and trembling per son. She was walking with a walker at home post rehab, noted to have some shortness of breath per son . He reports she was drinking a lot of water.   She denies chest pain.   Reviewed recent outpatient meds, no Xarelto listed when she left rehab but son states she has been receiving it.  (07 Nov 2023 18:58)    Renal consulted for SORAIDA. Chart reviewed. Was found to be retaining urine, now with gilliland.    NO N/V/SOB       PAST MEDICAL & SURGICAL HISTORY:  Essential hypertension      Chronic back pain      Hyperlipidemia, unspecified hyperlipidemia type      PNA (pneumonia)  november 2017      Lung nodule      Thyroid nodule      Acute deep vein thrombosis (DVT) of popliteal vein of both lower extremities      Pelvic fracture      Closed fracture of multiple ribs of right side, initial encounter      Aortic stenosis  s/p TAVR bioprosthetic Jan 2018 [facility] Dr. Aragon      Atherosclerosis of native coronary artery of native heart with angina pectoris      Hypercholesteremia      CURRY (dyspnea on exertion)      AF (atrial fibrillation)  Persistent/chronic      Asthma      AV block      Bronchiectasis      DVT (deep venous thrombosis)  left lower extremity      Kyphoscoliosis and scoliosis      Stenocardia      Spinal stenosis      Nosebleed  prior left nostrils cauterize x2      Murmur, cardiac  gr3/6      Neuropathy      Diastolic CHF  NYHA class 3      Aspiration pneumonia      Nosebleed  severe on aspirin; had nose cautery done in 2018      Anxiety      HTN (hypertension)      GERD (gastroesophageal reflux disease)      Closed pelvic ring fracture      Hip arthritis      Angina pectoris  class IV      [place] (hard of hearing)  wears bilateral hearing aides      Afib      History of valvular heart disease      HTN (hypertension)      Chronic back pain      S/P spinal fusion  L spines      S/p TAVR (transcatheter aortic valve replacement), bioprosthetic           FAMILY HISTORY:  Family history of breast cancer (Sibling)    Family history of cerebrovascular accident (CVA) (Mother)    Family history of esophageal cancer (Father)    [place]    Social History:Non smoker    MEDICATIONS  (STANDING):  aspirin enteric coated 81 milliGRAM(s) Oral daily  atorvastatin 80 milliGRAM(s) Oral at bedtime  chlorhexidine 2% Cloths 1 Application(s) Topical <User Schedule>  enoxaparin Injectable 30 milliGRAM(s) SubCutaneous every 24 hours  furosemide   Injectable 40 milliGRAM(s) IV Push daily  gabapentin 600 milliGRAM(s) Oral three times a day  influenza  Vaccine (HIGH DOSE) 0.7 milliLiter(s) IntraMuscular once  polyethylene glycol 3350 17 Gram(s) Oral daily  ranolazine 500 milliGRAM(s) Oral two times a day  senna 2 Tablet(s) Oral at bedtime    MEDICATIONS  (PRN):  albuterol    90 MICROgram(s) HFA Inhaler 2 Puff(s) Inhalation every 6 hours PRN for shortness of breath and/or wheezing   Meds reviewed    Allergies    No Known Allergies    Intolerances         REVIEW OF SYSTEMS:  as above    ICU Vital Signs Last 24 Hrs  T(C): 36.6 (09 Nov 2023 07:38), Max: 37 (08 Nov 2023 15:29)  T(F): 97.9 (09 Nov 2023 07:38), Max: 98.6 (08 Nov 2023 15:29)  HR: 81 (09 Nov 2023 14:00) (54 - 96)  BP: 121/58 (09 Nov 2023 14:00) (97/41 - 121/61)  BP(mean): 72 (09 Nov 2023 14:00) (63 - 80)  ABP: --  ABP(mean): --  RR: 18 (09 Nov 2023 14:00) (14 - 22)  SpO2: 99% (09 Nov 2023 14:00) (94% - 100%)    O2 Parameters below as of 09 Nov 2023 14:00  Patient On (Oxygen Delivery Method): nasal cannula  O2 Flow (L/min): 5        Daily     Daily     PHYSICAL EXAM:    GENERAL: NAD  NECK: Supple, neck  veins full  NERVOUS SYSTEM:  Awake and Alert  HEART: Regular rate and rhythm; No murmurs, rubs, or gallops  ABDOMEN: Soft, Nontender  EXTREMITIES:  No Edema  : [place]      LABS:                                   11.1   6.41  )-----------( 268      ( 09 Nov 2023 06:28 )             33.5     11-09    141  |  99  |  32.3<H>  ----------------------------<  98  4.2   |  32.0<H>  |  1.20    Ca    9.3      09 Nov 2023 06:28    TPro  6.7  /  Alb  3.6  /  TBili  1.0  /  DBili  x   /  AST  22  /  ALT  12  /  AlkPhos  88  11-09    PT/INR - ( 07 Nov 2023 19:20 )   PT: 17.1 sec;   INR: 1.56 ratio         PTT - ( 07 Nov 2023 19:20 )  PTT:38.5 sec  Urinalysis Basic - ( 09 Nov 2023 06:28 )    Color: x / Appearance: x / SG: x / pH: x  Gluc: 98 mg/dL / Ketone: x  / Bili: x / Urobili: x   Blood: x / Protein: x / Nitrite: x   Leuk Esterase: x / RBC: x / WBC x   Sq Epi: x / Non Sq Epi: x / Bacteria: x

## 2023-11-09 NOTE — PHYSICAL THERAPY INITIAL EVALUATION ADULT - ADDITIONAL COMMENTS
as per pt and the Family at the bedside: pt resides in the private house with 3 steps to enter (+) rails, owns and uses RW to ambulate with , (+) grab bars, manual W/C

## 2023-11-09 NOTE — PHYSICAL THERAPY INITIAL EVALUATION ADULT - PERTINENT HX OF CURRENT PROBLEM, REHAB EVAL
as per medical chart: recent fall, hypertension, hyperlipidemia, recently discharged from rehab on 11/3 presented with weakness, shortness of breath and  a fall as noted by family.

## 2023-11-09 NOTE — PROGRESS NOTE ADULT - ASSESSMENT
Acute on CKD Stage 3a  Urinary Retention  Acute on Chronic HFpEF  CVA      -SORAIDA multifactorial but mainly driven by acute urinary retention  -Bertrand in place  -Urine indices reviewed  -UA reviewed  -Will order renal imaging if renal indices worsen, but improving  -IV lasix as ordered; monitor for renal tolerance  -No IVF at this time  -Neuro eval  -Daily chem

## 2023-11-09 NOTE — CONSULT NOTE ADULT - ASSESSMENT
89 yr old female with CAD s/p PCI, GERD, atrial fibrillation on Xarelto, severe AS s/p TAVR, HFpEF, carotid atherosclerosis, recent fall, hypertension, hyperlipidemia, recently discharged from rehab on 11/3 presented with weakness, shortness of breath and  a fall as noted by family. Labs reviewed, noted to have SORAIDA, creatinine 1.5, elevated BNP, CXR consistent with CHF, EKG with atrial flutter 79. CT head with bilateral acute PCA strokes,    1. Acute CVA:  Likely cause for fall  admit to stroke unit  Neuro checks  - Stroke team consulted, advised continue aspirin, statin  - MRI-H and MRA-H/N reviewed  check coags  fall precautions  PT evaluation.  TTE reviewed  - MRI-H pending    2. Fall:  Likely sec to CVA  fall precautions, PT eval    3. Atrial fibrillation, CAD s/p PCI, elevated troponin:  Telemetry  cardiology consulted  trend troponins  restarted Xarelto  continue aspirin, statin  On Ranexa  troponin elevated ? SORAIDA  Hold anti hypertensives for now, acute CVA and soft BP  monitor BP    4. Acute diastolic CHF:  ? Excessive fluid intake  - C/w IV lasix 40 mg QD  Monitor I/O. daily weights  cardiology eval  ECHO ordered    5. SORAIDA;  nephro recs appreciated  s/p Lasix  monitor BMP    6. GERD:  Continue Pantoprazole.    7. DVT ppx:  Lovenox            Electronic Signatures:  Joseph Sheehan)  (Signed 09-Nov-2023 17:29)  	Authored: Progress Note, Reason for Admission, Subjective and Objective, Assessment and Plan   89 yr old female with HTN, HLD, CAD s/p PCI, GERD, atrial fibrillation on Xarelto, severe AS s/p TAVR, HFpEF, carotid atherosclerosis, recent fall, recently discharged from rehab on 11/3, presents to [facility] with weakness, shortness of breath and a fall. . CT head w/o acute intracranial hemorrhage, worrisome for acute bilateral PCA territory infarcts. Consulted for goals of care.    # fall:  - CT, MRI reviewed  - PT eval noted  - falls precaution    # afib  - on ASA  - Xerelto held pending review of MRI w/neuro    # HFpEF  - BNP, TTE results noted  - being diuresed    # dysphagia:  - SLP notes reviewed, appreciated.  Clinically unremarkable oropharyngeal swallow  - PO diet recommended: Regular solids w/thin liquids    # decreased creatinine clearance  - followed by nephrology, acute urinary retention thought to be a   - s/p gilliland, with subsequent improvement noted  - avoid nephrotoxins     # debility:  - supportive care, assist as needed    # palliative care encounter:  -Introduced role of palliative care in symptom management, care planning, support, and transitions in care to those with chronic illness to patient as well as son (met briefly outside room, while pt was working with PT).  Emotional support offered.  - Patient affirms DNR/I orders.    - States that she involves son in her care planning/medical decision making, involved in her own decision making.  Son is surrogate decision maker for the patient under the Family Health Care Decisions Act.  - Son and patient share that goal of care is for functional improvement.    - Son states that patient was on hospice in the past in the setting of COVID, subsequently disenrolled due to clinical improvement.      - Given MRI, clear goals of care, and low symptom burden, palliative care will sign off.  Thank you for involving us in the care of this patient.  Please re-consult as needed.    Total Time Spent__60__ minutes  This includes chart review, patient assessment, discussion and collaboration with interdisciplinary team members

## 2023-11-09 NOTE — PROGRESS NOTE ADULT - SUBJECTIVE AND OBJECTIVE BOX
Patient is a 89y old  Female who presents with a chief complaint of fall (09 Nov 2023 14:55)    INTERVAL HPI/OVERNIGHT EVENTS: No acute events overnight. HD stable. Patient continues to NC 6L.     MEDICATIONS  (STANDING):  aspirin enteric coated 81 milliGRAM(s) Oral daily  atorvastatin 80 milliGRAM(s) Oral at bedtime  chlorhexidine 2% Cloths 1 Application(s) Topical <User Schedule>  enoxaparin Injectable 30 milliGRAM(s) SubCutaneous every 24 hours  furosemide   Injectable 40 milliGRAM(s) IV Push daily  gabapentin 600 milliGRAM(s) Oral three times a day  influenza  Vaccine (HIGH DOSE) 0.7 milliLiter(s) IntraMuscular once  polyethylene glycol 3350 17 Gram(s) Oral daily  ranolazine 500 milliGRAM(s) Oral two times a day  senna 2 Tablet(s) Oral at bedtime    MEDICATIONS  (PRN):  albuterol    90 MICROgram(s) HFA Inhaler 2 Puff(s) Inhalation every 6 hours PRN for shortness of breath and/or wheezing      Allergies    No Known Allergies    Intolerances        REVIEW OF SYSTEMS: all negative with exception of above    Vital Signs Last 24 Hrs  T(C): 36.6 (09 Nov 2023 16:00), Max: 36.9 (08 Nov 2023 20:44)  T(F): 97.9 (09 Nov 2023 16:00), Max: 98.5 (08 Nov 2023 20:44)  HR: 85 (09 Nov 2023 16:00) (54 - 96)  BP: 119/65 (09 Nov 2023 16:00) (97/41 - 121/61)  BP(mean): 76 (09 Nov 2023 16:00) (63 - 80)  RR: 21 (09 Nov 2023 16:00) (14 - 22)  SpO2: 100% (09 Nov 2023 16:00) (95% - 100%)    Parameters below as of 09 Nov 2023 16:00  Patient On (Oxygen Delivery Method): nasal cannula  O2 Flow (L/min): 5      PHYSICAL EXAM:  GENERAL: NAD, well-groomed  NERVOUS SYSTEM:  Alert & Oriented X3, Good concentration; Motor Strength 5/5 B/L upper and lower extremities; DTRs 2+ intact and symmetric  CHEST/LUNG: Clear to percussion bilaterally; No rales, rhonchi, wheezing, or rubs  HEART: Irregularly irregular rhythm; No murmurs, rubs, or gallops  ABDOMEN: Soft, Nontender, Nondistended; Bowel sounds present  EXTREMITIES:  right LE 4/5, rest 5/5    LABS:                        11.1   6.41  )-----------( 268      ( 09 Nov 2023 06:28 )             33.5     11-09    141  |  99  |  32.3<H>  ----------------------------<  98  4.2   |  32.0<H>  |  1.20    Ca    9.3      09 Nov 2023 06:28    TPro  6.7  /  Alb  3.6  /  TBili  1.0  /  DBili  x   /  AST  22  /  ALT  12  /  AlkPhos  88  11-09    PT/INR - ( 07 Nov 2023 19:20 )   PT: 17.1 sec;   INR: 1.56 ratio         PTT - ( 07 Nov 2023 19:20 )  PTT:38.5 sec  Urinalysis Basic - ( 09 Nov 2023 06:28 )    Color: x / Appearance: x / SG: x / pH: x  Gluc: 98 mg/dL / Ketone: x  / Bili: x / Urobili: x   Blood: x / Protein: x / Nitrite: x   Leuk Esterase: x / RBC: x / WBC x   Sq Epi: x / Non Sq Epi: x / Bacteria: x      CAPILLARY BLOOD GLUCOSE          RADIOLOGY & ADDITIONAL TESTS:    Imaging Personally Reviewed:  [ ] YES  [ ] NO    Consultant(s) Notes Reviewed:  [ ] YES  [ ] NO    Care Discussed with Consultants/Other Providers [ ] YES  [ ] NO

## 2023-11-09 NOTE — CONSULT NOTE ADULT - SUBJECTIVE AND OBJECTIVE BOX
HPI:  89 yr old female with CAD s/p PCI, GERD, atrial fibrillation on Xarelto, severe AS s/p TAVR, HFpEF, carotid atherosclerosis, recent fall, hypertension, hyperlipidemia, recently discharged from rehab on 11/3 presented with weakness, shortness of breath and  a fall as noted by family. Patient states she does not remember much since yesterday, asked me to call her son for more information. Feels weak, uses oxygen at home.  Called son Bony, states patient lives next door to him, was doing well until last night when she fell off the toilet and could not get back up. She called for help and son helped her get back in bed at night. This am, she seemed weaker and trembling per son. She was walking with a walker at home post rehab, noted to have some shortness of breath per son . He reports she was drinking a lot of water.   She denies chest pain.   Reviewed recent outpatient meds, no Xarelto listed when she left rehab but son states she has been receiving it.  (07 Nov 2023 18:58)      HPI:    PERTINENT PMH REVIEWED: Yes No    PAST MEDICAL & SURGICAL HISTORY:  Essential hypertension      Chronic back pain      Hyperlipidemia, unspecified hyperlipidemia type      PNA (pneumonia)  november 2017      Lung nodule      Thyroid nodule      Acute deep vein thrombosis (DVT) of popliteal vein of both lower extremities      Pelvic fracture      Closed fracture of multiple ribs of right side, initial encounter      Aortic stenosis  s/p TAVR bioprosthetic Jan 2018 [facility] Dr. Aragon      Atherosclerosis of native coronary artery of native heart with angina pectoris      Hypercholesteremia      CURRY (dyspnea on exertion)      AF (atrial fibrillation)  Persistent/chronic      Asthma      AV block      Bronchiectasis      DVT (deep venous thrombosis)  left lower extremity      Kyphoscoliosis and scoliosis      Stenocardia      Spinal stenosis      Nosebleed  prior left nostrils cauterize x2      Murmur, cardiac  gr3/6      Neuropathy      Diastolic CHF  NYHA class 3      Aspiration pneumonia      Nosebleed  severe on aspirin; had nose cautery done in 2018      Anxiety      HTN (hypertension)      GERD (gastroesophageal reflux disease)      Closed pelvic ring fracture      Hip arthritis      Angina pectoris  class IV      [place] (hard of hearing)  wears bilateral hearing aides      Afib      History of valvular heart disease      HTN (hypertension)      Chronic back pain      S/P spinal fusion  L spines      S/p TAVR (transcatheter aortic valve replacement), bioprosthetic          SOCIAL HISTORY:                                     Admitted from:  home  SNF  [facility]     Surrogate/HCP/Guardian: Phone#:    FAMILY HISTORY:  Family history of breast cancer (Sibling)    Family history of cerebrovascular accident (CVA) (Mother)    Family history of esophageal cancer (Father)        Baseline ADLs (prior to admission):  Independent/ Dependent      Allergies    No Known Allergies    Intolerances        Present Symptoms:     Dyspnea: 0 1 2 3   Nausea/Vomiting: Yes No  Anxiety:  Yes No  Depression: Yes No  Fatigue: Yes No  Loss of appetite: Yes No    Pain:             Character-            Duration-            Effect-            Factors-            Frequency-            Location-            Severity-    Review of Systems: Reviewed                     Negative:                     Positive:  Unable to obtain due to poor mentation   All others negative    MEDICATIONS  (STANDING):  aspirin enteric coated 81 milliGRAM(s) Oral daily  atorvastatin 80 milliGRAM(s) Oral at bedtime  chlorhexidine 2% Cloths 1 Application(s) Topical <User Schedule>  furosemide   Injectable 40 milliGRAM(s) IV Push daily  gabapentin 600 milliGRAM(s) Oral three times a day  influenza  Vaccine (HIGH DOSE) 0.7 milliLiter(s) IntraMuscular once  polyethylene glycol 3350 17 Gram(s) Oral daily  ranolazine 500 milliGRAM(s) Oral two times a day  rivaroxaban 15 milliGRAM(s) Oral with dinner  senna 2 Tablet(s) Oral at bedtime    MEDICATIONS  (PRN):  albuterol    90 MICROgram(s) HFA Inhaler 2 Puff(s) Inhalation every 6 hours PRN for shortness of breath and/or wheezing      PHYSICAL EXAM:    Vital Signs Last 24 Hrs  T(C): 36.6 (09 Nov 2023 16:00), Max: 36.9 (08 Nov 2023 20:44)  T(F): 97.9 (09 Nov 2023 16:00), Max: 98.5 (08 Nov 2023 20:44)  HR: 85 (09 Nov 2023 16:00) (54 - 96)  BP: 119/65 (09 Nov 2023 16:00) (97/41 - 121/61)  BP(mean): 76 (09 Nov 2023 16:00) (63 - 80)  RR: 21 (09 Nov 2023 16:00) (14 - 22)  SpO2: 100% (09 Nov 2023 16:00) (95% - 100%)    Parameters below as of 09 Nov 2023 16:00  Patient On (Oxygen Delivery Method): nasal cannula  O2 Flow (L/min): 5      General: alert  oriented x ____ lethargic agitated                  cachexia  nonverbal  coma    Karnofsky:  %    HEENT: normal  dry mouth  ET tube/trach    Lungs: comfortable tachypnea/labored breathing  excessive secretions    CV: normal  tachycardia    GI: normal  distended  tender  no BS               PEG/NG/OG tube  constipation  last BM:     : normal  incontinent  oliguria/anuria  gilliland    MSK: normal  weakness  edema             ambulatory  bedbound/wheelchair bound    Skin: normal  pressure ulcers- Stage_____  no rash    LABS:                        11.1   6.41  )-----------( 268      ( 09 Nov 2023 06:28 )             33.5     11-09    141  |  99  |  32.3<H>  ----------------------------<  98  4.2   |  32.0<H>  |  1.20    Ca    9.3      09 Nov 2023 06:28    TPro  6.7  /  Alb  3.6  /  TBili  1.0  /  DBili  x   /  AST  22  /  ALT  12  /  AlkPhos  88  11-09    PT/INR - ( 07 Nov 2023 19:20 )   PT: 17.1 sec;   INR: 1.56 ratio         PTT - ( 07 Nov 2023 19:20 )  PTT:38.5 sec  Urinalysis Basic - ( 09 Nov 2023 06:28 )    Color: x / Appearance: x / SG: x / pH: x  Gluc: 98 mg/dL / Ketone: x  / Bili: x / Urobili: x   Blood: x / Protein: x / Nitrite: x   Leuk Esterase: x / RBC: x / WBC x   Sq Epi: x / Non Sq Epi: x / Bacteria: x      I&O's Summary    08 Nov 2023 07:01  -  09 Nov 2023 07:00  --------------------------------------------------------  IN: 480 mL / OUT: 2750 mL / NET: -2270 mL    09 Nov 2023 07:01  -  09 Nov 2023 17:37  --------------------------------------------------------  IN: 240 mL / OUT: 500 mL / NET: -260 mL        RADIOLOGY & ADDITIONAL STUDIES:    ADVANCE DIRECTIVES:   DNR YES NO  Completed on:                     MOLST  YES NO   Completed on:  Living Will  YES NO   Completed on:              IMPRESSION:    MRI brain: No acute infarct. There are confluent abnormal T2/FLAIR   hyperintense signal in the periventricular white matter, compatible with   moderate to severe chronic microvascular ischemic changes. There are   chronic lacunar infarcts in the bilateral basal ganglia.    MRA brain: No hemodynamically significant stenosis    MRA neck: No hemodynamically significant stenosis    --- End of Report --- HPI:  89 yr old female with CAD s/p PCI, GERD, atrial fibrillation on Xarelto, severe AS s/p TAVR, HFpEF, carotid atherosclerosis, recent fall, hypertension, hyperlipidemia, recently discharged from rehab on 11/3 presented with weakness, shortness of breath and  a fall as noted by family. Patient states she does not remember much since yesterday, asked me to call her son for more information. Feels weak, uses oxygen at home.  Called son Bony, states patient lives next door to him, was doing well until last night when she fell off the toilet and could not get back up. She called for help and son helped her get back in bed at night. This am, she seemed weaker and trembling per son. She was walking with a walker at home post rehab, noted to have some shortness of breath per son . He reports she was drinking a lot of water.   She denies chest pain.   Reviewed recent outpatient meds, no Xarelto listed when she left rehab but son states she has been receiving it.  (07 Nov 2023 18:58)      HPI:  89 yr old female with HTN, HLD, CAD s/p PCI, GERD, atrial fibrillation on Xarelto, severe AS s/p TAVR, HFpEF, carotid atherosclerosis, recent fall, recently discharged from rehab on 11/3, presents to [facility] with weakness, shortness of breath and a fall.  Per admission note, patient does not recall much since the day prior to presentation. + Endorses weakness. Per son Bony, states patient lives next door to him, was doing well until last night when she fell off the toilet and could not get back up, son got her into bed. This am, she seemed weaker and trembling per son, + SOB.     Findings in the ED notable for SORAIDA, creatinine 1.5, elevated BNP, CXR consistent with CHF, EKG with atrial flutter 79. CT head w/o acute intracranial hemorrhage, worrisome for acute bilateral PCA territory infarcts. MRI brain, MRA of the neck, completed this afternoon revealed:  No acute infarct, moderate to severe chronic microvascular ischemic change, bilateral basal ganglia chronic lacunar infarcts.  No hemodynamically significant stenosis noted.  Consulted for goals of care.    Patient seen and examined.  Affirms history prompting presentation.  Denies associated dizziness, lightheadedness prior to fall onto the toilet in the bathroom.  States in the days before, she experienced some lightheadedness.  States that the MRI was tough; she is glad to be done with it.  She just worked with PT and hopes to get to be more functional, with therapy. States that she likes to clean her home.      PERTINENT PMH REVIEWED: Yes    PAST MEDICAL & SURGICAL HISTORY:  Essential hypertension      Chronic back pain      Hyperlipidemia, unspecified hyperlipidemia type      PNA (pneumonia)  november 2017      Lung nodule      Thyroid nodule      Acute deep vein thrombosis (DVT) of popliteal vein of both lower extremities      Pelvic fracture      Closed fracture of multiple ribs of right side, initial encounter      Aortic stenosis  s/p TAVR bioprosthetic Jan 2018 [facility] Dr. Aragon      Atherosclerosis of native coronary artery of native heart with angina pectoris      Hypercholesteremia      CURRY (dyspnea on exertion)      AF (atrial fibrillation)  Persistent/chronic      Asthma      AV block      Bronchiectasis      DVT (deep venous thrombosis)  left lower extremity      Kyphoscoliosis and scoliosis      Stenocardia      Spinal stenosis      Nosebleed  prior left nostrils cauterize x2      Murmur, cardiac  gr3/6      Neuropathy      Diastolic CHF  NYHA class 3      Aspiration pneumonia      Nosebleed  severe on aspirin; had nose cautery done in 2018      Anxiety      HTN (hypertension)      GERD (gastroesophageal reflux disease)      Closed pelvic ring fracture      Hip arthritis      Angina pectoris  class IV      [place] (hard of hearing)  wears bilateral hearing aides      Afib      History of valvular heart disease      HTN (hypertension)      Chronic back pain      S/P spinal fusion  L spines      S/p TAVR (transcatheter aortic valve replacement), bioprosthetic          SOCIAL HISTORY:  , one son Bony                                Surrogate/HCP/Guardian: Bony Garnica Phone#: [phone]    FAMILY HISTORY:  Family history of breast cancer (Sibling)    Family history of cerebrovascular accident (CVA) (Mother)    Family history of esophageal cancer (Father)        Baseline ADLs (prior to admission): assistance with some IADLs  Independent/ Dependent      Allergies    No Known Allergies    Intolerances        Present Symptoms:     Dyspnea: denies  Nausea/Vomiting: denies  Anxiety:  denies  Depression: denies  Fatigue: mild  Loss of appetite mild    Pain: denies    Review of Systems: Reviewed                     Negative: vision changes                     Positive: mild weakness, eats pureed diet at home, has had trouble swallowing in the past    All others negative    MEDICATIONS  (STANDING):  aspirin enteric coated 81 milliGRAM(s) Oral daily  atorvastatin 80 milliGRAM(s) Oral at bedtime  chlorhexidine 2% Cloths 1 Application(s) Topical <User Schedule>  furosemide   Injectable 40 milliGRAM(s) IV Push daily  gabapentin 600 milliGRAM(s) Oral three times a day  influenza  Vaccine (HIGH DOSE) 0.7 milliLiter(s) IntraMuscular once  polyethylene glycol 3350 17 Gram(s) Oral daily  ranolazine 500 milliGRAM(s) Oral two times a day  rivaroxaban 15 milliGRAM(s) Oral with dinner  senna 2 Tablet(s) Oral at bedtime    MEDICATIONS  (PRN):  albuterol    90 MICROgram(s) HFA Inhaler 2 Puff(s) Inhalation every 6 hours PRN for shortness of breath and/or wheezing      PHYSICAL EXAM:    Vital Signs Last 24 Hrs  T(C): 36.6 (09 Nov 2023 16:00), Max: 36.9 (08 Nov 2023 20:44)  T(F): 97.9 (09 Nov 2023 16:00), Max: 98.5 (08 Nov 2023 20:44)  HR: 85 (09 Nov 2023 16:00) (54 - 96)  BP: 119/65 (09 Nov 2023 16:00) (97/41 - 121/61)  BP(mean): 76 (09 Nov 2023 16:00) (63 - 80)  RR: 21 (09 Nov 2023 16:00) (14 - 22)  SpO2: 100% (09 Nov 2023 16:00) (95% - 100%)    Parameters below as of 09 Nov 2023 16:00  Patient On (Oxygen Delivery Method): nasal cannula  O2 Flow (L/min): 5      Karnofsky: 30 %    Gen: In NAD.  No pain behaviors or work of breathing noted  Neuro: , awake, alert, communicates needs  Head: NC/AT  Eyes: sclerae non-icteric  ENT: no lip ulcerations  Resp:  unlabored  CV: S1, S2, irregularly irregular  Abd: soft, non-tender, + bowels sounds  : gilliland bag without gross hematuria  Peripheral Vasc: warm  Int: warm and dry  Psych: no psychomotor agitation    LABS:                        11.1   6.41  )-----------( 268      ( 09 Nov 2023 06:28 )             33.5     11-09    141  |  99  |  32.3<H>  ----------------------------<  98  4.2   |  32.0<H>  |  1.20    Ca    9.3      09 Nov 2023 06:28    TPro  6.7  /  Alb  3.6  /  TBili  1.0  /  DBili  x   /  AST  22  /  ALT  12  /  AlkPhos  88  11-09    PT/INR - ( 07 Nov 2023 19:20 )   PT: 17.1 sec;   INR: 1.56 ratio         PTT - ( 07 Nov 2023 19:20 )  PTT:38.5 sec  Urinalysis Basic - ( 09 Nov 2023 06:28 )    Color: x / Appearance: x / SG: x / pH: x  Gluc: 98 mg/dL / Ketone: x  / Bili: x / Urobili: x   Blood: x / Protein: x / Nitrite: x   Leuk Esterase: x / RBC: x / WBC x   Sq Epi: x / Non Sq Epi: x / Bacteria: x      I&O's Summary    08 Nov 2023 07:01  -  09 Nov 2023 07:00  --------------------------------------------------------  IN: 480 mL / OUT: 2750 mL / NET: -2270 mL    09 Nov 2023 07:01  -  09 Nov 2023 17:37  --------------------------------------------------------  IN: 240 mL / OUT: 500 mL / NET: -260 mL        RADIOLOGY & ADDITIONAL STUDIES: reviewed    ADVANCE DIRECTIVES:   - DNR/I

## 2023-11-09 NOTE — PHYSICAL THERAPY INITIAL EVALUATION ADULT - REFERRAL TO ANOTHER SERVICE NEEDED, PT EVAL
Infectious Diseases Daily Progress Note   [facility]     Patient's Name: Thomas Gilmore   Date of Service: 2/19/2018    I Date of admission: 1/17/2018  I  Hospital Day: 34   Scheduled Medications : reviewed   Past Medical History, Social History, Medications and Allergies reviewed.   Reviewed Pertinent: Laboratory studies, radiographic studies, medications, and recent progress notes.     Subjective:  Afebrile.  Ostomy out put better since abd stopped on 2/16/18     Review of Systems: No fever or chills. . No rashes. No cough or chest pain. No urinary symptoms.     Objective:    VITAL SIGNS:   128/78,  76,  18,  97.8  PHYSICAL EXAMINATION  GENERAL:    awake, alert, oriented x3, in no acute distress.  HEENT:   Pupils are equal.  No scleral icterus.   no thrush.  NECK:  Supple, no JVD, no lymphadenopathy, no thyromegaly.  CARDIOVASCULAR:  Pulses regular.  S1, S2 normal, no murmurs appreciated.  GASTROINTESTINAL:  Abdomen is soft, nontender and no distention.  Bowel sounds are normal. Colostomy site appears unremarkable. MURIEL drain is present in the left side.  PULMONARY:  Lungs are bilaterally clear.    EXTREMITIES:  right arm PICC line, site appears unremarkable.   LE edema better      LABORATORY:    Recent Labs  Lab 02/19/18  1014 02/15/18  0742   SODIUM 135 137   BUN 21* 18   CREATININE 0.61* 0.55*   GFRNA >90 >90   GFRA >90 >90   GLUCOSE 160* 113*   CALCIUM 8.2* 8.3*   ALBUMIN 2.6* 2.4*   AST 23 14   GPT 25 15   ALKPT 148* 123*   BILIRUBIN 0.2 0.2   RESR 32* 24*   CRP <0.3  --        Recent Labs  Lab 02/19/18  1014 02/15/18  0742   WBC 5.4 6.0   HGB 9.7* 9.3*   HCT 30.1* 29.3*    450       MICROBIOLOGY:    Reviewed.  Cultures from 12/29/2017 are showing VRE, Staphylococcus epidermidis, Clostridium perfringens and candida glabrata  Cultures from 12/28/2017 from abdominal collection are growing E. coli, coag-negative Staphylococcus, along with anaerobes.    IMAGING:  CT scan of the  abdomen and pelvis  2/13/18 is reviewed.    IMPRESSION:  Mesenteric desmoid tumor, status post interventional radiology guided biopsy with subsequent small bowel perforation requiring exploratory laparotomy and resection of small bowel and primary ileocolonic anastomosis 12/20/2017.  Ileo-anastomotic leak and intra-abdominal abscesses requiring lysis of adhesion, drainage of abscesses, and washout and repair of anastomotic leak and proximal diverting loop ileostomy 12/29/2017.  Postsurgical intraabdominal abscesses.  S/p IR Drainage of LUQ abdominal collection 1/22/18 Cx + amp sensitive enterococcus and candida glabrata   Cultures reviewed. New isolate of enterococcus is ampicillin sensitive while previous one was VRE   Protein calorie malnutrition and severe hypoalbuminemia.    PLAN AND RECOMMENDATIONS:  Discussed CT findings with pulm.    Consider thoracentesis       MDustin Diana MD  Infectious Diseases  [phone] (P)   [phone] (A)  2/19/2018     5:55 PM      occupational therapy/social work

## 2023-11-10 LAB
-  AMOXICILLIN/CLAVULANIC ACID: SIGNIFICANT CHANGE UP
-  AMOXICILLIN/CLAVULANIC ACID: SIGNIFICANT CHANGE UP
-  AMPICILLIN/SULBACTAM: SIGNIFICANT CHANGE UP
-  AMPICILLIN/SULBACTAM: SIGNIFICANT CHANGE UP
-  AMPICILLIN: SIGNIFICANT CHANGE UP
-  AMPICILLIN: SIGNIFICANT CHANGE UP
-  AZTREONAM: SIGNIFICANT CHANGE UP
-  AZTREONAM: SIGNIFICANT CHANGE UP
-  CEFAZOLIN: SIGNIFICANT CHANGE UP
-  CEFAZOLIN: SIGNIFICANT CHANGE UP
-  CEFEPIME: SIGNIFICANT CHANGE UP
-  CEFEPIME: SIGNIFICANT CHANGE UP
-  CEFOXITIN: SIGNIFICANT CHANGE UP
-  CEFOXITIN: SIGNIFICANT CHANGE UP
-  CEFTRIAXONE: SIGNIFICANT CHANGE UP
-  CEFTRIAXONE: SIGNIFICANT CHANGE UP
-  CEFUROXIME: SIGNIFICANT CHANGE UP
-  CEFUROXIME: SIGNIFICANT CHANGE UP
-  CIPROFLOXACIN: SIGNIFICANT CHANGE UP
-  CIPROFLOXACIN: SIGNIFICANT CHANGE UP
-  ERTAPENEM: SIGNIFICANT CHANGE UP
-  ERTAPENEM: SIGNIFICANT CHANGE UP
-  GENTAMICIN: SIGNIFICANT CHANGE UP
-  GENTAMICIN: SIGNIFICANT CHANGE UP
-  LEVOFLOXACIN: SIGNIFICANT CHANGE UP
-  LEVOFLOXACIN: SIGNIFICANT CHANGE UP
-  MEROPENEM: SIGNIFICANT CHANGE UP
-  MEROPENEM: SIGNIFICANT CHANGE UP
-  NITROFURANTOIN: SIGNIFICANT CHANGE UP
-  NITROFURANTOIN: SIGNIFICANT CHANGE UP
-  PIPERACILLIN/TAZOBACTAM: SIGNIFICANT CHANGE UP
-  PIPERACILLIN/TAZOBACTAM: SIGNIFICANT CHANGE UP
-  TOBRAMYCIN: SIGNIFICANT CHANGE UP
-  TOBRAMYCIN: SIGNIFICANT CHANGE UP
-  TRIMETHOPRIM/SULFAMETHOXAZOLE: SIGNIFICANT CHANGE UP
-  TRIMETHOPRIM/SULFAMETHOXAZOLE: SIGNIFICANT CHANGE UP
ANION GAP SERPL CALC-SCNC: 13 MMOL/L — SIGNIFICANT CHANGE UP (ref 5–17)
ANION GAP SERPL CALC-SCNC: 13 MMOL/L — SIGNIFICANT CHANGE UP (ref 5–17)
BUN SERPL-MCNC: 26.7 MG/DL — HIGH (ref 8–20)
BUN SERPL-MCNC: 26.7 MG/DL — HIGH (ref 8–20)
CALCIUM SERPL-MCNC: 9.3 MG/DL — SIGNIFICANT CHANGE UP (ref 8.4–10.5)
CALCIUM SERPL-MCNC: 9.3 MG/DL — SIGNIFICANT CHANGE UP (ref 8.4–10.5)
CHLORIDE SERPL-SCNC: 96 MMOL/L — SIGNIFICANT CHANGE UP (ref 96–108)
CHLORIDE SERPL-SCNC: 96 MMOL/L — SIGNIFICANT CHANGE UP (ref 96–108)
CO2 SERPL-SCNC: 28 MMOL/L — SIGNIFICANT CHANGE UP (ref 22–29)
CO2 SERPL-SCNC: 28 MMOL/L — SIGNIFICANT CHANGE UP (ref 22–29)
CREAT SERPL-MCNC: 1.09 MG/DL — SIGNIFICANT CHANGE UP (ref 0.5–1.3)
CREAT SERPL-MCNC: 1.09 MG/DL — SIGNIFICANT CHANGE UP (ref 0.5–1.3)
CULTURE RESULTS: ABNORMAL
CULTURE RESULTS: ABNORMAL
EGFR: 48 ML/MIN/1.73M2 — LOW
EGFR: 48 ML/MIN/1.73M2 — LOW
GLUCOSE SERPL-MCNC: 136 MG/DL — HIGH (ref 70–99)
GLUCOSE SERPL-MCNC: 136 MG/DL — HIGH (ref 70–99)
HCT VFR BLD CALC: 34.5 % — SIGNIFICANT CHANGE UP (ref 34.5–45)
HCT VFR BLD CALC: 34.5 % — SIGNIFICANT CHANGE UP (ref 34.5–45)
HGB BLD-MCNC: 11.4 G/DL — LOW (ref 11.5–15.5)
HGB BLD-MCNC: 11.4 G/DL — LOW (ref 11.5–15.5)
MCHC RBC-ENTMCNC: 30.2 PG — SIGNIFICANT CHANGE UP (ref 27–34)
MCHC RBC-ENTMCNC: 30.2 PG — SIGNIFICANT CHANGE UP (ref 27–34)
MCHC RBC-ENTMCNC: 33 GM/DL — SIGNIFICANT CHANGE UP (ref 32–36)
MCHC RBC-ENTMCNC: 33 GM/DL — SIGNIFICANT CHANGE UP (ref 32–36)
MCV RBC AUTO: 91.5 FL — SIGNIFICANT CHANGE UP (ref 80–100)
MCV RBC AUTO: 91.5 FL — SIGNIFICANT CHANGE UP (ref 80–100)
METHOD TYPE: SIGNIFICANT CHANGE UP
METHOD TYPE: SIGNIFICANT CHANGE UP
OB PNL STL: NEGATIVE — SIGNIFICANT CHANGE UP
OB PNL STL: NEGATIVE — SIGNIFICANT CHANGE UP
ORGANISM # SPEC MICROSCOPIC CNT: ABNORMAL
ORGANISM # SPEC MICROSCOPIC CNT: ABNORMAL
ORGANISM # SPEC MICROSCOPIC CNT: SIGNIFICANT CHANGE UP
ORGANISM # SPEC MICROSCOPIC CNT: SIGNIFICANT CHANGE UP
PLATELET # BLD AUTO: 289 K/UL — SIGNIFICANT CHANGE UP (ref 150–400)
PLATELET # BLD AUTO: 289 K/UL — SIGNIFICANT CHANGE UP (ref 150–400)
POTASSIUM SERPL-MCNC: 4.4 MMOL/L — SIGNIFICANT CHANGE UP (ref 3.5–5.3)
POTASSIUM SERPL-MCNC: 4.4 MMOL/L — SIGNIFICANT CHANGE UP (ref 3.5–5.3)
POTASSIUM SERPL-SCNC: 4.4 MMOL/L — SIGNIFICANT CHANGE UP (ref 3.5–5.3)
POTASSIUM SERPL-SCNC: 4.4 MMOL/L — SIGNIFICANT CHANGE UP (ref 3.5–5.3)
RBC # BLD: 3.77 M/UL — LOW (ref 3.8–5.2)
RBC # BLD: 3.77 M/UL — LOW (ref 3.8–5.2)
RBC # FLD: 15.6 % — HIGH (ref 10.3–14.5)
RBC # FLD: 15.6 % — HIGH (ref 10.3–14.5)
SODIUM SERPL-SCNC: 136 MMOL/L — SIGNIFICANT CHANGE UP (ref 135–145)
SODIUM SERPL-SCNC: 136 MMOL/L — SIGNIFICANT CHANGE UP (ref 135–145)
SPECIMEN SOURCE: SIGNIFICANT CHANGE UP
SPECIMEN SOURCE: SIGNIFICANT CHANGE UP
WBC # BLD: 5.24 K/UL — SIGNIFICANT CHANGE UP (ref 3.8–10.5)
WBC # BLD: 5.24 K/UL — SIGNIFICANT CHANGE UP (ref 3.8–10.5)
WBC # FLD AUTO: 5.24 K/UL — SIGNIFICANT CHANGE UP (ref 3.8–10.5)
WBC # FLD AUTO: 5.24 K/UL — SIGNIFICANT CHANGE UP (ref 3.8–10.5)

## 2023-11-10 PROCEDURE — 99232 SBSQ HOSP IP/OBS MODERATE 35: CPT

## 2023-11-10 PROCEDURE — 99233 SBSQ HOSP IP/OBS HIGH 50: CPT

## 2023-11-10 RX ADMIN — RANOLAZINE 500 MILLIGRAM(S): 500 TABLET, FILM COATED, EXTENDED RELEASE ORAL at 06:11

## 2023-11-10 RX ADMIN — GABAPENTIN 600 MILLIGRAM(S): 400 CAPSULE ORAL at 13:05

## 2023-11-10 RX ADMIN — CHLORHEXIDINE GLUCONATE 1 APPLICATION(S): 213 SOLUTION TOPICAL at 06:11

## 2023-11-10 RX ADMIN — Medication 81 MILLIGRAM(S): at 13:05

## 2023-11-10 RX ADMIN — GABAPENTIN 600 MILLIGRAM(S): 400 CAPSULE ORAL at 21:51

## 2023-11-10 RX ADMIN — RIVAROXABAN 15 MILLIGRAM(S): KIT at 17:14

## 2023-11-10 RX ADMIN — Medication 40 MILLIGRAM(S): at 06:11

## 2023-11-10 RX ADMIN — POLYETHYLENE GLYCOL 3350 17 GRAM(S): 17 POWDER, FOR SOLUTION ORAL at 13:04

## 2023-11-10 RX ADMIN — ATORVASTATIN CALCIUM 80 MILLIGRAM(S): 80 TABLET, FILM COATED ORAL at 21:51

## 2023-11-10 RX ADMIN — RANOLAZINE 500 MILLIGRAM(S): 500 TABLET, FILM COATED, EXTENDED RELEASE ORAL at 17:14

## 2023-11-10 RX ADMIN — GABAPENTIN 600 MILLIGRAM(S): 400 CAPSULE ORAL at 06:11

## 2023-11-10 NOTE — DIETITIAN INITIAL EVALUATION ADULT - ADD RECOMMEND
Suggest change diet to soft & bite sized (SLP cleared pt for regular solids with thin liquids 11/8) per MD discretion. Add Ensure Plus High Protein TID (350 kcal, 20g protein per serving). Rx: MVI and vitamin C 500mg daily. Encourage po intake, monitor diet tolerance, and provide assistance at meals as needed. Obtain daily weights to monitor trends.

## 2023-11-10 NOTE — DIETITIAN INITIAL EVALUATION ADULT - ETIOLOGY
related to inability to meet sufficient protein-energy in setting of advanced age, poor skin integrity, lack of appetite

## 2023-11-10 NOTE — PROGRESS NOTE ADULT - ASSESSMENT
Acute on CKD Stage 3a  Urinary Retention  Acute on Chronic HFpEF  CVA      -SORAIDA multifactorial but mainly driven by acute urinary retention  -Bertrand in place  -Urine indices reviewed  -UA reviewed  -Will order renal imaging if renal indices worsen, but improving  -Diurese as per cardiology. Renal wise tolerating  -No IVF at this time  -Neuro eval  -Daily chem    We will follow up Monday. Please call if needed over the weekend. Thank you

## 2023-11-10 NOTE — PROGRESS NOTE ADULT - SUBJECTIVE AND OBJECTIVE BOX
[facility]                                                         CARDIOLOGY AT [place] [address]                                                         Telephone: [phone]. Fax:[phone]                                                                             PROGRESS NOTE    Reason for follow up: HFpEF  Update: Patient states she still feels short of breath today, and is requiring 5L NC. Patient's bloodwork is still pending. Patient's MRI showed no acute infarct, restarted on Xarelto.      Review of symptoms:   Cardiac:  No chest pain. No dyspnea. No palpitations.  Respiratory: no cough. No dyspnea  Gastrointestinal: No diarrhea. No abdominal pain. No bleeding.   Neuro: No focal neuro complaints.    Vitals:  T(C): 36.7 (11-10-23 @ 07:49), Max: 36.7 (11-10-23 @ 07:49)  HR: 95 (11-10-23 @ 08:00) (54 - 95)  BP: 116/93 (11-10-23 @ 08:00) (108/58 - 132/54)  RR: 24 (11-10-23 @ 08:00) (15 - 24)  SpO2: 100% (11-10-23 @ 08:00) (97% - 100%)  Wt(kg): --  I&O's Summary    09 Nov 2023 07:01  -  10 Nov 2023 07:00  --------------------------------------------------------  IN: 240 mL / OUT: 1500 mL / NET: -1260 mL    10 Nov 2023 07:01  -  10 Nov 2023 10:06  --------------------------------------------------------  IN: 120 mL / OUT: 0 mL / NET: 120 mL      Weight (kg): 56.7 (11-07 @ 13:41)    PHYSICAL EXAM:  Appearance: Comfortable. No acute distress  HEENT:  Atraumatic. Normocephalic.  Normal oral mucosa  Neurologic: A & O x 3, no gross focal deficits.  Cardiovascular: Irregularly irregular, no rubs/gallops. No JVD, II/VI systolic murmur lsb  Respiratory: Still with trace rales bilaterally  Gastrointestinal:  Soft, Non-tender, + BS  Lower Extremities: 2+ Peripheral Pulses, No clubbing, cyanosis, or edema  Psychiatry: Patient is calm. No agitation.   Skin: warm and dry.    CURRENT CARDIAC MEDICATIONS:  furosemide   Injectable 40 milliGRAM(s) IV Push daily  ranolazine 500 milliGRAM(s) Oral two times a day      CURRENT OTHER MEDICATIONS:  albuterol    90 MICROgram(s) HFA Inhaler 2 Puff(s) Inhalation every 6 hours PRN for shortness of breath and/or wheezing  gabapentin 600 milliGRAM(s) Oral three times a day  polyethylene glycol 3350 17 Gram(s) Oral daily  senna 2 Tablet(s) Oral at bedtime  atorvastatin 80 milliGRAM(s) Oral at bedtime  aspirin enteric coated 81 milliGRAM(s) Oral daily  chlorhexidine 2% Cloths 1 Application(s) Topical <User Schedule>  influenza  Vaccine (HIGH DOSE) 0.7 milliLiter(s) IntraMuscular once  rivaroxaban 15 milliGRAM(s) Oral with dinner      LABS:	 	                            11.1   6.41  )-----------( 268      ( 09 Nov 2023 06:28 )             33.5     11-09    141  |  99  |  32.3<H>  ----------------------------<  98  4.2   |  32.0<H>  |  1.20    Ca    9.3      09 Nov 2023 06:28    TPro  6.7  /  Alb  3.6  /  TBili  1.0  /  DBili  x   /  AST  22  /  ALT  12  /  AlkPhos  88  11-09    PT/INR/PTT ( 07 Nov 2023 19:20 )                       :                       :      17.1         :       38.5                  .        .                   .              .           .       1.56        .                                       Lipid Profile: Date: 11-08 @ 02:50  Total cholesterol 158; Direct LDL: --; HDL: 77; Triglycerides:51    HgA1c:   TSH:     TELEMETRY: Atrial flutter, rate controlled  ECG:    DIAGNOSTIC TESTING:  [ ] Echocardiogram:     < from: TTE Echo Complete w/o Contrast w/ Doppler (11.07.23 @ 19:59) >  PHYSICIAN INTERPRETATION:  Left Ventricle: The left ventricular internal cavity size is normal.  Global LV systolic function was hyperdynamic. Left ventricular ejection   fraction, by visual estimation, is >75%. Elevated left atrial and left   ventricular end-diastolic pressures.  Right Ventricle: Normal right ventricular size and function.  Left Atrium: Severely enlarged left atrium.  Right Atrium: Normal right atrial size.  Pericardium: There is no evidence of pericardial effusion.  Mitral Valve: Thickening and calcification of the anterior and posterior   mitral valve leaflets. There is moderate mitral annular calcification.   Mild mitral valve regurgitation is seen.  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 Mild tricuspid   regurgitation is visualized. Estimated pulmonary artery systolic pressure   is 36.8 mmHg assuming a right atrial pressure of 5 mmHg, which is   consistent with borderline pulmonary hypertension.  Aortic Valve: Peak aortic valve gradient is 22.7 mmHg and the mean   gradient is 13.0 mmHg, which is probably normal in the setting of a   prosthetic aortic valve. Mild aortic valve regurgitation is seen.  Pulmonic Valve: The pulmonic valve is thickened with good excursion. Mild   pulmonic valve regurgitation.  Aorta: The aortic root is normal in size and structure.  Pulmonary Artery: The pulmonary artery is of normal size and origin.  Venous: The pulmonary veins were not well visualized. The inferior vena   cava is not well visualized.  In comparison to the previous echocardiogram(s): Prior examinations are   available and were reviewed for comparison purposes.      Summary:   1. Technically difficult study.   2. Left ventricular ejection fraction, by visual estimation, is >75%.   3. Hyperdynamic global left ventricular systolic function.   4. There is moderate concentric left ventricular hypertrophy.   5. Elevated left atrial and left ventricular end-diastolic pressures.   6. Severely enlarged left atrium.   7. Mild mitral valve regurgitation.   8. Moderate mitral annular calcification.   9. Thickening and calcification of the anterior and posterior mitral   valve leaflets.  10. Mild tricuspid regurgitation.  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  12. Bioprosthesis in the aortic position.  13. Mild aortic regurgitation.  14. Peak aortic valve gradient is 22.7 mmHg and the mean gradient is 13.0   mmHg, which isprobably normal in the setting of a prosthetic aortic   valve.  15. Mild pulmonic valve regurgitation.  16. Estimated pulmonary artery systolic pressure is 36.8 mmHg assuming a   right atrial pressure of 5 mmHg, which is consistent with borderline   pulmonary hypertension.    MD Jeff Electronically signed on 11/8/2023 at 6:56:26 AM    < end of copied text >    [ ]  Catheterization:  < from: Cardiac Cath Lab - Adult (11.12.18 @ 10:59) >  CORONARY VESSELS: The coronary circulation is co-dominant.  LM:   --  LM: Normal.  LAD:   --  Mid LAD: There was a 40 % stenosis.  CX:   --  Mid circumflex: There was a 80 % stenosis.  RCA:   -- Mid RCA: There was a 100 % stenosis.  COMPLICATIONS: No complications occurred during the cath lab visit.  SUMMARY:  HEMODYNAMICS: Hemodynamic assessment demonstrates mildly to moderately  elevated pulmonary capillary wedge pressure.  DIAGNOSTIC IMPRESSIONS: Severe circumflex disease. PCI performed with 1  JARETT.  Mild to moderate pulmonary hypertension. Mild to moderately elevated wedge  pressure.  DIAGNOSTIC RECOMMENDATIONS: Plavix and AC. No aspirin due to elevated  bleeding risk in elderly patient.  INTERVENTIONAL IMPRESSIONS: Severe circumflex disease. PCI performed with 1  JARETT.  Mild to moderate pulmonary hypertension. Mild to moderately elevated wedge  pressure.  INTERVENTIONAL RECOMMENDATIONS: Plavix and AC. No aspirin due to elevated  bleeding risk in elderly patient.  Prepared and signed by  Justyn Soto MD  Signed 11/13/2018 15:15:05    < end of copied text >    [ ] Stress Test:    OTHER:

## 2023-11-10 NOTE — DIETITIAN INITIAL EVALUATION ADULT - NSICDXPASTMEDICALHX_GEN_ALL_CORE_FT
PAST MEDICAL HISTORY:  Acute deep vein thrombosis (DVT) of popliteal vein of both lower extremities     AF (atrial fibrillation) Persistent/chronic    Afib     Angina pectoris class IV    Anxiety     Aortic stenosis s/p TAVR bioprosthetic Jan 2018 [facility] Dr. Aragon    Aspiration pneumonia     Asthma     Atherosclerosis of native coronary artery of native heart with angina pectoris     AV block     Bronchiectasis     Chronic back pain     Chronic back pain     Closed fracture of multiple ribs of right side, initial encounter     Closed pelvic ring fracture     Diastolic CHF NYHA class 3    CURRY (dyspnea on exertion)     DVT (deep venous thrombosis) left lower extremity    Essential hypertension     GERD (gastroesophageal reflux disease)     Hip arthritis     History of valvular heart disease     [place] (hard of hearing) wears bilateral hearing aides    HTN (hypertension)     HTN (hypertension)     Hypercholesteremia     Hyperlipidemia, unspecified hyperlipidemia type     Kyphoscoliosis and scoliosis     Lung nodule     Murmur, cardiac gr3/6    Neuropathy     Nosebleed prior left nostrils cauterize x2    Nosebleed severe on aspirin; had nose cautery done in 2018    Pelvic fracture     PNA (pneumonia) november 2017    Spinal stenosis     Stenocardia     Thyroid nodule

## 2023-11-10 NOTE — DIETITIAN INITIAL EVALUATION ADULT - ORAL INTAKE PTA/DIET HISTORY
Nutrition assessment completed. Aware seen by SLP 11/8 with recommendations for a regular diet with thin liquids. However, pt remains on pureed diet due to preference per SLP documentation. Spoke to pt this morning. Observed eating breakfast, pt states she dislikes pureed diet, states she usually has foods cut up for her which she tolerates well. Pt confirms weight loss over the last 6 months; UBW was 115 lbs now down to 105 lbs. Noted with stage I pressure injury to right buttocks. Palliative following for GOC. RD to follow up.

## 2023-11-10 NOTE — PROGRESS NOTE ADULT - ASSESSMENT
A/P: 89 yr old female with CAD s/p PCI , GERD, atrial fibrillation on Xarelto, DVT,  severe AS s/p TAVR, HFpEF, carotid atherosclerosis, recent fall, hypertension, hyperlipidemia, recently discharged from rehab on 11/3 presented with legs weakness sp fall.  ED MD, Called son oBny, states patient lives next door to him, was doing well until last night when she fell off the toilet and could not get back up. She called for help and son helped her get back in bed at night. This am, she seemed weaker and trembling per son. She was walking with a walker at home post rehab, noted to have some shortness of breath per son . He reports she was drinking a lot of water. She denies chest pain.

## 2023-11-10 NOTE — PROGRESS NOTE ADULT - SUBJECTIVE AND OBJECTIVE BOX
[facility] Stroke Team  Progress Note    Preliminary note, official note pending attending review/signature     HPI:  89 yr old female with CAD s/p PCI, GERD, atrial fibrillation on Xarelto, severe AS s/p TAVR, HFpEF, carotid atherosclerosis, recent fall, hypertension, hyperlipidemia, recently discharged from rehab on 11/3 presented to the ED on 11/7 with weakness, shortness of breath and  a fall as noted by family. Patient states she does not remember much since yesterday, asked to call her son for more information. Feels weak, uses oxygen at home. Called son Bony, states patient lives next door to him, was doing well until last night when she fell off the toilet and could not get back up. She called for help and son helped her get back in bed at night. Morning of 11/7 she seemed weaker and trembling per son. She was walking with a walker at home post rehab, noted to have some shortness of breath per son . He reports she was drinking a lot of water.   She denies chest pain.     SUBJECTIVE: No events overnight. No new neurologic complaints. MRI brain completed showing no acute findings. Pt restarted on xarelto. ROS reported negative unless otherwise noted.    albuterol    90 MICROgram(s) HFA Inhaler 2 Puff(s) Inhalation every 6 hours PRN  aspirin enteric coated 81 milliGRAM(s) Oral daily  atorvastatin 80 milliGRAM(s) Oral at bedtime  chlorhexidine 2% Cloths 1 Application(s) Topical <User Schedule>  furosemide   Injectable 40 milliGRAM(s) IV Push daily  gabapentin 600 milliGRAM(s) Oral three times a day  influenza  Vaccine (HIGH DOSE) 0.7 milliLiter(s) IntraMuscular once  polyethylene glycol 3350 17 Gram(s) Oral daily  ranolazine 500 milliGRAM(s) Oral two times a day  rivaroxaban 15 milliGRAM(s) Oral with dinner  senna 2 Tablet(s) Oral at bedtime    PHYSICAL EXAM:   Vital Signs Last 24 Hrs  T(C): 36.7 (10 Nov 2023 07:49), Max: 36.7 (10 Nov 2023 07:49)  T(F): 98.1 (10 Nov 2023 07:49), Max: 98.1 (10 Nov 2023 07:49)  HR: 87 (10 Nov 2023 10:00) (54 - 95)  BP: 118/63 (10 Nov 2023 10:00) (108/58 - 132/54)  BP(mean): 77 (10 Nov 2023 10:00) (68 - 105)  RR: 23 (10 Nov 2023 10:00) (15 - 24)  SpO2: 100% (10 Nov 2023 10:00) (97% - 100%)    Parameters below as of 10 Nov 2023 10:00  Patient On (Oxygen Delivery Method): nasal cannula  O2 Flow (L/min): 5    General: No acute distress  NEUROLOGICAL EXAM:  Mental status: Awake, alert, oriented x3, speech fluent, follows commands, no neglect, normal memory   Cranial Nerves: No facial asymmetry, no nystagmus, no dysarthria,  tongue midline  Motor exam: Normal tone, no drift, 5/5 RUE, 5/5 RLE, 5/5 LUE, 5/5 LLE, normal fine finger movements.  Sensation: Intact to light touch   Coordination/ Gait: No dysmetria, gait not tested    LABS:                        11.4   5.24  )-----------( 289      ( 10 Nov 2023 10:32 )             34.5    11-10    136  |  96  |  26.7<H>  ----------------------------<  136<H>  4.4   |  28.0  |  1.09    Ca    9.3      10 Nov 2023 10:32    TPro  6.7  /  Alb  3.6  /  TBili  1.0  /  DBili  x   /  AST  22  /  ALT  12  /  AlkPhos  88  11-09        IMAGING: Reviewed by me.   CT Head No Cont (11.07.23 @ 17:27)   1. Decreased attenuation involving the bilateral posterior temporal   occipital brain parenchyma, slightly more prominent on the left than the   right. Cannot exclude evolving acute bilateral PCA territory infarcts.   Correlate with clinical scenario. Consider further evaluation via MR   imaging to include DWI and ADC mapping techniques, provided there are no   contraindications.  2. No acute intracranial hemorrhage.    TTE Echo Complete w/o Contrast w/ Doppler (11.07.23 @ 19:59)   Summary:   1. Technically difficult study.   2. Left ventricular ejection fraction, by visual estimation, is >75%.   3. Hyperdynamic global left ventricular systolic function.   4. There is moderate concentric left ventricular hypertrophy.   5. Elevated left atrial and left ventricular end-diastolic pressures.   6. Severely enlarged left atrium.   7. Mild mitral valve regurgitation.   8. Moderate mitral annular calcification.   9. Thickening and calcification of the anterior and posterior mitral   valve leaflets.  10. Mild tricuspid regurgitation.  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  12. Bioprosthesis in the aortic position.  13. Mild aortic regurgitation.  14. Peak aortic valve gradient is 22.7 mmHg and the mean gradient is 13.0   mmHg, which isprobably normal in the setting of a prosthetic aortic   valve.  15. Mild pulmonic valve regurgitation.  16. Estimated pulmonary artery systolic pressure is 36.8 mmHg assuming a   right atrial pressure of 5 mmHg, which is consistent with borderline   pulmonary hypertension.    MRI brain: No acute infarct. There are confluent abnormal T2/FLAIR   hyperintense signal in the periventricular white matter, compatible with   moderate to severe chronic microvascular ischemicchanges. There are   chronic lacunar infarcts in the bilateral basal ganglia.    MRA brain: No hemodynamically significant stenosis    MRA neck: No hemodynamically significant stenosis [facility] Stroke Team  Progress Note    HPI:  89 yr old female with CAD s/p PCI, GERD, atrial fibrillation on Xarelto, severe AS s/p TAVR, HFpEF, carotid atherosclerosis, recent fall, hypertension, hyperlipidemia, recently discharged from rehab on 11/3 presented to the ED on 11/7 with weakness, shortness of breath and  a fall as noted by family. Patient states she does not remember much since yesterday, asked to call her son for more information. Feels weak, uses oxygen at home. Called son Bony, states patient lives next door to him, was doing well until last night when she fell off the toilet and could not get back up. She called for help and son helped her get back in bed at night. Morning of 11/7 she seemed weaker and trembling per son. She was walking with a walker at home post rehab, noted to have some shortness of breath per son . He reports she was drinking a lot of water.   She denies chest pain.     SUBJECTIVE: No events overnight. No new neurologic complaints. MRI brain completed showing no acute findings. Pt restarted on xarelto. ROS reported negative unless otherwise noted.    albuterol    90 MICROgram(s) HFA Inhaler 2 Puff(s) Inhalation every 6 hours PRN  aspirin enteric coated 81 milliGRAM(s) Oral daily  atorvastatin 80 milliGRAM(s) Oral at bedtime  chlorhexidine 2% Cloths 1 Application(s) Topical <User Schedule>  furosemide   Injectable 40 milliGRAM(s) IV Push daily  gabapentin 600 milliGRAM(s) Oral three times a day  influenza  Vaccine (HIGH DOSE) 0.7 milliLiter(s) IntraMuscular once  polyethylene glycol 3350 17 Gram(s) Oral daily  ranolazine 500 milliGRAM(s) Oral two times a day  rivaroxaban 15 milliGRAM(s) Oral with dinner  senna 2 Tablet(s) Oral at bedtime    PHYSICAL EXAM:   Vital Signs Last 24 Hrs  T(C): 36.7 (10 Nov 2023 07:49), Max: 36.7 (10 Nov 2023 07:49)  T(F): 98.1 (10 Nov 2023 07:49), Max: 98.1 (10 Nov 2023 07:49)  HR: 87 (10 Nov 2023 10:00) (54 - 95)  BP: 118/63 (10 Nov 2023 10:00) (108/58 - 132/54)  BP(mean): 77 (10 Nov 2023 10:00) (68 - 105)  RR: 23 (10 Nov 2023 10:00) (15 - 24)  SpO2: 100% (10 Nov 2023 10:00) (97% - 100%)    Parameters below as of 10 Nov 2023 10:00  Patient On (Oxygen Delivery Method): nasal cannula  O2 Flow (L/min): 5    General: No acute distress  NEUROLOGICAL EXAM:  Mental status: Awake, alert, oriented x3, speech fluent, follows commands, no neglect, normal memory   Cranial Nerves: No facial asymmetry, no nystagmus, no dysarthria,  tongue midline  Motor exam: Normal tone, no drift, 5/5 RUE, 5/5 RLE, 5/5 LUE, 5/5 LLE, normal fine finger movements.  Sensation: Intact to light touch   Coordination/ Gait: No dysmetria, gait not tested    LABS:                        11.4   5.24  )-----------( 289      ( 10 Nov 2023 10:32 )             34.5    11-10    136  |  96  |  26.7<H>  ----------------------------<  136<H>  4.4   |  28.0  |  1.09    Ca    9.3      10 Nov 2023 10:32    TPro  6.7  /  Alb  3.6  /  TBili  1.0  /  DBili  x   /  AST  22  /  ALT  12  /  AlkPhos  88  11-09        IMAGING: Reviewed by me.   CT Head No Cont (11.07.23 @ 17:27)   1. Decreased attenuation involving the bilateral posterior temporal   occipital brain parenchyma, slightly more prominent on the left than the   right. Cannot exclude evolving acute bilateral PCA territory infarcts.   Correlate with clinical scenario. Consider further evaluation via MR   imaging to include DWI and ADC mapping techniques, provided there are no   contraindications.  2. No acute intracranial hemorrhage.    TTE Echo Complete w/o Contrast w/ Doppler (11.07.23 @ 19:59)   Summary:   1. Technically difficult study.   2. Left ventricular ejection fraction, by visual estimation, is >75%.   3. Hyperdynamic global left ventricular systolic function.   4. There is moderate concentric left ventricular hypertrophy.   5. Elevated left atrial and left ventricular end-diastolic pressures.   6. Severely enlarged left atrium.   7. Mild mitral valve regurgitation.   8. Moderate mitral annular calcification.   9. Thickening and calcification of the anterior and posterior mitral   valve leaflets.  10. Mild tricuspid regurgitation.  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  12. Bioprosthesis in the aortic position.  13. Mild aortic regurgitation.  14. Peak aortic valve gradient is 22.7 mmHg and the mean gradient is 13.0   mmHg, which isprobably normal in the setting of a prosthetic aortic   valve.  15. Mild pulmonic valve regurgitation.  16. Estimated pulmonary artery systolic pressure is 36.8 mmHg assuming a   right atrial pressure of 5 mmHg, which is consistent with borderline   pulmonary hypertension.    MRI brain: No acute infarct. There are confluent abnormal T2/FLAIR   hyperintense signal in the periventricular white matter, compatible with   moderate to severe chronic microvascular ischemicchanges. There are   chronic lacunar infarcts in the bilateral basal ganglia.    MRA brain: No hemodynamically significant stenosis    MRA neck: No hemodynamically significant stenosis

## 2023-11-10 NOTE — DIETITIAN INITIAL EVALUATION ADULT - PERTINENT MEDS FT
MEDICATIONS  (STANDING):  furosemide   Injectable 40 milliGRAM(s) IV Push daily  polyethylene glycol 3350 17 Gram(s) Oral daily  rivaroxaban 15 milliGRAM(s) Oral with dinner  senna 2 Tablet(s) Oral at bedtime

## 2023-11-10 NOTE — DIETITIAN INITIAL EVALUATION ADULT - OTHER INFO
89 yr old female with HTN, HLD, CAD s/p PCI, GERD, atrial fibrillation on Xarelto, severe AS s/p TAVR, HFpEF, carotid atherosclerosis, recent fall, recently discharged from rehab on 11/3, presents to [facility] with weakness, shortness of breath and a fall. . CT head w/o acute intracranial hemorrhage, worrisome for acute bilateral PCA territory infarcts.

## 2023-11-10 NOTE — PROGRESS NOTE ADULT - ASSESSMENT
ASSESSMENT:   89 yr old female with CAD s/p PCI, GERD, atrial fibrillation on Xarelto, severe AS s/p TAVR, HFpEF, carotid atherosclerosis, recent fall, hypertension, hyperlipidemia, recently discharged from rehab on 11/3 presented to the ED on 11/7 with weakness, shortness of breath and a fall as noted by family. Patient stated she did not recall the events leading up to ED presentation. Morning of 11/7 she seemed weaker and trembling per son. She was walking with a walker at home post rehab, noted to have some shortness of breath per son. CT head showed decreased attenuation involving the bilateral posterior temporal occipital brain parenchyma, slightly more prominent on the left than the right; cannot exclude evolving acute bilateral PCA territory infarcts. Patient was not a tenecteplase candidate due to unclear time of onset of symptoms. MRI brain, MRA head/neck negative for acute findings.    NEURO:   -Pt negative for acute stroke. No need for additional inpatient neurologic workukp.  -BP goal per primary team.  -ANTITHROMBOTIC THERAPY: Continues on 81 mg ASA, Xarelto restarted.  -LDL 71 - continues on atorvastatin 80 mg daily  -Dysphagia screen: pass  -Physical therapy/OT/Speech eval/treatment.     CARDIOVASCULAR:   -TTE findings as above  -cardiac monitoring w/ telemetry for now, further evaluation pending findings of noted workup                              HEMATOLOGY:   -H/H 10.7/32.1, Platelets 247, patient should have all age and risk appropriate malignancy screenings with PCP or sooner if clinically suspected   -DVT ppx: Heparin s.c [] LMWH [x]     PULMONARY:   -protecting airway, saturating well     RENAL:   -BUN/Cr 49.8/17.5, SORAIDA management per primary team, monitor urine output, maintain adequate hydration    -Na Goal:  135-145    ID:   -afebrile, no leukocytosis, monitor for si/sx of infection     OTHER:    -condition and plan of care d/w patient, questions and concerns addressed.     DISPOSITION: Rehab or home depending on PT eval once stable and workup is complete    CORE MEASURES:        Admission NIHSS: 0     Tenecteplase : [] YES [x] NO      LDL/HDL/A1C: 71/77/4.8     Depression Screen- if depression hx and/or present      Statin Therapy: Atorvastatin 80 mg      Dysphagia Screen: [x] PASS [] FAIL     Smoking [] YES [x] NO      Afib [x] YES [] NO     Stroke Education [x] YES [] NO    Obtain screening lower extremity venous ultrasound in patients who meet 1 or more of the following criteria as patient is high risk for DVT/PE on admission:   [] History of DVT/PE  []Hypercoagulable states (Factor V Leiden, Cancer, OCP, etc. )  []Prolonged immobility (hemiplegia/hemiparesis/post operative or any other extended immobilization)  [] Transferred from outside facility (Rehab or Long term care)  [] Age </= to 50 ASSESSMENT:   89 yr old female with CAD s/p PCI, GERD, atrial fibrillation on Xarelto, severe AS s/p TAVR, HFpEF, carotid atherosclerosis, recent fall, hypertension, hyperlipidemia, recently discharged from rehab on 11/3 presented to the ED on 11/7 with weakness, shortness of breath and a fall as noted by family. Patient stated she did not recall the events leading up to ED presentation. Morning of 11/7 she seemed weaker and trembling per son. She was walking with a walker at home post rehab, noted to have some shortness of breath per son. CT head showed decreased attenuation involving the bilateral posterior temporal occipital brain parenchyma, slightly more prominent on the left than the right; cannot exclude evolving acute bilateral PCA territory infarcts. Patient was not a tenecteplase candidate due to unclear time of onset of symptoms. MRI brain, MRA head/neck negative for acute findings.    NEURO:   -Pt negative for acute stroke. No need for additional inpatient neurologic workup. Will sign off at this time. Please call with any new neurologic concerns.  -BP goal per primary team.  -ANTITHROMBOTIC THERAPY: Continues on 81 mg ASA, Xarelto restarted.  -LDL 71 - continues on atorvastatin 80 mg daily  -Dysphagia screen: pass  -Physical therapy/OT/Speech eval/treatment.     CARDIOVASCULAR:   -TTE findings as above  -On cardiac monitoring w/ telemetry                 HEMATOLOGY:   -H/H 11.4/34.5, Platelets 289, patient should have all age and risk appropriate malignancy screenings with PCP or sooner if clinically suspected   -DVT ppx: Heparin s.c [] LMWH [] - AC     PULMONARY:   -protecting airway, saturating well     RENAL:   -BUN/Cr 26.7/1.09, SORAIDA improved, monitor urine output, maintain adequate hydration    -Na Goal:  135-145    ID:   -afebrile, no leukocytosis, monitor for si/sx of infection     OTHER:    -condition and plan of care d/w patient, questions and concerns addressed.     DISPOSITION: Rehab or home depending on PT eval once stable and workup is complete    CORE MEASURES:        Admission NIHSS: 0     Tenecteplase : [] YES [x] NO      LDL/HDL/A1C: 71/77/4.8     Depression Screen- if depression hx and/or present      Statin Therapy: Atorvastatin 80 mg      Dysphagia Screen: [x] PASS [] FAIL     Smoking [] YES [x] NO      Afib [x] YES [] NO     Stroke Education [x] YES [] NO    Obtain screening lower extremity venous ultrasound in patients who meet 1 or more of the following criteria as patient is high risk for DVT/PE on admission:   [] History of DVT/PE  []Hypercoagulable states (Factor V Leiden, Cancer, OCP, etc. )  []Prolonged immobility (hemiplegia/hemiparesis/post operative or any other extended immobilization)  [] Transferred from outside facility (Rehab or Long term care)  [] Age </= to 50 ASSESSMENT:   89 yr old female with CAD s/p PCI, GERD, atrial fibrillation on Xarelto, severe AS s/p TAVR, HFpEF, carotid atherosclerosis, recent fall, hypertension, hyperlipidemia, recently discharged from rehab on 11/3 presented to the ED on 11/7 with weakness, shortness of breath and a fall as noted by family. Patient stated she did not recall the events leading up to ED presentation. Morning of 11/7 she seemed weaker and trembling per son. She was walking with a walker at home post rehab, noted to have some shortness of breath per son. CT head showed decreased attenuation involving the bilateral posterior temporal occipital brain parenchyma, slightly more prominent on the left than the right; cannot exclude evolving acute bilateral PCA territory infarcts. Patient was not a tenecteplase candidate due to unclear time of onset of symptoms. MRI brain, MRA head/neck negative for acute findings.    NEURO:   -No acute strokes appreciated on MRI Brain.  Pt is at her neurological baseline  -BP goal per primary team.  -ANTITHROMBOTIC THERAPY: Continues on 81 mg ASA, Xarelto restarted.  -LDL 71 - continues on atorvastatin 80 mg daily  -Dysphagia screen: pass  -Physical therapy/OT/Speech eval/treatment.    Will sign off at this time. Please call with any new neurologic concerns.    CARDIOVASCULAR:   -TTE findings as above  -On cardiac monitoring w/ telemetry                 HEMATOLOGY:   -H/H 11.4/34.5, Platelets 289, patient should have all age and risk appropriate malignancy screenings with PCP or sooner if clinically suspected   -DVT ppx: Heparin s.c [] LMWH [] - AC     PULMONARY:   -protecting airway, saturating well     RENAL:   -BUN/Cr 26.7/1.09, SORAIDA improved, monitor urine output, maintain adequate hydration    -Na Goal:  135-145    ID:   -afebrile, no leukocytosis, monitor for si/sx of infection     OTHER:    -condition and plan of care d/w patient, questions and concerns addressed.     DISPOSITION: Rehab or home depending on PT eval once stable and workup is complete    CORE MEASURES:        Admission NIHSS: 0     Tenecteplase : [] YES [x] NO      LDL/HDL/A1C: 71/77/4.8     Depression Screen- if depression hx and/or present      Statin Therapy: Atorvastatin 80 mg      Dysphagia Screen: [x] PASS [] FAIL     Smoking [] YES [x] NO      Afib [x] YES [] NO     Stroke Education [x] YES [] NO    Obtain screening lower extremity venous ultrasound in patients who meet 1 or more of the following criteria as patient is high risk for DVT/PE on admission:   [] History of DVT/PE  []Hypercoagulable states (Factor V Leiden, Cancer, OCP, etc. )  []Prolonged immobility (hemiplegia/hemiparesis/post operative or any other extended immobilization)  [] Transferred from outside facility (Rehab or Long term care)  [] Age </= to 50

## 2023-11-10 NOTE — DIETITIAN INITIAL EVALUATION ADULT - PERTINENT LABORATORY DATA
11-09    141  |  99  |  32.3<H>  ----------------------------<  98  4.2   |  32.0<H>  |  1.20    Ca    9.3      09 Nov 2023 06:28    TPro  6.7  /  Alb  3.6  /  TBili  1.0  /  DBili  x   /  AST  22  /  ALT  12  /  AlkPhos  88  11-09  A1C with Estimated Average Glucose Result: 4.8 % (11-08-23 @ 02:50)  A1C with Estimated Average Glucose Result: 5.2 % (09-09-23 @ 05:48)

## 2023-11-10 NOTE — PROGRESS NOTE ADULT - SUBJECTIVE AND OBJECTIVE BOX
Patient is a 89y old  Female who presents with a chief complaint of fall (08 Nov 2023 12:21)       HPI:  89 yr old female with CAD s/p PCI, GERD, atrial fibrillation on Xarelto, severe AS s/p TAVR, HFpEF, carotid atherosclerosis, recent fall, hypertension, hyperlipidemia, recently discharged from rehab on 11/3 presented with weakness, shortness of breath and  a fall as noted by family. Patient states she does not remember much since yesterday, asked me to call her son for more information. Feels weak, uses oxygen at home.  Called son Bony, states patient lives next door to him, was doing well until last night when she fell off the toilet and could not get back up. She called for help and son helped her get back in bed at night. This am, she seemed weaker and trembling per son. She was walking with a walker at home post rehab, noted to have some shortness of breath per son . He reports she was drinking a lot of water.   She denies chest pain.   Reviewed recent outpatient meds, no Xarelto listed when she left rehab but son states she has been receiving it.  (07 Nov 2023 18:58)    Renal consulted for SORAIDA. Chart reviewed. Was found to be retaining urine, now with gilliland.    NO N/V/SOB       PAST MEDICAL & SURGICAL HISTORY:  Essential hypertension      Chronic back pain      Hyperlipidemia, unspecified hyperlipidemia type      PNA (pneumonia)  november 2017      Lung nodule      Thyroid nodule      Acute deep vein thrombosis (DVT) of popliteal vein of both lower extremities      Pelvic fracture      Closed fracture of multiple ribs of right side, initial encounter      Aortic stenosis  s/p TAVR bioprosthetic Jan 2018 [facility] Dr. Aragon      Atherosclerosis of native coronary artery of native heart with angina pectoris      Hypercholesteremia      CURRY (dyspnea on exertion)      AF (atrial fibrillation)  Persistent/chronic      Asthma      AV block      Bronchiectasis      DVT (deep venous thrombosis)  left lower extremity      Kyphoscoliosis and scoliosis      Stenocardia      Spinal stenosis      Nosebleed  prior left nostrils cauterize x2      Murmur, cardiac  gr3/6      Neuropathy      Diastolic CHF  NYHA class 3      Aspiration pneumonia      Nosebleed  severe on aspirin; had nose cautery done in 2018      Anxiety      HTN (hypertension)      GERD (gastroesophageal reflux disease)      Closed pelvic ring fracture      Hip arthritis      Angina pectoris  class IV      [place] (hard of hearing)  wears bilateral hearing aides      Afib      History of valvular heart disease      HTN (hypertension)      Chronic back pain      S/P spinal fusion  L spines      S/p TAVR (transcatheter aortic valve replacement), bioprosthetic           FAMILY HISTORY:  Family history of breast cancer (Sibling)    Family history of cerebrovascular accident (CVA) (Mother)    Family history of esophageal cancer (Father)    NC    Social History:Non smoker    MEDICATIONS  (STANDING):  aspirin enteric coated 81 milliGRAM(s) Oral daily  atorvastatin 80 milliGRAM(s) Oral at bedtime  chlorhexidine 2% Cloths 1 Application(s) Topical <User Schedule>  enoxaparin Injectable 30 milliGRAM(s) SubCutaneous every 24 hours  furosemide   Injectable 40 milliGRAM(s) IV Push daily  gabapentin 600 milliGRAM(s) Oral three times a day  influenza  Vaccine (HIGH DOSE) 0.7 milliLiter(s) IntraMuscular once  polyethylene glycol 3350 17 Gram(s) Oral daily  ranolazine 500 milliGRAM(s) Oral two times a day  senna 2 Tablet(s) Oral at bedtime    MEDICATIONS  (PRN):  albuterol    90 MICROgram(s) HFA Inhaler 2 Puff(s) Inhalation every 6 hours PRN for shortness of breath and/or wheezing   Meds reviewed    Allergies    No Known Allergies    Intolerances         REVIEW OF SYSTEMS:  as above    Vital Signs Last 24 Hrs  T(C): 36.7 (10 Nov 2023 07:49), Max: 36.7 (10 Nov 2023 07:49)  T(F): 98.1 (10 Nov 2023 07:49), Max: 98.1 (10 Nov 2023 07:49)  HR: 87 (10 Nov 2023 10:00) (72 - 95)  BP: 118/63 (10 Nov 2023 10:00) (116/93 - 132/54)  BP(mean): 77 (10 Nov 2023 10:00) (68 - 105)  RR: 23 (10 Nov 2023 10:00) (15 - 24)  SpO2: 100% (10 Nov 2023 10:00) (97% - 100%)    Parameters below as of 10 Nov 2023 10:00  Patient On (Oxygen Delivery Method): nasal cannula  O2 Flow (L/min): 5          Daily     Daily     PHYSICAL EXAM:    GENERAL: NAD  NECK: Supple, neck  veins full  NERVOUS SYSTEM:  Awake and Alert  HEART: Regular rate and rhythm; No murmurs, rubs, or gallops  ABDOMEN: Soft, Nontender  EXTREMITIES:  No Edema  : Gilliland      LABS:                        11.4   5.24  )-----------( 289      ( 10 Nov 2023 10:32 )             34.5     11-10    136  |  96  |  26.7<H>  ----------------------------<  136<H>  4.4   |  28.0  |  1.09    Ca    9.3      10 Nov 2023 10:32    TPro  6.7  /  Alb  3.6  /  TBili  1.0  /  DBili  x   /  AST  22  /  ALT  12  /  AlkPhos  88  11-09      Urinalysis Basic - ( 10 Nov 2023 10:32 )    Color: x / Appearance: x / SG: x / pH: x  Gluc: 136 mg/dL / Ketone: x  / Bili: x / Urobili: x   Blood: x / Protein: x / Nitrite: x   Leuk Esterase: x / RBC: x / WBC x   Sq Epi: x / Non Sq Epi: x / Bacteria: x

## 2023-11-10 NOTE — PROGRESS NOTE ADULT - SUBJECTIVE AND OBJECTIVE BOX
Patient is a 89y old  Female who presents with a chief complaint of fall (10 Nov 2023 11:41)    INTERVAL HPI/OVERNIGHT EVENTS: Patient with one episode of black tarry stool. H/H stable.     MEDICATIONS  (STANDING):  aspirin enteric coated 81 milliGRAM(s) Oral daily  atorvastatin 80 milliGRAM(s) Oral at bedtime  chlorhexidine 2% Cloths 1 Application(s) Topical <User Schedule>  furosemide   Injectable 40 milliGRAM(s) IV Push daily  gabapentin 600 milliGRAM(s) Oral three times a day  influenza  Vaccine (HIGH DOSE) 0.7 milliLiter(s) IntraMuscular once  polyethylene glycol 3350 17 Gram(s) Oral daily  ranolazine 500 milliGRAM(s) Oral two times a day  rivaroxaban 15 milliGRAM(s) Oral with dinner  senna 2 Tablet(s) Oral at bedtime    MEDICATIONS  (PRN):  albuterol    90 MICROgram(s) HFA Inhaler 2 Puff(s) Inhalation every 6 hours PRN for shortness of breath and/or wheezing      Allergies    No Known Allergies    Intolerances        REVIEW OF SYSTEMS: all negative with exception of above    Vital Signs Last 24 Hrs  T(C): 36.7 (10 Nov 2023 07:49), Max: 36.7 (10 Nov 2023 07:49)  T(F): 98.1 (10 Nov 2023 07:49), Max: 98.1 (10 Nov 2023 07:49)  HR: 87 (10 Nov 2023 10:00) (72 - 95)  BP: 118/63 (10 Nov 2023 10:00) (116/93 - 132/54)  BP(mean): 77 (10 Nov 2023 10:00) (68 - 105)  RR: 23 (10 Nov 2023 10:00) (15 - 24)  SpO2: 100% (10 Nov 2023 10:00) (97% - 100%)    Parameters below as of 10 Nov 2023 10:00  Patient On (Oxygen Delivery Method): nasal cannula  O2 Flow (L/min): 5      PHYSICAL EXAM:  GENERAL: NAD, well-groomed  NERVOUS SYSTEM:  Alert & Oriented X3, Good concentration; Motor Strength 5/5 B/L upper and lower extremities; DTRs 2+ intact and symmetric  CHEST/LUNG: Clear to percussion bilaterally; No rales, rhonchi, wheezing, or rubs  HEART: Regular rate and rhythm; No murmurs, rubs, or gallops  ABDOMEN: Soft, Nontender, Nondistended; Bowel sounds present  EXTREMITIES:  2+ Peripheral Pulses, No clubbing, cyanosis, or edema    LABS:                        11.4   5.24  )-----------( 289      ( 10 Nov 2023 10:32 )             34.5     11-10    136  |  96  |  26.7<H>  ----------------------------<  136<H>  4.4   |  28.0  |  1.09    Ca    9.3      10 Nov 2023 10:32    TPro  6.7  /  Alb  3.6  /  TBili  1.0  /  DBili  x   /  AST  22  /  ALT  12  /  AlkPhos  88  11-09      Urinalysis Basic - ( 10 Nov 2023 10:32 )    Color: x / Appearance: x / SG: x / pH: x  Gluc: 136 mg/dL / Ketone: x  / Bili: x / Urobili: x   Blood: x / Protein: x / Nitrite: x   Leuk Esterase: x / RBC: x / WBC x   Sq Epi: x / Non Sq Epi: x / Bacteria: x      CAPILLARY BLOOD GLUCOSE          RADIOLOGY & ADDITIONAL TESTS:    Imaging Personally Reviewed:  [ ] YES  [ ] NO    Consultant(s) Notes Reviewed:  [ ] YES  [ ] NO    Care Discussed with Consultants/Other Providers [ ] YES  [ ] NO

## 2023-11-10 NOTE — PROGRESS NOTE ADULT - ASSESSMENT
89 yr old female with CAD s/p PCI, GERD, atrial fibrillation on Xarelto, severe AS s/p TAVR, HFpEF, carotid atherosclerosis, recent fall, hypertension, hyperlipidemia, recently discharged from rehab on 11/3 presented with weakness, shortness of breath and  a fall as noted by family. Labs reviewed, noted to have SORAIDA, creatinine 1.5, elevated BNP, CXR consistent with CHF, EKG with atrial flutter 79. CT head with bilateral acute PCA strokes,    1. Acute CVA:  Likely cause for fall  admit to stroke unit  Neuro checks  - Stroke team consulted, advised continue aspirin, statin  - MRI-H and MRA-H/N reviewed  - Appreciate GI recs- will c/w Xarelto  check coags  fall precautions  PT evaluation.  TTE reviewed    2. Fall:  Likely sec to CVA  fall precautions, PT eval  - UCx w/ Proteus. Asymptomatic, no leukocytosis and no fever. Likely colonication    3. Atrial fibrillation, CAD s/p PCI, elevated troponin:  Telemetry  cardiology consulted  trend troponins  restarted Xarelto  continue aspirin, statin  On Ranexa  troponin elevated ? SORAIDA  Hold anti hypertensives for now, acute CVA and soft BP  monitor BP    4. Acute diastolic CHF:  ? Excessive fluid intake  - Appreciate cards recs- will c/w IV lasix 40 mg QD. Possible transition to PO lasix tomorrow  - will need elective LAAO outpatient  Monitor I/O. daily weights  cardiology eval  ECHO ordered    5. SORAIDA;  nephro recs appreciated  - gilliland discontinued on 11/10  s/p Lasix  monitor BMP    6. GERD:  Continue Pantoprazole.    7. DVT ppx:  Lovenox    PT eval- home PT.

## 2023-11-11 ENCOUNTER — TRANSCRIPTION ENCOUNTER (OUTPATIENT)
Age: 88
End: 2023-11-11

## 2023-11-11 VITALS — TEMPERATURE: 98 F

## 2023-11-11 LAB
ALBUMIN SERPL ELPH-MCNC: 3.8 G/DL — SIGNIFICANT CHANGE UP (ref 3.3–5.2)
ALBUMIN SERPL ELPH-MCNC: 3.8 G/DL — SIGNIFICANT CHANGE UP (ref 3.3–5.2)
ALP SERPL-CCNC: 86 U/L — SIGNIFICANT CHANGE UP (ref 40–120)
ALP SERPL-CCNC: 86 U/L — SIGNIFICANT CHANGE UP (ref 40–120)
ALT FLD-CCNC: 13 U/L — SIGNIFICANT CHANGE UP
ALT FLD-CCNC: 13 U/L — SIGNIFICANT CHANGE UP
ANION GAP SERPL CALC-SCNC: 12 MMOL/L — SIGNIFICANT CHANGE UP (ref 5–17)
ANION GAP SERPL CALC-SCNC: 12 MMOL/L — SIGNIFICANT CHANGE UP (ref 5–17)
AST SERPL-CCNC: 21 U/L — SIGNIFICANT CHANGE UP
AST SERPL-CCNC: 21 U/L — SIGNIFICANT CHANGE UP
BILIRUB SERPL-MCNC: 0.7 MG/DL — SIGNIFICANT CHANGE UP (ref 0.4–2)
BILIRUB SERPL-MCNC: 0.7 MG/DL — SIGNIFICANT CHANGE UP (ref 0.4–2)
BUN SERPL-MCNC: 26.4 MG/DL — HIGH (ref 8–20)
BUN SERPL-MCNC: 26.4 MG/DL — HIGH (ref 8–20)
CALCIUM SERPL-MCNC: 9.1 MG/DL — SIGNIFICANT CHANGE UP (ref 8.4–10.5)
CALCIUM SERPL-MCNC: 9.1 MG/DL — SIGNIFICANT CHANGE UP (ref 8.4–10.5)
CHLORIDE SERPL-SCNC: 95 MMOL/L — LOW (ref 96–108)
CHLORIDE SERPL-SCNC: 95 MMOL/L — LOW (ref 96–108)
CO2 SERPL-SCNC: 30 MMOL/L — HIGH (ref 22–29)
CO2 SERPL-SCNC: 30 MMOL/L — HIGH (ref 22–29)
CREAT SERPL-MCNC: 1.06 MG/DL — SIGNIFICANT CHANGE UP (ref 0.5–1.3)
CREAT SERPL-MCNC: 1.06 MG/DL — SIGNIFICANT CHANGE UP (ref 0.5–1.3)
EGFR: 50 ML/MIN/1.73M2 — LOW
EGFR: 50 ML/MIN/1.73M2 — LOW
GLUCOSE SERPL-MCNC: 87 MG/DL — SIGNIFICANT CHANGE UP (ref 70–99)
GLUCOSE SERPL-MCNC: 87 MG/DL — SIGNIFICANT CHANGE UP (ref 70–99)
HCT VFR BLD CALC: 33.8 % — LOW (ref 34.5–45)
HCT VFR BLD CALC: 33.8 % — LOW (ref 34.5–45)
HGB BLD-MCNC: 11.3 G/DL — LOW (ref 11.5–15.5)
HGB BLD-MCNC: 11.3 G/DL — LOW (ref 11.5–15.5)
MCHC RBC-ENTMCNC: 30.1 PG — SIGNIFICANT CHANGE UP (ref 27–34)
MCHC RBC-ENTMCNC: 30.1 PG — SIGNIFICANT CHANGE UP (ref 27–34)
MCHC RBC-ENTMCNC: 33.4 GM/DL — SIGNIFICANT CHANGE UP (ref 32–36)
MCHC RBC-ENTMCNC: 33.4 GM/DL — SIGNIFICANT CHANGE UP (ref 32–36)
MCV RBC AUTO: 89.9 FL — SIGNIFICANT CHANGE UP (ref 80–100)
MCV RBC AUTO: 89.9 FL — SIGNIFICANT CHANGE UP (ref 80–100)
NT-PROBNP SERPL-SCNC: 1562 PG/ML — HIGH (ref 0–300)
NT-PROBNP SERPL-SCNC: 1562 PG/ML — HIGH (ref 0–300)
PLATELET # BLD AUTO: 300 K/UL — SIGNIFICANT CHANGE UP (ref 150–400)
PLATELET # BLD AUTO: 300 K/UL — SIGNIFICANT CHANGE UP (ref 150–400)
POTASSIUM SERPL-MCNC: 3.9 MMOL/L — SIGNIFICANT CHANGE UP (ref 3.5–5.3)
POTASSIUM SERPL-MCNC: 3.9 MMOL/L — SIGNIFICANT CHANGE UP (ref 3.5–5.3)
POTASSIUM SERPL-SCNC: 3.9 MMOL/L — SIGNIFICANT CHANGE UP (ref 3.5–5.3)
POTASSIUM SERPL-SCNC: 3.9 MMOL/L — SIGNIFICANT CHANGE UP (ref 3.5–5.3)
PROT SERPL-MCNC: 6.6 G/DL — SIGNIFICANT CHANGE UP (ref 6.6–8.7)
PROT SERPL-MCNC: 6.6 G/DL — SIGNIFICANT CHANGE UP (ref 6.6–8.7)
RBC # BLD: 3.76 M/UL — LOW (ref 3.8–5.2)
RBC # BLD: 3.76 M/UL — LOW (ref 3.8–5.2)
RBC # FLD: 15.3 % — HIGH (ref 10.3–14.5)
RBC # FLD: 15.3 % — HIGH (ref 10.3–14.5)
SODIUM SERPL-SCNC: 137 MMOL/L — SIGNIFICANT CHANGE UP (ref 135–145)
SODIUM SERPL-SCNC: 137 MMOL/L — SIGNIFICANT CHANGE UP (ref 135–145)
WBC # BLD: 5.64 K/UL — SIGNIFICANT CHANGE UP (ref 3.8–10.5)
WBC # BLD: 5.64 K/UL — SIGNIFICANT CHANGE UP (ref 3.8–10.5)
WBC # FLD AUTO: 5.64 K/UL — SIGNIFICANT CHANGE UP (ref 3.8–10.5)
WBC # FLD AUTO: 5.64 K/UL — SIGNIFICANT CHANGE UP (ref 3.8–10.5)

## 2023-11-11 PROCEDURE — 36415 COLL VENOUS BLD VENIPUNCTURE: CPT

## 2023-11-11 PROCEDURE — 93306 TTE W/DOPPLER COMPLETE: CPT

## 2023-11-11 PROCEDURE — 85730 THROMBOPLASTIN TIME PARTIAL: CPT

## 2023-11-11 PROCEDURE — 70551 MRI BRAIN STEM W/O DYE: CPT

## 2023-11-11 PROCEDURE — 81001 URINALYSIS AUTO W/SCOPE: CPT

## 2023-11-11 PROCEDURE — 84484 ASSAY OF TROPONIN QUANT: CPT

## 2023-11-11 PROCEDURE — 82570 ASSAY OF URINE CREATININE: CPT

## 2023-11-11 PROCEDURE — 70548 MR ANGIOGRAPHY NECK W/DYE: CPT

## 2023-11-11 PROCEDURE — 70545 MR ANGIOGRAPHY HEAD W/DYE: CPT

## 2023-11-11 PROCEDURE — 87077 CULTURE AEROBIC IDENTIFY: CPT

## 2023-11-11 PROCEDURE — 93005 ELECTROCARDIOGRAM TRACING: CPT

## 2023-11-11 PROCEDURE — 99239 HOSP IP/OBS DSCHRG MGMT >30: CPT

## 2023-11-11 PROCEDURE — 85025 COMPLETE CBC W/AUTO DIFF WBC: CPT

## 2023-11-11 PROCEDURE — 80053 COMPREHEN METABOLIC PANEL: CPT

## 2023-11-11 PROCEDURE — 85610 PROTHROMBIN TIME: CPT

## 2023-11-11 PROCEDURE — 94640 AIRWAY INHALATION TREATMENT: CPT

## 2023-11-11 PROCEDURE — 80061 LIPID PANEL: CPT

## 2023-11-11 PROCEDURE — 99285 EMERGENCY DEPT VISIT HI MDM: CPT | Mod: 25

## 2023-11-11 PROCEDURE — 84300 ASSAY OF URINE SODIUM: CPT

## 2023-11-11 PROCEDURE — 99232 SBSQ HOSP IP/OBS MODERATE 35: CPT

## 2023-11-11 PROCEDURE — 70450 CT HEAD/BRAIN W/O DYE: CPT | Mod: MG

## 2023-11-11 PROCEDURE — 80048 BASIC METABOLIC PNL TOTAL CA: CPT

## 2023-11-11 PROCEDURE — 71045 X-RAY EXAM CHEST 1 VIEW: CPT

## 2023-11-11 PROCEDURE — 87086 URINE CULTURE/COLONY COUNT: CPT

## 2023-11-11 PROCEDURE — 83036 HEMOGLOBIN GLYCOSYLATED A1C: CPT

## 2023-11-11 PROCEDURE — 84156 ASSAY OF PROTEIN URINE: CPT

## 2023-11-11 PROCEDURE — G1004: CPT

## 2023-11-11 PROCEDURE — 82803 BLOOD GASES ANY COMBINATION: CPT

## 2023-11-11 PROCEDURE — 0225U NFCT DS DNA&RNA 21 SARSCOV2: CPT

## 2023-11-11 PROCEDURE — 82272 OCCULT BLD FECES 1-3 TESTS: CPT

## 2023-11-11 PROCEDURE — 84540 ASSAY OF URINE/UREA-N: CPT

## 2023-11-11 PROCEDURE — 85027 COMPLETE CBC AUTOMATED: CPT

## 2023-11-11 PROCEDURE — 72170 X-RAY EXAM OF PELVIS: CPT

## 2023-11-11 PROCEDURE — 87186 SC STD MICRODIL/AGAR DIL: CPT

## 2023-11-11 PROCEDURE — 83935 ASSAY OF URINE OSMOLALITY: CPT

## 2023-11-11 PROCEDURE — 83880 ASSAY OF NATRIURETIC PEPTIDE: CPT

## 2023-11-11 RX ORDER — GABAPENTIN 400 MG/1
2 CAPSULE ORAL
Qty: 0 | Refills: 0 | DISCHARGE

## 2023-11-11 RX ORDER — GABAPENTIN 400 MG/1
1 CAPSULE ORAL
Qty: 0 | Refills: 0 | DISCHARGE
Start: 2023-11-11

## 2023-11-11 RX ORDER — RIVAROXABAN 15 MG-20MG
1 KIT ORAL
Qty: 0 | Refills: 0 | DISCHARGE

## 2023-11-11 RX ORDER — FUROSEMIDE 40 MG
40 TABLET ORAL DAILY
Refills: 0 | Status: DISCONTINUED | OUTPATIENT
Start: 2023-11-12 | End: 2023-11-11

## 2023-11-11 RX ORDER — ASPIRIN/CALCIUM CARB/MAGNESIUM 324 MG
1 TABLET ORAL
Qty: 0 | Refills: 0 | DISCHARGE
Start: 2023-11-11

## 2023-11-11 RX ORDER — POLYETHYLENE GLYCOL 3350 17 G/17G
17 POWDER, FOR SOLUTION ORAL
Qty: 0 | Refills: 0 | DISCHARGE
Start: 2023-11-11

## 2023-11-11 RX ORDER — RANOLAZINE 500 MG/1
1 TABLET, FILM COATED, EXTENDED RELEASE ORAL
Refills: 0 | DISCHARGE

## 2023-11-11 RX ORDER — RIVAROXABAN 15 MG-20MG
1 KIT ORAL
Qty: 0 | Refills: 0 | DISCHARGE
Start: 2023-11-11

## 2023-11-11 RX ORDER — ALBUTEROL 90 UG/1
2 AEROSOL, METERED ORAL
Refills: 0 | DISCHARGE

## 2023-11-11 RX ORDER — ALBUTEROL 90 UG/1
2 AEROSOL, METERED ORAL
Qty: 0 | Refills: 0 | DISCHARGE
Start: 2023-11-11

## 2023-11-11 RX ORDER — SENNA PLUS 8.6 MG/1
2 TABLET ORAL
Qty: 0 | Refills: 0 | DISCHARGE
Start: 2023-11-11

## 2023-11-11 RX ORDER — ATORVASTATIN CALCIUM 80 MG/1
1 TABLET, FILM COATED ORAL
Qty: 0 | Refills: 0 | DISCHARGE
Start: 2023-11-11

## 2023-11-11 RX ORDER — ALBUTEROL 90 UG/1
2 AEROSOL, METERED ORAL
Qty: 0 | Refills: 0 | DISCHARGE

## 2023-11-11 RX ORDER — FUROSEMIDE 40 MG
1 TABLET ORAL
Qty: 0 | Refills: 0 | DISCHARGE
Start: 2023-11-11

## 2023-11-11 RX ORDER — AMLODIPINE BESYLATE 2.5 MG/1
1 TABLET ORAL
Refills: 0 | DISCHARGE

## 2023-11-11 RX ORDER — RANOLAZINE 500 MG/1
1 TABLET, FILM COATED, EXTENDED RELEASE ORAL
Qty: 0 | Refills: 0 | DISCHARGE
Start: 2023-11-11

## 2023-11-11 RX ORDER — SPIRONOLACTONE 25 MG/1
0 TABLET, FILM COATED ORAL
Qty: 0 | Refills: 0 | DISCHARGE

## 2023-11-11 RX ADMIN — GABAPENTIN 600 MILLIGRAM(S): 400 CAPSULE ORAL at 05:37

## 2023-11-11 RX ADMIN — Medication 81 MILLIGRAM(S): at 12:35

## 2023-11-11 RX ADMIN — RANOLAZINE 500 MILLIGRAM(S): 500 TABLET, FILM COATED, EXTENDED RELEASE ORAL at 17:14

## 2023-11-11 RX ADMIN — Medication 40 MILLIGRAM(S): at 05:37

## 2023-11-11 RX ADMIN — RANOLAZINE 500 MILLIGRAM(S): 500 TABLET, FILM COATED, EXTENDED RELEASE ORAL at 05:37

## 2023-11-11 RX ADMIN — CHLORHEXIDINE GLUCONATE 1 APPLICATION(S): 213 SOLUTION TOPICAL at 05:37

## 2023-11-11 RX ADMIN — GABAPENTIN 600 MILLIGRAM(S): 400 CAPSULE ORAL at 14:05

## 2023-11-11 RX ADMIN — RIVAROXABAN 15 MILLIGRAM(S): KIT at 17:14

## 2023-11-11 NOTE — PROGRESS NOTE ADULT - SUBJECTIVE AND OBJECTIVE BOX
[facility]                                                         CARDIOLOGY AT [place] [address]                                                         Telephone: [phone]. Fax:[phone]                                                                             PROGRESS NOTE    Reason for follow up: HFpEF  Update: Patient's breathing continues to improve at this time, and oxygen requirements decreasing.       Review of symptoms:   Cardiac:  No chest pain. No dyspnea. No palpitations.  Respiratory: no cough. No dyspnea  Gastrointestinal: No diarrhea. No abdominal pain. No bleeding.   Neuro: No focal neuro complaints.    Vitals:  T(C): 36.3 (11-11-23 @ 07:45), Max: 36.9 (11-11-23 @ 04:00)  HR: 92 (11-11-23 @ 08:00) (82 - 100)  BP: 117/50 (11-11-23 @ 08:00) (112/63 - 146/71)  RR: 19 (11-11-23 @ 08:00) (15 - 23)  SpO2: 99% (11-11-23 @ 08:00) (94% - 100%)  Wt(kg): --  I&O's Summary    10 Nov 2023 07:01  -  11 Nov 2023 07:00  --------------------------------------------------------  IN: 420 mL / OUT: 1125 mL / NET: -705 mL    11 Nov 2023 07:01  -  11 Nov 2023 08:53  --------------------------------------------------------  IN: 0 mL / OUT: 450 mL / NET: -450 mL      Weight (kg): 56.7 (11-07 @ 13:41)    PHYSICAL EXAM:  Appearance: Comfortable. No acute distress  HEENT:  Atraumatic. Normocephalic.  Normal oral mucosa  Neurologic: A & O x 3, no gross focal deficits.  Cardiovascular: Irregularly irregular, no rubs/gallops. No JVD, II/VI systolic murmur lsb  Respiratory: Still with trace rales bilaterally  Gastrointestinal:  Soft, Non-tender, + BS  Lower Extremities: 2+ Peripheral Pulses, No clubbing, cyanosis, or edema  Psychiatry: Patient is calm. No agitation.   Skin: warm and dry.    CURRENT CARDIAC MEDICATIONS:  furosemide   Injectable 40 milliGRAM(s) IV Push daily  ranolazine 500 milliGRAM(s) Oral two times a day      CURRENT OTHER MEDICATIONS:  albuterol    90 MICROgram(s) HFA Inhaler 2 Puff(s) Inhalation every 6 hours PRN for shortness of breath and/or wheezing  gabapentin 600 milliGRAM(s) Oral three times a day  polyethylene glycol 3350 17 Gram(s) Oral daily  senna 2 Tablet(s) Oral at bedtime  atorvastatin 80 milliGRAM(s) Oral at bedtime  aspirin enteric coated 81 milliGRAM(s) Oral daily  chlorhexidine 2% Cloths 1 Application(s) Topical <User Schedule>  influenza  Vaccine (HIGH DOSE) 0.7 milliLiter(s) IntraMuscular once  rivaroxaban 15 milliGRAM(s) Oral with dinner      LABS:	 	                            11.3   5.64  )-----------( 300      ( 11 Nov 2023 06:45 )             33.8     11-11    137  |  95<L>  |  26.4<H>  ----------------------------<  87  3.9   |  30.0<H>  |  1.06    Ca    9.1      11 Nov 2023 06:45    TPro  6.6  /  Alb  3.8  /  TBili  0.7  /  DBili  x   /  AST  21  /  ALT  13  /  AlkPhos  86  11-11    PT/INR/PTT ( 07 Nov 2023 19:20 )                       :                       :      17.1         :       38.5                  .        .                   .              .           .       1.56        .                                       Lipid Profile: Date: 11-08 @ 02:50  Total cholesterol 158; Direct LDL: --; HDL: 77; Triglycerides:51    HgA1c:   TSH:     TELEMETRY: Afib, PVCs  ECG:    DIAGNOSTIC TESTING:  < from: TTE Echo Complete w/o Contrast w/ Doppler (11.07.23 @ 19:59) >  PHYSICIAN INTERPRETATION:  Left Ventricle: The left ventricular internal cavity size is normal.  Global LV systolic function was hyperdynamic. Left ventricular ejection   fraction, by visual estimation, is >75%. Elevated left atrial and left   ventricular end-diastolic pressures.  Right Ventricle: Normal right ventricular size and function.  Left Atrium: Severely enlarged left atrium.  Right Atrium: Normal right atrial size.  Pericardium: There is no evidence of pericardial effusion.  Mitral Valve: Thickening and calcification of the anterior and posterior   mitral valve leaflets. There is moderate mitral annular calcification.   Mild mitral valve regurgitation is seen.  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 Mild tricuspid   regurgitation is visualized. Estimated pulmonary artery systolic pressure   is 36.8 mmHg assuming a right atrial pressure of 5 mmHg, which is   consistent with borderline pulmonary hypertension.  Aortic Valve: Peak aortic valve gradient is 22.7 mmHg and the mean   gradient is 13.0 mmHg, which is probably normal in the setting of a   prosthetic aortic valve. Mild aortic valve regurgitation is seen.  Pulmonic Valve: The pulmonic valve is thickened with good excursion. Mild   pulmonic valve regurgitation.  Aorta: The aortic root is normal in size and structure.  Pulmonary Artery: The pulmonary artery is of normal size and origin.  Venous: The pulmonary veins were not well visualized. The inferior vena   cava is not well visualized.  In comparison to the previous echocardiogram(s): Prior examinations are   available and were reviewed for comparison purposes.      Summary:   1. Technically difficult study.   2. Left ventricular ejection fraction, by visual estimation, is >75%.   3. Hyperdynamic global left ventricular systolic function.   4. There is moderate concentric left ventricular hypertrophy.   5. Elevated left atrial and left ventricular end-diastolic pressures.   6. Severely enlarged left atrium.   7. Mild mitral valve regurgitation.   8. Moderate mitral annular calcification.   9. Thickening and calcification of the anterior and posterior mitral   valve leaflets.  10. Mild tricuspid regurgitation.  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  12. Bioprosthesis in the aortic position.  13. Mild aortic regurgitation.  14. Peak aortic valve gradient is 22.7 mmHg and the mean gradient is 13.0   mmHg, which isprobably normal in the setting of a prosthetic aortic   valve.  15. Mild pulmonic valve regurgitation.  16. Estimated pulmonary artery systolic pressure is 36.8 mmHg assuming a   right atrial pressure of 5 mmHg, which is consistent with borderline   pulmonary hypertension.    MD Jeff Electronically signed on 11/8/2023 at 6:56:26 AM    < end of copied text >    [ ]  Catheterization:  < from: Cardiac Cath Lab - Adult (11.12.18 @ 10:59) >  CORONARY VESSELS: The coronary circulation is co-dominant.  LM:   --  LM: Normal.  LAD:   --  Mid LAD: There was a 40 % stenosis.  CX:   --  Mid circumflex: There was a 80 % stenosis.  RCA:   -- Mid RCA: There was a 100 % stenosis.  COMPLICATIONS: No complications occurred during the cath lab visit.  SUMMARY:  HEMODYNAMICS: Hemodynamic assessment demonstrates mildly to moderately  elevated pulmonary capillary wedge pressure.  DIAGNOSTIC IMPRESSIONS: Severe circumflex disease. PCI performed with 1  JARETT.  Mild to moderate pulmonary hypertension. Mild to moderately elevated wedge  pressure.  DIAGNOSTIC RECOMMENDATIONS: Plavix and AC. No aspirin due to elevated  bleeding risk in elderly patient.  INTERVENTIONAL IMPRESSIONS: Severe circumflex disease. PCI performed with 1  JARETT.  Mild to moderate pulmonary hypertension. Mild to moderately elevated wedge  pressure.  INTERVENTIONAL RECOMMENDATIONS: Plavix and AC. No aspirin due to elevated  bleeding risk in elderly patient.  Prepared and signed by  Justyn Soto MD  Signed 11/13/2018 15:15:05    < end of copied text >

## 2023-11-11 NOTE — PROGRESS NOTE ADULT - PROBLEM SELECTOR PLAN 3
- Hx of CAD s/p JARETT to mLCx.   - Continue aspirin, lipitor, ranexa.

## 2023-11-11 NOTE — DISCHARGE NOTE PROVIDER - NSDCCPCAREPLAN_GEN_ALL_CORE_FT
PRINCIPAL DISCHARGE DIAGNOSIS  Diagnosis: Acute on chronic systolic congestive heart failure  Assessment and Plan of Treatment: continue PO lasix      SECONDARY DISCHARGE DIAGNOSES  Diagnosis: Ischemic cerebrovascular accident (CVA)  Assessment and Plan of Treatment: no acute infarcts on MRI    Diagnosis: Atrial fibrillation  Assessment and Plan of Treatment: continue xarelto    Diagnosis: CAD (coronary artery disease)  Assessment and Plan of Treatment:

## 2023-11-11 NOTE — DISCHARGE NOTE PROVIDER - NSDCFUSCHEDAPPT_GEN_ALL_CORE_FT
Marybeth Velazquez  [facility] Physician [facility]  CARDIOLOGY [address]  Scheduled Appointment: 11/29/2023

## 2023-11-11 NOTE — DISCHARGE NOTE PROVIDER - NSDCFUADDAPPT_GEN_ALL_CORE_FT
[facility] pt. for CHF    Appointment made with  Aman Rueda  on  11/29  at  8:00  [facility].   If you are unable to attend your pre-scheduled appointment, please contact the office directly at [phone]  to reschedule.      PCP appt-    Pcp- Appointment made with  Dr. Keyes  on  11/27  at  1:45 .   If you are unable to attend your pre-scheduled appointment, please contact the office directly at [phone]- to reschedule.

## 2023-11-11 NOTE — PROGRESS NOTE ADULT - NS ATTEND AMEND GEN_ALL_CORE FT
Agree with PA's assessment and plan
seen with above,    89F history significant for HTN, HLD, CAD s/p PCI , GERD, atrial fibrillation on Xarelto, DVT,  severe AS s/p TAVR, HFpEF (on Lasix 20mg and spironolactone 25mg), carotid atherosclerosis, recent fall and discharged from rehab on 11/3 presented with legs weakness and recurrent fall admitted for stroke workup, found dyspneic/hypoxic with  ADHF, pBNP 8K  -LV EF >75%  -remains on 5L NC need to clarify home oxygen use, would continue IV Lasix 40mg for another day, recheck pBNP tomorrow if downtrending then switch to PO Lasix 40mg and continue to hold spironolactone given recent SORAIDA  -MRI brain no acute stroke, restarted on Xarelto, given recent recurrent falls not ideal for long term AC, would need further discussion if she wants to proceed with elective LAAO in the future        Ryan Christopher DO, [facility]  Faculty Non-Invasive Cardiologist  [phone].
seen with above,    89F history significant for HTN, HLD, CAD s/p PCI , GERD, atrial fibrillation on Xarelto, DVT,  severe AS s/p TAVR, HFpEF (on Lasix 20mg and spironolactone 25mg), carotid atherosclerosis, recent fall and discharged from rehab on 11/3 presented with legs weakness and recurrent fall admitted for stroke workup, found dyspneic/hypoxic with  ADHF, pBNP 8K  -LV EF >75%  -weaned off supplemental oxygen and pBNP downtrending 1500s, switch to PO Lasix 40mg and continue to hold spironolactone given recent SORAIDA  -MRI brain no acute stroke, restarted on Xarelto, given recent recurrent falls not ideal for long term AC, discussed about fall precaution and need further discussion about elective LAAO in the future  -stable for discharge home, follow up in the office within 2 weeks        Ryan Christopher DO, [facility]  Faculty Non-Invasive Cardiologist  [phone]
seen with above,    89F history significant for HTN, HLD, CAD s/p PCI , GERD, atrial fibrillation on Xarelto, DVT,  severe AS s/p TAVR, HFpEF (on Lasix 20mg and spironolactone 25mg), carotid atherosclerosis, recent fall and discharged from rehab on 11/3 presented with legs weakness and recurrent fall admitted for stroke workup, found dyspneic/hyoxic with  ADHF, pBNP 8K  -LV EF >75%  -remains volume up on exam, continue IV Lasix 40mg daily for diuresis  -MRI brain pending, currently off Xarelto, if confirms CVA would need to resume but given recent recurrent falls not ideal for long term AC, would need further discussion if she wants to proceed with elective LAAO in the future        Ryan Christopher DO, [facility]  Faculty Non-Invasive Cardiologist  [phone]

## 2023-11-11 NOTE — PROGRESS NOTE ADULT - PROBLEM SELECTOR PROBLEM 1
Acute heart failure with preserved ejection fraction (HFpEF)

## 2023-11-11 NOTE — PROGRESS NOTE ADULT - TIME BILLING
Acute on chronic HFpEF, Chronic Afib, Recurrent falls
Acute on chronic HFrEF, Chronic Afib/flutter
Acute on chronic HFpEF, Acute hypoxia, SORAIDA

## 2023-11-11 NOTE — PROGRESS NOTE ADULT - PROVIDER SPECIALTY LIST ADULT
Neurology
Hospitalist
Nephrology
Cardiology

## 2023-11-11 NOTE — DISCHARGE NOTE PROVIDER - DETAILS OF MALNUTRITION DIAGNOSIS/DIAGNOSES
This patient has been assessed with a concern for Malnutrition and was treated during this hospitalization for the following Nutrition diagnosis/diagnoses:     -  11/10/2023: Severe protein-calorie malnutrition   -  11/10/2023: Underweight (BMI < 19)

## 2023-11-11 NOTE — PROGRESS NOTE ADULT - SUBJECTIVE AND OBJECTIVE BOX
Patient is a 89y old  Female who presents with a chief complaint of fall (08 Nov 2023 12:21)       HPI:  89 yr old female with CAD s/p PCI, GERD, atrial fibrillation on Xarelto, severe AS s/p TAVR, HFpEF, carotid atherosclerosis, recent fall, hypertension, hyperlipidemia, recently discharged from rehab on 11/3 presented with weakness, shortness of breath and  a fall as noted by family. Patient states she does not remember much since yesterday, asked me to call her son for more information. Feels weak, uses oxygen at home.  Called son Bony, states patient lives next door to him, was doing well until last night when she fell off the toilet and could not get back up. She called for help and son helped her get back in bed at night. This am, she seemed weaker and trembling per son. She was walking with a walker at home post rehab, noted to have some shortness of breath per son . He reports she was drinking a lot of water.   She denies chest pain.   Reviewed recent outpatient meds, no Xarelto listed when she left rehab but son states she has been receiving it.  (07 Nov 2023 18:58)    Renal consulted for SORAIDA. Chart reviewed. Was found to be retaining urine, now with gilliland.    NO N/V/SOB       PAST MEDICAL & SURGICAL HISTORY:  Essential hypertension      Chronic back pain      Hyperlipidemia, unspecified hyperlipidemia type      PNA (pneumonia)  november 2017      Lung nodule      Thyroid nodule      Acute deep vein thrombosis (DVT) of popliteal vein of both lower extremities      Pelvic fracture      Closed fracture of multiple ribs of right side, initial encounter      Aortic stenosis  s/p TAVR bioprosthetic Jan 2018 [facility]      Atherosclerosis of native coronary artery of native heart with angina pectoris      Hypercholesteremia      CURRY (dyspnea on exertion)      AF (atrial fibrillation)  Persistent/chronic      Asthma      AV block      Bronchiectasis      DVT (deep venous thrombosis)  left lower extremity      Kyphoscoliosis and scoliosis      Stenocardia      Spinal stenosis      Nosebleed  prior left nostrils cauterize x2      Murmur, cardiac  gr3/6      Neuropathy      Diastolic CHF  NYHA class 3      Aspiration pneumonia      Nosebleed  severe on aspirin; had nose cautery done in 2018      Anxiety      HTN (hypertension)      GERD (gastroesophageal reflux disease)      Closed pelvic ring fracture      Hip arthritis      Angina pectoris  class IV      [place] (hard of hearing)  wears bilateral hearing aides      Afib      History of valvular heart disease      HTN (hypertension)      Chronic back pain      S/P spinal fusion  L spines      S/p TAVR (transcatheter aortic valve replacement), bioprosthetic           FAMILY HISTORY:  Family history of breast cancer (Sibling)    Family history of cerebrovascular accident (CVA) (Mother)    Family history of esophageal cancer (Father)    NC    Social History:Non smoker    MEDICATIONS  (STANDING):  aspirin enteric coated 81 milliGRAM(s) Oral daily  atorvastatin 80 milliGRAM(s) Oral at bedtime  chlorhexidine 2% Cloths 1 Application(s) Topical <User Schedule>  enoxaparin Injectable 30 milliGRAM(s) SubCutaneous every 24 hours  furosemide   Injectable 40 milliGRAM(s) IV Push daily  gabapentin 600 milliGRAM(s) Oral three times a day  influenza  Vaccine (HIGH DOSE) 0.7 milliLiter(s) IntraMuscular once  polyethylene glycol 3350 17 Gram(s) Oral daily  ranolazine 500 milliGRAM(s) Oral two times a day  senna 2 Tablet(s) Oral at bedtime    MEDICATIONS  (PRN):  albuterol    90 MICROgram(s) HFA Inhaler 2 Puff(s) Inhalation every 6 hours PRN for shortness of breath and/or wheezing   Meds reviewed    Allergies    No Known Allergies    Intolerances         REVIEW OF SYSTEMS:  as above    Vital Signs Last 24 Hrs  T(C): 36.3 (11 Nov 2023 07:45), Max: 36.9 (11 Nov 2023 04:00)  T(F): 97.4 (11 Nov 2023 07:45), Max: 98.4 (11 Nov 2023 04:00)  HR: 92 (11 Nov 2023 08:00) (82 - 100)  BP: 117/50 (11 Nov 2023 08:00) (112/63 - 146/71)  BP(mean): 67 (11 Nov 2023 08:00) (67 - 115)  RR: 19 (11 Nov 2023 08:00) (15 - 22)  SpO2: 99% (11 Nov 2023 08:00) (94% - 100%)    Parameters below as of 11 Nov 2023 08:00  Patient On (Oxygen Delivery Method): nasal cannula  O2 Flow (L/min): 2      PHYSICAL EXAM:    GENERAL: NAD  NECK: Supple, neck  veins full  NERVOUS SYSTEM:  Awake and Alert  HEART: Regular rate and rhythm; No murmurs, rubs, or gallops  ABDOMEN: Soft, Nontender  EXTREMITIES:  No Edema  : Gilliland      LABS:                        11.3   5.64  )-----------( 300      ( 11 Nov 2023 06:45 )             33.8     11-11    137  |  95<L>  |  26.4<H>  ----------------------------<  87  3.9   |  30.0<H>  |  1.06    Ca    9.1      11 Nov 2023 06:45    TPro  6.6  /  Alb  3.8  /  TBili  0.7  /  DBili  x   /  AST  21  /  ALT  13  /  AlkPhos  86  11-11      Urinalysis Basic - ( 11 Nov 2023 06:45 )    Color: x / Appearance: x / SG: x / pH: x  Gluc: 87 mg/dL / Ketone: x  / Bili: x / Urobili: x   Blood: x / Protein: x / Nitrite: x   Leuk Esterase: x / RBC: x / WBC x   Sq Epi: x / Non Sq Epi: x / Bacteria: x

## 2023-11-11 NOTE — DISCHARGE NOTE PROVIDER - PROVIDER RX CONTACT NUMBER
[phone] CMS/[facility])  

## 2023-11-11 NOTE — DISCHARGE NOTE PROVIDER - HOSPITAL COURSE
89y/oF PMH CAD s/p PCI, GERD, afib on Xarelto, severe AS s/p TAVR, HFpEF, carotid atherosclerosis, recent fall, HTN, HLD, recently discharged from rehab on 11/3 presenting with weakness, sob and fall as per family. Noted to have CASIMIRO, elevated BNP, CXR consistent with CHF, EKG with aflutter, CTH with poss evolving acute b/l PCA territory infarcts. On MRI brain, no acute strokes appreciated. Seen by neurology. Pt also treated for acute HFpEF. seen by cardiology. Transitioned to PO lasix. Casimiro improved, nephrology recs appreciated. pt to dc to [facility]. 89y/oF PMH CAD s/p PCI, GERD, afib on Xarelto, severe AS s/p TAVR, HFpEF, carotid atherosclerosis, recent fall, HTN, HLD, recently discharged from rehab on 11/3 presenting with weakness, sob and fall as per family. Noted to have CASIMIRO, elevated BNP, CXR consistent with CHF, EKG with aflutter, CTH with poss evolving acute b/l PCA territory infarcts. On MRI brain, no acute strokes appreciated. Seen by neurology. Pt also treated for acute HFpEF. seen by cardiology. Transitioned to PO lasix. Casimiro improved, nephrology recs appreciated. pt also with positive ucx with proteus, ua and ucx negative on repeat without tx, poss colonization vs contaminated initial sample. no fever or wbc, no urinary symptoms. pt to dc to [facility].

## 2023-11-11 NOTE — DISCHARGE NOTE PROVIDER - NSDCMRMEDTOKEN_GEN_ALL_CORE_FT
albuterol 90 mcg/inh inhalation aerosol: 2 puff(s) inhaled every 6 hours As needed for shortness of breath and/or wheezing  aspirin 81 mg oral delayed release tablet: 1 tab(s) orally once a day  atorvastatin 80 mg oral tablet: 1 tab(s) orally once a day (at bedtime)  gabapentin 600 mg oral tablet: 1 tab(s) orally 3 times a day  Lasix 40 mg oral tablet: 1 tab(s) orally once a day  polyethylene glycol 3350 oral powder for reconstitution: 17 gram(s) orally once a day  Protonix 40 mg oral delayed release tablet: 1 tab(s) orally every 12 hours   ranolazine 500 mg oral tablet, extended release: 1 tab(s) orally 2 times a day  rivaroxaban 15 mg oral tablet: 1 tab(s) orally once a day (before a meal)  senna leaf extract oral tablet: 2 tab(s) orally once a day (at bedtime)

## 2023-11-11 NOTE — PROGRESS NOTE ADULT - ASSESSMENT
89y/oF PMH CAD s/p PCI, GERD, afib on xarelto, severe AS s/p TAVR, HFpEF, carotid atherosclerosis, recent fall, HTN, HLD, recently d/panfilo from rehab 11/3, presenting with weakness, SOB and fall as per family. Pt noted to have SORAIDA, elevated BNP, CXR consistent with CHF, EKG w/aflutter, CTH with poss acute b/l PCA strokes     Acute CVA   -CTH reviewed   -MRI/MRA reviewed, no acute infarcts appreciated   -PT eval appreciated   -cont asa, statin     s/p fall  -PT eval appreciated     acute diastolic HFpEF   -change to PO lasix 11/11; d/w Dr. Christopher  -cardio recs appreciated     afib   CAD s/p PCI   HTN, HLD   -cont asa, statin, ranexa, xarelto     SORAIDA , improved  -nephro recs appreciated     GERD   -cont ppi     vte ppx: lovenox sq     dispo: [facility]

## 2023-11-11 NOTE — DISCHARGE NOTE PROVIDER - ATTENDING DISCHARGE PHYSICAL EXAMINATION:
Vital Signs Last 24 Hrs  T(C): 36.6 (11 Nov 2023 12:18), Max: 36.9 (11 Nov 2023 04:00)  T(F): 97.8 (11 Nov 2023 12:18), Max: 98.4 (11 Nov 2023 04:00)  HR: 96 (11 Nov 2023 12:00) (82 - 100)  BP: 115/73 (11 Nov 2023 12:00) (109/69 - 146/71)  BP(mean): 82 (11 Nov 2023 12:00) (67 - 90)  RR: 21 (11 Nov 2023 12:00) (15 - 22)  SpO2: 96% (11 Nov 2023 12:00) (94% - 100%)    Parameters below as of 11 Nov 2023 12:00  Patient On (Oxygen Delivery Method): room air    CONSTITUTIONAL: NAD  CARDIAC: normal +S1, S2; irregularly irregular    RESPIRATORY: decreased at bases; respirations unlabored on NC   GASTROINTESTINAL: Abdomen soft, non-tender, no guarding  NEUROLOGICAL: Awake, alert  SKIN: warm, dry  PSYCHIATRIC: calm, cooperative

## 2023-11-11 NOTE — DISCHARGE NOTE PROVIDER - CARE PROVIDER_API CALL
Ryan Christopher  Cardiovascular Disease  [facility], [address] [address]  Phone: [phone]  Fax: [phone]  Follow Up Time:     PMD,   Phone: (   )    -  Fax: (   )    -  Follow Up Time:

## 2023-11-11 NOTE — PROGRESS NOTE ADULT - SUBJECTIVE AND OBJECTIVE BOX
MAMTA TALAVERA    [id number]    89y      Female    CC: chf    INTERVAL HPI/OVERNIGHT EVENTS: pt seen and examined. no acute events reported o/n     REVIEW OF SYSTEMS:    CONSTITUTIONAL: No fever, weight loss  RESPIRATORY: No cough, wheezing, hemoptysis; No shortness of breath  CARDIOVASCULAR: No chest pain, palpitations  GASTROINTESTINAL: No abdominal or epigastric pain. No nausea, vomiting    Vital Signs Last 24 Hrs  T(C): 36.6 (11 Nov 2023 12:18), Max: 36.9 (11 Nov 2023 04:00)  T(F): 97.8 (11 Nov 2023 12:18), Max: 98.4 (11 Nov 2023 04:00)  HR: 96 (11 Nov 2023 12:00) (82 - 100)  BP: 115/73 (11 Nov 2023 12:00) (109/69 - 146/71)  BP(mean): 82 (11 Nov 2023 12:00) (67 - 90)  RR: 21 (11 Nov 2023 12:00) (15 - 22)  SpO2: 96% (11 Nov 2023 12:00) (94% - 100%)    Parameters below as of 11 Nov 2023 12:00  Patient On (Oxygen Delivery Method): room air        PHYSICAL EXAM:    GENERAL: NAD  NECK: soft, supple  CHEST/LUNG: decreased at bases, respirations unlabored on NC   HEART: S1S2+, irregularly irregular   ABDOMEN: Soft, Nontender, Nondistended; Bowel sounds present  SKIN: warm, dry  NEURO: Awake, alert   PSYCH: calm, cooperative     LABS:                        11.3   5.64  )-----------( 300      ( 11 Nov 2023 06:45 )             33.8     11-11    137  |  95<L>  |  26.4<H>  ----------------------------<  87  3.9   |  30.0<H>  |  1.06    Ca    9.1      11 Nov 2023 06:45    TPro  6.6  /  Alb  3.8  /  TBili  0.7  /  DBili  x   /  AST  21  /  ALT  13  /  AlkPhos  86  11-11      Urinalysis Basic - ( 11 Nov 2023 06:45 )    Color: x / Appearance: x / SG: x / pH: x  Gluc: 87 mg/dL / Ketone: x  / Bili: x / Urobili: x   Blood: x / Protein: x / Nitrite: x   Leuk Esterase: x / RBC: x / WBC x   Sq Epi: x / Non Sq Epi: x / Bacteria: x          MEDICATIONS  (STANDING):  aspirin enteric coated 81 milliGRAM(s) Oral daily  atorvastatin 80 milliGRAM(s) Oral at bedtime  chlorhexidine 2% Cloths 1 Application(s) Topical <User Schedule>  furosemide   Injectable 40 milliGRAM(s) IV Push daily  gabapentin 600 milliGRAM(s) Oral three times a day  influenza  Vaccine (HIGH DOSE) 0.7 milliLiter(s) IntraMuscular once  polyethylene glycol 3350 17 Gram(s) Oral daily  ranolazine 500 milliGRAM(s) Oral two times a day  rivaroxaban 15 milliGRAM(s) Oral with dinner  senna 2 Tablet(s) Oral at bedtime    MEDICATIONS  (PRN):  albuterol    90 MICROgram(s) HFA Inhaler 2 Puff(s) Inhalation every 6 hours PRN for shortness of breath and/or wheezing      RADIOLOGY & ADDITIONAL TESTS:

## 2023-11-11 NOTE — PROGRESS NOTE ADULT - PROBLEM SELECTOR PLAN 1
- Repeat echocardiogram with EF >75%, mod LVH, severe LAE, mild MR, mild TR, TAVR with mild AI, mild IN, borderline pulmonary HTN.   - Continue Lasix 40mg IV daily at this time.   - Repeat bloodwork pending.  - Will restart Aldactone when off IV diuretics.   - Monitor on telemetry.   - Strict i/o and daily weights.    - Keep K > 4, Mg > 2.   - Monitor renal function with ongoing diuresis.
- Repeat echocardiogram with EF >75%, mod LVH, severe LAE, mild MR, mild TR, TAVR with mild AI, mild AL,   - Transition to Lasix 40mg PO qday.   - Continue hold aldactone.   - Stable for D/C home from Cardiology perspective.   - Monitor on telemetry.   - Strict i/o and daily weights.    - Keep K > 4, Mg > 2.   - Monitor renal function with ongoing diuresis.
- Repeat echocardiogram with EF >75%, mod LVH, severe LAE, mild MR, mild TR, TAVR with mild AI, mild [place], borderline pulmonary HTN.   - Continue Lasix 40mg IV daily at this time.   - SORAIDA is improving.   - Will restart Aldactone when off IV diuretics.   - Monitor on telemetry.   - Strict i/o and daily weights.    - Keep K > 4, Mg > 2.   - Monitor renal function with ongoing diuresis.

## 2023-11-11 NOTE — PROGRESS NOTE ADULT - PROBLEM SELECTOR PLAN 2
- Atrial fibrillation/flutter  - Current rate controlled.   - MRI with no acute infarct.   - Patient restarted on Xarelto.   - Outpatient evaluation for LAAO device in setting of multiple falls at home.
- Atrial fibrillation/flutter  - Current rate controlled.   - MRI with no acute infarct.   - Patient restarted on Xarelto.   - Outpatient evaluation for LAAO device in setting of multiple falls at home.
- Atrial fibrillation/flutter  - Current rate controlled.   - Xarelto on hold for MRI to r/o CVA.   - Restart Xarelto when cleared by Neurology.

## 2023-11-11 NOTE — PROGRESS NOTE ADULT - NUTRITIONAL ASSESSMENT
This patient has been assessed with a concern for Malnutrition and has been determined to have a diagnosis/diagnoses of Severe protein-calorie malnutrition and Underweight (BMI < 19).    This patient is being managed with:   Diet Pureed-  Entered: Nov 8 2023  5:24PM  
This patient has been assessed with a concern for Malnutrition and has been determined to have a diagnosis/diagnoses of Severe protein-calorie malnutrition and Underweight (BMI < 19).    This patient is being managed with:   Diet Regular-  DASH/TLC {Sodium & Cholesterol Restricted} (DASH)  Entered: Nov 11 2023 10:41AM

## 2023-11-14 ENCOUNTER — APPOINTMENT (OUTPATIENT)
Dept: ORTHOPEDIC SURGERY | Facility: CLINIC | Age: 88
End: 2023-11-14

## 2023-11-17 ENCOUNTER — TRANSCRIPTION ENCOUNTER (OUTPATIENT)
Age: 88
End: 2023-11-17

## 2023-11-21 ENCOUNTER — TRANSCRIPTION ENCOUNTER (OUTPATIENT)
Age: 88
End: 2023-11-21

## 2023-11-22 ENCOUNTER — APPOINTMENT (OUTPATIENT)
Dept: CARE COORDINATION | Facility: HOME HEALTH | Age: 88
End: 2023-11-22

## 2023-11-28 ENCOUNTER — APPOINTMENT (OUTPATIENT)
Dept: CARDIOLOGY | Facility: CLINIC | Age: 88
End: 2023-11-28
Payer: MEDICARE

## 2023-11-28 VITALS
OXYGEN SATURATION: 95 % | HEIGHT: 59 IN | TEMPERATURE: 98 F | DIASTOLIC BLOOD PRESSURE: 66 MMHG | WEIGHT: 106 LBS | HEART RATE: 85 BPM | SYSTOLIC BLOOD PRESSURE: 134 MMHG | BODY MASS INDEX: 21.37 KG/M2

## 2023-11-28 DIAGNOSIS — E78.00 PURE HYPERCHOLESTEROLEMIA, UNSPECIFIED: ICD-10-CM

## 2023-11-28 DIAGNOSIS — I20.89 OTHER FORMS OF ANGINA PECTORIS: ICD-10-CM

## 2023-11-28 DIAGNOSIS — I25.119 ATHEROSCLEROTIC HEART DISEASE OF NATIVE CORONARY ARTERY WITH UNSPECIFIED ANGINA PECTORIS: ICD-10-CM

## 2023-11-28 DIAGNOSIS — I10 ESSENTIAL (PRIMARY) HYPERTENSION: ICD-10-CM

## 2023-11-28 DIAGNOSIS — R07.9 CHEST PAIN, UNSPECIFIED: ICD-10-CM

## 2023-11-28 PROCEDURE — 93000 ELECTROCARDIOGRAM COMPLETE: CPT

## 2023-11-28 PROCEDURE — 99215 OFFICE O/P EST HI 40 MIN: CPT

## 2023-11-28 RX ORDER — FERROUS SULFATE 325(65) MG
325 TABLET ORAL TWICE DAILY
Refills: 0 | Status: ACTIVE | COMMUNITY

## 2023-11-28 RX ORDER — SPIRONOLACTONE 25 MG/1
25 TABLET ORAL
Qty: 45 | Refills: 3 | Status: DISCONTINUED | COMMUNITY
Start: 2019-07-10 | End: 2023-11-28

## 2023-11-28 RX ORDER — ATORVASTATIN CALCIUM 20 MG/1
20 TABLET, FILM COATED ORAL
Refills: 0 | Status: ACTIVE | COMMUNITY

## 2023-11-28 RX ORDER — AMLODIPINE BESYLATE 10 MG/1
10 TABLET ORAL DAILY
Qty: 90 | Refills: 3 | Status: DISCONTINUED | COMMUNITY
End: 2023-11-28

## 2023-11-28 RX ORDER — FUROSEMIDE 40 MG/1
40 TABLET ORAL DAILY
Refills: 0 | Status: ACTIVE | COMMUNITY

## 2023-11-28 RX ORDER — CHROMIUM 200 MCG
10 MCG TABLET ORAL DAILY
Refills: 0 | Status: DISCONTINUED | COMMUNITY
Start: 2019-07-10 | End: 2023-11-28

## 2023-11-28 RX ORDER — FUROSEMIDE 20 MG/1
20 TABLET ORAL DAILY
Qty: 90 | Refills: 3 | Status: DISCONTINUED | COMMUNITY
End: 2023-11-28

## 2023-11-30 ENCOUNTER — TRANSCRIPTION ENCOUNTER (OUTPATIENT)
Age: 88
End: 2023-11-30

## 2023-12-12 ENCOUNTER — TRANSCRIPTION ENCOUNTER (OUTPATIENT)
Age: 88
End: 2023-12-12

## 2023-12-14 NOTE — ED PROVIDER NOTE - DR. NAME
When writer went to book appointment with Dr. Schumacher the appointment was already taken on 4/8/24 so a message was left for her  Smith to let him know it will be on 4/9/24 instead at same time starting at 9:00 AM.     Najvot

## 2023-12-20 NOTE — ED PROVIDER NOTE - PHYSICAL EXAMINATION
5 (moderate pain) Constitutional - well-developed.   Head - NCAT. Airway patent.   Eyes - PERRL.   CV - RRR. no murmur. no edema.   Pulm - CTAB.   Abd - soft, nt. no rebound. no guarding.   Neuro - A&Ox2. strength 5/5 x4. sensation intact x4. normal gait.   Skin - No rash. .  MSK - normal ROM.

## 2024-04-30 ENCOUNTER — NON-APPOINTMENT (OUTPATIENT)
Age: 89
End: 2024-04-30

## 2024-05-14 ENCOUNTER — APPOINTMENT (OUTPATIENT)
Dept: CARDIOLOGY | Facility: CLINIC | Age: 89
End: 2024-05-14
Payer: MEDICARE

## 2024-05-14 VITALS
HEART RATE: 80 BPM | WEIGHT: 103 LBS | BODY MASS INDEX: 20.76 KG/M2 | HEIGHT: 59 IN | SYSTOLIC BLOOD PRESSURE: 132 MMHG | DIASTOLIC BLOOD PRESSURE: 70 MMHG | OXYGEN SATURATION: 93 %

## 2024-05-14 DIAGNOSIS — I25.10 ATHEROSCLEROTIC HEART DISEASE OF NATIVE CORONARY ARTERY W/OUT ANGINA PECTORIS: ICD-10-CM

## 2024-05-14 DIAGNOSIS — I50.30 UNSPECIFIED DIASTOLIC (CONGESTIVE) HEART FAILURE: ICD-10-CM

## 2024-05-14 DIAGNOSIS — I35.0 NONRHEUMATIC AORTIC (VALVE) STENOSIS: ICD-10-CM

## 2024-05-14 DIAGNOSIS — I48.19 OTHER PERSISTENT ATRIAL FIBRILLATION: ICD-10-CM

## 2024-05-14 DIAGNOSIS — R06.09 OTHER FORMS OF DYSPNEA: ICD-10-CM

## 2024-05-14 PROCEDURE — 99215 OFFICE O/P EST HI 40 MIN: CPT

## 2024-05-14 PROCEDURE — 93000 ELECTROCARDIOGRAM COMPLETE: CPT

## 2024-05-14 RX ORDER — RANOLAZINE 500 MG/1
500 TABLET, EXTENDED RELEASE ORAL
Qty: 180 | Refills: 3 | Status: ACTIVE | COMMUNITY
Start: 2021-01-20 | End: 1900-01-01

## 2024-05-14 RX ORDER — PANTOPRAZOLE 40 MG/1
40 TABLET, DELAYED RELEASE ORAL DAILY
Qty: 30 | Refills: 0 | Status: ACTIVE | COMMUNITY

## 2024-05-16 ENCOUNTER — LABORATORY RESULT (OUTPATIENT)
Age: 89
End: 2024-05-16

## 2024-05-20 ENCOUNTER — NON-APPOINTMENT (OUTPATIENT)
Age: 89
End: 2024-05-20

## 2024-07-26 NOTE — PHYSICAL THERAPY INITIAL EVALUATION ADULT - ADDITIONAL COMMENTS
Christoph Taylor is a 60 y.o. male with DM on insulin, CKD, HLD, GERD, chronic back pain and recent history of cauda equina syndrome s/p C4-C7 ACDF on 6/4 and L4-L5 laminectomies with stealth fusion 6/7 who presented 7/22/2024 with generalized malaise, and fall -  he was evaluated in ED 07/21 - workups unremarkable and DC home. He reported generalized weakness.     Pt denies fever, chills, lightheadedness, vision changes, chest pain, palpitation, SOB, dysphagia, abdominal pain, changes in urinary or bowel habits, arthralgia or leg edema. Pt denies recent travel history or sick contacts.      Upon arrival to ED 07/22, Pt was quite hypotensive 70/35 - responded to IVF. The patient was hypoxic to 70s in room air and placed on 3L NC. Exam was grossly unremarkable. Labs done were with elevated proBNP 1704. Trop 52 > 36, D-dimer 1.05 and mild normocytic anemia. CXR without acute cardiopulmonary abnormalities. EKG done personally reviewed was with NSR and no acute ischemic changes. Patient received IV diuresis and maintained appropriate blood pressures, weaned to room air. A1C of 12, blood sugar was optimized with lantus BID and sliding scale. Patient also noted to have diabetic wound to foot which does not appear infected. Patient improved clinically and was agreeable to discharge. Case management assisted in organizing discharge to skilled nursing facility. Patient was determined satisfactory for discharge with appropriate follow up.   owns and uses a RW, 3 steps 1 rail to enter home, no stairs within home

## 2024-08-06 ENCOUNTER — APPOINTMENT (OUTPATIENT)
Dept: CARDIOLOGY | Facility: CLINIC | Age: 89
End: 2024-08-06

## 2024-10-17 NOTE — HISTORY OF PRESENT ILLNESS
[facility]          Inflammatory Bowel Disease Follow Up Consultation Note         TODAY'S VISIT DATE:  10/17/2024    Reason for Consult:    Chief Complaint   Patient presents with    Ulcerative Colitis       PCP: Elkin Christianson MD:   No ref. provider found    History of Present Illness:  Lela Garcia who is a 34 y.o. female is being seen today at the [facility] on 10/17/2024 for inflammatory bowel disease- ulcerative colitis.  She continues to do very well.  She is taking Lialda 4.8 g daily.  She denies any side effects from the medication.  She is having 1-2 formed bowel movements daily.  Her stools are soft but formed.  She has no blood in his stools and no abdominal pain.  Her calprotectin level done at the end of August was normal.  She denies any new complaints today.    IBD History:  In 2022 she was diagnosed with ulcerative colitis.  At that time she had left-sided inflammation.  She was started on Lialda 2.4 g daily and did very well.  Within about a week her stools returned back to normal.  She tried balsalazide due to cost but that did not seem to be as effective.  Upon going back to Lialda 2.4 g daily she continues to do well for about a year.  She subsequently made a lot of dietary changes and was feeling quite well so she stopped the Lialda in 2023.  She did well for awhile but in December of 2023 she began having recurrent issues.  This started at the end of a trip to the [facility] [place].  During this time she was not working and had spotty insurance coverage.  She restarted Lialda and had improvement for a few days and then her symptoms started to come back again.  In April she was seen in the GI clinic and stool studies were positive for C diff.  She also had a markedly elevated calprotectin level in early May.  She was treated with vancomycin with significant improvement.  She continues to take  "Lialda 2.4 g daily.  On May 27, 2024 she underwent a follow-up colonoscopy that showed inflammatory changes extending from the rectum to the ascending colon but most severe in the transverse colon.  Biopsy showed chronic active inflammation.  Around that time her Lialda dose was increased to 4.8 g daily.  Before the increase in dose her stool calprotectin level had declined significantly but remained elevated.  She did well on the higher dose of Lialda with regards to symptoms and a follow-up calprotectin level in August of 2024 was normal.    IBD Details:  Dx Date:  2022  Disease type/distribution:  Ulcerative colitis/involvement from the rectum to the ascending colon  Current Treatment:  Lialda 4.8 g daily  Start Date:  May 20, 2024 Response:  Biochemical remission  Optimized:  Yes  Adverse reactions:  None  Prior surgeries:  None  CRP Elevation: Y  calprotectin: Markedly elevated with active disease  Disease Complications:  C diff infection  Extraintestinal manifestations:  Iron-deficiency anemia  Prior treatments:   Steroids:  None  5ASA:  Lialda-current medication  IMM:  None  TNF Inh:  None   Anti-Integrin:  None   IL 12/23:  None  DOROTHEA Inh:  None    Previous Clinical Trials:  None    Last Colonoscopy:  May 27, 2024-inflammation extending from the rectum to the ascending colon-most severe in the transverse colon-biopsies with chronic active inflammation    Other Endoscopies:  May 27, 2024-EGD-normal    Imaging:   MRE:  None   CT:  None   Other:  May 30, 2024-ultrasound-small gallbladder polyp    Pertinent Labs:  Lab Results   Component Value Date    SEDRATE 5 07/10/2023    CRP 0.2 06/19/2024     No results found for: "TTGIGA", "IGA"  Lab Results   Component Value Date    TSH 1.222 06/19/2024    FREET4 0.98 06/19/2024     Lab Results   Component Value Date    AVZKRAKT51ON 40 06/19/2024    UEGCQVOZ62 1007 (H) 06/19/2024     Lab Results   Component Value Date    HEPBSAG NON-REACTIVE 12/14/2018    HEPCAB " "NON-REACTIVE 12/14/2018     Lab Results   Component Value Date    AIB95JKBJ Negative 05/25/2018     No results found for: "NIL", "TBAG", "TBAGNIL", "MITOGENNIL", "TBGOLD", "TSPOTSCREN"  No results found for: "TPTMINTERP", "TPMTRESULT"  Lab Results   Component Value Date    STOOLCULTURE  04/25/2024     No Salmonella,Shigella,Vibrio,Campylobacter,Yersinia isolated.    EVSNVDUMPQ1D Negative 04/25/2024    LLSKZZSMOZ4F Negative 04/25/2024    CDIFFICILEAN Positive (A) 04/25/2024    CDIFFTOX Positive (A) 04/25/2024     Lab Results   Component Value Date    CALPROTECTIN <5.0 08/27/2024       Therapeutic Drug Monitoring Labs:  No results found for: "PROMETH"  No results found for: "ANSADAINIT", "INFLIXIMAB", "INFLIXINTERP"    Vaccinations:  No results found for: "HEPBSAB"  No results found for: "HEPAIGG"  No results found for: "VARICELLAZOS", "VARICELLAINT"  Immunization History   Administered Date(s) Administered    COVID-19, MRNA, LN-S, PF (Pfizer) (Gray Cap) 07/26/2022    COVID-19, MRNA, LN-S, PF (Pfizer) (Purple Cap) 10/29/2021    COVID-19, mRNA, LNP-S, bivalent booster, PF (PFIZER OMICRON) 11/29/2022    DTaP (5 Pertussis Antigens) 1990, 1990, 1990, 03/10/1992, 03/10/1995    HIB 1990, 06/06/1991, 03/10/1992    HPV 9-Valent 05/26/2016, 07/21/2016, 11/07/2016    Hepatitis B 06/19/2002, 07/20/2002, 04/07/2003    Influenza 12/09/1999, 10/26/2007, 11/16/2014, 02/26/2017, 10/04/2017, 10/15/2018, 10/21/2020    Influenza - Quadrivalent 08/22/2019    Influenza - Quadrivalent - PF *Preferred* (6 months and older) 10/15/2018, 10/10/2021    Influenza - Trivalent - Afluria, Fluzone MDV 10/15/2014, 11/16/2014    Influenza - Trivalent - Fluarix, Flulaval, Fluzone, Afluria - PF 02/26/2017, 10/04/2017    Influenza Whole 12/04/1997    MMR 03/10/1992, 03/10/1995    Meningococcal Conjugate (MCV4P) 07/12/2008    OPV 1990, 1990, 03/10/1992, 03/10/1995    PPD Test 07/21/2008    Td (ADULT) 07/20/2002    Tdap " 07/12/2008, 02/26/2017         Review of Systems  Review of Systems   Constitutional:  Negative for chills, fever and weight loss.   HENT:  Negative for sore throat.    Eyes:  Negative for pain, discharge and redness.   Respiratory:  Negative for cough, shortness of breath and wheezing.    Cardiovascular:  Negative for chest pain, orthopnea and leg swelling.   Gastrointestinal:  Negative for abdominal pain, blood in stool, constipation, diarrhea, heartburn, melena, nausea and vomiting.   Genitourinary:  Negative for dysuria, frequency and urgency.   Musculoskeletal:  Negative for back pain, joint pain and myalgias.   Skin:  Negative for itching and rash.   Neurological:  Negative for focal weakness and seizures.   Endo/Heme/Allergies:  Does not bruise/bleed easily.   Psychiatric/Behavioral:  Negative for depression. The patient is not nervous/anxious.        Medical History:   Past Medical History:   Diagnosis Date    Graves disease     Seasonal allergies     Ulcerative colitis     Pancolitis - dx 2022       Surgical History:  Past Surgical History:   Procedure Laterality Date    COLONOSCOPY N/A 05/27/2024    Procedure: COLONOSCOPY;  Surgeon: John Renee MD;  Location: [facility] ([address] [place] [address]);  Service: Endoscopy;  Laterality: N/A;  Pos C-Diff, Ok to schedule with Dr. Garcia per Dr. Michelle,  [facility] portal -     ESOPHAGOGASTRODUODENOSCOPY N/A 05/27/2024    Procedure: EGD (ESOPHAGOGASTRODUODENOSCOPY);  Surgeon: John Renee MD;  Location: [address]);  Service: Endoscopy;  Laterality: N/A;    PELVIC LAPAROSCOPY      removal of IUD       Family History:   Family History   Problem Relation Name Age of Onset    Hypertension Maternal Grandmother      Heart disease Maternal Grandfather      Breast cancer Paternal Grandmother Leandra Garcia     Hypertension Paternal Grandmother Leandra Garcia     Glaucoma Paternal Grandmother Leandra Garcia     Diabetes Paternal Grandmother Leandra Jose     Heart disease Paternal  Grandmother Leandra Garcia     Arthritis Paternal Grandmother Leandra Garcia     Cancer Paternal Grandmother Leandra Garcia     Vision loss Paternal Grandmother Leandra Garcia     Hypertension Mother Aaliyah Garcia     Glaucoma Mother Aaliyah Garcia     Diabetes Mother Aaliyah Garcia     Arthritis Mother Aaliyah Garcia     Parkinsonism Father      Colon cancer Neg Hx      Ovarian cancer Neg Hx         Social History:   Social History     Tobacco Use    Smoking status: Never    Smokeless tobacco: Never   Substance Use Topics    Alcohol use: Yes     Alcohol/week: 4.0 standard drinks of alcohol     Types: 4 Drinks containing 0.5 oz of alcohol per week     Comment: Kombucha    Drug use: Not Currently     Types: Marijuana       Allergies: Reviewed    Home Medications:   Medication List with Changes/Refills   Current Medications    CETIRIZINE (ZYRTEC) 10 MG TABLET    Take 10 mg by mouth once daily.    CHLORHEXIDINE (PERIDEX) 0.12 % SOLUTION        DEPO-SUBQ PROVERA 104 104 MG/0.65 ML INJECTION    Inject into the skin.    ETONOGESTREL-ETHINYL ESTRADIOL (NUVARING) 0.12-0.015 MG/24 HR VAGINAL RING    Place 1 each vaginally every 21 days. Insert one (1) ring vaginally and leave in place for three (3) weeks, then remove for one (1) week.    L-LYSINE ORAL    Take by mouth.    LACTOBACILLUS ACIDOPHILUS (PROBIOTIC ORAL)    Take by mouth.    MESALAMINE (LIALDA) 1.2 GRAM TBEC    Take 4 tablets (4.8 g total) by mouth daily with breakfast.    MV-MIN/IRON/FOLIC/CALCIUM/VITK (WOMEN'S MULTIVITAMIN ORAL)    Take by mouth.    SOD SULF-POT CHLORIDE-MAG SULF (SUTAB) 1.479-0.188- 0.225 GRAM TABLET    Take 12 tablets by mouth once daily. Please follow Endoscopy 's instructions. Please apply coupon code. Please apply coupon code. BIN: [id number], PCN: [id number], GROUP: [id number], MEMBER ID: [id number]    VALACYCLOVIR (VALTREX) 1000 MG TABLET    TAKE DAILY X 5 DAYS FOR OUTBREAKS       Physical Exam:  Vital Signs:  /84 (BP Location: Left arm, Patient  "Position: Sitting)   Pulse 79   Temp 97.9 °F (36.6 °C) (Skin)   Ht 5' 6" (1.676 m)   Wt 59.2 kg (130 lb 8.2 oz)   SpO2 100%   BMI 21.07 kg/m²   Body mass index is 21.07 kg/m².    Physical Exam  Vitals and nursing note reviewed.   Constitutional:       General: She is not in acute distress.     Appearance: Normal appearance. She is well-developed. She is not ill-appearing or toxic-appearing.   Eyes:      Conjunctiva/sclera: Conjunctivae normal.      Pupils: Pupils are equal, round, and reactive to light.   Neck:      Thyroid: No thyromegaly.   Cardiovascular:      Rate and Rhythm: Normal rate and regular rhythm.      Heart sounds: Normal heart sounds. No murmur heard.  Pulmonary:      Effort: Pulmonary effort is normal.      Breath sounds: Normal breath sounds. No wheezing or rales.   Abdominal:      General: Bowel sounds are normal. There is no distension.      Palpations: Abdomen is soft. There is no mass.      Tenderness: There is no abdominal tenderness.   Musculoskeletal:         General: No tenderness. Normal range of motion.      Right lower leg: No edema.      Left lower leg: No edema.   Lymphadenopathy:      Cervical: No cervical adenopathy.   Skin:     Findings: No erythema or rash.   Neurological:      General: No focal deficit present.      Mental Status: She is alert and oriented to person, place, and time.   Psychiatric:         Mood and Affect: Mood normal.         Behavior: Behavior normal.         Thought Content: Thought content normal.         Judgment: Judgment normal.         Labs: reviewed and pertinent noted above    Assessment/Plan:  Lela Garcia is a 34 y.o. female with ulcerative colitis. The following issues were addresssed:    1. Ulcerative pancolitis without complication    2. Iron deficiency anemia due to chronic blood loss    3. Gallbladder polyp      1. Ulcerative colitis:  She continues to do very well.  Continue Lialda 4.8 g daily.  Plan for colonoscopy at the end of " November.  If the colonoscopy confirms endoscopic remission we will likely change her follow-up from 6 months to 1 year.    2. Iron deficiency anemia:  Repeat iron studies with next blood draw.    3. Gallbladder polyp: Repeat ultrasound in the near future    4. Cervical dysplasia: Try to avoid thiopurines if possible.  Up-to-date on Pap smear.         # IBD specific health maintenance:  Colon cancer surveillance:  Start in 2030    Annual:  - Eye exam:  Not applicable  - Skin exam (if on IMM/TNF):  Recommend annual skin exam  - reminded pt to use sunblock/hats/sunprotective clothing  - PAP (if immunosuppressed):  Up-to-date    DEXA:  Not applicable    Vitamin D:  Recently normal    Vaccines:    Influenza:  Up-to-date   Pneumovax:  Discuss in the future   HAV:  Check serology in the future   HBV:  Vaccinated-check serology in the future   Tdap:  Up-to-date   MMR:  Vaccinated-check serologies in the future   VZV:  Check serology in the future   HZV:  Not applicable   HPV:  Vaccinated   Meningococcus:  Vaccinated   COVID: Vaccinated    Follow up: Follow up in about 6 months (around 4/17/2025).    Visit today is associated with current or anticipated ongoing medical care related to this patient's single serious condition/complex condition (ulcerative colitis).      Thank you again for sending Lela Garcia to see Dr. John Miranda today at the [facility]. Please don't hesitate to contact Dr. Miranda if there are any questions regarding this evaluation, or if you have any other patients with inflammatory bowel disease for whom you would like a consultation. You can reach Dr. Miranda at [phone] or by email at [email or web address]    Tanmay Miranda MD  Department of Gastroenterology  Inflammatory Bowel Disease           [FreeTextEntry1] : Hypertension and dyslipidemia; biop aortic valve replacement TAVR \par \par HPI for today: : feels good. no chest pain. no ehadahces. no LE edema. no headahces. no dizziness\par \par \par \par old noteL:   eats a lot of salty food.  compliatn with meds.  no dizzines.s no headaches. \par \par \par old note:  no chest pain. no dyspnea,. bno headaches. no dizziness. no palpitaitons./ ] \par compalisn of lef thip pain.\par  \par \par old note: she has pain all over. pain in the legs both legs.  and groi pain. \par every once in a while sharp ache onver chest. difficulty swallowing. \par she is still feels fatigue and weakness and has ongoing atrial flutter or fibrillation. \par \par \par old note:  patient is very short of breathe. \par she took off baby aspirin due to nasal bleeding. She is taking xarelto. \par Multiple nasal bleeding and cauterisation. \par She cannot walk muich. is passing uring in the am. No swelling of feet. \par She feels very short of breathe. \par \par \par old note: No chest pain. no dyspnea. no dizziness.s\par patient was recently admitted to hospital for pneumonia. probably aspiration pneumonia. \par \par \par old note: Feels better. "wonderful" . but  pain in the left leg. \par \par back pain radiating to the left. pain. sharp pain in the calf. releived with tramadol./ no headaches. \par \par \par old note: patient s/p JORGE.  tolerated procedure well. no stroke. no hematoma.  feels 75% better.\par no chest pain. no dizziness. + wheezing. \par old note: patient was seen in the hospital.  has diastolic heart failure. moderate to severe aortic stenosis. \par scheduled for jorge. Was discharged on lasix 20 mg daily. Patient has been complianing of LE edema and dyspnea nad wheezing.\par congestive heart failure.  .progressively worsneing dyspnea and LE edeam and orthopnea for last few days. \par \par old note: This is a 83 year old woman with history of hypertension, dyslipidemia, was referred for abnormal EKG.  Patient complains of chest pain on exertion.  No radiation. Not associated with dyspnea. Denies dizziness. No syncopal episodes. Denies any headache.

## 2024-10-28 ENCOUNTER — EMERGENCY (EMERGENCY)
Facility: HOSPITAL | Age: 89
LOS: 1 days | Discharge: HOME | End: 2024-10-28
Attending: STUDENT IN AN ORGANIZED HEALTH CARE EDUCATION/TRAINING PROGRAM
Payer: MEDICARE

## 2024-10-28 VITALS
WEIGHT: 107.14 LBS | HEART RATE: 87 BPM | TEMPERATURE: 98 F | RESPIRATION RATE: 16 BRPM | SYSTOLIC BLOOD PRESSURE: 193 MMHG | DIASTOLIC BLOOD PRESSURE: 73 MMHG | OXYGEN SATURATION: 100 %

## 2024-10-28 DIAGNOSIS — Z98.1 ARTHRODESIS STATUS: Chronic | ICD-10-CM

## 2024-10-28 DIAGNOSIS — I50.31 ACUTE DIASTOLIC (CONGESTIVE) HEART FAILURE: ICD-10-CM

## 2024-10-28 DIAGNOSIS — R79.89 OTHER SPECIFIED ABNORMAL FINDINGS OF BLOOD CHEMISTRY: ICD-10-CM

## 2024-10-28 DIAGNOSIS — Z95.3 PRESENCE OF XENOGENIC HEART VALVE: Chronic | ICD-10-CM

## 2024-10-28 LAB
ALBUMIN SERPL ELPH-MCNC: 4.5 G/DL — SIGNIFICANT CHANGE UP (ref 3.3–5.2)
ALP SERPL-CCNC: 63 U/L — SIGNIFICANT CHANGE UP (ref 40–120)
ALT FLD-CCNC: 25 U/L — SIGNIFICANT CHANGE UP
ANION GAP SERPL CALC-SCNC: 12 MMOL/L — SIGNIFICANT CHANGE UP (ref 5–17)
ANISOCYTOSIS BLD QL: SLIGHT — SIGNIFICANT CHANGE UP
APPEARANCE UR: CLEAR — SIGNIFICANT CHANGE UP
APTT BLD: 36.6 SEC — HIGH (ref 24.5–35.6)
AST SERPL-CCNC: 33 U/L — HIGH
BACTERIA # UR AUTO: NEGATIVE /HPF — SIGNIFICANT CHANGE UP
BASOPHILS # BLD AUTO: 0.12 K/UL — SIGNIFICANT CHANGE UP (ref 0–0.2)
BASOPHILS NFR BLD AUTO: 2.6 % — HIGH (ref 0–2)
BILIRUB SERPL-MCNC: 1.1 MG/DL — SIGNIFICANT CHANGE UP (ref 0.4–2)
BILIRUB UR-MCNC: NEGATIVE — SIGNIFICANT CHANGE UP
BUN SERPL-MCNC: 14.5 MG/DL — SIGNIFICANT CHANGE UP (ref 8–20)
CALCIUM SERPL-MCNC: 10.3 MG/DL — SIGNIFICANT CHANGE UP (ref 8.4–10.5)
CAST: 0 /LPF — SIGNIFICANT CHANGE UP (ref 0–4)
CHLORIDE SERPL-SCNC: 101 MMOL/L — SIGNIFICANT CHANGE UP (ref 96–108)
CO2 SERPL-SCNC: 29 MMOL/L — SIGNIFICANT CHANGE UP (ref 22–29)
COLOR SPEC: YELLOW — SIGNIFICANT CHANGE UP
CREAT SERPL-MCNC: 0.92 MG/DL — SIGNIFICANT CHANGE UP (ref 0.5–1.3)
DIFF PNL FLD: NEGATIVE — SIGNIFICANT CHANGE UP
EGFR: 58 ML/MIN/1.73M2 — LOW
EOSINOPHIL # BLD AUTO: 0 K/UL — SIGNIFICANT CHANGE UP (ref 0–0.5)
EOSINOPHIL NFR BLD AUTO: 0 % — SIGNIFICANT CHANGE UP (ref 0–6)
FLUAV AG NPH QL: SIGNIFICANT CHANGE UP
FLUBV AG NPH QL: SIGNIFICANT CHANGE UP
GIANT PLATELETS BLD QL SMEAR: PRESENT — SIGNIFICANT CHANGE UP
GLUCOSE SERPL-MCNC: 92 MG/DL — SIGNIFICANT CHANGE UP (ref 70–99)
GLUCOSE UR QL: NEGATIVE MG/DL — SIGNIFICANT CHANGE UP
HCT VFR BLD CALC: 38.2 % — SIGNIFICANT CHANGE UP (ref 34.5–45)
HGB BLD-MCNC: 13 G/DL — SIGNIFICANT CHANGE UP (ref 11.5–15.5)
INR BLD: 1.26 RATIO — HIGH (ref 0.85–1.16)
KETONES UR-MCNC: NEGATIVE MG/DL — SIGNIFICANT CHANGE UP
LEUKOCYTE ESTERASE UR-ACNC: ABNORMAL
LIDOCAIN IGE QN: 25 U/L — SIGNIFICANT CHANGE UP (ref 22–51)
LYMPHOCYTES # BLD AUTO: 0.69 K/UL — LOW (ref 1–3.3)
LYMPHOCYTES # BLD AUTO: 14.9 % — SIGNIFICANT CHANGE UP (ref 13–44)
MACROCYTES BLD QL: SLIGHT — SIGNIFICANT CHANGE UP
MANUAL SMEAR VERIFICATION: SIGNIFICANT CHANGE UP
MCHC RBC-ENTMCNC: 30.6 PG — SIGNIFICANT CHANGE UP (ref 27–34)
MCHC RBC-ENTMCNC: 34 GM/DL — SIGNIFICANT CHANGE UP (ref 32–36)
MCV RBC AUTO: 89.9 FL — SIGNIFICANT CHANGE UP (ref 80–100)
MONOCYTES # BLD AUTO: 1.06 K/UL — HIGH (ref 0–0.9)
MONOCYTES NFR BLD AUTO: 22.8 % — HIGH (ref 2–14)
NEUTROPHILS # BLD AUTO: 2.6 K/UL — SIGNIFICANT CHANGE UP (ref 1.8–7.4)
NEUTROPHILS NFR BLD AUTO: 56.2 % — SIGNIFICANT CHANGE UP (ref 43–77)
NITRITE UR-MCNC: NEGATIVE — SIGNIFICANT CHANGE UP
NT-PROBNP SERPL-SCNC: 7393 PG/ML — HIGH (ref 0–300)
OVALOCYTES BLD QL SMEAR: SLIGHT — SIGNIFICANT CHANGE UP
PH UR: 7.5 — SIGNIFICANT CHANGE UP (ref 5–8)
PLAT MORPH BLD: NORMAL — SIGNIFICANT CHANGE UP
PLATELET # BLD AUTO: 171 K/UL — SIGNIFICANT CHANGE UP (ref 150–400)
POIKILOCYTOSIS BLD QL AUTO: SLIGHT — SIGNIFICANT CHANGE UP
POTASSIUM SERPL-MCNC: 4.2 MMOL/L — SIGNIFICANT CHANGE UP (ref 3.5–5.3)
POTASSIUM SERPL-SCNC: 4.2 MMOL/L — SIGNIFICANT CHANGE UP (ref 3.5–5.3)
PROT SERPL-MCNC: 6.9 G/DL — SIGNIFICANT CHANGE UP (ref 6.6–8.7)
PROT UR-MCNC: SIGNIFICANT CHANGE UP MG/DL
PROTHROM AB SERPL-ACNC: 14.2 SEC — HIGH (ref 9.9–13.4)
RBC # BLD: 4.25 M/UL — SIGNIFICANT CHANGE UP (ref 3.8–5.2)
RBC # FLD: 14.6 % — HIGH (ref 10.3–14.5)
RBC BLD AUTO: ABNORMAL
RBC CASTS # UR COMP ASSIST: 1 /HPF — SIGNIFICANT CHANGE UP (ref 0–4)
RSV RNA NPH QL NAA+NON-PROBE: SIGNIFICANT CHANGE UP
SARS-COV-2 RNA SPEC QL NAA+PROBE: SIGNIFICANT CHANGE UP
SMUDGE CELLS # BLD: PRESENT — SIGNIFICANT CHANGE UP
SODIUM SERPL-SCNC: 142 MMOL/L — SIGNIFICANT CHANGE UP (ref 135–145)
SP GR SPEC: 1.02 — SIGNIFICANT CHANGE UP (ref 1–1.03)
SQUAMOUS # UR AUTO: 1 /HPF — SIGNIFICANT CHANGE UP (ref 0–5)
TROPONIN T, HIGH SENSITIVITY RESULT: 48 NG/L — SIGNIFICANT CHANGE UP (ref 0–51)
TROPONIN T, HIGH SENSITIVITY RESULT: 53 NG/L — HIGH (ref 0–51)
UROBILINOGEN FLD QL: 0.2 MG/DL — SIGNIFICANT CHANGE UP (ref 0.2–1)
VARIANT LYMPHS # BLD: 3.5 % — SIGNIFICANT CHANGE UP (ref 0–6)
WBC # BLD: 4.63 K/UL — SIGNIFICANT CHANGE UP (ref 3.8–10.5)
WBC # FLD AUTO: 4.63 K/UL — SIGNIFICANT CHANGE UP (ref 3.8–10.5)
WBC UR QL: 4 /HPF — SIGNIFICANT CHANGE UP (ref 0–5)

## 2024-10-28 PROCEDURE — 99223 1ST HOSP IP/OBS HIGH 75: CPT | Mod: FS

## 2024-10-28 PROCEDURE — 74177 CT ABD & PELVIS W/CONTRAST: CPT | Mod: 26,MC

## 2024-10-28 PROCEDURE — 99222 1ST HOSP IP/OBS MODERATE 55: CPT

## 2024-10-28 PROCEDURE — 71045 X-RAY EXAM CHEST 1 VIEW: CPT | Mod: 26

## 2024-10-28 PROCEDURE — 93010 ELECTROCARDIOGRAM REPORT: CPT

## 2024-10-28 RX ORDER — FERROUS SULFATE 325(65) MG
1 TABLET ORAL
Refills: 0 | DISCHARGE

## 2024-10-28 RX ORDER — RIVAROXABAN 20 MG/1
15 TABLET, FILM COATED ORAL DAILY
Refills: 0 | Status: DISCONTINUED | OUTPATIENT
Start: 2024-10-28 | End: 2024-11-04

## 2024-10-28 RX ORDER — FERROUS SULFATE 325(65) MG
325 TABLET ORAL DAILY
Refills: 0 | Status: DISCONTINUED | OUTPATIENT
Start: 2024-10-28 | End: 2024-11-04

## 2024-10-28 RX ORDER — SODIUM CHLORIDE 9 MG/ML
1000 INJECTION, SOLUTION INTRAMUSCULAR; INTRAVENOUS; SUBCUTANEOUS ONCE
Refills: 0 | Status: DISCONTINUED | OUTPATIENT
Start: 2024-10-28 | End: 2024-10-28

## 2024-10-28 RX ORDER — OLANZAPINE 20 MG/1
5 TABLET ORAL THREE TIMES A DAY
Refills: 0 | Status: DISCONTINUED | OUTPATIENT
Start: 2024-10-28 | End: 2024-11-04

## 2024-10-28 RX ORDER — ACETAMINOPHEN 500 MG
1000 TABLET ORAL ONCE
Refills: 0 | Status: DISCONTINUED | OUTPATIENT
Start: 2024-10-28 | End: 2024-10-28

## 2024-10-28 RX ORDER — PANTOPRAZOLE SODIUM 40 MG/1
40 TABLET, DELAYED RELEASE ORAL
Refills: 0 | Status: DISCONTINUED | OUTPATIENT
Start: 2024-10-28 | End: 2024-11-04

## 2024-10-28 RX ORDER — ACETAMINOPHEN 500 MG
650 TABLET ORAL EVERY 6 HOURS
Refills: 0 | Status: DISCONTINUED | OUTPATIENT
Start: 2024-10-28 | End: 2024-11-04

## 2024-10-28 RX ORDER — FAMOTIDINE 10 MG/ML
20 INJECTION INTRAVENOUS ONCE
Refills: 0 | Status: DISCONTINUED | OUTPATIENT
Start: 2024-10-28 | End: 2024-11-04

## 2024-10-28 RX ORDER — GABAPENTIN 300 MG/1
600 CAPSULE ORAL THREE TIMES A DAY
Refills: 0 | Status: DISCONTINUED | OUTPATIENT
Start: 2024-10-28 | End: 2024-11-04

## 2024-10-28 RX ORDER — RANOLAZINE 500 MG/1
500 TABLET, FILM COATED, EXTENDED RELEASE ORAL
Refills: 0 | Status: DISCONTINUED | OUTPATIENT
Start: 2024-10-28 | End: 2024-11-04

## 2024-10-28 RX ORDER — ALBUTEROL 90 MCG
2 AEROSOL (GRAM) INHALATION EVERY 6 HOURS
Refills: 0 | Status: DISCONTINUED | OUTPATIENT
Start: 2024-10-28 | End: 2024-11-04

## 2024-10-28 RX ORDER — FUROSEMIDE 40 MG
20 TABLET ORAL ONCE
Refills: 0 | Status: COMPLETED | OUTPATIENT
Start: 2024-10-28 | End: 2024-10-28

## 2024-10-28 RX ORDER — ACETAMINOPHEN 500 MG
725 TABLET ORAL ONCE
Refills: 0 | Status: COMPLETED | OUTPATIENT
Start: 2024-10-28 | End: 2024-10-28

## 2024-10-28 RX ADMIN — Medication 20 MILLIGRAM(S): at 12:09

## 2024-10-28 RX ADMIN — Medication 725 MILLIGRAM(S): at 12:03

## 2024-10-28 RX ADMIN — OLANZAPINE 5 MILLIGRAM(S): 20 TABLET ORAL at 20:22

## 2024-10-28 RX ADMIN — RANOLAZINE 500 MILLIGRAM(S): 500 TABLET, FILM COATED, EXTENDED RELEASE ORAL at 22:45

## 2024-10-28 RX ADMIN — Medication 650 MILLIGRAM(S): at 16:05

## 2024-10-28 RX ADMIN — Medication 20 MILLIGRAM(S): at 22:46

## 2024-10-28 RX ADMIN — Medication 725 MILLIGRAM(S): at 12:17

## 2024-10-28 RX ADMIN — Medication 290 MILLIGRAM(S): at 11:47

## 2024-10-28 RX ADMIN — OLANZAPINE 5 MILLIGRAM(S): 20 TABLET ORAL at 16:04

## 2024-10-28 RX ADMIN — GABAPENTIN 600 MILLIGRAM(S): 300 CAPSULE ORAL at 22:45

## 2024-10-28 NOTE — ED ADULT TRIAGE NOTE - CHIEF COMPLAINT QUOTE
Pt BIBA c/o chest pain, sob, and ams. As per EMS, pt son states pt has not been acting her self. EMS states on arrival pt had o2 stat in "80's". Pt states I have chest pain right now. A&Ox2 during triage.

## 2024-10-28 NOTE — ED CDU PROVIDER INITIAL DAY NOTE - ATTENDING APP SHARED VISIT CONTRIBUTION OF CARE
89 year old female PMHx CAD c/o dyspnea; initial work up and cardiology assessment noted; admit to obs for further work up and tx

## 2024-10-28 NOTE — ED ADULT NURSE NOTE - OBJECTIVE STATEMENT
Assumed care of pt at 0902. Patient is a 89yo female who presents to the ED with complaints of abdominal pain with vomiting and diarrhea since saturday. Patient stated that she "ate a lot of gelato" Saturday and has had diarrhea and vomiting since then. Pt does report increased urinary frequency stating she "feels like shes going every 30 minutes" Pt states her grandson brought her imodium this morning which she stated helped. Patient states her grandson checked her SPO2 at home and saw she was 85-89%. Patient states the grandson then called the ambulance. Upon assessment patients abdomen is soft and non tender. No evidence of guarding or grimacing upon palpation. Pt denies any CP or SOB.

## 2024-10-28 NOTE — ED ADULT NURSE REASSESSMENT NOTE - NS ED NURSE REASSESS COMMENT FT1
turned and positioned onto right side for comfort. pillows placed between knees and under right side buttocks.  Side rails up.

## 2024-10-28 NOTE — ED ADULT NURSE NOTE - NSFALLHARMRISKINTERV_ED_ALL_ED

## 2024-10-28 NOTE — ED ADULT NURSE REASSESSMENT NOTE - NS ED NURSE REASSESS COMMENT FT1
A 2 RN skin check has been performed on admission to _ER___ with RN witness _Germano__.  A pressure injury was not identified.  Documentation in the assessment to support findings.

## 2024-10-28 NOTE — ED CDU PROVIDER INITIAL DAY NOTE - OBJECTIVE STATEMENT
Pt is a 89 yo female with PMH of CAD s/p PCI, GERD, afib on Xarelto, severe AS s/p TAVR, HFpEF, carotid atherosclerosis, recent fall, HTN, HLD presenting with epigastric pain. Pt reports of mild dysuria, no SOB or chest pain at this time contrary to triage note. Pt was feeling nauseous for the past few days but not currently. Pt denies fever, diarrhea/constipation, been eating and drinking normally. Pt is coming from home, pt's son states that she has not been acting herself. Per EMS, pt desatted to 80s on RA.     ED labs reviewed, BNP elevated to 7393. Cardiology consulted which recommended TTE and treatment of CHF exacerbation. Given 20 IV lasix.

## 2024-10-28 NOTE — ED CDU PROVIDER INITIAL DAY NOTE - NS ED MD PROGRESS NOTE PROVIDER NAME2 FT
Dx:  Superior labrum anterior-to-posterior (SLAP) tear of right shoulder (K85.838Z)  Orthopedic aftercare (Z47.89)   Date of Onset: Spring 2019  DOS: 1/22/21: SLAP repair;  12 weeks post op       Insurance (Authorized # of Visits):  Medicaid/medicaid   A non-painful right shoulder ROM-MET    Assessment: Patient has completed 19 visits and had flareup late last week and now resolving. Still some palpable tenderness at incision area.  Patient having some mild crepitus with ER/IR in certain positions likely fr with retractions 3# 2 x 10  -Retractions with ER 2# 2 x 10  -prone shoulder extensions with 3#  -T' with 2# 2 x 10 with retractions.   -review of prone scap posterior tilt right 10x  -shoulder IR prone with 1# resistance  -PROM right shoulder ER/IR at 90 de Shift change Miryam

## 2024-10-28 NOTE — ED PROVIDER NOTE - CLINICAL SUMMARY MEDICAL DECISION MAKING FREE TEXT BOX
Pt is a 89 yo female with PMH of CAD s/p PCI, GERD, afib on Xarelto, severe AS s/p TAVR, HFpEF, carotid atherosclerosis, recent fall, HTN, HLD presenting with epigastric pain. Pt reports of mild dysuria, no SOB or chest pain at this time contrary to triage note. Pt was feeling nauseous for the past few days but not currently. Pt denies fever, diarrhea/constipation, been eating and drinking normally.    vs stable, mildly tender epigastric nondistended, lungs clear    acs workup for epigastric vs substernal chest pain on palpation. ct abd pelvis, ua/uc, pain control with ofirmev. Pt is a 89 yo female with PMH of CAD s/p PCI, GERD, afib on Xarelto, severe AS s/p TAVR, HFpEF, carotid atherosclerosis, recent fall, HTN, HLD presenting with epigastric pain. Pt reports of mild dysuria, no SOB or chest pain at this time contrary to triage note. Pt was feeling nauseous for the past few days but not currently. Pt denies fever, diarrhea/constipation, been eating and drinking normally. Pt is coming from home, pt's son states that she has not been acting herself. Per EMS, pt desatted to 80s on RA.     vs stable, mildly tender epigastric nondistended, lungs clear    acs workup for epigastric vs substernal chest pain on palpation. ct abd pelvis, ua/uc, pain control with ofirmev, flu swab. Pt is a 89 yo female with PMH of CAD s/p PCI, GERD, afib on Xarelto, severe AS s/p TAVR, HFpEF, carotid atherosclerosis, recent fall, HTN, HLD presenting with epigastric pain. Pt reports of mild dysuria, no SOB or chest pain at this time contrary to triage note. Pt was feeling nauseous for the past few days but not currently. Pt denies fever, diarrhea/constipation, been eating and drinking normally. Pt is coming from home, pt's son states that she has not been acting herself. Per EMS, pt desatted to 80s on RA.     vs stable, mildly tender epigastric nondistended, lungs clear    acs workup for epigastric vs substernal chest pain on palpation. ct abd pelvis, ua/uc, pain control with ofirmev, flu swab. chf exacerbation likely with mild chf exacerbation on cxr and pleural effusions bl small on CT abd. Pt given 20mg lasix IV, cards ss consulted, recommending TTE and chf management. Admitted to obs.

## 2024-10-28 NOTE — CONSULT NOTE ADULT - SUBJECTIVE AND OBJECTIVE BOX
[facility]                                              CARDIOLOGY AT [place] [address]                                             Telephone: [phone]. Fax:[phone]                                                       CARDIOLOGY CONSULTATION NOTE                                                                                             History obtained by: Patient and medical record  Community Cardiologist:    obtained: Yes [  ] No [  ]  Reason for Consultation:   Available out pt records reviewed: Yes [  ] No [  ]    Chief complaint:    Patient is a 89y old  Female who presents with a chief complaint of     HPI:      CARDIAC TESTING   ECHO:  < from: TTE Echo Complete w/o Contrast w/ Doppler (11.07.23 @ 19:59) >  Summary:   1. Technically difficult study.   2. Left ventricular ejection fraction, by visual estimation, is >75%.   3. Hyperdynamic global left ventricular systolic function.   4. There is moderate concentric left ventricular hypertrophy.   5. Elevated left atrial and left ventricular end-diastolic pressures.   6. Severely enlarged left atrium.   7. Mild mitral valve regurgitation.   8. Moderate mitral annular calcification.   9. Thickening and calcification of the anterior and posterior mitral   valve leaflets.  10. Mild tricuspid regurgitation.  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  12. Bioprosthesis in the aortic position.  13. Mild aortic regurgitation.  14. Peak aortic valve gradient is 22.7 mmHg and the mean gradient is 13.0   mmHg, which isprobably normal in the setting of a prosthetic aortic   valve.  15. Mild pulmonic valve regurgitation.  16. Estimated pulmonary artery systolic pressure is 36.8 mmHg assuming a   right atrial pressure of 5 mmHg, which is consistent with borderline   pulmonary hypertension.    MD Jeff Electronically signed on 11/8/2023 at 6:56:26 AM    < end of copied text >    CATH:   < from: Cardiac Cath Lab - Adult (11.12.18 @ 10:59) >  VENTRICLES: No LV gram was performed; however, a recent echocardiogram  demonstrated an EF of 55 %.  CORONARY VESSELS: The coronary circulation is co-dominant.  LM:   --  LM: Normal.  LAD:   --  Mid LAD: There was a 40 % stenosis.  CX:   --  Mid circumflex: There was a 80 % stenosis.  RCA:   -- Mid RCA: There was a 100 % stenosis.  COMPLICATIONS: No complications occurred during the cath lab visit.  SUMMARY:  HEMODYNAMICS: Hemodynamic assessment demonstrates mildly to moderately  elevated pulmonary capillary wedge pressure.  DIAGNOSTIC IMPRESSIONS: Severe circumflex disease. PCI performed with 1  JARETT.  Mild to moderate pulmonary hypertension. Mild to moderately elevated wedge  pressure.  DIAGNOSTIC RECOMMENDATIONS: Plavix and AC. No aspirin due to elevated  bleeding risk in elderly patient.  INTERVENTIONAL IMPRESSIONS: Severe circumflex disease. PCI performed with 1  JARETT.  Mild to moderate pulmonary hypertension. Mild to moderately elevated wedge  pressure.  INTERVENTIONAL RECOMMENDATIONS: Plavix and AC. No aspirin due to elevated  bleeding risk in elderly patient.  Prepared and signed by  Justyn Soto MD  Signed 11/13/2018 15:15:05    < end of copied text >      ELECTROPHYSIOLOGY:         PAST MEDICAL HISTORY  Essential hypertension  Chronic back pain  Hyperlipidemia, unspecified hyperlipidemia type  PNA (pneumonia)  Lung nodule  Thyroid nodule  Ace deep vein thrombosis (DVT) of popliteal vein of both lower extremities  Closed fracture of multiple ribs of right side, initial encounter  Aortic stenosis  Asthma  AV block  Bronchiectasis  DVT (deep venous thrombosis)  Kyphoscoliosis and scoliosis  Stenocardia  Spinal stenosis  Neuropathy  Diastolic CHF  GERD (gastroesophageal reflux disease)  Closed pelvic ring fracture  DVT (deep venous thrombosis)  Hip arthritis  [place] (hard of hearing)  Afib  History of valvular heart disease  HTN (hypertension)  Chronic back pain        PAST SURGICAL HISTORY  S/P spinal fusion  History of pelvic fracture  S/p TAVR (transcatheter aortic valve replacement), bioprosthetic        SOCIAL HISTORY:  Denies smoking/alcohol/drugs      FAMILY HISTORY:  Family history of breast cancer (Sibling)  Family history of cerebrovascular accident (CVA) (Mother)  Family history of esophageal cancer (Father)        HOME MEDICATIONS:  albuterol 90 mcg/inh inhalation aerosol: 2 puff(s) inhaled every 6 hours As needed for shortness of breath and/or wheezing (11 Nov 2023 14:32)  aspirin 81 mg oral delayed release tablet: 1 tab(s) orally once a day (11 Nov 2023 14:32)  atorvastatin 80 mg oral tablet: 1 tab(s) orally once a day (at bedtime) (11 Nov 2023 14:32)  gabapentin 600 mg oral tablet: 1 tab(s) orally 3 times a day (11 Nov 2023 14:32)  Lasix 40 mg oral tablet: 1 tab(s) orally once a day (11 Nov 2023 14:32)  polyethylene glycol 3350 oral powder for reconstitution: 17 gram(s) orally once a day (11 Nov 2023 14:32)  ranolazine 500 mg oral tablet, extended release: 1 tab(s) orally 2 times a day (11 Nov 2023 14:32)  rivaroxaban 15 mg oral tablet: 1 tab(s) orally once a day (before a meal) (11 Nov 2023 14:32)  senna leaf extract oral tablet: 2 tab(s) orally once a day (at bedtime) (11 Nov 2023 14:32)      CURRENT CARDIAC MEDICATIONS:      CURRENT OTHER MEDICATIONS:      ALLERGIES:   No Known Allergies      REVIEW OF SYMPTOMS:   CONSTITUTIONAL: No fever, no chills, no weight loss, no weight gain, no fatigue   ENMT:  No vertigo; No sinus or throat pain  NECK: No pain or stiffness  CARDIOVASCULAR: No chest pain, no dyspnea, no syncope/presyncope, no palpitations, no dizziness, no Orthopnea, no Paroxsymal nocturnal dyspnea  RESPIRATORY: no Shortness of breath, no cough, no wheezing  : No dysuria, no hematuria   GI: No dark color stool, no nausea, no diarrhea, no constipation, no abdominal pain   NEURO: No headache, no slurred speech   MUSCULOSKELETAL: No joint pain or swelling; No muscle, back, or extremity pain  PSYCH: No agitation, no anxiety.    ALL OTHER REVIEW OF SYSTEMS ARE NEGATIVE.    VITAL SIGNS:  T(C): 36.4 (10-28-24 @ 11:11), Max: 36.9 (10-28-24 @ 08:40)  T(F): 97.6 (10-28-24 @ 11:11), Max: 98.5 (10-28-24 @ 08:40)  HR: 88 (10-28-24 @ 11:11) (87 - 88)  BP: 184/77 (10-28-24 @ 11:11) (184/77 - 193/73)  RR: 23 (10-28-24 @ 11:11) (16 - 23)  SpO2: 97% (10-28-24 @ 11:11) (97% - 100%)    INTAKE AND OUTPUT:       PHYSICAL EXAM:  Constitutional: Comfortable . No acute distress.   HEENT: Atraumatic and normocephalic , neck is supple . no JVD. No carotid bruit.  CNS: A&Ox3. No focal deficits.   Respiratory: CTAB, unlabored   Cardiovascular: RRR normal s1 s2. No murmur. No rubs or gallop.  Gastrointestinal: Soft, non-tender. +Bowel sounds.   Extremities: 2+ Peripheral Pulses, No clubbing, cyanosis, or edema  Psychiatric: Calm . no agitation.   Skin: Warm and dry, no ulcers on extremities     LABS:                            13.0   4.63  )-----------( 171      ( 28 Oct 2024 09:58 )             38.2     10-28    142  |  101  |  14.5  ----------------------------<  92  4.2   |  29.0  |  0.92    Ca    10.3      28 Oct 2024 09:58    TPro  6.9  /  Alb  4.5  /  TBili  1.1  /  DBili  x   /  AST  33[H]  /  ALT  25  /  AlkPhos  63  10-28    PT/INR - ( 28 Oct 2024 09:58 )   PT: 14.2 sec;   INR: 1.26 ratio         PTT - ( 28 Oct 2024 09:58 )  PTT:36.6 sec  Urinalysis Basic - ( 28 Oct 2024 09:58 )    Color: x / Appearance: x / SG: x / pH: x  Gluc: 92 mg/dL / Ketone: x  / Bili: x / Urobili: x   Blood: x / Protein: x / Nitrite: x   Leuk Esterase: x / RBC: x / WBC x   Sq Epi: x / Non Sq Epi: x / Bacteria: x              INTERPRETATION OF TELEMETRY:     ECG:   Prior ECG: Yes [  ] No [  ]    RADIOLOGY & ADDITIONAL STUDIES:    X-ray:    CT scan:   MRI:   US:                                                [facility]                                              CARDIOLOGY AT [address], [place] [address]                                             Telephone: [phone]. Fax:[phone]                                                       CARDIOLOGY CONSULTATION NOTE                                                                                             History obtained by: Patient and medical record  Community Cardiologist: paige   obtained: Yes [  ] No [  ]  Reason for Consultation: HF  Available out pt records reviewed: Yes [x  ] No [  ]    Chief complaint:    Patient is a 89y old  Female who presents with a chief complaint of     HPI: 89 y/o F with PMH HTN, HLD, CAD (PCI 2018), GERD, AFib on Xarelto, DVT, severe AS s/p TAVR, HHFpEF, carotid atherosclerosis presents to ED with c/o epigastric pain, dyspnea. Pt very [place], lives independently, daughter in law, and grandson live next door (son passed away in Jan). Niece parth at bedside. Pt states grandson wanted her to come in, she c/o soft stool x few days. Denies chest pain, shortness of breath. Pro BNP elevated,  HS trop 53.     Venice Garnica [phone]      CARDIAC TESTING   ECHO:  < from: TTE Echo Complete w/o Contrast w/ Doppler (11.07.23 @ 19:59) >  Summary:   1. Technically difficult study.   2. Left ventricular ejection fraction, by visual estimation, is >75%.   3. Hyperdynamic global left ventricular systolic function.   4. There is moderate concentric left ventricular hypertrophy.   5. Elevated left atrial and left ventricular end-diastolic pressures.   6. Severely enlarged left atrium.   7. Mild mitral valve regurgitation.   8. Moderate mitral annular calcification.   9. Thickening and calcification of the anterior and posterior mitral   valve leaflets.  10. Mild tricuspid regurgitation.  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  12. Bioprosthesis in the aortic position.  13. Mild aortic regurgitation.  14. Peak aortic valve gradient is 22.7 mmHg and the mean gradient is 13.0   mmHg, which isprobably normal in the setting of a prosthetic aortic   valve.  15. Mild pulmonic valve regurgitation.  16. Estimated pulmonary artery systolic pressure is 36.8 mmHg assuming a   right atrial pressure of 5 mmHg, which is consistent with borderline   pulmonary hypertension.    MD Jeff Electronically signed on 11/8/2023 at 6:56:26 AM    < end of copied text >    CATH:   < from: Cardiac Cath Lab - Adult (11.12.18 @ 10:59) >  VENTRICLES: No LV gram was performed; however, a recent echocardiogram  demonstrated an EF of 55 %.  CORONARY VESSELS: The coronary circulation is co-dominant.  LM:   --  LM: Normal.  LAD:   --  Mid LAD: There was a 40 % stenosis.  CX:   --  Mid circumflex: There was a 80 % stenosis.  RCA:   -- Mid RCA: There was a 100 % stenosis.  COMPLICATIONS: No complications occurred during the cath lab visit.  SUMMARY:  HEMODYNAMICS: Hemodynamic assessment demonstrates mildly to moderately  elevated pulmonary capillary wedge pressure.  DIAGNOSTIC IMPRESSIONS: Severe circumflex disease. PCI performed with 1  JARETT.  Mild to moderate pulmonary hypertension. Mild to moderately elevated wedge  pressure.  DIAGNOSTIC RECOMMENDATIONS: Plavix and AC. No aspirin due to elevated  bleeding risk in elderly patient.  INTERVENTIONAL IMPRESSIONS: Severe circumflex disease. PCI performed with 1  JARETT.  Mild to moderate pulmonary hypertension. Mild to moderately elevated wedge  pressure.  INTERVENTIONAL RECOMMENDATIONS: Plavix and AC. No aspirin due to elevated  bleeding risk in elderly patient.  Prepared and signed by  Justyn Stoo MD  Signed 11/13/2018 15:15:05    < end of copied text >      ELECTROPHYSIOLOGY:         PAST MEDICAL HISTORY  Essential hypertension  Chronic back pain  Hyperlipidemia, unspecified hyperlipidemia type  PNA (pneumonia)  Lung nodule  Thyroid nodule  Ace deep vein thrombosis (DVT) of popliteal vein of both lower extremities  Closed fracture of multiple ribs of right side, initial encounter  Aortic stenosis  Asthma  AV block  Bronchiectasis  DVT (deep venous thrombosis)  Kyphoscoliosis and scoliosis  Stenocardia  Spinal stenosis  Neuropathy  Diastolic CHF  GERD (gastroesophageal reflux disease)  Closed pelvic ring fracture  DVT (deep venous thrombosis)  Hip arthritis  [place] (hard of hearing)  Afib  History of valvular heart disease  HTN (hypertension)  Chronic back pain        PAST SURGICAL HISTORY  S/P spinal fusion  History of pelvic fracture  S/p TAVR (transcatheter aortic valve replacement), bioprosthetic        SOCIAL HISTORY:  Denies smoking/alcohol/drugs      FAMILY HISTORY:  Family history of breast cancer (Sibling)  Family history of cerebrovascular accident (CVA) (Mother)  Family history of esophageal cancer (Father)        HOME MEDICATIONS:  albuterol 90 mcg/inh inhalation aerosol: 2 puff(s) inhaled every 6 hours As needed for shortness of breath and/or wheezing (11 Nov 2023 14:32)  aspirin 81 mg oral delayed release tablet: 1 tab(s) orally once a day (11 Nov 2023 14:32)  atorvastatin 80 mg oral tablet: 1 tab(s) orally once a day (at bedtime) (11 Nov 2023 14:32)  gabapentin 600 mg oral tablet: 1 tab(s) orally 3 times a day (11 Nov 2023 14:32)  Lasix 40 mg oral tablet: 1 tab(s) orally once a day (11 Nov 2023 14:32)  polyethylene glycol 3350 oral powder for reconstitution: 17 gram(s) orally once a day (11 Nov 2023 14:32)  ranolazine 500 mg oral tablet, extended release: 1 tab(s) orally 2 times a day (11 Nov 2023 14:32)  rivaroxaban 15 mg oral tablet: 1 tab(s) orally once a day (before a meal) (11 Nov 2023 14:32)  senna leaf extract oral tablet: 2 tab(s) orally once a day (at bedtime) (11 Nov 2023 14:32)      CURRENT CARDIAC MEDICATIONS:      CURRENT OTHER MEDICATIONS:      ALLERGIES:   No Known Allergies      REVIEW OF SYMPTOMS:   CONSTITUTIONAL: No fever, no chills, no weight loss, no weight gain, no fatigue   ENMT:  No vertigo; No sinus or throat pain  NECK: No pain or stiffness  CARDIOVASCULAR: No chest pain, no dyspnea, no syncope/presyncope, no palpitations, no dizziness, no Orthopnea, no Paroxsymal nocturnal dyspnea  RESPIRATORY: no Shortness of breath, no cough, no wheezing  : No dysuria, no hematuria   GI: No dark color stool, no nausea, no diarrhea, no constipation, no abdominal pain   NEURO: No headache, no slurred speech   MUSCULOSKELETAL: No joint pain or swelling; No muscle, back, or extremity pain  PSYCH: No agitation, no anxiety.    ALL OTHER REVIEW OF SYSTEMS ARE NEGATIVE.    VITAL SIGNS:  T(C): 36.4 (10-28-24 @ 11:11), Max: 36.9 (10-28-24 @ 08:40)  T(F): 97.6 (10-28-24 @ 11:11), Max: 98.5 (10-28-24 @ 08:40)  HR: 88 (10-28-24 @ 11:11) (87 - 88)  BP: 184/77 (10-28-24 @ 11:11) (184/77 - 193/73)  RR: 23 (10-28-24 @ 11:11) (16 - 23)  SpO2: 97% (10-28-24 @ 11:11) (97% - 100%)    INTAKE AND OUTPUT:       PHYSICAL EXAM:  Constitutional: Comfortable . No acute distress.   HEENT: Atraumatic and normocephalic , neck is supple . no JVD.   CNS: A&Ox3. No focal deficits.   Respiratory: CTAB, unlabored   Cardiovascular: RRR normal s1 s2. No murmur. No rubs or gallop.  Gastrointestinal: Soft, non-tender. +Bowel sounds.   Extremities: No edema  Psychiatric: Calm . no agitation.   Skin: Warm and dry, no ulcers on extremities     LABS:                    13.0   4.63  )-----------( 171      ( 28 Oct 2024 09:58 )             38.2     10-28    142  |  101  |  14.5  ----------------------------<  92  4.2   |  29.0  |  0.92    Ca    10.3      28 Oct 2024 09:58    TPro  6.9  /  Alb  4.5  /  TBili  1.1  /  DBili  x   /  AST  33[H]  /  ALT  25  /  AlkPhos  63  10-28    PT/INR - ( 28 Oct 2024 09:58 )   PT: 14.2 sec;   INR: 1.26 ratio         PTT - ( 28 Oct 2024 09:58 )  PTT:36.6 sec  Urinalysis Basic - ( 28 Oct 2024 09:58 )    Color: x / Appearance: x / SG: x / pH: x  Gluc: 92 mg/dL / Ketone: x  / Bili: x / Urobili: x   Blood: x / Protein: x / Nitrite: x   Leuk Esterase: x / RBC: x / WBC x   Sq Epi: x / Non Sq Epi: x / Bacteria: x      INTERPRETATION OF TELEMETRY:     ECG: Aflutter 3:1

## 2024-10-28 NOTE — ED PROVIDER NOTE - ATTENDING CONTRIBUTION TO CARE
89-year-old female with history of CAD with coronary stenting, GERD, A-fib on Xarelto status post TAVR, CHF  presents to ED  complaining of epigastric abdominal pain with associated diarrhea and possible dysuria.  As per patient's son patient is usually "sharper" and she has been acting has had similar occurrences when she had UTI in the past.   on exam patient well-appearing elderly female in no acute distress.,  PERRL, mm  slightly dry, neck supple, heart regular, lungs clear bilaterally, abdomen soft no localized tenderness, extremities no cyanosis or edema, neuro no focal deficits.  Will hydrate, check labs UA and reevaluate

## 2024-10-28 NOTE — ED ADULT NURSE REASSESSMENT NOTE - NS ED NURSE REASSESS COMMENT FT1
Patient is resting at this time. Patient is alert but confused. Patient is easily redirected.  Patient is eating at this time. Patient to proceed to TTE tomorrow according to PA

## 2024-10-28 NOTE — CONSULT NOTE ADULT - ASSESSMENT
89 y/o F with PMH HTN, HLD, CAD (PCI 2018), GERD, AFib on Xarelto, DVT, severe AS s/p TAVR, HHFpEF, carotid atherosclerosis presents to ED with c/o epigastric pain, dyspnea. Pt very [place], lives independently, daughter in law, and grandson live next door (son passed away in Jan). Niece parth at bedside. Pt states grandson wanted her to come in, she c/o soft stool x few days. Denies chest pain, shortness of breath. Pro BNP elevated,  HS trop 53.

## 2024-10-28 NOTE — ED CDU PROVIDER INITIAL DAY NOTE - PROGRESS NOTE DETAILS
spoke with pt's daughter [place], states pt occasionally gets delirious and forgetful at times especially while in hospital. Does not take any medications daily or prn for agitation. Daughter would prefer avoiding medication if possible. zyprexa 5mg ordered PRN. pt able to be redirected and now calm. see the patient at the bedside patient family member grandson at the bedside as well. mildly sundowning try to direct the patient. placed the patient on close observation by the MA.  to the morning risk of the fall. the reassurance that patient's daughter and grandson

## 2024-10-28 NOTE — ED PROVIDER NOTE - OBJECTIVE STATEMENT
Pt is a 89 yo female with PMH of CAD s/p PCI, GERD, afib on Xarelto, severe AS s/p TAVR, HFpEF, carotid atherosclerosis, recent fall, HTN, HLD presenting with epigastric pain. Pt reports of mild dysuria, no SOB or chest pain at this time contrary to triage note. Pt was feeling nauseous for the past few days but not currently. Pt denies fever, diarrhea/constipation, been eating and drinking normally. Pt is a 89 yo female with PMH of CAD s/p PCI, GERD, afib on Xarelto, severe AS s/p TAVR, HFpEF, carotid atherosclerosis, recent fall, HTN, HLD presenting with epigastric pain. Pt reports of mild dysuria, no SOB or chest pain at this time contrary to triage note. Pt was feeling nauseous for the past few days but not currently. Pt denies fever, diarrhea/constipation, been eating and drinking normally. Pt is coming from home, pt's son states that she has not been acting herself. Per EMS, pt desatted to 80s on RA.

## 2024-10-28 NOTE — ED PROVIDER NOTE - PHYSICAL EXAMINATION
General: NAD, ao3  HEENT: Normocephalic, atraumatic  Neck: No apparent stiffness or JVD  Pulm: Chest wall symmetric and nontender, lungs clear to ascultation   Cardiac: Regular rate and regular rhythm  Abdomen: mildly tender epigastric and nondistended  Skin: Skin is warm, dry and intact without rashes or lesions.  Neuro: No motor or sensory deficits above reported baseline  MSK: No deformity or tenderness above reported baseline

## 2024-10-28 NOTE — ED PROVIDER NOTE - PROGRESS NOTE DETAILS
chf exacerbation likely with mild chf exacerbation on cxr and pleural effusions bl small on CT abd. Pt given 20mg lasix IV, cards ss consulted, recommending TTE and chf management. Admitted to obs.

## 2024-10-28 NOTE — CONSULT NOTE ADULT - PROBLEM SELECTOR RECOMMENDATION 2
.  -- HS troponin 53, 48  - EKG unchanged from prior  - TTE to evaluate WMA, Valvulopathy   - spoke with coby and Daughter in Law, discussed elevated cardiac enzymes, and history of CAD.  - further recs after TTE completed, likely recommend medical management given patients age, frailty.  - cont atorvastatin, ranolazine .  -- HS troponin 53, 48  - EKG unchanged from prior; probably related to HFpEF  - TTE to evaluate WMA, Valvulopathy   - spoke with coby and Daughter in Law, discussed elevated cardiac enzymes, and history of CAD.  - further recs after TTE completed, likely recommend medical management given patients age, frailty.  - cont atorvastatin, ranolazine

## 2024-10-28 NOTE — ED CDU PROVIDER INITIAL DAY NOTE - CLINICAL SUMMARY MEDICAL DECISION MAKING FREE TEXT BOX
Pt is a 89 yo female with PMH of CAD s/p PCI, GERD, afib on Xarelto, severe AS s/p TAVR, HFpEF, carotid atherosclerosis, recent fall, HTN, HLD presenting with epigastric pain. Pt reports of mild dysuria, no SOB or chest pain at this time contrary to triage note. Pt was feeling nauseous for the past few days but not currently. Pt denies fever, diarrhea/constipation, been eating and drinking normally. Pt is coming from home, pt's son stated that she has not been acting herself. Per EMS, pt desatted to 80s on RA. Vitals stable, mildly tender epigastric nondistended, lungs clear. ED labs reviewed, BNP elevated to 7393. ct abd pelvis, UA negative, UC pending. Pain control with ofirmev, flu swab. chf exacerbation likely with mild chf exacerbation on cxr and pleural effusions bl small on CT abd. Pt given 20mg lasix IV, cards ss consulted, recommending TTE and chf management. Admitted to obs for further management.

## 2024-10-28 NOTE — ED ADULT NURSE REASSESSMENT NOTE - NS ED NURSE REASSESS COMMENT FT1
Respiratory rate remains 18-10 with oxygen saturation remaining at 100%.  Urine output 600ml s/p IV lasix clear yellow.  Pt agitated and times requesting discharge.  Redirected to comfortable position on stretcher with side rails up.  LEXY Norwood made aware and will call patient's daughter to review plan of care. Respiratory rate remains 18-20 with oxygen saturation remaining at 100%.  Urine output 600ml s/p IV lasix clear yellow.  Pt agitated and times requesting discharge.  Redirected to comfortable position on stretcher with side rails up.  LEXY [place]wood made aware and will call patient's daughter to review plan of care.

## 2024-10-28 NOTE — CONSULT NOTE ADULT - TIME BILLING
HTN, HLD, CAD (PCI 2018), GERD, AFib on Xarelto, DVT, severe AS s/p TAVR, HHFpEF, carotid atherosclerosis presents to ED with c/o epigastric pain, dyspnea.   Elevated troponin   Tele review

## 2024-10-28 NOTE — ED CDU PROVIDER INITIAL DAY NOTE - PHYSICAL EXAMINATION
Gen: No acute distress, non toxic  HEAD: NCAT  Eyes: pink conjunctivae  CV: RRR  Resp: CTAB, normal rate and effort  GI: +epigastric tenderness, Abdomen soft, ND. No rebound, no guarding  Neuro: A&O x 3, sensorimotor intact without deficits   Skin: No rashes. intact and perfused.

## 2024-10-28 NOTE — ED ADULT NURSE REASSESSMENT NOTE - NS ED NURSE REASSESS COMMENT FT1
Assumed care of patient at 19:15 from LOLIS Thompson. Patient A&O x2, denies pain, no complaints of CP/SOB, no acute distress noted. Patient lying comfortably in bed, bed locked and in lowest position, safety maintained. Patient updated on plan of care, awaiting TTE. Patient remains on CM in a-fib.

## 2024-10-28 NOTE — CONSULT NOTE ADULT - NS ATTEND AMEND GEN_ALL_CORE FT
89 y/o F with HTN, HLD, CAD (PCI 2018), GERD, AFib on Xarelto, DVT, severe AS s/p TAVR, HHFpEF, carotid atherosclerosis presents to ED with c/o epigastric pain, dyspnea.     Pt lives independently, daughter in law, and grandson live next door (son passed away in Jan). Niece parth at bedside. Pt states grandson wanted her to come in, she c/o soft stool x few days. Denies chest pain, shortness of breath. Pro BNP elevated,  HS trop 53 likely due to acute exacerbation of HFpEF    --TTE pending, we will follow up on the results  --further recommendations to follow after TTE completed, likely recommend medical management only given patients age, frailty (no evidence of acute coronary syndrome)  - cont atorvastatin, ranolazine  --Continue tele

## 2024-10-28 NOTE — CONSULT NOTE ADULT - PROBLEM SELECTOR RECOMMENDATION 9
.  - TTE 11/23 EF >75%, Left Atrial enlargement   - TTE pending  - Pro BNP 4890  - lasix 20 mg IV given in ED  - CT chest completed, small b/l pleural effusions  - as per niece/daughter in law, adherent with medications, although some dietary noncompliance.

## 2024-10-28 NOTE — ED CDU PROVIDER INITIAL DAY NOTE - WR ORDER STATUS 1
c/o lower abd pain pt reports 33 weeks pregnant, due date 10/17. denies any bleeding. pt to be seen in L&D
Resulted

## 2024-10-28 NOTE — ED CDU PROVIDER INITIAL DAY NOTE - NSICDXPASTMEDICALHX_GEN_ALL_CORE_FT
PAST MEDICAL HISTORY:  Acute deep vein thrombosis (DVT) of popliteal vein of both lower extremities     AF (atrial fibrillation) Persistent/chronic    Afib     Angina pectoris class IV    Anxiety     Aortic stenosis s/p TAVR bioprosthetic Jan 2018 [facility] Dr. Aragon    Aspiration pneumonia     Asthma     Atherosclerosis of native coronary artery of native heart with angina pectoris     AV block     Bronchiectasis     Chronic back pain     Chronic back pain     Closed fracture of multiple ribs of right side, initial encounter     Closed pelvic ring fracture     Diastolic CHF NYHA class 3    CURRY (dyspnea on exertion)     DVT (deep venous thrombosis) left lower extremity    Essential hypertension     GERD (gastroesophageal reflux disease)     Hip arthritis     History of valvular heart disease     [place] (hard of hearing) wears bilateral hearing aides    HTN (hypertension)     HTN (hypertension)     Hypercholesteremia     Hyperlipidemia, unspecified hyperlipidemia type     Kyphoscoliosis and scoliosis     Lung nodule     Murmur, cardiac gr3/6    Neuropathy     Nosebleed prior left nostrils cauterize x2    Nosebleed severe on aspirin; had nose cautery done in 2018    Pelvic fracture     PNA (pneumonia) november 2017    Spinal stenosis     Stenocardia     Thyroid nodule

## 2024-10-28 NOTE — ED ADULT NURSE NOTE - NSICDXPASTMEDICALHX_GEN_ALL_CORE_FT
PAST MEDICAL HISTORY:  Acute deep vein thrombosis (DVT) of popliteal vein of both lower extremities     AF (atrial fibrillation) Persistent/chronic    Afib     Angina pectoris class IV    Anxiety     Aortic stenosis s/p TAVR bioprosthetic Jan 2018 [facility] Dr. Aragon    Aspiration pneumonia     Asthma     Atherosclerosis of native coronary artery of native heart with angina pectoris     AV block     Bronchiectasis     Chronic back pain     Chronic back pain     Closed fracture of multiple ribs of right side, initial encounter     Closed pelvic ring fracture     Diastolic CHF NYHA class 3    CURRY (dyspnea on exertion)     DVT (deep venous thrombosis) left lower extremity    Essential hypertension     GERD (gastroesophageal reflux disease)     Hip arthritis     History of valvular heart disease     [place] (hard of hearing) wears bilateral hearing aides    HTN (hypertension)     HTN (hypertension)     Hypercholesteremia     Hyperlipidemia, unspecified hyperlipidemia type     Kyphoscoliosis and scoliosis     Lung nodule     Murmur, cardiac gr3/6    Neuropathy     Nosebleed prior left nostrils cauterize x2    Nosebleed severe on aspirin; had nose cautery done in 2018    Pelvic fracture     PNA (pneumonia) november 2017    Spinal stenosis     Stenocardia     Thyroid nodule

## 2024-10-28 NOTE — ED ADULT NURSE NOTE - SKIN INTEGRITY
Pt states she had history of a pressure ulcer upon her last admission here,  2 RN skin check with RN Seble Steward, skin is intact and blanchable./intact

## 2024-10-28 NOTE — ED PROVIDER NOTE - NSICDXPASTMEDICALHX_GEN_ALL_CORE_FT
PAST MEDICAL HISTORY:  Acute deep vein thrombosis (DVT) of popliteal vein of both lower extremities     AF (atrial fibrillation) Persistent/chronic    Afib     Angina pectoris class IV    Anxiety     Aortic stenosis s/p TAVR bioprosthetic Jan 2018 [facility] Dr. Aragon    Aspiration pneumonia     Asthma     Atherosclerosis of native coronary artery of native heart with angina pectoris     AV block     Bronchiectasis     Chronic back pain     Chronic back pain     Closed fracture of multiple ribs of right side, initial encounter     Closed pelvic ring fracture     Diastolic CHF NYHA class 3    CURRY (dyspnea on exertion)     DVT (deep venous thrombosis) left lower extremity    Essential hypertension     GERD (gastroesophageal reflux disease)     Hip arthritis     History of valvular heart disease     [place] (hard of hearing) wears bilateral hearing aides    HTN (hypertension)     HTN (hypertension)     Hypercholesteremia     Hyperlipidemia, unspecified hyperlipidemia type     Kyphoscoliosis and scoliosis     Lung nodule     Murmur, cardiac gr3/6    Neuropathy     Nosebleed prior left nostrils cauterize x2    Nosebleed severe on aspirin; had nose cautery done in 2018    Pelvic fracture     PNA (pneumonia) november 2017    Spinal stenosis     Stenocardia     Thyroid nodule

## 2024-10-28 NOTE — ED ADULT TRIAGE NOTE - HEART RATE (BEATS/MIN)
PT CALLED INTO THE HUB AND THE HUB TRIED TO TRANSFER PT BUT HE WAS NOT ON THE LINE. I TRIED TO CALL PT BACK AND PT DID NOT ANSWER   87

## 2024-10-28 NOTE — ED ADULT NURSE REASSESSMENT NOTE - NS ED NURSE REASSESS COMMENT FT1
[address] [facility] [address]  [address]  [place] [address]  Dept: [phone]    October 5, 2019     Patient: Saniya Smith   YOB: 1963   Date of Visit: 10/4/2019       To Whom it May Concern:    Tisha Almaguer is under my professional care  He was seen in the hospital from 10/4/2019   to 10/05/19  He Should remain out of work until cleared by the trauma service secondary to multiple right-sided rib fractures  He should avoid lifting greater than 10 lb or any strenuous exercise activity until cleared by the trauma service  He should avoid driving until cleared by the trauma service  His next evaluation by the trauma service is scheduled for 10/24/2019  If you have any questions or concerns, please don't hesitate to call           Sincerely,          Rico Joshi PA-C Cardiology NP at bedside at this time.

## 2024-10-29 ENCOUNTER — RESULT REVIEW (OUTPATIENT)
Age: 89
End: 2024-10-29

## 2024-10-29 DIAGNOSIS — I50.30 UNSPECIFIED DIASTOLIC (CONGESTIVE) HEART FAILURE: ICD-10-CM

## 2024-10-29 LAB
ALBUMIN SERPL ELPH-MCNC: 4.2 G/DL — SIGNIFICANT CHANGE UP (ref 3.3–5.2)
ALP SERPL-CCNC: 53 U/L — SIGNIFICANT CHANGE UP (ref 40–120)
ALT FLD-CCNC: 22 U/L — SIGNIFICANT CHANGE UP
ANION GAP SERPL CALC-SCNC: 14 MMOL/L — SIGNIFICANT CHANGE UP (ref 5–17)
AST SERPL-CCNC: 35 U/L — HIGH
BASE EXCESS BLDA CALC-SCNC: 6.9 MMOL/L — HIGH (ref -2–3)
BASOPHILS # BLD AUTO: 0.11 K/UL — SIGNIFICANT CHANGE UP (ref 0–0.2)
BASOPHILS NFR BLD AUTO: 1.7 % — SIGNIFICANT CHANGE UP (ref 0–2)
BILIRUB SERPL-MCNC: 0.9 MG/DL — SIGNIFICANT CHANGE UP (ref 0.4–2)
BLOOD GAS COMMENTS ARTERIAL: SIGNIFICANT CHANGE UP
BUN SERPL-MCNC: 16.3 MG/DL — SIGNIFICANT CHANGE UP (ref 8–20)
CALCIUM SERPL-MCNC: 9.7 MG/DL — SIGNIFICANT CHANGE UP (ref 8.4–10.5)
CHLORIDE SERPL-SCNC: 100 MMOL/L — SIGNIFICANT CHANGE UP (ref 96–108)
CO2 SERPL-SCNC: 28 MMOL/L — SIGNIFICANT CHANGE UP (ref 22–29)
CREAT SERPL-MCNC: 1.03 MG/DL — SIGNIFICANT CHANGE UP (ref 0.5–1.3)
CULTURE RESULTS: SIGNIFICANT CHANGE UP
EGFR: 51 ML/MIN/1.73M2 — LOW
EOSINOPHIL # BLD AUTO: 0 K/UL — SIGNIFICANT CHANGE UP (ref 0–0.5)
EOSINOPHIL NFR BLD AUTO: 0 % — SIGNIFICANT CHANGE UP (ref 0–6)
GIANT PLATELETS BLD QL SMEAR: PRESENT — SIGNIFICANT CHANGE UP
GLUCOSE BLDC GLUCOMTR-MCNC: 88 MG/DL — SIGNIFICANT CHANGE UP (ref 70–99)
GLUCOSE SERPL-MCNC: 78 MG/DL — SIGNIFICANT CHANGE UP (ref 70–99)
HCO3 BLDA-SCNC: 31 MMOL/L — HIGH (ref 21–28)
HCT VFR BLD CALC: 37.8 % — SIGNIFICANT CHANGE UP (ref 34.5–45)
HGB BLD-MCNC: 12.8 G/DL — SIGNIFICANT CHANGE UP (ref 11.5–15.5)
HOROWITZ INDEX BLDA+IHG-RTO: 21 — SIGNIFICANT CHANGE UP
LYMPHOCYTES # BLD AUTO: 1.25 K/UL — SIGNIFICANT CHANGE UP (ref 1–3.3)
LYMPHOCYTES # BLD AUTO: 20.2 % — SIGNIFICANT CHANGE UP (ref 13–44)
MANUAL SMEAR VERIFICATION: SIGNIFICANT CHANGE UP
MCHC RBC-ENTMCNC: 30.5 PG — SIGNIFICANT CHANGE UP (ref 27–34)
MCHC RBC-ENTMCNC: 33.9 GM/DL — SIGNIFICANT CHANGE UP (ref 32–36)
MCV RBC AUTO: 90 FL — SIGNIFICANT CHANGE UP (ref 80–100)
MONOCYTES # BLD AUTO: 1.63 K/UL — HIGH (ref 0–0.9)
MONOCYTES NFR BLD AUTO: 26.3 % — HIGH (ref 2–14)
NEUTROPHILS # BLD AUTO: 3.16 K/UL — SIGNIFICANT CHANGE UP (ref 1.8–7.4)
NEUTROPHILS NFR BLD AUTO: 50 % — SIGNIFICANT CHANGE UP (ref 43–77)
NEUTS BAND # BLD: 0.9 % — SIGNIFICANT CHANGE UP (ref 0–8)
PCO2 BLDA: 47 MMHG — HIGH (ref 32–45)
PH BLDA: 7.43 — SIGNIFICANT CHANGE UP (ref 7.35–7.45)
PLAT MORPH BLD: NORMAL — SIGNIFICANT CHANGE UP
PLATELET # BLD AUTO: 171 K/UL — SIGNIFICANT CHANGE UP (ref 150–400)
PO2 BLDA: 69 MMHG — LOW (ref 83–108)
POTASSIUM SERPL-MCNC: 4.1 MMOL/L — SIGNIFICANT CHANGE UP (ref 3.5–5.3)
POTASSIUM SERPL-SCNC: 4.1 MMOL/L — SIGNIFICANT CHANGE UP (ref 3.5–5.3)
PROT SERPL-MCNC: 6.6 G/DL — SIGNIFICANT CHANGE UP (ref 6.6–8.7)
RBC # BLD: 4.2 M/UL — SIGNIFICANT CHANGE UP (ref 3.8–5.2)
RBC # FLD: 14.8 % — HIGH (ref 10.3–14.5)
RBC BLD AUTO: NORMAL — SIGNIFICANT CHANGE UP
SAO2 % BLDA: 95.3 % — SIGNIFICANT CHANGE UP (ref 94–98)
SODIUM SERPL-SCNC: 142 MMOL/L — SIGNIFICANT CHANGE UP (ref 135–145)
SPECIMEN SOURCE: SIGNIFICANT CHANGE UP
VARIANT LYMPHS # BLD: 0.9 % — SIGNIFICANT CHANGE UP (ref 0–6)
WBC # BLD: 6.21 K/UL — SIGNIFICANT CHANGE UP (ref 3.8–10.5)
WBC # FLD AUTO: 6.21 K/UL — SIGNIFICANT CHANGE UP (ref 3.8–10.5)

## 2024-10-29 PROCEDURE — 93306 TTE W/DOPPLER COMPLETE: CPT | Mod: 26

## 2024-10-29 PROCEDURE — 99285 EMERGENCY DEPT VISIT HI MDM: CPT

## 2024-10-29 PROCEDURE — 99239 HOSP IP/OBS DSCHRG MGMT >30: CPT

## 2024-10-29 RX ORDER — FUROSEMIDE 40 MG
20 TABLET ORAL
Refills: 0 | Status: DISCONTINUED | OUTPATIENT
Start: 2024-10-29 | End: 2024-11-04

## 2024-10-29 RX ADMIN — Medication 325 MILLIGRAM(S): at 14:03

## 2024-10-29 RX ADMIN — GABAPENTIN 600 MILLIGRAM(S): 300 CAPSULE ORAL at 06:15

## 2024-10-29 RX ADMIN — Medication 20 MILLIGRAM(S): at 21:18

## 2024-10-29 RX ADMIN — PANTOPRAZOLE SODIUM 40 MILLIGRAM(S): 40 TABLET, DELAYED RELEASE ORAL at 06:15

## 2024-10-29 RX ADMIN — RANOLAZINE 500 MILLIGRAM(S): 500 TABLET, FILM COATED, EXTENDED RELEASE ORAL at 06:15

## 2024-10-29 RX ADMIN — GABAPENTIN 600 MILLIGRAM(S): 300 CAPSULE ORAL at 21:18

## 2024-10-29 NOTE — ED ADULT NURSE REASSESSMENT NOTE - NS ED NURSE REASSESS COMMENT FT1
Patient switched to NC as per respiratory therapist, MD aware. Pt is A&Ox1. On tele box and pulse ox. Breathing is spontaneous even and unlabored. Bed is in lowest position and side rails up. Safety precautions maintained.

## 2024-10-29 NOTE — ED CDU PROVIDER DISPOSITION NOTE - CLINICAL COURSE
89 year old female here for chest discomfort, seen by cardiology, underwent echo, cleared to f/u as outpatient.  While in ED, developed agitation, required 2 dosages of Zyprexa.  Found to be sleepy on morning rounds, more alert with oxygen.  Family amendable to take patient home.  Given copies of all results, understands and agrees to proceed.

## 2024-10-29 NOTE — ED ADULT NURSE REASSESSMENT NOTE - NS ED NURSE REASSESS COMMENT FT1
Patient desats down to 70s with good pleath when sleeps, may be holding breathe. MD and PA aware will obtain venous blood gas. Breathing is spontaneous even and unlabored currently at 98%. Bed is in lowest position and side rails up. Safety precautions maintained. Patient desats down to 70s with good pleath when sleeps, may be holding breathe. MD and PA aware will obtain arterial blood gas. Breathing is spontaneous even and unlabored currently at 98%. Bed is in lowest position and side rails up. Safety precautions maintained.

## 2024-10-29 NOTE — ED ADULT NURSE REASSESSMENT NOTE - NS ED NURSE REASSESS COMMENT FT1
Patient A&O x2, no complaints at this time, no acute distress noted. Patient laying in bed, safety maintained, safety sitter at bedside. Patient awaiting ambulance for discharge. Patient remains on CM in a-fib.

## 2024-10-29 NOTE — ED ADULT NURSE REASSESSMENT NOTE - NS ED NURSE REASSESS COMMENT FT1
Pt is sleeping. MD advised RN patient is sustaining C02. Placed on 10L Nonrebreather mask. Connected to tele box and pulse ox @96%.

## 2024-10-29 NOTE — ED CDU PROVIDER DISPOSITION NOTE - PATIENT PORTAL LINK FT
You can access the FollowMyHealth Patient Portal offered by [facility] by registering at the following website: [email or web address]. By joining [facility]’s FollowMyHealth portal, you will also be able to view your health information using other applications (apps) compatible with our system.

## 2024-10-29 NOTE — ED ADULT NURSE REASSESSMENT NOTE - NS ED NURSE REASSESS COMMENT FT1
Patient A&O x2, no complaints at this time, no acute distress noted. Patient lying in bed, safety maintained, safety sitter in place. Patient updated on plan of care, awaiting TTE. Patient remains on CM in a-fib.

## 2024-10-29 NOTE — ED ADULT NURSE REASSESSMENT NOTE - NS ED NURSE REASSESS COMMENT FT1
Pt placed on 3L NC due to pulse ox desat. Pt vitals were taken and PA was informed. Pt is alert but disorientated. Pt safety remains maintained, pt remains on a safety sit.

## 2024-10-29 NOTE — ED CDU PROVIDER DISPOSITION NOTE - CARE PROVIDER_API CALL
Ryan Christopher  Cardiovascular Disease  [address] [facility], [address] [address]  Phone: [phone]  Fax: [phone]  Follow Up Time:

## 2024-10-29 NOTE — PROGRESS NOTE ADULT - ASSESSMENT
89 y/o F with PMH HTN, HLD, CAD (PCI 2018), GERD, AFib on Xarelto, DVT, severe AS s/p TAVR, HHFpEF, carotid atherosclerosis presents to ED with c/o epigastric pain, dyspnea. Pt very [place], lives independently, daughter in law, and grandson live next door (son passed away in Jan). Niece parth at bedside. Pt states grandson wanted her to come in, she c/o soft stool x few days. Denies chest pain, shortness of breath. Pro BNP elevated,  HS trop 53.

## 2024-10-29 NOTE — CHART NOTE - NSCHARTNOTEFT_GEN_A_CORE
Pt family unable to get pt into care, [facility] set up ambulance via [facility] ambulance for next available. Transportation letter given to nephew, DESIRAE uploaded into ACM and copy given to RN. Pt's nephew will be home to receive pt. Family requesting H/C ref for disease management, P/T, referral made waiting for acceptance. Family will follow up with finding private hire aides. Pt family unable to get pt into care, [facility] set up ambulance via [facility] ambulance for next available. Transportation letter given to grandsonDESIRAE uploaded into ACM and copy given to RN. Pt's grandson will be home to receive pt. Family requesting H/C ref for disease management, P/T, referral made waiting for acceptance. Family will follow up with finding private hire aides. Pt has a oxygen concentrator and portable oxygen at home. Pt family unable to get pt into car, [facility] set up ambulance via [facility] ambulance for next available. Transportation letter given to grandsonDESIRAE uploaded into ACM and copy given to RN. Pt's grandson will be home to receive pt. Family requesting H/C ref for disease management, P/T, referral made waiting for acceptance. Family will follow up with finding private hire aides. Pt has a oxygen concentrator and portable oxygen at home.

## 2024-10-29 NOTE — CHART NOTE - NSCHARTNOTEFT_GEN_A_CORE
CM met w/ grandson and pt at .  grandson verbalizes plan is for dc home, no matter what.  CM discussed family staying w/ pt over the next few days until pt is back to her baseline.  Per grandson, multiple family members visit pt to meet her daily needs.  Pt has all DME, +rw, + cane, + 3:1. CM gave resource guide for private hire if needed.  CM advised elder care attny for future long term care needs. Gson stated he will be back at 5pm to pick pt up as she is doing much better than before.  No further CM need identified.  CM remains available.

## 2024-10-29 NOTE — PROGRESS NOTE ADULT - PROBLEM SELECTOR PLAN 2
- HS troponin 53 -> 48  - EKG unchanged from prior; probably related to HFpEF  - echo showed EF 67%, moderate to severe LVH. normal RV size and systolic function. PASP 39 mmHg. LA severely dilated, moderate TR, mild MR. Mild aortic regurgitation.    - spoke with niece and Daughter in Law, discussed elevated cardiac enzymes, and history of CAD  - cont atorvastatin, ranolazine  - pt has no anginal equivalent symptoms.   -

## 2024-10-29 NOTE — ED CDU PROVIDER SUBSEQUENT DAY NOTE - ATTENDING APP SHARED VISIT CONTRIBUTION OF CARE
89 y/o F with PMH HTN, HLD, CAD (PCI 2018), GERD, AFib on Xarelto, DVT, severe AS s/p TAVR, HHFpEF, carotid atherosclerosis presents to ED with c/o epigastric pain, dyspnea. Pt requiring zyprexa last night this morning sleeping comfortably no acute distress, awaiting echo 89 y/o F with PMH HTN, HLD, CAD (PCI 2018), GERD, AFib on Xarelto, DVT, severe AS s/p TAVR, HHFpEF, carotid atherosclerosis presents to ED with c/o epigastric pain, dyspnea. Pt requiring zyprexa last night this morning sleeping comfortably no acute distress, awaiting echo and FSG bmp glucose 78 89 y/o F with PMH HTN, HLD, CAD (PCI 2018), GERD, AFib on Xarelto, DVT, severe AS s/p TAVR, HHFpEF, carotid atherosclerosis presents to ED with c/o epigastric pain, dyspnea. Pt requiring zyprexa last night this morning sleeping comfortably no acute distress, awaiting echo and FSG bmp glucose 78    Patient much more arousable well-appearing  with slight confusion grandson at bedside states patient is almost at baseline and would like to take her home once he returns later today awaiting safe disposition for patient

## 2024-10-29 NOTE — PROGRESS NOTE ADULT - SUBJECTIVE AND OBJECTIVE BOX
[facility]                                                         CARDIOLOGY AT [place] [address]                                                         Telephone: [phone]. Fax:[phone]                                                                             PROGRESS NOTE    Reason for follow up: HFpEF   Update: Pt seen and examined at the bedside. Echo revealed EF 65-70%, moderate to severe LVH. Moderate TR. PASP 39 mmHg.     Review of symptoms:   Cardiac:  No chest pain. No dyspnea. No palpitations.  Respiratory: no cough. No dyspnea  Gastrointestinal: No diarrhea. No abdominal pain. No bleeding.   Neuro: No focal neuro complaints.    Vitals:  T(C): 36.3 (10-29-24 @ 08:00), Max: 37 (10-28-24 @ 23:37)  HR: 82 (10-29-24 @ 08:00) (82 - 99)  BP: 134/76 (10-29-24 @ 08:00) (134/76 - 184/77)  RR: 18 (10-29-24 @ 08:00) (18 - 24)  SpO2: 95% (10-29-24 @ 08:00) (95% - 100%)  Wt(kg): --  I&O's Summary    28 Oct 2024 07:01  -  29 Oct 2024 07:00  --------------------------------------------------------  IN: 0 mL / OUT: 600 mL / NET: -600 mL      Weight (kg): 48.6 (10-28 @ 10:42)    PHYSICAL EXAM:  Appearance: Comfortable. No acute distress  HEENT:  Atraumatic. Normocephalic.  Normal oral mucosa  Neurologic: A & O x 3, no gross focal deficits.  Cardiovascular: irregularly irregular , No murmur, no rubs/gallops. No JVD  Respiratory: Lungs clear to auscultation, unlabored   Gastrointestinal:  Soft, Non-tender, + BS  Lower Extremities: 2+ Peripheral Pulses, No clubbing, cyanosis, or edema  Psychiatry: Patient is calm. No agitation.   Skin: warm and dry.    CURRENT CARDIAC MEDICATIONS:  furosemide   Injectable 20 milliGRAM(s) IV Push <User Schedule>  ranolazine 500 milliGRAM(s) Oral two times a day      CURRENT OTHER MEDICATIONS:  albuterol    90 MICROgram(s) HFA Inhaler 2 Puff(s) Inhalation every 6 hours PRN for shortness of breath and/or wheezing  acetaminophen     Tablet .. 650 milliGRAM(s) Oral every 6 hours PRN Mild Pain (1 - 3)  gabapentin 600 milliGRAM(s) Oral three times a day  OLANZapine 5 milliGRAM(s) Oral three times a day PRN agitation  famotidine Injectable 20 milliGRAM(s) IV Push once, Stop order after: 1 Doses  pantoprazole    Tablet 40 milliGRAM(s) Oral before breakfast  atorvastatin 20 milliGRAM(s) Oral at bedtime  ferrous    sulfate 325 milliGRAM(s) Oral daily  rivaroxaban 15 milliGRAM(s) Oral daily      LABS:	 	                            12.8   6.21  )-----------( 171      ( 29 Oct 2024 05:13 )             37.8     10-29    142  |  100  |  16.3  ----------------------------<  78  4.1   |  28.0  |  1.03    Ca    9.7      29 Oct 2024 05:13    TPro  6.6  /  Alb  4.2  /  TBili  0.9  /  DBili  x   /  AST  35[H]  /  ALT  22  /  AlkPhos  53  10-29    PT/INR/PTT ( 28 Oct 2024 09:58 )                       :                       :      14.2         :       36.6                  .        .                   .              .           .       1.26        .                                       Lipid Profile:   HgA1c:   TSH:     TELEMETRY:   ECG:    DIAGNOSTIC TESTING:  [x ] Echocardiogram:  < from: TTE W or WO Ultrasound Enhancing Agent (10.29.24 @ 08:06) >      1. Left ventricular cavity is small. Left ventricular systolic function is normal with an ejection fraction of 67 % by Brown's method of disks with an ejection fraction visually estimated at 65 to 70 %.   2. Moderate to severe left ventricular hypertrophy. Mid cavity peak gradient of 23 mmHg at rest.   3. Analysis of left ventricular diastolic function and filling pressure is made challenging by the presence of atrial fibrillation.   4. Normal right ventricular cavity size and normal right ventricular systolic function.   5. Estimated pulmonary artery systolic pressure is 39 mmHg.   6. Left atrium is severely dilated.   7. Moderate tricuspid regurgitation.   8. Mild mitral regurgitation.   9. There is moderate calcification of the mitral valve annulus.  10. Gila TAVR is presentin the aortic position The prosthetic valve is well seated. There is trace intravalvular and mild-to-moderate paravalvular regurgitaitons.  11. Mild pulmonic regurgitation.  12. No pericardial effusion seen.  13. Compared to the transthoracic echocardiogram performed on 11/7/2023, overall there have been no significant interval changes except previously reported as mild aortic regurgitation.    < end of copied text >      [ ]  Catheterization:  [ ] Stress Test:    OTHER: 	                                                                [facility]                                                         CARDIOLOGY AT [place] [address]                                                         Telephone: [phone]. Fax:[phone]                                                                             PROGRESS NOTE    Reason for follow up: HFpEF   Update: Pt seen and examined at the bedside. Echo revealed EF 65-70%, moderate to severe LVH. Moderate TR. PASP 39 mmHg. AFib on telemetry.     Review of symptoms:   Cardiac:  No chest pain. No dyspnea. No palpitations.  Respiratory: no cough. No dyspnea  Gastrointestinal: No diarrhea. No abdominal pain. No bleeding.   Neuro: No focal neuro complaints.    Vitals:  T(C): 36.3 (10-29-24 @ 08:00), Max: 37 (10-28-24 @ 23:37)  HR: 82 (10-29-24 @ 08:00) (82 - 99)  BP: 134/76 (10-29-24 @ 08:00) (134/76 - 184/77)  RR: 18 (10-29-24 @ 08:00) (18 - 24)  SpO2: 95% (10-29-24 @ 08:00) (95% - 100%)  Wt(kg): --  I&O's Summary    28 Oct 2024 07:01  -  29 Oct 2024 07:00  --------------------------------------------------------  IN: 0 mL / OUT: 600 mL / NET: -600 mL      Weight (kg): 48.6 (10-28 @ 10:42)    PHYSICAL EXAM:  Appearance: Comfortable. No acute distress  HEENT:  Atraumatic. Normocephalic.  Normal oral mucosa  Neurologic: A & O x 3, no gross focal deficits.  Cardiovascular: irregularly irregular , No murmur, no rubs/gallops. No JVD  Respiratory: Lungs clear to auscultation, unlabored   Gastrointestinal:  Soft, Non-tender, + BS  Lower Extremities: 2+ Peripheral Pulses, No clubbing, cyanosis, or edema  Psychiatry: Patient is calm. No agitation.   Skin: warm and dry.    CURRENT CARDIAC MEDICATIONS:  furosemide   Injectable 20 milliGRAM(s) IV Push <User Schedule>  ranolazine 500 milliGRAM(s) Oral two times a day      CURRENT OTHER MEDICATIONS:  albuterol    90 MICROgram(s) HFA Inhaler 2 Puff(s) Inhalation every 6 hours PRN for shortness of breath and/or wheezing  acetaminophen     Tablet .. 650 milliGRAM(s) Oral every 6 hours PRN Mild Pain (1 - 3)  gabapentin 600 milliGRAM(s) Oral three times a day  OLANZapine 5 milliGRAM(s) Oral three times a day PRN agitation  famotidine Injectable 20 milliGRAM(s) IV Push once, Stop order after: 1 Doses  pantoprazole    Tablet 40 milliGRAM(s) Oral before breakfast  atorvastatin 20 milliGRAM(s) Oral at bedtime  ferrous    sulfate 325 milliGRAM(s) Oral daily  rivaroxaban 15 milliGRAM(s) Oral daily      LABS:	 	                            12.8   6.21  )-----------( 171      ( 29 Oct 2024 05:13 )             37.8     10-29    142  |  100  |  16.3  ----------------------------<  78  4.1   |  28.0  |  1.03    Ca    9.7      29 Oct 2024 05:13    TPro  6.6  /  Alb  4.2  /  TBili  0.9  /  DBili  x   /  AST  35[H]  /  ALT  22  /  AlkPhos  53  10-29    PT/INR/PTT ( 28 Oct 2024 09:58 )                       :                       :      14.2         :       36.6                  .        .                   .              .           .       1.26        .                                       Lipid Profile:   HgA1c: 4.8%   TSH:     TELEMETRY: Afib   ECG: AFib     DIAGNOSTIC TESTING:  [x ] Echocardiogram:  < from: TTE W or WO Ultrasound Enhancing Agent (10.29.24 @ 08:06) >      1. Left ventricular cavity is small. Left ventricular systolic function is normal with an ejection fraction of 67 % by Brown's method of disks with an ejection fraction visually estimated at 65 to 70 %.   2. Moderate to severe left ventricular hypertrophy. Mid cavity peak gradient of 23 mmHg at rest.   3. Analysis of left ventricular diastolic function and filling pressure is made challenging by the presence of atrial fibrillation.   4. Normal right ventricular cavity size and normal right ventricular systolic function.   5. Estimated pulmonary artery systolic pressure is 39 mmHg.   6. Left atrium is severely dilated.   7. Moderate tricuspid regurgitation.   8. Mild mitral regurgitation.   9. There is moderate calcification of the mitral valve annulus.  10. Gila TAVR is presentin the aortic position The prosthetic valve is well seated. There is trace intravalvular and mild-to-moderate paravalvular regurgitaitons.  11. Mild pulmonic regurgitation.  12. No pericardial effusion seen.  13. Compared to the transthoracic echocardiogram performed on 11/7/2023, overall there have been no significant interval changes except previously reported as mild aortic regurgitation.    < end of copied text >      [ ]  Catheterization:  [ ] Stress Test:    OTHER:

## 2024-10-29 NOTE — ED ADULT NURSE REASSESSMENT NOTE - NS ED NURSE REASSESS COMMENT FT1
Pt remains A&Ox4. She was given medication for pain relief in her left ankle. Pt states that she is no longer in pain. Pt safety is maintained.

## 2024-10-29 NOTE — ED ADULT NURSE REASSESSMENT NOTE - NS ED NURSE REASSESS COMMENT FT1
Assumed care of patient at 19:00 from LOLIS Bob. Patient A&O x2, denies pain, no complaints of CP/SOB, no acute distress noted. Patient lying in bed, bed locked and in lowest position, safety maintained. Afib on Tele and  good saturation on 3L nasal cannula. Pending discharge, waiting for the ambulance and family at bedside.   Patient updated on plan of care.

## 2024-10-29 NOTE — ED ADULT NURSE REASSESSMENT NOTE - NS ED NURSE REASSESS COMMENT FT1
Assumed care of patient at 0730. Patient observed resting in bed comfortably. A&Ox2, refusing to answer any questions but arousable. On a safety sit. Breathing is spontaneous even and unlabored. Vitals stable. Bed is in lowest position and side rails up. Safety precautions maintained. Awaiting ECCHO.

## 2024-10-29 NOTE — ED CDU PROVIDER SUBSEQUENT DAY NOTE - HISTORY
Patient resting overnight comfortably  with a dose of Zyprexa on cardiac telemetry. safety seat at the bedside no event overnight pending echo

## 2024-10-29 NOTE — PROGRESS NOTE ADULT - PROBLEM SELECTOR PLAN 1
- TTE 11/23 EF >75%, Left Atrial enlargement   - repeat echo showed EF 67%, moderate to severe LVH. normal RV size and systolic function. PASP 39 mmHg. LA severely dilated, moderate TR, mild MR. Mild aortic regurgitation.   - Pro BNP 7393  - lasix 20 mg IV x1 given in ED  - CT chest revealed small b/l pleural effusions  - as per niece/daughter in law, adherent with medications, although some dietary noncompliance.  - c/w outpatient dose of Lasix 40 mg po daily and add aldactone 25 mg po daily if SBP Is elevated.   - pt appears euvolemic.

## 2024-10-29 NOTE — ED ADULT NURSE REASSESSMENT NOTE - NS ED NURSE REASSESS COMMENT FT1
A&Ox2, observed resting in bed comfortably with family at bedside. Breathing is spontaneous even and unlabored. Bed is in lowest position and side rails up. Safety precautions maintained.

## 2024-10-29 NOTE — ED CDU PROVIDER DISPOSITION NOTE - ATTENDING CONTRIBUTION TO CARE
89 year old female here for chest discomfort, seen by cardiology, underwent echo, cleared to f/u as outpatient.  While in ED, developed agitation, required 2 dosages of Zyprexa.  Found to be sleepy on morning rounds, more alert with oxygen.  Family amendable to take patient home.  Given copies of all results, understands and agrees to proceed.    pt stable at time of dc

## 2024-10-29 NOTE — PROGRESS NOTE ADULT - NS ATTEND AMEND GEN_ALL_CORE FT
seen with above,    89F history significant for HTN, CAD remote stent 2018, [place] TAVR, HFpEF, Afib on Xarelto last seen in office 5/2024 with baseline pBNP 1500s, presents with epigastric pain/dyspnea found with pBNP 7K and hs-Trop 53    -apparently was agitated overnight received Zyprexa and currently asleep difficult to awake  -repeat Echo with still preserved LV EF, TAVR with slight progression of mild-mod paravalvular regurgitation, needs to be on Lasix 40mg once daily at home, if BP and Cr. stable then add spironolactone 25mg as outpatient   -no further inpatient cardiac testing indicated, follow up in the office in 2 weeks        Ryan Christopher DO, [facility]  Faculty Non-Invasive Cardiologist  [phone]

## 2024-10-29 NOTE — ED CDU PROVIDER SUBSEQUENT DAY NOTE - CLINICAL SUMMARY MEDICAL DECISION MAKING FREE TEXT BOX
Pt is a 89 yo female with PMH of CAD s/p PCI, GERD, afib on Xarelto, severe AS s/p TAVR, HFpEF, carotid atherosclerosis, recent fall, HTN, HLD presenting with epigastric pain. Pt reports of mild dysuria, no SOB or chest pain at this time contrary to triage note. Pt was feeling nauseous for the past few days but not currently. Pt denies fever, diarrhea/constipation, been eating and drinking normally. Pt is coming from home, pt's son stated that she has not been acting herself. Per EMS, pt desatted to 80s on RA. Vitals stable, mildly tender epigastric nondistended, lungs clear. ED labs reviewed, BNP elevated to 7393. ct abd pelvis, UA negative, UC pending. Pain control with ofirmev, flu swab. chf exacerbation likely with mild chf exacerbation on cxr and pleural effusions bl small on CT abd. Pt given 20mg lasix IV, cards ss consulted, recommending TTE and chf management. Admitted to obs for further management.

## 2024-10-29 NOTE — ED CDU PROVIDER SUBSEQUENT DAY NOTE - PROGRESS NOTE DETAILS
abg reviewed, +hypercabneic, patient received two dosages of zyprexa overnight, for agitation, spoke to respiratory therapist, placed on NC, not candidate for BIPAP, as per respirtory therapist reports almost sticking herself for ABG and will not tolerate it.  Patient more alert to grandson at bedside, reports since covid has been intermittently on oxygen and was a hospice patient.  Reports lives next door and grandmother is alert at oriented, however has been having difficulty with ambulation.  Will discuss case with SW/JHONNY for additional resources at home.

## 2024-10-29 NOTE — ED CDU PROVIDER SUBSEQUENT DAY NOTE - NSICDXPASTMEDICALHX_GEN_ALL_CORE_FT
PAST MEDICAL HISTORY:  Acute deep vein thrombosis (DVT) of popliteal vein of both lower extremities     AF (atrial fibrillation) Persistent/chronic    Afib     Angina pectoris class IV    Anxiety     Aortic stenosis s/p TAVR bioprosthetic Jan 2018 [facility] Dr. Aragon    Aspiration pneumonia     Asthma     Atherosclerosis of native coronary artery of native heart with angina pectoris     AV block     Bronchiectasis     Chronic back pain     Chronic back pain     Closed fracture of multiple ribs of right side, initial encounter     Closed pelvic ring fracture     Diastolic CHF NYHA class 3    CURRY (dyspnea on exertion)     DVT (deep venous thrombosis) left lower extremity    Essential hypertension     GERD (gastroesophageal reflux disease)     Hip arthritis     History of valvular heart disease     [place] (hard of hearing) wears bilateral hearing aides    HTN (hypertension)     HTN (hypertension)     Hypercholesteremia     Hyperlipidemia, unspecified hyperlipidemia type     Kyphoscoliosis and scoliosis     Lung nodule     Murmur, cardiac gr3/6    Neuropathy     Nosebleed prior left nostrils cauterize x2    Nosebleed severe on aspirin; had nose cautery done in 2018    Pelvic fracture     PNA (pneumonia) november 2017    Spinal stenosis     Stenocardia     Thyroid nodule

## 2024-10-30 VITALS
DIASTOLIC BLOOD PRESSURE: 95 MMHG | OXYGEN SATURATION: 98 % | TEMPERATURE: 98 F | HEART RATE: 78 BPM | RESPIRATION RATE: 20 BRPM | SYSTOLIC BLOOD PRESSURE: 186 MMHG

## 2024-11-05 ENCOUNTER — APPOINTMENT (OUTPATIENT)
Dept: CARDIOLOGY | Facility: CLINIC | Age: 89
End: 2024-11-05

## 2024-11-13 NOTE — PATIENT PROFILE ADULT - IS THERE A SUSPICION OF ABUSE/NEGLIGENCE?
Detail Level: Detailed
Quality 226: Preventive Care And Screening: Tobacco Use: Screening And Cessation Intervention: Tobacco Screening not Performed
Quality 137: Melanoma: Continuity Of Care - Recall System: Patient information entered into a recall system that includes: target date for the next exam specified AND a process to follow up with patients regarding missed or unscheduled appointments
no

## 2024-11-19 ENCOUNTER — APPOINTMENT (OUTPATIENT)
Dept: CARDIOLOGY | Facility: CLINIC | Age: 89
End: 2024-11-19
Payer: MEDICARE

## 2024-11-19 ENCOUNTER — NON-APPOINTMENT (OUTPATIENT)
Age: 89
End: 2024-11-19

## 2024-11-19 VITALS
WEIGHT: 101 LBS | BODY MASS INDEX: 20.36 KG/M2 | DIASTOLIC BLOOD PRESSURE: 78 MMHG | OXYGEN SATURATION: 95 % | HEART RATE: 74 BPM | HEIGHT: 59 IN | SYSTOLIC BLOOD PRESSURE: 150 MMHG

## 2024-11-19 DIAGNOSIS — I25.119 ATHEROSCLEROTIC HEART DISEASE OF NATIVE CORONARY ARTERY WITH UNSPECIFIED ANGINA PECTORIS: ICD-10-CM

## 2024-11-19 DIAGNOSIS — I48.19 OTHER PERSISTENT ATRIAL FIBRILLATION: ICD-10-CM

## 2024-11-19 DIAGNOSIS — I48.91 UNSPECIFIED ATRIAL FIBRILLATION: ICD-10-CM

## 2024-11-19 DIAGNOSIS — I50.30 UNSPECIFIED DIASTOLIC (CONGESTIVE) HEART FAILURE: ICD-10-CM

## 2024-11-19 DIAGNOSIS — Z95.3 PRESENCE OF XENOGENIC HEART VALVE: ICD-10-CM

## 2024-11-19 DIAGNOSIS — I25.10 ATHEROSCLEROTIC HEART DISEASE OF NATIVE CORONARY ARTERY W/OUT ANGINA PECTORIS: ICD-10-CM

## 2024-11-19 PROCEDURE — 99214 OFFICE O/P EST MOD 30 MIN: CPT

## 2024-11-19 PROCEDURE — 93000 ELECTROCARDIOGRAM COMPLETE: CPT

## 2024-11-20 PROCEDURE — 93005 ELECTROCARDIOGRAM TRACING: CPT

## 2024-11-20 PROCEDURE — 85025 COMPLETE CBC W/AUTO DIFF WBC: CPT

## 2024-11-20 PROCEDURE — C8929: CPT

## 2024-11-20 PROCEDURE — 96365 THER/PROPH/DIAG IV INF INIT: CPT | Mod: XU

## 2024-11-20 PROCEDURE — 81001 URINALYSIS AUTO W/SCOPE: CPT

## 2024-11-20 PROCEDURE — 36600 WITHDRAWAL OF ARTERIAL BLOOD: CPT

## 2024-11-20 PROCEDURE — 85730 THROMBOPLASTIN TIME PARTIAL: CPT

## 2024-11-20 PROCEDURE — 74177 CT ABD & PELVIS W/CONTRAST: CPT | Mod: MC

## 2024-11-20 PROCEDURE — G0378: CPT

## 2024-11-20 PROCEDURE — 87086 URINE CULTURE/COLONY COUNT: CPT

## 2024-11-20 PROCEDURE — 80053 COMPREHEN METABOLIC PANEL: CPT

## 2024-11-20 PROCEDURE — 85610 PROTHROMBIN TIME: CPT

## 2024-11-20 PROCEDURE — 82962 GLUCOSE BLOOD TEST: CPT

## 2024-11-20 PROCEDURE — 82803 BLOOD GASES ANY COMBINATION: CPT

## 2024-11-20 PROCEDURE — 87637 SARSCOV2&INF A&B&RSV AMP PRB: CPT

## 2024-11-20 PROCEDURE — 71045 X-RAY EXAM CHEST 1 VIEW: CPT

## 2024-11-20 PROCEDURE — 36415 COLL VENOUS BLD VENIPUNCTURE: CPT

## 2024-11-20 PROCEDURE — 96375 TX/PRO/DX INJ NEW DRUG ADDON: CPT

## 2024-11-20 PROCEDURE — 84484 ASSAY OF TROPONIN QUANT: CPT

## 2024-11-20 PROCEDURE — 83690 ASSAY OF LIPASE: CPT

## 2024-11-20 PROCEDURE — 99285 EMERGENCY DEPT VISIT HI MDM: CPT | Mod: 25

## 2024-11-20 PROCEDURE — 83880 ASSAY OF NATRIURETIC PEPTIDE: CPT

## 2025-02-11 ENCOUNTER — INPATIENT (INPATIENT)
Facility: HOSPITAL | Age: 89
LOS: 7 days | Discharge: NURSING, REHAB OR LONG-TERM CARE | DRG: 195 | End: 2025-02-19
Attending: STUDENT IN AN ORGANIZED HEALTH CARE EDUCATION/TRAINING PROGRAM | Admitting: STUDENT IN AN ORGANIZED HEALTH CARE EDUCATION/TRAINING PROGRAM
Payer: MEDICARE

## 2025-02-11 VITALS
SYSTOLIC BLOOD PRESSURE: 138 MMHG | OXYGEN SATURATION: 97 % | DIASTOLIC BLOOD PRESSURE: 76 MMHG | HEART RATE: 63 BPM | TEMPERATURE: 98 F | RESPIRATION RATE: 18 BRPM

## 2025-02-11 DIAGNOSIS — Z98.1 ARTHRODESIS STATUS: Chronic | ICD-10-CM

## 2025-02-11 DIAGNOSIS — Z95.3 PRESENCE OF XENOGENIC HEART VALVE: Chronic | ICD-10-CM

## 2025-02-11 DIAGNOSIS — J10.1 INFLUENZA DUE TO OTHER IDENTIFIED INFLUENZA VIRUS WITH OTHER RESPIRATORY MANIFESTATIONS: ICD-10-CM

## 2025-02-11 LAB
ALBUMIN SERPL ELPH-MCNC: 3.9 G/DL — SIGNIFICANT CHANGE UP (ref 3.3–5.2)
ALP SERPL-CCNC: 63 U/L — SIGNIFICANT CHANGE UP (ref 40–120)
ALT FLD-CCNC: 48 U/L — HIGH
ANION GAP SERPL CALC-SCNC: 17 MMOL/L — SIGNIFICANT CHANGE UP (ref 5–17)
ANISOCYTOSIS BLD QL: SLIGHT — SIGNIFICANT CHANGE UP
AST SERPL-CCNC: 60 U/L — HIGH
BASOPHILS # BLD AUTO: 0.04 K/UL — SIGNIFICANT CHANGE UP (ref 0–0.2)
BASOPHILS # BLD MANUAL: 0 K/UL — SIGNIFICANT CHANGE UP (ref 0–0.2)
BASOPHILS NFR BLD AUTO: 0.2 % — SIGNIFICANT CHANGE UP (ref 0–2)
BASOPHILS NFR BLD MANUAL: 0 % — SIGNIFICANT CHANGE UP (ref 0–2)
BILIRUB SERPL-MCNC: 0.7 MG/DL — SIGNIFICANT CHANGE UP (ref 0.4–2)
BUN SERPL-MCNC: 28.3 MG/DL — HIGH (ref 8–20)
CALCIUM SERPL-MCNC: 9.5 MG/DL — SIGNIFICANT CHANGE UP (ref 8.4–10.5)
CHLORIDE SERPL-SCNC: 98 MMOL/L — SIGNIFICANT CHANGE UP (ref 96–108)
CO2 SERPL-SCNC: 25 MMOL/L — SIGNIFICANT CHANGE UP (ref 22–29)
CREAT SERPL-MCNC: 1.21 MG/DL — SIGNIFICANT CHANGE UP (ref 0.5–1.3)
EGFR: 42 ML/MIN/1.73M2 — LOW
EOSINOPHIL # BLD AUTO: 0.1 K/UL — SIGNIFICANT CHANGE UP (ref 0–0.5)
EOSINOPHIL # BLD MANUAL: 0 K/UL — SIGNIFICANT CHANGE UP (ref 0–0.5)
EOSINOPHIL NFR BLD AUTO: 0.5 % — SIGNIFICANT CHANGE UP (ref 0–6)
EOSINOPHIL NFR BLD MANUAL: 0 % — SIGNIFICANT CHANGE UP (ref 0–6)
FLUAV AG NPH QL: DETECTED
FLUBV AG NPH QL: SIGNIFICANT CHANGE UP
GAS PNL BLDV: SIGNIFICANT CHANGE UP
GIANT PLATELETS BLD QL SMEAR: PRESENT
GLUCOSE SERPL-MCNC: 131 MG/DL — HIGH (ref 70–99)
HCT VFR BLD CALC: 38.4 % — SIGNIFICANT CHANGE UP (ref 34.5–45)
HGB BLD-MCNC: 12.6 G/DL — SIGNIFICANT CHANGE UP (ref 11.5–15.5)
IMM GRANULOCYTES # BLD AUTO: 0.37 K/UL — HIGH (ref 0–0.07)
IMM GRANULOCYTES NFR BLD AUTO: 1.8 % — HIGH (ref 0–0.9)
LACTATE SERPL-SCNC: 3.7 MMOL/L — HIGH (ref 0.5–2)
LYMPHOCYTES # BLD AUTO: 0.59 K/UL — LOW (ref 1–3.3)
LYMPHOCYTES # BLD MANUAL: 1.07 K/UL — SIGNIFICANT CHANGE UP (ref 1–3.3)
LYMPHOCYTES NFR BLD AUTO: 2.8 % — LOW (ref 13–44)
LYMPHOCYTES NFR BLD MANUAL: 5.1 % — LOW (ref 13–44)
MAGNESIUM SERPL-MCNC: 2.2 MG/DL — SIGNIFICANT CHANGE UP (ref 1.8–2.6)
MANUAL METAMYELOCYTE #: 0.19 K/UL — HIGH (ref 0–0)
MANUAL NEUTROPHIL BANDS #: 1.07 K/UL — HIGH (ref 0–0.84)
MANUAL REACTIVE LYMPHOCYTES #: 0.54 K/UL — SIGNIFICANT CHANGE UP (ref 0–0.63)
MCHC RBC-ENTMCNC: 29.8 PG — SIGNIFICANT CHANGE UP (ref 27–34)
MCHC RBC-ENTMCNC: 32.8 G/DL — SIGNIFICANT CHANGE UP (ref 32–36)
MCV RBC AUTO: 90.8 FL — SIGNIFICANT CHANGE UP (ref 80–100)
METAMYELOCYTES # FLD: 0.9 % — HIGH (ref 0–0)
METAMYELOCYTES NFR BLD: 0.9 % — HIGH (ref 0–0)
MONOCYTES # BLD AUTO: 8.52 K/UL — HIGH (ref 0–0.9)
MONOCYTES # BLD MANUAL: 8.24 K/UL — HIGH (ref 0–0.9)
MONOCYTES NFR BLD AUTO: 40.6 % — HIGH (ref 2–14)
MONOCYTES NFR BLD MANUAL: 39.3 % — HIGH (ref 2–14)
NEUTROPHILS # BLD AUTO: 11.34 K/UL — HIGH (ref 1.8–7.4)
NEUTROPHILS # BLD MANUAL: 9.85 K/UL — HIGH (ref 1.8–7.4)
NEUTROPHILS NFR BLD AUTO: 54.1 % — SIGNIFICANT CHANGE UP (ref 43–77)
NEUTROPHILS NFR BLD MANUAL: 47 % — SIGNIFICANT CHANGE UP (ref 43–77)
NEUTS BAND # BLD: 5.1 % — SIGNIFICANT CHANGE UP (ref 0–8)
NEUTS BAND NFR BLD: 5.1 % — SIGNIFICANT CHANGE UP (ref 0–8)
NRBC # BLD AUTO: 0 K/UL — SIGNIFICANT CHANGE UP (ref 0–0)
NRBC # FLD: 0 K/UL — SIGNIFICANT CHANGE UP (ref 0–0)
NRBC BLD AUTO-RTO: 0 /100 WBCS — SIGNIFICANT CHANGE UP (ref 0–0)
OVALOCYTES BLD QL SMEAR: SLIGHT — SIGNIFICANT CHANGE UP
PLAT MORPH BLD: NORMAL — SIGNIFICANT CHANGE UP
PLATELET # BLD AUTO: 111 K/UL — LOW (ref 150–400)
PLATELET COUNT - ESTIMATE: ABNORMAL
PMV BLD: 11.5 FL — SIGNIFICANT CHANGE UP (ref 7–13)
POIKILOCYTOSIS BLD QL AUTO: SLIGHT — SIGNIFICANT CHANGE UP
POLYCHROMASIA BLD QL SMEAR: SLIGHT — SIGNIFICANT CHANGE UP
POTASSIUM SERPL-MCNC: 4.3 MMOL/L — SIGNIFICANT CHANGE UP (ref 3.5–5.3)
POTASSIUM SERPL-SCNC: 4.3 MMOL/L — SIGNIFICANT CHANGE UP (ref 3.5–5.3)
PROT SERPL-MCNC: 6.7 G/DL — SIGNIFICANT CHANGE UP (ref 6.6–8.7)
RBC # BLD: 4.23 M/UL — SIGNIFICANT CHANGE UP (ref 3.8–5.2)
RBC # FLD: 14.8 % — HIGH (ref 10.3–14.5)
RBC BLD AUTO: ABNORMAL
RSV RNA NPH QL NAA+NON-PROBE: SIGNIFICANT CHANGE UP
SARS-COV-2 RNA SPEC QL NAA+PROBE: SIGNIFICANT CHANGE UP
SMUDGE CELLS # BLD: PRESENT
SODIUM SERPL-SCNC: 140 MMOL/L — SIGNIFICANT CHANGE UP (ref 135–145)
VARIANT LYMPHS # BLD: 2.6 % — SIGNIFICANT CHANGE UP (ref 0–6)
VARIANT LYMPHS NFR BLD MANUAL: 2.6 % — SIGNIFICANT CHANGE UP (ref 0–6)
WBC # BLD: 20.96 K/UL — HIGH (ref 3.8–10.5)
WBC # FLD AUTO: 20.96 K/UL — HIGH (ref 3.8–10.5)

## 2025-02-11 PROCEDURE — 99497 ADVNCD CARE PLAN 30 MIN: CPT | Mod: GC,25

## 2025-02-11 PROCEDURE — 72125 CT NECK SPINE W/O DYE: CPT | Mod: 26

## 2025-02-11 PROCEDURE — 74177 CT ABD & PELVIS W/CONTRAST: CPT | Mod: 26

## 2025-02-11 PROCEDURE — 99285 EMERGENCY DEPT VISIT HI MDM: CPT

## 2025-02-11 PROCEDURE — 71045 X-RAY EXAM CHEST 1 VIEW: CPT | Mod: 26

## 2025-02-11 PROCEDURE — 99223 1ST HOSP IP/OBS HIGH 75: CPT | Mod: GC

## 2025-02-11 PROCEDURE — 76705 ECHO EXAM OF ABDOMEN: CPT | Mod: 26

## 2025-02-11 PROCEDURE — 70450 CT HEAD/BRAIN W/O DYE: CPT | Mod: 26

## 2025-02-11 RX ORDER — IPRATROPIUM BROMIDE AND ALBUTEROL SULFATE .5; 2.5 MG/3ML; MG/3ML
3 SOLUTION RESPIRATORY (INHALATION) ONCE
Refills: 0 | Status: COMPLETED | OUTPATIENT
Start: 2025-02-11 | End: 2025-02-11

## 2025-02-11 RX ORDER — IPRATROPIUM BROMIDE AND ALBUTEROL SULFATE .5; 2.5 MG/3ML; MG/3ML
3 SOLUTION RESPIRATORY (INHALATION) ONCE
Refills: 0 | Status: DISCONTINUED | OUTPATIENT
Start: 2025-02-11 | End: 2025-02-12

## 2025-02-11 RX ORDER — MELATONIN 5 MG
3 TABLET ORAL AT BEDTIME
Refills: 0 | Status: DISCONTINUED | OUTPATIENT
Start: 2025-02-11 | End: 2025-02-19

## 2025-02-11 RX ORDER — ATORVASTATIN CALCIUM 80 MG/1
20 TABLET, FILM COATED ORAL AT BEDTIME
Refills: 0 | Status: DISCONTINUED | OUTPATIENT
Start: 2025-02-11 | End: 2025-02-19

## 2025-02-11 RX ORDER — FERROUS SULFATE 137(45) MG
0 TABLET, EXTENDED RELEASE ORAL
Refills: 0 | DISCHARGE

## 2025-02-11 RX ORDER — FERROUS SULFATE 137(45) MG
325 TABLET, EXTENDED RELEASE ORAL
Refills: 0 | Status: DISCONTINUED | OUTPATIENT
Start: 2025-02-11 | End: 2025-02-19

## 2025-02-11 RX ORDER — PIPERACILLIN-TAZO-DEXTROSE,ISO 3.375G/5
3.38 IV SOLUTION, PIGGYBACK PREMIX FROZEN(ML) INTRAVENOUS ONCE
Refills: 0 | Status: COMPLETED | OUTPATIENT
Start: 2025-02-11 | End: 2025-02-11

## 2025-02-11 RX ORDER — GABAPENTIN 400 MG/1
600 CAPSULE ORAL THREE TIMES A DAY
Refills: 0 | Status: DISCONTINUED | OUTPATIENT
Start: 2025-02-11 | End: 2025-02-19

## 2025-02-11 RX ORDER — ACETAMINOPHEN 500 MG/5ML
650 LIQUID (ML) ORAL EVERY 6 HOURS
Refills: 0 | Status: DISCONTINUED | OUTPATIENT
Start: 2025-02-11 | End: 2025-02-19

## 2025-02-11 RX ORDER — MAGNESIUM, ALUMINUM HYDROXIDE 200-200 MG
30 TABLET,CHEWABLE ORAL EVERY 4 HOURS
Refills: 0 | Status: DISCONTINUED | OUTPATIENT
Start: 2025-02-11 | End: 2025-02-19

## 2025-02-11 RX ORDER — OSELTAMIVIR PHOSPHATE 75 MG/1
75 CAPSULE ORAL
Refills: 0 | Status: COMPLETED | OUTPATIENT
Start: 2025-02-11 | End: 2025-02-16

## 2025-02-11 RX ORDER — RANOLAZINE 1000 MG/1
500 TABLET, FILM COATED, EXTENDED RELEASE ORAL
Refills: 0 | Status: DISCONTINUED | OUTPATIENT
Start: 2025-02-11 | End: 2025-02-19

## 2025-02-11 RX ORDER — RIVAROXABAN 10 MG/1
15 TABLET, FILM COATED ORAL
Refills: 0 | Status: DISCONTINUED | OUTPATIENT
Start: 2025-02-11 | End: 2025-02-19

## 2025-02-11 RX ORDER — ONDANSETRON HCL/PF 4 MG/2 ML
4 VIAL (ML) INJECTION EVERY 8 HOURS
Refills: 0 | Status: DISCONTINUED | OUTPATIENT
Start: 2025-02-11 | End: 2025-02-19

## 2025-02-11 RX ADMIN — ATORVASTATIN CALCIUM 20 MILLIGRAM(S): 80 TABLET, FILM COATED ORAL at 23:46

## 2025-02-11 RX ADMIN — IPRATROPIUM BROMIDE AND ALBUTEROL SULFATE 3 MILLILITER(S): .5; 2.5 SOLUTION RESPIRATORY (INHALATION) at 14:22

## 2025-02-11 RX ADMIN — Medication 1000 MILLILITER(S): at 23:46

## 2025-02-11 RX ADMIN — GABAPENTIN 600 MILLIGRAM(S): 400 CAPSULE ORAL at 23:46

## 2025-02-11 RX ADMIN — Medication 1000 MILLILITER(S): at 14:23

## 2025-02-11 RX ADMIN — Medication 200 GRAM(S): at 16:28

## 2025-02-11 NOTE — ED ADULT NURSE REASSESSMENT NOTE - NS ED NURSE REASSESS COMMENT FT1
Assumed care of patient at 1930, resting on stretcher in no acute distress, confused at baseline and admitted to medicine service as  for flu A.  Pt transferred to [facility] without incident.

## 2025-02-11 NOTE — H&P ADULT - CONVERSATION DETAILS
Patient lives with Caregiver/Daughter in Law Venice Garnica and Grandson Dalton Garnica. Prior MOLST on file noted, pt was DNR/DNI. Since patient is confused I spoke to Venice to confirm GOC. Venice assured me that prior MOLST is still in effect, patient has expressed her wishes in the past that she wound not want heroic measuring including CPR or intubation. Patient lives with Caregiver/Daughter in Law Venice Garnica. Prior MOLST on file noted, pt was DNR/DNI. Since patient is confused I spoke to [place] to confirm GOC. Venice assured me that prior MOLST is still in effect, patient has expressed her wishes in the past that she wound not want heroic measuring including CPR or intubation. Patient lives with Caregiver/Daughter in Law Venice Garnica. Prior MOLST on file noted, pt was DNR/DNI. Since patient is confused I spoke to [place] to confirm GOC. Venice assured me that prior MOLST is still in effect, patient has expressed her wishes in the past that she wound not want heroic measuring including CPR or intubation.    Prior MOLST available in Patient Window from Nov 2023

## 2025-02-11 NOTE — H&P ADULT - HISTORY OF PRESENT ILLNESS
89 y/o F with PMH of Afib (xarelto), HFpEF, AS s/p TAVR (2018), CAD s/p PCI w/ 1 JARETT (2018),  89 y/o F with PMH of Afib (xarelto), HFpEF, AS s/p TAVR (2018), CAD s/p PCI w/ 1 JARETT (2018), HTN, HLD, neuropathy 2/2 spinal stenosis presents for weakness s/p fall. She fell yesterday, +head strike on wall,  unknown LOC and went to [facility]  89 y/o F with PMH of Afib (xarelto), HFpEF, AS s/p TAVR (2018), CAD s/p PCI w/ 1 JARETT (2018), HTN, HLD, neuropathy and chronic back pain 2/2 spinal stenosis presents for weakness s/p fall. She fell yesterday, +head strike on wall,  unknown LOC and went to [facility] where she was diagnosed with Flu A and discharged after imaging was negative for fractures. Since her fall, she has been unable to walk due to pain and weakness in b/l LE. She regularly uses a walker. Daughter in law called PCP earlier today to inform him of events, and he advised her to present to our ED for further evaluation since she cannot ambulate and did not believe she should have been discharged. Patient also had very profuse uncontrolled diarrhea which appears to have subsided now. Also noted with non-productive cough. Lives at home with Daughter in law Venice and grandson Dalton. History obtained by Venice as pt is confused, A&Ox2, disoriented to place and situation. In ED pt noted w/ lactate 4.6, given 1L NS bolus, WBC 20.9, given 1 dose zosyn. CXR and CTH negative. CT A/P with proctitis and cholelithiasis. Reported that patient desated and placed on 3L NC and

## 2025-02-11 NOTE — H&P ADULT - ASSESSMENT
89 y/o F with PMH of Afib (xarelto), HFpEF, AS s/p TAVR (2018), CAD s/p PCI w/ 1 JARETT (2018), HTN, HLD, neuropathy and chronic back pain 2/2 spinal stenosis presents for weakness s/p fall. Found to have flu A and proctitis    #fall 2/2 multifactorial weakness w/ Flu A vs. dehydration    #Encephalopathy   89 y/o F with PMH of Afib (xarelto), HFpEF, AS s/p TAVR (2018), CAD s/p PCI w/ 1 JARETT (2018), HTN, HLD, ARMEN, neuropathy and chronic back pain 2/2 spinal stenosis presented for weakness s/p fall. Admitted for Sepsis 2/2 AHRF from flu and possible bronchitis. Also noted to have diarrhea 2/2 proctitis which may also be contributing to sepsis    #Sepsis 2/2 AHRF from flu A and possible bronchitis  -lactate 4.6 on admission, decreased to 3.7 after 1L bolus  -ordered additional 1L NS bolus  -ordered f/u lactate, will defer additional/maintenance fluids for now due to Hx HFpEF, may consider more fluids pending repeat lactate  -CXR negative  -CT w/ b/l LL atelectasis  -started tamiflu for total 5 day course  -standing duoneb q6hrs  -solumedrol 40mg TID  -desated to 85 when NC removed  -on 3L NC, wean as tolerated  -    #Diarrhea 2/2 proctitis  -may also be contributing to sepsis  -f/u GI PCR  -continue empiric zosyn 3.375mg TID  -may consider sending C.diff if diarrhea continues    #multifactorial metabolic encephalopathy  -CTH negative  -no known Hx of dementia  -continue home gabapentin 600mg TID for now, may consider lowering dose if pt sedated or encephalopathy not improved  -f/u UA/Ucx to r/o underlying UTI    #Weakness  -uses walker at baseline  -per family unable to ambulate since fall due to LE weakness and pain  -no acute fractures on CT  -PT consulted    #cholelithiasis  #distended gallbladder  #transaminitis  -CT A/P noted  -Abd US confirmed stones and sludge,  findings equivocal for acute cholecystitis  -T bili 0.7  -AST/ALT mildly elevated at 60/48  -no abd pain or tenderness  -may consider HIDA scan  -surgery consulted    #chronic back pain  #neuropathy 2/2 spinal stenosis  -outpt pt recently uptitrated from gabapentin 600mg BID to TID due to persistent neuropathy  -family adamant that pt requires her scheduled gabapentin   -continue gabapentin 600mg TID for now  -consider decreasing dose if encephalopathy worsens    #HFpEF  -most recent echo showed EF 67%, moderate to severe LVH. normal RV size and systolic function. PASP 39 mmHg. LA severely dilated, moderate TR, mild MR. Mild aortic regurgitation.  -holding home lasix due to active dehydration 2/2 diarrhea    #Afib  -f/u EKG  -continue home xarelto 15mg daily    #Sacral ulcer  -daily wound care as ordered  -f/u wound care consult    #stable CAD s/p PCI w/ 1 JARETT (2018)  -pt not on ASA, already on xarelto  -continue ranexa 500mg BID    #HLD  -continue home atorvastatin 20mg QHS    #Hx HTN  -not on any antihypertensive meds  -monitor    #Hx ARMEN  -continue home ferrous sulfate 325mg BID    #incidental finding of lesion vs artifact on pancreatic head  -found on CT A/P  -Will need f/u pancreatic MRI    DVT PPX: xarelto  Code: DNR/DNI, prior MOLST confirmed with [place]  Dispo: acute, admit to medicine w/  89 y/o F with PMH of Afib (xarelto), HFpEF, AS s/p TAVR (2018), CAD s/p PCI w/ 1 JARETT (2018), HTN, HLD, ARMEN, neuropathy and chronic back pain 2/2 spinal stenosis presented for weakness s/p fall. Admitted for AHRF from flu and possible bronchitis and Sepsis 2/2 proctitis    #AHRF from flu A and possible bronchitis  -lactate 4.6 on admission, decreased to 3.7 after 1L bolus  -ordered additional 1L NS bolus  -ordered f/u lactate, will defer additional/maintenance fluids for now due to Hx HFpEF, may consider more fluids pending repeat lactate  -CXR negative  -CT w/ b/l LL atelectasis  -started tamiflu for total 5 day course  -standing duoneb q6hrs  -solumedrol 40mg TID  -desated to 85 when NC removed  -on 3L NC, wean as tolerated  -    #Sepsis likely 2/2 proctitis  -f/u GI PCR  -continue empiric zosyn 3.375mg TID  -may consider sending C.diff if diarrhea continues  -f/u UA/Ucx to r/o UTI  -f/u Bcx    #multifactorial metabolic encephalopathy  -CTH negative  -no known Hx of dementia  -currently A&Ox2  -continue home gabapentin 600mg TID for now, may consider lowering dose if pt sedated or encephalopathy not improved  -f/u UA/Ucx to r/o underlying UTI    #cholelithiasis  #distended gallbladder  #transaminitis  -CT A/P noted  -Abd US confirmed stones and sludge,  findings equivocal for acute cholecystitis  -T bili 0.7  -AST/ALT mildly elevated at 60/48  -no abd pain or tenderness  -may consider HIDA scan  -surgery consulted    #Weakness  -uses walker at baseline  -per family unable to ambulate since fall due to b/l LE weakness and pain  -LE pain only with ambulation  -no acute fractures on CT, old fractures noted  -PT consulted    #chronic back pain  #neuropathy 2/2 spinal stenosis  -outpt pt recently uptitrated from gabapentin 600mg BID to TID due to persistent neuropathy  -family adamant that pt requires her scheduled gabapentin   -continue gabapentin 600mg TID for now  -consider decreasing dose if encephalopathy worsens    #HFpEF  -most recent echo 10/2024 showed EF 67%, moderate to severe LVH. normal RV size and systolic function. PASP 39 mmHg. LA severely dilated, moderate TR, mild MR. Mild aortic regurgitation.  -holding home lasix due to active dehydration 2/2 diarrhea    #Afib  -EKG reviewed, unchanged compared to prior from 10/2024  -continue home xarelto 15mg daily    #Sacral ulcer  -daily wound care as ordered  -f/u wound care consult    #stable CAD s/p PCI w/ 1 JARETT (2018)  -pt not on ASA, already on xarelto  -continue ranexa 500mg BID    #HLD  -continue home atorvastatin 20mg QHS    #Hx HTN  -not on any antihypertensive meds  -monitor    #Hx ARMEN  -continue home ferrous sulfate 325mg BID    #incidental finding of lesion vs artifact on pancreatic head  -found on CT A/P  -Will need f/u pancreatic MRI and outpt f/u  -discussed finding with [place]    DVT PPX: xarelto  Code: DNR/DNI, prior MOLST confirmed with [place]  Dispo: acute, admit to medicine w/  89 y/o F with PMH of Afib (xarelto), HFpEF, AS s/p TAVR (2018), CAD s/p PCI w/ 1 JARETT (2018), HTN, HLD, ARMEN, neuropathy and chronic back pain 2/2 spinal stenosis presented for weakness s/p fall. Admitted for AHRF from flu and possible bronchitis and Sepsis 2/2 proctitis    #AHRF from flu A and possible bronchitis  -lactate 4.6 on admission, decreased to 3.7 after 1L bolus  -ordered additional 1L NS bolus  -ordered f/u lactate, will defer additional/maintenance fluids for now due to Hx HFpEF, may consider more fluids pending repeat lactate  -CXR negative  -CT w/ b/l LL atelectasis  -started tamiflu for total 5 day course  -standing duoneb q6hrs  -solumedrol 40mg TID  -desated to 85 when NC removed  -on 3L NC, wean as tolerated  -    #Sepsis likely 2/2 proctitis  -f/u GI PCR  -continue empiric zosyn 3.375mg TID  -may consider sending C.diff if diarrhea continues  -f/u UA/Ucx to r/o UTI  -f/u Bcx    #multifactorial metabolic encephalopathy  -CTH negative  -no known Hx of dementia  -currently A&Ox2  -continue home gabapentin 600mg TID for now, may consider lowering dose if pt sedated or encephalopathy not improved  -f/u UA/Ucx to r/o underlying UTI    #cholelithiasis  #distended gallbladder  #transaminitis  -CT A/P noted  -Abd US confirmed stones and sludge,  findings equivocal for acute cholecystitis  -T bili 0.7  -AST/ALT mildly elevated at 60/48  -no abd pain or tenderness  -may consider HIDA scan  -surgery consulted    #Weakness  -uses walker at baseline  -per family unable to ambulate since fall due to b/l LE weakness and pain  -LE pain only with ambulation  -no acute fractures on CT, old fractures noted  -PT consulted    #chronic back pain  #neuropathy 2/2 spinal stenosis  -outpt pt recently uptitrated from gabapentin 600mg BID to TID due to persistent neuropathy  -family adamant that pt requires her scheduled gabapentin   -continue gabapentin 600mg TID for now  -consider decreasing dose if encephalopathy worsens    #HFpEF  -most recent echo 10/2024 showed EF 67%, moderate to severe LVH. normal RV size and systolic function. PASP 39 mmHg. LA severely dilated, moderate TR, mild MR. Mild aortic regurgitation.  -holding home lasix due to active dehydration 2/2 diarrhea    #Afib  -EKG reviewed, unchanged compared to prior from 10/2024  -continue home xarelto 15mg daily    #stable CAD s/p PCI w/ 1 JARETT (2018)  -pt not on ASA, already on xarelto  -continue ranexa 500mg BID    #HLD  -continue home atorvastatin 20mg QHS    #Hx HTN  -not on any antihypertensive meds  -monitor    #Hx ARMEN  -continue home ferrous sulfate 325mg BID    #incidental finding of lesion vs artifact on pancreatic head  -found on CT A/P  -Will need f/u pancreatic MRI and outpt f/u  -discussed finding with [place]    #Stage 1 sacral ulcer  -turn and reposition q2hrs    DVT PPX: xarelto  Code: DNR/DNI, prior MOLST confirmed with [place]  Dispo: acute, admit to medicine w/  89 y/o F with PMH of Afib (xarelto), HFpEF, AS s/p TAVR (2018), CAD s/p PCI w/ 1 JARETT (2018), HTN, HLD, ARMEN, neuropathy and chronic back pain 2/2 spinal stenosis presented for weakness s/p fall. Admitted for AHRF from flu and possible bronchitis and Sepsis 2/2 proctitis    #AHRF from flu A and possible bronchitis  -lactate 4.6 on admission, decreased to 3.7 after 1L bolus  -ordered additional 1L NS bolus  -ordered f/u lactate, will defer additional/maintenance fluids for now due to Hx HFpEF, may consider more fluids pending repeat lactate  -CXR negative  -CT w/ b/l LL atelectasis  -started tamiflu for total 5 day course  -standing duoneb q6hrs  -solumedrol 40mg TID  -desated to 85 when NC removed  -on 3L NC, wean as tolerated  -    #Sepsis likely 2/2 proctitis  -f/u GI PCR  -continue empiric zosyn 3.375mg TID  -may consider sending C.diff if diarrhea continues  -f/u UA/Ucx to r/o UTI  -f/u Bcx    #multifactorial metabolic encephalopathy  -CTH negative  -no known Hx of dementia  -currently A&Ox2  -continue home gabapentin 600mg TID for now, may consider lowering dose if pt sedated or encephalopathy not improved  -f/u UA/Ucx to r/o underlying UTI    #cholelithiasis  #distended gallbladder  #transaminitis  -CT A/P noted  -Abd US confirmed stones and sludge,  findings equivocal for acute cholecystitis  -T bili 0.7  -AST/ALT mildly elevated at 60/48  -no abd pain or tenderness  -may consider HIDA scan  -surgery consulted    #Weakness  -uses walker at baseline  -per family unable to ambulate since fall due to b/l LE weakness and pain  -LE pain only with ambulation  -no acute fractures on CT, old fractures noted  -PT consulted    #chronic back pain  #neuropathy 2/2 spinal stenosis  -outpt pt recently uptitrated from gabapentin 600mg BID to TID due to persistent neuropathy  -family adamant that pt requires her scheduled gabapentin   -continue gabapentin 600mg TID for now  -consider decreasing dose if encephalopathy worsens    #HFpEF  -most recent echo 10/2024 showed EF 67%, moderate to severe LVH. normal RV size and systolic function. PASP 39 mmHg. LA severely dilated, moderate TR, mild MR. Mild aortic regurgitation.  -holding home lasix due to active dehydration 2/2 diarrhea    #Afib  -EKG reviewed, unchanged compared to prior from 10/2024  -continue home xarelto 15mg daily    #stable CAD s/p PCI w/ 1 JARETT (2018)  -pt not on ASA, already on xarelto  -continue ranexa 500mg BID    #HLD  -continue home atorvastatin 20mg QHS    #Hx HTN  -not on any antihypertensive meds  -monitor    #Hx ARMEN  -continue home ferrous sulfate 325mg BID    #incidental finding of lesion vs artifact on pancreatic head  -found on CT A/P  -Will need f/u pancreatic MRI and outpt f/u  -discussed finding with [place]    #Stage 1 sacral ulcer  -turn and reposition q2hrs    DVT PPX: xarelto  Code: DNR/DNI, prior MOLST confirmed with [place]  Dispo: acute, admit to medicine w/ ; estimated LOS: unclear, pending PT eval

## 2025-02-11 NOTE — ED PROVIDER NOTE - WET READ LAUNCH FT
There are no Wet Read(s) to document. Pt reassessed, feels better. Results explained, will d/w Dr. Marti. Pt considering dc with outpt f/u. will dc home with uro follow up, oxycodone and zofran.

## 2025-02-11 NOTE — ED ADULT NURSE NOTE - TEMPLATE LIST FOR HEAD TO TOE ASSESSMENT
Gave pts wife the name of Gen Surgery, Dr Crowder. She verbalized understanding. States pts hemorrhoids are still bleeding and wants to be seen by Gen Surger but only wants a male provider.    General

## 2025-02-11 NOTE — ED PROVIDER NOTE - CARE PLAN
Principal Discharge DX:	Influenza A   1 Principal Discharge DX:	Influenza A  Secondary Diagnosis:	Acute hypoxic respiratory failure  Secondary Diagnosis:	Proctitis

## 2025-02-11 NOTE — H&P ADULT - NSICDXFAMILYHX_GEN_ALL_CORE_FT
FAMILY HISTORY:  Father  Still living? Unknown  Family history of esophageal cancer, Age at diagnosis: Age Unknown    Mother  Still living? Unknown  Family history of breast cancer, Age at diagnosis: Age Unknown  Family history of cerebrovascular accident (CVA), Age at diagnosis: Age Unknown    Sibling  Still living? Unknown  Family history of breast cancer, Age at diagnosis: Age Unknown

## 2025-02-11 NOTE — ED PROVIDER NOTE - NS ED ROS FT
Const: + Generalized weankess. Denies fever, chills  HEENT: Denies blurry vision, sore throat  Neck: Denies neck pain/stiffness  Resp: + Cough. Denies SOB  Cardiovascular: Denies CP, palpitations, LE edema  GI: Denies nausea, vomiting, abdominal pain, diarrhea, constipation, blood in stool  : Denies urinary frequency/urgency/dysuria, hematuria  MSK: Denies back pain  Neuro: Denies HA, dizziness, numbness, weakness  Skin: Denies rashes.

## 2025-02-11 NOTE — ED ADULT TRIAGE NOTE - PRO INTERPRETER NEED 2
Routing refill request to provider for review/approval because:  Patient needs to be seen because it has been more than 1 year since last office visit.         English

## 2025-02-11 NOTE — ED ADULT NURSE NOTE - NSICDXPASTMEDICALHX_GEN_ALL_CORE_FT
PAST MEDICAL HISTORY:  Acute deep vein thrombosis (DVT) of popliteal vein of both lower extremities     AF (atrial fibrillation) Persistent/chronic    Afib     Angina pectoris class IV    Anxiety     Aortic stenosis s/p TAVR bioprosthetic Jan 2018 [facility] Dr. Aragon    Aspiration pneumonia     Asthma     Atherosclerosis of native coronary artery of native heart with angina pectoris     AV block     Bronchiectasis     Chronic back pain     Chronic back pain     Closed fracture of multiple ribs of right side, initial encounter     Closed pelvic ring fracture     Diastolic CHF NYHA class 3    CURRY (dyspnea on exertion)     DVT (deep venous thrombosis) left lower extremity    Essential hypertension     GERD (gastroesophageal reflux disease)     Hip arthritis     History of valvular heart disease     [place] (hard of hearing) wears bilateral hearing aides    HTN (hypertension)     HTN (hypertension)     Hypercholesteremia     Hyperlipidemia, unspecified hyperlipidemia type     Kyphoscoliosis and scoliosis     Lung nodule     Murmur, cardiac gr3/6    Neuropathy     Nosebleed prior left nostrils cauterize x2    Nosebleed severe on aspirin; had nose cautery done in 2018    Pelvic fracture     PNA (pneumonia) november 2017    Spinal stenosis     Stenocardia     Thyroid nodule

## 2025-02-11 NOTE — ED PROVIDER NOTE - NSICDXPASTMEDICALHX_GEN_ALL_CORE_FT
PAST MEDICAL HISTORY:  Acute deep vein thrombosis (DVT) of popliteal vein of both lower extremities     AF (atrial fibrillation) Persistent/chronic    Afib     Angina pectoris class IV    Anxiety     Aortic stenosis s/p TAVR bioprosthetic Jan 2018 [facility] Dr. Aragon    Aspiration pneumonia     Asthma     Atherosclerosis of native coronary artery of native heart with angina pectoris     AV block     Bronchiectasis     Chronic back pain     Chronic back pain     Closed fracture of multiple ribs of right side, initial encounter     Closed pelvic ring fracture     Diastolic CHF NYHA class 3    CURRY (dyspnea on exertion)     DVT (deep venous thrombosis) left lower extremity    Essential hypertension     GERD (gastroesophageal reflux disease)     Hip arthritis     History of valvular heart disease     [place] (hard of hearing) wears bilateral hearing aides    HTN (hypertension)     HTN (hypertension)     Hypercholesteremia     Hyperlipidemia, unspecified hyperlipidemia type     Kyphoscoliosis and scoliosis     Lung nodule     Murmur, cardiac gr3/6    Neuropathy     Nosebleed prior left nostrils cauterize x2    Nosebleed severe on aspirin; had nose cautery done in 2018    Pelvic fracture     PNA (pneumonia) november 2017    Spinal stenosis     Stenocardia     Thyroid nodule

## 2025-02-11 NOTE — ED ADULT NURSE NOTE - OBJECTIVE STATEMENT
Assumed care of patient in [place] [address]. Pt a&ox2 disoriented to situation presents to ed with c/o of generalized weakness. As per daughter, pt had a fall recently, seen at [facility] and discharged but unable to ambulate. Denies chest pain, sob, fever, chills, gu/gi symptoms. pt educated on plan of care, pt able to successfully teach back plan of care to RN, RN will continue to reeducate pt during hospital stay.

## 2025-02-11 NOTE — ED PROVIDER NOTE - PHYSICAL EXAMINATION
Const: Awake, alert and oriented. In no acute distress. Well appearing.  HEENT: NC/AT. Dry mucous membranes.  Eyes: No scleral icterus. EOMI.  Neck:. Soft and supple. Full ROM without pain.  Cardiac: Irregularly irregular rate & rhythm. +S1/S2. No murmurs. Peripheral pulses 2+ and symmetric. No LE edema.  Resp: Speaking in full sentences. Tachypneic with speaking. Diffuse rhonchi.  Abd: Soft, non-tender, non-distended. Normal bowel sounds in all 4 quadrants. No guarding or rebound.  Back: Spine midline and non-tender. No CVAT.  Skin: No rashes, abrasions or lacerations.  Neuro: Awake, alert & oriented x 3. Moves all extremities symmetrically.

## 2025-02-11 NOTE — H&P ADULT - TIME BILLING
chart review, patient encounter, chart documentation.  Plan discussed with daughter in law, PGY1 resident, CDU RN.

## 2025-02-11 NOTE — H&P ADULT - NSICDXPASTMEDICALHX_GEN_ALL_CORE_FT
PAST MEDICAL HISTORY:  Acute deep vein thrombosis (DVT) of popliteal vein of both lower extremities     AF (atrial fibrillation) Persistent/chronic    Anxiety     Aortic stenosis s/p TAVR bioprosthetic Jan 2018 [facility] Dr. Aragon    Atherosclerosis of native coronary artery of native heart with angina pectoris     AV block     Bronchiectasis     Chronic back pain     Closed fracture of multiple ribs of right side, initial encounter     Closed pelvic ring fracture     Diastolic CHF NYHA class 3    Essential hypertension     GERD (gastroesophageal reflux disease)     Hip arthritis     History of valvular heart disease     [place] (hard of hearing) wears bilateral hearing aides    Hypercholesteremia     Hyperlipidemia, unspecified hyperlipidemia type     Kyphoscoliosis and scoliosis     Lung nodule     Murmur, cardiac gr3/6    Neuropathy     Pelvic fracture     PNA (pneumonia) november 2017    Spinal stenosis     Stenocardia     Thyroid nodule

## 2025-02-11 NOTE — ED PROVIDER NOTE - CLINICAL SUMMARY MEDICAL DECISION MAKING FREE TEXT BOX
89 y/o F with recent dx of influenza presents for generalized malaise. Patient tachypneic with speaking, diffuse rhonchi, appears dehydrated. Will check labs, IV fluids, neb treatment and reassess. 89 y/o F with recent dx of influenza presents for generalized malaise. Patient tachypneic with speaking, diffuse rhonchi, appears dehydrated. Will check labs, IV fluids, neb treatment and reassess.    Progress: Daughter at bedside, states that patient fell yesterday, hard, hit her head on the wall, unknown LOC, complaining of unable to walk, went to [facility], had XR, still can't walk, had uncontrolled diarrhea all night yesterday. Daughter called PMD today who was angry patient was discharged, prompting visit to [facility] today. Daughter states patient is usually lively and interactive and this is not her baseline mental status. As a result, will CT A/P to evaluate for infectious etiology vs. occult fracture. Patient is positive for Flu A. 89 y/o F with recent dx of influenza presents for generalized malaise. Patient tachypneic with speaking, diffuse rhonchi, appears dehydrated. Will check labs, IV fluids, neb treatment and reassess.    Progress: Daughter at bedside, states that patient fell yesterday, hard, hit her head on the wall, unknown LOC, complaining of unable to walk, went to [facility], had XR, still can't walk, had uncontrolled diarrhea all night yesterday. Daughter called PMD today who was angry patient was discharged, prompting visit to [facility] today. Daughter states patient is usually lively and interactive and this is not her baseline mental status. As a result, will CT A/P to evaluate for infectious etiology vs. occult fracture. Patient is positive for Flu A.    Progress: Patient desatting, now requiring NC O2 in the setting of influenza. CT shows no acute fractures, but acute proctitis which explains diarrhea - will require admission.

## 2025-02-11 NOTE — H&P ADULT - NSHPPHYSICALEXAM_GEN_ALL_CORE
VITALS:   T(C): 36.5 (02-11-25 @ 23:37), Max: 37.8 (02-11-25 @ 20:55)  HR: 83 (02-11-25 @ 23:37) (63 - 99)  BP: 118/74 (02-11-25 @ 23:37) (107/65 - 138/76)  RR: 19 (02-11-25 @ 23:37) (18 - 19)  SpO2: 98% (02-11-25 @ 23:37) (96% - 98%)    GENERAL: NAD, lying in bed comfortably, frail appearing  HEAD:  Atraumatic, normocephalic  EYES: EOMI, PERRLA, conjunctiva and sclera clear  ENT: Moist mucous membranes  NECK: Supple, no JVD  HEART: Irregular, no murmurs, rubs, or gallops  LUNGS: Unlabored respirations.  no crackles or rhonchi. +mild b/l wheezing. 3L NC in place  ABDOMEN: Soft, nontender, nondistended, +BS  EXTREMITIES: 2+ peripheral pulses bilaterally. No clubbing, cyanosis, or edema  NERVOUS SYSTEM:  A&Ox2, no focal deficits   SKIN: No rashes or lesions VITALS:   T(C): 36.5 (02-11-25 @ 23:37), Max: 37.8 (02-11-25 @ 20:55)  HR: 83 (02-11-25 @ 23:37) (63 - 99)  BP: 118/74 (02-11-25 @ 23:37) (107/65 - 138/76)  RR: 19 (02-11-25 @ 23:37) (18 - 19)  SpO2: 98% (02-11-25 @ 23:37) (96% - 98%)    GENERAL: NAD, lying in bed comfortably, frail appearing  HEAD:  Atraumatic, normocephalic  EYES: EOMI, PERRLA, conjunctiva and sclera clear  ENT: Moist mucous membranes  NECK: Supple, no JVD  HEART: Irregular, no murmurs, rubs, or gallops  LUNGS: Unlabored respirations.  no crackles or rhonchi. +mild b/l wheezing. 3L NC in place  ABDOMEN: Soft, nontender, nondistended, +BS  EXTREMITIES: 2+ peripheral pulses bilaterally. No clubbing, cyanosis, or edema  NERVOUS SYSTEM:  A&Ox2, no focal deficits   SKIN: stage 3 sacral ulcer VITALS:   T(C): 36.5 (02-11-25 @ 23:37), Max: 37.8 (02-11-25 @ 20:55)  HR: 83 (02-11-25 @ 23:37) (63 - 99)  BP: 118/74 (02-11-25 @ 23:37) (107/65 - 138/76)  RR: 19 (02-11-25 @ 23:37) (18 - 19)  SpO2: 98% (02-11-25 @ 23:37) (96% - 98%)    GENERAL: NAD, lying in bed comfortably, frail appearing  HEAD:  Atraumatic, normocephalic  EYES: EOMI, PERRLA, conjunctiva and sclera clear  ENT: Moist mucous membranes  NECK: Supple, no JVD  HEART: Irregular, no murmurs, rubs, or gallops  LUNGS: Unlabored respirations.  no crackles or rhonchi. +mild b/l wheezing. 3L NC in place  ABDOMEN: Soft, nontender, nondistended, +BS  EXTREMITIES: 2+ peripheral pulses bilaterally. No clubbing, cyanosis, or edema  NERVOUS SYSTEM:  A&Ox2, no focal deficits   SKIN: stage 1 sacral ulcer

## 2025-02-11 NOTE — H&P ADULT - ATTENDING COMMENTS
89yoF hx CAD s/p PCI, Afib on Xarelto, AS s/p TAVR, HFpEF, spinal stenosis with ?chronic back pain, presenting with cough, poor appetite, watery diarrhea, generalized weakness, recent fall.  On arrival to ED, pt hypoxic to 80s, placed on NC with improvement, flu positive, labs notable for leukocytosis. CT  A/P showing proctitis.      Admit for acute hypoxemic respiratory failure due to reactive airway disease from influenza and also sepsis due to infectious proctitis. Plan for IV steroids, standing DuoNebs, IV antibiotics. Obtain GI PCR. Incidental finding of distended gallbladder and stones on imaging with findings equivocal for acute cholecystitis.  General surgery consulted, follow up note.     Pt also with some increased confusion by family, likely metabolic encephalopathy from above processes.  CT head w/out structural abnormality.  Pt currently awake, alert, conversant, but appears delirious.  Pt on gabapentin with dose recently increased dose for spinal stenosis and family vocalized desire to keep current doses.  Continue gabapentin for now but monitor closely for worsening confusion, may require dose titration. 89yoF hx CAD s/p PCI, Afib on Xarelto, AS s/p TAVR, HFpEF, spinal stenosis with chronic back pain, presenting with cough, poor appetite, watery diarrhea, generalized weakness.  On arrival to ED, pt hypoxic to 80s, placed on NC with improvement, flu positive, labs notable for leukocytosis. CT  A/P showing proctitis.      Admit for acute hypoxemic respiratory failure due to reactive airway disease from influenza and also sepsis due to infectious proctitis. Plan for IV steroids, standing DuoNebs, IV antibiotics. Obtain GI PCR. Incidental finding of distended gallbladder and stones on imaging with findings equivocal for acute cholecystitis.  General surgery consulted, follow up note.     Pt also with some increased confusion by family, likely metabolic encephalopathy from above processes.  CT head w/out structural abnormality.  Pt currently awake, alert, conversant, but appears delirious.  Pt on gabapentin with dose recently increased dose for spinal stenosis and family vocalized desire to keep current doses.  Continue gabapentin for now but monitor closely for worsening confusion, may require dose titration.

## 2025-02-11 NOTE — ED ADULT NURSE NOTE - NSFALLHARMRISKINTERV_ED_ALL_ED

## 2025-02-11 NOTE — H&P ADULT - NSHPSOCIALHISTORY_GEN_ALL_CORE
No alcohol  or illicit drug use.  Never a smoker but previously lived with active smoker who smoked indoors

## 2025-02-11 NOTE — ED PROVIDER NOTE - OBJECTIVE STATEMENT
89 y/o F with PMH A fib on Xarelto, TAVR, DVT, HTN, HLD presents for feeling generalized weakness and loss of appetite after being diagnosed with the flu yesterday. She denies pain, states she can't eat and today she can't walk. She lives at home with her son who was hospitalized for 30+ days. She notes coughing, but not feeling short of breath, however she is having difficulty finishing sentences.

## 2025-02-12 LAB
ALBUMIN SERPL ELPH-MCNC: 3.2 G/DL — LOW (ref 3.3–5.2)
ALP SERPL-CCNC: 48 U/L — SIGNIFICANT CHANGE UP (ref 40–120)
ALT FLD-CCNC: 35 U/L — HIGH
ANION GAP SERPL CALC-SCNC: 12 MMOL/L — SIGNIFICANT CHANGE UP (ref 5–17)
APPEARANCE UR: CLEAR — SIGNIFICANT CHANGE UP
AST SERPL-CCNC: 51 U/L — HIGH
BACTERIA # UR AUTO: ABNORMAL /HPF
BILIRUB SERPL-MCNC: 0.5 MG/DL — SIGNIFICANT CHANGE UP (ref 0.4–2)
BILIRUB UR-MCNC: NEGATIVE — SIGNIFICANT CHANGE UP
BUN SERPL-MCNC: 25.3 MG/DL — HIGH (ref 8–20)
CALCIUM SERPL-MCNC: 8.3 MG/DL — LOW (ref 8.4–10.5)
CAST: 2 /LPF — SIGNIFICANT CHANGE UP (ref 0–4)
CHLORIDE SERPL-SCNC: 105 MMOL/L — SIGNIFICANT CHANGE UP (ref 96–108)
CO2 SERPL-SCNC: 22 MMOL/L — SIGNIFICANT CHANGE UP (ref 22–29)
COLOR SPEC: YELLOW — SIGNIFICANT CHANGE UP
CREAT SERPL-MCNC: 0.99 MG/DL — SIGNIFICANT CHANGE UP (ref 0.5–1.3)
CRP SERPL-MCNC: 118 MG/L — HIGH
DIFF PNL FLD: ABNORMAL
EGFR: 54 ML/MIN/1.73M2 — LOW
GLUCOSE SERPL-MCNC: 80 MG/DL — SIGNIFICANT CHANGE UP (ref 70–99)
GLUCOSE UR QL: NEGATIVE MG/DL — SIGNIFICANT CHANGE UP
HCT VFR BLD CALC: 40.7 % — SIGNIFICANT CHANGE UP (ref 34.5–45)
HGB BLD-MCNC: 13.2 G/DL — SIGNIFICANT CHANGE UP (ref 11.5–15.5)
IMMATURE PLATELET FRACTION #: 10.9 K/UL — SIGNIFICANT CHANGE UP (ref 4.7–11.1)
IMMATURE PLATELET FRACTION %: 11.1 % — HIGH (ref 1.6–4.9)
KETONES UR-MCNC: NEGATIVE MG/DL — SIGNIFICANT CHANGE UP
LACTATE SERPL-SCNC: 2.9 MMOL/L — HIGH (ref 0.5–2)
LEUKOCYTE ESTERASE UR-ACNC: ABNORMAL
MAGNESIUM SERPL-MCNC: 2 MG/DL — SIGNIFICANT CHANGE UP (ref 1.6–2.6)
MCHC RBC-ENTMCNC: 30 PG — SIGNIFICANT CHANGE UP (ref 27–34)
MCHC RBC-ENTMCNC: 32.4 G/DL — SIGNIFICANT CHANGE UP (ref 32–36)
MCV RBC AUTO: 92.5 FL — SIGNIFICANT CHANGE UP (ref 80–100)
NITRITE UR-MCNC: NEGATIVE — SIGNIFICANT CHANGE UP
NRBC # BLD AUTO: 0 K/UL — SIGNIFICANT CHANGE UP (ref 0–0)
NRBC # FLD: 0 K/UL — SIGNIFICANT CHANGE UP (ref 0–0)
NRBC BLD AUTO-RTO: 0 /100 WBCS — SIGNIFICANT CHANGE UP (ref 0–0)
PH UR: 5.5 — SIGNIFICANT CHANGE UP (ref 5–8)
PHOSPHATE SERPL-MCNC: 3.8 MG/DL — SIGNIFICANT CHANGE UP (ref 2.4–4.7)
PLATELET # BLD AUTO: 98 K/UL — LOW (ref 150–400)
PMV BLD: 11.5 FL — SIGNIFICANT CHANGE UP (ref 7–13)
POTASSIUM SERPL-MCNC: 4.4 MMOL/L — SIGNIFICANT CHANGE UP (ref 3.5–5.3)
POTASSIUM SERPL-SCNC: 4.4 MMOL/L — SIGNIFICANT CHANGE UP (ref 3.5–5.3)
PROCALCITONIN SERPL-MCNC: 2.68 NG/ML — HIGH (ref 0.02–0.1)
PROT SERPL-MCNC: 5.9 G/DL — LOW (ref 6.6–8.7)
PROT UR-MCNC: 100 MG/DL
RBC # BLD: 4.4 M/UL — SIGNIFICANT CHANGE UP (ref 3.8–5.2)
RBC # FLD: 15.1 % — HIGH (ref 10.3–14.5)
RBC CASTS # UR COMP ASSIST: 2 /HPF — SIGNIFICANT CHANGE UP (ref 0–4)
SODIUM SERPL-SCNC: 139 MMOL/L — SIGNIFICANT CHANGE UP (ref 135–145)
SP GR SPEC: >1.03 — HIGH (ref 1–1.03)
SQUAMOUS # UR AUTO: 2 /HPF — SIGNIFICANT CHANGE UP (ref 0–5)
UROBILINOGEN FLD QL: 1 MG/DL — SIGNIFICANT CHANGE UP (ref 0.2–1)
WBC # BLD: 14.82 K/UL — HIGH (ref 3.8–10.5)
WBC # FLD AUTO: 14.82 K/UL — HIGH (ref 3.8–10.5)
WBC UR QL: 8 /HPF — HIGH (ref 0–5)

## 2025-02-12 PROCEDURE — 73562 X-RAY EXAM OF KNEE 3: CPT | Mod: 26,LT

## 2025-02-12 PROCEDURE — 93010 ELECTROCARDIOGRAM REPORT: CPT

## 2025-02-12 PROCEDURE — 99233 SBSQ HOSP IP/OBS HIGH 50: CPT

## 2025-02-12 PROCEDURE — 99222 1ST HOSP IP/OBS MODERATE 55: CPT

## 2025-02-12 RX ORDER — ALBUTEROL SULFATE 2.5 MG/3ML
2 VIAL, NEBULIZER (ML) INHALATION THREE TIMES A DAY
Refills: 0 | Status: DISCONTINUED | OUTPATIENT
Start: 2025-02-12 | End: 2025-02-19

## 2025-02-12 RX ORDER — INFLUENZA A VIRUS A/IDAHO/07/2018 (H1N1) ANTIGEN (MDCK CELL DERIVED, PROPIOLACTONE INACTIVATED, INFLUENZA A VIRUS A/INDIANA/08/2018 (H3N2) ANTIGEN (MDCK CELL DERIVED, PROPIOLACTONE INACTIVATED), INFLUENZA B VIRUS B/SINGAPORE/INFTT-16-0610/2016 ANTIGEN (MDCK CELL DERIVED, PROPIOLACTONE INACTIVATED), INFLUENZA B VIRUS B/IOWA/06/2017 ANTIGEN (MDCK CELL DERIVED, PROPIOLACTONE INACTIVATED) 15; 15; 15; 15 UG/.5ML; UG/.5ML; UG/.5ML; UG/.5ML
0.5 INJECTION, SUSPENSION INTRAMUSCULAR ONCE
Refills: 0 | Status: COMPLETED | OUTPATIENT
Start: 2025-02-12 | End: 2025-02-12

## 2025-02-12 RX ORDER — IPRATROPIUM BROMIDE AND ALBUTEROL SULFATE .5; 2.5 MG/3ML; MG/3ML
3 SOLUTION RESPIRATORY (INHALATION) EVERY 6 HOURS
Refills: 0 | Status: DISCONTINUED | OUTPATIENT
Start: 2025-02-12 | End: 2025-02-19

## 2025-02-12 RX ORDER — PIPERACILLIN-TAZO-DEXTROSE,ISO 3.375G/5
3.38 IV SOLUTION, PIGGYBACK PREMIX FROZEN(ML) INTRAVENOUS EVERY 8 HOURS
Refills: 0 | Status: COMPLETED | OUTPATIENT
Start: 2025-02-12 | End: 2025-02-18

## 2025-02-12 RX ORDER — METHYLPREDNISOLONE ACETATE 80 MG/ML
40 INJECTION, SUSPENSION INTRA-ARTICULAR; INTRALESIONAL; INTRAMUSCULAR; SOFT TISSUE EVERY 12 HOURS
Refills: 0 | Status: DISCONTINUED | OUTPATIENT
Start: 2025-02-12 | End: 2025-02-14

## 2025-02-12 RX ORDER — IPRATROPIUM BROMIDE AND ALBUTEROL SULFATE .5; 2.5 MG/3ML; MG/3ML
3 SOLUTION RESPIRATORY (INHALATION) EVERY 6 HOURS
Refills: 0 | Status: DISCONTINUED | OUTPATIENT
Start: 2025-02-12 | End: 2025-02-12

## 2025-02-12 RX ORDER — METHYLPREDNISOLONE ACETATE 80 MG/ML
40 INJECTION, SUSPENSION INTRA-ARTICULAR; INTRALESIONAL; INTRAMUSCULAR; SOFT TISSUE EVERY 8 HOURS
Refills: 0 | Status: DISCONTINUED | OUTPATIENT
Start: 2025-02-12 | End: 2025-02-12

## 2025-02-12 RX ADMIN — Medication 325 MILLIGRAM(S): at 05:45

## 2025-02-12 RX ADMIN — METHYLPREDNISOLONE ACETATE 40 MILLIGRAM(S): 80 INJECTION, SUSPENSION INTRA-ARTICULAR; INTRALESIONAL; INTRAMUSCULAR; SOFT TISSUE at 17:20

## 2025-02-12 RX ADMIN — GABAPENTIN 600 MILLIGRAM(S): 400 CAPSULE ORAL at 05:46

## 2025-02-12 RX ADMIN — Medication 25 GRAM(S): at 05:45

## 2025-02-12 RX ADMIN — IPRATROPIUM BROMIDE AND ALBUTEROL SULFATE 3 MILLILITER(S): .5; 2.5 SOLUTION RESPIRATORY (INHALATION) at 20:26

## 2025-02-12 RX ADMIN — IPRATROPIUM BROMIDE AND ALBUTEROL SULFATE 3 MILLILITER(S): .5; 2.5 SOLUTION RESPIRATORY (INHALATION) at 08:38

## 2025-02-12 RX ADMIN — OSELTAMIVIR PHOSPHATE 75 MILLIGRAM(S): 75 CAPSULE ORAL at 05:46

## 2025-02-12 RX ADMIN — Medication 25 GRAM(S): at 21:40

## 2025-02-12 RX ADMIN — RIVAROXABAN 15 MILLIGRAM(S): 10 TABLET, FILM COATED ORAL at 17:08

## 2025-02-12 RX ADMIN — METHYLPREDNISOLONE ACETATE 40 MILLIGRAM(S): 80 INJECTION, SUSPENSION INTRA-ARTICULAR; INTRALESIONAL; INTRAMUSCULAR; SOFT TISSUE at 13:43

## 2025-02-12 RX ADMIN — Medication 25 GRAM(S): at 13:15

## 2025-02-12 RX ADMIN — RANOLAZINE 500 MILLIGRAM(S): 1000 TABLET, FILM COATED, EXTENDED RELEASE ORAL at 05:46

## 2025-02-12 RX ADMIN — IPRATROPIUM BROMIDE AND ALBUTEROL SULFATE 3 MILLILITER(S): .5; 2.5 SOLUTION RESPIRATORY (INHALATION) at 14:21

## 2025-02-12 RX ADMIN — METHYLPREDNISOLONE ACETATE 40 MILLIGRAM(S): 80 INJECTION, SUSPENSION INTRA-ARTICULAR; INTRALESIONAL; INTRAMUSCULAR; SOFT TISSUE at 05:56

## 2025-02-12 RX ADMIN — RANOLAZINE 500 MILLIGRAM(S): 1000 TABLET, FILM COATED, EXTENDED RELEASE ORAL at 17:08

## 2025-02-12 RX ADMIN — Medication 1 APPLICATION(S): at 05:47

## 2025-02-12 RX ADMIN — GABAPENTIN 600 MILLIGRAM(S): 400 CAPSULE ORAL at 13:15

## 2025-02-12 RX ADMIN — Medication 325 MILLIGRAM(S): at 17:08

## 2025-02-12 RX ADMIN — OSELTAMIVIR PHOSPHATE 75 MILLIGRAM(S): 75 CAPSULE ORAL at 17:08

## 2025-02-12 RX ADMIN — ATORVASTATIN CALCIUM 20 MILLIGRAM(S): 80 TABLET, FILM COATED ORAL at 21:39

## 2025-02-12 RX ADMIN — GABAPENTIN 600 MILLIGRAM(S): 400 CAPSULE ORAL at 21:38

## 2025-02-12 NOTE — PHYSICAL THERAPY INITIAL EVALUATION ADULT - MANUAL MUSCLE TESTING RESULTS, REHAB EVAL
bilateral shoulder flex, elbow flex 3+/5; right hip flex 3-/5, knee ext 3-/5, ankle df 4/5; left hip flex 3-/5, knee ext NT due to pain, ankle df 4/5

## 2025-02-12 NOTE — PHYSICAL THERAPY INITIAL EVALUATION ADULT - ACTIVE RANGE OF MOTION EXAMINATION, REHAB EVAL
left LE WFL at hip/ankle, left knee limited by pain/bilateral upper extremity Active ROM was WFL (within functional limits)/Right LE Active ROM was WFL (within functional limits)

## 2025-02-12 NOTE — PHYSICAL THERAPY INITIAL EVALUATION ADULT - ADDITIONAL COMMENTS
pt states she lives with her daughter in law and grandson in a 1-story house. unclear on steps but says she amb with a RW.

## 2025-02-12 NOTE — CONSULT NOTE ADULT - SUBJECTIVE AND OBJECTIVE BOX
HPI:  89 y/o F with PMH of Afib (xarelto), HFpEF, AS s/p TAVR (2018), CAD s/p PCI w/ 1 JARETT (2018), HTN, HLD, neuropathy and chronic back pain 2/2 spinal stenosis presents for weakness s/p fall. She fell yesterday, +head strike on wall,  unknown LOC and went to [facility] where she was diagnosed with Flu A and discharged after imaging was negative for fractures. Since her fall, she has been unable to walk due to pain and weakness in b/l LE. She regularly uses a walker. Daughter in law called PCP earlier today to inform him of events, and he advised her to present to our ED for further evaluation since she cannot ambulate and did not believe she should have been discharged. Patient also had very profuse uncontrolled diarrhea which appears to have subsided now. Also noted with non-productive cough. Lives at home with Daughter in law Venice and grandhalle Hoffman. History obtained by [place] as pt is confused, A&Ox2, disoriented to place and situation. In ED pt noted w/ lactate 4.6, given 1L NS bolus, WBC 20.9, given 1 dose zosyn. CXR and CTH negative. CT A/P with proctitis and cholelithiasis. Reported that patient desated and placed on 3L NC and  (11 Feb 2025 21:44)    Interval History:  Patient currently admitted to medicine team for acute hypoxic respiratory failure iekly secondary to Flu A. Patient CT A/P for w/u of sepsis which demonstrated distended gallbladder with stones. Patient denies any history of abdominal pain, nausea or Vomiting. LFTs mildly elevated with leukocytosis however downtrending.       PAST MEDICAL & SURGICAL HISTORY:  Essential hypertension      Chronic back pain      Hyperlipidemia, unspecified hyperlipidemia type      PNA (pneumonia)  november 2017      Lung nodule      Thyroid nodule      Acute deep vein thrombosis (DVT) of popliteal vein of both lower extremities      Pelvic fracture      Closed fracture of multiple ribs of right side, initial encounter      Aortic stenosis  s/p TAVR bioprosthetic Jan 2018 [facility] Dr. Aragon      Atherosclerosis of native coronary artery of native heart with angina pectoris      Hypercholesteremia      AF (atrial fibrillation)  Persistent/chronic      AV block      Bronchiectasis      Kyphoscoliosis and scoliosis      Stenocardia      Spinal stenosis      Murmur, cardiac  gr3/6      Neuropathy      Diastolic CHF  NYHA class 3      Anxiety      GERD (gastroesophageal reflux disease)      Closed pelvic ring fracture      Hip arthritis      [place] (hard of hearing)  wears bilateral hearing aides      History of valvular heart disease      S/P spinal fusion  L spines      S/p TAVR (transcatheter aortic valve replacement), bioprosthetic          CONSTITUTIONAL: No weakness, fevers or chills  EYES/ENT: No visual changes;  No vertigo or throat pain   NECK: No pain or stiffness  RESPIRATORY: No cough, wheezing, hemoptysis; No shortness of breath  CARDIOVASCULAR: No chest pain or palpitations  GASTROINTESTINAL: No abdominal or epigastric pain. No nausea, vomiting, or hematemesis; No diarrhea or constipation. No melena or hematochezia.  GENITOURINARY: No dysuria, frequency or hematuria  NEUROLOGICAL: No numbness or weakness  SKIN: No itching, burning, rashes, or lesions   All other review of systems is negative unless indicated above.    MEDICATIONS  (STANDING):  albuterol    90 MICROgram(s) HFA Inhaler 2 Puff(s) Inhalation three times a day  albuterol/ipratropium for Nebulization 3 milliLiter(s) Nebulizer every 6 hours  atorvastatin 20 milliGRAM(s) Oral at bedtime  chlorhexidine 2% Cloths 1 Application(s) Topical <User Schedule>  ferrous    sulfate 325 milliGRAM(s) Oral two times a day  gabapentin 600 milliGRAM(s) Oral three times a day  influenza  Vaccine (HIGH DOSE) 0.5 milliLiter(s) IntraMuscular once  methylPREDNISolone sodium succinate Injectable 40 milliGRAM(s) IV Push every 8 hours  oseltamivir 75 milliGRAM(s) Oral two times a day  piperacillin/tazobactam IVPB.. 3.375 Gram(s) IV Intermittent every 8 hours  ranolazine 500 milliGRAM(s) Oral two times a day  rivaroxaban 15 milliGRAM(s) Oral with dinner    MEDICATIONS  (PRN):  acetaminophen     Tablet .. 650 milliGRAM(s) Oral every 6 hours PRN Temp greater or equal to 38C (100.4F), Mild Pain (1 - 3)  aluminum hydroxide/magnesium hydroxide/simethicone Suspension 30 milliLiter(s) Oral every 4 hours PRN Dyspepsia  melatonin 3 milliGRAM(s) Oral at bedtime PRN Insomnia  ondansetron Injectable 4 milliGRAM(s) IV Push every 8 hours PRN Nausea and/or Vomiting      Allergies    No Known Allergies    Intolerances        SOCIAL HISTORY:  Nonsmoker.  No ETOH or illicit drug use    FAMILY HISTORY:  Family history of breast cancer (Sibling)    Family history of cerebrovascular accident (CVA) (Mother)    Family history of esophageal cancer (Father)    Family history of breast cancer (Mother)        Vital Signs Last 24 Hrs  T(C): 36.3 (12 Feb 2025 04:17), Max: 37.8 (11 Feb 2025 20:55)  T(F): 97.4 (12 Feb 2025 04:17), Max: 100.1 (11 Feb 2025 20:55)  HR: 88 (12 Feb 2025 04:17) (63 - 99)  BP: 138/81 (12 Feb 2025 04:17) (107/65 - 138/81)  BP(mean): --  RR: 19 (12 Feb 2025 04:17) (18 - 19)  SpO2: 94% (12 Feb 2025 04:17) (94% - 98%)    Parameters below as of 12 Feb 2025 04:17  Patient On (Oxygen Delivery Method): nasal cannula        GENERAL:  Well-nourished, well-developed Female lying comfortably in bed in [facility].  HEENT:  NC/AT. Sclera white. Mucous membranes moist.  CARDIO:  Regular rate and rhythm.  No murmur, gallop or rub appreciated.  RESPIRATORY:  Nonlabored breathing, no accessory muscle use. Lungs clear to auscultation bilaterally.  No wheezing, rales or rhonchi appreciated.  ABDOMEN:  Soft, nondistended, nontender. BS appreciated on auscultation.  No rebound tenderness or guarding.  EXTREMITIES: No calf tenderness bilaterally.  SKIN:  No jaundice, pallor, or cyanosis  NEURO:  A&O x 3    LABS:                        12.6   20.96 )-----------( 111      ( 11 Feb 2025 14:07 )             38.4     02-12    139  |  105  |  25.3[H]  ----------------------------<  80  4.4   |  22.0  |  0.99    Ca    8.3[L]      12 Feb 2025 04:39  Phos  3.8     02-12  Mg     2.0     02-12    TPro  5.9[L]  /  Alb  3.2[L]  /  TBili  0.5  /  DBili  x   /  AST  51[H]  /  ALT  35[H]  /  AlkPhos  48  02-12      Urinalysis Basic - ( 12 Feb 2025 05:15 )    Color: Yellow / Appearance: Clear / SG: >1.030 / pH: x  Gluc: x / Ketone: Negative mg/dL  / Bili: Negative / Urobili: 1.0 mg/dL   Blood: x / Protein: 100 mg/dL / Nitrite: Negative   Leuk Esterase: Trace / RBC: 2 /HPF / WBC 8 /HPF   Sq Epi: x / Non Sq Epi: 2 /HPF / Bacteria: Moderate /HPF        RADIOLOGY & ADDITIONAL STUDIES:

## 2025-02-12 NOTE — CONSULT NOTE ADULT - ASSESSMENT
89 y/o F with PMH of Afib (xarelto), HFpEF, AS s/p TAVR (2018), CAD s/p PCI w/ 1 JARETT (2018), HTN, HLD, neuropathy and chronic back pain 2/2 spinal stenosis presents for weakness s/p fall.   Patient currently admitted to medicine team for acute hypoxic respiratory failure iekly secondary to Flu A. Patient CT A/P for w/u of sepsis which demonstrated distended gallbladder with stones. Patient denies any history of abdominal pain, nausea or Vomiting. LFTs mildly elevated with leukocytosis however downtrending.     Plan:  -No acute surgical intervention at this time  - Acute cholecystitis unlikely  - Trend WBC  - Remainder of management per primary team  - Patient can f/u outpatient upon discharge to discuss elective surgery   - Please reconsult if needed    Patient discussed with Dr. Montez

## 2025-02-12 NOTE — PATIENT PROFILE ADULT - FALL HARM RISK - HARM RISK INTERVENTIONS

## 2025-02-12 NOTE — PHYSICAL THERAPY INITIAL EVALUATION ADULT - PASSIVE RANGE OF MOTION EXAMINATION, REHAB EVAL
left LE WFL at hip/ankle, left knee limited by pain/bilateral upper extremity Passive ROM was WFL (within functional limits)/Right LE Passive ROM was WFL (within functional limits)

## 2025-02-12 NOTE — PHYSICAL THERAPY INITIAL EVALUATION ADULT - PERTINENT HX OF CURRENT PROBLEM, REHAB EVAL
89 y/o F with PMH of Afib (xarelto), HFpEF, AS s/p TAVR (2018), CAD s/p PCI w/ 1 JARETT (2018), HTN, HLD, neuropathy and chronic back pain 2/2 spinal stenosis presents for weakness s/p fall. She fell yesterday, +head strike on wall,  unknown LOC and went to [facility] where she was diagnosed with Flu A and discharged after imaging was negative for fractures. Since her fall, she has been unable to walk due to pain and weakness in b/l LE. She regularly uses a walker. Daughter in law called PCP earlier today to inform him of events, and he advised her to present to our ED for further evaluation since she cannot ambulate and did not believe she should have been discharged. Patient also had very profuse uncontrolled diarrhea which appears to have subsided now. Also noted with non-productive cough. Lives at home with Daughter in law Venice and grandson Dalton. History obtained by [place] as pt is confused, A&Ox2, disoriented to place and situation. In ED pt noted w/ lactate 4.6, given 1L NS bolus, WBC 20.9, given 1 dose zosyn. CXR and CTH negative. CT A/P with proctitis and cholelithiasis. Reported that patient desated and placed on 3L NC and

## 2025-02-12 NOTE — CONSULT NOTE ADULT - ATTENDING COMMENTS
89-year-old female admitted with hypoxic respiratory failure found to have distended gallbladder with cholelithiasis also found to have pancreatic duct dilation   - Abdomen soft nontender nondistended       - no obvious signs of acute cholecystitis   - no indication for operative intervention   - recommend MRI of pancreas to follow up pancreatic finding   - can consider HIDA if patient develops abdominal pain   - can c/w abx and r/o alternative sources of infection  - Further management per trauma team

## 2025-02-12 NOTE — PHYSICAL THERAPY INITIAL EVALUATION ADULT - FUNCTIONAL LIMITATIONS, PT EVAL
Flavia Almaraz, pregnancy in third trimester, antepartum    Pregnancy       Start Time:   Stop Time:     Interpretation A  Nonstress Test Interpretation A: Reactive  Comments A: .      REACTIVE           self-care/home management/community/leisure

## 2025-02-12 NOTE — PROGRESS NOTE ADULT - SUBJECTIVE AND OBJECTIVE BOX
Hospitalist Daily Progress Note    Chief Complaint:  Patient is a 89y old  Female who presents with a chief complaint of Sepsis and AHRF (12 Feb 2025 07:12)      SUBJECTIVE / OVERNIGHT EVENTS:  Patient was seen and examined at bedside.   Confused  Without complaints.  Patient denies chest pain, SOB, abd pain, N/V, fever, chills, dysuria or any other complaints. All remainder ROS negative.     MEDICATIONS  (STANDING):  albuterol    90 MICROgram(s) HFA Inhaler 2 Puff(s) Inhalation three times a day  albuterol/ipratropium for Nebulization 3 milliLiter(s) Nebulizer every 6 hours  atorvastatin 20 milliGRAM(s) Oral at bedtime  chlorhexidine 2% Cloths 1 Application(s) Topical <User Schedule>  ferrous    sulfate 325 milliGRAM(s) Oral two times a day  gabapentin 600 milliGRAM(s) Oral three times a day  influenza  Vaccine (HIGH DOSE) 0.5 milliLiter(s) IntraMuscular once  methylPREDNISolone sodium succinate Injectable 40 milliGRAM(s) IV Push every 8 hours  oseltamivir 75 milliGRAM(s) Oral two times a day  piperacillin/tazobactam IVPB.. 3.375 Gram(s) IV Intermittent every 8 hours  ranolazine 500 milliGRAM(s) Oral two times a day  rivaroxaban 15 milliGRAM(s) Oral with dinner    MEDICATIONS  (PRN):  acetaminophen     Tablet .. 650 milliGRAM(s) Oral every 6 hours PRN Temp greater or equal to 38C (100.4F), Mild Pain (1 - 3)  aluminum hydroxide/magnesium hydroxide/simethicone Suspension 30 milliLiter(s) Oral every 4 hours PRN Dyspepsia  melatonin 3 milliGRAM(s) Oral at bedtime PRN Insomnia  ondansetron Injectable 4 milliGRAM(s) IV Push every 8 hours PRN Nausea and/or Vomiting        I&O's Summary      PHYSICAL EXAM:  Vital Signs Last 24 Hrs  T(C): 36.4 (12 Feb 2025 15:21), Max: 37.8 (11 Feb 2025 20:55)  T(F): 97.5 (12 Feb 2025 15:21), Max: 100.1 (11 Feb 2025 20:55)  HR: 79 (12 Feb 2025 15:21) (68 - 99)  BP: 117/65 (12 Feb 2025 15:21) (107/65 - 138/81)  BP(mean): 83 (12 Feb 2025 15:21) (83 - 83)  RR: 18 (12 Feb 2025 15:21) (18 - 19)  SpO2: 97% (12 Feb 2025 15:21) (94% - 100%)    Parameters below as of 12 Feb 2025 11:16  Patient On (Oxygen Delivery Method): nasal cannula          Constitutional: NAD, Resting, frail, female  ENT: Supple, No JVD  Lungs: CTA B/L, Non-labored breathing  Cardio: RRR, S1/S2, No murmur  Abdomen: Soft, Nontender, Nondistended; Bowel sounds present  Extremities: No calf tenderness, No pitting edema  Musculoskeletal:   No joint swelling  Psych: Calm, cooperative affect appropriate  Neuro: Awake and alert, oriented to name, states 2032, hospital,  Skin: No rashes; no palpable lesions    LABS:                        13.2   14.82 )-----------( 98       ( 12 Feb 2025 07:02 )             40.7     02-12    139  |  105  |  25.3[H]  ----------------------------<  80  4.4   |  22.0  |  0.99    Ca    8.3[L]      12 Feb 2025 04:39  Phos  3.8     02-12  Mg     2.0     02-12    TPro  5.9[L]  /  Alb  3.2[L]  /  TBili  0.5  /  DBili  x   /  AST  51[H]  /  ALT  35[H]  /  AlkPhos  48  02-12          Urinalysis Basic - ( 12 Feb 2025 05:15 )    Color: Yellow / Appearance: Clear / SG: >1.030 / pH: x  Gluc: x / Ketone: Negative mg/dL  / Bili: Negative / Urobili: 1.0 mg/dL   Blood: x / Protein: 100 mg/dL / Nitrite: Negative   Leuk Esterase: Trace / RBC: 2 /HPF / WBC 8 /HPF   Sq Epi: x / Non Sq Epi: 2 /HPF / Bacteria: Moderate /HPF        CAPILLARY BLOOD GLUCOSE            RADIOLOGY REVIEWED

## 2025-02-13 LAB
ACANTHOCYTES BLD QL SMEAR: SLIGHT — SIGNIFICANT CHANGE UP
ANION GAP SERPL CALC-SCNC: 11 MMOL/L — SIGNIFICANT CHANGE UP (ref 5–17)
B PERT DNA SPEC QL NAA+PROBE: SIGNIFICANT CHANGE UP
BASOPHILS # BLD AUTO: 0 K/UL — SIGNIFICANT CHANGE UP (ref 0–0.2)
BASOPHILS # BLD MANUAL: 0 K/UL — SIGNIFICANT CHANGE UP (ref 0–0.2)
BASOPHILS NFR BLD AUTO: 0 % — SIGNIFICANT CHANGE UP (ref 0–2)
BASOPHILS NFR BLD MANUAL: 0 % — SIGNIFICANT CHANGE UP (ref 0–2)
BUN SERPL-MCNC: 30.2 MG/DL — HIGH (ref 8–20)
C PNEUM DNA SPEC QL NAA+PROBE: SIGNIFICANT CHANGE UP
CALCIUM SERPL-MCNC: 8.9 MG/DL — SIGNIFICANT CHANGE UP (ref 8.4–10.5)
CHLORIDE SERPL-SCNC: 103 MMOL/L — SIGNIFICANT CHANGE UP (ref 96–108)
CO2 SERPL-SCNC: 25 MMOL/L — SIGNIFICANT CHANGE UP (ref 22–29)
CREAT SERPL-MCNC: 1.03 MG/DL — SIGNIFICANT CHANGE UP (ref 0.5–1.3)
EGFR: 51 ML/MIN/1.73M2 — LOW
EOSINOPHIL # BLD AUTO: 0 K/UL — SIGNIFICANT CHANGE UP (ref 0–0.5)
EOSINOPHIL # BLD MANUAL: 0 K/UL — SIGNIFICANT CHANGE UP (ref 0–0.5)
EOSINOPHIL NFR BLD AUTO: 0 % — SIGNIFICANT CHANGE UP (ref 0–6)
EOSINOPHIL NFR BLD MANUAL: 0 % — SIGNIFICANT CHANGE UP (ref 0–6)
FLUAV H1 2009 PAND RNA SPEC QL NAA+PROBE: DETECTED
FLUAV H1 RNA SPEC QL NAA+PROBE: SIGNIFICANT CHANGE UP
FLUAV H3 RNA SPEC QL NAA+PROBE: SIGNIFICANT CHANGE UP
FLUAV SUBTYP SPEC NAA+PROBE: DETECTED
FLUBV RNA SPEC QL NAA+PROBE: SIGNIFICANT CHANGE UP
GIANT PLATELETS BLD QL SMEAR: PRESENT
GLUCOSE SERPL-MCNC: 136 MG/DL — HIGH (ref 70–99)
HADV DNA SPEC QL NAA+PROBE: SIGNIFICANT CHANGE UP
HCOV PNL SPEC NAA+PROBE: SIGNIFICANT CHANGE UP
HCT VFR BLD CALC: 34.3 % — LOW (ref 34.5–45)
HGB BLD-MCNC: 11.1 G/DL — LOW (ref 11.5–15.5)
HMPV RNA SPEC QL NAA+PROBE: SIGNIFICANT CHANGE UP
HPIV1 RNA SPEC QL NAA+PROBE: SIGNIFICANT CHANGE UP
HPIV2 RNA SPEC QL NAA+PROBE: SIGNIFICANT CHANGE UP
HPIV3 RNA SPEC QL NAA+PROBE: SIGNIFICANT CHANGE UP
HPIV4 RNA SPEC QL NAA+PROBE: SIGNIFICANT CHANGE UP
IMM GRANULOCYTES # BLD AUTO: 0.08 K/UL — HIGH (ref 0–0.07)
IMM GRANULOCYTES NFR BLD AUTO: 1 % — HIGH (ref 0–0.9)
LYMPHOCYTES # BLD AUTO: 0.49 K/UL — LOW (ref 1–3.3)
LYMPHOCYTES # BLD MANUAL: 0.33 K/UL — LOW (ref 1–3.3)
LYMPHOCYTES NFR BLD AUTO: 6.3 % — LOW (ref 13–44)
LYMPHOCYTES NFR BLD MANUAL: 4.3 % — LOW (ref 13–44)
MAGNESIUM SERPL-MCNC: 2.1 MG/DL — SIGNIFICANT CHANGE UP (ref 1.6–2.6)
MANUAL NEUTROPHIL BANDS #: 0.74 K/UL — SIGNIFICANT CHANGE UP (ref 0–0.84)
MANUAL NRBC #: 0.08 K/UL — HIGH (ref 0–0)
MANUAL REACTIVE LYMPHOCYTES #: 0.13 K/UL — SIGNIFICANT CHANGE UP (ref 0–0.63)
MCHC RBC-ENTMCNC: 30 PG — SIGNIFICANT CHANGE UP (ref 27–34)
MCHC RBC-ENTMCNC: 32.4 G/DL — SIGNIFICANT CHANGE UP (ref 32–36)
MCV RBC AUTO: 92.7 FL — SIGNIFICANT CHANGE UP (ref 80–100)
MONOCYTES # BLD AUTO: 1.39 K/UL — HIGH (ref 0–0.9)
MONOCYTES # BLD MANUAL: 0.87 K/UL — SIGNIFICANT CHANGE UP (ref 0–0.9)
MONOCYTES NFR BLD AUTO: 17.9 % — HIGH (ref 2–14)
MONOCYTES NFR BLD MANUAL: 11.2 % — SIGNIFICANT CHANGE UP (ref 2–14)
NEUTROPHILS # BLD AUTO: 5.82 K/UL — SIGNIFICANT CHANGE UP (ref 1.8–7.4)
NEUTROPHILS # BLD MANUAL: 5.7 K/UL — SIGNIFICANT CHANGE UP (ref 1.8–7.4)
NEUTROPHILS NFR BLD AUTO: 74.8 % — SIGNIFICANT CHANGE UP (ref 43–77)
NEUTROPHILS NFR BLD MANUAL: 73.3 % — SIGNIFICANT CHANGE UP (ref 43–77)
NEUTS BAND # BLD: 9.5 % — HIGH (ref 0–8)
NEUTS BAND NFR BLD: 9.5 % — HIGH (ref 0–8)
NRBC # BLD AUTO: 0 K/UL — SIGNIFICANT CHANGE UP (ref 0–0)
NRBC # BLD: 1 /100 WBCS — HIGH (ref 0–0)
NRBC # FLD: 0 K/UL — SIGNIFICANT CHANGE UP (ref 0–0)
NRBC BLD AUTO-RTO: 0 /100 WBCS — SIGNIFICANT CHANGE UP (ref 0–0)
NRBC BLD-RTO: 1 /100 WBCS — HIGH (ref 0–0)
OVALOCYTES BLD QL SMEAR: SLIGHT — SIGNIFICANT CHANGE UP
PHOSPHATE SERPL-MCNC: 3.8 MG/DL — SIGNIFICANT CHANGE UP (ref 2.4–4.7)
PLAT MORPH BLD: NORMAL — SIGNIFICANT CHANGE UP
PLATELET # BLD AUTO: 104 K/UL — LOW (ref 150–400)
PMV BLD: 12.2 FL — SIGNIFICANT CHANGE UP (ref 7–13)
POIKILOCYTOSIS BLD QL AUTO: SLIGHT — SIGNIFICANT CHANGE UP
POLYCHROMASIA BLD QL SMEAR: SLIGHT — SIGNIFICANT CHANGE UP
POTASSIUM SERPL-MCNC: 4.8 MMOL/L — SIGNIFICANT CHANGE UP (ref 3.5–5.3)
POTASSIUM SERPL-SCNC: 4.8 MMOL/L — SIGNIFICANT CHANGE UP (ref 3.5–5.3)
RAPID RVP RESULT: DETECTED
RBC # BLD: 3.7 M/UL — LOW (ref 3.8–5.2)
RBC # FLD: 14.9 % — HIGH (ref 10.3–14.5)
RBC BLD AUTO: ABNORMAL
RSV RNA SPEC QL NAA+PROBE: SIGNIFICANT CHANGE UP
RV+EV RNA SPEC QL NAA+PROBE: SIGNIFICANT CHANGE UP
SARS-COV-2 RNA SPEC QL NAA+PROBE: SIGNIFICANT CHANGE UP
SODIUM SERPL-SCNC: 139 MMOL/L — SIGNIFICANT CHANGE UP (ref 135–145)
VARIANT LYMPHS # BLD: 1.7 % — SIGNIFICANT CHANGE UP (ref 0–6)
VARIANT LYMPHS NFR BLD MANUAL: 1.7 % — SIGNIFICANT CHANGE UP (ref 0–6)
WBC # BLD: 7.78 K/UL — SIGNIFICANT CHANGE UP (ref 3.8–10.5)
WBC # FLD AUTO: 7.78 K/UL — SIGNIFICANT CHANGE UP (ref 3.8–10.5)

## 2025-02-13 PROCEDURE — 99233 SBSQ HOSP IP/OBS HIGH 50: CPT

## 2025-02-13 RX ADMIN — ATORVASTATIN CALCIUM 20 MILLIGRAM(S): 80 TABLET, FILM COATED ORAL at 21:04

## 2025-02-13 RX ADMIN — Medication 25 GRAM(S): at 14:53

## 2025-02-13 RX ADMIN — OSELTAMIVIR PHOSPHATE 75 MILLIGRAM(S): 75 CAPSULE ORAL at 05:10

## 2025-02-13 RX ADMIN — GABAPENTIN 600 MILLIGRAM(S): 400 CAPSULE ORAL at 14:53

## 2025-02-13 RX ADMIN — METHYLPREDNISOLONE ACETATE 40 MILLIGRAM(S): 80 INJECTION, SUSPENSION INTRA-ARTICULAR; INTRALESIONAL; INTRAMUSCULAR; SOFT TISSUE at 17:40

## 2025-02-13 RX ADMIN — Medication 25 GRAM(S): at 21:04

## 2025-02-13 RX ADMIN — METHYLPREDNISOLONE ACETATE 40 MILLIGRAM(S): 80 INJECTION, SUSPENSION INTRA-ARTICULAR; INTRALESIONAL; INTRAMUSCULAR; SOFT TISSUE at 05:08

## 2025-02-13 RX ADMIN — Medication 325 MILLIGRAM(S): at 05:09

## 2025-02-13 RX ADMIN — OSELTAMIVIR PHOSPHATE 75 MILLIGRAM(S): 75 CAPSULE ORAL at 17:40

## 2025-02-13 RX ADMIN — Medication 325 MILLIGRAM(S): at 17:40

## 2025-02-13 RX ADMIN — GABAPENTIN 600 MILLIGRAM(S): 400 CAPSULE ORAL at 21:03

## 2025-02-13 RX ADMIN — GABAPENTIN 600 MILLIGRAM(S): 400 CAPSULE ORAL at 05:09

## 2025-02-13 RX ADMIN — Medication 1 APPLICATION(S): at 05:09

## 2025-02-13 RX ADMIN — Medication 25 GRAM(S): at 05:08

## 2025-02-13 RX ADMIN — IPRATROPIUM BROMIDE AND ALBUTEROL SULFATE 3 MILLILITER(S): .5; 2.5 SOLUTION RESPIRATORY (INHALATION) at 03:11

## 2025-02-13 RX ADMIN — RANOLAZINE 500 MILLIGRAM(S): 1000 TABLET, FILM COATED, EXTENDED RELEASE ORAL at 05:09

## 2025-02-13 NOTE — PROGRESS NOTE ADULT - SUBJECTIVE AND OBJECTIVE BOX
HOSPITALIST PROGRESS NOTE    MAMTA TALAVERA  [id number]  89yFemale    Patient is a 89y old  Female who presents with a chief complaint of Acute hypoxemic respiratory failure, sepsis, influenza, proctitis (12 Feb 2025 15:21)      SUBJECTIVE:   Chart reviewed since admission.    89 year old female with Hypertension, Dyslipidemia, YULISA DVT, Atrial fibrillation, GERD, AS s/p TAVR, CHF with preserved EF, Spinal stenosis, Chronic back pain and neuropathy presented with weakness, coughing and diarrhea.  Recently diagnosed with Influenza. Hypoxic in ER requiring supplemental Oxygen Low grade fever with leukocytosis in ER. CT abdomen significant for proctitis. RVP (+) for Influenza A    Patient seen and examined at bedside for hypoxic respiratory failure, Influenza A, Proctitis, encephalopathy      OBJECTIVE:  Vital Signs Last 24 Hrs  T(C): 36.3 (13 Feb 2025 08:33), Max: 36.7 (12 Feb 2025 20:00)  T(F): 97.4 (13 Feb 2025 08:33), Max: 98.1 (12 Feb 2025 20:00)  HR: 91 (13 Feb 2025 08:33) (79 - 91)  BP: 145/72 (13 Feb 2025 08:33) (109/71 - 145/72)  BP(mean): 81 (12 Feb 2025 17:12) (81 - 83)  RR: 17 (13 Feb 2025 08:33) (17 - 19)  SpO2: 95% (13 Feb 2025 08:33) (94% - 98%)    Parameters below as of 13 Feb 2025 04:02  Patient On (Oxygen Delivery Method): nasal cannula  O2 Flow (L/min): 3      PHYSICAL EXAMINATION  General:   HEENT:    NECK:    CVS:   RESP:    GI:    :   MSK:    CNS:    INTEG:    PSYCH:      MONITOR:  CAPILLARY BLOOD GLUCOSE            I&O's Summary    12 Feb 2025 07:01  -  13 Feb 2025 07:00  --------------------------------------------------------  IN: 1030 mL / OUT: 600 mL / NET: 430 mL                            11.1   7.78  )-----------( 104      ( 13 Feb 2025 06:22 )             34.3       02-13    139  |  103  |  30.2[H]  ----------------------------<  136[H]  4.8   |  25.0  |  1.03    Ca    8.9      13 Feb 2025 06:22  Phos  3.8     02-13  Mg     2.1     02-13    TPro  5.9[L]  /  Alb  3.2[L]  /  TBili  0.5  /  DBili  x   /  AST  51[H]  /  ALT  35[H]  /  AlkPhos  48  02-12        Urinalysis Basic - ( 13 Feb 2025 06:22 )    Color: x / Appearance: x / SG: x / pH: x  Gluc: 136 mg/dL / Ketone: x  / Bili: x / Urobili: x   Blood: x / Protein: x / Nitrite: x   Leuk Esterase: x / RBC: x / WBC x   Sq Epi: x / Non Sq Epi: x / Bacteria: x        Culture - Blood (02.11.25 @ 16:10)   Specimen Source: .Blood BLOOD  Culture Results:   No growth at 24 hours    TTE:  < from: TTE W or WO Ultrasound Enhancing Agent (10.29.24 @ 08:06) >     CONCLUSIONS:      1. Left ventricular cavity is small. Left ventricular systolic function is normal with an ejection fraction of 67 % by Brown's method of disks with an ejection fraction visually estimated at 65 to 70 %.   2. Moderate to severe left ventricular hypertrophy. Mid cavity peak gradient of 23 mmHg at rest.   3. Analysis of left ventricular diastolic function and filling pressure is made challenging by the presence of atrial fibrillation.   4. Normal right ventricular cavity size and normal right ventricular systolic function.   5. Estimated pulmonary artery systolic pressure is 39 mmHg.   6. Left atrium is severely dilated.   7. Moderate tricuspid regurgitation.   8. Mild mitral regurgitation.   9. There is moderate calcification of the mitral valve annulus.  10. Gila TAVR is presentin the aortic position The prosthetic valve is well seated. There is trace intravalvular and mild-to-moderate paravalvular regurgitaitons.  11. Mild pulmonic regurgitation.  12. No pericardial effusion seen.  13. Compared to the transthoracic echocardiogram performed on 11/7/2023, overall there have been no significant interval changes except previously reported as mild aortic regurgitation.    < end of copied text >          RADIOLOGY    < from: Xray Chest 1 View-PORTABLE IMMEDIATE (Xray Chest 1 View-PORTABLE IMMEDIATE .) (02.11.25 @ 14:45) >      < end of copied text >    < from: CT Head No Cont (02.11.25 @ 17:49) >  IMPRESSION:    CT HEAD:  No acute intracranial hemorrhage, mass effect, or midline shift.    CT CERVICAL SPINE:  No acute fracture or traumatic subluxation.    Multi-level degenerative changes.    < end of copied text >    < from: CT Abdomen and Pelvis w/ IV Cont (02.11.25 @ 18:03) >  FINDINGS:  LOWER CHEST: Bilateral lower lobe dependent opacities similar to prior   favored to represent fibrotic/atelectatic changes. Correlate clinically   for concomitant infection. Aortic valve replacement. Cardiomegaly.   Coronary artery calcification    LIVER: Within normal limits.  BILE DUCTS: Normal caliber.  GALLBLADDER: Cholelithiasis with marked gallbladder distention similar to   prior. Correlate with symptomatology. If there is concern for   cholecystitis right upper quadrant ultrasound can be obtained..  SPLEEN: Within normal limits.  PANCREAS: Question of small poorly defined hypodense lesion in the   pancreatic head versus partial volume artifact. Mild prominence main   pancreatic duct Correlate with  pancreatic MRI.  ADRENALS: Within normal limits.  KIDNEYS/URETERS: Within normal limits.    BLADDER: Within normal limits.  REPRODUCTIVE ORGANS: No gynecologic mass    BOWEL: No bowel obstruction .circumferential rectal wall thickening with   perirectal edema consistent with acute bronchitis. Colonic diverticulosis   without acutediverticulitis. Appendix no appendicitis  PERITONEUM/RETROPERITONEUM: Within normal limits.  VESSELS: Atherosclerotic, nonaneurysmal.  LYMPH NODES: No lymphadenopathy.  ABDOMINAL WALL: Within normal limits.  BONES: Old bilateral pubic fractures. Old coccygeal fracture. Chronic   loss of height of multiple thoracolumbar vertebral bodies similar to   prior. Postoperative changes with hardware at L4/L5 there is no acute   fracture    IMPRESSION:    Acute proctitis.    Colonic diverticulosis without acute diverticulitis    Cholelithiasis with marked gallbladder distention. Correlate with   symptomatology. If warranted right upper quadrant ultrasound can be   obtained.    Mildly prominent main pancreatic duct with question of a small hypodense   lesion pancreatic head versus partial volume artifact. Recommend   pancreatic MR    Additional findings as discussed    < end of copied text >      < from: US Abdomen Upper Quadrant Right (02.11.25 @ 22:25) >  IMPRESSION:  Distended gallbladder with layering sludge/tiny stones. No wall   thickening. Negative sonographic Saini's sign. Findings equivocal for   acute cholecystitis. If there is further clinical concern for acute   cholecystitis, a HIDA scan is suggested for further evaluation.    < end of copied text >                          MEDICATIONS  (STANDING):  albuterol    90 MICROgram(s) HFA Inhaler 2 Puff(s) Inhalation three times a day  albuterol/ipratropium for Nebulization 3 milliLiter(s) Nebulizer every 6 hours  atorvastatin 20 milliGRAM(s) Oral at bedtime  chlorhexidine 2% Cloths 1 Application(s) Topical <User Schedule>  ferrous    sulfate 325 milliGRAM(s) Oral two times a day  gabapentin 600 milliGRAM(s) Oral three times a day  influenza  Vaccine (HIGH DOSE) 0.5 milliLiter(s) IntraMuscular once  methylPREDNISolone sodium succinate Injectable 40 milliGRAM(s) IV Push every 12 hours  oseltamivir 75 milliGRAM(s) Oral two times a day  piperacillin/tazobactam IVPB.. 3.375 Gram(s) IV Intermittent every 8 hours  ranolazine 500 milliGRAM(s) Oral two times a day  rivaroxaban 15 milliGRAM(s) Oral with dinner      MEDICATIONS  (PRN):  acetaminophen     Tablet .. 650 milliGRAM(s) Oral every 6 hours PRN Temp greater or equal to 38C (100.4F), Mild Pain (1 - 3)  aluminum hydroxide/magnesium hydroxide/simethicone Suspension 30 milliLiter(s) Oral every 4 hours PRN Dyspepsia  melatonin 3 milliGRAM(s) Oral at bedtime PRN Insomnia  ondansetron Injectable 4 milliGRAM(s) IV Push every 8 hours PRN Nausea and/or Vomiting     HOSPITALIST PROGRESS NOTE    MAMTA TALAVERA  [id number]  89yFemale    Patient is a 89y old  Female who presents with a chief complaint of Acute hypoxemic respiratory failure, sepsis, influenza, proctitis (12 Feb 2025 15:21)      SUBJECTIVE:   Chart reviewed since admission.    89 year old female with Hypertension, Dyslipidemia, YULISA DVT, Atrial fibrillation, GERD, AS s/p TAVR, CHF with preserved EF, Spinal stenosis, Chronic back pain and neuropathy presented with weakness, coughing and diarrhea.  Recently diagnosed with Influenza. Hypoxic in ER requiring supplemental Oxygen Low grade fever with leukocytosis in ER. CT abdomen significant for proctitis. RVP (+) for Influenza A    Patient seen and examined at bedside for hypoxic respiratory failure, Influenza A, Proctitis, encephalopathy.  Denies any dyspnea at time of examination; Denies any chest pain, palpitations, dizziness.      OBJECTIVE:  Vital Signs Last 24 Hrs  T(C): 36.3 (13 Feb 2025 08:33), Max: 36.7 (12 Feb 2025 20:00)  T(F): 97.4 (13 Feb 2025 08:33), Max: 98.1 (12 Feb 2025 20:00)  HR: 91 (13 Feb 2025 08:33) (79 - 91)  BP: 145/72 (13 Feb 2025 08:33) (109/71 - 145/72)  BP(mean): 81 (12 Feb 2025 17:12) (81 - 83)  RR: 17 (13 Feb 2025 08:33) (17 - 19)  SpO2: 95% (13 Feb 2025 08:33) (94% - 98%)    Parameters below as of 13 Feb 2025 04:02  Patient On (Oxygen Delivery Method): nasal cannula  O2 Flow (L/min): 3      PHYSICAL EXAMINATION  General: Elderly female, fracil, sitting up in chair  HEENT:  3LNC  NECK:  Supple  CVS: regular rate and rhythm S1 S2  RESP:  Kyphoscoliosis, rhonchi diffuse  GI:  Soft nondistended nontender BS+  : No suprapubic tenderness  MSK:  Moves all extremities  CNS:  Awake. Significant hearing loss, Follows commands  INTEG:  warm skin  PSYCH:  Fair mood    MONITOR:  CAPILLARY BLOOD GLUCOSE            I&O's Summary    12 Feb 2025 07:01  -  13 Feb 2025 07:00  --------------------------------------------------------  IN: 1030 mL / OUT: 600 mL / NET: 430 mL                            11.1   7.78  )-----------( 104      ( 13 Feb 2025 06:22 )             34.3       02-13    139  |  103  |  30.2[H]  ----------------------------<  136[H]  4.8   |  25.0  |  1.03    Ca    8.9      13 Feb 2025 06:22  Phos  3.8     02-13  Mg     2.1     02-13    TPro  5.9[L]  /  Alb  3.2[L]  /  TBili  0.5  /  DBili  x   /  AST  51[H]  /  ALT  35[H]  /  AlkPhos  48  02-12        Urinalysis Basic - ( 13 Feb 2025 06:22 )    Color: x / Appearance: x / SG: x / pH: x  Gluc: 136 mg/dL / Ketone: x  / Bili: x / Urobili: x   Blood: x / Protein: x / Nitrite: x   Leuk Esterase: x / RBC: x / WBC x   Sq Epi: x / Non Sq Epi: x / Bacteria: x        Culture - Blood (02.11.25 @ 16:10)   Specimen Source: .Blood BLOOD  Culture Results:   No growth at 24 hours    TTE:  < from: TTE W or WO Ultrasound Enhancing Agent (10.29.24 @ 08:06) >     CONCLUSIONS:      1. Left ventricular cavity is small. Left ventricular systolic function is normal with an ejection fraction of 67 % by Brown's method of disks with an ejection fraction visually estimated at 65 to 70 %.   2. Moderate to severe left ventricular hypertrophy. Mid cavity peak gradient of 23 mmHg at rest.   3. Analysis of left ventricular diastolic function and filling pressure is made challenging by the presence of atrial fibrillation.   4. Normal right ventricular cavity size and normal right ventricular systolic function.   5. Estimated pulmonary artery systolic pressure is 39 mmHg.   6. Left atrium is severely dilated.   7. Moderate tricuspid regurgitation.   8. Mild mitral regurgitation.   9. There is moderate calcification of the mitral valve annulus.  10. Gila TAVR is presentin the aortic position The prosthetic valve is well seated. There is trace intravalvular and mild-to-moderate paravalvular regurgitaitons.  11. Mild pulmonic regurgitation.  12. No pericardial effusion seen.  13. Compared to the transthoracic echocardiogram performed on 11/7/2023, overall there have been no significant interval changes except previously reported as mild aortic regurgitation.    < end of copied text >          RADIOLOGY    < from: Xray Chest 1 View-PORTABLE IMMEDIATE (Xray Chest 1 View-PORTABLE IMMEDIATE .) (02.11.25 @ 14:45) >      < end of copied text >    < from: CT Head No Cont (02.11.25 @ 17:49) >  IMPRESSION:    CT HEAD:  No acute intracranial hemorrhage, mass effect, or midline shift.    CT CERVICAL SPINE:  No acute fracture or traumatic subluxation.    Multi-level degenerative changes.    < end of copied text >    < from: CT Abdomen and Pelvis w/ IV Cont (02.11.25 @ 18:03) >  FINDINGS:  LOWER CHEST: Bilateral lower lobe dependent opacities similar to prior   favored to represent fibrotic/atelectatic changes. Correlate clinically   for concomitant infection. Aortic valve replacement. Cardiomegaly.   Coronary artery calcification    LIVER: Within normal limits.  BILE DUCTS: Normal caliber.  GALLBLADDER: Cholelithiasis with marked gallbladder distention similar to   prior. Correlate with symptomatology. If there is concern for   cholecystitis right upper quadrant ultrasound can be obtained..  SPLEEN: Within normal limits.  PANCREAS: Question of small poorly defined hypodense lesion in the   pancreatic head versus partial volume artifact. Mild prominence main   pancreatic duct Correlate with  pancreatic MRI.  ADRENALS: Within normal limits.  KIDNEYS/URETERS: Within normal limits.    BLADDER: Within normal limits.  REPRODUCTIVE ORGANS: No gynecologic mass    BOWEL: No bowel obstruction .circumferential rectal wall thickening with   perirectal edema consistent with acute bronchitis. Colonic diverticulosis   without acutediverticulitis. Appendix no appendicitis  PERITONEUM/RETROPERITONEUM: Within normal limits.  VESSELS: Atherosclerotic, nonaneurysmal.  LYMPH NODES: No lymphadenopathy.  ABDOMINAL WALL: Within normal limits.  BONES: Old bilateral pubic fractures. Old coccygeal fracture. Chronic   loss of height of multiple thoracolumbar vertebral bodies similar to   prior. Postoperative changes with hardware at L4/L5 there is no acute   fracture    IMPRESSION:    Acute proctitis.    Colonic diverticulosis without acute diverticulitis    Cholelithiasis with marked gallbladder distention. Correlate with   symptomatology. If warranted right upper quadrant ultrasound can be   obtained.    Mildly prominent main pancreatic duct with question of a small hypodense   lesion pancreatic head versus partial volume artifact. Recommend   pancreatic MR    Additional findings as discussed    < end of copied text >      < from: US Abdomen Upper Quadrant Right (02.11.25 @ 22:25) >  IMPRESSION:  Distended gallbladder with layering sludge/tiny stones. No wall   thickening. Negative sonographic Saini's sign. Findings equivocal for   acute cholecystitis. If there is further clinical concern for acute   cholecystitis, a HIDA scan is suggested for further evaluation.    < end of copied text >                          MEDICATIONS  (STANDING):  albuterol    90 MICROgram(s) HFA Inhaler 2 Puff(s) Inhalation three times a day  albuterol/ipratropium for Nebulization 3 milliLiter(s) Nebulizer every 6 hours  atorvastatin 20 milliGRAM(s) Oral at bedtime  chlorhexidine 2% Cloths 1 Application(s) Topical <User Schedule>  ferrous    sulfate 325 milliGRAM(s) Oral two times a day  gabapentin 600 milliGRAM(s) Oral three times a day  influenza  Vaccine (HIGH DOSE) 0.5 milliLiter(s) IntraMuscular once  methylPREDNISolone sodium succinate Injectable 40 milliGRAM(s) IV Push every 12 hours  oseltamivir 75 milliGRAM(s) Oral two times a day  piperacillin/tazobactam IVPB.. 3.375 Gram(s) IV Intermittent every 8 hours  ranolazine 500 milliGRAM(s) Oral two times a day  rivaroxaban 15 milliGRAM(s) Oral with dinner      MEDICATIONS  (PRN):  acetaminophen     Tablet .. 650 milliGRAM(s) Oral every 6 hours PRN Temp greater or equal to 38C (100.4F), Mild Pain (1 - 3)  aluminum hydroxide/magnesium hydroxide/simethicone Suspension 30 milliLiter(s) Oral every 4 hours PRN Dyspepsia  melatonin 3 milliGRAM(s) Oral at bedtime PRN Insomnia  ondansetron Injectable 4 milliGRAM(s) IV Push every 8 hours PRN Nausea and/or Vomiting

## 2025-02-14 ENCOUNTER — TRANSCRIPTION ENCOUNTER (OUTPATIENT)
Age: 89
End: 2025-02-14

## 2025-02-14 LAB
ANION GAP SERPL CALC-SCNC: 9 MMOL/L — SIGNIFICANT CHANGE UP (ref 5–17)
BUN SERPL-MCNC: 32.2 MG/DL — HIGH (ref 8–20)
CALCIUM SERPL-MCNC: 8.9 MG/DL — SIGNIFICANT CHANGE UP (ref 8.4–10.5)
CHLORIDE SERPL-SCNC: 103 MMOL/L — SIGNIFICANT CHANGE UP (ref 96–108)
CO2 SERPL-SCNC: 27 MMOL/L — SIGNIFICANT CHANGE UP (ref 22–29)
CREAT SERPL-MCNC: 1.02 MG/DL — SIGNIFICANT CHANGE UP (ref 0.5–1.3)
EGFR: 52 ML/MIN/1.73M2 — LOW
GLUCOSE SERPL-MCNC: 112 MG/DL — HIGH (ref 70–99)
HCT VFR BLD CALC: 35.4 % — SIGNIFICANT CHANGE UP (ref 34.5–45)
HGB BLD-MCNC: 11.3 G/DL — LOW (ref 11.5–15.5)
MCHC RBC-ENTMCNC: 29.7 PG — SIGNIFICANT CHANGE UP (ref 27–34)
MCHC RBC-ENTMCNC: 31.9 G/DL — LOW (ref 32–36)
MCV RBC AUTO: 92.9 FL — SIGNIFICANT CHANGE UP (ref 80–100)
NRBC # BLD AUTO: 0 K/UL — SIGNIFICANT CHANGE UP (ref 0–0)
NRBC # FLD: 0 K/UL — SIGNIFICANT CHANGE UP (ref 0–0)
NRBC BLD AUTO-RTO: 0 /100 WBCS — SIGNIFICANT CHANGE UP (ref 0–0)
PLATELET # BLD AUTO: 112 K/UL — LOW (ref 150–400)
PMV BLD: 11.6 FL — SIGNIFICANT CHANGE UP (ref 7–13)
POTASSIUM SERPL-MCNC: 5 MMOL/L — SIGNIFICANT CHANGE UP (ref 3.5–5.3)
POTASSIUM SERPL-SCNC: 5 MMOL/L — SIGNIFICANT CHANGE UP (ref 3.5–5.3)
RBC # BLD: 3.81 M/UL — SIGNIFICANT CHANGE UP (ref 3.8–5.2)
RBC # FLD: 14.6 % — HIGH (ref 10.3–14.5)
SODIUM SERPL-SCNC: 139 MMOL/L — SIGNIFICANT CHANGE UP (ref 135–145)
WBC # BLD: 9 K/UL — SIGNIFICANT CHANGE UP (ref 3.8–10.5)
WBC # FLD AUTO: 9 K/UL — SIGNIFICANT CHANGE UP (ref 3.8–10.5)

## 2025-02-14 PROCEDURE — 99233 SBSQ HOSP IP/OBS HIGH 50: CPT

## 2025-02-14 RX ORDER — PREDNISONE 20 MG/1
40 TABLET ORAL ONCE
Refills: 0 | Status: DISCONTINUED | OUTPATIENT
Start: 2025-02-14 | End: 2025-02-18

## 2025-02-14 RX ORDER — PREDNISONE 20 MG/1
40 TABLET ORAL DAILY
Refills: 0 | Status: DISCONTINUED | OUTPATIENT
Start: 2025-02-15 | End: 2025-02-18

## 2025-02-14 RX ADMIN — Medication 25 GRAM(S): at 13:27

## 2025-02-14 RX ADMIN — Medication 25 GRAM(S): at 05:11

## 2025-02-14 RX ADMIN — Medication 1 APPLICATION(S): at 05:12

## 2025-02-14 RX ADMIN — Medication 325 MILLIGRAM(S): at 18:04

## 2025-02-14 RX ADMIN — GABAPENTIN 600 MILLIGRAM(S): 400 CAPSULE ORAL at 21:23

## 2025-02-14 RX ADMIN — OSELTAMIVIR PHOSPHATE 75 MILLIGRAM(S): 75 CAPSULE ORAL at 05:10

## 2025-02-14 RX ADMIN — Medication 25 GRAM(S): at 21:23

## 2025-02-14 RX ADMIN — GABAPENTIN 600 MILLIGRAM(S): 400 CAPSULE ORAL at 13:28

## 2025-02-14 RX ADMIN — IPRATROPIUM BROMIDE AND ALBUTEROL SULFATE 3 MILLILITER(S): .5; 2.5 SOLUTION RESPIRATORY (INHALATION) at 08:25

## 2025-02-14 RX ADMIN — RANOLAZINE 500 MILLIGRAM(S): 1000 TABLET, FILM COATED, EXTENDED RELEASE ORAL at 18:19

## 2025-02-14 RX ADMIN — RIVAROXABAN 15 MILLIGRAM(S): 10 TABLET, FILM COATED ORAL at 18:19

## 2025-02-14 RX ADMIN — GABAPENTIN 600 MILLIGRAM(S): 400 CAPSULE ORAL at 05:11

## 2025-02-14 RX ADMIN — IPRATROPIUM BROMIDE AND ALBUTEROL SULFATE 3 MILLILITER(S): .5; 2.5 SOLUTION RESPIRATORY (INHALATION) at 21:02

## 2025-02-14 RX ADMIN — RANOLAZINE 500 MILLIGRAM(S): 1000 TABLET, FILM COATED, EXTENDED RELEASE ORAL at 05:12

## 2025-02-14 RX ADMIN — METHYLPREDNISOLONE ACETATE 40 MILLIGRAM(S): 80 INJECTION, SUSPENSION INTRA-ARTICULAR; INTRALESIONAL; INTRAMUSCULAR; SOFT TISSUE at 05:08

## 2025-02-14 RX ADMIN — OSELTAMIVIR PHOSPHATE 75 MILLIGRAM(S): 75 CAPSULE ORAL at 18:03

## 2025-02-14 RX ADMIN — Medication 325 MILLIGRAM(S): at 05:11

## 2025-02-14 RX ADMIN — ATORVASTATIN CALCIUM 20 MILLIGRAM(S): 80 TABLET, FILM COATED ORAL at 21:23

## 2025-02-14 RX ADMIN — IPRATROPIUM BROMIDE AND ALBUTEROL SULFATE 3 MILLILITER(S): .5; 2.5 SOLUTION RESPIRATORY (INHALATION) at 15:14

## 2025-02-14 NOTE — DISCHARGE NOTE NURSING/CASE MANAGEMENT/SOCIAL WORK - PATIENT PORTAL LINK FT
You can access the FollowMyHealth Patient Portal offered by [facility] by registering at the following website: [email or web address]. By joining [employer]’s FollowMyHealth portal, you will also be able to view your health information using other applications (apps) compatible with our system.

## 2025-02-14 NOTE — DISCHARGE NOTE NURSING/CASE MANAGEMENT/SOCIAL WORK - FINANCIAL ASSISTANCE
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 [facility] provides services at a reduced cost to those who are determined to be eligible through [facility]’s financial assistance program. Information regarding [facility]’s financial assistance program can be found by going to [email or web address] or by calling [phone].

## 2025-02-14 NOTE — DIETITIAN INITIAL EVALUATION ADULT - ADD RECOMMEND
- Monitor weights daily for trend/accuracy  - Continue diet as tolerated.  - Rx MVI daily, vit C 500mg for wound healing  - Provide encouragement/assistance as needed during mealtimes to inc PO

## 2025-02-14 NOTE — DIETITIAN INITIAL EVALUATION ADULT - PERTINENT MEDS FT
MEDICATIONS  (STANDING):  ferrous    sulfate 325 milliGRAM(s) Oral two times a day  methylPREDNISolone sodium succinate Injectable 40 milliGRAM(s) IV Push every 12 hours  piperacillin/tazobactam IVPB.. 3.375 Gram(s) IV Intermittent every 8 hours

## 2025-02-14 NOTE — DIETITIAN INITIAL EVALUATION ADULT - NSICDXPASTMEDICALHX_GEN_ALL_CORE_FT
PAST MEDICAL HISTORY:  Acute deep vein thrombosis (DVT) of popliteal vein of both lower extremities     AF (atrial fibrillation) Persistent/chronic    Anxiety     Aortic stenosis s/p TAVR bioprosthetic Jan 2018 [facility] [address]. [facility]agon    Atherosclerosis of native coronary artery of native heart with angina pectoris     AV block     Bronchiectasis     Chronic back pain     Closed fracture of multiple ribs of right side, initial encounter     Closed pelvic ring fracture     Diastolic CHF NYHA class 3    Essential hypertension     GERD (gastroesophageal reflux disease)     Hip arthritis     History of valvular heart disease     [place] (hard of hearing) wears bilateral hearing aides    Hypercholesteremia     Hyperlipidemia, unspecified hyperlipidemia type     Kyphoscoliosis and scoliosis     Lung nodule     Murmur, cardiac gr3/6    Neuropathy     Pelvic fracture     PNA (pneumonia) november 2017    Spinal stenosis     Stenocardia     Thyroid nodule

## 2025-02-14 NOTE — PROGRESS NOTE ADULT - SUBJECTIVE AND OBJECTIVE BOX
[facility] Division of Hospital Medicine    Chief Complaint:      SUBJECTIVE / OVERNIGHT EVENTS:    Patient seen and examined at bedside, no acute events overnight, patient denies any new complaints. improving respirations, currently on 2L NC, noted Urine culture with E. coli, continues on Zosyn at this time for potential cholecystitis, however asymptomatic, denies dysuria / abdominal pain. Weaning Steroids, will start PO prednisone tomorrow, if respirations remains stable will be ready for GARRETT placement.     MEDICATIONS  (STANDING):  albuterol    90 MICROgram(s) HFA Inhaler 2 Puff(s) Inhalation three times a day  albuterol/ipratropium for Nebulization 3 milliLiter(s) Nebulizer every 6 hours  atorvastatin 20 milliGRAM(s) Oral at bedtime  chlorhexidine 2% Cloths 1 Application(s) Topical <User Schedule>  ferrous    sulfate 325 milliGRAM(s) Oral two times a day  gabapentin 600 milliGRAM(s) Oral three times a day  influenza  Vaccine (HIGH DOSE) 0.5 milliLiter(s) IntraMuscular once  oseltamivir 75 milliGRAM(s) Oral two times a day  piperacillin/tazobactam IVPB.. 3.375 Gram(s) IV Intermittent every 8 hours  predniSONE   Tablet 40 milliGRAM(s) Oral once  ranolazine 500 milliGRAM(s) Oral two times a day  rivaroxaban 15 milliGRAM(s) Oral with dinner    MEDICATIONS  (PRN):  acetaminophen     Tablet .. 650 milliGRAM(s) Oral every 6 hours PRN Temp greater or equal to 38C (100.4F), Mild Pain (1 - 3)  aluminum hydroxide/magnesium hydroxide/simethicone Suspension 30 milliLiter(s) Oral every 4 hours PRN Dyspepsia  melatonin 3 milliGRAM(s) Oral at bedtime PRN Insomnia  ondansetron Injectable 4 milliGRAM(s) IV Push every 8 hours PRN Nausea and/or Vomiting        I&O's Summary    13 Feb 2025 07:01  -  14 Feb 2025 07:00  --------------------------------------------------------  IN: 810 mL / OUT: 0 mL / NET: 810 mL        PHYSICAL EXAM:  Vital Signs Last 24 Hrs  T(C): 36.4 (14 Feb 2025 07:46), Max: 37.1 (13 Feb 2025 20:27)  T(F): 97.5 (14 Feb 2025 07:46), Max: 98.8 (13 Feb 2025 20:27)  HR: 66 (14 Feb 2025 08:43) (66 - 82)  BP: 139/77 (14 Feb 2025 07:46) (110/67 - 147/81)  BP(mean): --  RR: 18 (14 Feb 2025 07:46) (18 - 18)  SpO2: 98% (14 Feb 2025 08:43) (93% - 99%)    Parameters below as of 14 Feb 2025 08:43  Patient On (Oxygen Delivery Method): nasal cannula      General: Elderly female, fracil, sitting up in chair  HEENT:  2LNC  NECK:  Supple  CVS: regular rate and rhythm S1 S2  RESP:  Kyphoscoliosis, decreased breath sounds B/L, however clear   GI:  Soft nondistended nontender BS+  : No suprapubic tenderness  MSK:  Moves all extremities  CNS:  Awake. Significant hearing loss, Follows commands  INTEG:  warm skin  PSYCH:  Fair mood    LABS:                        11.3   9.00  )-----------( 112      ( 14 Feb 2025 05:00 )             35.4     02-14    139  |  103  |  32.2[H]  ----------------------------<  112[H]  5.0   |  27.0  |  1.02    Ca    8.9      14 Feb 2025 05:00  Phos  3.8     02-13  Mg     2.1     02-13            Urinalysis Basic - ( 14 Feb 2025 05:00 )    Color: x / Appearance: x / SG: x / pH: x  Gluc: 112 mg/dL / Ketone: x  / Bili: x / Urobili: x   Blood: x / Protein: x / Nitrite: x   Leuk Esterase: x / RBC: x / WBC x   Sq Epi: x / Non Sq Epi: x / Bacteria: x        Culture - Urine (collected 12 Feb 2025 05:15)  Source: Clean Catch Clean Catch (Midstream)  Preliminary Report (13 Feb 2025 14:48):    >100,000 CFU/ml Escherichia coli    Culture - Blood (collected 11 Feb 2025 16:10)  Source: .Blood BLOOD  Preliminary Report (14 Feb 2025 01:02):    No growth at 48 Hours      CAPILLARY BLOOD GLUCOSE            RADIOLOGY & ADDITIONAL TESTS:  Results Reviewed:   Imaging Personally Reviewed:  Electrocardiogram Personally Reviewed:

## 2025-02-14 NOTE — DIETITIAN INITIAL EVALUATION ADULT - OTHER INFO
89 year old female with Hypertension, Dyslipidemia, YULISA DVT, Atrial fibrillation, GERD, AS s/p TAVR, CHF with preserved EF, Spinal stenosis, Chronic back pain and neuropathy presented with weakness, coughing and diarrhea.  # Acute Hypoxic Respiratory Failure  # Influenza A Bronchitis  # Atelectasis

## 2025-02-14 NOTE — DIETITIAN INITIAL EVALUATION ADULT - ETIOLOGY
related to inability to meet sufficient protein-energy needs in setting of Acute Hypoxic Respiratory Failure

## 2025-02-14 NOTE — DISCHARGE NOTE NURSING/CASE MANAGEMENT/SOCIAL WORK - NSDCPEFALRISK_GEN_ALL_CORE
For information on Fall & Injury Prevention, visit: [email or web address] OR  [email or web address] OR  https://www.cdc.gov/steadi/patient.html

## 2025-02-14 NOTE — DIETITIAN INITIAL EVALUATION ADULT - ORAL INTAKE PTA/DIET HISTORY
Consult received for MST Score = />2. Breakfast tray present at time of visit, consumed > 50% of meal. Attempted to obtain nutrition interview, pt pleasantly confused, unknown UBW. Seen by RD department 11/2023, with weight 105#. No weight in chart on this admission, OOB to chair, unable to obtain new weight. Recommend to add ensure BID for additional protein/calories. NFPE obtained, moderate muscle/fat wasting observed. Nutrition/diet education not appropriate at this time. RD to remain available.

## 2025-02-15 LAB
-  AMOXICILLIN/CLAVULANIC ACID: SIGNIFICANT CHANGE UP
-  AMPICILLIN/SULBACTAM: SIGNIFICANT CHANGE UP
-  AMPICILLIN: SIGNIFICANT CHANGE UP
-  AZTREONAM: SIGNIFICANT CHANGE UP
-  CEFAZOLIN: SIGNIFICANT CHANGE UP
-  CEFEPIME: SIGNIFICANT CHANGE UP
-  CEFOXITIN: SIGNIFICANT CHANGE UP
-  CEFTRIAXONE: SIGNIFICANT CHANGE UP
-  CEFUROXIME: SIGNIFICANT CHANGE UP
-  CIPROFLOXACIN: SIGNIFICANT CHANGE UP
-  ERTAPENEM: SIGNIFICANT CHANGE UP
-  GENTAMICIN: SIGNIFICANT CHANGE UP
-  IMIPENEM: SIGNIFICANT CHANGE UP
-  LEVOFLOXACIN: SIGNIFICANT CHANGE UP
-  MEROPENEM: SIGNIFICANT CHANGE UP
-  NITROFURANTOIN: SIGNIFICANT CHANGE UP
-  PIPERACILLIN/TAZOBACTAM: SIGNIFICANT CHANGE UP
-  TOBRAMYCIN: SIGNIFICANT CHANGE UP
-  TRIMETHOPRIM/SULFAMETHOXAZOLE: SIGNIFICANT CHANGE UP
ANION GAP SERPL CALC-SCNC: 10 MMOL/L — SIGNIFICANT CHANGE UP (ref 5–17)
BUN SERPL-MCNC: 32 MG/DL — HIGH (ref 8–20)
CALCIUM SERPL-MCNC: 9.2 MG/DL — SIGNIFICANT CHANGE UP (ref 8.4–10.5)
CHLORIDE SERPL-SCNC: 103 MMOL/L — SIGNIFICANT CHANGE UP (ref 96–108)
CO2 SERPL-SCNC: 26 MMOL/L — SIGNIFICANT CHANGE UP (ref 22–29)
CREAT SERPL-MCNC: 0.98 MG/DL — SIGNIFICANT CHANGE UP (ref 0.5–1.3)
CULTURE RESULTS: ABNORMAL
EGFR: 54 ML/MIN/1.73M2 — LOW
GLUCOSE SERPL-MCNC: 113 MG/DL — HIGH (ref 70–99)
METHOD TYPE: SIGNIFICANT CHANGE UP
ORGANISM # SPEC MICROSCOPIC CNT: ABNORMAL
ORGANISM # SPEC MICROSCOPIC CNT: SIGNIFICANT CHANGE UP
POTASSIUM SERPL-MCNC: 4.6 MMOL/L — SIGNIFICANT CHANGE UP (ref 3.5–5.3)
POTASSIUM SERPL-SCNC: 4.6 MMOL/L — SIGNIFICANT CHANGE UP (ref 3.5–5.3)
PROCALCITONIN SERPL-MCNC: 0.48 NG/ML — HIGH (ref 0.02–0.1)
SODIUM SERPL-SCNC: 139 MMOL/L — SIGNIFICANT CHANGE UP (ref 135–145)
SPECIMEN SOURCE: SIGNIFICANT CHANGE UP

## 2025-02-15 PROCEDURE — 99232 SBSQ HOSP IP/OBS MODERATE 35: CPT

## 2025-02-15 RX ADMIN — PREDNISONE 40 MILLIGRAM(S): 20 TABLET ORAL at 05:18

## 2025-02-15 RX ADMIN — OSELTAMIVIR PHOSPHATE 75 MILLIGRAM(S): 75 CAPSULE ORAL at 18:14

## 2025-02-15 RX ADMIN — RANOLAZINE 500 MILLIGRAM(S): 1000 TABLET, FILM COATED, EXTENDED RELEASE ORAL at 05:18

## 2025-02-15 RX ADMIN — IPRATROPIUM BROMIDE AND ALBUTEROL SULFATE 3 MILLILITER(S): .5; 2.5 SOLUTION RESPIRATORY (INHALATION) at 16:13

## 2025-02-15 RX ADMIN — GABAPENTIN 600 MILLIGRAM(S): 400 CAPSULE ORAL at 21:00

## 2025-02-15 RX ADMIN — Medication 325 MILLIGRAM(S): at 18:14

## 2025-02-15 RX ADMIN — Medication 25 GRAM(S): at 15:03

## 2025-02-15 RX ADMIN — ATORVASTATIN CALCIUM 20 MILLIGRAM(S): 80 TABLET, FILM COATED ORAL at 21:01

## 2025-02-15 RX ADMIN — GABAPENTIN 600 MILLIGRAM(S): 400 CAPSULE ORAL at 15:01

## 2025-02-15 RX ADMIN — OSELTAMIVIR PHOSPHATE 75 MILLIGRAM(S): 75 CAPSULE ORAL at 05:18

## 2025-02-15 RX ADMIN — IPRATROPIUM BROMIDE AND ALBUTEROL SULFATE 3 MILLILITER(S): .5; 2.5 SOLUTION RESPIRATORY (INHALATION) at 21:05

## 2025-02-15 RX ADMIN — Medication 25 GRAM(S): at 21:00

## 2025-02-15 RX ADMIN — Medication 1 APPLICATION(S): at 05:22

## 2025-02-15 RX ADMIN — Medication 325 MILLIGRAM(S): at 05:18

## 2025-02-15 RX ADMIN — IPRATROPIUM BROMIDE AND ALBUTEROL SULFATE 3 MILLILITER(S): .5; 2.5 SOLUTION RESPIRATORY (INHALATION) at 04:09

## 2025-02-15 RX ADMIN — Medication 25 GRAM(S): at 05:22

## 2025-02-15 RX ADMIN — IPRATROPIUM BROMIDE AND ALBUTEROL SULFATE 3 MILLILITER(S): .5; 2.5 SOLUTION RESPIRATORY (INHALATION) at 09:24

## 2025-02-15 RX ADMIN — RANOLAZINE 500 MILLIGRAM(S): 1000 TABLET, FILM COATED, EXTENDED RELEASE ORAL at 18:14

## 2025-02-15 RX ADMIN — RIVAROXABAN 15 MILLIGRAM(S): 10 TABLET, FILM COATED ORAL at 18:14

## 2025-02-15 RX ADMIN — GABAPENTIN 600 MILLIGRAM(S): 400 CAPSULE ORAL at 05:19

## 2025-02-15 NOTE — PROGRESS NOTE ADULT - SUBJECTIVE AND OBJECTIVE BOX
Jd Zuniga MD  [facility] Medicine  Contact via Teams or text/call at [phone]    Patient is a 89y old  Female who presents with a chief complaint of Acute hypoxemic respiratory failure, sepsis, influenza, proctitis (14 Feb 2025 14:40)      Patient seen and examined at bedside. No overnight events reported.     ALLERGIES:  No Known Allergies    MEDICATIONS  (STANDING):  albuterol    90 MICROgram(s) HFA Inhaler 2 Puff(s) Inhalation three times a day  albuterol/ipratropium for Nebulization 3 milliLiter(s) Nebulizer every 6 hours  atorvastatin 20 milliGRAM(s) Oral at bedtime  chlorhexidine 2% Cloths 1 Application(s) Topical <User Schedule>  ferrous    sulfate 325 milliGRAM(s) Oral two times a day  gabapentin 600 milliGRAM(s) Oral three times a day  influenza  Vaccine (HIGH DOSE) 0.5 milliLiter(s) IntraMuscular once  oseltamivir 75 milliGRAM(s) Oral two times a day  piperacillin/tazobactam IVPB.. 3.375 Gram(s) IV Intermittent every 8 hours  predniSONE   Tablet 40 milliGRAM(s) Oral once  predniSONE   Tablet 40 milliGRAM(s) Oral daily  ranolazine 500 milliGRAM(s) Oral two times a day  rivaroxaban 15 milliGRAM(s) Oral with dinner    MEDICATIONS  (PRN):  acetaminophen     Tablet .. 650 milliGRAM(s) Oral every 6 hours PRN Temp greater or equal to 38C (100.4F), Mild Pain (1 - 3)  aluminum hydroxide/magnesium hydroxide/simethicone Suspension 30 milliLiter(s) Oral every 4 hours PRN Dyspepsia  melatonin 3 milliGRAM(s) Oral at bedtime PRN Insomnia  ondansetron Injectable 4 milliGRAM(s) IV Push every 8 hours PRN Nausea and/or Vomiting    Vital Signs Last 24 Hrs  T(F): 98.2 (15 Feb 2025 07:19), Max: 98.2 (15 Feb 2025 07:19)  HR: 83 (15 Feb 2025 07:19) (79 - 90)  BP: 134/71 (15 Feb 2025 07:19) (125/78 - 143/78)  RR: 18 (15 Feb 2025 07:19) (18 - 18)  SpO2: 94% (15 Feb 2025 07:19) (93% - 100%)  I&O's Summary    14 Feb 2025 07:01  -  15 Feb 2025 07:00  --------------------------------------------------------  IN: 660 mL / OUT: 850 mL / NET: -190 mL      PHYSICAL EXAM:  General: NAD, A/O x 3  ENT: No gross hearing impairment, Moist mucous membranes, no thrush  Neck: Supple, No JVD  Lungs: Clear to auscultation bilaterally, good air entry, non-labored breathing  Cardio: RRR, S1/S2, No murmur  Abdomen: Soft, Nontender, Nondistended; Bowel sounds present  Extremities: No calf tenderness, No cyanosis, No pitting edema  Psych: Appropriate mood and affect    LABS:                        11.3   9.00  )-----------( 112      ( 14 Feb 2025 05:00 )             35.4     02-15    139  |  103  |  32.0  ----------------------------<  113  4.6   |  26.0  |  0.98    Ca    9.2      15 Feb 2025 06:40  Phos  3.8     02-13  Mg     2.1     02-13                Procalcitonin: 0.48 ng/mL (02-15-25 @ 06:40)                          Urinalysis Basic - ( 15 Feb 2025 06:40 )    Color: x / Appearance: x / SG: x / pH: x  Gluc: 113 mg/dL / Ketone: x  / Bili: x / Urobili: x   Blood: x / Protein: x / Nitrite: x   Leuk Esterase: x / RBC: x / WBC x   Sq Epi: x / Non Sq Epi: x / Bacteria: x        Culture - Urine (collected 12 Feb 2025 05:15)  Source: Clean Catch Clean Catch (Midstream)  Final Report (15 Feb 2025 06:43):    >100,000 CFU/ml Escherichia coli  Organism: Escherichia coli (15 Feb 2025 06:43)  Organism: Escherichia coli (15 Feb 2025 06:43)      Method Type: KATHARINE      -  Amoxicillin/Clavulanic Acid: S <=8/4      -  Ampicillin: S <=8 These ampicillin results predict results for amoxicillin      -  Ampicillin/Sulbactam: S <=4/2      -  Aztreonam: S <=4      -  Cefazolin: S <=2 For uncomplicated UTI with K. pneumoniae, E. coli, or P. mirablis: KATHARINE <=16 is sensitive and KATHARINE >=32 is resistant. This also predicts results for oral agents cefaclor, cefdinir, cefpodoxime, cefprozil, cefuroxime axetil, cephalexin and locarbef for uncomplicated UTI. Note that some isolates may be susceptible to these agents while testing resistant to cefazolin.      -  Cefepime: S <=2      -  Cefoxitin: S <=8      -  Ceftriaxone: S <=1      -  Cefuroxime: S <=4      -  Ciprofloxacin: S <=0.25      -  Ertapenem: S <=0.5      -  Gentamicin: S <=2      -  Imipenem: S <=1      -  Levofloxacin: S <=0.5      -  Meropenem: S <=1      -  Nitrofurantoin: S <=32 Should not be used to treat pyelonephritis      -  Piperacillin/Tazobactam: S <=8      -  Tobramycin: S <=2      -  Trimethoprim/Sulfamethoxazole: S <=0.5/9.5    Culture - Blood (collected 11 Feb 2025 16:10)  Source: .Blood BLOOD  Preliminary Report (15 Feb 2025 01:01):    No growth at 72 Hours        RADIOLOGY & ADDITIONAL TESTS:    Care Discussed with Consultants/Other Providers:

## 2025-02-16 PROCEDURE — 99232 SBSQ HOSP IP/OBS MODERATE 35: CPT

## 2025-02-16 RX ADMIN — Medication 325 MILLIGRAM(S): at 17:41

## 2025-02-16 RX ADMIN — Medication 25 GRAM(S): at 21:58

## 2025-02-16 RX ADMIN — GABAPENTIN 600 MILLIGRAM(S): 400 CAPSULE ORAL at 21:59

## 2025-02-16 RX ADMIN — IPRATROPIUM BROMIDE AND ALBUTEROL SULFATE 3 MILLILITER(S): .5; 2.5 SOLUTION RESPIRATORY (INHALATION) at 22:01

## 2025-02-16 RX ADMIN — OSELTAMIVIR PHOSPHATE 75 MILLIGRAM(S): 75 CAPSULE ORAL at 06:09

## 2025-02-16 RX ADMIN — RANOLAZINE 500 MILLIGRAM(S): 1000 TABLET, FILM COATED, EXTENDED RELEASE ORAL at 17:40

## 2025-02-16 RX ADMIN — RIVAROXABAN 15 MILLIGRAM(S): 10 TABLET, FILM COATED ORAL at 17:40

## 2025-02-16 RX ADMIN — GABAPENTIN 600 MILLIGRAM(S): 400 CAPSULE ORAL at 06:09

## 2025-02-16 RX ADMIN — OSELTAMIVIR PHOSPHATE 75 MILLIGRAM(S): 75 CAPSULE ORAL at 17:41

## 2025-02-16 RX ADMIN — GABAPENTIN 600 MILLIGRAM(S): 400 CAPSULE ORAL at 13:39

## 2025-02-16 RX ADMIN — Medication 25 GRAM(S): at 06:08

## 2025-02-16 RX ADMIN — ATORVASTATIN CALCIUM 20 MILLIGRAM(S): 80 TABLET, FILM COATED ORAL at 21:59

## 2025-02-16 RX ADMIN — Medication 25 GRAM(S): at 13:40

## 2025-02-16 RX ADMIN — IPRATROPIUM BROMIDE AND ALBUTEROL SULFATE 3 MILLILITER(S): .5; 2.5 SOLUTION RESPIRATORY (INHALATION) at 08:45

## 2025-02-16 RX ADMIN — IPRATROPIUM BROMIDE AND ALBUTEROL SULFATE 3 MILLILITER(S): .5; 2.5 SOLUTION RESPIRATORY (INHALATION) at 15:05

## 2025-02-16 RX ADMIN — PREDNISONE 40 MILLIGRAM(S): 20 TABLET ORAL at 06:09

## 2025-02-16 RX ADMIN — Medication 325 MILLIGRAM(S): at 06:09

## 2025-02-16 RX ADMIN — Medication 1 APPLICATION(S): at 06:09

## 2025-02-16 RX ADMIN — RANOLAZINE 500 MILLIGRAM(S): 1000 TABLET, FILM COATED, EXTENDED RELEASE ORAL at 06:12

## 2025-02-16 NOTE — PROGRESS NOTE ADULT - SUBJECTIVE AND OBJECTIVE BOX
Jd Zuniga MD  [facility] Medicine  Contact via Teams or text/call at [phone]    Patient is a 89y old  Female who presents with a chief complaint of Acute hypoxemic respiratory failure, sepsis, influenza, proctitis (15 Feb 2025 09:54)    Feels okay.  Tired but breathing improved.     Patient seen and examined at bedside. No overnight events reported.     ALLERGIES:  No Known Allergies    MEDICATIONS  (STANDING):  albuterol    90 MICROgram(s) HFA Inhaler 2 Puff(s) Inhalation three times a day  albuterol/ipratropium for Nebulization 3 milliLiter(s) Nebulizer every 6 hours  atorvastatin 20 milliGRAM(s) Oral at bedtime  chlorhexidine 2% Cloths 1 Application(s) Topical <User Schedule>  ferrous    sulfate 325 milliGRAM(s) Oral two times a day  gabapentin 600 milliGRAM(s) Oral three times a day  influenza  Vaccine (HIGH DOSE) 0.5 milliLiter(s) IntraMuscular once  oseltamivir 75 milliGRAM(s) Oral two times a day  piperacillin/tazobactam IVPB.. 3.375 Gram(s) IV Intermittent every 8 hours  predniSONE   Tablet 40 milliGRAM(s) Oral once  predniSONE   Tablet 40 milliGRAM(s) Oral daily  ranolazine 500 milliGRAM(s) Oral two times a day  rivaroxaban 15 milliGRAM(s) Oral with dinner    MEDICATIONS  (PRN):  acetaminophen     Tablet .. 650 milliGRAM(s) Oral every 6 hours PRN Temp greater or equal to 38C (100.4F), Mild Pain (1 - 3)  aluminum hydroxide/magnesium hydroxide/simethicone Suspension 30 milliLiter(s) Oral every 4 hours PRN Dyspepsia  melatonin 3 milliGRAM(s) Oral at bedtime PRN Insomnia  ondansetron Injectable 4 milliGRAM(s) IV Push every 8 hours PRN Nausea and/or Vomiting    Vital Signs Last 24 Hrs  T(F): 99.1 (16 Feb 2025 07:16), Max: 99.2 (15 Feb 2025 19:41)  HR: 81 (16 Feb 2025 07:16) (74 - 98)  BP: 170/74 (16 Feb 2025 07:16) (125/66 - 170/74)  RR: 18 (16 Feb 2025 07:16) (17 - 19)  SpO2: 90% (16 Feb 2025 07:16) (90% - 99%)  I&O's Summary    15 Feb 2025 07:01  -  16 Feb 2025 07:00  --------------------------------------------------------  IN: 200 mL / OUT: 0 mL / NET: 200 mL      PHYSICAL EXAM:  General: NAD, A/O x 3  ENT: No gross hearing impairment, Moist mucous membranes, no thrush  Neck: Supple, No JVD  Lungs: Clear to auscultation bilaterally, good air entry, non-labored breathing  Cardio: RRR, S1/S2, No murmur  Abdomen: Soft, Nontender, Nondistended; Bowel sounds present  Extremities: No calf tenderness, No cyanosis, No pitting edema  Psych: Appropriate mood and affect    LABS:                        11.3   9.00  )-----------( 112      ( 14 Feb 2025 05:00 )             35.4     02-15    139  |  103  |  32.0  ----------------------------<  113  4.6   |  26.0  |  0.98    Ca    9.2      15 Feb 2025 06:40                Procalcitonin: 0.48 ng/mL (02-15-25 @ 06:40)                          Urinalysis Basic - ( 15 Feb 2025 06:40 )    Color: x / Appearance: x / SG: x / pH: x  Gluc: 113 mg/dL / Ketone: x  / Bili: x / Urobili: x   Blood: x / Protein: x / Nitrite: x   Leuk Esterase: x / RBC: x / WBC x   Sq Epi: x / Non Sq Epi: x / Bacteria: x        Culture - Urine (collected 12 Feb 2025 05:15)  Source: Clean Catch Clean Catch (Midstream)  Final Report (15 Feb 2025 06:43):    >100,000 CFU/ml Escherichia coli  Organism: Escherichia coli (15 Feb 2025 06:43)  Organism: Escherichia coli (15 Feb 2025 06:43)      Method Type: KATHARINE      -  Amoxicillin/Clavulanic Acid: S <=8/4      -  Ampicillin: S <=8 These ampicillin results predict results for amoxicillin      -  Ampicillin/Sulbactam: S <=4/2      -  Aztreonam: S <=4      -  Cefazolin: S <=2 For uncomplicated UTI with K. pneumoniae, E. coli, or P. mirablis: KATHARINE <=16 is sensitive and KATHARINE >=32 is resistant. This also predicts results for oral agents cefaclor, cefdinir, cefpodoxime, cefprozil, cefuroxime axetil, cephalexin and locarbef for uncomplicated UTI. Note that some isolates may be susceptible to these agents while testing resistant to cefazolin.      -  Cefepime: S <=2      -  Cefoxitin: S <=8      -  Ceftriaxone: S <=1      -  Cefuroxime: S <=4      -  Ciprofloxacin: S <=0.25      -  Ertapenem: S <=0.5      -  Gentamicin: S <=2      -  Imipenem: S <=1      -  Levofloxacin: S <=0.5      -  Meropenem: S <=1      -  Nitrofurantoin: S <=32 Should not be used to treat pyelonephritis      -  Piperacillin/Tazobactam: S <=8      -  Tobramycin: S <=2      -  Trimethoprim/Sulfamethoxazole: S <=0.5/9.5    Culture - Blood (collected 11 Feb 2025 16:10)  Source: .Blood BLOOD  Preliminary Report (16 Feb 2025 01:01):    No growth at 4 days        RADIOLOGY & ADDITIONAL TESTS:    Care Discussed with Consultants/Other Providers:

## 2025-02-17 LAB
CULTURE RESULTS: SIGNIFICANT CHANGE UP
SPECIMEN SOURCE: SIGNIFICANT CHANGE UP

## 2025-02-17 PROCEDURE — 99232 SBSQ HOSP IP/OBS MODERATE 35: CPT

## 2025-02-17 RX ADMIN — RIVAROXABAN 15 MILLIGRAM(S): 10 TABLET, FILM COATED ORAL at 17:28

## 2025-02-17 RX ADMIN — Medication 25 MILLIGRAM(S): at 21:59

## 2025-02-17 RX ADMIN — Medication 25 GRAM(S): at 05:54

## 2025-02-17 RX ADMIN — Medication 325 MILLIGRAM(S): at 17:28

## 2025-02-17 RX ADMIN — GABAPENTIN 600 MILLIGRAM(S): 400 CAPSULE ORAL at 21:59

## 2025-02-17 RX ADMIN — Medication 325 MILLIGRAM(S): at 05:54

## 2025-02-17 RX ADMIN — IPRATROPIUM BROMIDE AND ALBUTEROL SULFATE 3 MILLILITER(S): .5; 2.5 SOLUTION RESPIRATORY (INHALATION) at 08:13

## 2025-02-17 RX ADMIN — RANOLAZINE 500 MILLIGRAM(S): 1000 TABLET, FILM COATED, EXTENDED RELEASE ORAL at 05:54

## 2025-02-17 RX ADMIN — Medication 25 GRAM(S): at 13:29

## 2025-02-17 RX ADMIN — IPRATROPIUM BROMIDE AND ALBUTEROL SULFATE 3 MILLILITER(S): .5; 2.5 SOLUTION RESPIRATORY (INHALATION) at 04:13

## 2025-02-17 RX ADMIN — RANOLAZINE 500 MILLIGRAM(S): 1000 TABLET, FILM COATED, EXTENDED RELEASE ORAL at 17:28

## 2025-02-17 RX ADMIN — IPRATROPIUM BROMIDE AND ALBUTEROL SULFATE 3 MILLILITER(S): .5; 2.5 SOLUTION RESPIRATORY (INHALATION) at 14:50

## 2025-02-17 RX ADMIN — PREDNISONE 40 MILLIGRAM(S): 20 TABLET ORAL at 05:54

## 2025-02-17 RX ADMIN — IPRATROPIUM BROMIDE AND ALBUTEROL SULFATE 3 MILLILITER(S): .5; 2.5 SOLUTION RESPIRATORY (INHALATION) at 20:29

## 2025-02-17 RX ADMIN — GABAPENTIN 600 MILLIGRAM(S): 400 CAPSULE ORAL at 13:29

## 2025-02-17 RX ADMIN — Medication 25 GRAM(S): at 22:00

## 2025-02-17 RX ADMIN — ATORVASTATIN CALCIUM 20 MILLIGRAM(S): 80 TABLET, FILM COATED ORAL at 21:59

## 2025-02-17 RX ADMIN — Medication 1 APPLICATION(S): at 05:57

## 2025-02-17 RX ADMIN — GABAPENTIN 600 MILLIGRAM(S): 400 CAPSULE ORAL at 05:54

## 2025-02-17 NOTE — PROGRESS NOTE ADULT - SUBJECTIVE AND OBJECTIVE BOX
Jd Zuniga MD  [facility] Medicine  Contact via Teams or text/call at [phone]    Patient is a 89y old  Female who presents with a chief complaint of Acute hypoxemic respiratory failure, sepsis, influenza, proctitis (16 Feb 2025 08:43)    Tired but feels okay.  Denies chest pain states breathing improved.       Patient seen and examined at bedside. No overnight events reported.     ALLERGIES:  No Known Allergies    MEDICATIONS  (STANDING):  albuterol    90 MICROgram(s) HFA Inhaler 2 Puff(s) Inhalation three times a day  albuterol/ipratropium for Nebulization 3 milliLiter(s) Nebulizer every 6 hours  atorvastatin 20 milliGRAM(s) Oral at bedtime  chlorhexidine 2% Cloths 1 Application(s) Topical <User Schedule>  ferrous    sulfate 325 milliGRAM(s) Oral two times a day  gabapentin 600 milliGRAM(s) Oral three times a day  influenza  Vaccine (HIGH DOSE) 0.5 milliLiter(s) IntraMuscular once  piperacillin/tazobactam IVPB.. 3.375 Gram(s) IV Intermittent every 8 hours  predniSONE   Tablet 40 milliGRAM(s) Oral once  predniSONE   Tablet 40 milliGRAM(s) Oral daily  ranolazine 500 milliGRAM(s) Oral two times a day  rivaroxaban 15 milliGRAM(s) Oral with dinner    MEDICATIONS  (PRN):  acetaminophen     Tablet .. 650 milliGRAM(s) Oral every 6 hours PRN Temp greater or equal to 38C (100.4F), Mild Pain (1 - 3)  aluminum hydroxide/magnesium hydroxide/simethicone Suspension 30 milliLiter(s) Oral every 4 hours PRN Dyspepsia  melatonin 3 milliGRAM(s) Oral at bedtime PRN Insomnia  ondansetron Injectable 4 milliGRAM(s) IV Push every 8 hours PRN Nausea and/or Vomiting    Vital Signs Last 24 Hrs  T(F): 98.3 (17 Feb 2025 08:14), Max: 98.4 (17 Feb 2025 00:37)  HR: 82 (17 Feb 2025 08:52) (80 - 98)  BP: 162/83 (17 Feb 2025 08:14) (138/80 - 167/78)  RR: 19 (17 Feb 2025 08:14) (18 - 19)  SpO2: 94% (17 Feb 2025 08:14) (91% - 99%)  I&O's Summary    16 Feb 2025 07:01  -  17 Feb 2025 07:00  --------------------------------------------------------  IN: 600 mL / OUT: 800 mL / NET: -200 mL    17 Feb 2025 07:01  -  17 Feb 2025 13:26  --------------------------------------------------------  IN: 240 mL / OUT: 600 mL / NET: -360 mL    PHYSICAL EXAM:  General: NAD, tired  ENT: No gross hearing impairment, Moist mucous membranes, no thrush  Neck: Supple, No JVD  Lungs: diminished bilaterally, good air entry, non-labored breathing  Cardio: RRR, S1/S2, No murmur  Abdomen: Soft, Nontender, Nondistended; Bowel sounds present  Extremities: No calf tenderness, No cyanosis, No pitting edema  Psych: Appropriate mood and affect    LABS:    02-15    139  |  103  |  32.0  ----------------------------<  113  4.6   |  26.0  |  0.98    Ca    9.2      15 Feb 2025 06:40    Procalcitonin: 0.48 ng/mL (02-15-25 @ 06:40)    Urinalysis Basic - ( 15 Feb 2025 06:40 )    Color: x / Appearance: x / SG: x / pH: x  Gluc: 113 mg/dL / Ketone: x  / Bili: x / Urobili: x   Blood: x / Protein: x / Nitrite: x   Leuk Esterase: x / RBC: x / WBC x   Sq Epi: x / Non Sq Epi: x / Bacteria: x        Culture - Urine (collected 12 Feb 2025 05:15)  Source: Clean Catch Clean Catch (Midstream)  Final Report (15 Feb 2025 06:43):    >100,000 CFU/ml Escherichia coli  Organism: Escherichia coli (15 Feb 2025 06:43)  Organism: Escherichia coli (15 Feb 2025 06:43)      Method Type: KAHTARINE      -  Amoxicillin/Clavulanic Acid: S <=8/4      -  Ampicillin: S <=8 These ampicillin results predict results for amoxicillin      -  Ampicillin/Sulbactam: S <=4/2      -  Aztreonam: S <=4      -  Cefazolin: S <=2 For uncomplicated UTI with K. pneumoniae, E. coli, or P. mirablis: KATHARINE <=16 is sensitive and KATHARINE >=32 is resistant. This also predicts results for oral agents cefaclor, cefdinir, cefpodoxime, cefprozil, cefuroxime axetil, cephalexin and locarbef for uncomplicated UTI. Note that some isolates may be susceptible to these agents while testing resistant to cefazolin.      -  Cefepime: S <=2      -  Cefoxitin: S <=8      -  Ceftriaxone: S <=1      -  Cefuroxime: S <=4      -  Ciprofloxacin: S <=0.25      -  Ertapenem: S <=0.5      -  Gentamicin: S <=2      -  Imipenem: S <=1      -  Levofloxacin: S <=0.5      -  Meropenem: S <=1      -  Nitrofurantoin: S <=32 Should not be used to treat pyelonephritis      -  Piperacillin/Tazobactam: S <=8      -  Tobramycin: S <=2      -  Trimethoprim/Sulfamethoxazole: S <=0.5/9.5    Culture - Blood (collected 11 Feb 2025 16:10)  Source: .Blood BLOOD  Final Report (17 Feb 2025 01:00):    No growth at 5 days        RADIOLOGY & ADDITIONAL TESTS:    Care Discussed with Consultants/Other Providers:

## 2025-02-18 PROCEDURE — 99232 SBSQ HOSP IP/OBS MODERATE 35: CPT

## 2025-02-18 RX ORDER — PREDNISONE 20 MG/1
20 TABLET ORAL DAILY
Refills: 0 | Status: DISCONTINUED | OUTPATIENT
Start: 2025-02-19 | End: 2025-02-19

## 2025-02-18 RX ADMIN — GABAPENTIN 600 MILLIGRAM(S): 400 CAPSULE ORAL at 06:25

## 2025-02-18 RX ADMIN — IPRATROPIUM BROMIDE AND ALBUTEROL SULFATE 3 MILLILITER(S): .5; 2.5 SOLUTION RESPIRATORY (INHALATION) at 21:11

## 2025-02-18 RX ADMIN — IPRATROPIUM BROMIDE AND ALBUTEROL SULFATE 3 MILLILITER(S): .5; 2.5 SOLUTION RESPIRATORY (INHALATION) at 08:14

## 2025-02-18 RX ADMIN — IPRATROPIUM BROMIDE AND ALBUTEROL SULFATE 3 MILLILITER(S): .5; 2.5 SOLUTION RESPIRATORY (INHALATION) at 14:09

## 2025-02-18 RX ADMIN — Medication 25 GRAM(S): at 21:51

## 2025-02-18 RX ADMIN — Medication 25 MILLIGRAM(S): at 13:31

## 2025-02-18 RX ADMIN — Medication 1 APPLICATION(S): at 06:25

## 2025-02-18 RX ADMIN — Medication 325 MILLIGRAM(S): at 17:53

## 2025-02-18 RX ADMIN — Medication 25 MILLIGRAM(S): at 06:25

## 2025-02-18 RX ADMIN — RANOLAZINE 500 MILLIGRAM(S): 1000 TABLET, FILM COATED, EXTENDED RELEASE ORAL at 17:53

## 2025-02-18 RX ADMIN — Medication 25 MILLIGRAM(S): at 21:51

## 2025-02-18 RX ADMIN — Medication 325 MILLIGRAM(S): at 06:25

## 2025-02-18 RX ADMIN — PREDNISONE 40 MILLIGRAM(S): 20 TABLET ORAL at 06:24

## 2025-02-18 RX ADMIN — Medication 25 GRAM(S): at 06:25

## 2025-02-18 RX ADMIN — GABAPENTIN 600 MILLIGRAM(S): 400 CAPSULE ORAL at 13:31

## 2025-02-18 RX ADMIN — Medication 25 GRAM(S): at 13:23

## 2025-02-18 RX ADMIN — ATORVASTATIN CALCIUM 20 MILLIGRAM(S): 80 TABLET, FILM COATED ORAL at 21:51

## 2025-02-18 RX ADMIN — GABAPENTIN 600 MILLIGRAM(S): 400 CAPSULE ORAL at 21:51

## 2025-02-18 RX ADMIN — RIVAROXABAN 15 MILLIGRAM(S): 10 TABLET, FILM COATED ORAL at 17:53

## 2025-02-18 RX ADMIN — RANOLAZINE 500 MILLIGRAM(S): 1000 TABLET, FILM COATED, EXTENDED RELEASE ORAL at 06:27

## 2025-02-18 NOTE — PROGRESS NOTE ADULT - SUBJECTIVE AND OBJECTIVE BOX
Jd Zuniga MD  [facility] Medicine  Contact via Teams or text/call at [phone]    Patient is a 89y old  Female who presents with a chief complaint of Acute hypoxemic respiratory failure, sepsis, influenza, proctitis (17 Feb 2025 13:26)    Feels okay.  improved breathing.      Patient seen and examined at bedside. No overnight events reported.     ALLERGIES:  No Known Allergies    MEDICATIONS  (STANDING):  albuterol    90 MICROgram(s) HFA Inhaler 2 Puff(s) Inhalation three times a day  albuterol/ipratropium for Nebulization 3 milliLiter(s) Nebulizer every 6 hours  atorvastatin 20 milliGRAM(s) Oral at bedtime  chlorhexidine 2% Cloths 1 Application(s) Topical <User Schedule>  ferrous    sulfate 325 milliGRAM(s) Oral two times a day  gabapentin 600 milliGRAM(s) Oral three times a day  hydrALAZINE 25 milliGRAM(s) Oral every 8 hours  influenza  Vaccine (HIGH DOSE) 0.5 milliLiter(s) IntraMuscular once  piperacillin/tazobactam IVPB.. 3.375 Gram(s) IV Intermittent every 8 hours  predniSONE   Tablet 40 milliGRAM(s) Oral daily  predniSONE   Tablet 40 milliGRAM(s) Oral once  ranolazine 500 milliGRAM(s) Oral two times a day  rivaroxaban 15 milliGRAM(s) Oral with dinner    MEDICATIONS  (PRN):  acetaminophen     Tablet .. 650 milliGRAM(s) Oral every 6 hours PRN Temp greater or equal to 38C (100.4F), Mild Pain (1 - 3)  aluminum hydroxide/magnesium hydroxide/simethicone Suspension 30 milliLiter(s) Oral every 4 hours PRN Dyspepsia  melatonin 3 milliGRAM(s) Oral at bedtime PRN Insomnia  ondansetron Injectable 4 milliGRAM(s) IV Push every 8 hours PRN Nausea and/or Vomiting    Vital Signs Last 24 Hrs  T(F): 97.6 (18 Feb 2025 07:13), Max: 99.2 (18 Feb 2025 00:39)  HR: 88 (18 Feb 2025 09:10) (84 - 89)  BP: 148/84 (18 Feb 2025 07:13) (117/73 - 168/79)  RR: 17 (18 Feb 2025 07:13) (17 - 18)  SpO2: 97% (18 Feb 2025 07:13) (95% - 100%)  I&O's Summary    17 Feb 2025 07:01  -  18 Feb 2025 07:00  --------------------------------------------------------  IN: 600 mL / OUT: 1600 mL / NET: -1000 mL    18 Feb 2025 07:01  -  18 Feb 2025 12:46  --------------------------------------------------------  IN: 240 mL / OUT: 200 mL / NET: 40 mL      PHYSICAL EXAM:  General: NAD, A/O x 3  ENT: No gross hearing impairment, Moist mucous membranes, no thrush  Neck: Supple, No JVD  Lungs: Clear to auscultation bilaterally, good air entry, non-labored breathing  Cardio: RRR, S1/S2, No murmur  Abdomen: Soft, Nontender, Nondistended; Bowel sounds present  Extremities: No calf tenderness, No cyanosis, No pitting edema  Psych: Appropriate mood and affect    LABS:    Culture - Urine (collected 12 Feb 2025 05:15)  Source: Clean Catch Clean Catch (Midstream)  Final Report (15 Feb 2025 06:43):    >100,000 CFU/ml Escherichia coli  Organism: Escherichia coli (15 Feb 2025 06:43)  Organism: Escherichia coli (15 Feb 2025 06:43)      Method Type: KATHARINE      -  Amoxicillin/Clavulanic Acid: S <=8/4      -  Ampicillin: S <=8 These ampicillin results predict results for amoxicillin      -  Ampicillin/Sulbactam: S <=4/2      -  Aztreonam: S <=4      -  Cefazolin: S <=2 For uncomplicated UTI with K. pneumoniae, E. coli, or P. mirablis: KATHARINE <=16 is sensitive and KATHARINE >=32 is resistant. This also predicts results for oral agents cefaclor, cefdinir, cefpodoxime, cefprozil, cefuroxime axetil, cephalexin and locarbef for uncomplicated UTI. Note that some isolates may be susceptible to these agents while testing resistant to cefazolin.      -  Cefepime: S <=2      -  Cefoxitin: S <=8      -  Ceftriaxone: S <=1      -  Cefuroxime: S <=4      -  Ciprofloxacin: S <=0.25      -  Ertapenem: S <=0.5      -  Gentamicin: S <=2      -  Imipenem: S <=1      -  Levofloxacin: S <=0.5      -  Meropenem: S <=1      -  Nitrofurantoin: S <=32 Should not be used to treat pyelonephritis      -  Piperacillin/Tazobactam: S <=8      -  Tobramycin: S <=2      -  Trimethoprim/Sulfamethoxazole: S <=0.5/9.5    Culture - Blood (collected 11 Feb 2025 16:10)  Source: .Blood BLOOD  Final Report (17 Feb 2025 01:00):    No growth at 5 days        RADIOLOGY & ADDITIONAL TESTS:    Care Discussed with Consultants/Other Providers:

## 2025-02-18 NOTE — PROGRESS NOTE ADULT - REASON FOR ADMISSION
Acute hypoxemic respiratory failure, sepsis, influenza, proctitis

## 2025-02-18 NOTE — PROGRESS NOTE ADULT - ASSESSMENT
89 year old female with Hypertension, Dyslipidemia, YULISA DVT, Atrial fibrillation, GERD, CAD s/p PCI, AS s/p TAVR, CHF with preserved EF, Spinal stenosis, Chronic back pain and neuropathy presented with weakness, coughing and diarrhea.  Recently diagnosed with Influenza. Hypoxic in ER requiring supplemental Oxygen Low grade fever with leukocytosis in ER. CT abdomen significant for proctitis / equivocal findigns for cholecystitis. RVP (+) for Influenza A. Seen by surgery, no intervention, Urine culture with E. coli, continues on Abxs with Zosyn for donna / UTI both asymptomatic, weaning O2 / steroids currently on NC 2L, PO prednisone. Planning for GARRETT when able.     Acute Hypoxic Respiratory Failure  Influenza A Bronchitis  Atelectasis  - Supplemental o2, wean as tolerated, currently 2L NC, trial on 1 L NC   - DuoNebs  - cont prednisone 40 mg daily   - Incentive Spirometry  - OOB to chair     Severe Sepsis likely 2/2 proctitis vs Influenza / Cholecystitis?   UTI?   Lactic acidosis  No more diarrhea. UA benign but culture with > 100k E. coli  - Empiric zosyn 3.375mg TID, continue at this time, would cover for E. coli / Cholecystitis, could complete empiric course with Augmentin if discharged   - Follow up Blood and urine cultures    Multifactorial metabolic encephalopathy  improving   CTH negative  no known Hx of dementia  Could be related to infection as above vs toxic from meds  - home gabapentin 600mg TID for now, may consider lowering dose if pt sedated or encephalopathy not improved    Cholelithiasis  distended gallbladder  transaminitis  CT A/P noted  Abd US confirmed stones and sludge,  findings equivocal for acute cholecystitis  T bili 0.7  AST/ALT mildly elevated at 60/48  no abd pain or tenderness  Surgery recs appreciated   Defer HIDA unless patient with new symptoms  outpatient follow up     Weakness  uses walker at baseline  per family unable to ambulate since fall due to b/l LE weakness and pain  no acute fractures on CT, old fractures noted  Left Knee X-ray ordered  PT recs GARRETT    Chronic back pain  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 normal RV size and systolic function. PASP 39 mmHg. LA severely dilated, moderate TR, mild MR. Mild aortic regurgitation.  holding home lasix due to active dehydration 2/2 diarrhea    Chronic Atrial fibrillation  EKG reviewed, unchanged compared to prior from 10/2024  continue home xarelto 15mg daily    stable CAD s/p PCI w/ 1 JARETT (2018)  pt not on ASA, already on xarelto  continue ranexa 500mg BID    HLD  continue home atorvastatin 20mg QHS    Hx HTN  not on any antihypertensive meds  monitor    Hx ARMEN  continue home ferrous sulfate 325mg BID    incidental finding of lesion vs artifact on pancreatic head  found on CT A/P  Will need f/u pancreatic MRI and outpt f/u  Daughter in Law aware    Stage 1 sacral ulcer  turn and reposition q2hrs    DVT PPX: xarelto  Code: DNR/DNI, prior MOLST confirmed with [place]    Dispo: titrated off O2 then GARRETT - likely 1-2 days
89 year old female with Hypertension, Dyslipidemia, YULISA DVT, Atrial fibrillation, GERD, CAD s/p PCI, AS s/p TAVR, CHF with preserved EF, Spinal stenosis, Chronic back pain and neuropathy presented with weakness, coughing and diarrhea.  Recently diagnosed with Influenza. Hypoxic in ER requiring supplemental Oxygen Low grade fever with leukocytosis in ER. CT abdomen significant for proctitis / equivocal findigns for cholecystitis. RVP (+) for Influenza A. Seen by surgery, no intervention, Urine culture with E. coli, continues on Abxs with Zosyn for donna / UTI both asymptomatic, weaning O2 / steroids currently on NC 2L, PO prednisone. Planning for GARRETT when able.       Acute Hypoxic Respiratory Failure  Influenza A Bronchitis  Atelectasis  - Supplemental o2, wean as tolerated, currently 2L NC, trial on 1 L NC   - DuoNebs  - cont prednisone 40 mg daily   - Incentive Spirometry    Severe Sepsis likely 2/2 proctitis vs Influenza / Cholecystitis?   UTI?   Lactic acidosis  No more diarrhea. UA benign but culture with > 100k E. coli  - Empiric zosyn 3.375mg TID, continue at this time, would cover for E. coli / Cholecystitis, could complete empiric course with Augmentin if discharged   - Follow up Blood and urine cultures    Multifactorial metabolic encephalopathy  improving   CTH negative  no known Hx of dementia  Could be related to infection as above vs toxic from meds  - home gabapentin 600mg TID for now, may consider lowering dose if pt sedated or encephalopathy not improved    Cholelithiasis  distended gallbladder  transaminitis  CT A/P noted  Abd US confirmed stones and sludge,  findings equivocal for acute cholecystitis  T bili 0.7  AST/ALT mildly elevated at 60/48  no abd pain or tenderness  Surgery recs appreciated   Defer HIDA unless patient with new symptoms  outpatient follow up     Weakness  uses walker at baseline  per family unable to ambulate since fall due to b/l LE weakness and pain  no acute fractures on CT, old fractures noted  Left Knee X-ray ordered  PT recs GARRETT    Chronic back pain  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 normal RV size and systolic function. PASP 39 mmHg. LA severely dilated, moderate TR, mild MR. Mild aortic regurgitation.  holding home lasix due to active dehydration 2/2 diarrhea    Chronic Atrial fibrillation  EKG reviewed, unchanged compared to prior from 10/2024  continue home xarelto 15mg daily    stable CAD s/p PCI w/ 1 JARETT (2018)  pt not on ASA, already on xarelto  continue ranexa 500mg BID    HLD  continue home atorvastatin 20mg QHS    Hx HTN  not on any antihypertensive meds  monitor    Hx ARMEN  continue home ferrous sulfate 325mg BID    incidental finding of lesion vs artifact on pancreatic head  found on CT A/P  Will need f/u pancreatic MRI and outpt f/u  Daughter in Law aware    Stage 1 sacral ulcer  turn and reposition q2hrs    DVT PPX: xarelto  Code: DNR/DNI, prior MOLST confirmed with [place]    Dispo: titrated off O2 then GARRETT 
89 year old female with Hypertension, Dyslipidemia, YULISA DVT, Atrial fibrillation, GERD, CAD s/p PCI, AS s/p TAVR, CHF with preserved EF, Spinal stenosis, Chronic back pain and neuropathy presented with weakness, coughing and diarrhea.  Recently diagnosed with Influenza. Hypoxic in ER requiring supplemental Oxygen Low grade fever with leukocytosis in ER. CT abdomen significant for proctitis / equivocal findigns for cholecystitis. RVP (+) for Influenza A. Seen by surgery, no intervention, Urine culture with E. coli, continues on Abxs with Zosyn for donna / UTI both asymptomatic, weaning O2 / steroids currently on NC 2L, PO prednisone. Planning for GARRETT when able.     Acute Hypoxic Respiratory Failure  Influenza A Bronchitis  Atelectasis  - Supplemental o2, wean as tolerated  - DuoNebs  - cont prednisone 20 mg   - Incentive Spirometry  - OOB to chair     Severe Sepsis likely 2/2 proctitis vs Influenza / Cholecystitis?   UTI?   Lactic acidosis  No more diarrhea. UA benign but culture with > 100k E. coli  - Empiric zosyn 3.375mg TID, continue at this time, would cover for E. coli / Cholecystitis, could complete empiric course with Augmentin if discharged   - Follow up Blood and urine cultures    Multifactorial metabolic encephalopathy  improving   CTH negative  no known Hx of dementia  Could be related to infection as above vs toxic from meds  - home gabapentin 600mg TID for now, may consider lowering dose if pt sedated or encephalopathy not improved    Cholelithiasis  distended gallbladder  transaminitis  CT A/P noted  Abd US confirmed stones and sludge,  findings equivocal for acute cholecystitis  T bili 0.7  AST/ALT mildly elevated at 60/48  no abd pain or tenderness  Surgery recs appreciated   Defer HIDA unless patient with new symptoms  outpatient follow up     Weakness  uses walker at baseline  per family unable to ambulate since fall due to b/l LE weakness and pain  no acute fractures on CT, old fractures noted  Left Knee X-ray ordered  PT recs GARRETT    Chronic back pain  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 normal RV size and systolic function. PASP 39 mmHg. LA severely dilated, moderate TR, mild MR. Mild aortic regurgitation.  holding home lasix due to active dehydration 2/2 diarrhea    Chronic Atrial fibrillation  EKG reviewed, unchanged compared to prior from 10/2024  continue home xarelto 15mg daily    stable CAD s/p PCI w/ 1 JARETT (2018)  pt not on ASA, already on xarelto  continue ranexa 500mg BID    HLD  continue home atorvastatin 20mg QHS    Hx HTN  not on any antihypertensive meds  monitor    Hx ARMEN  continue home ferrous sulfate 325mg BID    incidental finding of lesion vs artifact on pancreatic head  found on CT A/P  Will need f/u pancreatic MRI and outpt f/u  Daughter in Law aware    Stage 1 sacral ulcer  turn and reposition q2hrs    DVT PPX: xarelto  Code: DNR/DNI, prior MOLST confirmed with [place]    Dispo: d/c to [facility] tomorrow AM
89 y/o F with PMH of Afib (xarelto), HFpEF, AS s/p TAVR (2018), CAD s/p PCI w/ 1 JARETT (2018), HTN, HLD, ARMEN, neuropathy and chronic back pain 2/2 spinal stenosis presented for weakness s/p fall. Admitted for AHRF from flu and possible bronchitis and Sepsis 2/2 proctitis    #AHRF from flu A and possible bronchitis  Oxygen via NC  cxr NEG  cT w/ b/l LL atelectasis  Complete course of Tamiflu  standing duoneb q6hrs  solumedrol 40mg BID for today  on 3L NC, wean as tolerated      #Severe Sepsis likely 2/2 proctitis vs Influenza  #Lactic acidosis  No more diarrhea  continue empiric zosyn 3.375mg TID  UA does not appear to have infection  F/u blood cx, ucx    #multifactorial metabolic encephalopathy  CTH negative  no known Hx of dementia  Could be related to infection as above vs toxic from meds  continue home gabapentin 600mg TID for now, may consider lowering dose if pt sedated or encephalopathy not improved    #cholelithiasis  #distended gallbladder  #transaminitis  CT A/P noted  Abd US confirmed stones and sludge,  findings equivocal for acute cholecystitis  T bili 0.7  AST/ALT mildly elevated at 60/48  no abd pain or tenderness  Surgery recs appreciated   Defer HIDA unless patient with new symptoms    #Weakness  uses walker at baseline  per family unable to ambulate since fall due to b/l LE weakness and pain  no acute fractures on CT, old fractures noted  Left Knee X-ray ordered  PT recs GARRETT    #chronic back pain  #neuropathy 2/2 spinal stenosis  outpt pt recently uptitrated from gabapentin 600mg BID to TID due to persistent neuropathy  family adamant that pt requires her scheduled gabapentin   continue gabapentin 600mg TID for now  consider decreasing dose if encephalopathy worsens    #HFpEF  most recent echo 10/2024 showed EF 67%, moderate to severe LVH. normal RV size and systolic function. PASP 39 mmHg. LA severely dilated, moderate TR, mild MR. Mild aortic regurgitation.  holding home lasix due to active dehydration 2/2 diarrhea    #Afib  EKG reviewed, unchanged compared to prior from 10/2024  continue home xarelto 15mg daily    #stable CAD s/p PCI w/ 1 JARETT (2018)  pt not on ASA, already on xarelto  continue ranexa 500mg BID    #HLD  continue home atorvastatin 20mg QHS    #Hx HTN  not on any antihypertensive meds  monitor    #Hx ARMEN  continue home ferrous sulfate 325mg BID    #incidental finding of lesion vs artifact on pancreatic head  found on CT A/P  Will need f/u pancreatic MRI and outpt f/u  Daughter in Law aware    #Stage 1 sacral ulcer  turn and reposition q2hrs    DVT PPX: xarelto  Code: DNR/DNI, prior MOLST confirmed with [place]    Dispo: Pending improvement and then GARRETT  Daughter in law updated 
89 year old female with Hypertension, Dyslipidemia, YULISA DVT, Atrial fibrillation, GERD, CAD s/p PCI, AS s/p TAVR, CHF with preserved EF, Spinal stenosis, Chronic back pain and neuropathy presented with weakness, coughing and diarrhea.  Recently diagnosed with Influenza. Hypoxic in ER requiring supplemental Oxygen Low grade fever with leukocytosis in ER. CT abdomen significant for proctitis / equivocal findigns for cholecystitis. RVP (+) for Influenza A. Seen by surgery, no intervention, Urine culture with E. coli, continues on Abxs with Zosyn for donna / UTI both asymptomatic, weaning O2 / steroids currently on NC 2L, PO prednisone. Planning for GARRETT when able.     Acute Hypoxic Respiratory Failure  Influenza A Bronchitis  Atelectasis  - Supplemental o2, wean as tolerated  - DuoNebs  - cont prednisone 40 mg daily, taper tomorrow to 20 mg   - Incentive Spirometry  - OOB to chair     Severe Sepsis likely 2/2 proctitis vs Influenza / Cholecystitis?   UTI?   Lactic acidosis  No more diarrhea. UA benign but culture with > 100k E. coli  - Empiric zosyn 3.375mg TID, continue at this time, would cover for E. coli / Cholecystitis, could complete empiric course with Augmentin if discharged   - Follow up Blood and urine cultures    Multifactorial metabolic encephalopathy  improving   CTH negative  no known Hx of dementia  Could be related to infection as above vs toxic from meds  - home gabapentin 600mg TID for now, may consider lowering dose if pt sedated or encephalopathy not improved    Cholelithiasis  distended gallbladder  transaminitis  CT A/P noted  Abd US confirmed stones and sludge,  findings equivocal for acute cholecystitis  T bili 0.7  AST/ALT mildly elevated at 60/48  no abd pain or tenderness  Surgery recs appreciated   Defer HIDA unless patient with new symptoms  outpatient follow up     Weakness  uses walker at baseline  per family unable to ambulate since fall due to b/l LE weakness and pain  no acute fractures on CT, old fractures noted  Left Knee X-ray ordered  PT recs GARRETT    Chronic back pain  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 normal RV size and systolic function. PASP 39 mmHg. LA severely dilated, moderate TR, mild MR. Mild aortic regurgitation.  holding home lasix due to active dehydration 2/2 diarrhea    Chronic Atrial fibrillation  EKG reviewed, unchanged compared to prior from 10/2024  continue home xarelto 15mg daily    stable CAD s/p PCI w/ 1 JARETT (2018)  pt not on ASA, already on xarelto  continue ranexa 500mg BID    HLD  continue home atorvastatin 20mg QHS    Hx HTN  not on any antihypertensive meds  monitor    Hx ARMEN  continue home ferrous sulfate 325mg BID    incidental finding of lesion vs artifact on pancreatic head  found on CT A/P  Will need f/u pancreatic MRI and outpt f/u  Daughter in Law aware    Stage 1 sacral ulcer  turn and reposition q2hrs    DVT PPX: xarelto  Code: DNR/DNI, prior MOLST confirmed with [place]    Dispo: suspect GARRETT tomorrow 
89 year old female with Hypertension, Dyslipidemia, YULISA DVT, Atrial fibrillation, GERD, CAD s/p PCI, AS s/p TAVR, CHF with preserved EF, Spinal stenosis, Chronic back pain and neuropathy presented with weakness, coughing and diarrhea.  Recently diagnosed with Influenza. Hypoxic in ER requiring supplemental Oxygen Low grade fever with leukocytosis in ER. CT abdomen significant for proctitis. RVP (+) for Influenza A      # Acute Hypoxic Respiratory Failure  # Influenza A Bronchitis  # Atelectasis  - Supplemental o2, wean as tolerated  - DuoNebs  - Methylprednisolone 40mg q12  - Chest PT  - Incentive Spirometry    # Severe Sepsis likely 2/2 proctitis vs Influenza  # Lactic acidosis  No more diarrhea. UA does not appear to have infection  - Empiric zosyn 3.375mg TID  - Follow up Blood and urine cultures    # Multifactorial metabolic encephalopathy  CTH negative  no known Hx of dementia  Could be related to infection as above vs toxic from meds  - home gabapentin 600mg TID for now, may consider lowering dose if pt sedated or encephalopathy not improved    # Cholelithiasis  # distended gallbladder  # transaminitis  CT A/P noted  Abd US confirmed stones and sludge,  findings equivocal for acute cholecystitis  T bili 0.7  AST/ALT mildly elevated at 60/48  no abd pain or tenderness  Surgery recs appreciated   Defer HIDA unless patient with new symptoms    # Weakness  uses walker at baseline  per family unable to ambulate since fall due to b/l LE weakness and pain  no acute fractures on CT, old fractures noted  Left Knee X-ray ordered  PT recs GARRETT    # chronic back pain  # neuropathy 2/2 spinal stenosis  outpt pt recently uptitrated from gabapentin 600mg BID to TID due to persistent neuropathy  family adamant that pt requires her scheduled gabapentin   continue gabapentin 600mg TID for now  consider decreasing dose if encephalopathy worsens    # HFpEF  most recent echo 10/2024 showed EF 67%, moderate to severe LVH. normal RV size and systolic function. PASP 39 mmHg. LA severely dilated, moderate TR, mild MR. Mild aortic regurgitation.  holding home lasix due to active dehydration 2/2 diarrhea    # Afib  EKG reviewed, unchanged compared to prior from 10/2024  continue home xarelto 15mg daily    # stable CAD s/p PCI w/ 1 JARETT (2018)  pt not on ASA, already on xarelto  continue ranexa 500mg BID    #HLD  continue home atorvastatin 20mg QHS    #Hx HTN  not on any antihypertensive meds  monitor    #Hx ARMEN  continue home ferrous sulfate 325mg BID    #incidental finding of lesion vs artifact on pancreatic head  found on CT A/P  Will need f/u pancreatic MRI and outpt f/u  Daughter in Law aware    #Stage 1 sacral ulcer  turn and reposition q2hrs    DVT PPX: xarelto  Code: DNR/DNI, prior MOLST confirmed with [place]    Dispo: Pending improvement and then GARRETT   
89 year old female with Hypertension, Dyslipidemia, YULISA DVT, Atrial fibrillation, GERD, CAD s/p PCI, AS s/p TAVR, CHF with preserved EF, Spinal stenosis, Chronic back pain and neuropathy presented with weakness, coughing and diarrhea.  Recently diagnosed with Influenza. Hypoxic in ER requiring supplemental Oxygen Low grade fever with leukocytosis in ER. CT abdomen significant for proctitis / equivocal findigns for cholecystitis. RVP (+) for Influenza A. Seen by surgery, no intervention, Urine culture with E. coli, continues on Abxs with Zosyn for donna / UTI both asymptomatic, weaning O2 / steroids currently on NC 2L, PO prednisone. Planning for GARRETT when able.       # Acute Hypoxic Respiratory Failure  # Influenza A Bronchitis  # Atelectasis  - Supplemental o2, wean as tolerated, currently 2L NC   - DuoNebs  - decreased to PO prednisone 40 mg daily   - Chest PT  - Incentive Spirometry    # Severe Sepsis likely 2/2 proctitis vs Influenza / Cholecystitis?   #UTI?   # Lactic acidosis  No more diarrhea. UA benign but culture with > 100k E. coli  - Empiric zosyn 3.375mg TID, continue at this time, would cover for E. coli / Cholecystitis, could complete empiric course with Augmentin if discharged   - Follow up Blood and urine cultures    # Multifactorial metabolic encephalopathy  improving   CTH negative  no known Hx of dementia  Could be related to infection as above vs toxic from meds  - home gabapentin 600mg TID for now, may consider lowering dose if pt sedated or encephalopathy not improved    # Cholelithiasis  # distended gallbladder  # transaminitis  CT A/P noted  Abd US confirmed stones and sludge,  findings equivocal for acute cholecystitis  T bili 0.7  AST/ALT mildly elevated at 60/48  no abd pain or tenderness  Surgery recs appreciated   Defer HIDA unless patient with new symptoms  outpatient follow up     # Weakness  uses walker at baseline  per family unable to ambulate since fall due to b/l LE weakness and pain  no acute fractures on CT, old fractures noted  Left Knee X-ray ordered  PT recs GARRETT    # chronic back pain  # neuropathy 2/2 spinal stenosis  outpt pt recently uptitrated from gabapentin 600mg BID to TID due to persistent neuropathy  family adamant that pt requires her scheduled gabapentin   continue gabapentin 600mg TID for now  consider decreasing dose if encephalopathy worsens    # HFpEF  most recent echo 10/2024 showed EF 67%, moderate to severe LVH. normal RV size and systolic function. PASP 39 mmHg. LA severely dilated, moderate TR, mild MR. Mild aortic regurgitation.  holding home lasix due to active dehydration 2/2 diarrhea    # Afib  EKG reviewed, unchanged compared to prior from 10/2024  continue home xarelto 15mg daily    # stable CAD s/p PCI w/ 1 JARETT (2018)  pt not on ASA, already on xarelto  continue ranexa 500mg BID    #HLD  continue home atorvastatin 20mg QHS    #Hx HTN  not on any antihypertensive meds  monitor    #Hx ARMEN  continue home ferrous sulfate 325mg BID    #incidental finding of lesion vs artifact on pancreatic head  found on CT A/P  Will need f/u pancreatic MRI and outpt f/u  Daughter in Law aware    #Stage 1 sacral ulcer  turn and reposition q2hrs    DVT PPX: xarelto  Code: DNR/DNI, prior MOLST confirmed with [place]: Likely ready for GARRETT over the weekend if respirations stable on PO prednisone

## 2025-02-19 ENCOUNTER — TRANSCRIPTION ENCOUNTER (OUTPATIENT)
Age: 89
End: 2025-02-19

## 2025-02-19 VITALS
RESPIRATION RATE: 18 BRPM | TEMPERATURE: 98 F | DIASTOLIC BLOOD PRESSURE: 74 MMHG | SYSTOLIC BLOOD PRESSURE: 124 MMHG | OXYGEN SATURATION: 97 % | HEART RATE: 100 BPM

## 2025-02-19 PROCEDURE — 87086 URINE CULTURE/COLONY COUNT: CPT

## 2025-02-19 PROCEDURE — 96374 THER/PROPH/DIAG INJ IV PUSH: CPT

## 2025-02-19 PROCEDURE — 99239 HOSP IP/OBS DSCHRG MGMT >30: CPT

## 2025-02-19 PROCEDURE — 93005 ELECTROCARDIOGRAM TRACING: CPT

## 2025-02-19 PROCEDURE — 85014 HEMATOCRIT: CPT

## 2025-02-19 PROCEDURE — 36415 COLL VENOUS BLD VENIPUNCTURE: CPT

## 2025-02-19 PROCEDURE — 71045 X-RAY EXAM CHEST 1 VIEW: CPT

## 2025-02-19 PROCEDURE — 84100 ASSAY OF PHOSPHORUS: CPT

## 2025-02-19 PROCEDURE — 82330 ASSAY OF CALCIUM: CPT

## 2025-02-19 PROCEDURE — 70450 CT HEAD/BRAIN W/O DYE: CPT | Mod: MC

## 2025-02-19 PROCEDURE — 80053 COMPREHEN METABOLIC PANEL: CPT

## 2025-02-19 PROCEDURE — 85025 COMPLETE CBC W/AUTO DIFF WBC: CPT

## 2025-02-19 PROCEDURE — 83605 ASSAY OF LACTIC ACID: CPT

## 2025-02-19 PROCEDURE — 97163 PT EVAL HIGH COMPLEX 45 MIN: CPT

## 2025-02-19 PROCEDURE — 83735 ASSAY OF MAGNESIUM: CPT

## 2025-02-19 PROCEDURE — 81001 URINALYSIS AUTO W/SCOPE: CPT

## 2025-02-19 PROCEDURE — 82947 ASSAY GLUCOSE BLOOD QUANT: CPT

## 2025-02-19 PROCEDURE — 0241U: CPT

## 2025-02-19 PROCEDURE — 76705 ECHO EXAM OF ABDOMEN: CPT

## 2025-02-19 PROCEDURE — 84145 PROCALCITONIN (PCT): CPT

## 2025-02-19 PROCEDURE — 72125 CT NECK SPINE W/O DYE: CPT | Mod: MC

## 2025-02-19 PROCEDURE — 80048 BASIC METABOLIC PNL TOTAL CA: CPT

## 2025-02-19 PROCEDURE — 82803 BLOOD GASES ANY COMBINATION: CPT

## 2025-02-19 PROCEDURE — 84295 ASSAY OF SERUM SODIUM: CPT

## 2025-02-19 PROCEDURE — 94640 AIRWAY INHALATION TREATMENT: CPT

## 2025-02-19 PROCEDURE — 84132 ASSAY OF SERUM POTASSIUM: CPT

## 2025-02-19 PROCEDURE — 94760 N-INVAS EAR/PLS OXIMETRY 1: CPT

## 2025-02-19 PROCEDURE — 85018 HEMOGLOBIN: CPT

## 2025-02-19 PROCEDURE — 87637 SARSCOV2&INF A&B&RSV AMP PRB: CPT

## 2025-02-19 PROCEDURE — 73562 X-RAY EXAM OF KNEE 3: CPT

## 2025-02-19 PROCEDURE — 74177 CT ABD & PELVIS W/CONTRAST: CPT | Mod: MC

## 2025-02-19 PROCEDURE — 87186 SC STD MICRODIL/AGAR DIL: CPT

## 2025-02-19 PROCEDURE — 82435 ASSAY OF BLOOD CHLORIDE: CPT

## 2025-02-19 PROCEDURE — 86140 C-REACTIVE PROTEIN: CPT

## 2025-02-19 PROCEDURE — 87040 BLOOD CULTURE FOR BACTERIA: CPT

## 2025-02-19 PROCEDURE — 99285 EMERGENCY DEPT VISIT HI MDM: CPT | Mod: 25

## 2025-02-19 PROCEDURE — 0225U NFCT DS DNA&RNA 21 SARSCOV2: CPT

## 2025-02-19 PROCEDURE — 85027 COMPLETE CBC AUTOMATED: CPT

## 2025-02-19 RX ORDER — PREDNISONE 20 MG/1
1 TABLET ORAL
Qty: 0 | Refills: 0 | DISCHARGE
Start: 2025-02-19 | End: 2025-02-24

## 2025-02-19 RX ADMIN — Medication 1 APPLICATION(S): at 05:36

## 2025-02-19 RX ADMIN — Medication 650 MILLIGRAM(S): at 10:15

## 2025-02-19 RX ADMIN — RANOLAZINE 500 MILLIGRAM(S): 1000 TABLET, FILM COATED, EXTENDED RELEASE ORAL at 05:37

## 2025-02-19 RX ADMIN — PREDNISONE 20 MILLIGRAM(S): 20 TABLET ORAL at 05:37

## 2025-02-19 RX ADMIN — Medication 25 MILLIGRAM(S): at 05:37

## 2025-02-19 RX ADMIN — Medication 325 MILLIGRAM(S): at 05:37

## 2025-02-19 RX ADMIN — GABAPENTIN 600 MILLIGRAM(S): 400 CAPSULE ORAL at 05:37

## 2025-02-19 NOTE — DISCHARGE NOTE PROVIDER - NSDCCPCAREPLAN_GEN_ALL_CORE_FT
PRINCIPAL DISCHARGE DIAGNOSIS  Diagnosis: Influenza A  Assessment and Plan of Treatment: Completed Tamiflu.      SECONDARY DISCHARGE DIAGNOSES  Diagnosis: Acute hypoxic respiratory failure  Assessment and Plan of Treatment:     Diagnosis: Proctitis  Assessment and Plan of Treatment:      PRINCIPAL DISCHARGE DIAGNOSIS  Diagnosis: Influenza A  Assessment and Plan of Treatment: Completed Tamiflu.      SECONDARY DISCHARGE DIAGNOSES  Diagnosis: Acute hypoxic respiratory failure  Assessment and Plan of Treatment: Resolved  Stop steroids after 5 days ( end date 2/23)    Diagnosis: Proctitis  Assessment and Plan of Treatment: Completed course of antibiotics as prescribed

## 2025-02-19 NOTE — DISCHARGE NOTE PROVIDER - HOSPITAL COURSE
89 year old female with Hypertension, Dyslipidemia, YULISA DVT, Atrial fibrillation, GERD, CAD s/p PCI, AS s/p TAVR, CHF with preserved EF, Spinal stenosis, Chronic back pain and neuropathy presented with weakness, coughing and diarrhea. Recently diagnosed with Influenza. Hypoxic in ER requiring supplemental Oxygen Low grade fever with leukocytosis in ER. CT abdomen significant for proctitis / equivocal findings  for cholecystitis. RVP (+) for Influenza A. Seen by surgery, no intervention, Urine culture with E. coli, treated with IV  Zosyn for donna / UTI both asymptomatic. Received IV steroids transitioned to PO prednisone. Stable for DC to [facility].

## 2025-02-19 NOTE — DISCHARGE NOTE PROVIDER - DETAILS OF MALNUTRITION DIAGNOSIS/DIAGNOSES
This patient has been assessed with a concern for Malnutrition and was treated during this hospitalization for the following Nutrition diagnosis/diagnoses:     -  02/14/2025: Moderate protein-calorie malnutrition

## 2025-02-19 NOTE — DISCHARGE NOTE PROVIDER - PROVIDER TOKENS
PROVIDER:[TOKEN:[[id number]]] PROVIDER:[TOKEN:[[id number]]],FREE:[LAST:[PCP],PHONE:[(   )    -],FAX:[(   )    -],FOLLOWUP:[2 weeks]]

## 2025-02-19 NOTE — DISCHARGE NOTE PROVIDER - CARE PROVIDERS DIRECT ADDRESSES
Per chart review, Pt has CORY appt with WS on 06/17/24.   ,[email or web address],DirectAddress_Unknown

## 2025-02-19 NOTE — DISCHARGE NOTE PROVIDER - ATTENDING DISCHARGE PHYSICAL EXAMINATION:
Vital Signs Last 24 Hrs  T(F): 97.5 (19 Feb 2025 07:49), Max: 98.4 (18 Feb 2025 15:34)  HR: 75 (19 Feb 2025 07:49) (75 - 98)  BP: 130/72 (19 Feb 2025 07:49) (113/70 - 144/79)  RR: 18 (19 Feb 2025 07:49) (18 - 18)  SpO2: 98% (19 Feb 2025 07:49) (94% - 99%)    Physical Exam:  Constitutional: NAD  HEENT: NC/AT  Respiratory: CTA bilaterally, symmetrical chest rise  Cardiovascular: RRR, no m/g/r  Gastrointestinal: Soft, NT/ND, BS+  Vascular: 2+ peripheral pulses  Neurological: Alert, no focal neurological deficits  Psych: Fair mood/affect  Musculoskeletal: No edema, cyanosis, deformities. ROM normal  Skin: No obvious rash, lesions. No jaundice.

## 2025-02-19 NOTE — DISCHARGE NOTE PROVIDER - NSDCMRMEDTOKEN_GEN_ALL_CORE_FT
atorvastatin 20 mg oral tablet: 1 tab(s) orally once a day (at bedtime)  FeroSul 325 mg (65 mg elemental iron) oral tablet: 1 tab(s) orally 2 times a day  gabapentin 600 mg oral tablet: 1 tab(s) orally 3 times a day  Lasix 40 mg oral tablet: 1 tab(s) orally once a day  ranolazine 500 mg oral tablet, extended release: 1 tab(s) orally 2 times a day  rivaroxaban 15 mg oral tablet: 1 tab(s) orally once a day (before a meal)   atorvastatin 20 mg oral tablet: 1 tab(s) orally once a day (at bedtime)  FeroSul 325 mg (65 mg elemental iron) oral tablet: 1 tab(s) orally 2 times a day  gabapentin 600 mg oral tablet: 1 tab(s) orally 3 times a day  hydrALAZINE 25 mg oral tablet: 1 tab(s) orally every 8 hours  Lasix 40 mg oral tablet: 1 tab(s) orally once a day  predniSONE 20 mg oral tablet: 1 tab(s) orally once a day  ranolazine 500 mg oral tablet, extended release: 1 tab(s) orally 2 times a day  rivaroxaban 15 mg oral tablet: 1 tab(s) orally once a day (before a meal)

## 2025-02-19 NOTE — DISCHARGE NOTE PROVIDER - CARE PROVIDER_API CALL
Ani Montez  Surgical Critical Care  [address] [place] [id number]  Phone: [phone]  Fax: [phone]  Follow Up Time:    Ani Montez  Surgical Critical Care  [address] [place] [id number]  Phone: [phone]  Fax: [phone]  Follow Up Time:     PCP,   Phone: (   )    -  Fax: (   )    -  Follow Up Time: 2 weeks

## 2025-03-09 ENCOUNTER — INPATIENT (INPATIENT)
Facility: HOSPITAL | Age: 89
LOS: 3 days | Discharge: NURSING, REHAB OR LONG-TERM CARE | DRG: 690 | End: 2025-03-13
Attending: FAMILY MEDICINE | Admitting: HOSPITALIST
Payer: MEDICARE

## 2025-03-09 VITALS
DIASTOLIC BLOOD PRESSURE: 67 MMHG | OXYGEN SATURATION: 98 % | HEIGHT: 58 IN | RESPIRATION RATE: 18 BRPM | SYSTOLIC BLOOD PRESSURE: 117 MMHG | WEIGHT: 100.09 LBS | TEMPERATURE: 98 F | HEART RATE: 81 BPM

## 2025-03-09 DIAGNOSIS — Z95.3 PRESENCE OF XENOGENIC HEART VALVE: Chronic | ICD-10-CM

## 2025-03-09 DIAGNOSIS — Z98.1 ARTHRODESIS STATUS: Chronic | ICD-10-CM

## 2025-03-09 LAB
ALBUMIN SERPL ELPH-MCNC: 3.3 G/DL — SIGNIFICANT CHANGE UP (ref 3.3–5.2)
ALP SERPL-CCNC: 131 U/L — HIGH (ref 40–120)
ALT FLD-CCNC: 34 U/L — HIGH
ANION GAP SERPL CALC-SCNC: 10 MMOL/L — SIGNIFICANT CHANGE UP (ref 5–17)
ANISOCYTOSIS BLD QL: SLIGHT — SIGNIFICANT CHANGE UP
APPEARANCE UR: ABNORMAL
APTT BLD: 29.9 SEC — SIGNIFICANT CHANGE UP (ref 24.5–35.6)
APTT BLD: 32.2 SEC — SIGNIFICANT CHANGE UP (ref 24.5–35.6)
AST SERPL-CCNC: 41 U/L — HIGH
BACTERIA # UR AUTO: ABNORMAL /HPF
BASOPHILS # BLD AUTO: 0 K/UL — SIGNIFICANT CHANGE UP (ref 0–0.2)
BASOPHILS # BLD MANUAL: 0.06 K/UL — SIGNIFICANT CHANGE UP (ref 0–0.2)
BASOPHILS NFR BLD AUTO: 0 % — SIGNIFICANT CHANGE UP (ref 0–2)
BASOPHILS NFR BLD MANUAL: 0.9 % — SIGNIFICANT CHANGE UP (ref 0–2)
BILIRUB SERPL-MCNC: 0.4 MG/DL — SIGNIFICANT CHANGE UP (ref 0.4–2)
BILIRUB UR-MCNC: NEGATIVE — SIGNIFICANT CHANGE UP
BLD GP AB SCN SERPL QL: SIGNIFICANT CHANGE UP
BUN SERPL-MCNC: 25.5 MG/DL — HIGH (ref 8–20)
CALCIUM SERPL-MCNC: 8.9 MG/DL — SIGNIFICANT CHANGE UP (ref 8.4–10.5)
CHLORIDE SERPL-SCNC: 104 MMOL/L — SIGNIFICANT CHANGE UP (ref 96–108)
CO2 SERPL-SCNC: 29 MMOL/L — SIGNIFICANT CHANGE UP (ref 22–29)
COLOR SPEC: ABNORMAL
CREAT SERPL-MCNC: 0.92 MG/DL — SIGNIFICANT CHANGE UP (ref 0.5–1.3)
DIFF PNL FLD: ABNORMAL
EGFR: 58 ML/MIN/1.73M2 — LOW
EGFR: 58 ML/MIN/1.73M2 — LOW
EOSINOPHIL # BLD AUTO: 0.01 K/UL — SIGNIFICANT CHANGE UP (ref 0–0.5)
EOSINOPHIL # BLD MANUAL: 0.06 K/UL — SIGNIFICANT CHANGE UP (ref 0–0.5)
EOSINOPHIL NFR BLD AUTO: 0.2 % — SIGNIFICANT CHANGE UP (ref 0–6)
EOSINOPHIL NFR BLD MANUAL: 0.9 % — SIGNIFICANT CHANGE UP (ref 0–6)
GLUCOSE SERPL-MCNC: 95 MG/DL — SIGNIFICANT CHANGE UP (ref 70–99)
GLUCOSE UR QL: NEGATIVE MG/DL — SIGNIFICANT CHANGE UP
HCT VFR BLD CALC: 30.9 % — LOW (ref 34.5–45)
HGB BLD-MCNC: 9.8 G/DL — LOW (ref 11.5–15.5)
HYPOCHROMIA BLD QL: SLIGHT — SIGNIFICANT CHANGE UP
IMM GRANULOCYTES # BLD AUTO: 0.12 K/UL — HIGH (ref 0–0.07)
IMM GRANULOCYTES NFR BLD AUTO: 1.9 % — HIGH (ref 0–0.9)
INR BLD: 1.22 RATIO — HIGH (ref 0.85–1.16)
INR BLD: 1.24 RATIO — HIGH (ref 0.85–1.16)
KETONES UR-MCNC: NEGATIVE MG/DL — SIGNIFICANT CHANGE UP
LEUKOCYTE ESTERASE UR-ACNC: ABNORMAL
LYMPHOCYTES # BLD AUTO: 1.09 K/UL — SIGNIFICANT CHANGE UP (ref 1–3.3)
LYMPHOCYTES # BLD MANUAL: 1.08 K/UL — SIGNIFICANT CHANGE UP (ref 1–3.3)
LYMPHOCYTES NFR BLD AUTO: 17.6 % — SIGNIFICANT CHANGE UP (ref 13–44)
LYMPHOCYTES NFR BLD MANUAL: 17.4 % — SIGNIFICANT CHANGE UP (ref 13–44)
MCHC RBC-ENTMCNC: 29.5 PG — SIGNIFICANT CHANGE UP (ref 27–34)
MCHC RBC-ENTMCNC: 31.7 G/DL — LOW (ref 32–36)
MCV RBC AUTO: 93.1 FL — SIGNIFICANT CHANGE UP (ref 80–100)
MONOCYTES # BLD AUTO: 1.61 K/UL — HIGH (ref 0–0.9)
MONOCYTES # BLD MANUAL: 1.19 K/UL — HIGH (ref 0–0.9)
MONOCYTES NFR BLD AUTO: 25.9 % — HIGH (ref 2–14)
MONOCYTES NFR BLD MANUAL: 19.1 % — HIGH (ref 2–14)
NEUTROPHILS # BLD AUTO: 3.38 K/UL — SIGNIFICANT CHANGE UP (ref 1.8–7.4)
NEUTROPHILS # BLD MANUAL: 3.83 K/UL — SIGNIFICANT CHANGE UP (ref 1.8–7.4)
NEUTROPHILS NFR BLD AUTO: 54.4 % — SIGNIFICANT CHANGE UP (ref 43–77)
NEUTROPHILS NFR BLD MANUAL: 61.7 % — SIGNIFICANT CHANGE UP (ref 43–77)
NITRITE UR-MCNC: POSITIVE
NRBC # BLD AUTO: 0 K/UL — SIGNIFICANT CHANGE UP (ref 0–0)
NRBC # FLD: 0 K/UL — SIGNIFICANT CHANGE UP (ref 0–0)
NRBC BLD AUTO-RTO: 0 /100 WBCS — SIGNIFICANT CHANGE UP (ref 0–0)
OB PNL STL: NEGATIVE — SIGNIFICANT CHANGE UP
OVALOCYTES BLD QL SMEAR: SLIGHT — SIGNIFICANT CHANGE UP
PH UR: >=9 (ref 5–8)
PLAT MORPH BLD: NORMAL — SIGNIFICANT CHANGE UP
PLATELET # BLD AUTO: 162 K/UL — SIGNIFICANT CHANGE UP (ref 150–400)
PMV BLD: 11 FL — SIGNIFICANT CHANGE UP (ref 7–13)
POIKILOCYTOSIS BLD QL AUTO: SLIGHT — SIGNIFICANT CHANGE UP
POLYCHROMASIA BLD QL SMEAR: SLIGHT — SIGNIFICANT CHANGE UP
POTASSIUM SERPL-MCNC: 4.8 MMOL/L — SIGNIFICANT CHANGE UP (ref 3.5–5.3)
POTASSIUM SERPL-SCNC: 4.8 MMOL/L — SIGNIFICANT CHANGE UP (ref 3.5–5.3)
PROT SERPL-MCNC: 5.8 G/DL — LOW (ref 6.6–8.7)
PROT UR-MCNC: NEGATIVE MG/DL — SIGNIFICANT CHANGE UP
PROTHROM AB SERPL-ACNC: 14.1 SEC — HIGH (ref 9.9–13.4)
PROTHROM AB SERPL-ACNC: 14.3 SEC — HIGH (ref 9.9–13.4)
RBC # BLD: 3.32 M/UL — LOW (ref 3.8–5.2)
RBC # FLD: 15.9 % — HIGH (ref 10.3–14.5)
RBC BLD AUTO: ABNORMAL
RBC CASTS # UR COMP ASSIST: >1900 /HPF — HIGH (ref 0–4)
SODIUM SERPL-SCNC: 143 MMOL/L — SIGNIFICANT CHANGE UP (ref 135–145)
SP GR SPEC: 1.03 — SIGNIFICANT CHANGE UP (ref 1–1.03)
SQUAMOUS # UR AUTO: 0 /HPF — SIGNIFICANT CHANGE UP (ref 0–5)
UROBILINOGEN FLD QL: 0.2 MG/DL — SIGNIFICANT CHANGE UP (ref 0.2–1)
WBC # BLD: 6.21 K/UL — SIGNIFICANT CHANGE UP (ref 3.8–10.5)
WBC # FLD AUTO: 6.21 K/UL — SIGNIFICANT CHANGE UP (ref 3.8–10.5)
WBC UR QL: 31 /HPF — HIGH (ref 0–5)

## 2025-03-09 PROCEDURE — 99285 EMERGENCY DEPT VISIT HI MDM: CPT | Mod: GC

## 2025-03-09 RX ORDER — CEFPODOXIME PROXETIL 200 MG/1
1 TABLET, FILM COATED ORAL
Refills: 0 | DISCHARGE
Start: 2025-03-09

## 2025-03-09 NOTE — ED ADULT NURSE NOTE - NSFALLFACTORS_ED_ALL_ED
Bedside and Verbal shift change report given to [address] (oncoming nurse) by Marco Whitaker RN (offgoing nurse). Report included the following information SBAR, Kardex, Intake/Output, MAR and Recent Results. Other

## 2025-03-09 NOTE — ED ADULT TRIAGE NOTE - CHIEF COMPLAINT QUOTE
BIBA from NH for pain with urination x 1 month, associated with intermittent hematuria. As per EMS NH also saw dark stools. Pt on 2L NC baseline

## 2025-03-09 NOTE — ED PROVIDER NOTE - PROGRESS NOTE DETAILS
Patient received as sign out pending CT imaging. Resting comfortably in bed. HD stable. CT back showing bladder nodularity and air levels, likely due to instrumentation as patient was straight cath for urine prior to imaging.   Hgb downtrending with persistent gross hematuria. Will bladder scan if retaining might benefit from a gilliland catheter. To be admitted at this time for further eval and H/H monitoring.  Samer Amrita Blue MD Victor Hugo CARVER: Received patient in signout.  Patient with decreasing hemoglobin in the setting of hemorrhagic cystitis and on blood thinners.  Also with altered mental status as per nursing home paperwork.  Decision made to admit for further treatment.

## 2025-03-09 NOTE — ED PROVIDER NOTE - DIFFERENTIAL DIAGNOSIS
DDX includes but not limited to:  hematuria vs vaginal bleeding vs rectal bleeding? Differential Diagnosis

## 2025-03-09 NOTE — ED PROVIDER NOTE - PHYSICAL EXAMINATION
General: well appearing, no acute distress, appropriate weight  Cardiac: heart RRR, no murmurs, rubs, gallops, pulses 2+ x4  Pulm: lungs CTA  GI: abdomen soft, nontender, negative weber's, McBurney's, rovsings, rebound, CVA tenderness  :  Skin: warm, dry, no rash, laceration, abrasion, contusion General: well appearing, no acute distress, appropriate weight  Cardiac: heart RRR, no murmurs, rubs, gallops, pulses 2+ x4  Pulm: lungs CTA  GI: abdomen soft, nontender, negative weber's, McBurney's, rovsings, rebound, CVA tenderness  : some swelling to BL labia. black stool per rectum.   Skin: warm, dry, no rash, laceration, abrasion, contusion General: well appearing, no acute distress, appropriate weight  Cardiac: heart RRR, no murmurs, rubs, gallops, pulses 2+ x4  Pulm: lungs CTA  GI: abdomen soft, nontender, negative weber's, McBurney's, rovsings, rebound, CVA tenderness  : some swelling to BL labia. black stool per rectum. bright red blood in diaper, does not appear to be coming from vagina - Chaperoned by LOLIS Almonte  Skin: warm, dry, no rash, laceration, abrasion, contusion

## 2025-03-09 NOTE — ED PROVIDER NOTE - CLINICAL SUMMARY MEDICAL DECISION MAKING FREE TEXT BOX
Patient is a  89-year-old female with history of DVT on rivaroxaban, heart failure, aortic stenosis who presents emergency department for bleeding in the groin area.  Patient lives in a nursing facility.  Is unsure if this was from her vagina or her urine or her stool.  Patient states she has been treated for this at the nursing facility but stated she did not want any more treatment for them, and spoke with daughter-in-law who suggested she come to the emergency department.  Patient denies fever, chills, nausea, vomiting, diarrhea, constipation, abdominal pain.  Does endorse dysuria.      Will obtain labs and reevaluate.

## 2025-03-09 NOTE — ED ADULT NURSE NOTE - NSFALLHARMRISKINTERV_ED_ALL_ED
Assistance OOB with selected safe patient handling equipment if applicable/Communicate risk of Fall with Harm to all staff, patient, and family/Provide visual cue: red socks, yellow wristband, yellow gown, etc/Reinforce activity limits and safety measures with patient and family/Bed in lowest position, wheels locked, appropriate side rails in place/Call bell, personal items and telephone in reach/Instruct patient to call for assistance before getting out of bed/chair/stretcher/Non-slip footwear applied when patient is off stretcher/[place] to call system/Physically safe environment - no spills, clutter or unnecessary equipment/Purposeful Proactive Rounding/Room/bathroom lighting operational, light cord in reach

## 2025-03-10 DIAGNOSIS — N30.01 ACUTE CYSTITIS WITH HEMATURIA: ICD-10-CM

## 2025-03-10 LAB
ALBUMIN SERPL ELPH-MCNC: 3.4 G/DL — SIGNIFICANT CHANGE UP (ref 3.3–5.2)
ALP SERPL-CCNC: 135 U/L — HIGH (ref 40–120)
ALT FLD-CCNC: 31 U/L — SIGNIFICANT CHANGE UP
ANION GAP SERPL CALC-SCNC: 10 MMOL/L — SIGNIFICANT CHANGE UP (ref 5–17)
AST SERPL-CCNC: 33 U/L — HIGH
BILIRUB SERPL-MCNC: 0.3 MG/DL — LOW (ref 0.4–2)
BUN SERPL-MCNC: 21.3 MG/DL — HIGH (ref 8–20)
CALCIUM SERPL-MCNC: 8.9 MG/DL — SIGNIFICANT CHANGE UP (ref 8.4–10.5)
CHLORIDE SERPL-SCNC: 104 MMOL/L — SIGNIFICANT CHANGE UP (ref 96–108)
CO2 SERPL-SCNC: 28 MMOL/L — SIGNIFICANT CHANGE UP (ref 22–29)
CREAT SERPL-MCNC: 0.89 MG/DL — SIGNIFICANT CHANGE UP (ref 0.5–1.3)
CRP SERPL-MCNC: 5 MG/L — HIGH
EGFR: 61 ML/MIN/1.73M2 — SIGNIFICANT CHANGE UP
EGFR: 61 ML/MIN/1.73M2 — SIGNIFICANT CHANGE UP
GLUCOSE SERPL-MCNC: 151 MG/DL — HIGH (ref 70–99)
HCT VFR BLD CALC: 28.7 % — LOW (ref 34.5–45)
HCT VFR BLD CALC: 32.4 % — LOW (ref 34.5–45)
HGB BLD-MCNC: 10.2 G/DL — LOW (ref 11.5–15.5)
HGB BLD-MCNC: 9.2 G/DL — LOW (ref 11.5–15.5)
MCHC RBC-ENTMCNC: 30 PG — SIGNIFICANT CHANGE UP (ref 27–34)
MCHC RBC-ENTMCNC: 32.1 G/DL — SIGNIFICANT CHANGE UP (ref 32–36)
MCV RBC AUTO: 93.5 FL — SIGNIFICANT CHANGE UP (ref 80–100)
NRBC # BLD AUTO: 0 K/UL — SIGNIFICANT CHANGE UP (ref 0–0)
NRBC # FLD: 0 K/UL — SIGNIFICANT CHANGE UP (ref 0–0)
NRBC BLD AUTO-RTO: 0 /100 WBCS — SIGNIFICANT CHANGE UP (ref 0–0)
NT-PROBNP SERPL-SCNC: 3392 PG/ML — HIGH (ref 0–300)
PLATELET # BLD AUTO: 149 K/UL — LOW (ref 150–400)
PMV BLD: 11 FL — SIGNIFICANT CHANGE UP (ref 7–13)
POTASSIUM SERPL-MCNC: 4 MMOL/L — SIGNIFICANT CHANGE UP (ref 3.5–5.3)
POTASSIUM SERPL-SCNC: 4 MMOL/L — SIGNIFICANT CHANGE UP (ref 3.5–5.3)
PROCALCITONIN SERPL-MCNC: 0.05 NG/ML — SIGNIFICANT CHANGE UP (ref 0.02–0.1)
PROT SERPL-MCNC: 5.8 G/DL — LOW (ref 6.6–8.7)
RBC # BLD: 3.07 M/UL — LOW (ref 3.8–5.2)
RBC # FLD: 16 % — HIGH (ref 10.3–14.5)
SODIUM SERPL-SCNC: 142 MMOL/L — SIGNIFICANT CHANGE UP (ref 135–145)
WBC # BLD: 5.77 K/UL — SIGNIFICANT CHANGE UP (ref 3.8–10.5)
WBC # FLD AUTO: 5.77 K/UL — SIGNIFICANT CHANGE UP (ref 3.8–10.5)

## 2025-03-10 PROCEDURE — 74177 CT ABD & PELVIS W/CONTRAST: CPT | Mod: 26

## 2025-03-10 PROCEDURE — 71045 X-RAY EXAM CHEST 1 VIEW: CPT | Mod: 26

## 2025-03-10 PROCEDURE — 99223 1ST HOSP IP/OBS HIGH 75: CPT

## 2025-03-10 PROCEDURE — 99497 ADVNCD CARE PLAN 30 MIN: CPT | Mod: 25

## 2025-03-10 RX ORDER — BISACODYL 5 MG
10 TABLET, DELAYED RELEASE (ENTERIC COATED) ORAL DAILY
Refills: 0 | Status: DISCONTINUED | OUTPATIENT
Start: 2025-03-10 | End: 2025-03-13

## 2025-03-10 RX ORDER — RANOLAZINE 1000 MG/1
500 TABLET, FILM COATED, EXTENDED RELEASE ORAL
Refills: 0 | Status: DISCONTINUED | OUTPATIENT
Start: 2025-03-10 | End: 2025-03-13

## 2025-03-10 RX ORDER — PHENAZOPYRIDINE HCL 100 MG
100 TABLET ORAL EVERY 8 HOURS
Refills: 0 | Status: COMPLETED | OUTPATIENT
Start: 2025-03-10 | End: 2025-03-11

## 2025-03-10 RX ORDER — CEFTRIAXONE 500 MG/1
1000 INJECTION, POWDER, FOR SOLUTION INTRAMUSCULAR; INTRAVENOUS ONCE
Refills: 0 | Status: DISCONTINUED | OUTPATIENT
Start: 2025-03-10 | End: 2025-03-10

## 2025-03-10 RX ORDER — FERROUS SULFATE 137(45) MG
1 TABLET, EXTENDED RELEASE ORAL
Refills: 0 | DISCHARGE

## 2025-03-10 RX ORDER — ONDANSETRON HCL/PF 4 MG/2 ML
4 VIAL (ML) INJECTION EVERY 6 HOURS
Refills: 0 | Status: DISCONTINUED | OUTPATIENT
Start: 2025-03-10 | End: 2025-03-13

## 2025-03-10 RX ORDER — ACETAMINOPHEN 500 MG/5ML
650 LIQUID (ML) ORAL EVERY 6 HOURS
Refills: 0 | Status: DISCONTINUED | OUTPATIENT
Start: 2025-03-10 | End: 2025-03-13

## 2025-03-10 RX ORDER — RIVAROXABAN 10 MG/1
15 TABLET, FILM COATED ORAL
Refills: 0 | Status: DISCONTINUED | OUTPATIENT
Start: 2025-03-10 | End: 2025-03-13

## 2025-03-10 RX ORDER — MELATONIN 5 MG
3 TABLET ORAL AT BEDTIME
Refills: 0 | Status: DISCONTINUED | OUTPATIENT
Start: 2025-03-10 | End: 2025-03-13

## 2025-03-10 RX ORDER — CEFTRIAXONE 500 MG/1
1000 INJECTION, POWDER, FOR SOLUTION INTRAMUSCULAR; INTRAVENOUS EVERY 24 HOURS
Refills: 0 | Status: DISCONTINUED | OUTPATIENT
Start: 2025-03-10 | End: 2025-03-13

## 2025-03-10 RX ORDER — ATORVASTATIN CALCIUM 80 MG/1
20 TABLET, FILM COATED ORAL AT BEDTIME
Refills: 0 | Status: DISCONTINUED | OUTPATIENT
Start: 2025-03-10 | End: 2025-03-13

## 2025-03-10 RX ORDER — GABAPENTIN 400 MG/1
600 CAPSULE ORAL THREE TIMES A DAY
Refills: 0 | Status: DISCONTINUED | OUTPATIENT
Start: 2025-03-10 | End: 2025-03-13

## 2025-03-10 RX ORDER — ACETAMINOPHEN 500 MG/5ML
2 LIQUID (ML) ORAL
Refills: 0 | DISCHARGE

## 2025-03-10 RX ORDER — FERROUS SULFATE 137(45) MG
325 TABLET, EXTENDED RELEASE ORAL
Refills: 0 | Status: DISCONTINUED | OUTPATIENT
Start: 2025-03-10 | End: 2025-03-13

## 2025-03-10 RX ORDER — CEFTRIAXONE 500 MG/1
1000 INJECTION, POWDER, FOR SOLUTION INTRAMUSCULAR; INTRAVENOUS ONCE
Refills: 0 | Status: COMPLETED | OUTPATIENT
Start: 2025-03-10 | End: 2025-03-10

## 2025-03-10 RX ADMIN — Medication 25 MILLIGRAM(S): at 14:48

## 2025-03-10 RX ADMIN — GABAPENTIN 600 MILLIGRAM(S): 400 CAPSULE ORAL at 14:48

## 2025-03-10 RX ADMIN — Medication 100 MILLIGRAM(S): at 09:45

## 2025-03-10 RX ADMIN — Medication 10 MILLIGRAM(S): at 06:21

## 2025-03-10 RX ADMIN — Medication 20 MILLIGRAM(S): at 19:41

## 2025-03-10 RX ADMIN — ATORVASTATIN CALCIUM 20 MILLIGRAM(S): 80 TABLET, FILM COATED ORAL at 22:02

## 2025-03-10 RX ADMIN — Medication 100 MILLIGRAM(S): at 22:02

## 2025-03-10 RX ADMIN — Medication 25 MILLIGRAM(S): at 06:21

## 2025-03-10 RX ADMIN — Medication 25 MILLIGRAM(S): at 22:02

## 2025-03-10 RX ADMIN — Medication 325 MILLIGRAM(S): at 19:40

## 2025-03-10 RX ADMIN — Medication 1 APPLICATION(S): at 11:39

## 2025-03-10 RX ADMIN — GABAPENTIN 600 MILLIGRAM(S): 400 CAPSULE ORAL at 06:21

## 2025-03-10 RX ADMIN — RANOLAZINE 500 MILLIGRAM(S): 1000 TABLET, FILM COATED, EXTENDED RELEASE ORAL at 06:21

## 2025-03-10 RX ADMIN — Medication 100 MILLIGRAM(S): at 14:48

## 2025-03-10 RX ADMIN — RANOLAZINE 500 MILLIGRAM(S): 1000 TABLET, FILM COATED, EXTENDED RELEASE ORAL at 19:40

## 2025-03-10 RX ADMIN — Medication 20 MILLIGRAM(S): at 06:21

## 2025-03-10 RX ADMIN — GABAPENTIN 600 MILLIGRAM(S): 400 CAPSULE ORAL at 22:02

## 2025-03-10 RX ADMIN — CEFTRIAXONE 1000 MILLIGRAM(S): 500 INJECTION, POWDER, FOR SOLUTION INTRAMUSCULAR; INTRAVENOUS at 01:08

## 2025-03-10 RX ADMIN — RIVAROXABAN 15 MILLIGRAM(S): 10 TABLET, FILM COATED ORAL at 19:40

## 2025-03-10 RX ADMIN — Medication 325 MILLIGRAM(S): at 06:21

## 2025-03-10 NOTE — CONSULT NOTE ADULT - SUBJECTIVE AND OBJECTIVE BOX
HPI: 89F with extensive PMH including afib on xarelto presents with chief complaint of hematuria.           PAST MEDICAL & SURGICAL HISTORY:  Essential hypertension    Chronic back pain    Hyperlipidemia, unspecified hyperlipidemia type    PNA (pneumonia)  2017    Lung nodule    Thyroid nodule    Acute deep vein thrombosis (DVT) of popliteal vein of both lower extremities    Pelvic fracture    Closed fracture of multiple ribs of right side, initial encounter    Aortic stenosis  s/p TAVR bioprosthetic 2018 [facility] Dr. Aragon    Atherosclerosis of native coronary artery of native heart with angina pectoris    Hypercholesteremia    AF (atrial fibrillation)  Persistent/chronic    AV block    Bronchiectasis    Kyphoscoliosis and scoliosis    Stenocardia    Spinal stenosis    Murmur, cardiac  gr3/6    Neuropathy    Diastolic CHF  NYHA class 3    Anxiety    GERD (gastroesophageal reflux disease)    Closed pelvic ring fracture    Hip arthritis    [place] (hard of hearing)  wears bilateral hearing aides    History of valvular heart disease    S/P spinal fusion  L spines      S/p TAVR (transcatheter aortic valve replacement), bioprosthetic              No Known Allergies      T(C): 36.5 (03-10-25 @ 03:27), Max: 36.9 (25 @ 18:02)  HR: 75 (03-10-25 @ 03:27) (75 - 81)  BP: 122/69 (03-10-25 @ 03:27) (117/67 - 138/95)  RR: 18 (03-10-25 @ 03:27) (18 - 18)  SpO2: 100% (03-10-25 @ 03:27) (94% - 100%)                              9.2    5.77  )-----------( 149      ( 10 Mar 2025 01:05 )             28.7           143  |  104  |  25.5[H]  ----------------------------<  95  4.8   |  29.0  |  0.92    Ca    8.9      09 Mar 2025 19:10    TPro  5.8[L]  /  Alb  3.3  /  TBili  0.4  /  DBili  x   /  AST  41[H]  /  ALT  34[H]  /  AlkPhos  131[H]        Urinalysis Basic - ( 09 Mar 2025 20:45 )    Color: Red / Appearance: Turbid / S.029 / pH: x  Gluc: x / Ketone: Negative mg/dL  / Bili: Negative / Urobili: 0.2 mg/dL   Blood: x / Protein: Negative mg/dL / Nitrite: Positive   Leuk Esterase: Large / RBC: >1900 /HPF / WBC 31 /HPF   Sq Epi: x / Non Sq Epi: 0 /HPF / Bacteria: Occasional /HPF        Radiology:     < from: CT Abdomen and Pelvis w/ IV Cont (03.10.25 @ 00:28) >  IMPRESSION:  Marked hyperemia of the urinary bladder urothelium, particularly   posteriorly where there is associated nodularity. Urothelial hyperemia is   compatible with cystitis. Areas of nodularity may be related to   underdistention of the bladder though possibility of urothelial lesion   posteriorly cannot be excluded.     < end of copied text >     HPI: 89F with extensive PMH including afib on xarelto presents with chief complaint of bleeding in urine x 3 days. Patient reports h/o intermittent hematuria "for the past few months" but has had new onset of hematuria 3 days ago. Pt also reports some dysuria and urinary frequency. Denies any fever, nausea/vomiting, abdominal pain.     PAST MEDICAL & SURGICAL HISTORY:  Essential hypertension    Chronic back pain    Hyperlipidemia, unspecified hyperlipidemia type    PNA (pneumonia)  2017    Lung nodule    Thyroid nodule    Acute deep vein thrombosis (DVT) of popliteal vein of both lower extremities    Pelvic fracture    Closed fracture of multiple ribs of right side, initial encounter    Aortic stenosis  s/p TAVR bioprosthetic 2018 [facility] Dr. Aragon    Atherosclerosis of native coronary artery of native heart with angina pectoris    Hypercholesteremia    AF (atrial fibrillation)  Persistent/chronic    AV block    Bronchiectasis    Kyphoscoliosis and scoliosis    Stenocardia    Spinal stenosis    Murmur, cardiac  gr3/6    Neuropathy    Diastolic CHF  NYHA class 3    Anxiety    GERD (gastroesophageal reflux disease)    Closed pelvic ring fracture    Hip arthritis    [place] (hard of hearing)  wears bilateral hearing aides    History of valvular heart disease    S/P spinal fusion  L spines      S/p TAVR (transcatheter aortic valve replacement), bioprosthetic              No Known Allergies      T(C): 36.5 (03-10-25 @ 03:27), Max: 36.9 (25 @ 18:02)  HR: 75 (03-10-25 @ 03:27) (75 - 81)  BP: 122/69 (03-10-25 @ 03:27) (117/67 - 138/95)  RR: 18 (03-10-25 @ 03:27) (18 - 18)  SpO2: 100% (03-10-25 @ 03:27) (94% - 100%)                              9.2    5.77  )-----------( 149      ( 10 Mar 2025 01:05 )             28.7       -    143  |  104  |  25.5[H]  ----------------------------<  95  4.8   |  29.0  |  0.92    Ca    8.9      09 Mar 2025 19:10    TPro  5.8[L]  /  Alb  3.3  /  TBili  0.4  /  DBili  x   /  AST  41[H]  /  ALT  34[H]  /  AlkPhos  131[H]  -      Urinalysis Basic - ( 09 Mar 2025 20:45 )    Color: Red / Appearance: Turbid / S.029 / pH: x  Gluc: x / Ketone: Negative mg/dL  / Bili: Negative / Urobili: 0.2 mg/dL   Blood: x / Protein: Negative mg/dL / Nitrite: Positive   Leuk Esterase: Large / RBC: >1900 /HPF / WBC 31 /HPF   Sq Epi: x / Non Sq Epi: 0 /HPF / Bacteria: Occasional /HPF        Radiology:     < from: CT Abdomen and Pelvis w/ IV Cont (03.10.25 @ 00:28) >  IMPRESSION:  Marked hyperemia of the urinary bladder urothelium, particularly   posteriorly where there is associated nodularity. Urothelial hyperemia is   compatible with cystitis. Areas of nodularity may be related to   underdistention of the bladder though possibility of urothelial lesion   posteriorly cannot be excluded.     < end of copied text >

## 2025-03-10 NOTE — H&P ADULT - HISTORY OF PRESENT ILLNESS
89yoF hx CAD s/p PCI (2018), Afib on Xarelto, AS s/p TAVR, HFpEF, spinal stenosis, recently admitted to [facility] in 2/2025 for acute hypoxemic respiratory failure and sepsis in setting of influenza and proctitis, discharged to [facility], sent from facility for active bleeding.  Pt is awake, alert and conversant but limited historian due to hearing impairment.  Collateral hx obtained from daughter in law/HCP [place].  Pt has had 2 weeks of active bleeding coming from groin area which nursing home staff attributed to vaginal bleeding.  Pt had a GYN appointment scheduled for 3/24.  Daughter in law Venice went to visit pt today and ‘found her in a pool of bed’ and advised that she be taken to hospital.   On admission, UA w/ >1900 RBC. FOBT negative.  ED RN performed straight cath that had gross hematuria with blood clots. CT A/P showed marked hyperemia of the urinary bladder urothelium and nodularity in posterior bladder with inability to exclude urothelial lesion.  89yoF hx CAD s/p PCI (2018), Afib on Xarelto, AS s/p TAVR, HFpEF, spinal stenosis, recently admitted to [facility] in 2/2025 for acute hypoxemic respiratory failure and sepsis in setting of influenza and proctitis, discharged to [facility], sent from facility for active bleeding.  Pt is awake, alert and conversant but limited historian due to hearing impairment.  Collateral hx obtained from daughter in law/HCP [place].  Pt has had 2 weeks of active bleeding coming from groin area which nursing home staff attributed to vaginal bleeding.  Pt had a GYN appointment scheduled for 3/24.  Daughter in law Venice went to visit pt today and ‘found her in a pool of blood’ and advised that she be taken to hospital.   On admission, UA w/ >1900 RBC. FOBT negative.  ED RN performed straight cath that had gross hematuria with blood clots. CT A/P showed marked hyperemia of the urinary bladder urothelium and nodularity in posterior bladder with inability to exclude urothelial lesion.

## 2025-03-10 NOTE — PATIENT PROFILE ADULT - FALL HARM RISK - HARM RISK INTERVENTIONS

## 2025-03-10 NOTE — PATIENT PROFILE ADULT - FUNCTIONAL ASSESSMENT - DAILY ACTIVITY 3.
REMINDER: Under Reason For Call, comments MUST be formatted as:   (Surgeon's Initials) / (Procedure)      Special Instructions / FYI: Cardiac clearance pending.                                                  Level 3  Please call daughterNany for scheduling and instructions.  [phone].    Consent: I certify that patient has signed, printed, timed, and dated their surgery consent.  I certify that the patient's LEGAL NAME and DATE OF BIRTH are written in the upper left corner on BOTH sides of the consent.  I certify that BOTH sides of the completed surgery consent have been scanned into the patient's Epic chart by myself on 10/16/2024.  Yes, I have LABELED the consent in Epic as Consent for Vascular Procedure.     For Surgical Clearances     Levels   1-3   ROUTE this encounter to [facility] Clearance Pool (AND)   [facility] Surgery Coordinator Pool     Level   4   ROUTE this encounter to [facility] Surgery Coordinator Pool       HYDRATION CLEARANCES   ONLY ROUTE TO  [facility] Clearance Pool       Yes, I have ROUTED this encounter to [facility] Surgery Coordinator and/or [facility] Clearance Pool.             1 = Total assistance

## 2025-03-10 NOTE — H&P ADULT - CONVERSATION DETAILS
[facility] limited with pt due to hearing impairment.  Spoke with daughter in law Venice Garnica who states that she is the HCP and that pt is DNR/DNI.  There is prior MOLST from 2023 that was reviewed and is located in Patient Window. [facility] limited with pt due to hearing impairment.  Spoke with daughter in law Venice Garnica who states that she is the HCP and that pt is DNR/DNI.  There is prior MOLST from 2023 that states pt is DNR/DNI.  The MOLST was reviewed and is located in Patient Window.

## 2025-03-10 NOTE — PROGRESS NOTE ADULT - SUBJECTIVE AND OBJECTIVE BOX
Patient was seen and examined around 9:15 am.  Complaining of lower abdominal pain and vaginal burning. Explained to her that her symptoms are likely from Cystitis and we are treating that with IV Antibiotics and Bladder Irrigation due to Hematuria.   Asking medication to relieve burning.   CBI in progress, hematuria is clearing up.     ~ Continue current treatment.  ~ Add Pyridium.     Please refer to H&P from earlier today for more details.

## 2025-03-10 NOTE — H&P ADULT - LOCATION OF DISCUSSION
2/2 sepsis and hypoxia, reverted back into afib briefly overnight  - c/w eliquis  - Cardiology recs appreciate: c/w lopressor 25mg BID  - TTE result reviewed  - EP recs appreciated Telephone

## 2025-03-10 NOTE — H&P ADULT - ASSESSMENT
89yoF hx CAD s/p PCI (2018), Afib on Xarelto, AS s/p TAVR, HFpEF, spinal stenosis, recently admitted to [facility] in 2/2025 for acute hypoxemic respiratory failure and sepsis in setting of influenza and proctitis, discharged to [facility], presenting with 2 weeks of hematuria     Acute blood loss anemia due to hematuria  -Hematuria either from UTI exacerbated from AC or urothelial lesion  -Hgb decline noted (13 to 9 since last month)  -CT A/P reviewed, inability to exclude urothelial lesion  -Urology consulted, placed on CBI  -pRBC consent from HCP obtained  -Type and screen active  -s/p ceftriaxone given by ED  -Continue ceftriaxone given prior cx w/ pan sensitive E. coli, while awaiting new culture     Hx chronic hypoxemic respiratory failure  -Recent admission in 2/2025 for respiratory failure due to influenza   -Discharged on 2L NC, remains on baseline requirements   -Lower chest in CT A/P findings noted, likely related to chronic CHF, unlikely PNA  -CXR ordered, please follow up report     Hx HFpEF  -Previously on furosemide, was  not on transfer docs med rec  -Pt appears euvolemic on exam, however w/ small pleural effusions noted on CT A/P  -Follow up CXR report and proBNP result, may require resuming diuretic     Hx Afib  -On Xarelto, on hold due to ongoing blood loss from hematuria    Hx CAD s/p PCI (2018), HTN, HLD  -Ranolazine hydralazine, atorvastatin     Hx chronic back pain due to spinal stenosis  -Gabapentin 600mg TID    Prophylactic measure  -Xarelto on hold due to active hematuria  -Intermittent pneumatic compression    Dispo: medically active, Hgb monitoring, urology input.  Estimated LOS: 2-3 days, likely d/c back to [facility].

## 2025-03-10 NOTE — H&P ADULT - TIME BILLING
chart review, patient encounter, chart documentation, GOC discussion.  Plan discussed with patient, urology PA, ED RN, HCP Binu

## 2025-03-10 NOTE — CHART NOTE - NSCHARTNOTEFT_GEN_A_CORE
Pt with UTI, hematuria, AC held  Pt seen and examined again, CBI held this am  urine yellow, hematuria resolved  cont abx  may restart Xarelto  if urine remains clear on Xarelto, d/c gilliland tomorrow

## 2025-03-10 NOTE — H&P ADULT - NSICDXPASTMEDICALHX_GEN_ALL_CORE_FT
PAST MEDICAL HISTORY:  Acute deep vein thrombosis (DVT) of popliteal vein of both lower extremities     AF (atrial fibrillation) Persistent/chronic    Anxiety     Aortic stenosis s/p TAVR bioprosthetic Jan 2018 [facility] [address] Aragon    Atherosclerosis of native coronary artery of native heart with angina pectoris     AV block     Bronchiectasis     Chronic back pain     Closed fracture of multiple ribs of right side, initial encounter     Closed pelvic ring fracture     Diastolic CHF NYHA class 3    Essential hypertension     GERD (gastroesophageal reflux disease)     Hip arthritis     History of valvular heart disease     [place] (hard of hearing) wears bilateral hearing aides    Hypercholesteremia     Hyperlipidemia, unspecified hyperlipidemia type     Kyphoscoliosis and scoliosis     Lung nodule     Murmur, cardiac gr3/6    Neuropathy     Pelvic fracture     PNA (pneumonia) november 2017    Spinal stenosis     Stenocardia     Thyroid nodule

## 2025-03-10 NOTE — H&P ADULT - NSHPLABSRESULTS_GEN_ALL_CORE
03-09    143  |  104  |  25.5[H]  ----------------------------<  95  4.8   |  29.0  |  0.92    Ca    8.9      09 Mar 2025 19:10    TPro  5.8[L]  /  Alb  3.3  /  TBili  0.4  /  DBili  x   /  AST  41[H]  /  ALT  34[H]  /  AlkPhos  131[H]  03-09                            9.2    5.77  )-----------( 149      ( 10 Mar 2025 01:05 )             28.7

## 2025-03-10 NOTE — CONSULT NOTE ADULT - ASSESSMENT
Assessment:     Plan:   - gilliland   - CBI, titrate daily   - irrigate prn clot retention   - trend h/h  - IV abx   - f/u urine cx   - risks vs benefits to d/c xarelto ISO acute anemia and hematuria   - rest of care per primary team   - plan discussed with attending Dr. Maldonado  Assessment: 89F admitted to medicine service with acute cystitis and hematuria on xarelto. CBI started. Hemodynamically stable.     Plan:   - gilliland   - CBI, titrate daily   - irrigate prn clot retention   - trend h/h  - IV abx   - f/u urine cx   - risks vs benefits to d/c xarelto ISO acute anemia and hematuria   - rest of care per primary team   - plan discussed with attending Dr. Maldonado  Assessment: 89F admitted to medicine service with acute cystitis and hematuria on xarelto. CBI started. Hemodynamically stable.     Plan:   - gilliland   - CBI, titrate daily   - irrigate prn clot retention   - trend h/h  - IV abx   - f/u urine cx   - risks vs benefits to d/c xarelto ISO acute anemia and hematuria, tbd by medicine team  - rest of care per primary team   - plan discussed with attending Dr. Maldonado

## 2025-03-10 NOTE — PATIENT PROFILE ADULT - TRANSPORTATION
Please send to Dr. Julio    Received a call from DAYAMI Duffy at Dr. Blas's office, giving an update the provider's recommendation regarding the patient's low blood pressure. She states LUDMILA Cotter, recommends the patient hold his metoprolol unless his systolic blood pressure is greater that 120 and his heart rate is greater than 60. She would also like him to eat a salt-rich diet. She states she will call the patient directly to update him.  no

## 2025-03-10 NOTE — H&P ADULT - NSHPPHYSICALEXAM_GEN_ALL_CORE
Vital Signs Last 24 Hrs  T(C): 36.5 (10 Mar 2025 03:27), Max: 36.9 (09 Mar 2025 18:02)  T(F): 97.7 (10 Mar 2025 03:27), Max: 98.4 (09 Mar 2025 18:02)  HR: 75 (10 Mar 2025 03:27) (75 - 81)  BP: 174/75 (10 Mar 2025 06:13) (117/67 - 174/75)  BP(mean): 86 (10 Mar 2025 03:27) (86 - 86)  RR: 18 (10 Mar 2025 03:27) (18 - 18)  SpO2: 100% (10 Mar 2025 03:27) (94% - 100%)    Parameters below as of 10 Mar 2025 03:27  Patient On (Oxygen Delivery Method): nasal cannula  O2 Flow (L/min): 2    GENERAL:  Well-appearing elderly female, not in acute distress, hearing impairment   EYES:  Clear conjunctiva, extraocular movement intact  ENT: Moist mucous membranes  RESP:  Non-labored breathing pattern, lungs clear to ausculation in anterior fields  CV: Regular rate and rhythm, no murmurs appreciated, no lower extremity edema  GI: Soft, non-tender, non-distended  : Bertrand w/ red urine (per RN color has become lighter was previously dark red)  NEURO: Awake, alert, conversant, upper and lower extremity strength 5/5, light touch sensation grossly intact  PSYCH: Calm, cooperative  SKIN: No rash or lesions, warm and dry

## 2025-03-11 ENCOUNTER — TRANSCRIPTION ENCOUNTER (OUTPATIENT)
Age: 89
End: 2025-03-11

## 2025-03-11 LAB
ANION GAP SERPL CALC-SCNC: 8 MMOL/L — SIGNIFICANT CHANGE UP (ref 5–17)
BUN SERPL-MCNC: 17.5 MG/DL — SIGNIFICANT CHANGE UP (ref 8–20)
CALCIUM SERPL-MCNC: 8.8 MG/DL — SIGNIFICANT CHANGE UP (ref 8.4–10.5)
CHLORIDE SERPL-SCNC: 104 MMOL/L — SIGNIFICANT CHANGE UP (ref 96–108)
CO2 SERPL-SCNC: 29 MMOL/L — SIGNIFICANT CHANGE UP (ref 22–29)
CREAT SERPL-MCNC: 0.92 MG/DL — SIGNIFICANT CHANGE UP (ref 0.5–1.3)
EGFR: 58 ML/MIN/1.73M2 — LOW
EGFR: 58 ML/MIN/1.73M2 — LOW
GLUCOSE SERPL-MCNC: 82 MG/DL — SIGNIFICANT CHANGE UP (ref 70–99)
HCT VFR BLD CALC: 32.3 % — LOW (ref 34.5–45)
HGB BLD-MCNC: 9.8 G/DL — LOW (ref 11.5–15.5)
MAGNESIUM SERPL-MCNC: 2.2 MG/DL — SIGNIFICANT CHANGE UP (ref 1.6–2.6)
MCHC RBC-ENTMCNC: 29 PG — SIGNIFICANT CHANGE UP (ref 27–34)
MCHC RBC-ENTMCNC: 30.3 G/DL — LOW (ref 32–36)
MCV RBC AUTO: 95.6 FL — SIGNIFICANT CHANGE UP (ref 80–100)
NRBC # BLD AUTO: 0 K/UL — SIGNIFICANT CHANGE UP (ref 0–0)
NRBC # FLD: 0 K/UL — SIGNIFICANT CHANGE UP (ref 0–0)
NRBC BLD AUTO-RTO: 0 /100 WBCS — SIGNIFICANT CHANGE UP (ref 0–0)
PLATELET # BLD AUTO: 159 K/UL — SIGNIFICANT CHANGE UP (ref 150–400)
PMV BLD: 10.3 FL — SIGNIFICANT CHANGE UP (ref 7–13)
POTASSIUM SERPL-MCNC: 4.9 MMOL/L — SIGNIFICANT CHANGE UP (ref 3.5–5.3)
POTASSIUM SERPL-SCNC: 4.9 MMOL/L — SIGNIFICANT CHANGE UP (ref 3.5–5.3)
RBC # BLD: 3.38 M/UL — LOW (ref 3.8–5.2)
RBC # FLD: 15.8 % — HIGH (ref 10.3–14.5)
SODIUM SERPL-SCNC: 141 MMOL/L — SIGNIFICANT CHANGE UP (ref 135–145)
WBC # BLD: 6.76 K/UL — SIGNIFICANT CHANGE UP (ref 3.8–10.5)
WBC # FLD AUTO: 6.76 K/UL — SIGNIFICANT CHANGE UP (ref 3.8–10.5)

## 2025-03-11 PROCEDURE — 99233 SBSQ HOSP IP/OBS HIGH 50: CPT

## 2025-03-11 RX ADMIN — Medication 25 MILLIGRAM(S): at 13:13

## 2025-03-11 RX ADMIN — GABAPENTIN 600 MILLIGRAM(S): 400 CAPSULE ORAL at 05:51

## 2025-03-11 RX ADMIN — Medication 325 MILLIGRAM(S): at 17:07

## 2025-03-11 RX ADMIN — Medication 25 MILLIGRAM(S): at 21:42

## 2025-03-11 RX ADMIN — GABAPENTIN 600 MILLIGRAM(S): 400 CAPSULE ORAL at 13:13

## 2025-03-11 RX ADMIN — Medication 100 MILLIGRAM(S): at 05:51

## 2025-03-11 RX ADMIN — Medication 325 MILLIGRAM(S): at 05:51

## 2025-03-11 RX ADMIN — Medication 100 MILLIGRAM(S): at 21:43

## 2025-03-11 RX ADMIN — Medication 20 MILLIGRAM(S): at 17:07

## 2025-03-11 RX ADMIN — GABAPENTIN 600 MILLIGRAM(S): 400 CAPSULE ORAL at 21:43

## 2025-03-11 RX ADMIN — Medication 1 APPLICATION(S): at 19:03

## 2025-03-11 RX ADMIN — ATORVASTATIN CALCIUM 20 MILLIGRAM(S): 80 TABLET, FILM COATED ORAL at 21:42

## 2025-03-11 RX ADMIN — RANOLAZINE 500 MILLIGRAM(S): 1000 TABLET, FILM COATED, EXTENDED RELEASE ORAL at 05:51

## 2025-03-11 RX ADMIN — RANOLAZINE 500 MILLIGRAM(S): 1000 TABLET, FILM COATED, EXTENDED RELEASE ORAL at 17:06

## 2025-03-11 RX ADMIN — Medication 100 MILLIGRAM(S): at 13:13

## 2025-03-11 RX ADMIN — Medication 20 MILLIGRAM(S): at 05:51

## 2025-03-11 RX ADMIN — RIVAROXABAN 15 MILLIGRAM(S): 10 TABLET, FILM COATED ORAL at 17:06

## 2025-03-11 NOTE — SWALLOW BEDSIDE ASSESSMENT ADULT - SLP GENERAL OBSERVATIONS
Pt recd a&ox1, upright in bed, suspect reduced cognition, tolerating RA, NAD, 0/10 pain scale pre/post

## 2025-03-11 NOTE — DISCHARGE NOTE PROVIDER - CARE PROVIDER_API CALL
Dawit Maldonado  Urology  [address], [address]  [place] [address]  Phone: [phone]  Fax: [phone]  Follow Up Time:    Dawit Maldonado  Urology  [address], [address]  [place] [address]  Phone: [phone]  Fax: [phone]  Follow Up Time: 2 weeks    Reza Jim  [address], [place] [id number]  Phone: [phone]  Fax: [phone]  Established Patient  Follow Up Time: 2 weeks    Cardiology,   Phone: (   )    -  Fax: (   )    -  Established Patient  Follow Up Time: 2 weeks

## 2025-03-11 NOTE — PHYSICAL THERAPY INITIAL EVALUATION ADULT - ACTIVE RANGE OF MOTION EXAMINATION, REHAB EVAL
bilateral upper extremity Active ROM was WFL (within functional limits) Doxycycline Counseling:  Patient counseled regarding possible photosensitivity and increased risk for sunburn.  Patient instructed to avoid sunlight, if possible.  When exposed to sunlight, patients should wear protective clothing, sunglasses, and sunscreen.  The patient was instructed to call the office immediately if the following severe adverse effects occur:  hearing changes, easy bruising/bleeding, severe headache, or vision changes.  The patient verbalized understanding of the proper use and possible adverse effects of doxycycline.  All of the patient's questions and concerns were addressed.

## 2025-03-11 NOTE — PROGRESS NOTE ADULT - SUBJECTIVE AND OBJECTIVE BOX
Acute cystitis with hematuria    HPI:  89yoF hx CAD s/p PCI (2018), Afib on Xarelto, AS s/p TAVR, HFpEF, spinal stenosis, recently admitted to [facility] in 2/2025 for acute hypoxemic respiratory failure and sepsis in setting of influenza and proctitis, discharged to [facility], sent from facility for active bleeding.  Pt is awake, alert and conversant but limited historian due to hearing impairment.  Collateral hx obtained from daughter in law/HCP [place].  Pt has had 2 weeks of active bleeding coming from groin area which nursing home staff attributed to vaginal bleeding.  Pt had a GYN appointment scheduled for 3/24.  Daughter in law [place] went to visit pt today and ‘found her in a pool of blood’ and advised that she be taken to hospital.   On admission, UA w/ >1900 RBC. FOBT negative.  ED RN performed straight cath that had gross hematuria with blood clots. CT A/P showed marked hyperemia of the urinary bladder urothelium and nodularity in posterior bladder with inability to exclude urothelial lesion.  (10 Mar 2025 05:41)    Interval History:  Patient was seen and examined at bedside around 10 am.  Feels OK today.  Urine is orange likely from Pyridium with some blood clots / sediments. No signs of active bleeding.   Lower abdominal pain is better today.   Unable to eat regular food.     ROS:  As per interval history otherwise unremarkable.    PHYSICAL EXAM:  Vital Signs  T(C): 36.9 (11 Mar 2025 11:50), Max: 37 (10 Mar 2025 20:49)  T(F): 98.5 (11 Mar 2025 11:50), Max: 98.6 (10 Mar 2025 20:49)  HR: 86 (11 Mar 2025 11:50) (74 - 86)  BP: 136/56 (11 Mar 2025 11:50) (105/80 - 153/73)  BP(mean): 88 (11 Mar 2025 04:22) (81 - 88)  RR: 18 (11 Mar 2025 11:50) (18 - 18)  SpO2: 99% (11 Mar 2025 11:50) (99% - 100%)  Parameters below as of 11 Mar 2025 11:50  Patient On (Oxygen Delivery Method): nasal cannula  O2 Flow (L/min): 3  General: Elderly female lying in bed comfortably. No acute distress  HEENT: EOMI. Clear conjunctivae. Moist mucus membrane. Hard of hearing.   Neck: Supple.   Chest: Good air entry. No wheezing, rales or rhonchi.   Heart: Normal S1 & S2. RRR.   Abdomen: Non distended. Soft. Non-tender. + BS  : Bertrand's catheter in place with orange colored urine + blood clots / sediments.   Ext: No pedal edema. No calf tenderness   Neuro: Awake. Moves all extremities. Speech clear.   Skin: Warm and Dry  Psychiatry: Normal mood and affect    LABS:                      9.8    6.76  )-----------( 159      ( 11 Mar 2025 06:18 )             32.3     03-11    141  |  104  |  17.5  ----------------------------<  82  4.9   |  29.0  |  0.92    Ca    8.8      11 Mar 2025 06:18  Mg     2.2     03-11    TPro  5.8[L]  /  Alb  3.4  /  TBili  0.3[L]  /  DBili  x   /  AST  33[H]  /  ALT  31  /  AlkPhos  135[H]  03-10    PT/INR - ( 09 Mar 2025 20:45 )   PT: 14.3 sec;   INR: 1.24 ratio       PTT - ( 09 Mar 2025 20:45 )  PTT:29.9 sec  Urinalysis Basic - ( 11 Mar 2025 06:18 )    Color: x / Appearance: x / SG: x / pH: x  Gluc: 82 mg/dL / Ketone: x  / Bili: x / Urobili: x   Blood: x / Protein: x / Nitrite: x   Leuk Esterase: x / RBC: x / WBC x   Sq Epi: x / Non Sq Epi: x / Bacteria: x    RADIOLOGY & ADDITIONAL STUDIES:  Reviewed     MEDICATIONS  (STANDING):  atorvastatin 20 milliGRAM(s) Oral at bedtime  cefTRIAXone Injectable. 1000 milliGRAM(s) IV Push every 24 hours  chlorhexidine 2% Cloths 1 Application(s) Topical daily  famotidine    Tablet 20 milliGRAM(s) Oral two times a day  ferrous    sulfate 325 milliGRAM(s) Oral two times a day  gabapentin 600 milliGRAM(s) Oral three times a day  hydrALAZINE 25 milliGRAM(s) Oral every 8 hours  phenazopyridine 100 milliGRAM(s) Oral every 8 hours  ranolazine 500 milliGRAM(s) Oral two times a day  rivaroxaban 15 milliGRAM(s) Oral with dinner    MEDICATIONS  (PRN):  acetaminophen     Tablet .. 650 milliGRAM(s) Oral every 6 hours PRN Temp greater or equal to 38C (100.4F), Mild Pain (1 - 3), Moderate Pain (4 - 6)  bisacodyl Suppository 10 milliGRAM(s) Rectal daily PRN Constipation  melatonin 3 milliGRAM(s) Oral at bedtime PRN Insomnia  ondansetron Injectable 4 milliGRAM(s) IV Push every 6 hours PRN Nausea and/or Vomiting

## 2025-03-11 NOTE — DISCHARGE NOTE PROVIDER - CARE PROVIDERS DIRECT ADDRESSES
[email or web address]

## 2025-03-11 NOTE — DISCHARGE NOTE PROVIDER - PROVIDER TOKENS
PROVIDER:[TOKEN:[[id number]]] PROVIDER:[TOKEN:[[id number]],FOLLOWUP:[2 weeks]],PROVIDER:[TOKEN:[[id number]],FOLLOWUP:[2 weeks],ESTABLISHEDPATIENT:[T]],FREE:[LAST:[Cardiology],PHONE:[(   )    -],FAX:[(   )    -],FOLLOWUP:[2 weeks],ESTABLISHEDPATIENT:[T]]

## 2025-03-11 NOTE — DISCHARGE NOTE PROVIDER - NSDCMRMEDTOKEN_GEN_ALL_CORE_FT
acetaminophen 325 mg oral tablet: 2 tab(s) orally once a day 30-60min prior to wound care  atorvastatin 20 mg oral tablet: 1 tab(s) orally once a day (at bedtime)  cefpodoxime 100 mg oral tablet: 1 tab(s) orally 2 times a day for 3 days  famotidine 20 mg oral tablet: 1 tab(s) orally 2 times a day  FeroSul 325 mg (65 mg elemental iron) oral tablet: 1 tab(s) orally 2 times a day  gabapentin 600 mg oral tablet: 1 tab(s) orally 3 times a day  hydrALAZINE 25 mg oral tablet: 1 tab(s) orally every 8 hours  ranolazine 500 mg oral tablet, extended release: 1 tab(s) orally 2 times a day  rivaroxaban 15 mg oral tablet: 1 tab(s) orally once a day (before a meal)   acetaminophen 325 mg oral tablet: 2 tab(s) orally once a day 30-60min prior to wound care  atorvastatin 20 mg oral tablet: 1 tab(s) orally once a day (at bedtime)  cefpodoxime 200 mg oral tablet: 1 tab(s) orally 2 times a day for 4 more days.  famotidine 20 mg oral tablet: 1 tab(s) orally 2 times a day  FeroSul 325 mg (65 mg elemental iron) oral tablet: 1 tab(s) orally 2 times a day  furosemide 20 mg oral tablet: 1 tab(s) orally once a day  gabapentin 600 mg oral tablet: 1 tab(s) orally 3 times a day  hydrALAZINE 25 mg oral tablet: 1 tab(s) orally every 8 hours  Multiple Vitamins oral tablet: 1 tab(s) orally once a day  ranolazine 500 mg oral tablet, extended release: 1 tab(s) orally 2 times a day  rivaroxaban 15 mg oral tablet: 1 tab(s) orally once a day (before a meal)

## 2025-03-11 NOTE — DISCHARGE NOTE PROVIDER - ATTENDING DISCHARGE PHYSICAL EXAMINATION:
Vital Signs   T(C): 36.4 (13 Mar 2025 07:54), Max: 36.6 (12 Mar 2025 21:10)  T(F): 97.6 (13 Mar 2025 07:54), Max: 97.9 (12 Mar 2025 21:10)  HR: 74 (13 Mar 2025 07:54) (61 - 82)  BP: 112/62 (13 Mar 2025 07:54) (102/60 - 119/68)  RR: 18 (13 Mar 2025 07:54) (18 - 18)  SpO2: 95% (13 Mar 2025 07:54) (93% - 100%)  Parameters below as of 13 Mar 2025 07:54  Patient On (Oxygen Delivery Method): room air  General: Elderly female lying in bed comfortably. No acute distress  HEENT: EOMI. Clear conjunctivae. Moist mucus membrane. Hard of hearing.   Neck: Supple.   Chest: Good air entry. No wheezing, rales or rhonchi.   Heart: Normal S1 & S2. RRR.   Abdomen: Non distended. Soft. Non-tender. + BS  Ext: No pedal edema. No calf tenderness   Neuro: Awake. Moves all extremities. Speech clear.   Skin: Warm and Dry  Psychiatry: Normal mood and affect

## 2025-03-11 NOTE — SWALLOW BEDSIDE ASSESSMENT ADULT - SLP PERTINENT HISTORY OF CURRENT PROBLEM
As per MD note: "89yoF hx CAD s/p PCI (2018), Afib on Xarelto, AS s/p TAVR, HFpEF, spinal stenosis, recently admitted to [facility] in 2/2025 for acute hypoxemic respiratory failure and sepsis in setting of influenza and proctitis, discharged to [facility], sent from facility for active bleeding.  Pt is awake, alert and conversant but limited historian due to hearing impairment.  Collateral hx obtained from daughter in law/HCP [place].  Pt has had 2 weeks of active bleeding coming from groin area which nursing home staff attributed to vaginal bleeding.  Pt had a GYN appointment scheduled for 3/24.  Daughter in law [place] went to visit pt today and ‘found her in a pool of blood’ and advised that she be taken to hospital.   On admission, UA w/ >1900 RBC. FOBT negative.  ED RN performed straight cath that had gross hematuria with blood clots. CT A/P showed marked hyperemia of the urinary bladder urothelium and nodularity in posterior bladder with inability to exclude urothelial lesion"

## 2025-03-11 NOTE — DISCHARGE NOTE PROVIDER - HOSPITAL COURSE
89yoF hx CAD s/p PCI (2018), Afib on Xarelto, AS s/p TAVR, HFpEF, spinal stenosis, recently admitted to [facility] in 2/2025 for acute hypoxemic respiratory failure and sepsis in setting of influenza and proctitis, discharged to [facility], presented with 2 weeks of hematuria.   Admitted with ABLA due to Hematuria. CT with marked hyperemia of the urinary bladder urothelium, particularly posteriorly where there is associated nodularity. Urothelial hyperemia is compatible with cystitis. Areas of nodularity may be related to underdistention of the bladder though possibility of urothelial lesion posteriorly cannot be excluded.   Seen by Urology and was started on CBI and antibiotics. Xarelto was held. Hematuria resolved. Xarelto resumed and she is tolerating well with no more hematuria. Urology is recommending outpatient follow up for Cystoscopy.    She was monitored closely. There was some suspicion for Aspiration Pneumonia so speech therapy was consulted and they recommended regular solids with thin liquids. No signs of aspiration. She is at baseline oxygen requirement.   She is stable for discharge at this time.

## 2025-03-11 NOTE — PHYSICAL THERAPY INITIAL EVALUATION ADULT - IMPAIRMENTS FOUND, PT EVAL
aerobic capacity/endurance/arousal, attention, and cognition/gait, locomotion, and balance/gross motor/muscle strength/poor safety awareness/ROM

## 2025-03-11 NOTE — PROGRESS NOTE ADULT - SUBJECTIVE AND OBJECTIVE BOX
Subjective: 89y Female who presented with UTI and hematuria on xarelto seen at bedside. Pt resting comfortably. No signs of acute distress.      MEDICATIONS  (STANDING):  atorvastatin 20 milliGRAM(s) Oral at bedtime  cefTRIAXone Injectable. 1000 milliGRAM(s) IV Push every 24 hours  chlorhexidine 2% Cloths 1 Application(s) Topical daily  famotidine    Tablet 20 milliGRAM(s) Oral two times a day  ferrous    sulfate 325 milliGRAM(s) Oral two times a day  gabapentin 600 milliGRAM(s) Oral three times a day  hydrALAZINE 25 milliGRAM(s) Oral every 8 hours  phenazopyridine 100 milliGRAM(s) Oral every 8 hours  ranolazine 500 milliGRAM(s) Oral two times a day  rivaroxaban 15 milliGRAM(s) Oral with dinner    MEDICATIONS  (PRN):  acetaminophen     Tablet .. 650 milliGRAM(s) Oral every 6 hours PRN Temp greater or equal to 38C (100.4F), Mild Pain (1 - 3), Moderate Pain (4 - 6)  bisacodyl Suppository 10 milliGRAM(s) Rectal daily PRN Constipation  melatonin 3 milliGRAM(s) Oral at bedtime PRN Insomnia  ondansetron Injectable 4 milliGRAM(s) IV Push every 6 hours PRN Nausea and/or Vomiting      Vital Signs Last 24 Hrs  T(C): 37 (11 Mar 2025 04:22), Max: 37 (10 Mar 2025 20:49)  T(F): 98.6 (11 Mar 2025 04:22), Max: 98.6 (10 Mar 2025 20:49)  HR: 75 (11 Mar 2025 04:22) (71 - 79)  BP: 105/80 (11 Mar 2025 04:22) (105/80 - 157/67)  BP(mean): 88 (11 Mar 2025 04:22) (81 - 88)  RR: 18 (11 Mar 2025 04:22) (18 - 18)  SpO2: 100% (11 Mar 2025 04:22) (96% - 100%)    Parameters below as of 11 Mar 2025 04:22  Patient On (Oxygen Delivery Method): nasal cannula  O2 Flow (L/min): 3      PHYSICAL EXAM   General: Normal, well appearing. NAD.  HEENT: EOMI  Respiratory: Respirations non-labored; no accessory muscle use  ABD: Soft, non-tender, non-distended  : 18F 3 way gilliland catheter draining urine at bedside. Urine in proximal tubing clear yellow, distal tubing and bag draining dark red urine. No clots observed. CBI held.  MSK: No joint pain, swelling, deformity, or point tenderness; no limitation of movement  Skin: Warm, dry. No cyanosis.    LABS:                        9.8    6.76  )-----------( 159      ( 11 Mar 2025 06:18 )             32.3     03-11    141  |  104  |  17.5  ----------------------------<  82  4.9   |  29.0  |  0.92    Ca    8.8      11 Mar 2025 06:18  Mg     2.2     03-11    TPro  5.8[L]  /  Alb  3.4  /  TBili  0.3[L]  /  DBili  x   /  AST  33[H]  /  ALT  31  /  AlkPhos  135[H]  03-10    PT/INR - ( 09 Mar 2025 20:45 )   PT: 14.3 sec;   INR: 1.24 ratio         PTT - ( 09 Mar 2025 20:45 )  PTT:29.9 sec  Urinalysis Basic - ( 11 Mar 2025 06:18 )    Color: x / Appearance: x / SG: x / pH: x  Gluc: 82 mg/dL / Ketone: x  / Bili: x / Urobili: x   Blood: x / Protein: x / Nitrite: x   Leuk Esterase: x / RBC: x / WBC x   Sq Epi: x / Non Sq Epi: x / Bacteria: x          ASSESSMENT:  89y Female who presented with UTI and hematuria on xarelto seen at bedside.    PLAN:  Pt restarted on xarelto 3/10  CBI held  Will continue to observe urine output and quality  F/u urine culture   Continue IV abx   Trend H/H    Subjective: 89y Female who presented with UTI and hematuria on xarelto seen at bedside. Pt resting comfortably. No signs of acute distress.      MEDICATIONS  (STANDING):  atorvastatin 20 milliGRAM(s) Oral at bedtime  cefTRIAXone Injectable. 1000 milliGRAM(s) IV Push every 24 hours  chlorhexidine 2% Cloths 1 Application(s) Topical daily  famotidine    Tablet 20 milliGRAM(s) Oral two times a day  ferrous    sulfate 325 milliGRAM(s) Oral two times a day  gabapentin 600 milliGRAM(s) Oral three times a day  hydrALAZINE 25 milliGRAM(s) Oral every 8 hours  phenazopyridine 100 milliGRAM(s) Oral every 8 hours  ranolazine 500 milliGRAM(s) Oral two times a day  rivaroxaban 15 milliGRAM(s) Oral with dinner    MEDICATIONS  (PRN):  acetaminophen     Tablet .. 650 milliGRAM(s) Oral every 6 hours PRN Temp greater or equal to 38C (100.4F), Mild Pain (1 - 3), Moderate Pain (4 - 6)  bisacodyl Suppository 10 milliGRAM(s) Rectal daily PRN Constipation  melatonin 3 milliGRAM(s) Oral at bedtime PRN Insomnia  ondansetron Injectable 4 milliGRAM(s) IV Push every 6 hours PRN Nausea and/or Vomiting      Vital Signs Last 24 Hrs  T(C): 37 (11 Mar 2025 04:22), Max: 37 (10 Mar 2025 20:49)  T(F): 98.6 (11 Mar 2025 04:22), Max: 98.6 (10 Mar 2025 20:49)  HR: 75 (11 Mar 2025 04:22) (71 - 79)  BP: 105/80 (11 Mar 2025 04:22) (105/80 - 157/67)  BP(mean): 88 (11 Mar 2025 04:22) (81 - 88)  RR: 18 (11 Mar 2025 04:22) (18 - 18)  SpO2: 100% (11 Mar 2025 04:22) (96% - 100%)    Parameters below as of 11 Mar 2025 04:22  Patient On (Oxygen Delivery Method): nasal cannula  O2 Flow (L/min): 3      PHYSICAL EXAM   General: Normal, well appearing. NAD.  HEENT: EOMI  Respiratory: Respirations non-labored; no accessory muscle use  ABD: Soft, non-tender, non-distended  : 18F 3 way gilliland catheter draining urine at bedside. Urine in proximal tubing clear yellow, distal tubing and bag draining dark red urine. No clots observed. CBI held.  MSK: No joint pain, swelling, deformity, or point tenderness; no limitation of movement  Skin: Warm, dry. No cyanosis.    LABS:                        9.8    6.76  )-----------( 159      ( 11 Mar 2025 06:18 )             32.3     03-11    141  |  104  |  17.5  ----------------------------<  82  4.9   |  29.0  |  0.92    Ca    8.8      11 Mar 2025 06:18  Mg     2.2     03-11    TPro  5.8[L]  /  Alb  3.4  /  TBili  0.3[L]  /  DBili  x   /  AST  33[H]  /  ALT  31  /  AlkPhos  135[H]  03-10    PT/INR - ( 09 Mar 2025 20:45 )   PT: 14.3 sec;   INR: 1.24 ratio         PTT - ( 09 Mar 2025 20:45 )  PTT:29.9 sec  Urinalysis Basic - ( 11 Mar 2025 06:18 )    Color: x / Appearance: x / SG: x / pH: x  Gluc: 82 mg/dL / Ketone: x  / Bili: x / Urobili: x   Blood: x / Protein: x / Nitrite: x   Leuk Esterase: x / RBC: x / WBC x   Sq Epi: x / Non Sq Epi: x / Bacteria: x          ASSESSMENT:  89y Female who presented with UTI and hematuria on xarelto seen at bedside.    PLAN:  Pt restarted on xarelto 3/10  CBI held  Will continue to observe urine output and quality  F/u urine culture   Continue IV abx   Trend H/H   Follow up with Dr. Maldonado's office outpatient for cystoscopy

## 2025-03-11 NOTE — DISCHARGE NOTE PROVIDER - NSDCCPCAREPLAN_GEN_ALL_CORE_FT
PRINCIPAL DISCHARGE DIAGNOSIS  Diagnosis: Acute hemorrhagic cystitis  Assessment and Plan of Treatment: Please follow up with Dr. Maldonado's office outpatient in 1-2  weeks for a cystoscopy     PRINCIPAL DISCHARGE DIAGNOSIS  Diagnosis: Acute hemorrhagic cystitis  Assessment and Plan of Treatment: Please follow up with Dr. Maldonado's office outpatient in 1-2  weeks for a cystoscopy.

## 2025-03-11 NOTE — PHYSICAL THERAPY INITIAL EVALUATION ADULT - PERTINENT HX OF CURRENT PROBLEM, REHAB EVAL
Pt from [facility], sent for active bleeding- Acute blood loss anemia due to hematuria

## 2025-03-11 NOTE — DISCHARGE NOTE PROVIDER - NSDCFUADDAPPT_GEN_ALL_CORE_FT
Please follow up with Dr. Maldonado's office outpatient for a cystoscopy  Please follow up with Dr. Maldonado's office outpatient for a cystoscopy.    APPTS ARE READY TO BE MADE: [X] YES    Best Family or Patient Contact (if needed): [place] Danika (Daughter in law)-[phone]    1: PCP in 1-2 weeks.  2: Urology in 2 weeks.   3: Cardiology in 2 weeks.  Please follow up with Dr. Maldonado's office outpatient for a cystoscopy.    APPTS ARE READY TO BE MADE: [X] YES    Best Family or Patient Contact (if needed): [place] Garnica (Daughter in law)-[phone]    1: PCP in 1-2 weeks.  2: Urology in 2 weeks.   3: Cardiology in 2 weeks.     Patient is being transferred. Caregiver will arrange follow up.

## 2025-03-11 NOTE — SWALLOW BEDSIDE ASSESSMENT ADULT - COMMENTS
Pt known to this service from prior admission in November 2023 w/ recommendation made for regular/thin, see full report for details.

## 2025-03-11 NOTE — DISCHARGE NOTE PROVIDER - DETAILS OF MALNUTRITION DIAGNOSIS/DIAGNOSES
This patient has been assessed with a concern for Malnutrition and was treated during this hospitalization for the following Nutrition diagnosis/diagnoses:     -  03/12/2025: Moderate protein-calorie malnutrition

## 2025-03-11 NOTE — PHYSICAL THERAPY INITIAL EVALUATION ADULT - ADDITIONAL COMMENTS
Pt states from [facility], prior lived in 1 story home with DIL and grandson, amb with RW.

## 2025-03-12 LAB
ANION GAP SERPL CALC-SCNC: 9 MMOL/L — SIGNIFICANT CHANGE UP (ref 5–17)
BASOPHILS # BLD AUTO: 0.01 K/UL — SIGNIFICANT CHANGE UP (ref 0–0.2)
BASOPHILS NFR BLD AUTO: 0.1 % — SIGNIFICANT CHANGE UP (ref 0–2)
BUN SERPL-MCNC: 14.7 MG/DL — SIGNIFICANT CHANGE UP (ref 8–20)
CALCIUM SERPL-MCNC: 8.8 MG/DL — SIGNIFICANT CHANGE UP (ref 8.4–10.5)
CHLORIDE SERPL-SCNC: 105 MMOL/L — SIGNIFICANT CHANGE UP (ref 96–108)
CO2 SERPL-SCNC: 29 MMOL/L — SIGNIFICANT CHANGE UP (ref 22–29)
CREAT SERPL-MCNC: 0.88 MG/DL — SIGNIFICANT CHANGE UP (ref 0.5–1.3)
EGFR: 62 ML/MIN/1.73M2 — SIGNIFICANT CHANGE UP
EGFR: 62 ML/MIN/1.73M2 — SIGNIFICANT CHANGE UP
EOSINOPHIL # BLD AUTO: 0.03 K/UL — SIGNIFICANT CHANGE UP (ref 0–0.5)
EOSINOPHIL NFR BLD AUTO: 0.4 % — SIGNIFICANT CHANGE UP (ref 0–6)
GLUCOSE SERPL-MCNC: 95 MG/DL — SIGNIFICANT CHANGE UP (ref 70–99)
HCT VFR BLD CALC: 34 % — LOW (ref 34.5–45)
HGB BLD-MCNC: 10.9 G/DL — LOW (ref 11.5–15.5)
IMM GRANULOCYTES # BLD AUTO: 0.25 K/UL — HIGH (ref 0–0.07)
IMM GRANULOCYTES NFR BLD AUTO: 3.6 % — HIGH (ref 0–0.9)
LYMPHOCYTES # BLD AUTO: 1.03 K/UL — SIGNIFICANT CHANGE UP (ref 1–3.3)
LYMPHOCYTES NFR BLD AUTO: 14.7 % — SIGNIFICANT CHANGE UP (ref 13–44)
MAGNESIUM SERPL-MCNC: 2 MG/DL — SIGNIFICANT CHANGE UP (ref 1.6–2.6)
MCHC RBC-ENTMCNC: 30.1 PG — SIGNIFICANT CHANGE UP (ref 27–34)
MCHC RBC-ENTMCNC: 32.1 G/DL — SIGNIFICANT CHANGE UP (ref 32–36)
MCV RBC AUTO: 93.9 FL — SIGNIFICANT CHANGE UP (ref 80–100)
MONOCYTES # BLD AUTO: 2.23 K/UL — HIGH (ref 0–0.9)
MONOCYTES NFR BLD AUTO: 31.9 % — HIGH (ref 2–14)
MRSA PCR RESULT.: SIGNIFICANT CHANGE UP
NEUTROPHILS # BLD AUTO: 3.44 K/UL — SIGNIFICANT CHANGE UP (ref 1.8–7.4)
NEUTROPHILS NFR BLD AUTO: 49.3 % — SIGNIFICANT CHANGE UP (ref 43–77)
NRBC # BLD AUTO: 0 K/UL — SIGNIFICANT CHANGE UP (ref 0–0)
NRBC # FLD: 0 K/UL — SIGNIFICANT CHANGE UP (ref 0–0)
NRBC BLD AUTO-RTO: 0 /100 WBCS — SIGNIFICANT CHANGE UP (ref 0–0)
PLATELET # BLD AUTO: 168 K/UL — SIGNIFICANT CHANGE UP (ref 150–400)
PMV BLD: 11 FL — SIGNIFICANT CHANGE UP (ref 7–13)
POTASSIUM SERPL-MCNC: 4.9 MMOL/L — SIGNIFICANT CHANGE UP (ref 3.5–5.3)
POTASSIUM SERPL-SCNC: 4.9 MMOL/L — SIGNIFICANT CHANGE UP (ref 3.5–5.3)
RBC # BLD: 3.62 M/UL — LOW (ref 3.8–5.2)
RBC # FLD: 15.9 % — HIGH (ref 10.3–14.5)
S AUREUS DNA NOSE QL NAA+PROBE: SIGNIFICANT CHANGE UP
SODIUM SERPL-SCNC: 143 MMOL/L — SIGNIFICANT CHANGE UP (ref 135–145)
WBC # BLD: 6.99 K/UL — SIGNIFICANT CHANGE UP (ref 3.8–10.5)
WBC # FLD AUTO: 6.99 K/UL — SIGNIFICANT CHANGE UP (ref 3.8–10.5)

## 2025-03-12 PROCEDURE — 99232 SBSQ HOSP IP/OBS MODERATE 35: CPT

## 2025-03-12 RX ORDER — FUROSEMIDE 10 MG/ML
20 INJECTION INTRAMUSCULAR; INTRAVENOUS DAILY
Refills: 0 | Status: DISCONTINUED | OUTPATIENT
Start: 2025-03-12 | End: 2025-03-13

## 2025-03-12 RX ORDER — B1/B2/B3/B5/B6/B12/VIT C/FOLIC 500-0.5 MG
1 TABLET ORAL DAILY
Refills: 0 | Status: DISCONTINUED | OUTPATIENT
Start: 2025-03-12 | End: 2025-03-13

## 2025-03-12 RX ADMIN — RIVAROXABAN 15 MILLIGRAM(S): 10 TABLET, FILM COATED ORAL at 17:33

## 2025-03-12 RX ADMIN — Medication 25 MILLIGRAM(S): at 21:13

## 2025-03-12 RX ADMIN — GABAPENTIN 600 MILLIGRAM(S): 400 CAPSULE ORAL at 05:49

## 2025-03-12 RX ADMIN — Medication 20 MILLIGRAM(S): at 05:49

## 2025-03-12 RX ADMIN — Medication 20 MILLIGRAM(S): at 17:34

## 2025-03-12 RX ADMIN — FUROSEMIDE 20 MILLIGRAM(S): 10 INJECTION INTRAMUSCULAR; INTRAVENOUS at 17:33

## 2025-03-12 RX ADMIN — CEFTRIAXONE 1000 MILLIGRAM(S): 500 INJECTION, POWDER, FOR SOLUTION INTRAMUSCULAR; INTRAVENOUS at 05:48

## 2025-03-12 RX ADMIN — Medication 1 TABLET(S): at 17:33

## 2025-03-12 RX ADMIN — Medication 325 MILLIGRAM(S): at 05:49

## 2025-03-12 RX ADMIN — RANOLAZINE 500 MILLIGRAM(S): 1000 TABLET, FILM COATED, EXTENDED RELEASE ORAL at 17:33

## 2025-03-12 RX ADMIN — ATORVASTATIN CALCIUM 20 MILLIGRAM(S): 80 TABLET, FILM COATED ORAL at 21:13

## 2025-03-12 RX ADMIN — GABAPENTIN 600 MILLIGRAM(S): 400 CAPSULE ORAL at 21:11

## 2025-03-12 RX ADMIN — Medication 325 MILLIGRAM(S): at 17:34

## 2025-03-12 RX ADMIN — GABAPENTIN 600 MILLIGRAM(S): 400 CAPSULE ORAL at 13:29

## 2025-03-12 RX ADMIN — Medication 650 MILLIGRAM(S): at 21:18

## 2025-03-12 RX ADMIN — Medication 25 MILLIGRAM(S): at 05:49

## 2025-03-12 RX ADMIN — Medication 1 APPLICATION(S): at 13:29

## 2025-03-12 RX ADMIN — RANOLAZINE 500 MILLIGRAM(S): 1000 TABLET, FILM COATED, EXTENDED RELEASE ORAL at 05:49

## 2025-03-12 NOTE — DIETITIAN INITIAL EVALUATION ADULT - NSFNSPHYEXAMSKINFT_GEN_A_CORE
Pressure Injury 1: sacrum, Stage II  Pressure Injury 11: none, none, Pressure Injury 1: sacrum, none

## 2025-03-12 NOTE — DIETITIAN INITIAL EVALUATION ADULT - PERTINENT MEDS FT
MEDICATIONS  (STANDING):  cefTRIAXone Injectable. 1000 milliGRAM(s) IV Push every 24 hours  famotidine    Tablet 20 milliGRAM(s) Oral two times a day  ferrous    sulfate 325 milliGRAM(s) Oral two times a day  hydrALAZINE 25 milliGRAM(s) Oral every 8 hours  ranolazine 500 milliGRAM(s) Oral two times a day  rivaroxaban 15 milliGRAM(s) Oral with dinner    MEDICATIONS  (PRN):  bisacodyl Suppository 10 milliGRAM(s) Rectal daily PRN Constipation  ondansetron Injectable 4 milliGRAM(s) IV Push every 6 hours PRN Nausea and/or Vomiting

## 2025-03-12 NOTE — DIETITIAN NUTRITION RISK NOTIFICATION - TREATMENT: THE FOLLOWING DIET HAS BEEN RECOMMENDED
Diet, DASH/TLC:   Sodium & Cholesterol Restricted  Supplement Feeding Modality:  Oral  Ensure Enlive Cans or Servings Per Day:  1       Frequency:  Two Times a day (03-12-25 @ 08:47) [Active]

## 2025-03-12 NOTE — PROGRESS NOTE ADULT - REASON FOR ADMISSION
Acute blood loss anemia due to hematuria

## 2025-03-12 NOTE — DIETITIAN INITIAL EVALUATION ADULT - NS FNS DIET ORDER
Diet, DASH/TLC:   Sodium & Cholesterol Restricted  Supplement Feeding Modality:  Oral  Ensure Enlive Cans or Servings Per Day:  1       Frequency:  Two Times a day (03-12-25 @ 08:47)

## 2025-03-12 NOTE — PROGRESS NOTE ADULT - ASSESSMENT
89yoF hx CAD s/p PCI (2018), Afib on Xarelto, AS s/p TAVR, HFpEF, spinal stenosis, recently admitted to [facility] in 2/2025 for acute hypoxemic respiratory failure and sepsis in setting of influenza and proctitis, discharged to [facility], presented with 2 weeks of hematuria     Acute blood loss anemia due to hematuria  -Hematuria either from UTI exacerbated from AC or urothelial lesion  -CT A/P reviewed, inability to exclude urothelial lesion  -s/p CBI  -s/p ceftriaxone given by ED  -Continue ceftriaxone given prior cx w/ pan sensitive E. coli, while awaiting new culture   -Outpatient follow up with Urology for cystoscopy  -Discussed with Urology at bedside     Hx chronic hypoxemic respiratory failure  -Recent admission in 2/2025 for respiratory failure due to influenza   -Discharged on 2L NC, remains on baseline requirements   -R/O Aspiration Pneumonia   -Swallow eval  -Patient is already on antibiotics     Hx HFpEF (chronic diastolic heart failure)   -Previously on furosemide, was  not on transfer docs med rec  -Pt appears euvolemic on exam, however w/ small pleural effusions noted on CT A/P  -Resume Lasix      Hx Afib  -Xarelto resumed (cleared by Urology)    Hx CAD s/p PCI (2018), HTN, HLD  -Ranolazine hydralazine, atorvastatin and Xarelto     Hx chronic back pain due to spinal stenosis  -Gabapentin 600mg TID    DVT Prophylaxis -- Patient is on Xarelto.    Dispo: Likely [facility] in 48 hours if remains stable. 
89yoF hx CAD s/p PCI (2018), Afib on Xarelto, AS s/p TAVR, HFpEF, spinal stenosis, recently admitted to [facility] in 2/2025 for acute hypoxemic respiratory failure and sepsis in setting of influenza and proctitis, discharged to [facility], presented with 2 weeks of hematuria     Acute blood loss anemia due to hematuria  -Hematuria either from UTI exacerbated from AC or urothelial lesion  -CT A/P reviewed, inability to exclude urothelial lesion  -s/p CBI  -s/p ceftriaxone given by ED  -Continue ceftriaxone    -Outpatient follow up with Urology for cystoscopy  -Urology recs noted     Hx chronic hypoxemic respiratory failure  -Recent admission in 2/2025 for respiratory failure due to influenza   -Discharged on 2L NC, remains on baseline requirements   -? Aspiration Pneumonia   -Swallow eval: regular solids with thin liquids   -Patient is already on antibiotics     Hx HFpEF (chronic diastolic heart failure)   -Previously on furosemide, was not on transfer docs med rec  -Pt appears euvolemic on exam, however w/ small pleural effusions noted on CT A/P  -Resume Lasix      Chronic A. Fib   -Xarelto resumed (cleared by Urology)    Hx CAD s/p PCI (2018), HTN, HLD  -Ranolazine hydralazine, atorvastatin and Xarelto     Hx chronic back pain due to spinal stenosis  -Gabapentin 600mg TID    DVT Prophylaxis -- Patient is on Xarelto.    Dispo: [facility] likely in 24 hours if remains stable.

## 2025-03-12 NOTE — DIETITIAN INITIAL EVALUATION ADULT - ORAL INTAKE PTA/DIET HISTORY
Patient A/OX2 - limited nutrition history obtained. Pt previously on pureed diet and per SLP recommendation advanced ot regular consistency. Pt tolerating well. Pt states her appetite is normal, unsure of amount eaten at breakfast this AM. Lunch tray just arrived upon interview - assisted in setting up pt for meal. Pt states PTA she was at a nursing home and ate 3 meals/day, unsure of any diet restrictions followed. Pt not appropriate for diet education secondary to AMS. No reported c/o N/V/C/D at this time. Pt states her UBW is about 99 lbs, current weight 100.1 lbs. No edema noted. Pt unsure of weight changes. Last admission weight 105 lbs (Feb 2025). Possible 5 lb weight loss x1 month (~5%). NFPE conducted and pt with mild muscle/fat wasting. Pt also receiving Ensure BID. Will continue to monitor and follow up as needed. RD remains available.

## 2025-03-12 NOTE — DIETITIAN INITIAL EVALUATION ADULT - ADD RECOMMEND
1) Continue diet as tolerated.   2) Continue Ensure BID to optimize PO intake and provide an additional 350 kcal and 20g protein per serving.   3) Encourage po intake, monitor diet tolerance, and provide assistance at meals as needed.   4) Rx: MVI daily.   5) Obtain daily weights to monitor trends.

## 2025-03-12 NOTE — CHART NOTE - NSCHARTNOTEFT_GEN_A_CORE
Notified by RN,  temp 96.1 rectal     MD notified.  Placed on [facility] Notified by RN, rectal  temp 96.1. /65,. Patient asymptomatic. Denies any acute distress.   MD notified.  Orders placed for  Loretta Richard.  Rn to re-assess vitals and inform provider of changes

## 2025-03-12 NOTE — DIETITIAN INITIAL EVALUATION ADULT - OTHER INFO
Per chart "89yoF hx CAD s/p PCI (2018), Afib on Xarelto, AS s/p TAVR, HFpEF, spinal stenosis, recently admitted to [facility] in 2/2025 for acute hypoxemic respiratory failure and sepsis in setting of influenza and proctitis, discharged to [facility], presented with 2 weeks of hematuria."

## 2025-03-12 NOTE — PROGRESS NOTE ADULT - NUTRITIONAL ASSESSMENT
This patient has been assessed with a concern for Malnutrition and has been determined to have a diagnosis/diagnoses of Moderate protein-calorie malnutrition.    This patient is being managed with:   Diet DASH/TLC-  Sodium & Cholesterol Restricted  Supplement Feeding Modality:  Oral  Ensure Enlive Cans or Servings Per Day:  1       Frequency:  Two Times a day  Entered: Mar 12 2025  8:47AM

## 2025-03-12 NOTE — DIETITIAN INITIAL EVALUATION ADULT - NSICDXPASTMEDICALHX_GEN_ALL_CORE_FT
PAST MEDICAL HISTORY:  Acute deep vein thrombosis (DVT) of popliteal vein of both lower extremities     AF (atrial fibrillation) Persistent/chronic    Anxiety     Aortic stenosis s/p TAVR bioprosthetic Jan 2018 [facility] Dr. Aragon    Atherosclerosis of native coronary artery of native heart with angina pectoris     AV block     Bronchiectasis     Chronic back pain     Closed fracture of multiple ribs of right side, initial encounter     Closed pelvic ring fracture     Diastolic CHF NYHA class 3    Essential hypertension     GERD (gastroesophageal reflux disease)     Hip arthritis     History of valvular heart disease     [place] (hard of hearing) wears bilateral hearing aides    Hypercholesteremia     Hyperlipidemia, unspecified hyperlipidemia type     Kyphoscoliosis and scoliosis     Lung nodule     Murmur, cardiac gr3/6    Neuropathy     Pelvic fracture     PNA (pneumonia) november 2017    Spinal stenosis     Stenocardia     Thyroid nodule

## 2025-03-12 NOTE — PROVIDER CONTACT NOTE (OTHER) - ACTION/TREATMENT ORDERED:
Pt is asymptomatic, resting comfortably in bed  Tele monitor in place, call bell within reach  Will initiate alan hugger as per orders

## 2025-03-12 NOTE — PROGRESS NOTE ADULT - SUBJECTIVE AND OBJECTIVE BOX
Acute cystitis with hematuria    HPI:  89yoF hx CAD s/p PCI (2018), Afib on Xarelto, AS s/p TAVR, HFpEF, spinal stenosis, recently admitted to [facility] in 2/2025 for acute hypoxemic respiratory failure and sepsis in setting of influenza and proctitis, discharged to [facility], sent from facility for active bleeding.  Pt is awake, alert and conversant but limited historian due to hearing impairment.  Collateral hx obtained from daughter in law/HCP [place].  Pt has had 2 weeks of active bleeding coming from groin area which nursing home staff attributed to vaginal bleeding.  Pt had a GYN appointment scheduled for 3/24.  Daughter in law Venice went to visit pt today and ‘found her in a pool of blood’ and advised that she be taken to hospital.   On admission, UA w/ >1900 RBC. FOBT negative.  ED RN performed straight cath that had gross hematuria with blood clots. CT A/P showed marked hyperemia of the urinary bladder urothelium and nodularity in posterior bladder with inability to exclude urothelial lesion.  (10 Mar 2025 05:41)    Interval History:  Patient was seen and examined at bedside around 9:45 am.  Feels cold.   Hematuria has resolved.   Urine is orange likely from Pyridium.    Lower abdominal pain has improved.    Does not like the food.     ROS:  As per interval history otherwise unremarkable.    PHYSICAL EXAM:  Vital Signs   T(C): 35.6 (12 Mar 2025 12:45), Max: 37.1 (12 Mar 2025 00:05)  T(F): 96.1 (12 Mar 2025 12:45), Max: 98.7 (12 Mar 2025 00:05)  HR: 61 (12 Mar 2025 12:45) (60 - 97)  BP: 110/55 (12 Mar 2025 12:45) (92/58 - 145/72)  RR: 18 (12 Mar 2025 12:45) (18 - 18)  SpO2: 100% (12 Mar 2025 12:45) (94% - 100%)  Parameters below as of 12 Mar 2025 12:45  Patient On (Oxygen Delivery Method): nasal cannula  O2 Flow (L/min): 3  General: Elderly female lying in bed comfortably. No acute distress  HEENT: EOMI. Clear conjunctivae. Moist mucus membrane. Hard of hearing.   Neck: Supple.   Chest: Good air entry. No wheezing, rales or rhonchi.   Heart: Normal S1 & S2. RRR.   Abdomen: Non distended. Soft. Non-tender. + BS  Ext: No pedal edema. No calf tenderness   Neuro: Awake. Moves all extremities. Speech clear.   Skin: Warm and Dry  Psychiatry: Normal mood and affect    LABS:                     10.9   6.99  )-----------( 168      ( 12 Mar 2025 06:25 )             34.0     03-12    143  |  105  |  14.7  ----------------------------<  95  4.9   |  29.0  |  0.88    Ca    8.8      12 Mar 2025 06:25  Mg     2.0     03-12    Blood Culture: 03-09 @ 20:45  Organism --  Gram Stain Blood -- Gram Stain --  Specimen Source Clean Catch Clean Catch (Midstream)  Culture-Blood --    RADIOLOGY & ADDITIONAL STUDIES:  Reviewed     MEDICATIONS  (STANDING):  atorvastatin 20 milliGRAM(s) Oral at bedtime  cefTRIAXone Injectable. 1000 milliGRAM(s) IV Push every 24 hours  chlorhexidine 2% Cloths 1 Application(s) Topical daily  famotidine    Tablet 20 milliGRAM(s) Oral two times a day  ferrous    sulfate 325 milliGRAM(s) Oral two times a day  gabapentin 600 milliGRAM(s) Oral three times a day  hydrALAZINE 25 milliGRAM(s) Oral every 8 hours  multivitamin 1 Tablet(s) Oral daily  ranolazine 500 milliGRAM(s) Oral two times a day  rivaroxaban 15 milliGRAM(s) Oral with dinner    MEDICATIONS  (PRN):  acetaminophen     Tablet .. 650 milliGRAM(s) Oral every 6 hours PRN Temp greater or equal to 38C (100.4F), Mild Pain (1 - 3), Moderate Pain (4 - 6)  bisacodyl Suppository 10 milliGRAM(s) Rectal daily PRN Constipation  melatonin 3 milliGRAM(s) Oral at bedtime PRN Insomnia  ondansetron Injectable 4 milliGRAM(s) IV Push every 6 hours PRN Nausea and/or Vomiting

## 2025-03-13 ENCOUNTER — TRANSCRIPTION ENCOUNTER (OUTPATIENT)
Age: 89
End: 2025-03-13

## 2025-03-13 VITALS
OXYGEN SATURATION: 97 % | DIASTOLIC BLOOD PRESSURE: 63 MMHG | TEMPERATURE: 98 F | SYSTOLIC BLOOD PRESSURE: 118 MMHG | HEART RATE: 78 BPM | RESPIRATION RATE: 18 BRPM

## 2025-03-13 LAB
ANION GAP SERPL CALC-SCNC: 9 MMOL/L — SIGNIFICANT CHANGE UP (ref 5–17)
BUN SERPL-MCNC: 20.2 MG/DL — HIGH (ref 8–20)
CALCIUM SERPL-MCNC: 8.7 MG/DL — SIGNIFICANT CHANGE UP (ref 8.4–10.5)
CHLORIDE SERPL-SCNC: 103 MMOL/L — SIGNIFICANT CHANGE UP (ref 96–108)
CO2 SERPL-SCNC: 28 MMOL/L — SIGNIFICANT CHANGE UP (ref 22–29)
CREAT SERPL-MCNC: 0.87 MG/DL — SIGNIFICANT CHANGE UP (ref 0.5–1.3)
CULTURE RESULTS: ABNORMAL
EGFR: 62 ML/MIN/1.73M2 — SIGNIFICANT CHANGE UP
EGFR: 62 ML/MIN/1.73M2 — SIGNIFICANT CHANGE UP
GLUCOSE SERPL-MCNC: 92 MG/DL — SIGNIFICANT CHANGE UP (ref 70–99)
HCT VFR BLD CALC: 32.1 % — LOW (ref 34.5–45)
HGB BLD-MCNC: 10.3 G/DL — LOW (ref 11.5–15.5)
MAGNESIUM SERPL-MCNC: 1.9 MG/DL — SIGNIFICANT CHANGE UP (ref 1.6–2.6)
MCHC RBC-ENTMCNC: 29.8 PG — SIGNIFICANT CHANGE UP (ref 27–34)
MCHC RBC-ENTMCNC: 32.1 G/DL — SIGNIFICANT CHANGE UP (ref 32–36)
MCV RBC AUTO: 92.8 FL — SIGNIFICANT CHANGE UP (ref 80–100)
NRBC # BLD AUTO: 0 K/UL — SIGNIFICANT CHANGE UP (ref 0–0)
NRBC # FLD: 0 K/UL — SIGNIFICANT CHANGE UP (ref 0–0)
NRBC BLD AUTO-RTO: 0 /100 WBCS — SIGNIFICANT CHANGE UP (ref 0–0)
PLATELET # BLD AUTO: 168 K/UL — SIGNIFICANT CHANGE UP (ref 150–400)
PMV BLD: 10.4 FL — SIGNIFICANT CHANGE UP (ref 7–13)
POTASSIUM SERPL-MCNC: 4.7 MMOL/L — SIGNIFICANT CHANGE UP (ref 3.5–5.3)
POTASSIUM SERPL-SCNC: 4.7 MMOL/L — SIGNIFICANT CHANGE UP (ref 3.5–5.3)
RBC # BLD: 3.46 M/UL — LOW (ref 3.8–5.2)
RBC # FLD: 15.9 % — HIGH (ref 10.3–14.5)
SODIUM SERPL-SCNC: 140 MMOL/L — SIGNIFICANT CHANGE UP (ref 135–145)
SPECIMEN SOURCE: SIGNIFICANT CHANGE UP
WBC # BLD: 4.78 K/UL — SIGNIFICANT CHANGE UP (ref 3.8–10.5)
WBC # FLD AUTO: 4.78 K/UL — SIGNIFICANT CHANGE UP (ref 3.8–10.5)

## 2025-03-13 PROCEDURE — 83880 ASSAY OF NATRIURETIC PEPTIDE: CPT

## 2025-03-13 PROCEDURE — 86900 BLOOD TYPING SEROLOGIC ABO: CPT

## 2025-03-13 PROCEDURE — 85018 HEMOGLOBIN: CPT

## 2025-03-13 PROCEDURE — 36415 COLL VENOUS BLD VENIPUNCTURE: CPT

## 2025-03-13 PROCEDURE — 87640 STAPH A DNA AMP PROBE: CPT

## 2025-03-13 PROCEDURE — 85730 THROMBOPLASTIN TIME PARTIAL: CPT

## 2025-03-13 PROCEDURE — 85025 COMPLETE CBC W/AUTO DIFF WBC: CPT

## 2025-03-13 PROCEDURE — 96374 THER/PROPH/DIAG INJ IV PUSH: CPT

## 2025-03-13 PROCEDURE — 81001 URINALYSIS AUTO W/SCOPE: CPT

## 2025-03-13 PROCEDURE — 87077 CULTURE AEROBIC IDENTIFY: CPT

## 2025-03-13 PROCEDURE — 85014 HEMATOCRIT: CPT

## 2025-03-13 PROCEDURE — 86850 RBC ANTIBODY SCREEN: CPT

## 2025-03-13 PROCEDURE — 99285 EMERGENCY DEPT VISIT HI MDM: CPT | Mod: 25

## 2025-03-13 PROCEDURE — 84145 PROCALCITONIN (PCT): CPT

## 2025-03-13 PROCEDURE — 74177 CT ABD & PELVIS W/CONTRAST: CPT | Mod: MC

## 2025-03-13 PROCEDURE — 87641 MR-STAPH DNA AMP PROBE: CPT

## 2025-03-13 PROCEDURE — 71045 X-RAY EXAM CHEST 1 VIEW: CPT

## 2025-03-13 PROCEDURE — 83735 ASSAY OF MAGNESIUM: CPT

## 2025-03-13 PROCEDURE — 82272 OCCULT BLD FECES 1-3 TESTS: CPT

## 2025-03-13 PROCEDURE — 85610 PROTHROMBIN TIME: CPT

## 2025-03-13 PROCEDURE — 86140 C-REACTIVE PROTEIN: CPT

## 2025-03-13 PROCEDURE — 86901 BLOOD TYPING SEROLOGIC RH(D): CPT

## 2025-03-13 PROCEDURE — 87086 URINE CULTURE/COLONY COUNT: CPT

## 2025-03-13 PROCEDURE — 80048 BASIC METABOLIC PNL TOTAL CA: CPT

## 2025-03-13 PROCEDURE — 80053 COMPREHEN METABOLIC PANEL: CPT

## 2025-03-13 PROCEDURE — 85027 COMPLETE CBC AUTOMATED: CPT

## 2025-03-13 PROCEDURE — 99239 HOSP IP/OBS DSCHRG MGMT >30: CPT

## 2025-03-13 RX ORDER — FUROSEMIDE 10 MG/ML
1 INJECTION INTRAMUSCULAR; INTRAVENOUS
Qty: 0 | Refills: 0 | DISCHARGE
Start: 2025-03-13

## 2025-03-13 RX ORDER — B1/B2/B3/B5/B6/B12/VIT C/FOLIC 500-0.5 MG
1 TABLET ORAL
Qty: 0 | Refills: 0 | DISCHARGE
Start: 2025-03-13

## 2025-03-13 RX ORDER — CEFPODOXIME PROXETIL 200 MG/1
1 TABLET, FILM COATED ORAL
Qty: 8 | Refills: 0
Start: 2025-03-13 | End: 2025-03-16

## 2025-03-13 RX ADMIN — RANOLAZINE 500 MILLIGRAM(S): 1000 TABLET, FILM COATED, EXTENDED RELEASE ORAL at 05:50

## 2025-03-13 RX ADMIN — GABAPENTIN 600 MILLIGRAM(S): 400 CAPSULE ORAL at 05:50

## 2025-03-13 RX ADMIN — FUROSEMIDE 20 MILLIGRAM(S): 10 INJECTION INTRAMUSCULAR; INTRAVENOUS at 05:51

## 2025-03-13 RX ADMIN — CEFTRIAXONE 1000 MILLIGRAM(S): 500 INJECTION, POWDER, FOR SOLUTION INTRAMUSCULAR; INTRAVENOUS at 05:50

## 2025-03-13 RX ADMIN — Medication 25 MILLIGRAM(S): at 05:50

## 2025-03-13 RX ADMIN — Medication 1 TABLET(S): at 13:21

## 2025-03-13 RX ADMIN — Medication 1 APPLICATION(S): at 13:16

## 2025-03-13 RX ADMIN — Medication 325 MILLIGRAM(S): at 05:50

## 2025-03-13 RX ADMIN — GABAPENTIN 600 MILLIGRAM(S): 400 CAPSULE ORAL at 13:21

## 2025-03-13 RX ADMIN — Medication 20 MILLIGRAM(S): at 05:50

## 2025-03-13 RX ADMIN — Medication 25 MILLIGRAM(S): at 13:21

## 2025-03-13 NOTE — DISCHARGE NOTE NURSING/CASE MANAGEMENT/SOCIAL WORK - PATIENT PORTAL LINK FT
You can access the FollowMyHealth Patient Portal offered by [facility] by registering at the following website: [email or web address]. By joining [facility]’s FollowMyHealth portal, you will also be able to view your health information using other applications (apps) compatible with our system.

## 2025-03-13 NOTE — DISCHARGE NOTE NURSING/CASE MANAGEMENT/SOCIAL WORK - NSDCFUADDAPPT_GEN_ALL_CORE_FT
Please follow up with Dr. Maldonado's office outpatient for a cystoscopy.    APPTS ARE READY TO BE MADE: [X] YES    Best Family or Patient Contact (if needed): Venice Danika (Daughter in law)-[phone]    1: PCP in 1-2 weeks.  2: Urology in 2 weeks.   3: Cardiology in 2 weeks.

## 2025-03-13 NOTE — DISCHARGE NOTE NURSING/CASE MANAGEMENT/SOCIAL WORK - FINANCIAL ASSISTANCE
[facility] provides services at a reduced cost to those who are determined to be eligible through [facility]’s financial assistance program. Information regarding [facility]’s financial assistance program can be found by going to [email or web address] or by calling [phone].

## 2025-03-14 NOTE — PATIENT PROFILE ADULT - NSPRONUTRITIONRISK_GEN_A_NUR
No indicators present

## 2025-05-29 ENCOUNTER — APPOINTMENT (OUTPATIENT)
Dept: OBGYN | Facility: CLINIC | Age: 89
End: 2025-05-29
Payer: MEDICARE

## 2025-05-29 VITALS
HEIGHT: 59 IN | WEIGHT: 107 LBS | DIASTOLIC BLOOD PRESSURE: 63 MMHG | BODY MASS INDEX: 21.57 KG/M2 | SYSTOLIC BLOOD PRESSURE: 117 MMHG

## 2025-05-29 DIAGNOSIS — N95.0 POSTMENOPAUSAL BLEEDING: ICD-10-CM

## 2025-05-29 DIAGNOSIS — N93.9 ABNORMAL UTERINE AND VAGINAL BLEEDING, UNSPECIFIED: ICD-10-CM

## 2025-05-29 PROCEDURE — 99203 OFFICE O/P NEW LOW 30 MIN: CPT

## 2025-05-29 NOTE — ED ADULT TRIAGE NOTE - PAIN RATING/NUMBER SCALE (0-10): REST
Patient called to schedule an appointment with [facility] due to her menopause symptoms getting worse. I did schedule patient for Sept of 2026. Patient is on the waiting list.  
0

## 2025-05-30 LAB
BV BACTERIA RRNA VAG QL NAA+PROBE: NOT DETECTED
C GLABRATA RNA VAG QL NAA+PROBE: NOT DETECTED
CANDIDA RRNA VAG QL PROBE: NOT DETECTED
T VAGINALIS RRNA SPEC QL NAA+PROBE: NOT DETECTED

## 2025-06-22 ENCOUNTER — INPATIENT (INPATIENT)
Facility: HOSPITAL | Age: 89
LOS: 11 days | Discharge: HOME | DRG: 690 | End: 2025-07-04
Attending: FAMILY MEDICINE | Admitting: STUDENT IN AN ORGANIZED HEALTH CARE EDUCATION/TRAINING PROGRAM
Payer: MEDICARE

## 2025-06-22 VITALS
SYSTOLIC BLOOD PRESSURE: 135 MMHG | RESPIRATION RATE: 16 BRPM | TEMPERATURE: 98 F | WEIGHT: 119.93 LBS | OXYGEN SATURATION: 98 % | HEART RATE: 65 BPM | HEIGHT: 60 IN | DIASTOLIC BLOOD PRESSURE: 67 MMHG

## 2025-06-22 DIAGNOSIS — Z98.1 ARTHRODESIS STATUS: Chronic | ICD-10-CM

## 2025-06-22 DIAGNOSIS — Z95.3 PRESENCE OF XENOGENIC HEART VALVE: Chronic | ICD-10-CM

## 2025-06-22 DIAGNOSIS — N39.0 URINARY TRACT INFECTION, SITE NOT SPECIFIED: ICD-10-CM

## 2025-06-22 LAB
ALBUMIN SERPL ELPH-MCNC: 3.9 G/DL — SIGNIFICANT CHANGE UP (ref 3.3–5.2)
ALP SERPL-CCNC: 82 U/L — SIGNIFICANT CHANGE UP (ref 40–120)
ALT FLD-CCNC: 23 U/L — SIGNIFICANT CHANGE UP
ANION GAP SERPL CALC-SCNC: 14 MMOL/L — SIGNIFICANT CHANGE UP (ref 5–17)
ANISOCYTOSIS BLD QL: SLIGHT — SIGNIFICANT CHANGE UP
APPEARANCE UR: CLEAR — SIGNIFICANT CHANGE UP
AST SERPL-CCNC: 32 U/L — HIGH
BACTERIA # UR AUTO: NEGATIVE /HPF — SIGNIFICANT CHANGE UP
BASOPHILS # BLD AUTO: 0.02 K/UL — SIGNIFICANT CHANGE UP (ref 0–0.2)
BASOPHILS # BLD MANUAL: 0 K/UL — SIGNIFICANT CHANGE UP (ref 0–0.2)
BASOPHILS NFR BLD AUTO: 0.4 % — SIGNIFICANT CHANGE UP (ref 0–2)
BASOPHILS NFR BLD MANUAL: 0 % — SIGNIFICANT CHANGE UP (ref 0–2)
BILIRUB SERPL-MCNC: 0.6 MG/DL — SIGNIFICANT CHANGE UP (ref 0.4–2)
BILIRUB UR-MCNC: NEGATIVE — SIGNIFICANT CHANGE UP
BUN SERPL-MCNC: 54.4 MG/DL — HIGH (ref 8–20)
CALCIUM SERPL-MCNC: 9.6 MG/DL — SIGNIFICANT CHANGE UP (ref 8.4–10.5)
CAST: 2 /LPF — SIGNIFICANT CHANGE UP (ref 0–4)
CHLORIDE SERPL-SCNC: 93 MMOL/L — LOW (ref 96–108)
CO2 SERPL-SCNC: 30 MMOL/L — HIGH (ref 22–29)
COLOR SPEC: SIGNIFICANT CHANGE UP
CREAT SERPL-MCNC: 1.43 MG/DL — HIGH (ref 0.5–1.3)
DIFF PNL FLD: ABNORMAL
EGFR: 34 ML/MIN/1.73M2 — LOW
EGFR: 34 ML/MIN/1.73M2 — LOW
EOSINOPHIL # BLD AUTO: 0.04 K/UL — SIGNIFICANT CHANGE UP (ref 0–0.5)
EOSINOPHIL # BLD MANUAL: 0.04 K/UL — SIGNIFICANT CHANGE UP (ref 0–0.5)
EOSINOPHIL NFR BLD AUTO: 0.8 % — SIGNIFICANT CHANGE UP (ref 0–6)
EOSINOPHIL NFR BLD MANUAL: 0.9 % — SIGNIFICANT CHANGE UP (ref 0–6)
GIANT PLATELETS BLD QL SMEAR: PRESENT
GLUCOSE SERPL-MCNC: 79 MG/DL — SIGNIFICANT CHANGE UP (ref 70–99)
GLUCOSE UR QL: NEGATIVE MG/DL — SIGNIFICANT CHANGE UP
HCT VFR BLD CALC: 38.1 % — SIGNIFICANT CHANGE UP (ref 34.5–45)
HGB BLD-MCNC: 12.8 G/DL — SIGNIFICANT CHANGE UP (ref 11.5–15.5)
IMM GRANULOCYTES # BLD AUTO: 0.13 K/UL — HIGH (ref 0–0.07)
IMM GRANULOCYTES NFR BLD AUTO: 2.7 % — HIGH (ref 0–0.9)
KETONES UR QL: NEGATIVE MG/DL — SIGNIFICANT CHANGE UP
LACTATE SERPL-SCNC: 0.9 MMOL/L — SIGNIFICANT CHANGE UP (ref 0.5–2)
LEUKOCYTE ESTERASE UR-ACNC: ABNORMAL
LIDOCAIN IGE QN: 61 U/L — HIGH (ref 22–51)
LYMPHOCYTES # BLD AUTO: 1.17 K/UL — SIGNIFICANT CHANGE UP (ref 1–3.3)
LYMPHOCYTES # BLD MANUAL: 1.17 K/UL — SIGNIFICANT CHANGE UP (ref 1–3.3)
LYMPHOCYTES NFR BLD AUTO: 24.1 % — SIGNIFICANT CHANGE UP (ref 13–44)
LYMPHOCYTES NFR BLD MANUAL: 24.1 % — SIGNIFICANT CHANGE UP (ref 13–44)
MANUAL REACTIVE LYMPHOCYTES #: 0.08 K/UL — SIGNIFICANT CHANGE UP (ref 0–0.63)
MCHC RBC-ENTMCNC: 28.8 PG — SIGNIFICANT CHANGE UP (ref 27–34)
MCHC RBC-ENTMCNC: 33.6 G/DL — SIGNIFICANT CHANGE UP (ref 32–36)
MCV RBC AUTO: 85.6 FL — SIGNIFICANT CHANGE UP (ref 80–100)
MONOCYTES # BLD AUTO: 1.68 K/UL — HIGH (ref 0–0.9)
MONOCYTES # BLD MANUAL: 1.21 K/UL — HIGH (ref 0–0.9)
MONOCYTES NFR BLD AUTO: 34.6 % — HIGH (ref 2–14)
MONOCYTES NFR BLD MANUAL: 25 % — HIGH (ref 2–14)
NEUTROPHILS # BLD AUTO: 1.81 K/UL — SIGNIFICANT CHANGE UP (ref 1.8–7.4)
NEUTROPHILS # BLD MANUAL: 2.34 K/UL — SIGNIFICANT CHANGE UP (ref 1.8–7.4)
NEUTROPHILS NFR BLD AUTO: 37.4 % — LOW (ref 43–77)
NEUTROPHILS NFR BLD MANUAL: 48.3 % — SIGNIFICANT CHANGE UP (ref 43–77)
NITRITE UR-MCNC: POSITIVE
NRBC # BLD AUTO: 0 K/UL — SIGNIFICANT CHANGE UP (ref 0–0)
NRBC # FLD: 0 K/UL — SIGNIFICANT CHANGE UP (ref 0–0)
NRBC BLD AUTO-RTO: 0 /100 WBCS — SIGNIFICANT CHANGE UP (ref 0–0)
PH UR: 8 — SIGNIFICANT CHANGE UP (ref 5–8)
PLAT MORPH BLD: NORMAL — SIGNIFICANT CHANGE UP
PLATELET # BLD AUTO: 205 K/UL — SIGNIFICANT CHANGE UP (ref 150–400)
PMV BLD: 10.8 FL — SIGNIFICANT CHANGE UP (ref 7–13)
POTASSIUM SERPL-MCNC: 4.1 MMOL/L — SIGNIFICANT CHANGE UP (ref 3.5–5.3)
POTASSIUM SERPL-SCNC: 4.1 MMOL/L — SIGNIFICANT CHANGE UP (ref 3.5–5.3)
PROT SERPL-MCNC: 6.7 G/DL — SIGNIFICANT CHANGE UP (ref 6.6–8.7)
PROT UR-MCNC: SIGNIFICANT CHANGE UP MG/DL
RBC # BLD: 4.45 M/UL — SIGNIFICANT CHANGE UP (ref 3.8–5.2)
RBC # FLD: 14.4 % — SIGNIFICANT CHANGE UP (ref 10.3–14.5)
RBC BLD AUTO: ABNORMAL
RBC CASTS # UR COMP ASSIST: 23 /HPF — HIGH (ref 0–4)
SMUDGE CELLS # BLD: PRESENT
SODIUM SERPL-SCNC: 137 MMOL/L — SIGNIFICANT CHANGE UP (ref 135–145)
SP GR SPEC: 1.01 — SIGNIFICANT CHANGE UP (ref 1–1.03)
SQUAMOUS # UR AUTO: 0 /HPF — SIGNIFICANT CHANGE UP (ref 0–5)
UROBILINOGEN FLD QL: 1 MG/DL — SIGNIFICANT CHANGE UP (ref 0.2–1)
VARIANT LYMPHS # BLD: 1.7 % — SIGNIFICANT CHANGE UP (ref 0–6)
VARIANT LYMPHS NFR BLD MANUAL: 1.7 % — SIGNIFICANT CHANGE UP (ref 0–6)
WBC # BLD: 4.85 K/UL — SIGNIFICANT CHANGE UP (ref 3.8–10.5)
WBC # FLD AUTO: 4.85 K/UL — SIGNIFICANT CHANGE UP (ref 3.8–10.5)
WBC UR QL: 3 /HPF — SIGNIFICANT CHANGE UP (ref 0–5)

## 2025-06-22 PROCEDURE — 99223 1ST HOSP IP/OBS HIGH 75: CPT

## 2025-06-22 PROCEDURE — 99497 ADVNCD CARE PLAN 30 MIN: CPT | Mod: 25

## 2025-06-22 PROCEDURE — 99285 EMERGENCY DEPT VISIT HI MDM: CPT

## 2025-06-22 RX ORDER — CEFTRIAXONE 500 MG/1
1000 INJECTION, POWDER, FOR SOLUTION INTRAMUSCULAR; INTRAVENOUS ONCE
Refills: 0 | Status: DISCONTINUED | OUTPATIENT
Start: 2025-06-22 | End: 2025-06-22

## 2025-06-22 RX ORDER — CEFTRIAXONE 500 MG/1
1000 INJECTION, POWDER, FOR SOLUTION INTRAMUSCULAR; INTRAVENOUS ONCE
Refills: 0 | Status: COMPLETED | OUTPATIENT
Start: 2025-06-22 | End: 2025-06-22

## 2025-06-22 RX ORDER — PHENAZOPYRIDINE HCL 100 MG
200 TABLET ORAL ONCE
Refills: 0 | Status: COMPLETED | OUTPATIENT
Start: 2025-06-22 | End: 2025-06-22

## 2025-06-22 RX ADMIN — Medication 200 MILLIGRAM(S): at 18:47

## 2025-06-22 RX ADMIN — Medication 1000 MILLILITER(S): at 16:04

## 2025-06-22 RX ADMIN — CEFTRIAXONE 1000 MILLIGRAM(S): 500 INJECTION, POWDER, FOR SOLUTION INTRAMUSCULAR; INTRAVENOUS at 18:40

## 2025-06-22 NOTE — H&P ADULT - CONVERSATION DETAILS
Pt defers to daughter in law who is her primary care taker. Spoke with daughter in law Venice Garnica who states that she is the HCP and that pt is DNR/DNI.  There is prior MOLST from 2023 that states pt is DNR/DNI.  The MOLST was reviewed and is located in Patient Window.

## 2025-06-22 NOTE — ED ADULT NURSE NOTE - OBJECTIVE STATEMENT
Pt presents to the ED c/o dysuria and pelvic pain x 1 month. Pt is AOx3, breathing is even and unlabored bilaterally. pt denies chest pain, SOB, N/V, HA, dizziness, abd pain, or any other complaints. On 2 L NC at baseline as per pt. Pending UA and lab results. Pt presents to the ED c/o dysuria and pelvic pain x 1 month. Pt is AOx3, breathing is even and unlabored bilaterally. Pt denies chest pain, SOB, N/V, HA, dizziness, abd pain, or any other complaints. On 2 L NC as of yesterday as per family. Family states pt was recently discharged from [facility] and has had frequent UTI's. Pt states they have treated with abx's but the burning pain has always remained. Pending UA and lab results.

## 2025-06-22 NOTE — ED ADULT NURSE NOTE - NS_SISCREENINGSR_GEN_ALL_ED
CERTIFICATE OF WORK      October 9, 2020      Re: Stacy Dan  [address] [address] [facility] [id number]      This is to certify that Stacy Dan has been under my care from 10/9/2020 and can return to regular work on 10/12/2020.    RESTRICTIONS: Sit to stand option as needed.      REMARKS: Stacy is recovering from back injury. She will follow up with our office as needed.        SIGNATURE:___________________________________________          Larry Moreno PA-C  [facility]  [address] [address]  Dept Phone: [phone]    
Negative

## 2025-06-22 NOTE — ED ADULT NURSE NOTE - CHIEF COMPLAINT QUOTE
pelvic pain and dysuria x 1 month. no new symptoms noted. a and o x3. [place] (doesn't have hearing aids).

## 2025-06-22 NOTE — ED ADULT NURSE NOTE - NSICDXPASTMEDICALHX_GEN_ALL_CORE_FT
PAST MEDICAL HISTORY:  Acute deep vein thrombosis (DVT) of popliteal vein of both lower extremities     AF (atrial fibrillation) Persistent/chronic    Anxiety     Aortic stenosis s/p TAVR bioprosthetic Jan 2018 [facility] Dr. Aragon    Atherosclerosis of native coronary artery of native heart with angina pectoris     AV block     Bronchiectasis     Chronic back pain     Closed fracture of multiple ribs of right side, initial encounter     Closed pelvic ring fracture     Diastolic CHF NYHA class 3    Essential hypertension     GERD (gastroesophageal reflux disease)     Hip arthritis     History of valvular heart disease     [place] (hard of hearing) wears bilateral hearing aides    Hypercholesteremia     Hyperlipidemia, unspecified hyperlipidemia type     Kyphoscoliosis and scoliosis     Lung nodule     Murmur, cardiac gr3/6    Neuropathy     Pelvic fracture     PNA (pneumonia) november 2017    Spinal stenosis     Stenocardia     Thyroid nodule

## 2025-06-22 NOTE — H&P ADULT - NSHPPHYSICALEXAM_GEN_ALL_CORE
Vital Signs Last 24 Hrs  T(C): 36.4 (23 Jun 2025 04:09), Max: 36.7 (22 Jun 2025 14:48)  T(F): 97.5 (23 Jun 2025 04:09), Max: 98.1 (22 Jun 2025 14:48)  HR: 70 (23 Jun 2025 04:09) (61 - 70)  BP: 146/68 (23 Jun 2025 04:09) (127/60 - 154/59)  BP(mean): 84 (22 Jun 2025 21:53) (84 - 84)  RR: 20 (23 Jun 2025 04:09) (16 - 20)  SpO2: 98% (23 Jun 2025 04:09) (98% - 100%)    Parameters below as of 23 Jun 2025 04:09  Patient On (Oxygen Delivery Method): nasal cannula  O2 Flow (L/min): 2    GENERAL:  Well-appearing elderly female, not in acute distress, sitting up eating food in bed  EYES:  Clear conjunctiva, extraocular movement intact  ENT: Moist mucous membranes  RESP:  Non-labored breathing pattern, lungs clear to ausculation in anterior fields  CV: Regular rate and rhythm, no murmurs appreciated, no lower extremity edema  GI: Soft, non-tender, non-distended  NEURO: Awake, alert, conversant, upper and lower extremity strength 5/5, light touch sensation grossly intact  PSYCH: Calm, cooperative  SKIN: LLE shin ulcer covered in dressing, no purulent drainage noted

## 2025-06-22 NOTE — ED PROVIDER NOTE - NSICDXPASTMEDICALHX_GEN_ALL_CORE_FT
PAST MEDICAL HISTORY:  Acute deep vein thrombosis (DVT) of popliteal vein of both lower extremities     AF (atrial fibrillation) Persistent/chronic    Anxiety     Aortic stenosis s/p TAVR bioprosthetic Jan 2018 [facility] Dr. Aragon    Atherosclerosis of native coronary artery of native heart with angina pectoris     AV block     Bronchiectasis     Chronic back pain     Closed fracture of multiple ribs of right side, initial encounter     Closed pelvic ring fracture     Diastolic CHF NYHA class 3    Essential hypertension     GERD (gastroesophageal reflux disease)     Hip arthritis     History of valvular heart disease     [place] (hard of hearing) wears bilateral hearing aides    Hypercholesteremia     Hyperlipidemia, unspecified hyperlipidemia type     Kyphoscoliosis and scoliosis     Lung nodule     Murmur, cardiac gr3/6    Neuropathy     Pelvic fracture     PNA (pneumonia) november 2017    Spinal stenosis     Stenocardia     Thyroid nodule

## 2025-06-22 NOTE — H&P ADULT - HISTORY OF PRESENT ILLNESS
89yoF hx CAD s/p PCI (2018), Afib on Xarelto, HFpEF, chronic hypoxemic respiratory failure since 2/2025 admission for influenza on 2L NC, recurrent UTIs, presenting with persistent dysuria and urinary frequency.  Collateral hx obtained from daughter in law at bedside.  Pt has last treated for UTI in early June with Macrobid but has not had complete resolution of her dysuria and urinary frequency, prompting daughter to bring her to hospital.  Per daughter, pt has not had any hematuria.  Pt was pending outpatient urology appointment for cystoscopy as prior CT A/P in 3/2025 noted bladder nodularity and was unable to exclude a urothelial lesion, however daughter in law has been unable to get her to follow up appointment.

## 2025-06-22 NOTE — ED PROVIDER NOTE - CLINICAL SUMMARY MEDICAL DECISION MAKING FREE TEXT BOX
Ddx: UTI/ ro sepsis/ no abdominal tenderness or guarding to suggest surgical abdomen.   plan: Cbc, cmp, ua, cxr, fluids, reassess

## 2025-06-22 NOTE — H&P ADULT - NSICDXPASTMEDICALHX_GEN_ALL_CORE_FT
PAST MEDICAL HISTORY:  Acute deep vein thrombosis (DVT) of popliteal vein of both lower extremities     AF (atrial fibrillation) Persistent/chronic    Anxiety     Aortic stenosis s/p TAVR bioprosthetic Jan 2018 [facility] Dr. Aragon    Atherosclerosis of native coronary artery of native heart with angina pectoris     AV block     Bronchiectasis     Chronic back pain     Closed fracture of multiple ribs of right side, initial encounter     Closed pelvic ring fracture     Diastolic CHF NYHA class 3    Essential hypertension     GERD (gastroesophageal reflux disease)     Hip arthritis     History of valvular heart disease     [place] (hard of hearing) wears bilateral hearing aides    Hypercholesteremia     Hyperlipidemia, unspecified hyperlipidemia type     Kyphoscoliosis and scoliosis     Lung nodule     Murmur, cardiac gr3/6    Neuropathy     Pelvic fracture     PNA (pneumonia) november 2017    Spinal stenosis     Stenocardia     Thyroid nodule

## 2025-06-22 NOTE — H&P ADULT - ASSESSMENT
89yoF hx CAD s/p PCI (2018), Afib on Xarelto, HFpEF, chronic hypoxemic respiratory failure since 2/2025 admission for influenza on 2L NC, recurrent UTIs, presenting with persistent dysuria and urinary frequency despite recent treatment for UTI w/ PO antibiotics    Suspected UTI  -UA noted, not impressive but in setting of recent abx use, also microscopic hematuria  -Proteus in prior urine cx sensitive to cephalosporin  -Continue w/ ceftriaxone  -Add on Pyridium for dysuria relief   -Daughter requesting urology eval due to recurrent UTIs, difficulty bringing pt to outpatient appointments     Hx possible urothelial lesion  -CT scan 3/2025 w/ nodularity in posterior bladder, inability to exclude lesion   -Urology consulted, spoke w/ PA advised repeat CT A/P    SORAIDA  -Could be pre-renal etiology from infection, diuretic use  -s/p 1L NS, will start maintenance IVF  -Awaiting repeat CT A/P    Hx chronic hypoxemic respiratory failure  -No acute respiratory symptoms   -On baseline O2 requirements of 2L NC, monitor     Hx HFpEF  -Hold furosemide due to SORAIDA  -Monitor volume status closely while on maintenance IVF    Hx Afib  -Resume Eliquis  -Monitor closely for signs of gross  hematuria     Hx spinal stenosis  -On gabapentin 600mg TID, change to renal dosing due to SORAIDA    Hx CAD with chronic stable angina  -Resume ranolazine, atorvastatin     Hx hypothyroidism  -Resume levothyroxine     Hx LLE ulcer  -Has dressing placed by home visiting RN, will order wound care consult    Medication management  -Daughter in law unclear on if pt still on BP meds hydralazine, triamterene-HCTZ, states she will bring pill bottles in AM    Prophylactic measure   -On Xarelto     Dispo: medically active, pending clinical improvement, culture results.  Estimated LOS: 2-3 days, possible d/c to home. 89yoF hx CAD s/p PCI (2018), Afib on Xarelto, HFpEF, chronic hypoxemic respiratory failure since 2/2025 admission for influenza on 2L NC, recurrent UTIs, presenting with persistent dysuria and urinary frequency despite recent treatment for UTI w/ PO antibiotics    Suspected UTI  -UA noted, not impressive but in setting of recent abx use, also microscopic hematuria  -Proteus in prior urine cx sensitive to cephalosporin  -Continue w/ ceftriaxone  -Add on Pyridium for dysuria relief   -Daughter requesting urology eval due to recurrent UTIs, difficulty bringing pt to outpatient appointments     SORAIDA  -Could be pre-renal etiology from infection, diuretic use  -s/p 1L NS, will start maintenance IVF  -Awaiting repeat CT A/P    Hx possible urothelial lesion  -CT scan 3/2025 w/ nodularity in posterior bladder, inability to exclude lesion   -Urology consulted, spoke w/ PA advised repeat CT A/P    Hx chronic hypoxemic respiratory failure  -No acute respiratory symptoms   -On baseline O2 requirements of 2L NC, monitor     Hx HFpEF  -Hold furosemide due to SORAIDA  -Monitor volume status closely while on maintenance IVF    Hx Afib  -Resume Eliquis  -Monitor closely for signs of gross  hematuria     Hx spinal stenosis  -On gabapentin 600mg TID, change to renal dosing due to SORAIDA    Hx CAD with chronic stable angina  -Resume ranolazine, atorvastatin     Hx hypothyroidism  -Resume levothyroxine     Hx LLE ulcer  -Has dressing placed by home visiting RN, will order wound care consult    Medication management  -Daughter in law unclear on if pt still on BP meds hydralazine, triamterene-HCTZ, states she will bring pill bottles in AM    Prophylactic measure   -On Xarelto     Dispo: medically active, pending clinical improvement, culture results.  Estimated LOS: 2-3 days, possible d/c to home.

## 2025-06-22 NOTE — ED PROVIDER NOTE - OBJECTIVE STATEMENT
Pt is a 89 yo lady with a pmhx of Afib on xarelto, HL, CHF who presents to the ED with dysuria for the past month. Has had mulitple utis in the past per daughter. No fever, no abdominal pain, but with intense pain with urination. No chest pain, no sob, no n/v/d. No abdominal surgery.

## 2025-06-22 NOTE — ED ADULT NURSE NOTE - ED STAT RN HANDOFF DETAILS
handoff received from [facility], awaiting inpt bed, no acute distress, resps even unlabored, baseline mental status, daughter at bedside.

## 2025-06-22 NOTE — ED ADULT NURSE NOTE - NSFALLHARMRISKINTERV_ED_ALL_ED
Assistance OOB with selected safe patient handling equipment if applicable/Assistance with ambulation/Communicate risk of Fall with Harm to all staff, patient, and family/Monitor gait and stability/Provide visual cue: red socks, yellow wristband, yellow gown, etc/Reinforce activity limits and safety measures with patient and family/Bed in lowest position, wheels locked, appropriate side rails in place/Call bell, personal items and telephone in reach/Instruct patient to call for assistance before getting out of bed/chair/stretcher/Non-slip footwear applied when patient is off stretcher/[place] to call system/Physically safe environment - no spills, clutter or unnecessary equipment/Purposeful Proactive Rounding/Room/bathroom lighting operational, light cord in reach

## 2025-06-22 NOTE — H&P ADULT - NSHPLABSRESULTS_GEN_ALL_CORE
06-22    137  |  93[L]  |  54.4[H]  ----------------------------<  79  4.1   |  30.0[H]  |  1.43[H]    Ca    9.6      22 Jun 2025 15:46    TPro  6.7  /  Alb  3.9  /  TBili  0.6  /  DBili  x   /  AST  32[H]  /  ALT  23  /  AlkPhos  82  06-22                            12.8   4.85  )-----------( 205      ( 22 Jun 2025 15:46 )             38.1

## 2025-06-23 LAB
ANION GAP SERPL CALC-SCNC: 15 MMOL/L — SIGNIFICANT CHANGE UP (ref 5–17)
ANISOCYTOSIS BLD QL: SLIGHT — SIGNIFICANT CHANGE UP
BASOPHILS # BLD AUTO: 0.03 K/UL — SIGNIFICANT CHANGE UP (ref 0–0.2)
BASOPHILS # BLD MANUAL: 0.05 K/UL — SIGNIFICANT CHANGE UP (ref 0–0.2)
BASOPHILS NFR BLD AUTO: 0.6 % — SIGNIFICANT CHANGE UP (ref 0–2)
BASOPHILS NFR BLD MANUAL: 0.9 % — SIGNIFICANT CHANGE UP (ref 0–2)
BUN SERPL-MCNC: 51.5 MG/DL — HIGH (ref 8–20)
CALCIUM SERPL-MCNC: 9.1 MG/DL — SIGNIFICANT CHANGE UP (ref 8.4–10.5)
CHLORIDE SERPL-SCNC: 97 MMOL/L — SIGNIFICANT CHANGE UP (ref 96–108)
CO2 SERPL-SCNC: 27 MMOL/L — SIGNIFICANT CHANGE UP (ref 22–29)
CREAT SERPL-MCNC: 1.28 MG/DL — SIGNIFICANT CHANGE UP (ref 0.5–1.3)
EGFR: 39 ML/MIN/1.73M2 — LOW
EGFR: 39 ML/MIN/1.73M2 — LOW
ELLIPTOCYTES BLD QL SMEAR: SLIGHT — SIGNIFICANT CHANGE UP
EOSINOPHIL # BLD AUTO: 0.06 K/UL — SIGNIFICANT CHANGE UP (ref 0–0.5)
EOSINOPHIL # BLD MANUAL: 0 K/UL — SIGNIFICANT CHANGE UP (ref 0–0.5)
EOSINOPHIL NFR BLD AUTO: 1.2 % — SIGNIFICANT CHANGE UP (ref 0–6)
EOSINOPHIL NFR BLD MANUAL: 0 % — SIGNIFICANT CHANGE UP (ref 0–6)
GIANT PLATELETS BLD QL SMEAR: PRESENT
GLUCOSE SERPL-MCNC: 80 MG/DL — SIGNIFICANT CHANGE UP (ref 70–99)
HCT VFR BLD CALC: 39.9 % — SIGNIFICANT CHANGE UP (ref 34.5–45)
HGB BLD-MCNC: 13 G/DL — SIGNIFICANT CHANGE UP (ref 11.5–15.5)
IMM GRANULOCYTES # BLD AUTO: 0.07 K/UL — SIGNIFICANT CHANGE UP (ref 0–0.07)
IMM GRANULOCYTES NFR BLD AUTO: 1.3 % — HIGH (ref 0–0.9)
LYMPHOCYTES # BLD AUTO: 1.41 K/UL — SIGNIFICANT CHANGE UP (ref 1–3.3)
LYMPHOCYTES # BLD MANUAL: 1.71 K/UL — SIGNIFICANT CHANGE UP (ref 1–3.3)
LYMPHOCYTES NFR BLD AUTO: 27.2 % — SIGNIFICANT CHANGE UP (ref 13–44)
LYMPHOCYTES NFR BLD MANUAL: 33 % — SIGNIFICANT CHANGE UP (ref 13–44)
MCHC RBC-ENTMCNC: 28.3 PG — SIGNIFICANT CHANGE UP (ref 27–34)
MCHC RBC-ENTMCNC: 32.6 G/DL — SIGNIFICANT CHANGE UP (ref 32–36)
MCV RBC AUTO: 86.9 FL — SIGNIFICANT CHANGE UP (ref 80–100)
MICROCYTES BLD QL: SLIGHT — SIGNIFICANT CHANGE UP
MONOCYTES # BLD AUTO: 1.79 K/UL — HIGH (ref 0–0.9)
MONOCYTES # BLD MANUAL: 0.95 K/UL — HIGH (ref 0–0.9)
MONOCYTES NFR BLD AUTO: 34.5 % — HIGH (ref 2–14)
MONOCYTES NFR BLD MANUAL: 18.3 % — HIGH (ref 2–14)
NEUTROPHILS # BLD AUTO: 1.83 K/UL — SIGNIFICANT CHANGE UP (ref 1.8–7.4)
NEUTROPHILS # BLD MANUAL: 2.48 K/UL — SIGNIFICANT CHANGE UP (ref 1.8–7.4)
NEUTROPHILS NFR BLD AUTO: 35.2 % — LOW (ref 43–77)
NEUTROPHILS NFR BLD MANUAL: 47.8 % — SIGNIFICANT CHANGE UP (ref 43–77)
NRBC # BLD AUTO: 0 K/UL — SIGNIFICANT CHANGE UP (ref 0–0)
NRBC # FLD: 0 K/UL — SIGNIFICANT CHANGE UP (ref 0–0)
NRBC BLD AUTO-RTO: 0 /100 WBCS — SIGNIFICANT CHANGE UP (ref 0–0)
OVALOCYTES BLD QL SMEAR: SLIGHT — SIGNIFICANT CHANGE UP
PLAT MORPH BLD: NORMAL — SIGNIFICANT CHANGE UP
PLATELET # BLD AUTO: 203 K/UL — SIGNIFICANT CHANGE UP (ref 150–400)
PMV BLD: 11.1 FL — SIGNIFICANT CHANGE UP (ref 7–13)
POIKILOCYTOSIS BLD QL AUTO: SLIGHT — SIGNIFICANT CHANGE UP
POLYCHROMASIA BLD QL SMEAR: SLIGHT — SIGNIFICANT CHANGE UP
POTASSIUM SERPL-MCNC: 3.9 MMOL/L — SIGNIFICANT CHANGE UP (ref 3.5–5.3)
POTASSIUM SERPL-SCNC: 3.9 MMOL/L — SIGNIFICANT CHANGE UP (ref 3.5–5.3)
RBC # BLD: 4.59 M/UL — SIGNIFICANT CHANGE UP (ref 3.8–5.2)
RBC # FLD: 14.5 % — SIGNIFICANT CHANGE UP (ref 10.3–14.5)
RBC BLD AUTO: ABNORMAL
SMUDGE CELLS # BLD: PRESENT
SODIUM SERPL-SCNC: 139 MMOL/L — SIGNIFICANT CHANGE UP (ref 135–145)
WBC # BLD: 5.19 K/UL — SIGNIFICANT CHANGE UP (ref 3.8–10.5)
WBC # FLD AUTO: 5.19 K/UL — SIGNIFICANT CHANGE UP (ref 3.8–10.5)

## 2025-06-23 PROCEDURE — 74176 CT ABD & PELVIS W/O CONTRAST: CPT | Mod: 26

## 2025-06-23 PROCEDURE — 99221 1ST HOSP IP/OBS SF/LOW 40: CPT

## 2025-06-23 PROCEDURE — 99233 SBSQ HOSP IP/OBS HIGH 50: CPT

## 2025-06-23 RX ORDER — GABAPENTIN 400 MG/1
600 CAPSULE ORAL THREE TIMES A DAY
Refills: 0 | Status: DISCONTINUED | OUTPATIENT
Start: 2025-06-23 | End: 2025-06-23

## 2025-06-23 RX ORDER — PHENAZOPYRIDINE HCL 100 MG
200 TABLET ORAL EVERY 8 HOURS
Refills: 0 | Status: COMPLETED | OUTPATIENT
Start: 2025-06-23 | End: 2025-06-24

## 2025-06-23 RX ORDER — RIVAROXABAN 10 MG/1
15 TABLET, FILM COATED ORAL DAILY
Refills: 0 | Status: DISCONTINUED | OUTPATIENT
Start: 2025-06-23 | End: 2025-07-04

## 2025-06-23 RX ORDER — OMEPRAZOLE 20 MG/1
1 CAPSULE, DELAYED RELEASE ORAL
Refills: 0 | DISCHARGE

## 2025-06-23 RX ORDER — LEVOTHYROXINE SODIUM 300 MCG
1 TABLET ORAL
Refills: 0 | DISCHARGE

## 2025-06-23 RX ORDER — RANOLAZINE 1000 MG/1
500 TABLET, FILM COATED, EXTENDED RELEASE ORAL
Refills: 0 | Status: DISCONTINUED | OUTPATIENT
Start: 2025-06-23 | End: 2025-07-04

## 2025-06-23 RX ORDER — ACETAMINOPHEN 500 MG/5ML
650 LIQUID (ML) ORAL EVERY 6 HOURS
Refills: 0 | Status: DISCONTINUED | OUTPATIENT
Start: 2025-06-22 | End: 2025-07-04

## 2025-06-23 RX ORDER — GABAPENTIN 400 MG/1
300 CAPSULE ORAL EVERY 12 HOURS
Refills: 0 | Status: DISCONTINUED | OUTPATIENT
Start: 2025-06-23 | End: 2025-06-27

## 2025-06-23 RX ORDER — ATORVASTATIN CALCIUM 80 MG/1
20 TABLET, FILM COATED ORAL AT BEDTIME
Refills: 0 | Status: DISCONTINUED | OUTPATIENT
Start: 2025-06-23 | End: 2025-07-04

## 2025-06-23 RX ORDER — CEFTRIAXONE 500 MG/1
1000 INJECTION, POWDER, FOR SOLUTION INTRAMUSCULAR; INTRAVENOUS EVERY 24 HOURS
Refills: 0 | Status: DISCONTINUED | OUTPATIENT
Start: 2025-06-23 | End: 2025-06-25

## 2025-06-23 RX ORDER — LEVOTHYROXINE SODIUM 300 MCG
50 TABLET ORAL DAILY
Refills: 0 | Status: DISCONTINUED | OUTPATIENT
Start: 2025-06-23 | End: 2025-07-04

## 2025-06-23 RX ORDER — FERROUS SULFATE 137(45) MG
325 TABLET, EXTENDED RELEASE ORAL
Refills: 0 | Status: DISCONTINUED | OUTPATIENT
Start: 2025-06-23 | End: 2025-07-04

## 2025-06-23 RX ORDER — MELATONIN 5 MG
3 TABLET ORAL AT BEDTIME
Refills: 0 | Status: DISCONTINUED | OUTPATIENT
Start: 2025-06-22 | End: 2025-07-04

## 2025-06-23 RX ORDER — ONDANSETRON HCL/PF 4 MG/2 ML
4 VIAL (ML) INJECTION EVERY 6 HOURS
Refills: 0 | Status: DISCONTINUED | OUTPATIENT
Start: 2025-06-22 | End: 2025-07-04

## 2025-06-23 RX ADMIN — RIVAROXABAN 15 MILLIGRAM(S): 10 TABLET, FILM COATED ORAL at 09:32

## 2025-06-23 RX ADMIN — Medication 3 MILLIGRAM(S): at 00:46

## 2025-06-23 RX ADMIN — Medication 40 MILLIGRAM(S): at 06:16

## 2025-06-23 RX ADMIN — Medication 325 MILLIGRAM(S): at 18:15

## 2025-06-23 RX ADMIN — RANOLAZINE 500 MILLIGRAM(S): 1000 TABLET, FILM COATED, EXTENDED RELEASE ORAL at 06:16

## 2025-06-23 RX ADMIN — GABAPENTIN 300 MILLIGRAM(S): 400 CAPSULE ORAL at 06:17

## 2025-06-23 RX ADMIN — Medication 650 MILLIGRAM(S): at 00:46

## 2025-06-23 RX ADMIN — Medication 50 MICROGRAM(S): at 06:17

## 2025-06-23 RX ADMIN — CEFTRIAXONE 1000 MILLIGRAM(S): 500 INJECTION, POWDER, FOR SOLUTION INTRAMUSCULAR; INTRAVENOUS at 18:13

## 2025-06-23 RX ADMIN — Medication 75 MILLILITER(S): at 14:30

## 2025-06-23 RX ADMIN — RANOLAZINE 500 MILLIGRAM(S): 1000 TABLET, FILM COATED, EXTENDED RELEASE ORAL at 18:15

## 2025-06-23 RX ADMIN — Medication 200 MILLIGRAM(S): at 06:16

## 2025-06-23 RX ADMIN — Medication 200 MILLIGRAM(S): at 22:31

## 2025-06-23 RX ADMIN — Medication 200 MILLIGRAM(S): at 13:11

## 2025-06-23 RX ADMIN — Medication 325 MILLIGRAM(S): at 06:16

## 2025-06-23 RX ADMIN — ATORVASTATIN CALCIUM 20 MILLIGRAM(S): 80 TABLET, FILM COATED ORAL at 22:31

## 2025-06-23 RX ADMIN — GABAPENTIN 300 MILLIGRAM(S): 400 CAPSULE ORAL at 18:15

## 2025-06-23 NOTE — PATIENT PROFILE ADULT - FALL HARM RISK - HARM RISK INTERVENTIONS

## 2025-06-23 NOTE — CONSULT NOTE ADULT - SUBJECTIVE AND OBJECTIVE BOX
urology consulted per patient's family member request for recurrent UTIs and difficulty getting pt to outpatient office. Per chart review pt recently admitted with UTI, hematuria which cleared after CBI, and outpt cysto was recommended at the time, CT during last admission consistent with cystitis. difficult for pt to get to outpatient for follow up.   pt seen and examined at bedside, pt presented to ED with extreme pain while urinating.         PAST MEDICAL & SURGICAL HISTORY:  Essential hypertension  Chronic back pain  Hyperlipidemia, unspecified hyperlipidemia type  PNA (pneumonia)  Lung nodule  Thyroid nodule  Acute deep vein thrombosis (DVT) of popliteal vein of both lower extremities  Pelvic fracture  Closed fx of multiple ribs of right side, initial encounter  Aortic stenosis  Atherosclerosis of native coronary artery of native heart with angina pectoris  Hypercholeeremia  AF (atrial fibrillation)  AV block  Bronchiectasis  Kyphoscoliosis and scoliosis  Stenocardia  Spinal stenosis  Murmur, cardiac  Neuropathy  Diastolic CHF  Anxiety  GERD (gastroesophageal reflux disease)  Closed pelvic ring fracture  Hip arthritis  [place] (hard of hearing)  History f valvular heart disease  S/P spinal fusion  S/p TAVR (transcatheter aortic valve replacement), bioprosthetic    Home Medications:  atorvastatin 20 mg oral tablet: 1 tab(s) orally once a day (at bedtime) (10 Mar 2025 09:22)  FeroSul 325 mg (65 mg elemental iron) oral tablet: 1 tab(s) orally 2 times a day (10 Mar 2025 09:22)  furosemide 40 mg oral tablet: 1 tab(s) orally once a day (23 Jun 2025 00:07)  gabapentin 600 mg oral tablet: 1 tab(s) orally 3 times a day (10 Mar 2025 09:22)  hydrALAZINE 25 mg oral tablet: 1 tab(s) orally every 8 hours (10 Mar 2025 09:22)  levothyroxine 50 mcg (0.05 mg) oral tablet: 1 tab(s) orally once a day (23 Jun 2025 00:04)  omeprazole 10 mg oral delayed release capsule: 1 cap(s) orally once a day (23 Jun 2025 00:06)  ranolazine 500 mg oral tablet, extended release: 1 tab(s) orally 2 times a day (10 Mar 2025 09:22)  rivaroxaban 15 mg oral tablet: 1 tab(s) orally once a day (before a meal) (10 Mar 2025 09:22)  triamterene-hydrochlorothiazide 37.5 mg-25 mg oral capsule: 1 cap(s) orally once a day (23 Jun 2025 00:07)      Review of Systems: All negative except where noted in HPI    MEDICATIONS  (STANDING):  atorvastatin 20 milliGRAM(s) Oral at bedtime  cefTRIAXone Injectable. 1000 milliGRAM(s) IV Push every 24 hours  ferrous    sulfate 325 milliGRAM(s) Oral two times a day  gabapentin 600 milliGRAM(s) Oral three times a day  levothyroxine 50 MICROGram(s) Oral daily  pantoprazole    Tablet 40 milliGRAM(s) Oral before breakfast  ranolazine 500 milliGRAM(s) Oral two times a day  rivaroxaban 15 milliGRAM(s) Oral daily    MEDICATIONS  (PRN):  acetaminophen     Tablet .. 650 milliGRAM(s) Oral every 6 hours PRN Temp greater or equal to 38C (100.4F), Mild Pain (1 - 3), Moderate Pain (4 - 6)  melatonin 3 milliGRAM(s) Oral at bedtime PRN Insomnia  ondansetron Injectable 4 milliGRAM(s) IV Push every 6 hours PRN Nausea and/or Vomiting    Vital Signs Last 24 Hrs  T(C): 36.7 (23 Jun 2025 00:08), Max: 36.7 (22 Jun 2025 14:48)  T(F): 98.1 (23 Jun 2025 00:08), Max: 98.1 (22 Jun 2025 14:48)  HR: 70 (23 Jun 2025 00:08) (61 - 70)  BP: 144/70 (23 Jun 2025 00:08) (127/60 - 154/59)  BP(mean): 84 (22 Jun 2025 21:53) (84 - 84)  RR: 20 (23 Jun 2025 00:08) (16 - 20)  SpO2: 99% (23 Jun 2025 00:08) (98% - 100%)    Parameters below as of 23 Jun 2025 00:08  Patient On (Oxygen Delivery Method): nasal cannula  O2 Flow (L/min): 2    Physical Exam:  General: NAD  HEENT: PERRL, EOMI  Pulm: Respirations non labored, no accessory muscle use  Abdomen: Soft, NT/ND, without rebound or guarding    LABS:                     12.8   4.85  )-----------( 205      ( 22 Jun 2025 15:46 )             38.1   06-22  137  |  93[L]  |  54.4[H]  ----------------------------<  79  4.1   |  30.0[H]  |  1.43[H]  Ca    9.6      22 Jun 2025 15:46  TPro  6.7  /  Alb  3.9  /  TBili  0.6  /  DBili  x   /  AST  32[H]  /  ALT  23  /  AlkPhos  82  06-22    A/P:   Patient is a 89 yo F admitted in March for a UTI and hematuria, which cleared, now presented with another UTI, urine yellow, no hematuria. Per primary team, pt family, difficulty to get to outpatient setting for follow up.   no acute gu intervention at this time.   if concern, can obtain CT   continue to tx UTI.   can trial pyridium or oxybutynin for sxs relief.   pt can benefit from cystoscopy to eval as prev CT revealed cystitis. continue previous plan to follow up with Dr. Maldonado or Dr. Mcginnis as an outpatient.   f/u urine cultures/ sensitivities.     Plan to be discussed with Dr. Mcginnis  urology consulted per patient's family member request for recurrent UTIs and difficulty getting pt to outpatient office. Per chart review pt recently admitted with UTI, hematuria which cleared after CBI, and outpt cysto was recommended at the time, CT during last admission consistent with cystitis. difficult for pt to get to outpatient for follow up.     pt seen and examined at bedside, pt upset about the pulse ox on her finger and c/o L leg pain. no urinary sxs. no family @ bedside when i arrived for evaluation.       PAST MEDICAL & SURGICAL HISTORY:  Essential hypertension  Chronic back pain  Hyperlipidemia, unspecified hyperlipidemia type  PNA (pneumonia)  Lung nodule  Thyroid nodule  Acute deep vein thrombosis (DVT) of popliteal vein of both lower extremities  Pelvic fracture  Closed fx of multiple ribs of right side, initial encounter  Aortic stenosis  Atherosclerosis of native coronary artery of native heart with angina pectoris  Hypercholeeremia  AF (atrial fibrillation)  AV block  Bronchiectasis  Kyphoscoliosis and scoliosis  Stenocardia  Spinal stenosis  Murmur, cardiac  Neuropathy  Diastolic CHF  Anxiety  GERD (gastroesophageal reflux disease)  Closed pelvic ring fracture  Hip arthritis  [place] (hard of hearing)  History f valvular heart disease  S/P spinal fusion  S/p TAVR (transcatheter aortic valve replacement), bioprosthetic    Home Medications:  atorvastatin 20 mg oral tablet: 1 tab(s) orally once a day (at bedtime) (10 Mar 2025 09:22)  FeroSul 325 mg (65 mg elemental iron) oral tablet: 1 tab(s) orally 2 times a day (10 Mar 2025 09:22)  furosemide 40 mg oral tablet: 1 tab(s) orally once a day (23 Jun 2025 00:07)  gabapentin 600 mg oral tablet: 1 tab(s) orally 3 times a day (10 Mar 2025 09:22)  hydrALAZINE 25 mg oral tablet: 1 tab(s) orally every 8 hours (10 Mar 2025 09:22)  levothyroxine 50 mcg (0.05 mg) oral tablet: 1 tab(s) orally once a day (23 Jun 2025 00:04)  omeprazole 10 mg oral delayed release capsule: 1 cap(s) orally once a day (23 Jun 2025 00:06)  ranolazine 500 mg oral tablet, extended release: 1 tab(s) orally 2 times a day (10 Mar 2025 09:22)  rivaroxaban 15 mg oral tablet: 1 tab(s) orally once a day (before a meal) (10 Mar 2025 09:22)  triamterene-hydrochlorothiazide 37.5 mg-25 mg oral capsule: 1 cap(s) orally once a day (23 Jun 2025 00:07)      Review of Systems: All negative except where noted in HPI    MEDICATIONS  (STANDING):  atorvastatin 20 milliGRAM(s) Oral at bedtime  cefTRIAXone Injectable. 1000 milliGRAM(s) IV Push every 24 hours  ferrous    sulfate 325 milliGRAM(s) Oral two times a day  gabapentin 600 milliGRAM(s) Oral three times a day  levothyroxine 50 MICROGram(s) Oral daily  pantoprazole    Tablet 40 milliGRAM(s) Oral before breakfast  ranolazine 500 milliGRAM(s) Oral two times a day  rivaroxaban 15 milliGRAM(s) Oral daily    MEDICATIONS  (PRN):  acetaminophen     Tablet .. 650 milliGRAM(s) Oral every 6 hours PRN Temp greater or equal to 38C (100.4F), Mild Pain (1 - 3), Moderate Pain (4 - 6)  melatonin 3 milliGRAM(s) Oral at bedtime PRN Insomnia  ondansetron Injectable 4 milliGRAM(s) IV Push every 6 hours PRN Nausea and/or Vomiting    Vital Signs Last 24 Hrs  T(C): 36.7 (23 Jun 2025 00:08), Max: 36.7 (22 Jun 2025 14:48)  T(F): 98.1 (23 Jun 2025 00:08), Max: 98.1 (22 Jun 2025 14:48)  HR: 70 (23 Jun 2025 00:08) (61 - 70)  BP: 144/70 (23 Jun 2025 00:08) (127/60 - 154/59)  BP(mean): 84 (22 Jun 2025 21:53) (84 - 84)  RR: 20 (23 Jun 2025 00:08) (16 - 20)  SpO2: 99% (23 Jun 2025 00:08) (98% - 100%)    Parameters below as of 23 Jun 2025 00:08  Patient On (Oxygen Delivery Method): nasal cannula  O2 Flow (L/min): 2    Physical Exam:  General: NAD, [place]   HEENT: PERRL, EOMI  Pulm: Respirations non labored, no accessory muscle use  Abdomen: Soft, NT/ND, without rebound or guarding  gu: yellow urine     LABS:                     12.8   4.85  )-----------( 205      ( 22 Jun 2025 15:46 )             38.1   06-22  137  |  93[L]  |  54.4[H]  ----------------------------<  79  4.1   |  30.0[H]  |  1.43[H]  Ca    9.6      22 Jun 2025 15:46  TPro  6.7  /  Alb  3.9  /  TBili  0.6  /  DBili  x   /  AST  32[H]  /  ALT  23  /  AlkPhos  82  06-22    A/P:   Patient is a 89 yo F admitted in March for a UTI and hematuria, which cleared, now presented with another UTI, urine yellow, no hematuria. Per primary team, pt family, difficulty to get to outpatient setting for follow up.   no acute gu intervention at this time.   if concern, can obtain CT   continue to tx UTI.   can trial pyridium or oxybutynin for sxs relief.   continue previous plan, to follow up with Dr. Maldonado or Dr. Mcginnis as an outpatient, no acute gu intervention indicated at this time.   f/u urine cultures, sensitivities.     Plan to be discussed with Dr. Mcginnis  urology consulted per patient's family member request for recurrent UTIs and difficulty getting pt to outpatient office. Per chart review pt recently admitted with UTI, hematuria which cleared after CBI, and outpt cysto was recommended at the time, CT during last admission consistent with cystitis. difficult for pt to get to outpatient for follow up.     pt seen and examined at bedside, pt upset about the pulse ox on her finger and c/o L leg pain. no urinary sxs. no family @ bedside when i arrived for evaluation.       PAST MEDICAL & SURGICAL HISTORY:  Essential hypertension  Chronic back pain  Hyperlipidemia, unspecified hyperlipidemia type  PNA (pneumonia)  Lung nodule  Thyroid nodule  Acute deep vein thrombosis (DVT) of popliteal vein of both lower extremities  Pelvic fracture  Closed fx of multiple ribs of right side, initial encounter  Aortic stenosis  Atherosclerosis of native coronary artery of native heart with angina pectoris  Hypercholeeremia  AF (atrial fibrillation)  AV block  Bronchiectasis  Kyphoscoliosis and scoliosis  Stenocardia  Spinal stenosis  Murmur, cardiac  Neuropathy  Diastolic CHF  Anxiety  GERD (gastroesophageal reflux disease)  Closed pelvic ring fracture  Hip arthritis  [place] (hard of hearing)  History f valvular heart disease  S/P spinal fusion  S/p TAVR (transcatheter aortic valve replacement), bioprosthetic    Home Medications:  atorvastatin 20 mg oral tablet: 1 tab(s) orally once a day (at bedtime) (10 Mar 2025 09:22)  FeroSul 325 mg (65 mg elemental iron) oral tablet: 1 tab(s) orally 2 times a day (10 Mar 2025 09:22)  furosemide 40 mg oral tablet: 1 tab(s) orally once a day (23 Jun 2025 00:07)  gabapentin 600 mg oral tablet: 1 tab(s) orally 3 times a day (10 Mar 2025 09:22)  hydrALAZINE 25 mg oral tablet: 1 tab(s) orally every 8 hours (10 Mar 2025 09:22)  levothyroxine 50 mcg (0.05 mg) oral tablet: 1 tab(s) orally once a day (23 Jun 2025 00:04)  omeprazole 10 mg oral delayed release capsule: 1 cap(s) orally once a day (23 Jun 2025 00:06)  ranolazine 500 mg oral tablet, extended release: 1 tab(s) orally 2 times a day (10 Mar 2025 09:22)  rivaroxaban 15 mg oral tablet: 1 tab(s) orally once a day (before a meal) (10 Mar 2025 09:22)  triamterene-hydrochlorothiazide 37.5 mg-25 mg oral capsule: 1 cap(s) orally once a day (23 Jun 2025 00:07)      Review of Systems: All negative except where noted in HPI    MEDICATIONS  (STANDING):  atorvastatin 20 milliGRAM(s) Oral at bedtime  cefTRIAXone Injectable. 1000 milliGRAM(s) IV Push every 24 hours  ferrous    sulfate 325 milliGRAM(s) Oral two times a day  gabapentin 600 milliGRAM(s) Oral three times a day  levothyroxine 50 MICROGram(s) Oral daily  pantoprazole    Tablet 40 milliGRAM(s) Oral before breakfast  ranolazine 500 milliGRAM(s) Oral two times a day  rivaroxaban 15 milliGRAM(s) Oral daily    MEDICATIONS  (PRN):  acetaminophen     Tablet .. 650 milliGRAM(s) Oral every 6 hours PRN Temp greater or equal to 38C (100.4F), Mild Pain (1 - 3), Moderate Pain (4 - 6)  melatonin 3 milliGRAM(s) Oral at bedtime PRN Insomnia  ondansetron Injectable 4 milliGRAM(s) IV Push every 6 hours PRN Nausea and/or Vomiting    Vital Signs Last 24 Hrs  T(C): 36.7 (23 Jun 2025 00:08), Max: 36.7 (22 Jun 2025 14:48)  T(F): 98.1 (23 Jun 2025 00:08), Max: 98.1 (22 Jun 2025 14:48)  HR: 70 (23 Jun 2025 00:08) (61 - 70)  BP: 144/70 (23 Jun 2025 00:08) (127/60 - 154/59)  BP(mean): 84 (22 Jun 2025 21:53) (84 - 84)  RR: 20 (23 Jun 2025 00:08) (16 - 20)  SpO2: 99% (23 Jun 2025 00:08) (98% - 100%)    Parameters below as of 23 Jun 2025 00:08  Patient On (Oxygen Delivery Method): nasal cannula  O2 Flow (L/min): 2    Physical Exam:  General: NAD, [place]   HEENT: PERRL, EOMI  Pulm: Respirations non labored, no accessory muscle use  Abdomen: Soft, NT/ND, without rebound or guarding  gu: yellow urine       LABS:                     12.8   4.85  )-----------( 205      ( 22 Jun 2025 15:46 )             38.1   06-22  137  |  93[L]  |  54.4[H]  ----------------------------<  79  4.1   |  30.0[H]  |  1.43[H]  Ca    9.6      22 Jun 2025 15:46  TPro  6.7  /  Alb  3.9  /  TBili  0.6  /  DBili  x   /  AST  32[H]  /  ALT  23  /  AlkPhos  82  06-22    A/P:   Patient is a 89 yo F admitted in March for a UTI and hematuria, which cleared, now presented with another UTI, urine yellow, no hematuria. Per primary team, pt family, difficulty to get to outpatient setting for follow up.   no acute gu intervention at this time.   if concern, can obtain CT   continue to tx UTI.   can trial pyridium or oxybutynin for sxs relief.   continue previous plan, to follow up with Dr. Maldonado or Dr. Mcginnis as an outpatient, no acute gu intervention indicated at this time.   f/u urine cultures, sensitivities.     Plan to be discussed with Dr. Mcginnis  urology consulted per patient's family member request for recurrent UTIs and difficulty getting pt to outpatient office. Per chart review pt recently admitted with UTI, hematuria which cleared after CBI, and outpt cysto was recommended at the time, CT during last admission consistent with cystitis. difficult for pt to get to outpatient for follow up.     pt seen and examined at bedside, pt upset about the pulse ox on her finger and c/o L leg pain. no urinary sxs. no family @ bedside when i arrived for evaluation.       PAST MEDICAL & SURGICAL HISTORY:  Essential hypertension  Chronic back pain  Hyperlipidemia, unspecified hyperlipidemia type  PNA (pneumonia)  Lung nodule  Thyroid nodule  Acute deep vein thrombosis (DVT) of popliteal vein of both lower extremities  Pelvic fracture  Closed fx of multiple ribs of right side, initial encounter  Aortic stenosis  Atherosclerosis of native coronary artery of native heart with angina pectoris  Hypercholeeremia  AF (atrial fibrillation)  AV block  Bronchiectasis  Kyphoscoliosis and scoliosis  Stenocardia  Spinal stenosis  Murmur, cardiac  Neuropathy  Diastolic CHF  Anxiety  GERD (gastroesophageal reflux disease)  Closed pelvic ring fracture  Hip arthritis  [place] (hard of hearing)  History f valvular heart disease  S/P spinal fusion  S/p TAVR (transcatheter aortic valve replacement), bioprosthetic    Home Medications:  atorvastatin 20 mg oral tablet: 1 tab(s) orally once a day (at bedtime) (10 Mar 2025 09:22)  FeroSul 325 mg (65 mg elemental iron) oral tablet: 1 tab(s) orally 2 times a day (10 Mar 2025 09:22)  furosemide 40 mg oral tablet: 1 tab(s) orally once a day (23 Jun 2025 00:07)  gabapentin 600 mg oral tablet: 1 tab(s) orally 3 times a day (10 Mar 2025 09:22)  hydrALAZINE 25 mg oral tablet: 1 tab(s) orally every 8 hours (10 Mar 2025 09:22)  levothyroxine 50 mcg (0.05 mg) oral tablet: 1 tab(s) orally once a day (23 Jun 2025 00:04)  omeprazole 10 mg oral delayed release capsule: 1 cap(s) orally once a day (23 Jun 2025 00:06)  ranolazine 500 mg oral tablet, extended release: 1 tab(s) orally 2 times a day (10 Mar 2025 09:22)  rivaroxaban 15 mg oral tablet: 1 tab(s) orally once a day (before a meal) (10 Mar 2025 09:22)  triamterene-hydrochlorothiazide 37.5 mg-25 mg oral capsule: 1 cap(s) orally once a day (23 Jun 2025 00:07)      Review of Systems: All negative except where noted in HPI    MEDICATIONS  (STANDING):  atorvastatin 20 milliGRAM(s) Oral at bedtime  cefTRIAXone Injectable. 1000 milliGRAM(s) IV Push every 24 hours  ferrous    sulfate 325 milliGRAM(s) Oral two times a day  gabapentin 600 milliGRAM(s) Oral three times a day  levothyroxine 50 MICROGram(s) Oral daily  pantoprazole    Tablet 40 milliGRAM(s) Oral before breakfast  ranolazine 500 milliGRAM(s) Oral two times a day  rivaroxaban 15 milliGRAM(s) Oral daily    MEDICATIONS  (PRN):  acetaminophen     Tablet .. 650 milliGRAM(s) Oral every 6 hours PRN Temp greater or equal to 38C (100.4F), Mild Pain (1 - 3), Moderate Pain (4 - 6)  melatonin 3 milliGRAM(s) Oral at bedtime PRN Insomnia  ondansetron Injectable 4 milliGRAM(s) IV Push every 6 hours PRN Nausea and/or Vomiting    Vital Signs Last 24 Hrs  T(C): 36.7 (23 Jun 2025 00:08), Max: 36.7 (22 Jun 2025 14:48)  T(F): 98.1 (23 Jun 2025 00:08), Max: 98.1 (22 Jun 2025 14:48)  HR: 70 (23 Jun 2025 00:08) (61 - 70)  BP: 144/70 (23 Jun 2025 00:08) (127/60 - 154/59)  BP(mean): 84 (22 Jun 2025 21:53) (84 - 84)  RR: 20 (23 Jun 2025 00:08) (16 - 20)  SpO2: 99% (23 Jun 2025 00:08) (98% - 100%)    Parameters below as of 23 Jun 2025 00:08  Patient On (Oxygen Delivery Method): nasal cannula  O2 Flow (L/min): 2    Physical Exam:  General: NAD, [place]   HEENT: PERRL, EOMI  Pulm: Respirations non labored, no accessory muscle use  Abdomen: Soft, NT/ND, without rebound or guarding  gu: yellow urine   neuro: AAOx1     LABS:                     12.8   4.85  )-----------( 205      ( 22 Jun 2025 15:46 )             38.1   06-22  137  |  93[L]  |  54.4[H]  ----------------------------<  79  4.1   |  30.0[H]  |  1.43[H]  Ca    9.6      22 Jun 2025 15:46  TPro  6.7  /  Alb  3.9  /  TBili  0.6  /  DBili  x   /  AST  32[H]  /  ALT  23  /  AlkPhos  82  06-22    A/P:   Patient is a 89 yo F admitted in March for a UTI and hematuria, which cleared, now presented with another UTI, urine yellow, no hematuria. Per primary team, pt family, difficulty to get to outpatient setting for follow up.   no acute gu intervention at this time.   if concern, can obtain CT   continue to tx UTI.   can trial pyridium or oxybutynin for sxs relief.   continue previous plan, to follow up with Dr. Maldonado or Dr. Mcginnis as an outpatient, no acute gu intervention indicated at this time.   f/u urine cultures, sensitivities.     Plan to be discussed with Dr. Mcginnis

## 2025-06-23 NOTE — ADVANCED PRACTICE NURSE CONSULT - REASON FOR CONSULT
Wound Care was consulted for evaluation of the following:  -    HPI: per chart review  -    Pt seen at bedside today:  -  Wound Care was consulted for evaluation of the following:  -LLE wound \    Pt seen at bedside today:  -reporting that she is [place] b/l, has hearing aids but needs to be charged. uses glasses. c/o care recieved at nursing facility, that she has UTI that is causing her difficulties. Was recieving wound care for her leg, unsure of what they were using. noting LLE pain, worse with adhesives and palpation. no other acute complaints at this time

## 2025-06-23 NOTE — ADVANCED PRACTICE NURSE CONSULT - ASSESSMENT
SHANTANU SCORE  Last shantanu score is : ---  19+: pt is NOT at risk for developing pressure injuries  15-18: pt is AT RISK for developing pressure injuries   13-14: pt is AT MODERATE RISK for developing pressure injuries   10-12: pt is AT HIGH RISK for developing pressure injuries   6-9: pt is AT VERY HIGH RISK for developing pressure injuries     PHYSICAL EXAM  Is pt AOx?:  Bed bound?:   Incontinent?:  Mattress/ bed active?:   Can pt assist with turn or dependent?:  Active pressure injury prevention measures? :     SKIN CHECK  -  SHANTANU SCORE  Last shantanu score is : ---13  19+: pt is NOT at risk for developing pressure injuries  15-18: pt is AT RISK for developing pressure injuries   13-14: pt is AT MODERATE RISK for developing pressure injuries   10-12: pt is AT HIGH RISK for developing pressure injuries   6-9: pt is AT VERY HIGH RISK for developing pressure injuries     PHYSICAL EXAM  Is pt AOx?: x4 in nad resting in stretcher   Bed bound?: at this time, yes   Incontinent?: yes   Mattress/ bed active?: stretcher   Can pt assist with turn or dependent?: requires assist   Active pressure injury prevention measures? : bl heel foam dressings placed to reduce friction and shear     SKIN CHECK  -thin appearing older female   -over the LLE anterior shin region is full thickness ulcerating wound bed measuring 2.5x1.5cm, with biofilm vs yellow slough noted when peeling back current foam dressing. no periwound erythema, purulence, malodor, nor eschar. no tunneling nor uindermining.edges are attached, wound bed is shallow, and otherwise partially granulating. no visible flap.    -over the spinous process (abnormal curvature noted), extending down to the sacrum and coccyx are areas of localized blanching redness without skin break. not pressure injury at this time. however, these areas are more at risk,   -b/l heels are otherwise blanching, palpable pedal pulses 2+ b/l DPPT.

## 2025-06-23 NOTE — ADVANCED PRACTICE NURSE CONSULT - RECOMMEDATIONS
-    1. 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   3.     WOUNDS:  =======  ---      GENERAL GUIDELINES, AND RECOMMENDATIONS  ==============================  -Carefully check that no tubes are entangled with the bed linens or have contact to patient’s skin. Keep bed sheets smooth and wrinkle free to prevent injury to the skin   - Assure to position pt feet at 20 degrees, then HOB at a 30 degree angle  to limit sliding/friction/shearing, especially in bed bound populations.         Turn and position the patient Q2 hours. Use wedges when turning and positioning to the left/right, notably if patient can only assist minimally.    Place wedges / moldable pillows above the waist for effective sacral offloading. Keep shoulders and hips properly aligned for greater comfort. Place pillow between legs to support bony prominences and reduce friction/rubbing.    Apply waffle/sacral cushion for sacral offloading, both in bed and in chair. if limited mobility, maintain full fowlers chair seated position for <2 hours at a time.     Keep the pt’s heels protected and elevated off the surface of the bed. Place what the patient can tolerate: pillows, moldable pillows, offloading heel boots      This was a 20  minute visit, with greater than 50% counseling. My findings and suggestions that may benefit the pt were disscussed with the family/caregiver if present at bedside and with pt permission,  shift RN at bedside, in addition to overseeing team/provider via chat and or telephone.     Attending on file for patient notified was ***    Wound Care will continue to follow the patient? : no    Continue to monitor skin integrity as per policy,  and call or re-consult Wound Care with any acute changes or lack of progression of current recommendations. Wound care rceommendations are generally for shift RN dressing changes, unless otherwise specified. Wound Care IS NOT continuing to follow the patient unless otherwise specified as noted above.     Gautam SMITH, RN, CWCN  Wound Ostomy Nurse Educator   [facility]  Please call/message over Teams and/or Email for clarification/contact.  (E): [email or web address]  chronic ulceration over the LLE anterior surface, no e/o infection on exam. WBC WDL. was using hydrofera blue at home/prior facility. d/t skin fragility attempt to minimize amount of adhesives over the skin especially near wound region, opting for non adherence vs minimially adhesive.     1. 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     WOUNDS:  =======  LLE  Medihoney is a liquid wound dressing used to help remove necrotic/devitalized tissue from the wound bed.   Be sure to follow the directions  -cleanse the area with wound cleansor of choice/ normal saline   -apply medihoney in nickel thick layer to wound bed  -apply cavilon or other no sting barrier film to periwound area  -cover with non woven gauze, abd pad, and kerlix/tape. minimize adhesives to skin   -change this dressing once daily. can resume treatment at home for hydrofera blue on dc if pt prefers. will continue honey in house.        GENERAL GUIDELINES, AND RECOMMENDATIONS  ==============================  -Carefully check that no tubes are entangled with the bed linens or have contact to patient’s skin. Keep bed sheets smooth and wrinkle free to prevent injury to the skin   - Assure to position pt feet at 20 degrees, then HOB at a 30 degree angle  to limit sliding/friction/shearing, especially in bed bound populations.       Turn and position the patient Q2 hours. Use wedges when turning and positioning to the left/right, notably if patient can only assist minimally.  Apply waffle/sacral cushion for sacral offloading, both in bed and in chair. if limited mobility, maintain full fowlers chair seated position for <2 hours at a time.   Keep the pt’s heels protected and elevated off the surface of the bed. Place what the patient can tolerate: pillows, moldable pillows. consider applying larger allevyn foam dressings over the heels to reduce friction and shear (applied today)       This was a 20  minute visit, with greater than 50% counseling. My findings and suggestions that may benefit the pt were disscussed with the family/caregiver if present at bedside and with pt permission,  shift RN at bedside, in addition to overseeing team/provider via chat and or telephone.     Attending on file for patient notified was maddison     Wound Care will continue to follow the patient? : no    Continue to monitor skin integrity as per policy,  and call or re-consult Wound Care with any acute changes or lack of progression of current recommendations. Wound care rceommendations are generally for shift RN dressing changes, unless otherwise specified. Wound Care IS NOT continuing to follow the patient unless otherwise specified as noted above.     Gautam HOGANN, RN, CWCN  Wound Ostomy Nurse Educator   [facility]  Please call/message over Teams and/or Email for clarification/contact.  (E): [email or web address]

## 2025-06-23 NOTE — PATIENT PROFILE ADULT - FUNCTIONAL ASSESSMENT - BASIC MOBILITY 6.
1-calculated by average/Not able to assess (calculate score using [facility] averaging method)

## 2025-06-23 NOTE — PATIENT PROFILE ADULT - FLU SEASON?
Patient with elevated troponin levels likely secondary to demand ischemia from acute on chronic diastolic congestive heart failure from end-stage renal disease after a missed dialysis session.  Fluid management with hemodialysis per nephrology.  Will continue to monitor on telemetry.   No

## 2025-06-23 NOTE — INPATIENT CERTIFICATION FOR MEDICARE PATIENTS - PHYSICIAN CONCUR
Patients mother called office needing an appointment. Writer informed patient the message would be sent and someone will return call to schedule appointment     I concur with the Admission Order and I certify that services are provided in accordance with Section 42 CFR § 412.3

## 2025-06-23 NOTE — PROGRESS NOTE ADULT - ASSESSMENT
89yoF hx CAD s/p PCI (2018), Afib on Xarelto, HFpEF, chronic hypoxemic respiratory failure since 2/2025 admission for influenza on 2L NC, recurrent UTIs, presenting with persistent dysuria and urinary frequency despite recent treatment for UTI w/ PO antibiotics    Suspected UTI  -UA noted, not impressive but in setting of recent abx use, also microscopic hematuria  -Proteus in prior urine cx sensitive to cephalosporin  -Continue w/ ceftriaxone  -cw  Pyridium for dysuria relief     SORAIDA likely 2/2 UTI with some component of pre-renal from dehydration  -Cr improving  -Cw IVF. DC later   -s/p 1L NS, will start maintenance IVF    #Hx possible urothelial lesion  -CT scan 3/2025 w/ nodularity in posterior bladder, inability to exclude lesion   -Urology consulted, recs no  intervention. Pyridium for symp control. Op fu  -Will clarify if dedicated CT pelvis needed given CTABP incomplete     #Hx chronic hypoxemic respiratory failure  -No acute respiratory symptoms   -On baseline O2 requirements of 2L NC, monitor     #Hx HFpEF  -Hold furosemide due to SORAIDA  -Monitor volume status closely while on maintenance IVF    #Hx Afib  -cw Xeralto  -Monitor closely for signs of gross  hematuria     #Hx spinal stenosis  -On gabapentin 600mg TID, change to renal dosing due to SORAIDA    #Hx CAD with chronic stable angina  -Resume ranolazine, atorvastatin     #Hx hypothyroidism  -cw levothyroxine     #Hx LLE ulcer  -Has dressing placed by home visiting RN, will order wound care consult    Medication management  -Daughter in law unclear on if pt still on BP meds hydralazine, triamterene-HCTZ, states she will bring pill bottles in AM    Dispo: medically active, pending cculture results.  Estimated LOS: 2-3 days, possible d/c to home. 89yoF hx CAD s/p PCI (2018), Afib on Xarelto, HFpEF, chronic hypoxemic respiratory failure since 2/2025 admission for influenza on 2L NC, recurrent UTIs, presenting with persistent dysuria and urinary frequency despite recent treatment for UTI w/ PO antibiotics    Suspected UTI  -UA noted, not impressive but in setting of recent abx use, also microscopic hematuria  -Proteus in prior urine cx sensitive to cephalosporin  -Continue w/ ceftriaxone  - Fu urine cx   -cw  Pyridium for dysuria relief if not helpful will change to Oxybutanin     SORAIDA likely 2/2 UTI with some component of pre-renal from dehydration  -Cr improving  -S/p IVF    #Hx possible urothelial lesion  -CT scan 3/2025 w/ nodularity in posterior bladder, inability to exclude lesion   -Urology consulted, recs no  intervention. Pyridium for symp control. Op fu with Dr Maldonado or Dr Mcginnis    #Hx chronic hypoxemic respiratory failure  -No acute respiratory symptoms   -On baseline O2 requirements of 2L NC, monitor     #Hx HFpEF  -Hold furosemide due to SORAIDA  -Monitor volume status closely while on maintenance IVF    #Hx Afib  -cw Xeralto  -Monitor closely for signs of gross  hematuria     #Hx spinal stenosis  -On gabapentin 600mg TID, change to renal dosing due to SORAIDA    #Hx CAD with chronic stable angina  -Resume ranolazine, atorvastatin     #Hx hypothyroidism  -cw levothyroxine     #Hx LLE ulcer  -Has dressing placed by home visiting RN  -Wound care consult recs appreciated. Wound care ordered         Dispo: medically active, pending culture results.  Estimated LOS: 2-3 days, possible d/c to home.

## 2025-06-23 NOTE — PROGRESS NOTE ADULT - SUBJECTIVE AND OBJECTIVE BOX
Jacque Thompson MD (Available on [employer])  [facility] Division of Hospital Medicine    Chief Complaint:      SUBJECTIVE / OVERNIGHT EVENTS:  Pt seen and examined at bedside.     Patient denies chest pain, SOB, abd pain, N/V, fever, chills, dysuria or any other complaints. All remainder ROS negative.     INTERVAL EVENTS:  -Pt hemodynamically stable with no overnight events.     MEDICATIONS  (STANDING):  atorvastatin 20 milliGRAM(s) Oral at bedtime  cefTRIAXone Injectable. 1000 milliGRAM(s) IV Push every 24 hours  ferrous    sulfate 325 milliGRAM(s) Oral two times a day  gabapentin 300 milliGRAM(s) Oral every 12 hours  levothyroxine 50 MICROGram(s) Oral daily  pantoprazole    Tablet 40 milliGRAM(s) Oral before breakfast  phenazopyridine 200 milliGRAM(s) Oral every 8 hours  ranolazine 500 milliGRAM(s) Oral two times a day  rivaroxaban 15 milliGRAM(s) Oral daily  sodium chloride 0.9%. 1000 milliLiter(s) (75 mL/Hr) IV Continuous <Continuous>    MEDICATIONS  (PRN):  acetaminophen     Tablet .. 650 milliGRAM(s) Oral every 6 hours PRN Temp greater or equal to 38C (100.4F), Mild Pain (1 - 3), Moderate Pain (4 - 6)  melatonin 3 milliGRAM(s) Oral at bedtime PRN Insomnia  ondansetron Injectable 4 milliGRAM(s) IV Push every 6 hours PRN Nausea and/or Vomiting      I&O's Summary      PHYSICAL EXAM:  Vital Signs Last 24 Hrs  T(C): 36.3 (23 Jun 2025 07:25), Max: 36.7 (22 Jun 2025 14:48)  T(F): 97.4 (23 Jun 2025 07:25), Max: 98.1 (22 Jun 2025 14:48)  HR: 64 (23 Jun 2025 07:25) (61 - 70)  BP: 149/70 (23 Jun 2025 07:25) (127/60 - 154/59)  BP(mean): 96 (23 Jun 2025 07:25) (84 - 96)  RR: 17 (23 Jun 2025 07:25) (16 - 20)  SpO2: 100% (23 Jun 2025 07:25) (98% - 100%)    Parameters below as of 23 Jun 2025 07:25  Patient On (Oxygen Delivery Method): nasal cannula  O2 Flow (L/min): 2      Pt lying in bed in no acute distress  Moist Mucus membranes  S1/S2 regular  CTABL, good air entry  Abdomen soft, non-tender, BS+  No LE edema       LABS:                        13.0   5.19  )-----------( 203      ( 23 Jun 2025 06:32 )             39.9     06-23    139  |  97  |  51.5[H]  ----------------------------<  80  3.9   |  27.0  |  1.28    Ca    9.1      23 Jun 2025 06:32    TPro  6.7  /  Alb  3.9  /  TBili  0.6  /  DBili  x   /  AST  32[H]  /  ALT  23  /  AlkPhos  82  06-22          Urinalysis Basic - ( 23 Jun 2025 06:32 )    Color: x / Appearance: x / SG: x / pH: x  Gluc: 80 mg/dL / Ketone: x  / Bili: x / Urobili: x   Blood: x / Protein: x / Nitrite: x   Leuk Esterase: x / RBC: x / WBC x   Sq Epi: x / Non Sq Epi: x / Bacteria: x        Urinalysis with Rflx Culture (collected 22 Jun 2025 17:31)      CAPILLARY BLOOD GLUCOSE            RADIOLOGY & ADDITIONAL TESTS:  Results Reviewed:   Imaging Personally Reviewed:  Electrocardiogram Personally Reviewed:     Jacque Thompson MD (Available on [employer])  [facility] Division of Hospital Medicine    Chief Complaint:  UTI    SUBJECTIVE / OVERNIGHT EVENTS:  Pt seen and examined at bedside. Pt reports still having dysuria.     Patient denies chest pain, SOB, abd pain, N/V, fever, chills, dysuria or any other complaints. All remainder ROS negative.     INTERVAL EVENTS:  -Pt hemodynamically stable with no overnight events.   -Cw Ceftriaxone IV, Urine cx pending   -CTABP done, pelvis part incomplete. Spoke to Urology who reviewed imaging, recs no need for dedicated ct pelvis.   -If pyridium doesnt help will try oxybutanin   -Appreciate wound care consult     MEDICATIONS  (STANDING):  atorvastatin 20 milliGRAM(s) Oral at bedtime  cefTRIAXone Injectable. 1000 milliGRAM(s) IV Push every 24 hours  ferrous    sulfate 325 milliGRAM(s) Oral two times a day  gabapentin 300 milliGRAM(s) Oral every 12 hours  levothyroxine 50 MICROGram(s) Oral daily  pantoprazole    Tablet 40 milliGRAM(s) Oral before breakfast  phenazopyridine 200 milliGRAM(s) Oral every 8 hours  ranolazine 500 milliGRAM(s) Oral two times a day  rivaroxaban 15 milliGRAM(s) Oral daily  sodium chloride 0.9%. 1000 milliLiter(s) (75 mL/Hr) IV Continuous <Continuous>    MEDICATIONS  (PRN):  acetaminophen     Tablet .. 650 milliGRAM(s) Oral every 6 hours PRN Temp greater or equal to 38C (100.4F), Mild Pain (1 - 3), Moderate Pain (4 - 6)  melatonin 3 milliGRAM(s) Oral at bedtime PRN Insomnia  ondansetron Injectable 4 milliGRAM(s) IV Push every 6 hours PRN Nausea and/or Vomiting      I&O's Summary      PHYSICAL EXAM:  Vital Signs Last 24 Hrs  T(C): 36.3 (23 Jun 2025 07:25), Max: 36.7 (22 Jun 2025 14:48)  T(F): 97.4 (23 Jun 2025 07:25), Max: 98.1 (22 Jun 2025 14:48)  HR: 64 (23 Jun 2025 07:25) (61 - 70)  BP: 149/70 (23 Jun 2025 07:25) (127/60 - 154/59)  BP(mean): 96 (23 Jun 2025 07:25) (84 - 96)  RR: 17 (23 Jun 2025 07:25) (16 - 20)  SpO2: 100% (23 Jun 2025 07:25) (98% - 100%)    Parameters below as of 23 Jun 2025 07:25  Patient On (Oxygen Delivery Method): nasal cannula  O2 Flow (L/min): 2      Pt lying in bed in no acute distress  Moist Mucus membranes  S1/S2 regular  CTABL, good air entry  Abdomen soft, non-tender, BS+  No LE edema       LABS:                        13.0   5.19  )-----------( 203      ( 23 Jun 2025 06:32 )             39.9     06-23    139  |  97  |  51.5[H]  ----------------------------<  80  3.9   |  27.0  |  1.28    Ca    9.1      23 Jun 2025 06:32    TPro  6.7  /  Alb  3.9  /  TBili  0.6  /  DBili  x   /  AST  32[H]  /  ALT  23  /  AlkPhos  82  06-22          Urinalysis Basic - ( 23 Jun 2025 06:32 )    Color: x / Appearance: x / SG: x / pH: x  Gluc: 80 mg/dL / Ketone: x  / Bili: x / Urobili: x   Blood: x / Protein: x / Nitrite: x   Leuk Esterase: x / RBC: x / WBC x   Sq Epi: x / Non Sq Epi: x / Bacteria: x        Urinalysis with Rflx Culture (collected 22 Jun 2025 17:31)      CAPILLARY BLOOD GLUCOSE            RADIOLOGY & ADDITIONAL TESTS:  Results Reviewed:   Imaging Personally Reviewed:  Electrocardiogram Personally Reviewed:

## 2025-06-24 LAB
ANION GAP SERPL CALC-SCNC: 13 MMOL/L — SIGNIFICANT CHANGE UP (ref 5–17)
BUN SERPL-MCNC: 35.7 MG/DL — HIGH (ref 8–20)
CALCIUM SERPL-MCNC: 9.2 MG/DL — SIGNIFICANT CHANGE UP (ref 8.4–10.5)
CHLORIDE SERPL-SCNC: 101 MMOL/L — SIGNIFICANT CHANGE UP (ref 96–108)
CO2 SERPL-SCNC: 30 MMOL/L — HIGH (ref 22–29)
CREAT SERPL-MCNC: 1.08 MG/DL — SIGNIFICANT CHANGE UP (ref 0.5–1.3)
EGFR: 48 ML/MIN/1.73M2 — LOW
EGFR: 48 ML/MIN/1.73M2 — LOW
GLUCOSE SERPL-MCNC: 86 MG/DL — SIGNIFICANT CHANGE UP (ref 70–99)
HCT VFR BLD CALC: 38.8 % — SIGNIFICANT CHANGE UP (ref 34.5–45)
HGB BLD-MCNC: 12.5 G/DL — SIGNIFICANT CHANGE UP (ref 11.5–15.5)
MAGNESIUM SERPL-MCNC: 1.8 MG/DL — SIGNIFICANT CHANGE UP (ref 1.6–2.6)
MCHC RBC-ENTMCNC: 28.6 PG — SIGNIFICANT CHANGE UP (ref 27–34)
MCHC RBC-ENTMCNC: 32.2 G/DL — SIGNIFICANT CHANGE UP (ref 32–36)
MCV RBC AUTO: 88.8 FL — SIGNIFICANT CHANGE UP (ref 80–100)
NRBC # BLD AUTO: 0 K/UL — SIGNIFICANT CHANGE UP (ref 0–0)
NRBC # FLD: 0 K/UL — SIGNIFICANT CHANGE UP (ref 0–0)
NRBC BLD AUTO-RTO: 0 /100 WBCS — SIGNIFICANT CHANGE UP (ref 0–0)
PLATELET # BLD AUTO: 225 K/UL — SIGNIFICANT CHANGE UP (ref 150–400)
PMV BLD: 10.8 FL — SIGNIFICANT CHANGE UP (ref 7–13)
POTASSIUM SERPL-MCNC: 3.5 MMOL/L — SIGNIFICANT CHANGE UP (ref 3.5–5.3)
POTASSIUM SERPL-SCNC: 3.5 MMOL/L — SIGNIFICANT CHANGE UP (ref 3.5–5.3)
RBC # BLD: 4.37 M/UL — SIGNIFICANT CHANGE UP (ref 3.8–5.2)
RBC # FLD: 14.4 % — SIGNIFICANT CHANGE UP (ref 10.3–14.5)
SODIUM SERPL-SCNC: 144 MMOL/L — SIGNIFICANT CHANGE UP (ref 135–145)
WBC # BLD: 5.48 K/UL — SIGNIFICANT CHANGE UP (ref 3.8–10.5)
WBC # FLD AUTO: 5.48 K/UL — SIGNIFICANT CHANGE UP (ref 3.8–10.5)

## 2025-06-24 PROCEDURE — 99233 SBSQ HOSP IP/OBS HIGH 50: CPT

## 2025-06-24 RX ORDER — AMLODIPINE BESYLATE 10 MG/1
5 TABLET ORAL ONCE
Refills: 0 | Status: COMPLETED | OUTPATIENT
Start: 2025-06-24 | End: 2025-06-24

## 2025-06-24 RX ADMIN — RANOLAZINE 500 MILLIGRAM(S): 1000 TABLET, FILM COATED, EXTENDED RELEASE ORAL at 06:21

## 2025-06-24 RX ADMIN — Medication 200 MILLIGRAM(S): at 13:11

## 2025-06-24 RX ADMIN — CEFTRIAXONE 1000 MILLIGRAM(S): 500 INJECTION, POWDER, FOR SOLUTION INTRAMUSCULAR; INTRAVENOUS at 17:24

## 2025-06-24 RX ADMIN — Medication 325 MILLIGRAM(S): at 17:24

## 2025-06-24 RX ADMIN — GABAPENTIN 300 MILLIGRAM(S): 400 CAPSULE ORAL at 17:24

## 2025-06-24 RX ADMIN — GABAPENTIN 300 MILLIGRAM(S): 400 CAPSULE ORAL at 06:21

## 2025-06-24 RX ADMIN — Medication 650 MILLIGRAM(S): at 13:11

## 2025-06-24 RX ADMIN — RIVAROXABAN 15 MILLIGRAM(S): 10 TABLET, FILM COATED ORAL at 11:54

## 2025-06-24 RX ADMIN — Medication 650 MILLIGRAM(S): at 01:26

## 2025-06-24 RX ADMIN — Medication 650 MILLIGRAM(S): at 17:02

## 2025-06-24 RX ADMIN — RANOLAZINE 500 MILLIGRAM(S): 1000 TABLET, FILM COATED, EXTENDED RELEASE ORAL at 18:09

## 2025-06-24 RX ADMIN — Medication 50 MICROGRAM(S): at 06:20

## 2025-06-24 RX ADMIN — AMLODIPINE BESYLATE 5 MILLIGRAM(S): 10 TABLET ORAL at 18:50

## 2025-06-24 RX ADMIN — Medication 650 MILLIGRAM(S): at 00:26

## 2025-06-24 RX ADMIN — ATORVASTATIN CALCIUM 20 MILLIGRAM(S): 80 TABLET, FILM COATED ORAL at 22:40

## 2025-06-24 RX ADMIN — Medication 3 MILLIGRAM(S): at 00:29

## 2025-06-24 RX ADMIN — Medication 40 MILLIGRAM(S): at 06:20

## 2025-06-24 RX ADMIN — Medication 200 MILLIGRAM(S): at 22:37

## 2025-06-24 RX ADMIN — Medication 325 MILLIGRAM(S): at 06:21

## 2025-06-24 RX ADMIN — Medication 200 MILLIGRAM(S): at 06:21

## 2025-06-24 NOTE — PROGRESS NOTE ADULT - ASSESSMENT
89yoF hx CAD s/p PCI (2018), Afib on Xarelto, HFpEF, chronic hypoxemic respiratory failure since 2/2025 admission for influenza on 2L NC, recurrent UTIs, presenting with persistent dysuria and urinary frequency despite recent treatment for UTI w/ PO antibiotics    Suspected UTI  -UA noted, not impressive but in setting of recent abx use, also microscopic hematuria  -Proteus in prior urine cx sensitive to cephalosporin  -Continue w/ ceftriaxone  - Fu urine cx - >100k GNR   -cw  Pyridium for dysuria relief if not helpful will change to Oxybutanin     SORAIDA likely 2/2 UTI with some component of pre-renal from dehydration  -Cr improving  -S/p IVF    #Hx possible urothelial lesion  -CT scan 3/2025 w/ nodularity in posterior bladder, inability to exclude lesion   -Urology consulted, recs no  intervention. Pyridium for symp control. Op fu with Dr Maldonado or Dr Mcginnis, will request Urology to make an appointment for her per dil's request     #Hx chronic hypoxemic respiratory failure  -No acute respiratory symptoms   -On baseline O2 requirements of 2L NC, monitor     #Hx HFpEF  -Hold furosemide due to SORAIDA. Resume on dc     #Hx Afib  -cw Xeralto    #Hx spinal stenosis  -cw gabapentin 600mg TID    #Hx CAD with chronic stable angina  -Resume ranolazine, atorvastatin     #Hx hypothyroidism  -cw levothyroxine     #Hx LLE ulcer  -Has dressing placed by home visiting RN  -Wound care consult recs appreciated. Wound care ordered         Dispo: medically active, pending cultures to finalize. Will consult PT

## 2025-06-24 NOTE — PROGRESS NOTE ADULT - SUBJECTIVE AND OBJECTIVE BOX
Jacque Thompson MD (Available on [employer])  [facility] Division of Hospital Medicine    Chief Complaint:  UTI    SUBJECTIVE / OVERNIGHT EVENTS:  Pt seen and examined at bedside. Endorses still having dysuria but better than yesterday. Daughter in law at bedside, all questions addressed.       INTERVAL EVENTS:  -Pt hemodynamically stable with no overnight events.   -Cw Ceftriaxone IV day 2/5. Urine cx: >100k GNR   -cw Pyridium, seems to be helping if continues to have symptoms will switch to oxybutanin  -PT consult     MEDICATIONS  (STANDING):  atorvastatin 20 milliGRAM(s) Oral at bedtime  cefTRIAXone Injectable. 1000 milliGRAM(s) IV Push every 24 hours  ferrous    sulfate 325 milliGRAM(s) Oral two times a day  gabapentin 300 milliGRAM(s) Oral every 12 hours  levothyroxine 50 MICROGram(s) Oral daily  pantoprazole    Tablet 40 milliGRAM(s) Oral before breakfast  phenazopyridine 200 milliGRAM(s) Oral every 8 hours  ranolazine 500 milliGRAM(s) Oral two times a day  rivaroxaban 15 milliGRAM(s) Oral daily  sodium chloride 0.9%. 1000 milliLiter(s) (75 mL/Hr) IV Continuous <Continuous>    MEDICATIONS  (PRN):  acetaminophen     Tablet .. 650 milliGRAM(s) Oral every 6 hours PRN Temp greater or equal to 38C (100.4F), Mild Pain (1 - 3), Moderate Pain (4 - 6)  melatonin 3 milliGRAM(s) Oral at bedtime PRN Insomnia  ondansetron Injectable 4 milliGRAM(s) IV Push every 6 hours PRN Nausea and/or Vomiting      I&O's Summary      PHYSICAL EXAM:  Vital Signs Last 24 Hrs  T(C): 36.5 (24 Jun 2025 09:25), Max: 36.7 (23 Jun 2025 19:36)  T(F): 97.7 (24 Jun 2025 09:25), Max: 98.1 (23 Jun 2025 19:36)  HR: 77 (24 Jun 2025 09:25) (66 - 77)  BP: 116/62 (24 Jun 2025 09:25) (116/62 - 135/57)  BP(mean): 79 (23 Jun 2025 19:36) (79 - 79)  RR: 18 (24 Jun 2025 09:25) (17 - 18)  SpO2: 99% (24 Jun 2025 09:25) (97% - 100%)    Parameters below as of 24 Jun 2025 09:25  Patient On (Oxygen Delivery Method): nasal cannula        Pt lying in bed in no acute distress  Moist Mucus membranes  S1/S2 regular  CTABL, good air entry  Abdomen soft, non-tender, BS+  No LE edema       LABS:                        12.5   5.48  )-----------( 225      ( 24 Jun 2025 07:09 )             38.8     06-24    144  |  101  |  35.7[H]  ----------------------------<  86  3.5   |  30.0[H]  |  1.08    Ca    9.2      24 Jun 2025 07:09  Mg     1.8     06-24    TPro  6.7  /  Alb  3.9  /  TBili  0.6  /  DBili  x   /  AST  32[H]  /  ALT  23  /  AlkPhos  82  06-22          Urinalysis Basic - ( 24 Jun 2025 07:09 )    Color: x / Appearance: x / SG: x / pH: x  Gluc: 86 mg/dL / Ketone: x  / Bili: x / Urobili: x   Blood: x / Protein: x / Nitrite: x   Leuk Esterase: x / RBC: x / WBC x   Sq Epi: x / Non Sq Epi: x / Bacteria: x        Culture - Blood (collected 22 Jun 2025 18:38)  Source: Blood Blood-Peripheral  Preliminary Report (24 Jun 2025 02:03):    No growth at 24 hours    Urinalysis with Rflx Culture (collected 22 Jun 2025 17:31)    Culture - Urine (collected 22 Jun 2025 17:31)  Source: Clean Catch  Preliminary Report (24 Jun 2025 13:39):    50,000 - 99,000 CFU/mL Proteus mirabilis    50,000 - 99,000 CFU/mL Enterococcus faecalis      CAPILLARY BLOOD GLUCOSE            RADIOLOGY & ADDITIONAL TESTS:  Results Reviewed:   Imaging Personally Reviewed:  Electrocardiogram Personally Reviewed:

## 2025-06-25 LAB
-  AMPICILLIN/SULBACTAM: SIGNIFICANT CHANGE UP
-  AMPICILLIN: SIGNIFICANT CHANGE UP
-  AZTREONAM: SIGNIFICANT CHANGE UP
-  CEFAZOLIN: SIGNIFICANT CHANGE UP
-  CEFEPIME: SIGNIFICANT CHANGE UP
-  CEFTRIAXONE: SIGNIFICANT CHANGE UP
-  CEFUROXIME: SIGNIFICANT CHANGE UP
-  CIPROFLOXACIN: SIGNIFICANT CHANGE UP
-  ERTAPENEM: SIGNIFICANT CHANGE UP
-  GENTAMICIN: SIGNIFICANT CHANGE UP
-  LEVOFLOXACIN: SIGNIFICANT CHANGE UP
-  MEROPENEM: SIGNIFICANT CHANGE UP
-  NITROFURANTOIN: SIGNIFICANT CHANGE UP
-  PIPERACILLIN/TAZOBACTAM: SIGNIFICANT CHANGE UP
-  TOBRAMYCIN: SIGNIFICANT CHANGE UP
-  TRIMETHOPRIM/SULFAMETHOXAZOLE: SIGNIFICANT CHANGE UP
ANION GAP SERPL CALC-SCNC: 12 MMOL/L — SIGNIFICANT CHANGE UP (ref 5–17)
BUN SERPL-MCNC: 31.4 MG/DL — HIGH (ref 8–20)
CALCIUM SERPL-MCNC: 9.4 MG/DL — SIGNIFICANT CHANGE UP (ref 8.4–10.5)
CHLORIDE SERPL-SCNC: 102 MMOL/L — SIGNIFICANT CHANGE UP (ref 96–108)
CO2 SERPL-SCNC: 31 MMOL/L — HIGH (ref 22–29)
CREAT SERPL-MCNC: 1.06 MG/DL — SIGNIFICANT CHANGE UP (ref 0.5–1.3)
EGFR: 49 ML/MIN/1.73M2 — LOW
EGFR: 49 ML/MIN/1.73M2 — LOW
GLUCOSE SERPL-MCNC: 87 MG/DL — SIGNIFICANT CHANGE UP (ref 70–99)
MAGNESIUM SERPL-MCNC: 1.8 MG/DL — SIGNIFICANT CHANGE UP (ref 1.6–2.6)
METHOD TYPE: SIGNIFICANT CHANGE UP
POTASSIUM SERPL-MCNC: 4 MMOL/L — SIGNIFICANT CHANGE UP (ref 3.5–5.3)
POTASSIUM SERPL-SCNC: 4 MMOL/L — SIGNIFICANT CHANGE UP (ref 3.5–5.3)
SODIUM SERPL-SCNC: 145 MMOL/L — SIGNIFICANT CHANGE UP (ref 135–145)

## 2025-06-25 PROCEDURE — 99232 SBSQ HOSP IP/OBS MODERATE 35: CPT

## 2025-06-25 PROCEDURE — 80048 BASIC METABOLIC PNL TOTAL CA: CPT

## 2025-06-25 PROCEDURE — 36415 COLL VENOUS BLD VENIPUNCTURE: CPT

## 2025-06-25 PROCEDURE — 74176 CT ABD & PELVIS W/O CONTRAST: CPT

## 2025-06-25 PROCEDURE — 83605 ASSAY OF LACTIC ACID: CPT

## 2025-06-25 PROCEDURE — 85025 COMPLETE CBC W/AUTO DIFF WBC: CPT

## 2025-06-25 PROCEDURE — 83735 ASSAY OF MAGNESIUM: CPT

## 2025-06-25 PROCEDURE — 87086 URINE CULTURE/COLONY COUNT: CPT

## 2025-06-25 PROCEDURE — 85027 COMPLETE CBC AUTOMATED: CPT

## 2025-06-25 PROCEDURE — 87186 SC STD MICRODIL/AGAR DIL: CPT

## 2025-06-25 PROCEDURE — 80053 COMPREHEN METABOLIC PANEL: CPT

## 2025-06-25 PROCEDURE — 87040 BLOOD CULTURE FOR BACTERIA: CPT

## 2025-06-25 PROCEDURE — 81001 URINALYSIS AUTO W/SCOPE: CPT

## 2025-06-25 PROCEDURE — 87077 CULTURE AEROBIC IDENTIFY: CPT

## 2025-06-25 PROCEDURE — 83690 ASSAY OF LIPASE: CPT

## 2025-06-25 RX ORDER — ERTAPENEM SODIUM 1 G/1
1000 INJECTION, POWDER, LYOPHILIZED, FOR SOLUTION INTRAMUSCULAR; INTRAVENOUS EVERY 24 HOURS
Refills: 0 | Status: DISCONTINUED | OUTPATIENT
Start: 2025-06-25 | End: 2025-06-25

## 2025-06-25 RX ORDER — ERTAPENEM SODIUM 1 G/1
500 INJECTION, POWDER, LYOPHILIZED, FOR SOLUTION INTRAMUSCULAR; INTRAVENOUS EVERY 24 HOURS
Refills: 0 | Status: DISCONTINUED | OUTPATIENT
Start: 2025-06-25 | End: 2025-06-26

## 2025-06-25 RX ADMIN — GABAPENTIN 300 MILLIGRAM(S): 400 CAPSULE ORAL at 05:58

## 2025-06-25 RX ADMIN — Medication 650 MILLIGRAM(S): at 06:17

## 2025-06-25 RX ADMIN — Medication 325 MILLIGRAM(S): at 17:34

## 2025-06-25 RX ADMIN — Medication 650 MILLIGRAM(S): at 07:08

## 2025-06-25 RX ADMIN — RANOLAZINE 500 MILLIGRAM(S): 1000 TABLET, FILM COATED, EXTENDED RELEASE ORAL at 06:15

## 2025-06-25 RX ADMIN — GABAPENTIN 300 MILLIGRAM(S): 400 CAPSULE ORAL at 17:34

## 2025-06-25 RX ADMIN — ERTAPENEM SODIUM 100 MILLIGRAM(S): 1 INJECTION, POWDER, LYOPHILIZED, FOR SOLUTION INTRAMUSCULAR; INTRAVENOUS at 15:56

## 2025-06-25 RX ADMIN — Medication 40 MILLIGRAM(S): at 06:16

## 2025-06-25 RX ADMIN — RIVAROXABAN 15 MILLIGRAM(S): 10 TABLET, FILM COATED ORAL at 12:12

## 2025-06-25 RX ADMIN — ATORVASTATIN CALCIUM 20 MILLIGRAM(S): 80 TABLET, FILM COATED ORAL at 21:19

## 2025-06-25 RX ADMIN — Medication 325 MILLIGRAM(S): at 05:58

## 2025-06-25 RX ADMIN — RANOLAZINE 500 MILLIGRAM(S): 1000 TABLET, FILM COATED, EXTENDED RELEASE ORAL at 17:36

## 2025-06-25 RX ADMIN — Medication 650 MILLIGRAM(S): at 15:53

## 2025-06-25 RX ADMIN — Medication 50 MICROGRAM(S): at 05:58

## 2025-06-25 NOTE — DIETITIAN INITIAL EVALUATION ADULT - ORAL INTAKE PTA/DIET HISTORY
Pt visited at bedside, interview limited due to patient's difficulty hearing, no family at bedside. Chart reviewed, pt previously on puree diet per SLP recommendation during admission 3/11/25. Pt reported UBW as 99lbs. Noted previously with weight as 105lbs (02/2025). Current admission weight 101.4lbs. Indicates 2.5% wt loss x 4 months, not clinically significant. Diet eduction not appropriate at this time.

## 2025-06-25 NOTE — DIETITIAN INITIAL EVALUATION ADULT - ADD RECOMMEND
1. Continue diet as tolerated  2. If pt exhibit s/s of aspiration/dysphagia, recommend keep pt NPO and order swallow eval.   3. Recommend Ensure Plus 1x daily to promote PO intake (provides 350 calories, 20 gm protein/8 oz)   4. Obtain daily weights   5. Monitor PO intake, weight trends, skin integrity

## 2025-06-25 NOTE — PROGRESS NOTE ADULT - SUBJECTIVE AND OBJECTIVE BOX
Patient is a 89y old  Female who presents with a chief complaint of Suspected UTI, SORAIDA (24 Jun 2025 13:53)      SUBJECTIVE / OVERNIGHT EVENTS:  Patient seen and examined at bedside  Urine Cx show ESBL Proteus, will start Invanz   Patient denies chest pain, SOB, abd pain, N/V, fever, chills, dysuria or any other complaints. All remainder ROS negative.     MEDICATIONS  (STANDING):  atorvastatin 20 milliGRAM(s) Oral at bedtime  cefTRIAXone Injectable. 1000 milliGRAM(s) IV Push every 24 hours  ertapenem  IVPB 1000 milliGRAM(s) IV Intermittent every 24 hours  ferrous    sulfate 325 milliGRAM(s) Oral two times a day  gabapentin 300 milliGRAM(s) Oral every 12 hours  levothyroxine 50 MICROGram(s) Oral daily  pantoprazole    Tablet 40 milliGRAM(s) Oral before breakfast  ranolazine 500 milliGRAM(s) Oral two times a day  rivaroxaban 15 milliGRAM(s) Oral daily  sodium chloride 0.9%. 1000 milliLiter(s) (75 mL/Hr) IV Continuous <Continuous>    MEDICATIONS  (PRN):  acetaminophen     Tablet .. 650 milliGRAM(s) Oral every 6 hours PRN Temp greater or equal to 38C (100.4F), Mild Pain (1 - 3), Moderate Pain (4 - 6)  melatonin 3 milliGRAM(s) Oral at bedtime PRN Insomnia  ondansetron Injectable 4 milliGRAM(s) IV Push every 6 hours PRN Nausea and/or Vomiting        I&O's Summary      PHYSICAL EXAM:  Vital Signs Last 24 Hrs  T(C): 36.6 (25 Jun 2025 08:45), Max: 36.6 (24 Jun 2025 20:54)  T(F): 97.9 (25 Jun 2025 08:45), Max: 97.9 (24 Jun 2025 20:54)  HR: 74 (25 Jun 2025 08:45) (69 - 84)  BP: 134/60 (25 Jun 2025 08:45) (134/60 - 169/72)  BP(mean): --  RR: 18 (25 Jun 2025 08:45) (18 - 18)  SpO2: 95% (25 Jun 2025 08:45) (94% - 100%)    Parameters below as of 25 Jun 2025 08:45  Patient On (Oxygen Delivery Method): nasal cannula  O2 Flow (L/min): 2      CONSTITUTIONAL: NAD, well-groomed  EYES:  EOMI, conjunctiva and sclera clear  ENMT: Moist oral mucosa, no pharyngeal injection or exudates; normal dentition, neck supple   RESPIRATORY: Normal respiratory effort; lungs are clear to auscultation bilaterally  CARDIOVASCULAR: Regular rate and rhythm, normal S1 and S2, no murmur/rub/gallop; No lower extremity edema; Peripheral pulses are 2+ bilaterally  ABDOMEN: Nontender to palpation, normoactive bowel sounds, no rebound/guarding; No hepatosplenomegaly  MUSCLOSKELETAL:  Normal gait; no clubbing or cyanosis of digits; no joint swelling or tenderness to palpation  PSYCH: A+O to person, place, and time; affect appropriate  NEUROLOGY: CN 2-12 are intact and symmetric; no gross sensory deficits;   SKIN: No rashes; no palpable lesions      LABS:                        12.5   5.48  )-----------( 225      ( 24 Jun 2025 07:09 )             38.8     06-25    145  |  102  |  31.4[H]  ----------------------------<  87  4.0   |  31.0[H]  |  1.06    Ca    9.4      25 Jun 2025 06:40  Mg     1.8     06-25            Urinalysis Basic - ( 25 Jun 2025 06:40 )    Color: x / Appearance: x / SG: x / pH: x  Gluc: 87 mg/dL / Ketone: x  / Bili: x / Urobili: x   Blood: x / Protein: x / Nitrite: x   Leuk Esterase: x / RBC: x / WBC x   Sq Epi: x / Non Sq Epi: x / Bacteria: x        Culture - Blood (collected 22 Jun 2025 18:38)  Source: Blood Blood-Peripheral  Preliminary Report (25 Jun 2025 02:02):    No growth at 48 Hours    Urinalysis with Rflx Culture (collected 22 Jun 2025 17:31)    Culture - Urine (collected 22 Jun 2025 17:31)  Source: Clean Catch  Preliminary Report (25 Jun 2025 10:23):    50,000 - 99,000 CFU/mL Proteus mirabilis ESBL    50,000 - 99,000 CFU/mL Enterococcus faecalis  Organism: Proteus mirabilis ESBL (25 Jun 2025 10:23)  Organism: Proteus mirabilis ESBL (25 Jun 2025 10:23)      CAPILLARY BLOOD GLUCOSE          Labs reviewed:    RADIOLOGY & ADDITIONAL TESTS:  Imaging Personally Reviewed:  Electrocardiogram Personally Reviewed:

## 2025-06-25 NOTE — DIETITIAN INITIAL EVALUATION ADULT - OTHER INFO
89yoF hx CAD s/p PCI (2018), Afib on Xarelto, HFpEF, chronic hypoxemic respiratory failure since 2/2025 admission for influenza on 2L NC, recurrent UTIs, presenting with persistent dysuria and urinary frequency despite recent treatment for UTI w/ PO antibiotic. Pt with LLE wound, receiving wound care.

## 2025-06-25 NOTE — PROGRESS NOTE ADULT - ASSESSMENT
89yoF hx CAD s/p PCI (2018), Afib on Xarelto, HFpEF, chronic hypoxemic respiratory failure since 2/2025 admission for influenza on 2L NC, recurrent UTIs, presenting with persistent dysuria and urinary frequency despite recent treatment for UTI w/ PO antibiotics    Suspected UTI  -UA noted, not impressive but in setting of recent abx use, also microscopic hematuria  -Proteus in prior urine cx sensitive to cephalosporin  -DC ceftriaxone, start invanz for 5 days   - Fu urine cx - >proteus ESBL, and enterococcus  -cw  Pyridium for dysuria relief if not helpful will change to Oxybutanin     SORAIDA likely 2/2 UTI with some component of pre-renal from dehydration  -Cr improving  -S/p IVF    #Hx possible urothelial lesion  -CT scan 3/2025 w/ nodularity in posterior bladder, inability to exclude lesion   -Urology consulted, recs no  intervention. Pyridium for symp control. Op fu with Dr Maldonado or Dr Mcginnis, will request Urology to make an appointment for her per dil's request     #Hx chronic hypoxemic respiratory failure  -No acute respiratory symptoms   -On baseline O2 requirements of 2L NC, monitor     #Hx HFpEF  -Hold furosemide due to SORAIDA. Resume on dc     #Hx Afib  -cw Xarelto    #Hx spinal stenosis  -cw gabapentin 600mg TID    #Hx CAD with chronic stable angina  -Resume ranolazine, atorvastatin     #Hx hypothyroidism  -cw levothyroxine     #Hx LLE ulcer  -Has dressing placed by home visiting RN  -Wound care consult recs appreciated. Wound care ordered         Dispo: medically active, Invanz x 5 days

## 2025-06-25 NOTE — DIETITIAN INITIAL EVALUATION ADULT - NSICDXPASTMEDICALHX_GEN_ALL_CORE_FT
PAST MEDICAL HISTORY:  Acute deep vein thrombosis (DVT) of popliteal vein of both lower extremities     AF (atrial fibrillation) Persistent/chronic    Anxiety     Aortic stenosis s/p TAVR bioprosthetic Jan 2018 [facility] Dr. Aragon    Atherosclerosis of native coronary artery of native heart with angina pectoris     AV block     Bronchiectasis     Chronic back pain     Closed fracture of multiple ribs of right side, initial encounter     Closed pelvic ring fracture     Diastolic CHF NYHA class 3    Essential hypertension     GERD (gastroesophageal reflux disease)     Hip arthritis     History of valvular heart disease     [place] (hard of hearing) wears bilateral hearing aides    Hypercholesteremia     Hyperlipidemia, unspecified hyperlipidemia type     Kyphoscoliosis and scoliosis     Lung nodule     Murmur, cardiac gr3/6    Neuropathy     Pelvic fracture     PNA (pneumonia) november 2017    Spinal stenosis     Stenocardia     Thyroid nodule

## 2025-06-25 NOTE — DIETITIAN INITIAL EVALUATION ADULT - PERTINENT MEDS FT
MEDICATIONS  (STANDING):  ferrous    sulfate 325 milliGRAM(s) Oral two times a day  pantoprazole    Tablet 40 milliGRAM(s) Oral before breakfast    MEDICATIONS  (PRN):  ondansetron Injectable 4 milliGRAM(s) IV Push every 6 hours PRN Nausea and/or Vomiting

## 2025-06-26 LAB
-  AMPICILLIN: SIGNIFICANT CHANGE UP
-  CIPROFLOXACIN: SIGNIFICANT CHANGE UP
-  DAPTOMYCIN: SIGNIFICANT CHANGE UP
-  LEVOFLOXACIN: SIGNIFICANT CHANGE UP
-  LINEZOLID: SIGNIFICANT CHANGE UP
-  NITROFURANTOIN: SIGNIFICANT CHANGE UP
-  TETRACYCLINE: SIGNIFICANT CHANGE UP
-  VANCOMYCIN: SIGNIFICANT CHANGE UP
ANION GAP SERPL CALC-SCNC: 12 MMOL/L — SIGNIFICANT CHANGE UP (ref 5–17)
BUN SERPL-MCNC: 28 MG/DL — HIGH (ref 8–20)
CALCIUM SERPL-MCNC: 9.3 MG/DL — SIGNIFICANT CHANGE UP (ref 8.4–10.5)
CHLORIDE SERPL-SCNC: 101 MMOL/L — SIGNIFICANT CHANGE UP (ref 96–108)
CO2 SERPL-SCNC: 30 MMOL/L — HIGH (ref 22–29)
CREAT SERPL-MCNC: 0.92 MG/DL — SIGNIFICANT CHANGE UP (ref 0.5–1.3)
CULTURE RESULTS: ABNORMAL
EGFR: 58 ML/MIN/1.73M2 — LOW
EGFR: 58 ML/MIN/1.73M2 — LOW
GLUCOSE SERPL-MCNC: 83 MG/DL — SIGNIFICANT CHANGE UP (ref 70–99)
HCT VFR BLD CALC: 38.5 % — SIGNIFICANT CHANGE UP (ref 34.5–45)
HGB BLD-MCNC: 12.2 G/DL — SIGNIFICANT CHANGE UP (ref 11.5–15.5)
MAGNESIUM SERPL-MCNC: 1.8 MG/DL — SIGNIFICANT CHANGE UP (ref 1.6–2.6)
MCHC RBC-ENTMCNC: 28 PG — SIGNIFICANT CHANGE UP (ref 27–34)
MCHC RBC-ENTMCNC: 31.7 G/DL — LOW (ref 32–36)
MCV RBC AUTO: 88.3 FL — SIGNIFICANT CHANGE UP (ref 80–100)
METHOD TYPE: SIGNIFICANT CHANGE UP
NRBC # BLD AUTO: 0 K/UL — SIGNIFICANT CHANGE UP (ref 0–0)
NRBC # FLD: 0 K/UL — SIGNIFICANT CHANGE UP (ref 0–0)
NRBC BLD AUTO-RTO: 0 /100 WBCS — SIGNIFICANT CHANGE UP (ref 0–0)
ORGANISM # SPEC MICROSCOPIC CNT: ABNORMAL
ORGANISM # SPEC MICROSCOPIC CNT: ABNORMAL
ORGANISM # SPEC MICROSCOPIC CNT: SIGNIFICANT CHANGE UP
PHOSPHATE SERPL-MCNC: 2.6 MG/DL — SIGNIFICANT CHANGE UP (ref 2.4–4.7)
PLATELET # BLD AUTO: 262 K/UL — SIGNIFICANT CHANGE UP (ref 150–400)
PMV BLD: 11.2 FL — SIGNIFICANT CHANGE UP (ref 7–13)
POTASSIUM SERPL-MCNC: 4.1 MMOL/L — SIGNIFICANT CHANGE UP (ref 3.5–5.3)
POTASSIUM SERPL-SCNC: 4.1 MMOL/L — SIGNIFICANT CHANGE UP (ref 3.5–5.3)
RBC # BLD: 4.36 M/UL — SIGNIFICANT CHANGE UP (ref 3.8–5.2)
RBC # FLD: 14.6 % — HIGH (ref 10.3–14.5)
SODIUM SERPL-SCNC: 143 MMOL/L — SIGNIFICANT CHANGE UP (ref 135–145)
SPECIMEN SOURCE: SIGNIFICANT CHANGE UP
WBC # BLD: 6.03 K/UL — SIGNIFICANT CHANGE UP (ref 3.8–10.5)
WBC # FLD AUTO: 6.03 K/UL — SIGNIFICANT CHANGE UP (ref 3.8–10.5)

## 2025-06-26 PROCEDURE — G0545: CPT

## 2025-06-26 PROCEDURE — 85025 COMPLETE CBC W/AUTO DIFF WBC: CPT

## 2025-06-26 PROCEDURE — 36415 COLL VENOUS BLD VENIPUNCTURE: CPT

## 2025-06-26 PROCEDURE — 83605 ASSAY OF LACTIC ACID: CPT

## 2025-06-26 PROCEDURE — 99232 SBSQ HOSP IP/OBS MODERATE 35: CPT

## 2025-06-26 PROCEDURE — 99222 1ST HOSP IP/OBS MODERATE 55: CPT

## 2025-06-26 PROCEDURE — 81001 URINALYSIS AUTO W/SCOPE: CPT

## 2025-06-26 PROCEDURE — 83690 ASSAY OF LIPASE: CPT

## 2025-06-26 PROCEDURE — 80053 COMPREHEN METABOLIC PANEL: CPT

## 2025-06-26 PROCEDURE — 85027 COMPLETE CBC AUTOMATED: CPT

## 2025-06-26 PROCEDURE — 80048 BASIC METABOLIC PNL TOTAL CA: CPT

## 2025-06-26 PROCEDURE — 84100 ASSAY OF PHOSPHORUS: CPT

## 2025-06-26 PROCEDURE — 87186 SC STD MICRODIL/AGAR DIL: CPT

## 2025-06-26 PROCEDURE — 83735 ASSAY OF MAGNESIUM: CPT

## 2025-06-26 PROCEDURE — 87077 CULTURE AEROBIC IDENTIFY: CPT

## 2025-06-26 PROCEDURE — 74176 CT ABD & PELVIS W/O CONTRAST: CPT

## 2025-06-26 PROCEDURE — 87040 BLOOD CULTURE FOR BACTERIA: CPT

## 2025-06-26 PROCEDURE — 87086 URINE CULTURE/COLONY COUNT: CPT

## 2025-06-26 RX ORDER — MAGNESIUM OXIDE 400 MG
400 TABLET ORAL
Refills: 0 | Status: COMPLETED | OUTPATIENT
Start: 2025-06-26 | End: 2025-06-27

## 2025-06-26 RX ORDER — MEROPENEM 1 G/30ML
1000 INJECTION INTRAVENOUS EVERY 12 HOURS
Refills: 0 | Status: COMPLETED | OUTPATIENT
Start: 2025-06-26 | End: 2025-06-29

## 2025-06-26 RX ADMIN — RANOLAZINE 500 MILLIGRAM(S): 1000 TABLET, FILM COATED, EXTENDED RELEASE ORAL at 18:09

## 2025-06-26 RX ADMIN — RIVAROXABAN 15 MILLIGRAM(S): 10 TABLET, FILM COATED ORAL at 14:58

## 2025-06-26 RX ADMIN — RANOLAZINE 500 MILLIGRAM(S): 1000 TABLET, FILM COATED, EXTENDED RELEASE ORAL at 05:41

## 2025-06-26 RX ADMIN — Medication 650 MILLIGRAM(S): at 15:18

## 2025-06-26 RX ADMIN — Medication 650 MILLIGRAM(S): at 16:18

## 2025-06-26 RX ADMIN — ERTAPENEM SODIUM 100 MILLIGRAM(S): 1 INJECTION, POWDER, LYOPHILIZED, FOR SOLUTION INTRAMUSCULAR; INTRAVENOUS at 18:09

## 2025-06-26 RX ADMIN — Medication 50 MICROGRAM(S): at 05:41

## 2025-06-26 RX ADMIN — Medication 325 MILLIGRAM(S): at 18:09

## 2025-06-26 RX ADMIN — GABAPENTIN 300 MILLIGRAM(S): 400 CAPSULE ORAL at 18:09

## 2025-06-26 RX ADMIN — GABAPENTIN 300 MILLIGRAM(S): 400 CAPSULE ORAL at 05:42

## 2025-06-26 RX ADMIN — Medication 400 MILLIGRAM(S): at 14:58

## 2025-06-26 RX ADMIN — Medication 400 MILLIGRAM(S): at 18:09

## 2025-06-26 RX ADMIN — Medication 325 MILLIGRAM(S): at 05:41

## 2025-06-26 RX ADMIN — Medication 40 MILLIGRAM(S): at 05:41

## 2025-06-26 NOTE — CONSULT NOTE ADULT - SUBJECTIVE AND OBJECTIVE BOX
[facility]                                                INFECTIOUS DISEASES  =======================================================                     Sami Nguyen#   Diogenes Zepeda MD#   Brody Durbin MD*                           Monica Miguel MD*   Neli Umaña MD*  Jaime Patrick*            Diplomates American Board of Internal Medicine & Infectious Diseases                  # [place] Office - Appt - Tel  [phone] Fax [phone]                * [place] Office - Appt - Tel [phone] Fax [phone]                                  Hospital Consult line:  [phone]  =======================================================      [id number]  MAMTA TALAVERA   HPI:  89yoF hx CAD s/p PCI (2018), Afib on Xarelto, HFpEF, chronic hypoxemic respiratory failure since 2/2025 admission for influenza on 2L NC, recurrent UTIs, presenting with persistent dysuria and urinary frequency.  Collateral hx obtained from daughter in law at bedside.  Pt has last treated for UTI in early June with Macrobid but has not had complete resolution of her dysuria and urinary frequency, prompting daughter to bring her to hospital.  Per daughter, pt has not had any hematuria.  Pt was pending outpatient urology appointment for cystoscopy as prior CT A/P in 3/2025 noted bladder nodularity and was unable to exclude a urothelial lesion, however daughter in law has been unable to get her to follow up appointment.  (22 Jun 2025 23:56)          I have personally reviewed the labs and data; pertinent labs and data are listed in this note; please see below.   =======================================================  Past Medical & Surgical Hx:  =====================  PAST MEDICAL & SURGICAL HISTORY:  Essential hypertension      Chronic back pain      Hyperlipidemia, unspecified hyperlipidemia type      PNA (pneumonia)  november 2017      Lung nodule      Thyroid nodule      Acute deep vein thrombosis (DVT) of popliteal vein of both lower extremities      Pelvic fracture      Closed fracture of multiple ribs of right side, initial encounter      Aortic stenosis  s/p TAVR bioprosthetic Jan 2018 [facility] Dr. Aragon      Atherosclerosis of native coronary artery of native heart with angina pectoris      Hypercholesteremia      AF (atrial fibrillation)  Persistent/chronic      AV block      Bronchiectasis      Kyphoscoliosis and scoliosis      Stenocardia      Spinal stenosis      Murmur, cardiac  gr3/6      Neuropathy      Diastolic CHF  NYHA class 3      Anxiety      GERD (gastroesophageal reflux disease)      Closed pelvic ring fracture      Hip arthritis      [place] (hard of hearing)  wears bilateral hearing aides      History of valvular heart disease      S/P spinal fusion  L spines      S/p TAVR (transcatheter aortic valve replacement), bioprosthetic        Problem List:  ==========  HEALTH ISSUES - PROBLEM Dx:        Social Hx:  =======  no toxic habits currently    FAMILY HISTORY:  Family history of breast cancer (Sibling)    Family history of cerebrovascular accident (CVA) (Mother)    Family history of esophageal cancer (Father)    Family history of breast cancer (Mother)    no significant family history of immunosuppressive disorders in mother or father   =======================================================    REVIEW OF SYSTEMS:  CONSTITUTIONAL:  No Fever or chills  HEENT:  No diplopia or blurred vision.  No earache, sore throat or runny nose.  CARDIOVASCULAR:  No pressure, squeezing, strangling, tightness, heaviness or aching about the chest, neck, axilla or epigastrium.  RESPIRATORY:  No cough, shortness of breath  GASTROINTESTINAL:  No nausea, vomiting or diarrhea.  GENITOURINARY:  No dysuria, frequency or urgency. No Blood in urine  MUSCULOSKELETAL:  no joint aches, no muscle pain  SKIN:  No change in skin, hair or nails.  NEUROLOGIC:  No Headaches, seizures or weakness.  PSYCHIATRIC:  No disorder of thought or mood.  ENDOCRINE:  No heat or cold intolerance  HEMATOLOGICAL:  No easy bruising or bleeding.    =======================================================  Allergies    No Known Allergies    Intolerances    Antibiotics:  ertapenem  IVPB 500 milliGRAM(s) IV Intermittent every 24 hours    Other medications:  atorvastatin 20 milliGRAM(s) Oral at bedtime  ferrous    sulfate 325 milliGRAM(s) Oral two times a day  gabapentin 300 milliGRAM(s) Oral every 12 hours  levothyroxine 50 MICROGram(s) Oral daily  magnesium oxide 400 milliGRAM(s) Oral three times a day with meals  pantoprazole    Tablet 40 milliGRAM(s) Oral before breakfast  ranolazine 500 milliGRAM(s) Oral two times a day  rivaroxaban 15 milliGRAM(s) Oral daily  sodium chloride 0.9%. 1000 milliLiter(s) IV Continuous <Continuous>     cefTRIAXone Injectable.   1000 milliGRAM(s) IV Push (06-22-25 @ 18:40)    cefTRIAXone Injectable.   1000 milliGRAM(s) IV Push (06-23-25 @ 18:13)   1000 milliGRAM(s) IV Push (06-24-25 @ 17:24)    ertapenem  IVPB   100 mL/Hr IV Intermittent (06-25-25 @ 15:56)      ======================================================  Physical Exam:  ============  T(F): 98.4 (26 Jun 2025 08:13), Max: 98.4 (26 Jun 2025 08:13)  HR: 74 (26 Jun 2025 08:13)  BP: 166/71 (26 Jun 2025 08:13)  RR: 17 (26 Jun 2025 08:13)  SpO2: 97% (26 Jun 2025 08:13) (95% - 97%)  temp max in last 48H T(F): , Max: 98.4 (06-26-25 @ 08:13)    General:  No acute distress.  Eye: Pupils are equal, round and reactive to light, Normal conjunctiva.  HENT: Normocephalic, Oral mucosa is moist, No pharyngeal erythema, No sinus tenderness.  Neck: Supple, No lymphadenopathy.  Respiratory: Lungs are clear to auscultation, Respirations are non-labored.  Cardiovascular: Normal rate, Regular rhythm, s1 + s2  Gastrointestinal: Soft, Non-tender, Non-distended, Normal bowel sounds.  Genitourinary: No costovertebral angle tenderness.  Lymphatics: No lymphadenopathy neck,   Musculoskeletal: Normal range of motion, Normal strength.  Integumentary: No rash.  Neurologic: Alert, Oriented, No focal deficits  Psychiatric: Appropriate mood & affect.    =======================================================  Labs:                        12.2   6.03  )-----------( 262      ( 26 Jun 2025 05:10 )             38.5     06-26    143  |  101  |  28.0[H]  ----------------------------<  83  4.1   |  30.0[H]  |  0.92    Ca    9.3      26 Jun 2025 05:10  Phos  2.6     06-26  Mg     1.8     06-26        Culture - Blood (collected 06-22-25 @ 18:38)  Source: Blood Blood-Peripheral  Preliminary Report (06-26-25 @ 02:01):    No growth at 72 Hours    Urinalysis with Rflx Culture (collected 06-22-25 @ 17:31)    Culture - Urine (collected 06-22-25 @ 17:31)  Source: Clean Catch  Final Report (06-26-25 @ 11:32):    50,000 - 99,000 CFU/mL Proteus mirabilis ESBL    50,000 - 99,000 CFU/mL Enterococcus faecalis (vancomycin resistant)  Organism: Proteus mirabilis ESBL  Enterococcus faecalis (vancomycin resistant) (06-26-25 @ 11:32)  Organism: Enterococcus faecalis (vancomycin resistant) (06-26-25 @ 11:32)    Sensitivities:      Method Type: KATHARINE      -  Ampicillin: S <=2 Predicts results to ampicillin/sulbactam, amoxacillin-clavulanate and  piperacillin-tazobactam.      -  Ciprofloxacin: R >2      -  Daptomycin: S 1      -  Levofloxacin: R >4      -  Linezolid: S 2      -  Nitrofurantoin: S <=32 Should not be used to treat pyelonephritis.      -  Tetracycline: R >8      -  Vancomycin: R >16  Organism: Proteus mirabilis ESBL (06-26-25 @ 11:32)    Sensitivities:      Method Type: KATHARINE      -  Ampicillin: R >16 These ampicillin results predict results for amoxicillin      -  Ampicillin/Sulbactam: S 8/4      -  Aztreonam: R <=4      -  Cefazolin: R >16 For uncomplicated UTI with K. pneumoniae, E. coli, or P. mirablis: KATHARINE <=16 is sensitive and KATHARINE >=32 is resistant. This also predicts results for oral agents cefaclor, cefdinir, cefpodoxime, cefprozil, cefuroxime axetil, cephalexin and locarbef for uncomplicated UTI. Note that some isolates may be susceptible to these agents while testing resistant to cefazolin.      -  Cefepime: R >16      -  Ceftriaxone: R >32      -  Cefuroxime: R >16      -  Ciprofloxacin: R >2      -  Ertapenem: S <=0.5      -  Gentamicin: S <=2      -  Levofloxacin: R 2      -  Meropenem: S <=1      -  Nitrofurantoin: R >64 Should not be used to treat pyelonephritis      -  Piperacillin/Tazobactam: S <=8      -  Tobramycin: S <=2      -  Trimethoprim/Sulfamethoxazole: R >2/38    Culture - Urine (collected 03-09-25 @ 20:45)  Source: Clean Catch Clean Catch (Midstream)  Final Report (03-13-25 @ 10:47):    50,000 - 99,000 CFU/mL Clostridium tertium "Susceptibilities not    performed"    Culture - Urine (collected 02-12-25 @ 05:15)  Source: Clean Catch Clean Catch (Midstream)  Final Report (02-15-25 @ 06:43):    >100,000 CFU/ml Escherichia coli  Organism: Escherichia coli (02-15-25 @ 06:43)  Organism: Escherichia coli (02-15-25 @ 06:43)    Sensitivities:      Method Type: KATHARINE      -  Amoxicillin/Clavulanic Acid: S <=8/4      -  Ampicillin: S <=8 These ampicillin results predict results for amoxicillin      -  Ampicillin/Sulbactam: S <=4/2      -  Aztreonam: S <=4      -  Cefazolin: S <=2 For uncomplicated UTI with K. pneumoniae, E. coli, or P. mirablis: KATHARINE <=16 is sensitive and KATHARINE >=32 is resistant. This also predicts results for oral agents cefaclor, cefdinir, cefpodoxime, cefprozil, cefuroxime axetil, cephalexin and locarbef for uncomplicated UTI. Note that some isolates may be susceptible to these agents while testing resistant to cefazolin.      -  Cefepime: S <=2      -  Cefoxitin: S <=8      -  Ceftriaxone: S <=1      -  Cefuroxime: S <=4      -  Ciprofloxacin: S <=0.25      -  Ertapenem: S <=0.5      -  Gentamicin: S <=2      -  Imipenem: S <=1      -  Levofloxacin: S <=0.5      -  Meropenem: S <=1      -  Nitrofurantoin: S <=32 Should not be used to treat pyelonephritis      -  Piperacillin/Tazobactam: S <=8      -  Tobramycin: S <=2      -  Trimethoprim/Sulfamethoxazole: S <=0.5/9.5    Culture - Blood (collected 02-11-25 @ 16:10)  Source: .Blood BLOOD  Final Report (02-17-25 @ 01:00):    No growth at 5 days    Culture - Urine (collected 10-28-24 @ 12:47)  Source: Clean Catch Clean Catch (Midstream)  Final Report (10-29-24 @ 17:33):    <10,000 CFU/mL Normal Urogenital Aliya    Culture - Urine (collected 11-08-23 @ 02:00)  Source: Catheterized Catheterized  Final Report (11-09-23 @ 11:24):    No growth    Culture - Urine (collected 11-07-23 @ 21:20)  Source: Clean Catch Clean Catch (Midstream)  Final Report (11-10-23 @ 10:51):    >100,000 CFU/ml Proteus mirabilis  Organism: Proteus mirabilis (11-10-23 @ 10:51)  Organism: Proteus mirabilis (11-10-23 @ 10:51)    Sensitivities:      Method Type: KATHARINE      -  Amoxicillin/Clavulanic Acid: S <=8/4      -  Ampicillin: S <=8 These ampicillin results predict results for amoxicillin      -  Ampicillin/Sulbactam: S <=4/2      -  Aztreonam: S <=4      -  Cefazolin: S <=2 For uncomplicated UTI with K. pneumoniae, E. coli, or P. mirablis: KATHARINE <=16 is sensitive and KATHARINE >=32 is resistant. This also predicts results for oral agents cefaclor, cefdinir, cefpodoxime, cefprozil, cefuroxime axetil, cephalexin and locarbef for uncomplicated UTI. Note that some isolates may be susceptible to these agents while testing resistant to cefazolin.      -  Cefepime: S <=2      -  Cefoxitin: S <=8      -  Ceftriaxone: S <=1      -  Cefuroxime: S <=4      -  Ciprofloxacin: R >2      -  Ertapenem: S <=0.5      -  Gentamicin: S <=2      -  Levofloxacin: R >4      -  Meropenem: S <=1      -  Nitrofurantoin: R 64 Should not be used to treat pyelonephritis      -  Piperacillin/Tazobactam: S <=8      -  Tobramycin: S <=2      -  Trimethoprim/Sulfamethoxazole: R >2/38                                                        [facility]                                                INFECTIOUS DISEASES  =======================================================                     Sami Nguyen#   Diogenes Zepeda MD#   Brody Durbin MD*                           Monica Miguel MD*   Neli Umaña MD*  Jaime Patrick*            Diplomates American Board of Internal Medicine & Infectious Diseases                  # [place] Office - Appt - Tel  [phone] Fax [phone]                * [place] Office - Appt - Tel [phone] Fax [phone]                                  Hospital Consult line:  [phone]  =======================================================      [id number]  MAMTA TALAVERA   HPI:  89yoF hx CAD s/p PCI (2018), Afib on Xarelto, HFpEF, chronic hypoxemic respiratory failure since 2/2025 admission for influenza on 2L NC, recurrent UTIs, presenting with persistent dysuria and urinary frequency.  Collateral hx obtained from daughter in law at bedside.  Pt has last treated for UTI in early June with Macrobid but has not had complete resolution of her dysuria and urinary frequency, prompting daughter to bring her to hospital.  Per daughter, pt has not had any hematuria.  Pt was pending outpatient urology appointment for cystoscopy as prior CT A/P in 3/2025 noted bladder nodularity and was unable to exclude a urothelial lesion, however daughter in law has been unable to get her to follow up appointment.  (22 Jun 2025 23:56)          I have personally reviewed the labs and data; pertinent labs and data are listed in this note; please see below.   =======================================================  Past Medical & Surgical Hx:  =====================  PAST MEDICAL & SURGICAL HISTORY:  Essential hypertension      Chronic back pain      Hyperlipidemia, unspecified hyperlipidemia type      PNA (pneumonia)  november 2017      Lung nodule      Thyroid nodule      Acute deep vein thrombosis (DVT) of popliteal vein of both lower extremities      Pelvic fracture      Closed fracture of multiple ribs of right side, initial encounter      Aortic stenosis  s/p TAVR bioprosthetic Jan 2018 [facility] Dr. Aragon      Atherosclerosis of native coronary artery of native heart with angina pectoris      Hypercholesteremia      AF (atrial fibrillation)  Persistent/chronic      AV block      Bronchiectasis      Kyphoscoliosis and scoliosis      Stenocardia      Spinal stenosis      Murmur, cardiac  gr3/6      Neuropathy      Diastolic CHF  NYHA class 3      Anxiety      GERD (gastroesophageal reflux disease)      Closed pelvic ring fracture      Hip arthritis      [place] (hard of hearing)  wears bilateral hearing aides      History of valvular heart disease      S/P spinal fusion  L spines      S/p TAVR (transcatheter aortic valve replacement), bioprosthetic        Problem List:  ==========  HEALTH ISSUES - PROBLEM Dx:        Social Hx:  =======  no toxic habits currently    FAMILY HISTORY:  Family history of breast cancer (Sibling)    Family history of cerebrovascular accident (CVA) (Mother)    Family history of esophageal cancer (Father)    Family history of breast cancer (Mother)    no significant family history of immunosuppressive disorders in mother or father   =======================================================    REVIEW OF SYSTEMS:  CONSTITUTIONAL:  No Fever or chills  HEENT:  No diplopia or blurred vision.  No earache, sore throat or runny nose.  CARDIOVASCULAR:  No pressure, squeezing, strangling, tightness, heaviness or aching about the chest, neck, axilla or epigastrium.  RESPIRATORY:  No cough, shortness of breath  GASTROINTESTINAL:  No nausea, vomiting or diarrhea.  GENITOURINARY:  No dysuria, frequency or urgency. No Blood in urine  MUSCULOSKELETAL:  no joint aches, no muscle pain  SKIN:  No change in skin, hair or nails.  NEUROLOGIC:  No Headaches, seizures or weakness.  PSYCHIATRIC:  No disorder of thought or mood.  ENDOCRINE:  No heat or cold intolerance  HEMATOLOGICAL:  No easy bruising or bleeding.    =======================================================  Allergies    No Known Allergies    Intolerances    Antibiotics:  ertapenem  IVPB 500 milliGRAM(s) IV Intermittent every 24 hours    Other medications:  atorvastatin 20 milliGRAM(s) Oral at bedtime  ferrous    sulfate 325 milliGRAM(s) Oral two times a day  gabapentin 300 milliGRAM(s) Oral every 12 hours  levothyroxine 50 MICROGram(s) Oral daily  magnesium oxide 400 milliGRAM(s) Oral three times a day with meals  pantoprazole    Tablet 40 milliGRAM(s) Oral before breakfast  ranolazine 500 milliGRAM(s) Oral two times a day  rivaroxaban 15 milliGRAM(s) Oral daily  sodium chloride 0.9%. 1000 milliLiter(s) IV Continuous <Continuous>     cefTRIAXone Injectable.   1000 milliGRAM(s) IV Push (06-22-25 @ 18:40)    cefTRIAXone Injectable.   1000 milliGRAM(s) IV Push (06-23-25 @ 18:13)   1000 milliGRAM(s) IV Push (06-24-25 @ 17:24)    ertapenem  IVPB   100 mL/Hr IV Intermittent (06-25-25 @ 15:56)      ======================================================  Physical Exam:  ============  T(F): 98.4 (26 Jun 2025 08:13), Max: 98.4 (26 Jun 2025 08:13)  HR: 74 (26 Jun 2025 08:13)  BP: 166/71 (26 Jun 2025 08:13)  RR: 17 (26 Jun 2025 08:13)  SpO2: 97% (26 Jun 2025 08:13) (95% - 97%)  temp max in last 48H T(F): , Max: 98.4 (06-26-25 @ 08:13)    General:  No acute distress elderly female sitting in chair  Eye: Pupils are equal, round and reactive to light, Normal conjunctiva.  s moist, No pharyngeal erythema, No sinus tenderness.  Neck: Supple, No lymphadenopathy.  Respiratory: Lungs are clear to auscultation, Respirations are non-labored.  Cardiovascular: Normal rate, Regular rhythm, s1 + s2 +Dottie  Gastrointestinal: Soft, Non-tender, Non-distended, Normal bowel sounds.  Genitourinary: No costovertebral angle tenderness. purewick  Integumentary: No rash.  Neurologic: Alert, Oriented x3, No focal deficits  Psychiatric: Appropriate mood & affect.    =======================================================  Labs:                        12.2   6.03  )-----------( 262      ( 26 Jun 2025 05:10 )             38.5     06-26    143  |  101  |  28.0[H]  ----------------------------<  83  4.1   |  30.0[H]  |  0.92    Ca    9.3      26 Jun 2025 05:10  Phos  2.6     06-26  Mg     1.8     06-26        Culture - Blood (collected 06-22-25 @ 18:38)  Source: Blood Blood-Peripheral  Preliminary Report (06-26-25 @ 02:01):    No growth at 72 Hours    Urinalysis with Rflx Culture (collected 06-22-25 @ 17:31)    Culture - Urine (collected 06-22-25 @ 17:31)  Source: Clean Catch  Final Report (06-26-25 @ 11:32):    50,000 - 99,000 CFU/mL Proteus mirabilis ESBL    50,000 - 99,000 CFU/mL Enterococcus faecalis (vancomycin resistant)  Organism: Proteus mirabilis ESBL  Enterococcus faecalis (vancomycin resistant) (06-26-25 @ 11:32)  Organism: Enterococcus faecalis (vancomycin resistant) (06-26-25 @ 11:32)    Sensitivities:      Method Type: KATHARINE      -  Ampicillin: S <=2 Predicts results to ampicillin/sulbactam, amoxacillin-clavulanate and  piperacillin-tazobactam.      -  Ciprofloxacin: R >2      -  Daptomycin: S 1      -  Levofloxacin: R >4      -  Linezolid: S 2      -  Nitrofurantoin: S <=32 Should not be used to treat pyelonephritis.      -  Tetracycline: R >8      -  Vancomycin: R >16  Organism: Proteus mirabilis ESBL (06-26-25 @ 11:32)    Sensitivities:      Method Type: KATHARINE      -  Ampicillin: R >16 These ampicillin results predict results for amoxicillin      -  Ampicillin/Sulbactam: S 8/4      -  Aztreonam: R <=4      -  Cefazolin: R >16 For uncomplicated UTI with K. pneumoniae, E. coli, or P. mirablis: KATHARINE <=16 is sensitive and KATHARINE >=32 is resistant. This also predicts results for oral agents cefaclor, cefdinir, cefpodoxime, cefprozil, cefuroxime axetil, cephalexin and locarbef for uncomplicated UTI. Note that some isolates may be susceptible to these agents while testing resistant to cefazolin.      -  Cefepime: R >16      -  Ceftriaxone: R >32      -  Cefuroxime: R >16      -  Ciprofloxacin: R >2      -  Ertapenem: S <=0.5      -  Gentamicin: S <=2      -  Levofloxacin: R 2      -  Meropenem: S <=1      -  Nitrofurantoin: R >64 Should not be used to treat pyelonephritis      -  Piperacillin/Tazobactam: S <=8      -  Tobramycin: S <=2      -  Trimethoprim/Sulfamethoxazole: R >2/38    Culture - Urine (collected 03-09-25 @ 20:45)  Source: Clean Catch Clean Catch (Midstream)  Final Report (03-13-25 @ 10:47):    50,000 - 99,000 CFU/mL Clostridium tertium "Susceptibilities not    performed"    Culture - Urine (collected 02-12-25 @ 05:15)  Source: Clean Catch Clean Catch (Midstream)  Final Report (02-15-25 @ 06:43):    >100,000 CFU/ml Escherichia coli  Organism: Escherichia coli (02-15-25 @ 06:43)  Organism: Escherichia coli (02-15-25 @ 06:43)    Sensitivities:      Method Type: KATHARINE      -  Amoxicillin/Clavulanic Acid: S <=8/4      -  Ampicillin: S <=8 These ampicillin results predict results for amoxicillin      -  Ampicillin/Sulbactam: S <=4/2      -  Aztreonam: S <=4      -  Cefazolin: S <=2 For uncomplicated UTI with K. pneumoniae, E. coli, or P. mirablis: KATHARINE <=16 is sensitive and KATHARINE >=32 is resistant. This also predicts results for oral agents cefaclor, cefdinir, cefpodoxime, cefprozil, cefuroxime axetil, cephalexin and locarbef for uncomplicated UTI. Note that some isolates may be susceptible to these agents while testing resistant to cefazolin.      -  Cefepime: S <=2      -  Cefoxitin: S <=8      -  Ceftriaxone: S <=1      -  Cefuroxime: S <=4      -  Ciprofloxacin: S <=0.25      -  Ertapenem: S <=0.5      -  Gentamicin: S <=2      -  Imipenem: S <=1      -  Levofloxacin: S <=0.5      -  Meropenem: S <=1      -  Nitrofurantoin: S <=32 Should not be used to treat pyelonephritis      -  Piperacillin/Tazobactam: S <=8      -  Tobramycin: S <=2      -  Trimethoprim/Sulfamethoxazole: S <=0.5/9.5    Culture - Blood (collected 02-11-25 @ 16:10)  Source: .Blood BLOOD  Final Report (02-17-25 @ 01:00):    No growth at 5 days    Culture - Urine (collected 10-28-24 @ 12:47)  Source: Clean Catch Clean Catch (Midstream)  Final Report (10-29-24 @ 17:33):    <10,000 CFU/mL Normal Urogenital Aliya    Culture - Urine (collected 11-08-23 @ 02:00)  Source: Catheterized Catheterized  Final Report (11-09-23 @ 11:24):    No growth    Culture - Urine (collected 11-07-23 @ 21:20)  Source: Clean Catch Clean Catch (Midstream)  Final Report (11-10-23 @ 10:51):    >100,000 CFU/ml Proteus mirabilis  Organism: Proteus mirabilis (11-10-23 @ 10:51)  Organism: Proteus mirabilis (11-10-23 @ 10:51)    Sensitivities:      Method Type: KATHARINE      -  Amoxicillin/Clavulanic Acid: S <=8/4      -  Ampicillin: S <=8 These ampicillin results predict results for amoxicillin      -  Ampicillin/Sulbactam: S <=4/2      -  Aztreonam: S <=4      -  Cefazolin: S <=2 For uncomplicated UTI with K. pneumoniae, E. coli, or P. mirablis: KATHARINE <=16 is sensitive and KATHARINE >=32 is resistant. This also predicts results for oral agents cefaclor, cefdinir, cefpodoxime, cefprozil, cefuroxime axetil, cephalexin and locarbef for uncomplicated UTI. Note that some isolates may be susceptible to these agents while testing resistant to cefazolin.      -  Cefepime: S <=2      -  Cefoxitin: S <=8      -  Ceftriaxone: S <=1      -  Cefuroxime: S <=4      -  Ciprofloxacin: R >2      -  Ertapenem: S <=0.5      -  Gentamicin: S <=2      -  Levofloxacin: R >4      -  Meropenem: S <=1      -  Nitrofurantoin: R 64 Should not be used to treat pyelonephritis      -  Piperacillin/Tazobactam: S <=8      -  Tobramycin: S <=2      -  Trimethoprim/Sulfamethoxazole: R >2/38

## 2025-06-26 NOTE — PROGRESS NOTE ADULT - ASSESSMENT
89y/oF PMH CAD s/p PCI (2018), Afib on Xarelto, HFpEF, chronic hypoxemic respiratory failure since 2/2025 admission for influenza on 2L NC, recurrent UTIs, presenting with persistent dysuria and urinary frequency despite recent treatment for UTI w/ PO antibiotics    Suspected UTI  -UA noted, not impressive but in setting of recent abx use, also microscopic hematuria  -Proteus in prior urine cx sensitive to cephalosporin  -urine cx 50-99k CFU/mL Proteus mirabilis ESBL, 50-99k CFU/mL Enterococcus faecalis (Vancomycin resistant)   -cont Pyridium for dysuria  -Ceftriaxone changed to Ertapenem 6/25  -f/u ID consult     SORAIDA likely 2/2 UTI with component of pre-renal from dehydration  -sCr improved s/p IVF   -f/u am BMP     Hx possible urothelial lesion  -CT scan 3/2025 w/ nodularity in posterior bladder, inability to exclude lesion   -Urology consulted, recs no  intervention. Pyridium for symp control. Op f/u with Dr Maldonado or Dr Mcginnis    Hx chronic hypoxemic respiratory failure  -No acute respiratory symptoms   -On baseline O2 requirements of 2L NC    Chronic diastolic HFpEF  -Holding furosemide due to SORAIDA. Resume on dc     Hx Afib  -cont Xarelto    Hx spinal stenosis  -cont gabapentin 600mg TID    Hx CAD with chronic stable angina  -cont ranolazine, atorvastatin     Hx hypothyroidism  -cont levothyroxine     Hx LLE ulcer  -Has dressing placed by home visiting RN  -Wound care consult recs appreciated    Dispo: medically active

## 2025-06-26 NOTE — PROGRESS NOTE ADULT - SUBJECTIVE AND OBJECTIVE BOX
MAMTA TALAVERA    [id number]    89y      Female    CC: UTI    INTERVAL HPI/OVERNIGHT EVENTS: Pt seen and examined. denies abd pain    REVIEW OF SYSTEMS:    RESPIRATORY: No cough, wheezing, hemoptysis; No shortness of breath  CARDIOVASCULAR: No chest pain, palpitations  GASTROINTESTINAL: No abdominal or epigastric pain. No nausea, vomiting      Vital Signs Last 24 Hrs  T(C): 36.9 (26 Jun 2025 08:13), Max: 36.9 (26 Jun 2025 08:13)  T(F): 98.4 (26 Jun 2025 08:13), Max: 98.4 (26 Jun 2025 08:13)  HR: 74 (26 Jun 2025 08:13) (63 - 80)  BP: 166/71 (26 Jun 2025 08:13) (121/59 - 166/71)  BP(mean): --  RR: 17 (26 Jun 2025 08:13) (16 - 18)  SpO2: 97% (26 Jun 2025 08:13) (95% - 97%)    Parameters below as of 26 Jun 2025 08:13  Patient On (Oxygen Delivery Method): nasal cannula  O2 Flow (L/min): 2      PHYSICAL EXAM:    GENERAL: NAD  CHEST/LUNG: respirations unlabored on NC  HEART: S1S2+, Regular rate and rhythm  ABDOMEN: Soft, Nontender, Nondistended  SKIN: warm, dry   NEURO: Awake, alert     LABS:                        12.2   6.03  )-----------( 262      ( 26 Jun 2025 05:10 )             38.5     06-26    143  |  101  |  28.0[H]  ----------------------------<  83  4.1   |  30.0[H]  |  0.92    Ca    9.3      26 Jun 2025 05:10  Phos  2.6     06-26  Mg     1.8     06-26        Urinalysis Basic - ( 26 Jun 2025 05:10 )    Color: x / Appearance: x / SG: x / pH: x  Gluc: 83 mg/dL / Ketone: x  / Bili: x / Urobili: x   Blood: x / Protein: x / Nitrite: x   Leuk Esterase: x / RBC: x / WBC x   Sq Epi: x / Non Sq Epi: x / Bacteria: x          MEDICATIONS  (STANDING):  atorvastatin 20 milliGRAM(s) Oral at bedtime  ertapenem  IVPB 500 milliGRAM(s) IV Intermittent every 24 hours  ferrous    sulfate 325 milliGRAM(s) Oral two times a day  gabapentin 300 milliGRAM(s) Oral every 12 hours  levothyroxine 50 MICROGram(s) Oral daily  magnesium oxide 400 milliGRAM(s) Oral three times a day with meals  pantoprazole    Tablet 40 milliGRAM(s) Oral before breakfast  ranolazine 500 milliGRAM(s) Oral two times a day  rivaroxaban 15 milliGRAM(s) Oral daily  sodium chloride 0.9%. 1000 milliLiter(s) (75 mL/Hr) IV Continuous <Continuous>    MEDICATIONS  (PRN):  acetaminophen     Tablet .. 650 milliGRAM(s) Oral every 6 hours PRN Temp greater or equal to 38C (100.4F), Mild Pain (1 - 3), Moderate Pain (4 - 6)  melatonin 3 milliGRAM(s) Oral at bedtime PRN Insomnia  ondansetron Injectable 4 milliGRAM(s) IV Push every 6 hours PRN Nausea and/or Vomiting      RADIOLOGY & ADDITIONAL TESTS:   Pt's spouse, antonia, notified. Okay per hipaa        Lab order mailed to home address.

## 2025-06-26 NOTE — PHYSICAL THERAPY INITIAL EVALUATION ADULT - NSACTIVITYREC_GEN_A_PT
PT will follow, DC rec GARRETT at this time. Pt actively resisting movement due to fear during all assessment tasks

## 2025-06-26 NOTE — CONSULT NOTE ADULT - ASSESSMENT
89yoF hx CAD s/p PCI (2018), Afib on Xarelto, HFpEF, chronic hypoxemic respiratory failure since 2/2025 admission for influenza on 2L NC, recurrent UTIs, presenting with persistent dysuria and urinary frequency.  Collateral hx obtained from daughter in law at bedside.  Pt has last treated for UTI in early June with Macrobid but has not had complete resolution of her dysuria and urinary frequency, prompting daughter to bring her to hospital.  Per daughter, pt has not had any hematuria.  Pt was pending outpatient urology appointment for cystoscopy as prior CT A/P in 3/2025 noted bladder nodularity and was unable to exclude a urothelial lesion, however daughter in law has been unable to get her to follow up appointment.  (22 Jun 2025 23:56)    Dysuria  Abnormal urine culture- UTI vs contamination  Bladder stones  Recurrent UTI  SORAIDA    - pt reports burning sensation when she urinates ? if true UTI vs vaginal atrophy  - no fever or leukocytosis  - UA + RBC normal wbc, UCX esbl proteus and e.faecalis vanco resistant. unclear how this was collected  - bcx neg  - dc ertapenem suggest meropenem x 3 days   - urology f/u outpatient  - Trend Fever  - Trend Leukocytosis      d/w Dr Rojas  signing off     89yoF hx CAD s/p PCI (2018), Afib on Xarelto, HFpEF, chronic hypoxemic respiratory failure since 2/2025 admission for influenza on 2L NC, recurrent UTIs, presenting with persistent dysuria and urinary frequency.  Collateral hx obtained from daughter in law at bedside.  Pt has last treated for UTI in early June with Macrobid but has not had complete resolution of her dysuria and urinary frequency, prompting daughter to bring her to hospital.  Per daughter, pt has not had any hematuria.  Pt was pending outpatient urology appointment for cystoscopy as prior CT A/P in 3/2025 noted bladder nodularity and was unable to exclude a urothelial lesion, however daughter in law has been unable to get her to follow up appointment.  (22 Jun 2025 23:56)    Dysuria  Abnormal urine culture- UTI vs contamination  Bladder stones  Recurrent UTI  SORAIDA    - pt reports burning sensation when she urinates ? if true UTI vs vaginal atrophy  - no fever or leukocytosis  - UA + RBC normal wbc, UCX esbl proteus and e.faecalis vanco resistant. unclear how this was collected  - bcx neg  - dc ertapenem suggest meropenem x 3 days   - symptomatic treatment, pyridium, lubricants etc  - urology f/u outpatient  - Trend Fever  - Trend Leukocytosis      d/w Dr Rojas  signing off

## 2025-06-27 LAB
ANION GAP SERPL CALC-SCNC: 12 MMOL/L — SIGNIFICANT CHANGE UP (ref 5–17)
BUN SERPL-MCNC: 27.7 MG/DL — HIGH (ref 8–20)
CALCIUM SERPL-MCNC: 9 MG/DL — SIGNIFICANT CHANGE UP (ref 8.4–10.5)
CHLORIDE SERPL-SCNC: 101 MMOL/L — SIGNIFICANT CHANGE UP (ref 96–108)
CO2 SERPL-SCNC: 28 MMOL/L — SIGNIFICANT CHANGE UP (ref 22–29)
CREAT SERPL-MCNC: 0.87 MG/DL — SIGNIFICANT CHANGE UP (ref 0.5–1.3)
EGFR: 62 ML/MIN/1.73M2 — SIGNIFICANT CHANGE UP
EGFR: 62 ML/MIN/1.73M2 — SIGNIFICANT CHANGE UP
GLUCOSE SERPL-MCNC: 87 MG/DL — SIGNIFICANT CHANGE UP (ref 70–99)
HCT VFR BLD CALC: 39.1 % — SIGNIFICANT CHANGE UP (ref 34.5–45)
HGB BLD-MCNC: 12.3 G/DL — SIGNIFICANT CHANGE UP (ref 11.5–15.5)
MAGNESIUM SERPL-MCNC: 1.8 MG/DL — SIGNIFICANT CHANGE UP (ref 1.6–2.6)
MCHC RBC-ENTMCNC: 28 PG — SIGNIFICANT CHANGE UP (ref 27–34)
MCHC RBC-ENTMCNC: 31.5 G/DL — LOW (ref 32–36)
MCV RBC AUTO: 89.1 FL — SIGNIFICANT CHANGE UP (ref 80–100)
NRBC # BLD AUTO: 0 K/UL — SIGNIFICANT CHANGE UP (ref 0–0)
NRBC # FLD: 0 K/UL — SIGNIFICANT CHANGE UP (ref 0–0)
NRBC BLD AUTO-RTO: 0 /100 WBCS — SIGNIFICANT CHANGE UP (ref 0–0)
PLATELET # BLD AUTO: 238 K/UL — SIGNIFICANT CHANGE UP (ref 150–400)
PMV BLD: 10.5 FL — SIGNIFICANT CHANGE UP (ref 7–13)
POTASSIUM SERPL-MCNC: 4.5 MMOL/L — SIGNIFICANT CHANGE UP (ref 3.5–5.3)
POTASSIUM SERPL-SCNC: 4.5 MMOL/L — SIGNIFICANT CHANGE UP (ref 3.5–5.3)
RBC # BLD: 4.39 M/UL — SIGNIFICANT CHANGE UP (ref 3.8–5.2)
RBC # FLD: 14.7 % — HIGH (ref 10.3–14.5)
SODIUM SERPL-SCNC: 141 MMOL/L — SIGNIFICANT CHANGE UP (ref 135–145)
WBC # BLD: 5.95 K/UL — SIGNIFICANT CHANGE UP (ref 3.8–10.5)
WBC # FLD AUTO: 5.95 K/UL — SIGNIFICANT CHANGE UP (ref 3.8–10.5)

## 2025-06-27 PROCEDURE — 87040 BLOOD CULTURE FOR BACTERIA: CPT

## 2025-06-27 PROCEDURE — 83605 ASSAY OF LACTIC ACID: CPT

## 2025-06-27 PROCEDURE — 80053 COMPREHEN METABOLIC PANEL: CPT

## 2025-06-27 PROCEDURE — 87186 SC STD MICRODIL/AGAR DIL: CPT

## 2025-06-27 PROCEDURE — 80048 BASIC METABOLIC PNL TOTAL CA: CPT

## 2025-06-27 PROCEDURE — 81001 URINALYSIS AUTO W/SCOPE: CPT

## 2025-06-27 PROCEDURE — 99232 SBSQ HOSP IP/OBS MODERATE 35: CPT

## 2025-06-27 PROCEDURE — 74176 CT ABD & PELVIS W/O CONTRAST: CPT

## 2025-06-27 PROCEDURE — 83690 ASSAY OF LIPASE: CPT

## 2025-06-27 PROCEDURE — 85027 COMPLETE CBC AUTOMATED: CPT

## 2025-06-27 PROCEDURE — 85025 COMPLETE CBC W/AUTO DIFF WBC: CPT

## 2025-06-27 PROCEDURE — 83735 ASSAY OF MAGNESIUM: CPT

## 2025-06-27 PROCEDURE — 84100 ASSAY OF PHOSPHORUS: CPT

## 2025-06-27 PROCEDURE — 36415 COLL VENOUS BLD VENIPUNCTURE: CPT

## 2025-06-27 PROCEDURE — 87077 CULTURE AEROBIC IDENTIFY: CPT

## 2025-06-27 PROCEDURE — 87086 URINE CULTURE/COLONY COUNT: CPT

## 2025-06-27 RX ORDER — GABAPENTIN 400 MG/1
400 CAPSULE ORAL THREE TIMES A DAY
Refills: 0 | Status: DISCONTINUED | OUTPATIENT
Start: 2025-06-27 | End: 2025-07-04

## 2025-06-27 RX ORDER — TRAMADOL HYDROCHLORIDE 50 MG/1
25 TABLET, FILM COATED ORAL EVERY 6 HOURS
Refills: 0 | Status: DISCONTINUED | OUTPATIENT
Start: 2025-06-27 | End: 2025-07-04

## 2025-06-27 RX ORDER — TRAMADOL HYDROCHLORIDE 50 MG/1
50 TABLET, FILM COATED ORAL EVERY 6 HOURS
Refills: 0 | Status: DISCONTINUED | OUTPATIENT
Start: 2025-06-27 | End: 2025-07-04

## 2025-06-27 RX ORDER — GABAPENTIN 400 MG/1
300 CAPSULE ORAL EVERY 8 HOURS
Refills: 0 | Status: DISCONTINUED | OUTPATIENT
Start: 2025-06-27 | End: 2025-06-27

## 2025-06-27 RX ADMIN — Medication 40 MILLIGRAM(S): at 05:23

## 2025-06-27 RX ADMIN — RIVAROXABAN 15 MILLIGRAM(S): 10 TABLET, FILM COATED ORAL at 14:49

## 2025-06-27 RX ADMIN — Medication 400 MILLIGRAM(S): at 13:46

## 2025-06-27 RX ADMIN — ATORVASTATIN CALCIUM 20 MILLIGRAM(S): 80 TABLET, FILM COATED ORAL at 22:26

## 2025-06-27 RX ADMIN — GABAPENTIN 400 MILLIGRAM(S): 400 CAPSULE ORAL at 22:26

## 2025-06-27 RX ADMIN — RANOLAZINE 500 MILLIGRAM(S): 1000 TABLET, FILM COATED, EXTENDED RELEASE ORAL at 17:37

## 2025-06-27 RX ADMIN — Medication 325 MILLIGRAM(S): at 17:37

## 2025-06-27 RX ADMIN — Medication 400 MILLIGRAM(S): at 08:30

## 2025-06-27 RX ADMIN — TRAMADOL HYDROCHLORIDE 50 MILLIGRAM(S): 50 TABLET, FILM COATED ORAL at 13:56

## 2025-06-27 RX ADMIN — MEROPENEM 1000 MILLIGRAM(S): 1 INJECTION INTRAVENOUS at 05:23

## 2025-06-27 RX ADMIN — GABAPENTIN 300 MILLIGRAM(S): 400 CAPSULE ORAL at 05:22

## 2025-06-27 RX ADMIN — MEROPENEM 1000 MILLIGRAM(S): 1 INJECTION INTRAVENOUS at 17:37

## 2025-06-27 RX ADMIN — TRAMADOL HYDROCHLORIDE 50 MILLIGRAM(S): 50 TABLET, FILM COATED ORAL at 14:56

## 2025-06-27 RX ADMIN — GABAPENTIN 400 MILLIGRAM(S): 400 CAPSULE ORAL at 13:46

## 2025-06-27 RX ADMIN — Medication 325 MILLIGRAM(S): at 05:23

## 2025-06-27 RX ADMIN — RANOLAZINE 500 MILLIGRAM(S): 1000 TABLET, FILM COATED, EXTENDED RELEASE ORAL at 05:22

## 2025-06-27 RX ADMIN — Medication 50 MICROGRAM(S): at 05:22

## 2025-06-27 RX ADMIN — Medication 400 MILLIGRAM(S): at 17:37

## 2025-06-27 NOTE — PROGRESS NOTE ADULT - ASSESSMENT
89y/oF PMH CAD s/p PCI (2018), Afib on Xarelto, HFpEF, chronic hypoxemic respiratory failure since 2/2025 admission for influenza on 2L NC, recurrent UTIs, presenting with persistent dysuria and urinary frequency despite recent treatment for UTI w/ PO antibiotics    UTI  -UA noted, not impressive but in setting of recent abx use, also microscopic hematuria  -Proteus in prior urine cx sensitive to cephalosporin  -urine cx 50-99k CFU/mL Proteus mirabilis ESBL, 50-99k CFU/mL Enterococcus faecalis (Vancomycin resistant)   -cont Pyridium for dysuria  -Ceftriaxone changed to Ertapenem 6/25 ->Meropenem 6/26 , cont x3 days   -ID consult appreciated, d/w Dr. Miguel     SORAIDA likely 2/2 UTI with component of pre-renal from dehydration  -sCr improved s/p IVF   -f/u am BMP     Hx possible urothelial lesion  -CT scan 3/2025 w/ nodularity in posterior bladder, inability to exclude lesion   -Urology consulted, recs no  intervention. Pyridium for symp control. Op f/u with Dr Maldonado or Dr Mcginnis    Hx chronic hypoxemic respiratory failure  -No acute respiratory symptoms   -On baseline O2 requirements of 2L NC    Chronic diastolic HFpEF  -Holding furosemide due to SORAIDA. Resume on dc     Hx Afib  -cont Xarelto    Hx spinal stenosis  -cont gabapentin (home dose 600mg tid, decreased on admission for SORAIDA), increased dose 6/27, monitor bmp     Hx CAD with chronic stable angina  -cont ranolazine, atorvastatin     Hx hypothyroidism  -cont levothyroxine     Hx LLE ulcer  -Has dressing placed by home visiting RN  -Wound care consult recs appreciated    Dispo: home w/home care after iv abx completed, plan for 6/30  CM following    89y/oF PMH CAD s/p PCI (2018), Afib on Xarelto, HFpEF, chronic hypoxemic respiratory failure since 2/2025 admission for influenza on 2L NC, recurrent UTIs, presenting with persistent dysuria and urinary frequency despite recent treatment for UTI w/ PO antibiotics    UTI  -ct abd reviewed bladder stone  -UA noted, not impressive but in setting of recent abx use, also microscopic hematuria  -Proteus in prior urine cx sensitive to cephalosporin  -urine cx 50-99k CFU/mL Proteus mirabilis ESBL, 50-99k CFU/mL Enterococcus faecalis (Vancomycin resistant)   -cont Pyridium for dysuria  -Ceftriaxone changed to Ertapenem 6/25 ->Meropenem 6/26 , cont x3 days   -ID consult appreciated, d/w Dr. Miguel     SORAIDA likely 2/2 UTI with component of pre-renal from dehydration  -sCr improved s/p IVF   -f/u am BMP     Hx possible urothelial lesion  -CT scan 3/2025 w/ nodularity in posterior bladder, inability to exclude lesion   -Urology consulted, recs no  intervention. Pyridium for symp control. Op f/u with Dr Maldonado or Dr Mcginnis    Hx chronic hypoxemic respiratory failure  -No acute respiratory symptoms   -On baseline O2 requirements of 2L NC    Chronic diastolic HFpEF  -Holding furosemide due to SORAIDA. Resume on dc     Hx Afib  -cont Xarelto    Hx spinal stenosis  -cont gabapentin (home dose 600mg tid, decreased on admission for SORAIDA), increased dose 6/27, monitor bmp     Hx CAD with chronic stable angina  -cont ranolazine, atorvastatin     Hx hypothyroidism  -cont levothyroxine     Hx LLE ulcer  -Has dressing placed by home visiting RN  -Wound care consult recs appreciated    Dispo: home w/home care after iv abx completed, plan for 6/30  CM following

## 2025-06-27 NOTE — PROGRESS NOTE ADULT - SUBJECTIVE AND OBJECTIVE BOX
MAMTA TALAVERA    [id number]    89y      Female    CC: UTI     INTERVAL HPI/OVERNIGHT EVENTS: Pt seen and examined.     REVIEW OF SYSTEMS:    RESPIRATORY: No cough, wheezing, hemoptysis  CARDIOVASCULAR: No chest pain, palpitations  GASTROINTESTINAL: No abdominal or epigastric pain. No nausea, vomiting    Vital Signs Last 24 Hrs  T(C): 36.2 (27 Jun 2025 09:08), Max: 36.8 (26 Jun 2025 16:21)  T(F): 97.1 (27 Jun 2025 09:08), Max: 98.3 (26 Jun 2025 16:21)  HR: 76 (27 Jun 2025 09:08) (67 - 76)  BP: 156/70 (27 Jun 2025 09:08) (133/72 - 156/70)  BP(mean): --  RR: 18 (27 Jun 2025 09:08) (17 - 18)  SpO2: 97% (27 Jun 2025 09:08) (97% - 98%)    Parameters below as of 27 Jun 2025 09:08  Patient On (Oxygen Delivery Method): nasal cannula  O2 Flow (L/min): 2      PHYSICAL EXAM:    GENERAL: NAD  CHEST/LUNG: respirations unlabored on NC  HEART: S1S2+, Regular rate and rhythm  ABDOMEN: Soft, Nontender, Nondistended  SKIN: warm, dry   NEURO: Awake, alert     LABS:                        12.3   5.95  )-----------( 238      ( 27 Jun 2025 06:03 )             39.1     06-27    141  |  101  |  27.7[H]  ----------------------------<  87  4.5   |  28.0  |  0.87    Ca    9.0      27 Jun 2025 06:03  Phos  2.6     06-26  Mg     1.8     06-27        Urinalysis Basic - ( 27 Jun 2025 06:03 )    Color: x / Appearance: x / SG: x / pH: x  Gluc: 87 mg/dL / Ketone: x  / Bili: x / Urobili: x   Blood: x / Protein: x / Nitrite: x   Leuk Esterase: x / RBC: x / WBC x   Sq Epi: x / Non Sq Epi: x / Bacteria: x          MEDICATIONS  (STANDING):  atorvastatin 20 milliGRAM(s) Oral at bedtime  ferrous    sulfate 325 milliGRAM(s) Oral two times a day  gabapentin 400 milliGRAM(s) Oral three times a day  levothyroxine 50 MICROGram(s) Oral daily  magnesium oxide 400 milliGRAM(s) Oral three times a day with meals  meropenem Injectable 1000 milliGRAM(s) IV Push every 12 hours  pantoprazole    Tablet 40 milliGRAM(s) Oral before breakfast  ranolazine 500 milliGRAM(s) Oral two times a day  rivaroxaban 15 milliGRAM(s) Oral daily  sodium chloride 0.9%. 1000 milliLiter(s) (75 mL/Hr) IV Continuous <Continuous>    MEDICATIONS  (PRN):  acetaminophen     Tablet .. 650 milliGRAM(s) Oral every 6 hours PRN Temp greater or equal to 38C (100.4F), Mild Pain (1 - 3), Moderate Pain (4 - 6)  melatonin 3 milliGRAM(s) Oral at bedtime PRN Insomnia  ondansetron Injectable 4 milliGRAM(s) IV Push every 6 hours PRN Nausea and/or Vomiting  traMADol 25 milliGRAM(s) Oral every 6 hours PRN Moderate Pain (4 - 6)  traMADol 50 milliGRAM(s) Oral every 6 hours PRN Severe Pain (7 - 10)      RADIOLOGY & ADDITIONAL TESTS:

## 2025-06-28 LAB
CULTURE RESULTS: SIGNIFICANT CHANGE UP
SPECIMEN SOURCE: SIGNIFICANT CHANGE UP

## 2025-06-28 PROCEDURE — 99232 SBSQ HOSP IP/OBS MODERATE 35: CPT

## 2025-06-28 RX ADMIN — Medication 4 MILLIGRAM(S): at 22:56

## 2025-06-28 RX ADMIN — GABAPENTIN 400 MILLIGRAM(S): 400 CAPSULE ORAL at 05:40

## 2025-06-28 RX ADMIN — GABAPENTIN 400 MILLIGRAM(S): 400 CAPSULE ORAL at 13:25

## 2025-06-28 RX ADMIN — GABAPENTIN 400 MILLIGRAM(S): 400 CAPSULE ORAL at 22:56

## 2025-06-28 RX ADMIN — MEROPENEM 1000 MILLIGRAM(S): 1 INJECTION INTRAVENOUS at 05:40

## 2025-06-28 RX ADMIN — TRAMADOL HYDROCHLORIDE 50 MILLIGRAM(S): 50 TABLET, FILM COATED ORAL at 22:56

## 2025-06-28 RX ADMIN — Medication 40 MILLIGRAM(S): at 05:46

## 2025-06-28 RX ADMIN — RANOLAZINE 500 MILLIGRAM(S): 1000 TABLET, FILM COATED, EXTENDED RELEASE ORAL at 17:02

## 2025-06-28 RX ADMIN — RIVAROXABAN 15 MILLIGRAM(S): 10 TABLET, FILM COATED ORAL at 13:25

## 2025-06-28 RX ADMIN — MEROPENEM 1000 MILLIGRAM(S): 1 INJECTION INTRAVENOUS at 17:02

## 2025-06-28 RX ADMIN — ATORVASTATIN CALCIUM 20 MILLIGRAM(S): 80 TABLET, FILM COATED ORAL at 22:56

## 2025-06-28 RX ADMIN — RANOLAZINE 500 MILLIGRAM(S): 1000 TABLET, FILM COATED, EXTENDED RELEASE ORAL at 05:40

## 2025-06-28 RX ADMIN — Medication 325 MILLIGRAM(S): at 05:41

## 2025-06-28 RX ADMIN — Medication 50 MICROGRAM(S): at 05:41

## 2025-06-28 RX ADMIN — Medication 3 MILLIGRAM(S): at 22:55

## 2025-06-28 RX ADMIN — Medication 325 MILLIGRAM(S): at 17:02

## 2025-06-28 NOTE — PROGRESS NOTE ADULT - SUBJECTIVE AND OBJECTIVE BOX
MAMTA TALAVERA    [id number]    89y      Female    CC: UTI    INTERVAL HPI/OVERNIGHT EVENTS: Pt seen and examined. no acute events reported o/n     REVIEW OF SYSTEMS:    RESPIRATORY: No cough; No shortness of breath  CARDIOVASCULAR: No chest pain  GASTROINTESTINAL: No abdominal or epigastric pain    Vital Signs Last 24 Hrs  T(C): 36.8 (28 Jun 2025 08:39), Max: 36.8 (28 Jun 2025 08:39)  T(F): 98.3 (28 Jun 2025 08:39), Max: 98.3 (28 Jun 2025 08:39)  HR: 78 (28 Jun 2025 08:39) (64 - 78)  BP: 131/69 (28 Jun 2025 08:39) (123/57 - 167/73)  BP(mean): --  RR: 18 (28 Jun 2025 08:39) (18 - 18)  SpO2: 93% (28 Jun 2025 08:39) (92% - 100%)    Parameters below as of 28 Jun 2025 08:39  Patient On (Oxygen Delivery Method): nasal cannula  O2 Flow (L/min): 2      PHYSICAL EXAM:    GENERAL: NAD  CHEST/LUNG: respirations unlabored on NC  HEART: S1S2+, Regular rate and rhythm  ABDOMEN: Soft, Nontender, Nondistended  SKIN: warm, dry   NEURO: Awake, alert     LABS:                        12.3   5.95  )-----------( 238      ( 27 Jun 2025 06:03 )             39.1     06-27    141  |  101  |  27.7[H]  ----------------------------<  87  4.5   |  28.0  |  0.87    Ca    9.0      27 Jun 2025 06:03  Mg     1.8     06-27        Urinalysis Basic - ( 27 Jun 2025 06:03 )    Color: x / Appearance: x / SG: x / pH: x  Gluc: 87 mg/dL / Ketone: x  / Bili: x / Urobili: x   Blood: x / Protein: x / Nitrite: x   Leuk Esterase: x / RBC: x / WBC x   Sq Epi: x / Non Sq Epi: x / Bacteria: x          MEDICATIONS  (STANDING):  atorvastatin 20 milliGRAM(s) Oral at bedtime  ferrous    sulfate 325 milliGRAM(s) Oral two times a day  gabapentin 400 milliGRAM(s) Oral three times a day  levothyroxine 50 MICROGram(s) Oral daily  meropenem Injectable 1000 milliGRAM(s) IV Push every 12 hours  pantoprazole    Tablet 40 milliGRAM(s) Oral before breakfast  ranolazine 500 milliGRAM(s) Oral two times a day  rivaroxaban 15 milliGRAM(s) Oral daily  sodium chloride 0.9%. 1000 milliLiter(s) (75 mL/Hr) IV Continuous <Continuous>    MEDICATIONS  (PRN):  acetaminophen     Tablet .. 650 milliGRAM(s) Oral every 6 hours PRN Temp greater or equal to 38C (100.4F), Mild Pain (1 - 3), Moderate Pain (4 - 6)  melatonin 3 milliGRAM(s) Oral at bedtime PRN Insomnia  ondansetron Injectable 4 milliGRAM(s) IV Push every 6 hours PRN Nausea and/or Vomiting  traMADol 25 milliGRAM(s) Oral every 6 hours PRN Moderate Pain (4 - 6)  traMADol 50 milliGRAM(s) Oral every 6 hours PRN Severe Pain (7 - 10)      RADIOLOGY & ADDITIONAL TESTS:

## 2025-06-28 NOTE — PROGRESS NOTE ADULT - ASSESSMENT
89y/oF PMH CAD s/p PCI (2018), Afib on Xarelto, HFpEF, chronic hypoxemic respiratory failure since 2/2025 admission for influenza on 2L NC, recurrent UTIs, presenting with persistent dysuria and urinary frequency despite recent treatment for UTI w/ PO antibiotics    UTI  -UA noted, not impressive but in setting of recent abx use, also microscopic hematuria  -Proteus in prior urine cx sensitive to cephalosporin  -urine cx 50-99k CFU/mL Proteus mirabilis ESBL, 50-99k CFU/mL Enterococcus faecalis (Vancomycin resistant)   -Ceftriaxone changed to Ertapenem 6/25 ->Meropenem 6/26 , cont x3 days   -ID consult appreciated     SORAIDA likely 2/2 UTI with component of pre-renal from dehydration  -sCr improved s/p IVF   -f/u am BMP     Hx possible urothelial lesion  -CT scan 3/2025 w/ nodularity in posterior bladder, inability to exclude lesion   -Urology consulted, recs no  intervention.  Op f/u with Dr Maldonado or Dr Mcginnis    Hx chronic hypoxemic respiratory failure  -No acute respiratory symptoms   -On baseline O2 requirements of 2L NC    Chronic diastolic HFpEF  -Holding furosemide due to SORAIDA. Resume on dc     Hx Afib  -cont Xarelto    Hx spinal stenosis  -cont gabapentin (home dose 600mg tid, decreased on admission for SORAIDA), increased dose 6/27, monitor bmp     Hx CAD with chronic stable angina  -cont ranolazine, atorvastatin     Hx hypothyroidism  -cont levothyroxine     Hx LLE ulcer  -Has dressing placed by home visiting RN  -Wound care consult recs appreciated    Dispo: home w/home care after iv abx completed, plan for 6/30  CM following

## 2025-06-29 LAB
ANION GAP SERPL CALC-SCNC: 11 MMOL/L — SIGNIFICANT CHANGE UP (ref 5–17)
BUN SERPL-MCNC: 25 MG/DL — HIGH (ref 8–20)
CALCIUM SERPL-MCNC: 9.3 MG/DL — SIGNIFICANT CHANGE UP (ref 8.4–10.5)
CHLORIDE SERPL-SCNC: 103 MMOL/L — SIGNIFICANT CHANGE UP (ref 96–108)
CO2 SERPL-SCNC: 29 MMOL/L — SIGNIFICANT CHANGE UP (ref 22–29)
CREAT SERPL-MCNC: 0.74 MG/DL — SIGNIFICANT CHANGE UP (ref 0.5–1.3)
EGFR: 75 ML/MIN/1.73M2 — SIGNIFICANT CHANGE UP
EGFR: 75 ML/MIN/1.73M2 — SIGNIFICANT CHANGE UP
GLUCOSE SERPL-MCNC: 82 MG/DL — SIGNIFICANT CHANGE UP (ref 70–99)
MAGNESIUM SERPL-MCNC: 1.9 MG/DL — SIGNIFICANT CHANGE UP (ref 1.6–2.6)
POTASSIUM SERPL-MCNC: 4.8 MMOL/L — SIGNIFICANT CHANGE UP (ref 3.5–5.3)
POTASSIUM SERPL-SCNC: 4.8 MMOL/L — SIGNIFICANT CHANGE UP (ref 3.5–5.3)
SODIUM SERPL-SCNC: 143 MMOL/L — SIGNIFICANT CHANGE UP (ref 135–145)

## 2025-06-29 PROCEDURE — 99232 SBSQ HOSP IP/OBS MODERATE 35: CPT

## 2025-06-29 RX ADMIN — GABAPENTIN 400 MILLIGRAM(S): 400 CAPSULE ORAL at 21:27

## 2025-06-29 RX ADMIN — RANOLAZINE 500 MILLIGRAM(S): 1000 TABLET, FILM COATED, EXTENDED RELEASE ORAL at 19:16

## 2025-06-29 RX ADMIN — RIVAROXABAN 15 MILLIGRAM(S): 10 TABLET, FILM COATED ORAL at 11:57

## 2025-06-29 RX ADMIN — TRAMADOL HYDROCHLORIDE 50 MILLIGRAM(S): 50 TABLET, FILM COATED ORAL at 06:55

## 2025-06-29 RX ADMIN — Medication 40 MILLIGRAM(S): at 05:58

## 2025-06-29 RX ADMIN — MEROPENEM 1000 MILLIGRAM(S): 1 INJECTION INTRAVENOUS at 05:59

## 2025-06-29 RX ADMIN — Medication 325 MILLIGRAM(S): at 05:58

## 2025-06-29 RX ADMIN — Medication 50 MICROGRAM(S): at 04:53

## 2025-06-29 RX ADMIN — TRAMADOL HYDROCHLORIDE 50 MILLIGRAM(S): 50 TABLET, FILM COATED ORAL at 05:59

## 2025-06-29 RX ADMIN — MEROPENEM 1000 MILLIGRAM(S): 1 INJECTION INTRAVENOUS at 19:15

## 2025-06-29 RX ADMIN — ATORVASTATIN CALCIUM 20 MILLIGRAM(S): 80 TABLET, FILM COATED ORAL at 21:26

## 2025-06-29 RX ADMIN — TRAMADOL HYDROCHLORIDE 50 MILLIGRAM(S): 50 TABLET, FILM COATED ORAL at 13:10

## 2025-06-29 RX ADMIN — TRAMADOL HYDROCHLORIDE 50 MILLIGRAM(S): 50 TABLET, FILM COATED ORAL at 12:12

## 2025-06-29 RX ADMIN — GABAPENTIN 400 MILLIGRAM(S): 400 CAPSULE ORAL at 05:59

## 2025-06-29 RX ADMIN — Medication 325 MILLIGRAM(S): at 19:15

## 2025-06-29 RX ADMIN — GABAPENTIN 400 MILLIGRAM(S): 400 CAPSULE ORAL at 12:12

## 2025-06-29 RX ADMIN — RANOLAZINE 500 MILLIGRAM(S): 1000 TABLET, FILM COATED, EXTENDED RELEASE ORAL at 05:58

## 2025-06-29 NOTE — DISCHARGE NOTE PROVIDER - NSDCFUADDAPPT_GEN_ALL_CORE_FT
APPTS ARE READY TO BE MADE: [X] YES    Best Family or Patient Contact (if needed):    Additional Information about above appointments (if needed):    1: f/u pcp one week-f/u bp, bmp  2:   3:     Other comments or requests:    APPTS ARE READY TO BE MADE: [X] YES    Best Family or Patient Contact (if needed):    Additional Information about above appointments (if needed):    1: f/u pcp one week-f/u bp, bmp  2:   3:     Other comments or requests:   Patient advises they do not want our assistance and prefer to coordinate the appointments on their own. No information was provided to the patient. Patient's daughter in law advised patient is unable to commute to any appointments but also declined assistance securing virtual appointments.

## 2025-06-29 NOTE — DISCHARGE NOTE PROVIDER - CARE PROVIDER_API CALL
PMD,   Phone: (   )    -  Fax: (   )    -  Follow Up Time:     Dawit Maldonado  Urology  [address] [facility], [address]  [place] [address]  Phone: [phone]  Fax: [phone]  Follow Up Time:    Dawit Maldonado  Urology  [address] [facility], [address]  [place] [address]  Phone: [phone]  Fax: [phone]  Follow Up Time:     PMD,   Phone: (   )    -  Fax: (   )    -  Follow Up Time:     Quin Hollis  Obstetrics & Gynecology  [address] [place] [address]  Phone: [phone]  Fax: [phone]  Follow Up Time: 1 week

## 2025-06-29 NOTE — DISCHARGE NOTE PROVIDER - DETAILS OF MALNUTRITION DIAGNOSIS/DIAGNOSES
This patient has been assessed with a concern for Malnutrition and was treated during this hospitalization for the following Nutrition diagnosis/diagnoses:     -  06/30/2025: Moderate protein-calorie malnutrition

## 2025-06-29 NOTE — DISCHARGE NOTE PROVIDER - NSDCFUADDINST_GEN_ALL_CORE_FT
transport services to get pt into home as pt cannot ambulate the stairs to enter, home at w/c level with 24/7 supervision, total assist for bed to/from w/c transfers, hands on assist for all mobility needs  fall precaution

## 2025-06-29 NOTE — DISCHARGE NOTE PROVIDER - ATTENDING DISCHARGE PHYSICAL EXAMINATION:
T(C): 36.6 (06-30-25 @ 05:28), Max: 36.7 (06-29-25 @ 14:53)  HR: 72 (06-30-25 @ 05:28) (55 - 79)  BP: 109/66 (06-30-25 @ 05:28) (109/66 - 129/60)  RR: 18 (06-30-25 @ 05:28) (18 - 18)  SpO2: 95% (06-30-25 @ 05:28) (95% - 100%)    GEN - NAD  HEENT - NCAT, EOMI, MARIA EUGENIA,  RESP - CTA BL, no wheeze/rhonchi/crackles.   CARDIO - NS1S2, RRR. No murmurs.  ABD - Soft/Non tender/Non distended. Normal BS x4 quadrants.   Ext - No YULISA.   MSK - no joints swelling/TN   Neuro - cn 2-12 grossly intact. no tremor. gait not observed.   Skin - clean, dry, intact. no rashes or lesions.    Psych- Alert,awake.  T(C): 36.4 (07-03-25 @ 07:57), Max: 36.7 (07-02-25 @ 15:39)  HR: 73 (07-03-25 @ 07:57) (69 - 83)  BP: 117/64 (07-03-25 @ 07:57) (117/64 - 168/84)  RR: 18 (07-03-25 @ 07:57) (15 - 18)  SpO2: 92% (07-03-25 @ 07:57) (92% - 98%)    CONSTITUTIONAL: NAD,  EYES: PERRLA; conjunctiva and sclera clear  ENMT: Moist oral mucosa,   RESPIRATORY: Normal respiratory effort; lungs are clear to auscultation bilaterally  CARDIOVASCULAR:  normal S1 and S2, no murmur   EXTS: No lower extremity edema; Peripheral pulses are 2+ bilaterally  ABDOMEN: Nontender to palpation, normoactive bowel sounds, no rebound/guarding;   MUSCLOSKELETAL:   no joint swelling or tenderness to palpation  PSYCH: COOP  NEUROLOGY: A+O to person, place, and time; HARD HEARING no gross sensory deficits;          T(C): 36.5 (07-04-25 @ 04:27), Max: 36.9 (07-03-25 @ 14:21)  HR: 68 (07-04-25 @ 04:27) (68 - 85)  BP: 116/64 (07-04-25 @ 04:27) (116/64 - 150/77)  RR: 18 (07-04-25 @ 04:27) (18 - 18)  SpO2: 92% (07-04-25 @ 04:27) (90% - 93%)          CONSTITUTIONAL: NAD,  EYES: PERRLA; conjunctiva and sclera clear  ENMT: Moist oral mucosa,   RESPIRATORY: Normal respiratory effort; lungs are clear to auscultation bilaterally  CARDIOVASCULAR:  normal S1 and S2, no murmur   EXTS: No lower extremity edema; Peripheral pulses are 2+ bilaterally  ABDOMEN: Nontender to palpation, normoactive bowel sounds, no rebound/guarding;   MUSCLOSKELETAL:   no joint swelling or tenderness to palpation  PSYCH: COOP  NEUROLOGY: A+O to person, place, and time; HARD HEARING no gross sensory deficits;

## 2025-06-29 NOTE — DISCHARGE NOTE PROVIDER - HOSPITAL COURSE
89y/oF PMH CAD s/p PCI (2018), Afib on Xarelto, HFpEF, chronic hypoxemic respiratory failure since 2/2025 admission for influenza on 2L NC, recurrent UTIs, presenting with persistent dysuria and urinary frequency despite recent treatment for UTI w/ PO antibiotics. UCx with 50-99k  CFU/mL ESBL Proteus mirabilis and VRE Enterococcus faecalis. Abx changed to Ertapenem 6/25, then to Meropenem 6/26. ID consult appreciated, recommending tx with meropenem x3 days. Pt also with SORAIDA, likely due to UTI and dehydration, now improved. Urothelial lesion noted, seen by urology, no intervention recommended at this time, to f/u outpt with Dr. Maldonado or Dr. Mcginnis. Pt out of GARRETT days, pt to dc home w/home care. 89y/oF PMH CAD s/p PCI (2018), Afib on Xarelto, HFpEF, chronic hypoxemic respiratory failure since 2/2025 admission for influenza on 2L NC, recurrent UTIs, presenting with persistent dysuria and urinary frequency despite recent treatment for UTI w/ PO antibiotics. UCx with 50-99k  CFU/mL ESBL Proteus mirabilis and VRE Enterococcus faecalis. Abx changed to Ertapenem 6/25, then to Meropenem 6/26. ID consult appreciated, recommending tx with meropenem x3 days. Pt also with SORAIDA, likely due to UTI and dehydration, now improved. Urothelial lesion noted, seen by urology, no intervention recommended at this time, to f/u outpt with Dr. Maldonado or Dr. Mcginnis. Pt out of GARRETT days, pt to dc home w/home care.BP soft Hold bp meds,lasix for now. f/u with pcp.    89y/oF PMH CAD s/p PCI (2018), Afib on Xarelto, HFpEF, chronic hypoxemic respiratory failure since 2/2025 admission for influenza on 2L NC, recurrent UTIs, presenting with persistent dysuria and urinary frequency despite recent treatment for UTI w/ PO antibiotics. UCx with 50-99k  CFU/mL ESBL Proteus mirabilis and VRE Enterococcus faecalis. Abx changed to Ertapenem 6/25, then to Meropenem 6/26. ID consult appreciated, recommending tx with meropenem. pt with c/o persistant dysuria, repeat urine c/s neg for uti,reviewed by ID.DC ABX. GRIFFIN WAS PLACED WITH OUT IMPROVEMENT OF PAIN. LATER PT STATES SHE IS HAVING VAGINAL PAIN.SEEN BY GYN, VAGINITIS PANEL STABLE .PAIN LIKELY FROM VAGINAL ATROPHY. CLEARED  BY ID,UROLOGY. F/U UROLOGY OUT PT. F/U GYN OUT PT. Pt also with SORAIDA, likely due to UTI and dehydration, now improved. Urothelial lesion/BLADDER SONE  noted, seen by urology, no intervention recommended at this time, to f/u outpt with Dr. Maldonado or Dr. Mcginnis. Pt out of GARRETT days, pt to dc home w/home care.BP soft Hold bp meds,lasix for now. f/u with pcp.    89y/oF PMH CAD s/p PCI (2018), Afib on Xarelto, HFpEF, chronic hypoxemic respiratory failure since 2/2025 admission for influenza on 2L NC, recurrent UTIs, presenting with persistent dysuria and urinary frequency despite recent treatment for UTI w/ PO antibiotics. UCx with 50-99k  CFU/mL ESBL Proteus mirabilis and VRE Enterococcus faecalis. Abx changed to Ertapenem 6/25, then to Meropenem 6/26. ID consult appreciated, recommending tx with meropenem. pt with c/o persistant dysuria, repeat urine c/s neg for uti,reviewed by ID.DC ABX. GRIFFIN WAS PLACED WITH OUT IMPROVEMENT OF PAIN. LATER PT STATES SHE IS HAVING VAGINAL PAIN.SEEN BY GYN, VAGINITIS PANEL STABLE .PAIN LIKELY FROM VAGINAL ATROPHY. CLEARED  BY ID,UROLOGY. F/U UROLOGY OUT PT. F/U GYN OUT PT. Pt also with SORAIDA, likely due to UTI and dehydration, now improved. Urothelial lesion/BLADDER SONE  noted, seen by urology, no intervention recommended at this time, to f/u outpt with Dr. Maldonado or Dr. Mcginnis. Pt out of GARRETT days, pt to dc home w/home care.BP soft Hold bp meds,lasix for now. f/u with pcp. Plan of care artur Martinez.she will pick the estrogen cream from [facility] tomorrow.f/u gyn/urology out pt.

## 2025-06-29 NOTE — PROGRESS NOTE ADULT - SUBJECTIVE AND OBJECTIVE BOX
MAMTA TALAVERA    [id number]    89y      Female    CC: UTI    INTERVAL HPI/OVERNIGHT EVENTS: Pt seen and examined. no new complaints     REVIEW OF SYSTEMS:    RESPIRATORY: No cough, wheezing, hemoptysis  CARDIOVASCULAR: No chest pain, palpitations  GASTROINTESTINAL: No abdominal or epigastric pain. No nausea, vomiting    Vital Signs Last 24 Hrs  T(C): 36.6 (29 Jun 2025 08:21), Max: 36.7 (28 Jun 2025 14:35)  T(F): 97.9 (29 Jun 2025 08:21), Max: 98.1 (28 Jun 2025 14:35)  HR: 55 (29 Jun 2025 08:21) (55 - 70)  BP: 125/66 (29 Jun 2025 08:21) (117/57 - 160/65)  BP(mean): --  RR: 18 (29 Jun 2025 08:21) (16 - 18)  SpO2: 98% (29 Jun 2025 08:21) (98% - 100%)    Parameters below as of 29 Jun 2025 08:21  Patient On (Oxygen Delivery Method): nasal cannula  O2 Flow (L/min): 2      PHYSICAL EXAM:    GENERAL: NAD  CHEST/LUNG: respirations unlabored on NC  HEART: S1S2+, Regular rate and rhythm  ABDOMEN: Soft, Nontender, Nondistended  SKIN: warm, dry     LABS:    06-29    143  |  103  |  25.0[H]  ----------------------------<  82  4.8   |  29.0  |  0.74    Ca    9.3      29 Jun 2025 05:05  Mg     1.9     06-29        Urinalysis Basic - ( 29 Jun 2025 05:05 )    Color: x / Appearance: x / SG: x / pH: x  Gluc: 82 mg/dL / Ketone: x  / Bili: x / Urobili: x   Blood: x / Protein: x / Nitrite: x   Leuk Esterase: x / RBC: x / WBC x   Sq Epi: x / Non Sq Epi: x / Bacteria: x          MEDICATIONS  (STANDING):  atorvastatin 20 milliGRAM(s) Oral at bedtime  ferrous    sulfate 325 milliGRAM(s) Oral two times a day  gabapentin 400 milliGRAM(s) Oral three times a day  levothyroxine 50 MICROGram(s) Oral daily  meropenem Injectable 1000 milliGRAM(s) IV Push every 12 hours  pantoprazole    Tablet 40 milliGRAM(s) Oral before breakfast  ranolazine 500 milliGRAM(s) Oral two times a day  rivaroxaban 15 milliGRAM(s) Oral daily  sodium chloride 0.9%. 1000 milliLiter(s) (75 mL/Hr) IV Continuous <Continuous>    MEDICATIONS  (PRN):  acetaminophen     Tablet .. 650 milliGRAM(s) Oral every 6 hours PRN Temp greater or equal to 38C (100.4F), Mild Pain (1 - 3), Moderate Pain (4 - 6)  melatonin 3 milliGRAM(s) Oral at bedtime PRN Insomnia  ondansetron Injectable 4 milliGRAM(s) IV Push every 6 hours PRN Nausea and/or Vomiting  traMADol 25 milliGRAM(s) Oral every 6 hours PRN Moderate Pain (4 - 6)  traMADol 50 milliGRAM(s) Oral every 6 hours PRN Severe Pain (7 - 10)      RADIOLOGY & ADDITIONAL TESTS:

## 2025-06-29 NOTE — PROGRESS NOTE ADULT - ASSESSMENT
89y/oF PMH CAD s/p PCI (2018), Afib on Xarelto, HFpEF, chronic hypoxemic respiratory failure since 2/2025 admission for influenza on 2L NC, recurrent UTIs, presenting with persistent dysuria and urinary frequency despite recent treatment for UTI w/ PO antibiotics    UTI  -UA noted, not impressive but in setting of recent abx use, also microscopic hematuria  -Proteus in prior urine cx sensitive to cephalosporin  -urine cx 50-99k CFU/mL Proteus mirabilis ESBL, 50-99k CFU/mL Enterococcus faecalis (Vancomycin resistant)   -Ceftriaxone changed to Ertapenem 6/25 ->Meropenem 6/26 , cont x3 days last day today 6/29  -ID consult appreciated     SORAIDA likely 2/2 UTI with component of pre-renal from dehydration  -sCr improved s/p IVF     Hx possible urothelial lesion  -CT scan 3/2025 w/ nodularity in posterior bladder, inability to exclude lesion   -Urology consulted, recs no  intervention.  Op f/u with Dr Maldonado or Dr Mcginnis    Hx chronic hypoxemic respiratory failure  -No acute respiratory symptoms   -On baseline O2 requirements of 2L NC    Chronic diastolic HFpEF  -Holding furosemide due to SORAIDA. Resume on dc     Hx Afib  -cont Xarelto    Hx spinal stenosis  -cont gabapentin (home dose 600mg tid, decreased on admission for SORAIDA), increased dose 6/27, monitor bmp     Hx CAD with chronic stable angina  -cont ranolazine, atorvastatin     Hx hypothyroidism  -cont levothyroxine     Hx LLE ulcer  -Has dressing placed by home visiting RN  -Wound care consult recs appreciated    Dispo: home w/home care after iv abx completed, plan for 6/30  CM following

## 2025-06-29 NOTE — DISCHARGE NOTE PROVIDER - CARE PROVIDERS DIRECT ADDRESSES
,DirectAddress_Unknown,[email or web address] ,[email or web address],DirectAddress_Unknown,DirectAddress_Unknown

## 2025-06-29 NOTE — DISCHARGE NOTE PROVIDER - PROVIDER TOKENS
FREE:[LAST:[PMD],PHONE:[(   )    -],FAX:[(   )    -]],PROVIDER:[TOKEN:[[id number]]] PROVIDER:[TOKEN:[[id number]]],FREE:[LAST:[PMD],PHONE:[(   )    -],FAX:[(   )    -]],PROVIDER:[TOKEN:[[id number]],FOLLOWUP:[1 week]]

## 2025-06-29 NOTE — DISCHARGE NOTE PROVIDER - NSDCMRMEDTOKEN_GEN_ALL_CORE_FT
atorvastatin 20 mg oral tablet: 1 tab(s) orally once a day (at bedtime)  FeroSul 325 mg (65 mg elemental iron) oral tablet: 1 tab(s) orally 2 times a day  furosemide 40 mg oral tablet: 1 tab(s) orally once a day  gabapentin 600 mg oral tablet: 1 tab(s) orally 3 times a day  hydrALAZINE 25 mg oral tablet: 1 tab(s) orally every 8 hours  levothyroxine 50 mcg (0.05 mg) oral tablet: 1 tab(s) orally once a day  omeprazole 10 mg oral delayed release capsule: 1 cap(s) orally once a day  ranolazine 500 mg oral tablet, extended release: 1 tab(s) orally 2 times a day  rivaroxaban 15 mg oral tablet: 1 tab(s) orally once a day (before a meal)  triamterene-hydrochlorothiazide 37.5 mg-25 mg oral capsule: 1 cap(s) orally once a day   atorvastatin 20 mg oral tablet: 1 tab(s) orally once a day (at bedtime)  FeroSul 325 mg (65 mg elemental iron) oral tablet: 1 tab(s) orally 2 times a day  gabapentin 600 mg oral tablet: 1 tab(s) orally 3 times a day  levothyroxine 50 mcg (0.05 mg) oral tablet: 1 tab(s) orally once a day  omeprazole 10 mg oral delayed release capsule: 1 cap(s) orally once a day  ranolazine 500 mg oral tablet, extended release: 1 tab(s) orally 2 times a day  rivaroxaban 15 mg oral tablet: 1 tab(s) orally once a day (before a meal)   atorvastatin 20 mg oral tablet: 1 tab(s) orally once a day (at bedtime)  conjugated estrogens 0.625 mg/g vaginal cream: 0.5 gram(s) vaginal once a day  FeroSul 325 mg (65 mg elemental iron) oral tablet: 1 tab(s) orally 2 times a day  gabapentin 600 mg oral tablet: 1 tab(s) orally 3 times a day  levothyroxine 50 mcg (0.05 mg) oral tablet: 1 tab(s) orally once a day  omeprazole 10 mg oral delayed release capsule: 1 cap(s) orally once a day  ranolazine 500 mg oral tablet, extended release: 1 tab(s) orally 2 times a day  rivaroxaban 15 mg oral tablet: 1 tab(s) orally once a day (before a meal)

## 2025-06-30 ENCOUNTER — TRANSCRIPTION ENCOUNTER (OUTPATIENT)
Age: 89
End: 2025-06-30

## 2025-06-30 PROCEDURE — 80053 COMPREHEN METABOLIC PANEL: CPT

## 2025-06-30 PROCEDURE — 83735 ASSAY OF MAGNESIUM: CPT

## 2025-06-30 PROCEDURE — 87086 URINE CULTURE/COLONY COUNT: CPT

## 2025-06-30 PROCEDURE — 85027 COMPLETE CBC AUTOMATED: CPT

## 2025-06-30 PROCEDURE — 81001 URINALYSIS AUTO W/SCOPE: CPT

## 2025-06-30 PROCEDURE — 84100 ASSAY OF PHOSPHORUS: CPT

## 2025-06-30 PROCEDURE — 87077 CULTURE AEROBIC IDENTIFY: CPT

## 2025-06-30 PROCEDURE — 85025 COMPLETE CBC W/AUTO DIFF WBC: CPT

## 2025-06-30 PROCEDURE — 36415 COLL VENOUS BLD VENIPUNCTURE: CPT

## 2025-06-30 PROCEDURE — 80048 BASIC METABOLIC PNL TOTAL CA: CPT

## 2025-06-30 PROCEDURE — 99232 SBSQ HOSP IP/OBS MODERATE 35: CPT

## 2025-06-30 PROCEDURE — 87186 SC STD MICRODIL/AGAR DIL: CPT

## 2025-06-30 PROCEDURE — 87040 BLOOD CULTURE FOR BACTERIA: CPT

## 2025-06-30 PROCEDURE — 74176 CT ABD & PELVIS W/O CONTRAST: CPT

## 2025-06-30 PROCEDURE — 83605 ASSAY OF LACTIC ACID: CPT

## 2025-06-30 PROCEDURE — 83690 ASSAY OF LIPASE: CPT

## 2025-06-30 PROCEDURE — 99233 SBSQ HOSP IP/OBS HIGH 50: CPT

## 2025-06-30 RX ORDER — TRIAMTERENE/HYDROCHLOROTHIAZID 50 MG-25MG
1 CAPSULE ORAL
Refills: 0 | DISCHARGE

## 2025-06-30 RX ORDER — MEROPENEM 1 G/30ML
1000 INJECTION INTRAVENOUS EVERY 8 HOURS
Refills: 0 | Status: COMPLETED | OUTPATIENT
Start: 2025-06-30 | End: 2025-07-03

## 2025-06-30 RX ORDER — FUROSEMIDE 10 MG/ML
1 INJECTION INTRAMUSCULAR; INTRAVENOUS
Refills: 0 | DISCHARGE

## 2025-06-30 RX ORDER — MEROPENEM 1 G/30ML
INJECTION INTRAVENOUS
Refills: 0 | Status: DISCONTINUED | OUTPATIENT
Start: 2025-06-30 | End: 2025-06-30

## 2025-06-30 RX ADMIN — ATORVASTATIN CALCIUM 20 MILLIGRAM(S): 80 TABLET, FILM COATED ORAL at 21:36

## 2025-06-30 RX ADMIN — MEROPENEM 1000 MILLIGRAM(S): 1 INJECTION INTRAVENOUS at 13:07

## 2025-06-30 RX ADMIN — GABAPENTIN 400 MILLIGRAM(S): 400 CAPSULE ORAL at 13:07

## 2025-06-30 RX ADMIN — Medication 50 MICROGRAM(S): at 05:30

## 2025-06-30 RX ADMIN — RANOLAZINE 500 MILLIGRAM(S): 1000 TABLET, FILM COATED, EXTENDED RELEASE ORAL at 05:30

## 2025-06-30 RX ADMIN — GABAPENTIN 400 MILLIGRAM(S): 400 CAPSULE ORAL at 21:37

## 2025-06-30 RX ADMIN — MEROPENEM 1000 MILLIGRAM(S): 1 INJECTION INTRAVENOUS at 21:36

## 2025-06-30 RX ADMIN — Medication 40 MILLIGRAM(S): at 05:30

## 2025-06-30 RX ADMIN — RANOLAZINE 500 MILLIGRAM(S): 1000 TABLET, FILM COATED, EXTENDED RELEASE ORAL at 17:28

## 2025-06-30 RX ADMIN — Medication 325 MILLIGRAM(S): at 05:30

## 2025-06-30 RX ADMIN — RIVAROXABAN 15 MILLIGRAM(S): 10 TABLET, FILM COATED ORAL at 11:43

## 2025-06-30 RX ADMIN — GABAPENTIN 400 MILLIGRAM(S): 400 CAPSULE ORAL at 05:30

## 2025-06-30 RX ADMIN — Medication 325 MILLIGRAM(S): at 17:28

## 2025-06-30 NOTE — PROGRESS NOTE ADULT - SUBJECTIVE AND OBJECTIVE BOX
Patient is a 89y old  Female who presents with a chief complaint of Suspected UTI, SORAIDA (29 Jun 2025 10:23)      c/o dysuria. urine dark color  denies abd/flank pain,fever,chill  REVIEW OF SYSTEMS: All systems are reviewed and found to be negative except above    MEDICATIONS  (STANDING):  atorvastatin 20 milliGRAM(s) Oral at bedtime  ferrous    sulfate 325 milliGRAM(s) Oral two times a day  gabapentin 400 milliGRAM(s) Oral three times a day  levothyroxine 50 MICROGram(s) Oral daily  pantoprazole    Tablet 40 milliGRAM(s) Oral before breakfast  ranolazine 500 milliGRAM(s) Oral two times a day  rivaroxaban 15 milliGRAM(s) Oral daily  sodium chloride 0.9%. 1000 milliLiter(s) (75 mL/Hr) IV Continuous <Continuous>    MEDICATIONS  (PRN):  acetaminophen     Tablet .. 650 milliGRAM(s) Oral every 6 hours PRN Temp greater or equal to 38C (100.4F), Mild Pain (1 - 3), Moderate Pain (4 - 6)  melatonin 3 milliGRAM(s) Oral at bedtime PRN Insomnia  ondansetron Injectable 4 milliGRAM(s) IV Push every 6 hours PRN Nausea and/or Vomiting  traMADol 25 milliGRAM(s) Oral every 6 hours PRN Moderate Pain (4 - 6)  traMADol 50 milliGRAM(s) Oral every 6 hours PRN Severe Pain (7 - 10)      CAPILLARY BLOOD GLUCOSE        I&O's Summary    29 Jun 2025 07:01  -  30 Jun 2025 07:00  --------------------------------------------------------  IN: 0 mL / OUT: 400 mL / NET: -400 mL        PHYSICAL EXAM:  Vital Signs Last 24 Hrs  T(C): 36.7 (30 Jun 2025 08:10), Max: 36.7 (29 Jun 2025 14:53)  T(F): 98.1 (30 Jun 2025 08:10), Max: 98.1 (30 Jun 2025 08:10)  HR: 67 (30 Jun 2025 08:10) (67 - 79)  BP: 129/67 (30 Jun 2025 08:10) (109/66 - 129/67)  BP(mean): --  RR: 18 (30 Jun 2025 08:10) (18 - 18)  SpO2: 98% (30 Jun 2025 08:10) (95% - 100%)    Parameters below as of 30 Jun 2025 08:10  Patient On (Oxygen Delivery Method): nasal cannula        CONSTITUTIONAL: NAD,  EYES: PERRLA; conjunctiva and sclera clear  ENMT: Moist oral mucosa,   RESPIRATORY: Normal respiratory effort; lungs are clear to auscultation bilaterally  CARDIOVASCULAR: Regular rate and rhythm, normal S1 and S2, no murmur   EXTS: No lower extremity edema; Peripheral pulses are 2+ bilaterally  ABDOMEN: Nontender to palpation, normoactive bowel sounds, no rebound/guarding;   MUSCLOSKELETAL: no joint swelling or tenderness to palpation  PSYCH: coop  NEUROLOGY: A+O to person, place, and time;+m/s.hard to hearing      LABS:    06-29    143  |  103  |  25.0[H]  ----------------------------<  82  4.8   |  29.0  |  0.74    Ca    9.3      29 Jun 2025 05:05  Mg     1.9     06-29            Urinalysis Basic - ( 29 Jun 2025 05:05 )    Color: x / Appearance: x / SG: x / pH: x  Gluc: 82 mg/dL / Ketone: x  / Bili: x / Urobili: x   Blood: x / Protein: x / Nitrite: x   Leuk Esterase: x / RBC: x / WBC x   Sq Epi: x / Non Sq Epi: x / Bacteria: x          RADIOLOGY & ADDITIONAL TESTS:  Results Reviewed:

## 2025-06-30 NOTE — DISCHARGE NOTE NURSING/CASE MANAGEMENT/SOCIAL WORK - NSDCPEFALRISK_GEN_ALL_CORE
For information on Fall & Injury Prevention, visit: [email or web address] OR  [email or web address] OR  https://www.cdc.gov/steadi/patient.html

## 2025-06-30 NOTE — DISCHARGE NOTE NURSING/CASE MANAGEMENT/SOCIAL WORK - PATIENT PORTAL LINK FT
You can access the FollowMyHealth Patient Portal offered by [facility] by registering at the following website: [email or web address]. By joining [facility]’s FollowMyHealth portal, you will also be able to view your health information using other applications (apps) compatible with our system.

## 2025-06-30 NOTE — CHART NOTE - NSCHARTNOTEFT_GEN_A_CORE
Source: Patient [X]  Family [ ]   other [ ]    Current Diet: Diet, DASH/TLC:   Sodium & Cholesterol Restricted (06-23-25 @ 00:02) [Active]    PO intake:  < 50% [ ]   50-75%  [X]   %  [ ]  other :    Source for PO intake [X] Patient [ ] family [ ] chart [ ] staff [ ] other    Current Weight:   6/26 101.4 lbs  6/24 106.4 lbs     Pertinent Medications: MEDICATIONS  (STANDING):  ferrous    sulfate 325 milliGRAM(s) Oral two times a day  pantoprazole    Tablet 40 milliGRAM(s) Oral before breakfast  sodium chloride 0.9%. 1000 milliLiter(s) (75 mL/Hr) IV Continuous <Continuous>    MEDICATIONS  (PRN):  ondansetron Injectable 4 milliGRAM(s) IV Push every 6 hours PRN Nausea and/or Vomiting    Pertinent Labs:   6/29 - reviewed, unremarkable    Skin:  L outer leg wound    Nutrition focused physical exam conducted - found signs of malnutrition [ ]absent [X]present    Subcutaneous fat loss: [ ] Orbital fat pads region, [ ]Buccal fat region, [ ]Triceps region,  [ ]Ribs region    Muscle wasting: [ ]Temples region, [X]Clavicle region, [ ]Shoulder region, [X]Scapula region, [ ]Interosseous region,  [ ]thigh region, [ ]Calf region    Estimated Needs:   2836-3274 kcal/day (22-28 kcal/45.8 kg)  45-59 gm protein/day (1.2-1.3 gm/45.8 kg)    Clinical Course: 89yoF hx CAD s/p PCI (2018), Afib on Xarelto, HFpEF, chronic hypoxemic respiratory failure since 2/2025 admission for influenza on 2L NC, recurrent UTIs, presenting with persistent dysuria and urinary frequency despite recent treatment for UTI w/ PO antibiotic. Pt with LLE wound, receiving wound care. C/o severe dysuria, on abx.     Current Nutrition Diagnosis: Moderate malnutrition related to chronic illness (LLE ulcer, HFpEF, Afib, CAD, chronic hypoxemic respiratory failure) as evidenced by moderate muscle mass depletions, PO intake likely meeting < 75% est needs x 1 month.     Pt visited this afternoon, consumed ~75% of lunch. Pt endorses fair appetite. C/o severe burning in vaginal area.     Recommendations:   1. Recommend liberalizing diet to Regular with consideration for age, malnutrition.  2. Recommend Ensure Plus High Protein to provide protein fo0r wound healing (provides 350 calories, 20 gm protein/8 oz)   3. Recommend MVI with minerals  4. Recommend daily probiotic while on abx    Monitoring and Evaluation:   [X] PO intake [ ] Tolerance to diet prescription [X] Weights  [X] Follow up per protocol [X] Labs: chem 8

## 2025-06-30 NOTE — PROGRESS NOTE ADULT - SUBJECTIVE AND OBJECTIVE BOX
Subjective:89yFemale with resistant UTI, pt still c/o dysuria.  CT shows bladder stones not seen on previous CT in March 2025.  Pt didn't follow up for OP cystoscopy that was recommended due to difficulties getting pt to the office.  Urology called again today for persistent dysuria.  Pt voiding, pure wick in place, urine dark, old bloody in color, no clots, clear.      Vital Signs Last 24 Hrs  T(C): 36.7 (30 Jun 2025 08:10), Max: 36.7 (29 Jun 2025 14:53)  T(F): 98.1 (30 Jun 2025 08:10), Max: 98.1 (30 Jun 2025 08:10)  HR: 67 (30 Jun 2025 08:10) (67 - 79)  BP: 129/67 (30 Jun 2025 08:10) (109/66 - 129/67)  BP(mean): --  RR: 18 (30 Jun 2025 08:10) (18 - 18)  SpO2: 98% (30 Jun 2025 08:10) (95% - 100%)    Parameters below as of 30 Jun 2025 08:10  Patient On (Oxygen Delivery Method): nasal cannula      I&O's Detail    29 Jun 2025 07:01  -  30 Jun 2025 07:00  --------------------------------------------------------  IN:  Total IN: 0 mL    OUT:    Voided (mL): 400 mL  Total OUT: 400 mL    Total NET: -400 mL          Labs:    06-29    143  |  103  |  25.0[H]  ----------------------------<  82  4.8   |  29.0  |  0.74    Ca    9.3      29 Jun 2025 05:05  Mg     1.9     06-29

## 2025-06-30 NOTE — PROGRESS NOTE ADULT - ASSESSMENT
89y/oF PMH CAD s/p PCI (2018), Afib on Xarelto, HFpEF, chronic hypoxemic respiratory failure since 2/2025 admission for influenza on 2L NC, recurrent UTIs, presenting with persistent dysuria and urinary frequency despite recent treatment for UTI w/ PO antibiotics    UTI  -still c/o severe dysuria.   -check UA  -Bladder scan  -will continue Merrem now. spoke with urology. Placed a call for ID. Will f/u rec  -UA noted, not impressive but in setting of recent abx use, also microscopic hematuria  -Proteus in prior urine cx sensitive to cephalosporin  -urine cx 50-99k CFU/mL Proteus mirabilis ESBL, 50-99k CFU/mL Enterococcus faecalis (Vancomycin resistant)   -Ceftriaxone changed to Ertapenem 6/25 ->Meropenem 6/26 , cont x3 days last day was 6/29, will continue today. f/u id rec      SORAIDA likely 2/2 UTI with component of pre-renal from dehydration  -sCr improved s/p IVF     Hx possible urothelial lesion  -CT scan 3/2025 w/ nodularity in posterior bladder, inability to exclude lesion   -Urology consulted, recs no  intervention.  Op f/u with Dr Maldonado or Dr Mcginnis    Hx chronic hypoxemic respiratory failure  -No acute respiratory symptoms   -On baseline O2 requirements of 2L NC    Chronic diastolic HFpEF  -Holding furosemide due to SORAIDA. Resume on dc     Hx Afib  -cont Xarelto    Hx spinal stenosis  -cont gabapentin (home dose 600mg tid, decreased on admission for SORAIDA), increased dose 6/27, monitor bmp     Hx CAD with chronic stable angina  -cont ranolazine, atorvastatin     Hx hypothyroidism  -cont levothyroxine     Hx LLE ulcer  -Has dressing placed by home visiting RN  -Wound care consult recs appreciated    Dispo: pending improvement of symptoms. home w/home care   dw pt, granddaughter at bedside  dw urology team.

## 2025-06-30 NOTE — DISCHARGE NOTE NURSING/CASE MANAGEMENT/SOCIAL WORK - FINANCIAL ASSISTANCE
[facility] provides services at a reduced cost to those who are determined to be eligible through [facility]’s financial assistance program. Information regarding [facility]’s financial assistance program can be found by going to [email or web address] or by calling [phone].

## 2025-06-30 NOTE — PROGRESS NOTE ADULT - ASSESSMENT
89 yo female with resistant UTI, dysuria, bladder stones  - cont merrem for UTI treatment  - mild vaginitis on exam- suggest yeast cx  - unknown if dysuria due to UTI, bladder stones or vaginitis  - consider GYN for exam with speculum- pt seen recently in gyn office but exam incomplete due to pt in wheelchair  - consider gilliland catheter for dysuria with urination   - pt needs to f/u as OP in office for cystoscopy- would not perform cystoscopy when pt is being treated for a UTI  - case d/w Dr. Mcginnis

## 2025-06-30 NOTE — DISCHARGE NOTE NURSING/CASE MANAGEMENT/SOCIAL WORK - NSDCFUADDAPPT_GEN_ALL_CORE_FT
APPTS ARE READY TO BE MADE: [X] YES    Best Family or Patient Contact (if needed):    Additional Information about above appointments (if needed):    1: f/u pcp one week-f/u bp, bmp  2:   3:     Other comments or requests:

## 2025-06-30 NOTE — PROGRESS NOTE ADULT - GENITOURINARY COMMENTS
labia majora normal, minor irritation of labia minora, mild erythema of introitus, ? small cystocele, pt not tolerating exam, did not tolerate attempted digital vaginal exam due to discomfort

## 2025-07-01 LAB
ANION GAP SERPL CALC-SCNC: 11 MMOL/L — SIGNIFICANT CHANGE UP (ref 5–17)
APPEARANCE UR: ABNORMAL
BACTERIA # UR AUTO: ABNORMAL /HPF
BILIRUB UR-MCNC: NEGATIVE — SIGNIFICANT CHANGE UP
BUN SERPL-MCNC: 35.2 MG/DL — HIGH (ref 8–20)
CALCIUM SERPL-MCNC: 9.1 MG/DL — SIGNIFICANT CHANGE UP (ref 8.4–10.5)
CAST: 3 /LPF — SIGNIFICANT CHANGE UP (ref 0–4)
CHLORIDE SERPL-SCNC: 103 MMOL/L — SIGNIFICANT CHANGE UP (ref 96–108)
CO2 SERPL-SCNC: 29 MMOL/L — SIGNIFICANT CHANGE UP (ref 22–29)
COLOR SPEC: ABNORMAL
CREAT SERPL-MCNC: 0.95 MG/DL — SIGNIFICANT CHANGE UP (ref 0.5–1.3)
DIFF PNL FLD: ABNORMAL
EGFR: 56 ML/MIN/1.73M2 — LOW
EGFR: 56 ML/MIN/1.73M2 — LOW
GLUCOSE SERPL-MCNC: 91 MG/DL — SIGNIFICANT CHANGE UP (ref 70–99)
GLUCOSE UR QL: NEGATIVE MG/DL — SIGNIFICANT CHANGE UP
HCT VFR BLD CALC: 35.4 % — SIGNIFICANT CHANGE UP (ref 34.5–45)
HGB BLD-MCNC: 11.1 G/DL — LOW (ref 11.5–15.5)
KETONES UR QL: 15 MG/DL
LEUKOCYTE ESTERASE UR-ACNC: ABNORMAL
MCHC RBC-ENTMCNC: 28.2 PG — SIGNIFICANT CHANGE UP (ref 27–34)
MCHC RBC-ENTMCNC: 31.4 G/DL — LOW (ref 32–36)
MCV RBC AUTO: 90.1 FL — SIGNIFICANT CHANGE UP (ref 80–100)
NITRITE UR-MCNC: NEGATIVE — SIGNIFICANT CHANGE UP
NRBC # BLD AUTO: 0 K/UL — SIGNIFICANT CHANGE UP (ref 0–0)
NRBC # FLD: 0 K/UL — SIGNIFICANT CHANGE UP (ref 0–0)
NRBC BLD AUTO-RTO: 0 /100 WBCS — SIGNIFICANT CHANGE UP (ref 0–0)
PH UR: 6.5 — SIGNIFICANT CHANGE UP (ref 5–8)
PLATELET # BLD AUTO: 185 K/UL — SIGNIFICANT CHANGE UP (ref 150–400)
PMV BLD: 11.1 FL — SIGNIFICANT CHANGE UP (ref 7–13)
POTASSIUM SERPL-MCNC: 4.5 MMOL/L — SIGNIFICANT CHANGE UP (ref 3.5–5.3)
POTASSIUM SERPL-SCNC: 4.5 MMOL/L — SIGNIFICANT CHANGE UP (ref 3.5–5.3)
PROT UR-MCNC: 100 MG/DL
RBC # BLD: 3.93 M/UL — SIGNIFICANT CHANGE UP (ref 3.8–5.2)
RBC # FLD: 14.9 % — HIGH (ref 10.3–14.5)
RBC CASTS # UR COMP ASSIST: 1861 /HPF — HIGH (ref 0–4)
SODIUM SERPL-SCNC: 142 MMOL/L — SIGNIFICANT CHANGE UP (ref 135–145)
SP GR SPEC: 1.03 — SIGNIFICANT CHANGE UP (ref 1–1.03)
SQUAMOUS # UR AUTO: 1 /HPF — SIGNIFICANT CHANGE UP (ref 0–5)
UROBILINOGEN FLD QL: 1 MG/DL — SIGNIFICANT CHANGE UP (ref 0.2–1)
WBC # BLD: 5.81 K/UL — SIGNIFICANT CHANGE UP (ref 3.8–10.5)
WBC # FLD AUTO: 5.81 K/UL — SIGNIFICANT CHANGE UP (ref 3.8–10.5)
WBC UR QL: 66 /HPF — HIGH (ref 0–5)

## 2025-07-01 PROCEDURE — 99233 SBSQ HOSP IP/OBS HIGH 50: CPT

## 2025-07-01 PROCEDURE — 99222 1ST HOSP IP/OBS MODERATE 55: CPT | Mod: GC

## 2025-07-01 PROCEDURE — 99232 SBSQ HOSP IP/OBS MODERATE 35: CPT

## 2025-07-01 PROCEDURE — G0545: CPT

## 2025-07-01 RX ORDER — PHENAZOPYRIDINE HCL 100 MG
100 TABLET ORAL EVERY 8 HOURS
Refills: 0 | Status: DISCONTINUED | OUTPATIENT
Start: 2025-07-01 | End: 2025-07-03

## 2025-07-01 RX ADMIN — TRAMADOL HYDROCHLORIDE 50 MILLIGRAM(S): 50 TABLET, FILM COATED ORAL at 17:00

## 2025-07-01 RX ADMIN — Medication 325 MILLIGRAM(S): at 16:26

## 2025-07-01 RX ADMIN — TRAMADOL HYDROCHLORIDE 50 MILLIGRAM(S): 50 TABLET, FILM COATED ORAL at 16:26

## 2025-07-01 RX ADMIN — TRAMADOL HYDROCHLORIDE 50 MILLIGRAM(S): 50 TABLET, FILM COATED ORAL at 22:51

## 2025-07-01 RX ADMIN — Medication 40 MILLIGRAM(S): at 05:40

## 2025-07-01 RX ADMIN — Medication 325 MILLIGRAM(S): at 05:39

## 2025-07-01 RX ADMIN — RANOLAZINE 500 MILLIGRAM(S): 1000 TABLET, FILM COATED, EXTENDED RELEASE ORAL at 05:40

## 2025-07-01 RX ADMIN — GABAPENTIN 400 MILLIGRAM(S): 400 CAPSULE ORAL at 21:46

## 2025-07-01 RX ADMIN — TRAMADOL HYDROCHLORIDE 50 MILLIGRAM(S): 50 TABLET, FILM COATED ORAL at 02:06

## 2025-07-01 RX ADMIN — MEROPENEM 1000 MILLIGRAM(S): 1 INJECTION INTRAVENOUS at 21:45

## 2025-07-01 RX ADMIN — TRAMADOL HYDROCHLORIDE 50 MILLIGRAM(S): 50 TABLET, FILM COATED ORAL at 03:06

## 2025-07-01 RX ADMIN — ATORVASTATIN CALCIUM 20 MILLIGRAM(S): 80 TABLET, FILM COATED ORAL at 21:46

## 2025-07-01 RX ADMIN — RANOLAZINE 500 MILLIGRAM(S): 1000 TABLET, FILM COATED, EXTENDED RELEASE ORAL at 16:26

## 2025-07-01 RX ADMIN — Medication 100 MILLIGRAM(S): at 18:28

## 2025-07-01 RX ADMIN — TRAMADOL HYDROCHLORIDE 50 MILLIGRAM(S): 50 TABLET, FILM COATED ORAL at 21:51

## 2025-07-01 RX ADMIN — GABAPENTIN 400 MILLIGRAM(S): 400 CAPSULE ORAL at 05:40

## 2025-07-01 RX ADMIN — Medication 50 MICROGRAM(S): at 05:39

## 2025-07-01 RX ADMIN — MEROPENEM 1000 MILLIGRAM(S): 1 INJECTION INTRAVENOUS at 05:39

## 2025-07-01 RX ADMIN — GABAPENTIN 400 MILLIGRAM(S): 400 CAPSULE ORAL at 13:16

## 2025-07-01 RX ADMIN — RIVAROXABAN 15 MILLIGRAM(S): 10 TABLET, FILM COATED ORAL at 13:16

## 2025-07-01 RX ADMIN — MEROPENEM 1000 MILLIGRAM(S): 1 INJECTION INTRAVENOUS at 13:16

## 2025-07-01 NOTE — PROGRESS NOTE ADULT - SUBJECTIVE AND OBJECTIVE BOX
Patient is a 89y old  Female who presents with a chief complaint of Suspected UTI, SORAIDA (30 Jun 2025 13:27)    c/o severe dysuria. dark urine  denies abd pain,fever,chill  REVIEW OF SYSTEMS: All systems are reviewed and found to be negative except above    MEDICATIONS  (STANDING):  atorvastatin 20 milliGRAM(s) Oral at bedtime  ferrous    sulfate 325 milliGRAM(s) Oral two times a day  gabapentin 400 milliGRAM(s) Oral three times a day  levothyroxine 50 MICROGram(s) Oral daily  meropenem Injectable 1000 milliGRAM(s) IV Push every 8 hours  pantoprazole    Tablet 40 milliGRAM(s) Oral before breakfast  ranolazine 500 milliGRAM(s) Oral two times a day  rivaroxaban 15 milliGRAM(s) Oral daily  sodium chloride 0.9%. 1000 milliLiter(s) (75 mL/Hr) IV Continuous <Continuous>    MEDICATIONS  (PRN):  acetaminophen     Tablet .. 650 milliGRAM(s) Oral every 6 hours PRN Temp greater or equal to 38C (100.4F), Mild Pain (1 - 3), Moderate Pain (4 - 6)  melatonin 3 milliGRAM(s) Oral at bedtime PRN Insomnia  ondansetron Injectable 4 milliGRAM(s) IV Push every 6 hours PRN Nausea and/or Vomiting  traMADol 25 milliGRAM(s) Oral every 6 hours PRN Moderate Pain (4 - 6)  traMADol 50 milliGRAM(s) Oral every 6 hours PRN Severe Pain (7 - 10)      CAPILLARY BLOOD GLUCOSE        I&O's Summary    30 Jun 2025 07:01  -  01 Jul 2025 07:00  --------------------------------------------------------  IN: 0 mL / OUT: 400 mL / NET: -400 mL        PHYSICAL EXAM:  Vital Signs Last 24 Hrs  T(C): 36.3 (01 Jul 2025 08:55), Max: 36.9 (01 Jul 2025 04:34)  T(F): 97.4 (01 Jul 2025 08:55), Max: 98.4 (01 Jul 2025 04:34)  HR: 56 (01 Jul 2025 08:55) (56 - 81)  BP: 152/75 (01 Jul 2025 08:55) (120/67 - 152/75)  BP(mean): 95 (30 Jun 2025 21:00) (95 - 95)  RR: 18 (01 Jul 2025 08:55) (18 - 18)  SpO2: 99% (01 Jul 2025 08:55) (97% - 99%)    Parameters below as of 01 Jul 2025 08:55  Patient On (Oxygen Delivery Method): nasal cannula  O2 Flow (L/min): 2      CONSTITUTIONAL: NAD,  EYES: PERRLA; conjunctiva and sclera clear  ENMT: Moist oral mucosa,   RESPIRATORY: Normal respiratory effort; lungs are clear to auscultation bilaterally  CARDIOVASCULAR: Regular rate and rhythm, normal S1 and S2, no murmur   EXTS: No lower extremity edema; Peripheral pulses are 2+ bilaterally  ABDOMEN: Nontender to palpation, normoactive bowel sounds, no rebound/guarding;   MUSCLOSKELETAL:   no joint swelling or tenderness to palpation  PSYCH:coop  NEUROLOGY: A+O to person, place, and time;hard hearing ; no gross sensory deficits;       LABS:                        11.1   5.81  )-----------( 185      ( 01 Jul 2025 04:45 )             35.4     07-01    142  |  103  |  35.2[H]  ----------------------------<  91  4.5   |  29.0  |  0.95    Ca    9.1      01 Jul 2025 04:45            Urinalysis Basic - ( 01 Jul 2025 04:45 )    Color: x / Appearance: x / SG: x / pH: x  Gluc: 91 mg/dL / Ketone: x  / Bili: x / Urobili: x   Blood: x / Protein: x / Nitrite: x   Leuk Esterase: x / RBC: x / WBC x   Sq Epi: x / Non Sq Epi: x / Bacteria: x          RADIOLOGY & ADDITIONAL TESTS:  Results Reviewed:   < from: CT Abdomen and Pelvis No Cont (06.23.25 @ 05:06) >    IMPRESSION:  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 If clinically indicated,   completion pelvis CT should be performed.    Multiple bladder calculi not seen previously. The urothelium cannot be   assessed without IV contrast.    Constipated colon. Colonic diverticulosis without diverticulitis.    Mildly distended gallbladder with gallstones.    < end of copied text >

## 2025-07-01 NOTE — PROGRESS NOTE ADULT - ASSESSMENT
Clothing 89y/oF PMH CAD s/p PCI (2018), Afib on Xarelto, HFpEF, chronic hypoxemic respiratory failure since 2/2025 admission for influenza on 2L NC, recurrent UTIs, presenting with persistent dysuria and urinary frequency despite recent treatment for UTI w/ PO antibiotics    UTI  Urinary bladder calculi   -still c/o severe dysuria.   -check UA straight per ID  -Bladder scan  -will continue Merrem now. spoke with urology. REC gyn CONSULT? Vaginitis. rec gilliland if pain persist. f/u out pt   -UA noted, not impressive but in setting of recent abx use, also microscopic hematuria  -Proteus in prior urine cx sensitive to cephalosporin  -urine cx 50-99k CFU/mL Proteus mirabilis ESBL, 50-99k CFU/mL Enterococcus faecalis (Vancomycin resistant)   -Ceftriaxone changed to Ertapenem 6/25 ->Meropenem 6/26   -spoke with GYN      SORAIDA likely 2/2 UTI with component of pre-renal from dehydration  -sCr improved s/p IVF     Hx possible urothelial lesion  -CT scan 3/2025 w/ nodularity in posterior bladder, inability to exclude lesion   -Urology consulted, recs no  intervention.  Op f/u with Dr Maldonado or Dr Mcginnis    Hx chronic hypoxemic respiratory failure  -No acute respiratory symptoms   -On baseline O2 requirements of 2L NC    Chronic diastolic HFpEF  -Holding furosemide due to SORAIDA. Resume on dc     Hx Afib  -cont Xarelto    Hx spinal stenosis  -cont gabapentin (home dose 600mg tid, decreased on admission for SORAIDA), increased dose 6/27, monitor bmp     Hx CAD with chronic stable angina  -cont ranolazine, atorvastatin     Hx hypothyroidism  -cont levothyroxine     Hx LLE ulcer  -Has dressing placed by home visiting RN  -Wound care consult recs appreciated    Dispo: pending improvement of symptoms. GYN eval. home w/home care   dw pt  at bedside  dw urology team REC Gilliland placement and see if  pt still c/o dysuria.   DW ID  spoke Gyn

## 2025-07-01 NOTE — PROGRESS NOTE ADULT - SUBJECTIVE AND OBJECTIVE BOX
[facility]                                                INFECTIOUS DISEASES  =======================================================                   Sami Nguyen#   Diogenes Zepeda MD#    Brody Durbin MD*                           Monica Miguel MD*   Neli Umaña MD*   Jaime Patrick *           Diplomates American Board of Internal Medicine & Infectious Diseases                  # [place] Office - Appt - Tel  [phone] Fax [phone]                * [place] Office - Appt - Tel [phone] Fax [phone]                                  Hospital Consult line:  [phone]  =======================================================      [facility]-[id number]  MAMTA SADAF   follow up for: r/o uti  ID called back  pt continues to c/o "burning"  pt states she is having burning when she pees  denies abdominal pain  has purewick in place  patient seen and examined.       I have personally reviewed the labs and data; pertinent labs and data are listed in this note; please see below.   ===================================================  REVIEW OF SYSTEMS:  CONSTITUTIONAL:  No Fever or chills  HEENT:  No diplopia or blurred vision.  No earache, sore throat or runny nose.  CARDIOVASCULAR:  No pressure, squeezing, strangling, tightness, heaviness or aching about the chest, neck, axilla or epigastrium.  RESPIRATORY:  No cough, shortness of breath  GASTROINTESTINAL:  No nausea, vomiting or diarrhea.  GENITOURINARY:  + dysuria, no frequency or urgency. No Blood in urine  MUSCULOSKELETAL:  no joint aches, no muscle pain  SKIN:  No change in skin, hair or nails.  NEUROLOGIC:  No Headaches, seizures or weakness.  PSYCHIATRIC:  No disorder of thought or mood.  ENDOCRINE:  No heat or cold intolerance  HEMATOLOGICAL:  No easy bruising or bleeding.    =======================================================  Allergies    No Known Allergies    Intolerances    Antibiotics:  meropenem Injectable 1000 milliGRAM(s) IV Push every 8 hours    Other medications:  atorvastatin 20 milliGRAM(s) Oral at bedtime  ferrous    sulfate 325 milliGRAM(s) Oral two times a day  gabapentin 400 milliGRAM(s) Oral three times a day  levothyroxine 50 MICROGram(s) Oral daily  pantoprazole    Tablet 40 milliGRAM(s) Oral before breakfast  ranolazine 500 milliGRAM(s) Oral two times a day  rivaroxaban 15 milliGRAM(s) Oral daily  sodium chloride 0.9%. 1000 milliLiter(s) IV Continuous <Continuous>    ======================================================  Physical Exam:  ============  T(F): 97.4 (01 Jul 2025 08:55), Max: 98.4 (01 Jul 2025 04:34)  HR: 56 (01 Jul 2025 08:55)  BP: 152/75 (01 Jul 2025 08:55)  RR: 18 (01 Jul 2025 08:55)  SpO2: 99% (01 Jul 2025 08:55) (97% - 99%)  temp max in last 48H T(F): , Max: 98.4 (07-01-25 @ 04:34)    General:  No acute distress elderly lady sitting in chair [place], on  2  Eye: no conjunctival pallor, no scleral icterus  Respiratory: Lungs are clear to auscultation, Respirations are non-labored.  Cardiovascular: Normal rate, Regular rhythm,  s1+s2  Gastrointestinal: Soft, Non-tender, Non-distended, Normal bowel sounds.  Genitourinary: No costovertebral angle tenderness purewick  Integumentary: No rash.  Neurologic: Alert, Oriented, No focal deficits  Psychiatric: Appropriate mood & affect.  =======================================================  Labs:                        11.1   5.81  )-----------( 185      ( 01 Jul 2025 04:45 )             35.4     07-01    142  |  103  |  35.2[H]  ----------------------------<  91  4.5   |  29.0  |  0.95    Ca    9.1      01 Jul 2025 04:45        Culture - Blood (collected 06-22-25 @ 18:38)  Source: Blood Blood-Peripheral  Final Report (06-28-25 @ 02:00):    No growth at 5 days    Urinalysis with Rflx Culture (collected 06-22-25 @ 17:31)    Culture - Urine (collected 06-22-25 @ 17:31)  Source: Clean Catch  Final Report (06-26-25 @ 11:32):    50,000 - 99,000 CFU/mL Proteus mirabilis ESBL    50,000 - 99,000 CFU/mL Enterococcus faecalis (vancomycin resistant)  Organism: Proteus mirabilis ESBL  Enterococcus faecalis (vancomycin resistant) (06-26-25 @ 11:32)  Organism: Enterococcus faecalis (vancomycin resistant) (06-26-25 @ 11:32)    Sensitivities:      -  Levofloxacin: R >4      -  Nitrofurantoin: S <=32 Should not be used to treat pyelonephritis.      -  Daptomycin: S 1      -  Linezolid: S 2      -  Vancomycin: R >16      -  Ciprofloxacin: R >2      -  Ampicillin: S <=2 Predicts results to ampicillin/sulbactam, amoxacillin-clavulanate and  piperacillin-tazobactam.      -  Tetracycline: R >8      Method Type: KATHARINE  Organism: Proteus mirabilis ESBL (06-26-25 @ 11:32)    Sensitivities:      -  Levofloxacin: R 2      -  Tobramycin: S <=2      -  Nitrofurantoin: R >64 Should not be used to treat pyelonephritis      -  Aztreonam: R <=4      -  Gentamicin: S <=2      -  Cefepime: R >16      -  Cefazolin: R >16 For uncomplicated UTI with K. pneumoniae, E. coli, or P. mirablis: KATHARINE <=16 is sensitive and KATHARINE >=32 is resistant. This also predicts results for oral agents cefaclor, cefdinir, cefpodoxime, cefprozil, cefuroxime axetil, cephalexin and locarbef for uncomplicated UTI. Note that some isolates may be susceptible to these agents while testing resistant to cefazolin.      -  Piperacillin/Tazobactam: S <=8      -  Ciprofloxacin: R >2      -  Ceftriaxone: R >32      -  Ampicillin: R >16 These ampicillin results predict results for amoxicillin      Method Type: KATHARINE      -  Meropenem: S <=1      -  Ampicillin/Sulbactam: S 8/4      -  Cefuroxime: R >16      -  Trimethoprim/Sulfamethoxazole: R >2/38      -  Ertapenem: S <=0.5    Culture - Urine (collected 03-09-25 @ 20:45)  Source: Clean Catch Clean Catch (Midstream)  Final Report (03-13-25 @ 10:47):    50,000 - 99,000 CFU/mL Clostridium tertium "Susceptibilities not    performed"    Culture - Urine (collected 02-12-25 @ 05:15)  Source: Clean Catch Clean Catch (Midstream)  Final Report (02-15-25 @ 06:43):    >100,000 CFU/ml Escherichia coli  Organism: Escherichia coli (02-15-25 @ 06:43)  Organism: Escherichia coli (02-15-25 @ 06:43)    Sensitivities:      -  Levofloxacin: S <=0.5      -  Tobramycin: S <=2      -  Nitrofurantoin: S <=32 Should not be used to treat pyelonephritis      -  Aztreonam: S <=4      -  Gentamicin: S <=2      -  Cefazolin: S <=2 For uncomplicated UTI with K. pneumoniae, E. coli, or P. mirablis: KATHARINE <=16 is sensitive and KATHARINE >=32 is resistant. This also predicts results for oral agents cefaclor, cefdinir, cefpodoxime, cefprozil, cefuroxime axetil, cephalexin and locarbef for uncomplicated UTI. Note that some isolates may be susceptible to these agents while testing resistant to cefazolin.      -  Cefepime: S <=2      -  Piperacillin/Tazobactam: S <=8      -  Ciprofloxacin: S <=0.25      -  Imipenem: S <=1      -  Ceftriaxone: S <=1      -  Ampicillin: S <=8 These ampicillin results predict results for amoxicillin      Method Type: KATHARINE      -  Meropenem: S <=1      -  Ampicillin/Sulbactam: S <=4/2      -  Cefoxitin: S <=8      -  Cefuroxime: S <=4      -  Amoxicillin/Clavulanic Acid: S <=8/4      -  Trimethoprim/Sulfamethoxazole: S <=0.5/9.5      -  Ertapenem: S <=0.5    Culture - Blood (collected 02-11-25 @ 16:10)  Source: .Blood BLOOD  Final Report (02-17-25 @ 01:00):    No growth at 5 days    Culture - Urine (collected 10-28-24 @ 12:47)  Source: Clean Catch Clean Catch (Midstream)  Final Report (10-29-24 @ 17:33):    <10,000 CFU/mL Normal Urogenital Aliya    Culture - Urine (collected 11-08-23 @ 02:00)  Source: Catheterized Catheterized  Final Report (11-09-23 @ 11:24):    No growth    Culture - Urine (collected 11-07-23 @ 21:20)  Source: Clean Catch Clean Catch (Midstream)  Final Report (11-10-23 @ 10:51):    >100,000 CFU/ml Proteus mirabilis  Organism: Proteus mirabilis (11-10-23 @ 10:51)  Organism: Proteus mirabilis (11-10-23 @ 10:51)    Sensitivities:      -  Levofloxacin: R >4      -  Tobramycin: S <=2      -  Nitrofurantoin: R 64 Should not be used to treat pyelonephritis      -  Aztreonam: S <=4      -  Gentamicin: S <=2      -  Cefazolin: S <=2 For uncomplicated UTI with K. pneumoniae, E. coli, or P. mirablis: KATHARINE <=16 is sensitive and KATHARINE >=32 is resistant. This also predicts results for oral agents cefaclor, cefdinir, cefpodoxime, cefprozil, cefuroxime axetil, cephalexin and locarbef for uncomplicated UTI. Note that some isolates may be susceptible to these agents while testing resistant to cefazolin.      -  Cefepime: S <=2      -  Piperacillin/Tazobactam: S <=8      -  Ciprofloxacin: R >2      -  Ceftriaxone: S <=1      -  Ampicillin: S <=8 These ampicillin results predict results for amoxicillin      Method Type: KATHARINE      -  Meropenem: S <=1      -  Ampicillin/Sulbactam: S <=4/2      -  Cefoxitin: S <=8      -  Cefuroxime: S <=4      -  Amoxicillin/Clavulanic Acid: S <=8/4      -  Trimethoprim/Sulfamethoxazole: R >2/38      -  Ertapenem: S <=0.5

## 2025-07-01 NOTE — CONSULT NOTE ADULT - ATTENDING COMMENTS
Case reviewed with resident  89y/o  LMP unknown who presents with 6 months of chronic dysuria hospitalized since 25 for SORAIDA UTI. History limited by patient. Additional history taken from daughter in law (caregiver) and chart review. Gyn consulted at recommendation of urology to assess for vaginitis given persistent dysuria after treatment of UTI. Daughter in law denies hematuria, vaginal complaints - no discharge, no odor, no pruritis. Daughter in law reports patient with episode of bleeding in her diaper 6 months ago, unknown if rectal vs vaginal vs urinary. Workup included CT imaging which showed absent uterus.  T(F): 97.4 (2025 08:55), Max: 98.4 (2025 04:34)  HR: 56 (2025 08:55) (56 - 81)  BP: 152/75 (2025 08:55) (120/67 - 152/75)  EXTERNAL GENITALIA: Atrophic, narrow introitus. No rashes, lesions, or excoriations noted.  89 y/o   with chronic dysuria admitted to medicine with UTI and SORAIDA   - low suspicion for vaginitis  - cultures collected  - no other acute gyn intervention    Quin Hollis MD

## 2025-07-01 NOTE — CONSULT NOTE ADULT - SUBJECTIVE AND OBJECTIVE BOX
HPI: 89y  LMP unknown who presents with 6 months of chronic dysuria. She is currently hospitalized since 25 for SORAIDA. Gyn consulted at recommendation of urology to assess for vaginitis given persistent dysuria after treatment of UTI.    Daughter in law denies hematuria. Episode of bleeding in her diaper 6 months ago, unknown if rectal vs vaginal. Workup included CT imaging which showed absent uterus.     History limited by patient. Additional history taken from daughter in law (caregiver) and chart review.     OBHx: 2 vaginal deliveries  GynHx: postmenopausal, hysterectomy noted on CT from 3/10/25    PAST MEDICAL & SURGICAL HISTORY:  Essential hypertension      Chronic back pain      Hyperlipidemia, unspecified hyperlipidemia type      PNA (pneumonia)  2017      Lung nodule      Thyroid nodule      Acute deep vein thrombosis (DVT) of popliteal vein of both lower extremities      Pelvic fracture      Closed fracture of multiple ribs of right side, initial encounter      Aortic stenosis  s/p TAVR bioprosthetic 2018 [facility] Dr. Aragon      Atherosclerosis of native coronary artery of native heart with angina pectoris      Hypercholesteremia      AF (atrial fibrillation)  Persistent/chronic      AV block      Bronchiectasis      Kyphoscoliosis and scoliosis      Stenocardia      Spinal stenosis      Murmur, cardiac  gr3/6      Neuropathy      Diastolic CHF  NYHA class 3      Anxiety      GERD (gastroesophageal reflux disease)      Closed pelvic ring fracture      Hip arthritis      [place] (hard of hearing)  wears bilateral hearing aides      History of valvular heart disease    Surg Hx  S/P spinal fusion  L spines      S/p TAVR (transcatheter aortic valve replacement), bioprosthetic    S/P Hysterectomy  per CT imaging 3/10/25, date unknown    Home Medications:  atorvastatin 20 mg oral tablet: 1 tab(s) orally once a day (at bedtime) (2025 04:52)  FeroSul 325 mg (65 mg elemental iron) oral tablet: 1 tab(s) orally 2 times a day (2025 04:52)  gabapentin 600 mg oral tablet: 1 tab(s) orally 3 times a day (2025 04:52)  levothyroxine 50 mcg (0.05 mg) oral tablet: 1 tab(s) orally once a day (2025 00:04)  omeprazole 10 mg oral delayed release capsule: 1 cap(s) orally once a day (2025 00:06)  ranolazine 500 mg oral tablet, extended release: 1 tab(s) orally 2 times a day (2025 04:52)  rivaroxaban 15 mg oral tablet: 1 tab(s) orally once a day (before a meal) (2025 04:52)      Allergies: Allergies    No Known Allergies    SocialHx: lives with daughter in law.    Hospital Meds:  acetaminophen     Tablet .. 650 milliGRAM(s) Oral every 6 hours PRN  atorvastatin 20 milliGRAM(s) Oral at bedtime  ferrous    sulfate 325 milliGRAM(s) Oral two times a day  gabapentin 400 milliGRAM(s) Oral three times a day  levothyroxine 50 MICROGram(s) Oral daily  melatonin 3 milliGRAM(s) Oral at bedtime PRN  meropenem Injectable 1000 milliGRAM(s) IV Push every 8 hours  ondansetron Injectable 4 milliGRAM(s) IV Push every 6 hours PRN  pantoprazole    Tablet 40 milliGRAM(s) Oral before breakfast  ranolazine 500 milliGRAM(s) Oral two times a day  rivaroxaban 15 milliGRAM(s) Oral daily  sodium chloride 0.9%. 1000 milliLiter(s) IV Continuous <Continuous>  traMADol 25 milliGRAM(s) Oral every 6 hours PRN  traMADol 50 milliGRAM(s) Oral every 6 hours PRN      Vital Signs Last 24 Hrs  T(C): 36.3 (2025 08:55), Max: 36.9 (2025 04:34)  T(F): 97.4 (2025 08:55), Max: 98.4 (2025 04:34)  HR: 56 (2025 08:55) (56 - 81)  BP: 152/75 (2025 08:55) (120/67 - 152/75)  BP(mean): 95 (2025 21:00) (95 - 95)  RR: 18 (2025 08:55) (18 - 18)  SpO2: 99% (2025 08:55) (97% - 99%)    Parameters below as of 2025 08:55  Patient On (Oxygen Delivery Method): nasal cannula  O2 Flow (L/min): 2         25 @ 07:01  -  25 @ 07:00  --------------------------------------------------------  IN: 0 mL / OUT: 400 mL / NET: -400 mL        PHYSICAL EXAM:  CHEST/LUNG: Breathing comfortably, nasal cannula in place  HEART: Appears well perfused  ABDOMEN: Soft, Nontender, Nondistended  EXTREMITIES:  ulcers on bilateral lower legs  PELVIC: attempted speculum exam but due to patient discomfort and given symptoms unrelated to upper vagina, ended exam        EXTERNAL GENITALIA: Atrophic, narrow introitus. No rashes, lesions, or excoriations noted.         VAGINA: Atrophic No discharge noted, scant blood noted at posterior fourchette secondary to speculum exam        LABS:                        11.1   5.81  )-----------( 185      ( 2025 04:45 )             35.4     07-    142  |  103  |  35.2[H]  ----------------------------<  91  4.5   |  29.0  |  0.95    Ca    9.1      2025 04:45      Urinalysis Basic - ( 2025 04:45 )    Color: x / Appearance: x / SG: x / pH: x  Gluc: 91 mg/dL / Ketone: x  / Bili: x / Urobili: x   Blood: x / Protein: x / Nitrite: x   Leuk Esterase: x / RBC: x / WBC x   Sq Epi: x / Non Sq Epi: x / Bacteria: x          RADIOLOGY STUDIES: HPI: 89y  LMP unknown who presents with 6 months of chronic dysuria hospitalized since 25 for SORAIDA UTI. History limited by patient. Additional history taken from daughter in law (caregiver) and chart review. Gyn consulted at recommendation of urology to assess for vaginitis given persistent dysuria after treatment of UTI. Daughter in law denies hematuria, vaginal complaints - no discharge, no odor, no pruritis. Daughter in law reports patient with episode of bleeding in her diaper 6 months ago, unknown if rectal vs vaginal vs urinary. Workup included CT imaging which showed absent uterus.     OBHx: NSVDx2  GynHx: postmenopausal, hysterectomy noted on CT from 3/10/25  MedHx:  -AFib  -HFpEF  -HTN  -HLD  -Lung nodule  -Thyroid nodule  -Hx DVT  -Hx pelvic fracture  -Hx R rib fractures   -Aortic stenosis s/p TAVR bioprosthetic 2018 [facility] Dr. Aragon  -Atherosclerosis of native coronary artery of native heart with angina pectoris  -AV block  -Bronchiectasis  -Anxiety  -GERD (gastroesophageal reflux disease)  -[place] (hard of hearing) wears bilateral hearing aides  SurgHx:  -Spinal fusion  -TAVR (transcatheter aortic valve replacement), bioprosthetic  -Hysterectomy per CT imaging date unknown reason unknown  Home Medications:  -atorvastatin 20 mg oral tablet: 1 tab(s) orally once a day (at bedtime) (2025 04:52)  -FeroSul 325 mg (65 mg elemental iron) oral tablet: 1 tab(s) orally 2 times a day (2025 04:52)  -gabapentin 600 mg oral tablet: 1 tab(s) orally 3 times a day (2025 04:52)  -levothyroxine 50 mcg (0.05 mg) oral tablet: 1 tab(s) orally once a day (2025 00:04)  -omeprazole 10 mg oral delayed release capsule: 1 cap(s) orally once a day (2025 00:06)  -ranolazine 500 mg oral tablet, extended release: 1 tab(s) orally 2 times a day (2025 04:52)  -rivaroxaban 15 mg oral tablet: 1 tab(s) orally once a day (before a meal) (2025 04:52)  Allergies: NKDA  SocialHx: lives with daughter in law.    Hospital Meds:  acetaminophen     Tablet .. 650 milliGRAM(s) Oral every 6 hours PRN  atorvastatin 20 milliGRAM(s) Oral at bedtime  ferrous    sulfate 325 milliGRAM(s) Oral two times a day  gabapentin 400 milliGRAM(s) Oral three times a day  levothyroxine 50 MICROGram(s) Oral daily  melatonin 3 milliGRAM(s) Oral at bedtime PRN  meropenem Injectable 1000 milliGRAM(s) IV Push every 8 hours  ondansetron Injectable 4 milliGRAM(s) IV Push every 6 hours PRN  pantoprazole    Tablet 40 milliGRAM(s) Oral before breakfast  ranolazine 500 milliGRAM(s) Oral two times a day  rivaroxaban 15 milliGRAM(s) Oral daily  sodium chloride 0.9%. 1000 milliLiter(s) IV Continuous <Continuous>  traMADol 25 milliGRAM(s) Oral every 6 hours PRN  traMADol 50 milliGRAM(s) Oral every 6 hours PRN      Vital Signs Last 24 Hrs  T(C): 36.3 (2025 08:55), Max: 36.9 (2025 04:34)  T(F): 97.4 (2025 08:55), Max: 98.4 (2025 04:34)  HR: 56 (2025 08:55) (56 - 81)  BP: 152/75 (2025 08:55) (120/67 - 152/75)  BP(mean): 95 (2025 21:00) (95 - 95)  RR: 18 (2025 08:55) (18 - 18)  SpO2: 99% (2025 08:55) (97% - 99%)    Parameters below as of 2025 08:55  Patient On (Oxygen Delivery Method): nasal cannula  O2 Flow (L/min): 2         25 @ 07:01  -  25 @ 07:00  --------------------------------------------------------  IN: 0 mL / OUT: 400 mL / NET: -400 mL        PHYSICAL EXAM:  CHEST/LUNG: Breathing comfortably, nasal cannula in place  HEART: Appears well perfused  ABDOMEN: Soft, Nontender, Nondistended  EXTREMITIES:  ulcers on bilateral lower legs  PELVIC: attempted speculum exam but due to patient discomfort and given symptoms unrelated to upper vagina, ended exam        EXTERNAL GENITALIA: Atrophic, narrow introitus. No rashes, lesions, or excoriations noted.         VAGINA: Atrophic No discharge noted, scant blood noted at posterior fourchette secondary to speculum exam        LABS:                        11.1   5.81  )-----------( 185      ( 2025 04:45 )             35.4     07-    142  |  103  |  35.2[H]  ----------------------------<  91  4.5   |  29.0  |  0.95    Ca    9.1      2025 04:45      Urinalysis Basic - ( 2025 04:45 )    Color: x / Appearance: x / SG: x / pH: x  Gluc: 91 mg/dL / Ketone: x  / Bili: x / Urobili: x   Blood: x / Protein: x / Nitrite: x   Leuk Esterase: x / RBC: x / WBC x   Sq Epi: x / Non Sq Epi: x / Bacteria: x

## 2025-07-01 NOTE — PROGRESS NOTE ADULT - ASSESSMENT
89yoF hx CAD s/p PCI (2018), Afib on Xarelto, HFpEF, chronic hypoxemic respiratory failure since 2/2025 admission for influenza on 2L NC, recurrent UTIs, presenting with persistent dysuria and urinary frequency.  Collateral hx obtained from daughter in law at bedside.  Pt has last treated for UTI in early June with Macrobid but has not had complete resolution of her dysuria and urinary frequency, prompting daughter to bring her to hospital.  Per daughter, pt has not had any hematuria.  Pt was pending outpatient urology appointment for cystoscopy as prior CT A/P in 3/2025 noted bladder nodularity and was unable to exclude a urothelial lesion, however daughter in law has been unable to get her to follow up appointment.  (22 Jun 2025 23:56)    Dysuria  Abnormal urine culture- UTI vs contamination  Bladder stones  Recurrent UTI  SORAIDA    - pt reports burning sensation when she urinates ? if true UTI vs vaginal atrophy, pain from bladder stones  - no fever or leukocytosis  - initial UA + RBC normal wbc, UCX esbl proteus and e. faecalis vanco resistant. unclear how this was collected  - bcx neg  - plan was for meropenem x 3 days however patient continues to c/o burning with urination. UA was repeated 6/30 via straight cath + pyuria and RBC  - please send urine culture via straight cath  - symptomatic treatment, pyridium, lubricants etc  - urology f/u noted  - GYN consulted f/u vaginitis panel  - Trend Fever  - Trend Leukocytosis      d/w Dr Rossi

## 2025-07-01 NOTE — CONSULT NOTE ADULT - ASSESSMENT
89y  LMP unknown who presents with 6 months of chronic dysuria which did not improve with antibiotics. Admitted for UTI and SORAIDA. Gynecology consulted to evaluate for vaginitis.    Vaginitis:  -Vaginal swabs collected - GC/Chlamydia and Vaginitis panel  -No discharge noted. No excoriations noted. Appearance resembles menopause associated atrophy.     No other gynecologic concerns at this time. Reports history of spotting in diaper but unclear origin and patient is s/p hysterectomy. 89y  LMP unknown who presents with 6 months of chronic dysuria which did not improve with antibiotics admitted for UTI/SORAIDA - urology consulted gynecology to evaluate for vaginitis.    - History and presentation not consistent with vaginitis. No discharge noted. No excoriations noted. Appearance resembles menopause associated atrophy. Vaginal swabs collected - GC/Chlamydia and Vaginitis panel; will f/u.  - No other gynecologic concerns at this time. Daughter-in-law reports history of spotting in diaper 6m ago but of unclear origin; patient is s/p hysterectomy and does not have risk factors for endometrial cancer.    Gynecology signed off at this time. Please reconsult as needed    D/w Dr. Hollis

## 2025-07-02 LAB
C TRACH RRNA SPEC QL NAA+PROBE: SIGNIFICANT CHANGE UP
CANDIDA AB TITR SER: SIGNIFICANT CHANGE UP
G VAGINALIS DNA SPEC QL NAA+PROBE: SIGNIFICANT CHANGE UP
N GONORRHOEA RRNA SPEC QL NAA+PROBE: SIGNIFICANT CHANGE UP
SPECIMEN SOURCE: SIGNIFICANT CHANGE UP
T VAGINALIS SPEC QL WET PREP: SIGNIFICANT CHANGE UP

## 2025-07-02 PROCEDURE — 99232 SBSQ HOSP IP/OBS MODERATE 35: CPT

## 2025-07-02 PROCEDURE — G0545: CPT

## 2025-07-02 PROCEDURE — 99233 SBSQ HOSP IP/OBS HIGH 50: CPT

## 2025-07-02 RX ADMIN — Medication 100 MILLIGRAM(S): at 06:27

## 2025-07-02 RX ADMIN — RANOLAZINE 500 MILLIGRAM(S): 1000 TABLET, FILM COATED, EXTENDED RELEASE ORAL at 05:08

## 2025-07-02 RX ADMIN — Medication 3 MILLIGRAM(S): at 21:19

## 2025-07-02 RX ADMIN — TRAMADOL HYDROCHLORIDE 50 MILLIGRAM(S): 50 TABLET, FILM COATED ORAL at 12:14

## 2025-07-02 RX ADMIN — RIVAROXABAN 15 MILLIGRAM(S): 10 TABLET, FILM COATED ORAL at 12:14

## 2025-07-02 RX ADMIN — RANOLAZINE 500 MILLIGRAM(S): 1000 TABLET, FILM COATED, EXTENDED RELEASE ORAL at 17:35

## 2025-07-02 RX ADMIN — TRAMADOL HYDROCHLORIDE 50 MILLIGRAM(S): 50 TABLET, FILM COATED ORAL at 13:30

## 2025-07-02 RX ADMIN — TRAMADOL HYDROCHLORIDE 50 MILLIGRAM(S): 50 TABLET, FILM COATED ORAL at 21:19

## 2025-07-02 RX ADMIN — Medication 325 MILLIGRAM(S): at 17:32

## 2025-07-02 RX ADMIN — ATORVASTATIN CALCIUM 20 MILLIGRAM(S): 80 TABLET, FILM COATED ORAL at 21:20

## 2025-07-02 RX ADMIN — MEROPENEM 1000 MILLIGRAM(S): 1 INJECTION INTRAVENOUS at 05:07

## 2025-07-02 RX ADMIN — Medication 100 MILLIGRAM(S): at 17:32

## 2025-07-02 RX ADMIN — MEROPENEM 1000 MILLIGRAM(S): 1 INJECTION INTRAVENOUS at 13:46

## 2025-07-02 RX ADMIN — Medication 40 MILLIGRAM(S): at 05:08

## 2025-07-02 RX ADMIN — GABAPENTIN 400 MILLIGRAM(S): 400 CAPSULE ORAL at 13:46

## 2025-07-02 RX ADMIN — Medication 325 MILLIGRAM(S): at 05:08

## 2025-07-02 RX ADMIN — Medication 50 MICROGRAM(S): at 05:08

## 2025-07-02 RX ADMIN — MEROPENEM 1000 MILLIGRAM(S): 1 INJECTION INTRAVENOUS at 21:21

## 2025-07-02 RX ADMIN — GABAPENTIN 400 MILLIGRAM(S): 400 CAPSULE ORAL at 05:07

## 2025-07-02 RX ADMIN — TRAMADOL HYDROCHLORIDE 50 MILLIGRAM(S): 50 TABLET, FILM COATED ORAL at 22:19

## 2025-07-02 RX ADMIN — GABAPENTIN 400 MILLIGRAM(S): 400 CAPSULE ORAL at 21:20

## 2025-07-02 NOTE — PROGRESS NOTE ADULT - SUBJECTIVE AND OBJECTIVE BOX
Patient is a 89y old  Female who presents with a chief complaint of Suspected UTI, SORAIDA (02 Jul 2025 10:17)      SUBJECTIVE / OVERNIGHT EVENTS:  REVIEW OF SYSTEMS: All systems are reviewed and found to be negative except above    MEDICATIONS  (STANDING):  atorvastatin 20 milliGRAM(s) Oral at bedtime  ferrous    sulfate 325 milliGRAM(s) Oral two times a day  gabapentin 400 milliGRAM(s) Oral three times a day  levothyroxine 50 MICROGram(s) Oral daily  meropenem Injectable 1000 milliGRAM(s) IV Push every 8 hours  pantoprazole    Tablet 40 milliGRAM(s) Oral before breakfast  ranolazine 500 milliGRAM(s) Oral two times a day  rivaroxaban 15 milliGRAM(s) Oral daily  sodium chloride 0.9%. 1000 milliLiter(s) (75 mL/Hr) IV Continuous <Continuous>    MEDICATIONS  (PRN):  acetaminophen     Tablet .. 650 milliGRAM(s) Oral every 6 hours PRN Temp greater or equal to 38C (100.4F), Mild Pain (1 - 3), Moderate Pain (4 - 6)  melatonin 3 milliGRAM(s) Oral at bedtime PRN Insomnia  ondansetron Injectable 4 milliGRAM(s) IV Push every 6 hours PRN Nausea and/or Vomiting  phenazopyridine 100 milliGRAM(s) Oral every 8 hours PRN dysuria  traMADol 25 milliGRAM(s) Oral every 6 hours PRN Moderate Pain (4 - 6)  traMADol 50 milliGRAM(s) Oral every 6 hours PRN Severe Pain (7 - 10)      CAPILLARY BLOOD GLUCOSE        I&O's Summary    01 Jul 2025 07:01  -  02 Jul 2025 07:00  --------------------------------------------------------  IN: 0 mL / OUT: 150 mL / NET: -150 mL        PHYSICAL EXAM:  Vital Signs Last 24 Hrs  T(C): 36.4 (02 Jul 2025 08:24), Max: 36.7 (02 Jul 2025 00:07)  T(F): 97.5 (02 Jul 2025 08:24), Max: 98.1 (02 Jul 2025 00:07)  HR: 67 (02 Jul 2025 08:24) (60 - 69)  BP: 154/66 (02 Jul 2025 08:24) (136/64 - 154/66)  BP(mean): --  RR: 18 (02 Jul 2025 08:24) (18 - 18)  SpO2: 98% (02 Jul 2025 08:24) (98% - 99%)    Parameters below as of 02 Jul 2025 08:24  Patient On (Oxygen Delivery Method): nasal cannula  O2 Flow (L/min): 2      CONSTITUTIONAL: NAD,  EYES: PERRLA; conjunctiva and sclera clear  ENMT: Moist oral mucosa,   RESPIRATORY: Normal respiratory effort; lungs are clear to auscultation bilaterally  CARDIOVASCULAR: Regular rate and rhythm, normal S1 and S2, no murmur   EXTS: No lower extremity edema; Peripheral pulses are 2+ bilaterally  ABDOMEN: Nontender to palpation, normoactive bowel sounds, no rebound/guarding;   MUSCLOSKELETAL:   no clubbing or cyanosis of digits; no joint swelling or tenderness to palpation  PSYCH: affect appropriate  NEUROLOGY: A+O to person, place, and time; CN 2-12 are intact and symmetric; no gross sensory deficits;       LABS:                        11.1   5.81  )-----------( 185      ( 01 Jul 2025 04:45 )             35.4     07-01    142  |  103  |  35.2[H]  ----------------------------<  91  4.5   |  29.0  |  0.95    Ca    9.1      01 Jul 2025 04:45            Urinalysis Basic - ( 01 Jul 2025 04:45 )    Color: x / Appearance: x / SG: x / pH: x  Gluc: 91 mg/dL / Ketone: x  / Bili: x / Urobili: x   Blood: x / Protein: x / Nitrite: x   Leuk Esterase: x / RBC: x / WBC x   Sq Epi: x / Non Sq Epi: x / Bacteria: x          RADIOLOGY & ADDITIONAL TESTS:  Results Reviewed:   Imaging Personally Reviewed:  Electrocardiogram Personally Reviewed:    COORDINATION OF CARE:  Care Discussed with Consultants/Other Providers [Y/N]:  Prior or Outpatient Records Reviewed [Y/N]:

## 2025-07-02 NOTE — PROGRESS NOTE ADULT - ASSESSMENT
89y/oF PMH CAD s/p PCI (2018), Afib on Xarelto, HFpEF, chronic hypoxemic respiratory failure since 2/2025 admission for influenza on 2L NC, recurrent UTIs, presenting with persistent dysuria and urinary frequency despite recent treatment for UTI w/ PO antibiotics    UTI  Urinary bladder calculi   -still c/o severe dysuria, burning in vagina"    -  ? if true UTI vs vaginal atrophy, pain from bladder stones  - no fever or leukocytosis  - initial UA + RBC normal wbc, UCX esbl proteus and e. faecalis vanco resistant. unclear how this was collected  - bcx neg  - plan was for meropenem x 3 days however patient continues to c/o burning with urination. UA was repeated 6/30 via straight cath + pyuria and RBC. meropenem was continued  - f/u UCX sent 7/1/25  - on pyridium  - urology rec [place],f/u out pt  - GYN consulted- per GYN appears to have menopause related atrophy  - f/u vaginitis panel and further gyn rec  d to Ertapenem 6/25 ->Meropenem 6/26   -spoke with GYN      SORAIDA likely 2/2 UTI with component of pre-renal from dehydration  -sCr improved s/p IVF     Hx possible urothelial lesion  -CT scan 3/2025 w/ nodularity in posterior bladder, inability to exclude lesion   -Urology consulted, recs no  intervention.  Op f/u with Dr Maldonado or Dr Mcginnis    Hx chronic hypoxemic respiratory failure  -No acute respiratory symptoms   -On baseline O2 requirements of 2L NC    Chronic diastolic HFpEF  -Holding furosemide due to SORAIDA. Resume on dc     Hx Afib  -cont Xarelto    Hx spinal stenosis  -cont gabapentin (home dose 600mg tid, decreased on admission for SORAIDA), increased dose 6/27, monitor bmp     Hx CAD with chronic stable angina  -cont ranolazine, atorvastatin     Hx hypothyroidism  -cont levothyroxine     Hx LLE ulcer  -Has dressing placed by home visiting RN  -Wound care consult recs appreciated    Dispo: pending improvement of symptoms.urine c/s,vaginal panel . home w/home care   dw pt  at bedside

## 2025-07-02 NOTE — PROGRESS NOTE ADULT - SUBJECTIVE AND OBJECTIVE BOX
[facility]                                                INFECTIOUS DISEASES  =======================================================                   Sami Nguyen#   Diogenes Zepeda MD#    Brody Durbin MD*                           Monica Miguel MD*   Neli Umaña MD*   Jaime Patrick *           Diplomates American Board of Internal Medicine & Infectious Diseases                  # [place] Office - Appt - Tel  [phone] Fax [phone]                * [place] Office - Appt - Tel [phone] Fax [phone]                                  Hospital Consult line:  [phone]  =======================================================      [facility]-[id number]  MAMTA TALAVERA   follow up for: r/o uti  c/o "burning in vagina, from the inside" denies burning sensation outside denies abdominal pain, flank pain  patient seen and examined.       I have personally reviewed the labs and data; pertinent labs and data are listed in this note; please see below.   ===================================================  REVIEW OF SYSTEMS:  CONSTITUTIONAL:  No Fever or chills  HEENT:  No diplopia or blurred vision.  No earache, sore throat or runny nose.  CARDIOVASCULAR:  No pressure, squeezing, strangling, tightness, heaviness or aching about the chest, neck, axilla or epigastrium.  RESPIRATORY:  No cough, shortness of breath  GASTROINTESTINAL:  No nausea, vomiting or diarrhea.  GENITOURINARY:  + dysuria, no frequency or urgency. No Blood in urine  MUSCULOSKELETAL:  no joint aches, no muscle pain  SKIN:  No change in skin, hair or nails.  NEUROLOGIC:  No Headaches, seizures or weakness.  PSYCHIATRIC:  No disorder of thought or mood.  ENDOCRINE:  No heat or cold intolerance  HEMATOLOGICAL:  No easy bruising or bleeding.    =======================================================  Allergies    No Known Allergies    Intolerances    Antibiotics:  meropenem Injectable 1000 milliGRAM(s) IV Push every 8 hours    Other medications:  atorvastatin 20 milliGRAM(s) Oral at bedtime  ferrous    sulfate 325 milliGRAM(s) Oral two times a day  gabapentin 400 milliGRAM(s) Oral three times a day  levothyroxine 50 MICROGram(s) Oral daily  pantoprazole    Tablet 40 milliGRAM(s) Oral before breakfast  ranolazine 500 milliGRAM(s) Oral two times a day  rivaroxaban 15 milliGRAM(s) Oral daily  sodium chloride 0.9%. 1000 milliLiter(s) IV Continuous <Continuous>    ======================================================  Physical Exam:  ============  Vital Signs Last 24 Hrs  T(C): 36.4 (02 Jul 2025 08:24), Max: 36.7 (02 Jul 2025 00:07)  T(F): 97.5 (02 Jul 2025 08:24), Max: 98.1 (02 Jul 2025 00:07)  HR: 67 (02 Jul 2025 08:24) (60 - 69)  BP: 154/66 (02 Jul 2025 08:24) (136/64 - 154/66)  BP(mean): --  RR: 18 (02 Jul 2025 08:24) (18 - 18)  SpO2: 98% (02 Jul 2025 08:24) (98% - 99%)    Parameters below as of 02 Jul 2025 08:24  Patient On (Oxygen Delivery Method): nasal cannula  O2 Flow (L/min): 2      General:  No acute distress elderly lady layin in bed, Ramona, on  o2  Eye: no conjunctival pallor, no scleral icterus  Respiratory: Lungs are clear to auscultation, Respirations are non-labored.  Cardiovascular: Normal rate, Regular rhythm,  s1+s2  Gastrointestinal: Soft, Non-tender, Non-distended, Normal bowel sounds.  Genitourinary: No costovertebral angle tenderness gilliland  Integumentary: No rash.  Neurologic: Alert, Oriented, No focal deficits  Psychiatric: Appropriate mood & affect.  =======================================================  Labs:                                      11.1   5.81  )-----------( 185      ( 01 Jul 2025 04:45 )             35.4       07-01    142  |  103  |  35.2[H]  ----------------------------<  91  4.5   |  29.0  |  0.95    Ca    9.1      01 Jul 2025 04:45                Urinalysis Basic - ( 01 Jul 2025 04:45 )    Color: x / Appearance: x / SG: x / pH: x  Gluc: 91 mg/dL / Ketone: x  / Bili: x / Urobili: x   Blood: x / Protein: x / Nitrite: x   Leuk Esterase: x / RBC: x / WBC x   Sq Epi: x / Non Sq Epi: x / Bacteria: x                  CAPILLARY BLOOD GLUCOSE                    Culture - Blood (collected 06-22-25 @ 18:38)  Source: Blood Blood-Peripheral  Final Report (06-28-25 @ 02:00):    No growth at 5 days    Urinalysis with Rflx Culture (collected 06-22-25 @ 17:31)    Culture - Urine (collected 06-22-25 @ 17:31)  Source: Clean Catch  Final Report (06-26-25 @ 11:32):    50,000 - 99,000 CFU/mL Proteus mirabilis ESBL    50,000 - 99,000 CFU/mL Enterococcus faecalis (vancomycin resistant)  Organism: Proteus mirabilis ESBL  Enterococcus faecalis (vancomycin resistant) (06-26-25 @ 11:32)  Organism: Enterococcus faecalis (vancomycin resistant) (06-26-25 @ 11:32)    Sensitivities:      -  Levofloxacin: R >4      -  Nitrofurantoin: S <=32 Should not be used to treat pyelonephritis.      -  Daptomycin: S 1      -  Linezolid: S 2      -  Vancomycin: R >16      -  Ciprofloxacin: R >2      -  Ampicillin: S <=2 Predicts results to ampicillin/sulbactam, amoxacillin-clavulanate and  piperacillin-tazobactam.      -  Tetracycline: R >8      Method Type: KATHARINE  Organism: Proteus mirabilis ESBL (06-26-25 @ 11:32)    Sensitivities:      -  Levofloxacin: R 2      -  Tobramycin: S <=2      -  Nitrofurantoin: R >64 Should not be used to treat pyelonephritis      -  Aztreonam: R <=4      -  Gentamicin: S <=2      -  Cefepime: R >16      -  Cefazolin: R >16 For uncomplicated UTI with K. pneumoniae, E. coli, or P. mirablis: KATHARINE <=16 is sensitive and KATHARINE >=32 is resistant. This also predicts results for oral agents cefaclor, cefdinir, cefpodoxime, cefprozil, cefuroxime axetil, cephalexin and locarbef for uncomplicated UTI. Note that some isolates may be susceptible to these agents while testing resistant to cefazolin.      -  Piperacillin/Tazobactam: S <=8      -  Ciprofloxacin: R >2      -  Ceftriaxone: R >32      -  Ampicillin: R >16 These ampicillin results predict results for amoxicillin      Method Type: KATHARINE      -  Meropenem: S <=1      -  Ampicillin/Sulbactam: S 8/4      -  Cefuroxime: R >16      -  Trimethoprim/Sulfamethoxazole: R >2/38      -  Ertapenem: S <=0.5    Culture - Urine (collected 03-09-25 @ 20:45)  Source: Clean Catch Clean Catch (Midstream)  Final Report (03-13-25 @ 10:47):    50,000 - 99,000 CFU/mL Clostridium tertium "Susceptibilities not    performed"    Culture - Urine (collected 02-12-25 @ 05:15)  Source: Clean Catch Clean Catch (Midstream)  Final Report (02-15-25 @ 06:43):    >100,000 CFU/ml Escherichia coli  Organism: Escherichia coli (02-15-25 @ 06:43)  Organism: Escherichia coli (02-15-25 @ 06:43)    Sensitivities:      -  Levofloxacin: S <=0.5      -  Tobramycin: S <=2      -  Nitrofurantoin: S <=32 Should not be used to treat pyelonephritis      -  Aztreonam: S <=4      -  Gentamicin: S <=2      -  Cefazolin: S <=2 For uncomplicated UTI with K. pneumoniae, E. coli, or P. mirablis: KATHARINE <=16 is sensitive and KATHARINE >=32 is resistant. This also predicts results for oral agents cefaclor, cefdinir, cefpodoxime, cefprozil, cefuroxime axetil, cephalexin and locarbef for uncomplicated UTI. Note that some isolates may be susceptible to these agents while testing resistant to cefazolin.      -  Cefepime: S <=2      -  Piperacillin/Tazobactam: S <=8      -  Ciprofloxacin: S <=0.25      -  Imipenem: S <=1      -  Ceftriaxone: S <=1      -  Ampicillin: S <=8 These ampicillin results predict results for amoxicillin      Method Type: KATHARINE      -  Meropenem: S <=1      -  Ampicillin/Sulbactam: S <=4/2      -  Cefoxitin: S <=8      -  Cefuroxime: S <=4      -  Amoxicillin/Clavulanic Acid: S <=8/4      -  Trimethoprim/Sulfamethoxazole: S <=0.5/9.5      -  Ertapenem: S <=0.5    Culture - Blood (collected 02-11-25 @ 16:10)  Source: .Blood BLOOD  Final Report (02-17-25 @ 01:00):    No growth at 5 days    Culture - Urine (collected 10-28-24 @ 12:47)  Source: Clean Catch Clean Catch (Midstream)  Final Report (10-29-24 @ 17:33):    <10,000 CFU/mL Normal Urogenital Aliya    Culture - Urine (collected 11-08-23 @ 02:00)  Source: Catheterized Catheterized  Final Report (11-09-23 @ 11:24):    No growth    Culture - Urine (collected 11-07-23 @ 21:20)  Source: Clean Catch Clean Catch (Midstream)  Final Report (11-10-23 @ 10:51):    >100,000 CFU/ml Proteus mirabilis  Organism: Proteus mirabilis (11-10-23 @ 10:51)  Organism: Proteus mirabilis (11-10-23 @ 10:51)    Sensitivities:      -  Levofloxacin: R >4      -  Tobramycin: S <=2      -  Nitrofurantoin: R 64 Should not be used to treat pyelonephritis      -  Aztreonam: S <=4      -  Gentamicin: S <=2      -  Cefazolin: S <=2 For uncomplicated UTI with K. pneumoniae, E. coli, or P. mirablis: KATHARINE <=16 is sensitive and KATHARINE >=32 is resistant. This also predicts results for oral agents cefaclor, cefdinir, cefpodoxime, cefprozil, cefuroxime axetil, cephalexin and locarbef for uncomplicated UTI. Note that some isolates may be susceptible to these agents while testing resistant to cefazolin.      -  Cefepime: S <=2      -  Piperacillin/Tazobactam: S <=8      -  Ciprofloxacin: R >2      -  Ceftriaxone: S <=1      -  Ampicillin: S <=8 These ampicillin results predict results for amoxicillin      Method Type: KATHARINE      -  Meropenem: S <=1      -  Ampicillin/Sulbactam: S <=4/2      -  Cefoxitin: S <=8      -  Cefuroxime: S <=4      -  Amoxicillin/Clavulanic Acid: S <=8/4      -  Trimethoprim/Sulfamethoxazole: R >2/38      -  Ertapenem: S <=0.5

## 2025-07-02 NOTE — PROGRESS NOTE ADULT - ASSESSMENT
89yoF hx CAD s/p PCI (2018), Afib on Xarelto, HFpEF, chronic hypoxemic respiratory failure since 2/2025 admission for influenza on 2L NC, recurrent UTIs, presenting with persistent dysuria and urinary frequency.  Collateral hx obtained from daughter in law at bedside.  Pt has last treated for UTI in early June with Macrobid but has not had complete resolution of her dysuria and urinary frequency, prompting daughter to bring her to hospital.  Per daughter, pt has not had any hematuria.  Pt was pending outpatient urology appointment for cystoscopy as prior CT A/P in 3/2025 noted bladder nodularity and was unable to exclude a urothelial lesion, however daughter in law has been unable to get her to follow up appointment.  (22 Jun 2025 23:56)    Dysuria  Abnormal urine culture- UTI vs contamination  Bladder stones  Recurrent UTI  SORAIDA    - pt reports burning sensation when she urinates reports "burning in vagina" ? if true UTI vs vaginal atrophy, pain from bladder stones  - no fever or leukocytosis  - initial UA + RBC normal wbc, UCX esbl proteus and e. faecalis vanco resistant. unclear how this was collected  - bcx neg  - plan was for meropenem x 3 days however patient continues to c/o burning with urination. UA was repeated 6/30 via straight cath + pyuria and RBC. 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

## 2025-07-03 LAB
CULTURE RESULTS: NO GROWTH — SIGNIFICANT CHANGE UP
SPECIMEN SOURCE: SIGNIFICANT CHANGE UP

## 2025-07-03 PROCEDURE — G0545: CPT

## 2025-07-03 PROCEDURE — 99232 SBSQ HOSP IP/OBS MODERATE 35: CPT

## 2025-07-03 RX ORDER — ESTROGENS, CONJUGATED 0.625 MG/G
0.5 CREAM WITH APPLICATOR VAGINAL
Qty: 1 | Refills: 0
Start: 2025-07-03 | End: 2025-08-01

## 2025-07-03 RX ORDER — ESTROGENS, CONJUGATED 0.625 MG/G
0.5 CREAM WITH APPLICATOR VAGINAL DAILY
Refills: 0 | Status: DISCONTINUED | OUTPATIENT
Start: 2025-07-03 | End: 2025-07-04

## 2025-07-03 RX ORDER — PHENAZOPYRIDINE HCL 100 MG
100 TABLET ORAL EVERY 8 HOURS
Refills: 0 | Status: DISCONTINUED | OUTPATIENT
Start: 2025-07-03 | End: 2025-07-04

## 2025-07-03 RX ADMIN — Medication 100 MILLIGRAM(S): at 12:14

## 2025-07-03 RX ADMIN — GABAPENTIN 400 MILLIGRAM(S): 400 CAPSULE ORAL at 05:01

## 2025-07-03 RX ADMIN — Medication 325 MILLIGRAM(S): at 05:02

## 2025-07-03 RX ADMIN — RIVAROXABAN 15 MILLIGRAM(S): 10 TABLET, FILM COATED ORAL at 12:14

## 2025-07-03 RX ADMIN — Medication 325 MILLIGRAM(S): at 17:45

## 2025-07-03 RX ADMIN — TRAMADOL HYDROCHLORIDE 50 MILLIGRAM(S): 50 TABLET, FILM COATED ORAL at 21:13

## 2025-07-03 RX ADMIN — GABAPENTIN 400 MILLIGRAM(S): 400 CAPSULE ORAL at 15:48

## 2025-07-03 RX ADMIN — MEROPENEM 1000 MILLIGRAM(S): 1 INJECTION INTRAVENOUS at 05:00

## 2025-07-03 RX ADMIN — RANOLAZINE 500 MILLIGRAM(S): 1000 TABLET, FILM COATED, EXTENDED RELEASE ORAL at 05:01

## 2025-07-03 RX ADMIN — TRAMADOL HYDROCHLORIDE 50 MILLIGRAM(S): 50 TABLET, FILM COATED ORAL at 22:13

## 2025-07-03 RX ADMIN — GABAPENTIN 400 MILLIGRAM(S): 400 CAPSULE ORAL at 21:14

## 2025-07-03 RX ADMIN — Medication 3 MILLIGRAM(S): at 21:14

## 2025-07-03 RX ADMIN — Medication 0.5 GRAM(S): at 16:30

## 2025-07-03 RX ADMIN — Medication 100 MILLIGRAM(S): at 15:47

## 2025-07-03 RX ADMIN — Medication 40 MILLIGRAM(S): at 05:02

## 2025-07-03 RX ADMIN — Medication 100 MILLIGRAM(S): at 21:14

## 2025-07-03 RX ADMIN — RANOLAZINE 500 MILLIGRAM(S): 1000 TABLET, FILM COATED, EXTENDED RELEASE ORAL at 17:45

## 2025-07-03 RX ADMIN — ATORVASTATIN CALCIUM 20 MILLIGRAM(S): 80 TABLET, FILM COATED ORAL at 21:14

## 2025-07-03 RX ADMIN — Medication 50 MICROGRAM(S): at 05:02

## 2025-07-03 NOTE — PHYSICAL THERAPY INITIAL EVALUATION ADULT - CRITERIA FOR SKILLED THERAPEUTIC INTERVENTIONS
PT continues to recommend [facility], however if [facility] is not available, pt will need the following for safe DC home: transport services to get pt into home as pt cannot ambulate the stairs to enter, home at w/c level with total assist from family for bed to/from w/c transfers, hands on assist from family for all mobility needs./impairments found/functional limitations in following categories/anticipated discharge recommendation PT continues to recommend [facility], however if [facility] is not available, pt will need the following for safe DC home: transport services to get pt into home as pt cannot ambulate the stairs to enter, home at w/c level with 24/7 supervision, total assist for bed to/from w/c transfers, hands on assist for all mobility needs./impairments found/functional limitations in following categories/anticipated discharge recommendation

## 2025-07-03 NOTE — PHYSICAL THERAPY INITIAL EVALUATION ADULT - IMPAIRMENTS FOUND, PT EVAL
cognitive impairment/gait, locomotion, and balance/gross motor/integumentary integrity
aerobic capacity/endurance/arousal, attention, and cognition/gait, locomotion, and balance

## 2025-07-03 NOTE — PHYSICAL THERAPY INITIAL EVALUATION ADULT - MANUAL MUSCLE TESTING RESULTS, REHAB EVAL
Grossly assessed: BLEs and BUEs at least 3/5 as observed during functional mobility.
3-/5 throughout/grossly assessed due to

## 2025-07-03 NOTE — PHYSICAL THERAPY INITIAL EVALUATION ADULT - ADDITIONAL COMMENTS
Pt is too confused to provide social and functional history. the following PLOF is gathered from prior PT evaluation when pts family was present to provide information: "per family/EMR pt has RWx2, WC, SAC, shower chair, oxygen and DIL provides 1A for all mobility. DIL reports pt uses RW for mobility at 1A level, household level distances. Has 4STE unclear handrail, wound care 3x a week and PT at prior. Lives with DIL who completes all IADLs and self care tasks."
per family/EMR pt has RWx2, WC, SAC, shower chair, oxygen and DIL provides 1A for all mobility. DIL reports pt uses RW for mobility at 1A level, household level distances. Has 4STE unclear handrail, wound care 3x a week and PT at prior. Lives with DIL who completes all IADLs and self care tasks.

## 2025-07-03 NOTE — PROGRESS NOTE ADULT - TIME BILLING
Time spent reviewing the chart documentation, reviewing labs and imaging studies, evaluating the patient, discussing the plan of care with the consultants & medical team, and documenting.
Time spent reviewing the chart documentation, reviewing labs and imaging studies, evaluating the patient, discussing the plan of care with the medical team, and documenting.
Time spent reviewing the chart documentation, reviewing labs and imaging studies, evaluating the patient, discussing the plan of care with the  medical team, and documenting.
chart review, patient eval
Time spent reviewing the chart documentation, reviewing labs and imaging studies, evaluating the patient, discussing the plan of care with the consultants & medical team, and documenting.

## 2025-07-03 NOTE — PHYSICAL THERAPY INITIAL EVALUATION ADULT - LEVEL OF INDEPENDENCE: GAIT, REHAB EVAL
pt unable to tolerate standing positioning due to weakness, pt also reporting fear of falling and demonstrates posterior lean toward bed in standing, unsafe to attempt ambulation away from bedside./unable to perform
dependent (less than 25% patients effort)

## 2025-07-03 NOTE — PROGRESS NOTE ADULT - REASON FOR ADMISSION
Suspected UTI, SORAIDA

## 2025-07-03 NOTE — PHYSICAL THERAPY INITIAL EVALUATION ADULT - PERTINENT HX OF CURRENT PROBLEM, REHAB EVAL
Dx: UTI  PMH per H&P: "89yoF hx CAD s/p PCI (2018), Afib on Xarelto, HFpEF, chronic hypoxemic respiratory failure since 2/2025 admission for influenza on 2L NC, recurrent UTIs, "
reports to [facility] ED for suspected UTI - pelvic pain and dysuria x 1 month. recent treatment of UTI

## 2025-07-03 NOTE — PROGRESS NOTE ADULT - ASSESSMENT
89yoF hx CAD s/p PCI (2018), Afib on Xarelto, HFpEF, chronic hypoxemic respiratory failure since 2/2025 admission for influenza on 2L NC, recurrent UTIs, presenting with persistent dysuria and urinary frequency.  Collateral hx obtained from daughter in law at bedside.  Pt has last treated for UTI in early June with Macrobid but has not had complete resolution of her dysuria and urinary frequency, prompting daughter to bring her to hospital.  Per daughter, pt has not had any hematuria.  Pt was pending outpatient urology appointment for cystoscopy as prior CT A/P in 3/2025 noted bladder nodularity and was unable to exclude a urothelial lesion, however daughter in law has been unable to get her to follow up appointment.  (22 Jun 2025 23:56)    Dysuria  Abnormal urine culture- UTI vs contamination  Bladder stones  Recurrent UTI  SORAIDA    - pt reports burning sensation when she urinates reports "burning in vagina" -? if true UTI vs vaginal atrophy, pain from bladder stones  - no fever or leukocytosis  - initial UA + RBC normal wbc, UCX esbl proteus and e. faecalis vanco resistant. unclear how this was collected  - bcx neg  - plan was for meropenem x 3 days however patient continued to c/o burning with urination. UA was repeated 6/30 via straight cath + pyuria and RBC. 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

## 2025-07-03 NOTE — PHYSICAL THERAPY INITIAL EVALUATION ADULT - DIAGNOSIS, PT EVAL
decreased balance, coordination and strength
Pt demonstrates functional limitations in bed mobility and transfers due to decreased strength, decreased balance, and fear avoidance behaviors.

## 2025-07-03 NOTE — PHYSICAL THERAPY INITIAL EVALUATION ADULT - BALANCE DISTURBANCE, IDENTIFIED IMPAIRMENT CONTRIBUTE, REHAB EVAL
impaired postural control/decreased ROM/decreased strength
impaired coordination/impaired motor control/impaired postural control

## 2025-07-03 NOTE — PHYSICAL THERAPY INITIAL EVALUATION ADULT - IMPAIRED TRANSFERS: SIT/STAND, REHAB EVAL
impaired balance/impaired coordination/impaired motor control/impaired postural control
impaired balance/cognition/decreased flexibility/impaired postural control/decreased ROM/decreased strength

## 2025-07-03 NOTE — PHYSICAL THERAPY INITIAL EVALUATION ADULT - PLANNED THERAPY INTERVENTIONS, PT EVAL
bed mobility training/gait training/transfer training
stairs/bed mobility training/gait training/motor coordination training/transfer training

## 2025-07-03 NOTE — PROGRESS NOTE ADULT - NUTRITIONAL ASSESSMENT
This patient has been assessed with a concern for Malnutrition and has been determined to have a diagnosis/diagnoses of Moderate protein-calorie malnutrition.    This patient is being managed with:   Diet DASH/TLC-  Sodium & Cholesterol Restricted  Entered: Jun 22 2025 11:59PM  

## 2025-07-03 NOTE — PROGRESS NOTE ADULT - SUBJECTIVE AND OBJECTIVE BOX
[facility]                                                INFECTIOUS DISEASES  =======================================================                   Sami Nguyen#   Diogenes Zepeda MD#    Brody Durbin MD*                           Monica Miguel MD*   Neli Umaña MD*   Jaime Patrick *           Diplomates American Board of Internal Medicine & Infectious Diseases                  # [place] Office - Appt - Tel  [phone] Fax [phone]                * [place] Office - Appt - Tel [phone] Fax [phone]                                  Hospital Consult line:  [phone]  =======================================================      [facility]-[id number]  MAMTA TALAVERA   follow up for: r/o uti  pt c/o burning when she pees   states it is outside, in the vagina  patient seen and examined.       I have personally reviewed the labs and data; pertinent labs and data are listed in this note; please see below.   ===================================================  REVIEW OF SYSTEMS:  CONSTITUTIONAL:  No Fever or chills  HEENT:  No diplopia or blurred vision.  No earache, sore throat or runny nose.  CARDIOVASCULAR:  No pressure, squeezing, strangling, tightness, heaviness or aching about the chest, neck, axilla or epigastrium.  RESPIRATORY:  No cough, shortness of breath  GASTROINTESTINAL:  No nausea, vomiting or diarrhea.  GENITOURINARY:  No dysuria, frequency or urgency. No Blood in urine  MUSCULOSKELETAL:  no joint aches, no muscle pain  SKIN:  No change in skin, hair or nails.  NEUROLOGIC:  No Headaches, seizures or weakness.  PSYCHIATRIC:  No disorder of thought or mood.  ENDOCRINE:  No heat or cold intolerance  HEMATOLOGICAL:  No easy bruising or bleeding.    =======================================================  Allergies    No Known Allergies    Intolerances    Antibiotics:    Other medications:  atorvastatin 20 milliGRAM(s) Oral at bedtime  ferrous    sulfate 325 milliGRAM(s) Oral two times a day  gabapentin 400 milliGRAM(s) Oral three times a day  levothyroxine 50 MICROGram(s) Oral daily  pantoprazole    Tablet 40 milliGRAM(s) Oral before breakfast  phenazopyridine 100 milliGRAM(s) Oral every 8 hours  ranolazine 500 milliGRAM(s) Oral two times a day  rivaroxaban 15 milliGRAM(s) Oral daily  sodium chloride 0.9%. 1000 milliLiter(s) IV Continuous <Continuous>    ======================================================  Physical Exam:  ============  T(F): 97.6 (03 Jul 2025 07:57), Max: 98 (02 Jul 2025 15:39)  HR: 73 (03 Jul 2025 07:57)  BP: 117/64 (03 Jul 2025 07:57)  RR: 18 (03 Jul 2025 07:57)  SpO2: 92% (03 Jul 2025 07:57) (92% - 98%)  temp max in last 48H T(F): , Max: 98.1 (07-02-25 @ 00:07)    General:  No acute distress. [place]  Eye: no conjunctival pallor, no scleral icterus  Respiratory: Lungs are clear to auscultation, Respirations are non-labored.  Cardiovascular: Normal rate, Regular rhythm,  s1+s2  Gastrointestinal: Soft, Non-tender, Non-distended, Normal bowel sounds.  Genitourinary: No costovertebral angle tenderness. g[place]  Integumentary: No rash.  Neurologic: Alert, Oriented, No focal deficits  Psychiatric: Appropriate mood & affect.  =======================================================  Labs:            Culture - Urine (collected 07-01-25 @ 18:00)  Source: Catheterized Catheterized  Final Report (07-03-25 @ 08:35):    No growth    Culture - Blood (collected 06-22-25 @ 18:38)  Source: Blood Blood-Peripheral  Final Report (06-28-25 @ 02:00):    No growth at 5 days    Urinalysis with Rflx Culture (collected 06-22-25 @ 17:31)    Culture - Urine (collected 06-22-25 @ 17:31)  Source: Clean Catch  Final Report (06-26-25 @ 11:32):    50,000 - 99,000 CFU/mL Proteus mirabilis ESBL    50,000 - 99,000 CFU/mL Enterococcus faecalis (vancomycin resistant)  Organism: Proteus mirabilis ESBL  Enterococcus faecalis (vancomycin resistant) (06-26-25 @ 11:32)  Organism: Enterococcus faecalis (vancomycin resistant) (06-26-25 @ 11:32)    Sensitivities:      -  Levofloxacin: R >4      -  Nitrofurantoin: S <=32 Should not be used to treat pyelonephritis.      -  Daptomycin: S 1      -  Linezolid: S 2      -  Vancomycin: R >16      -  Ciprofloxacin: R >2      -  Ampicillin: S <=2 Predicts results to ampicillin/sulbactam, amoxacillin-clavulanate and  piperacillin-tazobactam.      -  Tetracycline: R >8      Method Type: KATHARINE  Organism: Proteus mirabilis ESBL (06-26-25 @ 11:32)    Sensitivities:      -  Levofloxacin: R 2      -  Tobramycin: S <=2      -  Nitrofurantoin: R >64 Should not be used to treat pyelonephritis      -  Aztreonam: R <=4      -  Gentamicin: S <=2      -  Cefepime: R >16      -  Cefazolin: R >16 For uncomplicated UTI with K. pneumoniae, E. coli, or P. mirablis: KATHARINE <=16 is sensitive and KATHARINE >=32 is resistant. This also predicts results for oral agents cefaclor, cefdinir, cefpodoxime, cefprozil, cefuroxime axetil, cephalexin and locarbef for uncomplicated UTI. Note that some isolates may be susceptible to these agents while testing resistant to cefazolin.      -  Piperacillin/Tazobactam: S <=8      -  Ciprofloxacin: R >2      -  Ceftriaxone: R >32      -  Ampicillin: R >16 These ampicillin results predict results for amoxicillin      Method Type: KATHARINE      -  Meropenem: S <=1      -  Ampicillin/Sulbactam: S 8/4      -  Cefuroxime: R >16      -  Trimethoprim/Sulfamethoxazole: R >2/38      -  Ertapenem: S <=0.5    Culture - Urine (collected 03-09-25 @ 20:45)  Source: Clean Catch Clean Catch (Midstream)  Final Report (03-13-25 @ 10:47):    50,000 - 99,000 CFU/mL Clostridium tertium "Susceptibilities not    performed"    Culture - Urine (collected 02-12-25 @ 05:15)  Source: Clean Catch Clean Catch (Midstream)  Final Report (02-15-25 @ 06:43):    >100,000 CFU/ml Escherichia coli  Organism: Escherichia coli (02-15-25 @ 06:43)  Organism: Escherichia coli (02-15-25 @ 06:43)    Sensitivities:      -  Levofloxacin: S <=0.5      -  Tobramycin: S <=2      -  Nitrofurantoin: S <=32 Should not be used to treat pyelonephritis      -  Aztreonam: S <=4      -  Gentamicin: S <=2      -  Cefazolin: S <=2 For uncomplicated UTI with K. pneumoniae, E. coli, or P. mirablis: KATHARINE <=16 is sensitive and KATHARINE >=32 is resistant. This also predicts results for oral agents cefaclor, cefdinir, cefpodoxime, cefprozil, cefuroxime axetil, cephalexin and locarbef for uncomplicated UTI. Note that some isolates may be susceptible to these agents while testing resistant to cefazolin.      -  Cefepime: S <=2      -  Piperacillin/Tazobactam: S <=8      -  Ciprofloxacin: S <=0.25      -  Imipenem: S <=1      -  Ceftriaxone: S <=1      -  Ampicillin: S <=8 These ampicillin results predict results for amoxicillin      Method Type: KATHARINE      -  Meropenem: S <=1      -  Ampicillin/Sulbactam: S <=4/2      -  Cefoxitin: S <=8      -  Cefuroxime: S <=4      -  Amoxicillin/Clavulanic Acid: S <=8/4      -  Trimethoprim/Sulfamethoxazole: S <=0.5/9.5      -  Ertapenem: S <=0.5    Culture - Blood (collected 02-11-25 @ 16:10)  Source: .Blood BLOOD  Final Report (02-17-25 @ 01:00):    No growth at 5 days    Culture - Urine (collected 10-28-24 @ 12:47)  Source: Clean Catch Clean Catch (Midstream)  Final Report (10-29-24 @ 17:33):    <10,000 CFU/mL Normal Urogenital Aliya    Culture - Urine (collected 11-08-23 @ 02:00)  Source: Catheterized Catheterized  Final Report (11-09-23 @ 11:24):    No growth    Culture - Urine (collected 11-07-23 @ 21:20)  Source: Clean Catch Clean Catch (Midstream)  Final Report (11-10-23 @ 10:51):    >100,000 CFU/ml Proteus mirabilis  Organism: Proteus mirabilis (11-10-23 @ 10:51)  Organism: Proteus mirabilis (11-10-23 @ 10:51)    Sensitivities:      -  Levofloxacin: R >4      -  Tobramycin: S <=2      -  Nitrofurantoin: R 64 Should not be used to treat pyelonephritis      -  Aztreonam: S <=4      -  Gentamicin: S <=2      -  Cefazolin: S <=2 For uncomplicated UTI with K. pneumoniae, E. coli, or P. mirablis: KATHARINE <=16 is sensitive and KATHARINE >=32 is resistant. This also predicts results for oral agents cefaclor, cefdinir, cefpodoxime, cefprozil, cefuroxime axetil, cephalexin and locarbef for uncomplicated UTI. Note that some isolates may be susceptible to these agents while testing resistant to cefazolin.      -  Cefepime: S <=2      -  Piperacillin/Tazobactam: S <=8      -  Ciprofloxacin: R >2      -  Ceftriaxone: S <=1      -  Ampicillin: S <=8 These ampicillin results predict results for amoxicillin      Method Type: KATHARINE      -  Meropenem: S <=1      -  Ampicillin/Sulbactam: S <=4/2      -  Cefoxitin: S <=8      -  Cefuroxime: S <=4      -  Amoxicillin/Clavulanic Acid: S <=8/4      -  Trimethoprim/Sulfamethoxazole: R >2/38      -  Ertapenem: S <=0.5

## 2025-07-03 NOTE — PHYSICAL THERAPY INITIAL EVALUATION ADULT - GENERAL OBSERVATIONS, REHAB EVAL
received semifowler with 2L O2, purwick+, IV lock, 0/10 pain, DIL present and consent obtained to continue, pt agreeing to PT eval with max encouragement from therapist and family
Pt received reclining in bed, bedrails x 4, bed alarm on, CBWR, and pure wick intact. Pt is confused yet agreeable to PT evaluation.

## 2025-07-03 NOTE — PHYSICAL THERAPY INITIAL EVALUATION ADULT - RANGE OF MOTION EXAMINATION, REHAB EVAL
BLEs: ankle and knee ROM WFL, hip extension limited to approximately -24 degrees from neutral in standing. Trunk ROM limited with kyphotic posture./bilateral lower extremity ROM was WFL (within functional limits)
shoulders david limited to 90degrees flexion. unable to stand fully upright/bilateral upper extremity ROM was WFL (within functional limits)/bilateral lower extremity ROM was WFL (within functional limits)

## 2025-07-03 NOTE — PROGRESS NOTE ADULT - SUBJECTIVE AND OBJECTIVE BOX
Patient is a 89y old  Female who presents with a chief complaint of Suspected UTI, SORAIDA (03 Jul 2025 10:10)    pt is c/o intermittent vaginal pain. no fever,chill  REVIEW OF SYSTEMS: All systems are reviewed and found to be negative except above    MEDICATIONS  (STANDING):  atorvastatin 20 milliGRAM(s) Oral at bedtime  estrogens  Cream 0.5 Gram(s) Vaginal daily  ferrous    sulfate 325 milliGRAM(s) Oral two times a day  gabapentin 400 milliGRAM(s) Oral three times a day  levothyroxine 50 MICROGram(s) Oral daily  pantoprazole    Tablet 40 milliGRAM(s) Oral before breakfast  phenazopyridine 100 milliGRAM(s) Oral every 8 hours  ranolazine 500 milliGRAM(s) Oral two times a day  rivaroxaban 15 milliGRAM(s) Oral daily  sodium chloride 0.9%. 1000 milliLiter(s) (75 mL/Hr) IV Continuous <Continuous>    MEDICATIONS  (PRN):  acetaminophen     Tablet .. 650 milliGRAM(s) Oral every 6 hours PRN Temp greater or equal to 38C (100.4F), Mild Pain (1 - 3), Moderate Pain (4 - 6)  melatonin 3 milliGRAM(s) Oral at bedtime PRN Insomnia  ondansetron Injectable 4 milliGRAM(s) IV Push every 6 hours PRN Nausea and/or Vomiting  traMADol 25 milliGRAM(s) Oral every 6 hours PRN Moderate Pain (4 - 6)  traMADol 50 milliGRAM(s) Oral every 6 hours PRN Severe Pain (7 - 10)      CAPILLARY BLOOD GLUCOSE        I&O's Summary    02 Jul 2025 07:01  -  03 Jul 2025 07:00  --------------------------------------------------------  IN: 240 mL / OUT: 250 mL / NET: -10 mL        PHYSICAL EXAM:  Vital Signs Last 24 Hrs  T(C): 36.4 (03 Jul 2025 07:57), Max: 36.7 (02 Jul 2025 15:39)  T(F): 97.6 (03 Jul 2025 07:57), Max: 98 (02 Jul 2025 15:39)  HR: 73 (03 Jul 2025 07:57) (69 - 83)  BP: 117/64 (03 Jul 2025 07:57) (117/64 - 168/84)  BP(mean): --  RR: 18 (03 Jul 2025 07:57) (15 - 18)  SpO2: 92% (03 Jul 2025 07:57) (92% - 98%)    Parameters below as of 03 Jul 2025 05:19  Patient On (Oxygen Delivery Method): nasal cannula  O2 Flow (L/min): 2      CONSTITUTIONAL: NAD,  EYES: PERRLA; conjunctiva and sclera clear  ENMT: Moist oral mucosa,   RESPIRATORY: Normal respiratory effort; lungs are clear to auscultation bilaterally  CARDIOVASCULAR: Regular rate and rhythm, normal S1 and S2, no murmur   EXTS: No lower extremity edema; Peripheral pulses are 2+ bilaterally  ABDOMEN: Nontender to palpation, normoactive bowel sounds, no rebound/guarding;   MUSCLOSKELETAL:   no joint swelling or tenderness to palpation  PSYCH: coop  NEUROLOGY: A+O to person, place, and time;  no gross sensory deficits;       LABS:                    Culture - Urine (collected 01 Jul 2025 18:00)  Source: Catheterized Catheterized  Final Report (03 Jul 2025 08:35):    No growth        RADIOLOGY & ADDITIONAL TESTS:  Results Reviewed:

## 2025-07-03 NOTE — PROGRESS NOTE ADULT - PROVIDER SPECIALTY LIST ADULT
Hospitalist
Hospitalist
Urology
Hospitalist
Infectious Disease
Infectious Disease
Hospitalist
Infectious Disease
Hospitalist

## 2025-07-03 NOTE — PHYSICAL THERAPY INITIAL EVALUATION ADULT - IMPAIRMENTS CONTRIBUTING IMPAIRED BED MOBILITY, REHAB EVAL
impaired balance/cognition/decreased flexibility/impaired postural control/decreased ROM/decreased strength
impaired coordination/decreased flexibility/impaired motor control/impaired postural control

## 2025-07-03 NOTE — PROGRESS NOTE ADULT - ASSESSMENT
89y/oF PMH CAD s/p PCI (2018), Afib on Xarelto, HFpEF, chronic hypoxemic respiratory failure since 2/2025 admission for influenza on 2L NC, recurrent UTIs, presenting with persistent dysuria and urinary frequency despite recent treatment for UTI w/ PO antibiotics    UTI  Urinary bladder calculi   -still c/o severe dysuria, burning in vagina"    -  ? if true UTI vs vaginal atrophy, pain from bladder stones  - no fever or leukocytosis  - initial UA + RBC normal wbc, UCX esbl proteus and e. faecalis vanco resistant. unclear how this was collected  - bcx neg  - plan was for meropenem x 3 days however patient continues to c/o burning with urination. UA was repeated 6/30 via straight cath + pyuria and RBC. 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  Op f/u with Dr Maldonado or Dr Mcginnis    Hx chronic hypoxemic respiratory failure  -No acute respiratory symptoms   -On baseline O2 requirements of 2L NC    Chronic diastolic HFpEF  -Holding furosemide due to SORAIDA. Resume on dc     Hx Afib  -cont Xarelto    Hx spinal stenosis  -cont gabapentin (home dose 600mg tid, decreased on admission for SORAIDA), increased dose 6/27, monitor bmp     Hx CAD with chronic stable angina  -cont ranolazine, atorvastatin     Hx hypothyroidism  -cont levothyroxine     Hx LLE ulcer  -Has dressing placed by home visiting RN  -Wound care consult recs appreciated    Dispo: pending urine c/s.  home w/home care   dw pt  at bedside     89y/oF PMH CAD s/p PCI (2018), Afib on Xarelto, HFpEF, chronic hypoxemic respiratory failure since 2/2025 admission for influenza on 2L NC, recurrent UTIs, presenting with persistent dysuria and urinary frequency despite recent treatment for UTI w/ PO antibiotics    Ruled out UTI  Urinary bladder calculi   -still c/o  burning in vagina"? vaginal atrophy     -  ? if true UTI vs vaginal atrophy, pain from bladder stones, urine c/s neg  - no fever or leukocytosis  - initial UA + RBC normal wbc, UCX esbl proteus and e. faecalis vanco resistant. unclear how this was collected. pt was on merrem . dc today .repeat urine c/s neg  - will dc gilliland. pt's pain likley from vaginal atrophy. Pyridium did not help  - bcx neg  - plan was for meropenem x 3 days however patient continues to c/o burning with urination. UA was repeated 6/30 via straight cath + pyuria and RBC. meropenem was continued. dc today   - f/u UCX sent 7/1/25  - on pyridium  - urology rec gilliland if pain persist with gilliland ,pt is not related to uti/bladder stone.   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 vaginal atrophy causing pain  -start estrogen cream   -f/u gyn out pt   -f/u urology      SORAIDA likely 2/2 UTI with component of pre-renal from dehydration  -sCr improved s/p IVF     Hx possible urothelial lesion  -CT scan 3/2025 w/ nodularity in posterior bladder, inability to exclude lesion   -Urology consulted, recs no  intervention.  Op f/u with Dr Maldonado or Dr Mcginnis    Hx chronic hypoxemic respiratory failure  -No acute respiratory symptoms   -On baseline O2 requirements of 2L NC    Chronic diastolic HFpEF  -Holding furosemide due to SORAIDA. Resume on dc     Hx Afib  -cont Xarelto    Hx spinal stenosis  -cont gabapentin (home dose 600mg tid, decreased on admission for SORAIDA), increased dose 6/27, monitor bmp     Hx CAD with chronic stable angina  -cont ranolazine, atorvastatin     Hx hypothyroidism  -cont levothyroxine     Hx LLE ulcer  -Has dressing placed by home visiting RN  -Wound care consult recs appreciated    Physical deconditioning  -PT EVAL  -pt used up GARRETT time     Dispo: pending PT eval. home w/home care   dw pt  at bedside  NINA durán

## 2025-07-03 NOTE — PHYSICAL THERAPY INITIAL EVALUATION ADULT - NSPTDISCHREC_GEN_A_CORE
PT continues to recommend [facility] DC, however if [facility] DC is not available, pt will need the following for safe DC home: transport services to get pt into home as pt cannot ambulate the stairs to enter, home at w/c level with total assist from family for bed to/from w/c transfers, hands on assist from family for all mobility needs./Sub-acute Rehab PT continues to recommend [facility] DC, however if [facility] is not available, pt will need the following for safe DC home: transport services to get pt into home as pt cannot ambulate the stairs to enter, home at w/c level with 24/7 supervision, total assist for bed to/from w/c transfers, hands on assist for all mobility needs./Sub-acute Rehab

## 2025-07-03 NOTE — PHYSICAL THERAPY INITIAL EVALUATION ADULT - TRANSFER SAFETY CONCERNS NOTED: SIT/STAND, REHAB EVAL
losing balance/decreased weight-shifting ability
decreased balance during turns/decreased safety awareness/decreased proprioception/decreased sequencing ability

## 2025-07-04 VITALS
HEART RATE: 71 BPM | DIASTOLIC BLOOD PRESSURE: 74 MMHG | TEMPERATURE: 98 F | OXYGEN SATURATION: 92 % | RESPIRATION RATE: 16 BRPM | SYSTOLIC BLOOD PRESSURE: 138 MMHG

## 2025-07-04 PROCEDURE — 87591 N.GONORRHOEAE DNA AMP PROB: CPT

## 2025-07-04 PROCEDURE — 80048 BASIC METABOLIC PNL TOTAL CA: CPT

## 2025-07-04 PROCEDURE — 87186 SC STD MICRODIL/AGAR DIL: CPT

## 2025-07-04 PROCEDURE — 99285 EMERGENCY DEPT VISIT HI MDM: CPT

## 2025-07-04 PROCEDURE — 81001 URINALYSIS AUTO W/SCOPE: CPT

## 2025-07-04 PROCEDURE — 80053 COMPREHEN METABOLIC PANEL: CPT

## 2025-07-04 PROCEDURE — 85025 COMPLETE CBC W/AUTO DIFF WBC: CPT

## 2025-07-04 PROCEDURE — 36415 COLL VENOUS BLD VENIPUNCTURE: CPT

## 2025-07-04 PROCEDURE — 87077 CULTURE AEROBIC IDENTIFY: CPT

## 2025-07-04 PROCEDURE — 87800 DETECT AGNT MULT DNA DIREC: CPT

## 2025-07-04 PROCEDURE — 74176 CT ABD & PELVIS W/O CONTRAST: CPT

## 2025-07-04 PROCEDURE — 83735 ASSAY OF MAGNESIUM: CPT

## 2025-07-04 PROCEDURE — 97163 PT EVAL HIGH COMPLEX 45 MIN: CPT

## 2025-07-04 PROCEDURE — 85027 COMPLETE CBC AUTOMATED: CPT

## 2025-07-04 PROCEDURE — 87086 URINE CULTURE/COLONY COUNT: CPT

## 2025-07-04 PROCEDURE — 99239 HOSP IP/OBS DSCHRG MGMT >30: CPT

## 2025-07-04 PROCEDURE — 83605 ASSAY OF LACTIC ACID: CPT

## 2025-07-04 PROCEDURE — 87491 CHLMYD TRACH DNA AMP PROBE: CPT

## 2025-07-04 PROCEDURE — 83690 ASSAY OF LIPASE: CPT

## 2025-07-04 PROCEDURE — 87040 BLOOD CULTURE FOR BACTERIA: CPT

## 2025-07-04 PROCEDURE — 84100 ASSAY OF PHOSPHORUS: CPT

## 2025-07-04 RX ADMIN — Medication 40 MILLIGRAM(S): at 05:11

## 2025-07-04 RX ADMIN — Medication 100 MILLIGRAM(S): at 05:11

## 2025-07-04 RX ADMIN — Medication 50 MICROGRAM(S): at 05:11

## 2025-07-04 RX ADMIN — RANOLAZINE 500 MILLIGRAM(S): 1000 TABLET, FILM COATED, EXTENDED RELEASE ORAL at 05:11

## 2025-07-04 RX ADMIN — GABAPENTIN 400 MILLIGRAM(S): 400 CAPSULE ORAL at 05:11

## 2025-07-04 RX ADMIN — Medication 325 MILLIGRAM(S): at 05:11

## 2025-07-21 NOTE — DIETITIAN INITIAL EVALUATION ADULT. - LOSS OF SUBCUTANEOUS FAT
Orders:    Ambulatory referral to Spine & Pain Management    MRI lumbar spine w wo contrast; Future    
Orbital.../Fat overlying ribcage.../Buccal...

## 2025-08-15 ENCOUNTER — APPOINTMENT (OUTPATIENT)
Dept: UROGYNECOLOGY | Facility: CLINIC | Age: 89
End: 2025-08-15
Payer: MEDICARE

## 2025-08-15 VITALS
WEIGHT: 105 LBS | SYSTOLIC BLOOD PRESSURE: 162 MMHG | BODY MASS INDEX: 22.04 KG/M2 | HEIGHT: 58 IN | DIASTOLIC BLOOD PRESSURE: 79 MMHG

## 2025-08-15 DIAGNOSIS — N81.9 FEMALE GENITAL PROLAPSE, UNSPECIFIED: ICD-10-CM

## 2025-08-15 DIAGNOSIS — R33.9 RETENTION OF URINE, UNSPECIFIED: ICD-10-CM

## 2025-08-15 DIAGNOSIS — R30.0 DYSURIA: ICD-10-CM

## 2025-08-15 DIAGNOSIS — K59.00 CONSTIPATION, UNSPECIFIED: ICD-10-CM

## 2025-08-15 DIAGNOSIS — R35.0 FREQUENCY OF MICTURITION: ICD-10-CM

## 2025-08-15 DIAGNOSIS — R39.13 SPLITTING OF URINARY STREAM: ICD-10-CM

## 2025-08-15 DIAGNOSIS — R35.1 NOCTURIA: ICD-10-CM

## 2025-08-15 DIAGNOSIS — N39.0 URINARY TRACT INFECTION, SITE NOT SPECIFIED: ICD-10-CM

## 2025-08-15 DIAGNOSIS — R39.15 URGENCY OF URINATION: ICD-10-CM

## 2025-08-15 DIAGNOSIS — R31.9 HEMATURIA, UNSPECIFIED: ICD-10-CM

## 2025-08-15 DIAGNOSIS — R10.2 PELVIC AND PERINEAL PAIN: ICD-10-CM

## 2025-08-15 DIAGNOSIS — Z82.3 FAMILY HISTORY OF STROKE: ICD-10-CM

## 2025-08-15 DIAGNOSIS — R15.9 FULL INCONTINENCE OF FECES: ICD-10-CM

## 2025-08-15 DIAGNOSIS — R32 UNSPECIFIED URINARY INCONTINENCE: ICD-10-CM

## 2025-08-15 LAB
BILIRUB UR QL STRIP: NORMAL
CLARITY UR: NORMAL
COLLECTION METHOD: NORMAL
GLUCOSE UR-MCNC: NEGATIVE
HCG UR QL: 1 EU/DL
HGB UR QL STRIP.AUTO: NORMAL
KETONES UR-MCNC: NEGATIVE
LEUKOCYTE ESTERASE UR QL STRIP: NORMAL
NITRITE UR QL STRIP: NEGATIVE
PH UR STRIP: 7
PROT UR STRIP-MCNC: 300
SP GR UR STRIP: 1.03

## 2025-08-15 PROCEDURE — 99459 PELVIC EXAMINATION: CPT

## 2025-08-15 PROCEDURE — 99204 OFFICE O/P NEW MOD 45 MIN: CPT | Mod: 25

## 2025-08-15 PROCEDURE — 51701 INSERT BLADDER CATHETER: CPT

## 2025-08-15 PROCEDURE — 81003 URINALYSIS AUTO W/O SCOPE: CPT | Mod: QW

## 2025-08-18 ENCOUNTER — LABORATORY RESULT (OUTPATIENT)
Age: 89
End: 2025-08-18

## 2025-08-18 LAB — BACTERIA UR CULT: NORMAL

## 2025-08-19 ENCOUNTER — NON-APPOINTMENT (OUTPATIENT)
Age: 89
End: 2025-08-19

## 2025-08-19 LAB — URINE CYTOLOGY: NORMAL

## 2025-08-24 LAB
APPEARANCE: ABNORMAL
BACTERIA: ABNORMAL /HPF
BILIRUBIN URINE: ABNORMAL
BLOOD URINE: ABNORMAL
COLOR: ABNORMAL
GLUCOSE QUALITATIVE U: 100 MG/DL
KETONES URINE: NEGATIVE MG/DL
LEUKOCYTE ESTERASE URINE: ABNORMAL
MICROSCOPIC-UA: NORMAL
NITRITE URINE: NEGATIVE
PH URINE: >=9
PROTEIN URINE: >=1000 MG/DL
RED BLOOD CELLS URINE: >1900 /HPF
SPECIFIC GRAVITY URINE: 1.03
SQUAMOUS EPITHELIAL CELLS: PRESENT
TRIPLE PHOSPHATE CRYSTALS: PRESENT
URINE COMMENTS: NORMAL
UROBILINOGEN URINE: 1 MG/DL
WHITE BLOOD CELLS URINE: 22 /HPF

## 2025-08-26 ENCOUNTER — APPOINTMENT (OUTPATIENT)
Dept: UROLOGY | Facility: CLINIC | Age: 89
End: 2025-08-26
Payer: MEDICARE

## 2025-08-26 ENCOUNTER — APPOINTMENT (OUTPATIENT)
Dept: CARDIOLOGY | Facility: CLINIC | Age: 89
End: 2025-08-26

## 2025-08-26 VITALS
HEART RATE: 83 BPM | SYSTOLIC BLOOD PRESSURE: 163 MMHG | BODY MASS INDEX: 22.04 KG/M2 | HEIGHT: 58 IN | OXYGEN SATURATION: 93 % | WEIGHT: 105 LBS | DIASTOLIC BLOOD PRESSURE: 67 MMHG

## 2025-08-26 VITALS
WEIGHT: 105 LBS | BODY MASS INDEX: 22.04 KG/M2 | HEIGHT: 58 IN | HEART RATE: 81 BPM | SYSTOLIC BLOOD PRESSURE: 134 MMHG | DIASTOLIC BLOOD PRESSURE: 75 MMHG

## 2025-08-26 DIAGNOSIS — N21.0 CALCULUS IN BLADDER: ICD-10-CM

## 2025-08-26 DIAGNOSIS — Z00.00 ENCOUNTER FOR GENERAL ADULT MEDICAL EXAMINATION W/OUT ABNORMAL FINDINGS: ICD-10-CM

## 2025-08-26 DIAGNOSIS — E78.00 PURE HYPERCHOLESTEROLEMIA, UNSPECIFIED: ICD-10-CM

## 2025-08-26 DIAGNOSIS — I48.19 OTHER PERSISTENT ATRIAL FIBRILLATION: ICD-10-CM

## 2025-08-26 DIAGNOSIS — R07.9 CHEST PAIN, UNSPECIFIED: ICD-10-CM

## 2025-08-26 DIAGNOSIS — I25.10 ATHEROSCLEROTIC HEART DISEASE OF NATIVE CORONARY ARTERY W/OUT ANGINA PECTORIS: ICD-10-CM

## 2025-08-26 DIAGNOSIS — I25.119 ATHEROSCLEROTIC HEART DISEASE OF NATIVE CORONARY ARTERY WITH UNSPECIFIED ANGINA PECTORIS: ICD-10-CM

## 2025-08-26 DIAGNOSIS — I20.89 OTHER FORMS OF ANGINA PECTORIS: ICD-10-CM

## 2025-08-26 DIAGNOSIS — I10 ESSENTIAL (PRIMARY) HYPERTENSION: ICD-10-CM

## 2025-08-26 DIAGNOSIS — I50.30 UNSPECIFIED DIASTOLIC (CONGESTIVE) HEART FAILURE: ICD-10-CM

## 2025-08-26 DIAGNOSIS — N39.0 URINARY TRACT INFECTION, SITE NOT SPECIFIED: ICD-10-CM

## 2025-08-26 PROCEDURE — 99214 OFFICE O/P EST MOD 30 MIN: CPT

## 2025-08-26 RX ORDER — TAMSULOSIN HYDROCHLORIDE 0.4 MG/1
0.4 CAPSULE ORAL
Qty: 90 | Refills: 2 | Status: ACTIVE | COMMUNITY
Start: 2025-08-26 | End: 1900-01-01

## 2025-08-29 ENCOUNTER — NON-APPOINTMENT (OUTPATIENT)
Age: 89
End: 2025-08-29

## 2025-09-12 RX ORDER — FUROSEMIDE 20 MG/1
20 TABLET ORAL EVERY OTHER DAY
Qty: 45 | Refills: 1 | Status: ACTIVE | COMMUNITY
Start: 2025-09-11 | End: 1900-01-01

## 2025-09-17 ENCOUNTER — NON-APPOINTMENT (OUTPATIENT)
Age: 89
End: 2025-09-17

## 2025-09-19 ENCOUNTER — NON-APPOINTMENT (OUTPATIENT)
Age: 89
End: 2025-09-19